# Patient Record
Sex: FEMALE | Race: WHITE | NOT HISPANIC OR LATINO | Employment: OTHER | URBAN - METROPOLITAN AREA
[De-identification: names, ages, dates, MRNs, and addresses within clinical notes are randomized per-mention and may not be internally consistent; named-entity substitution may affect disease eponyms.]

---

## 2017-01-19 ENCOUNTER — OFFICE VISIT (OUTPATIENT)
Dept: BARIATRICS | Facility: CLINIC | Age: 72
End: 2017-01-19
Payer: MEDICARE

## 2017-01-19 VITALS
DIASTOLIC BLOOD PRESSURE: 64 MMHG | BODY MASS INDEX: 39.92 KG/M2 | HEIGHT: 63 IN | HEART RATE: 76 BPM | WEIGHT: 225.31 LBS | SYSTOLIC BLOOD PRESSURE: 110 MMHG

## 2017-01-19 DIAGNOSIS — I10 ESSENTIAL HYPERTENSION: Primary | ICD-10-CM

## 2017-01-19 DIAGNOSIS — M17.11 PRIMARY LOCALIZED OSTEOARTHROSIS, LOWER LEG, RIGHT: ICD-10-CM

## 2017-01-19 DIAGNOSIS — M17.0 OSTEOARTHRITIS OF BOTH KNEES, UNSPECIFIED OSTEOARTHRITIS TYPE: ICD-10-CM

## 2017-01-19 PROCEDURE — 99213 OFFICE O/P EST LOW 20 MIN: CPT | Mod: S$PBB,,, | Performed by: INTERNAL MEDICINE

## 2017-01-19 PROCEDURE — 99999 PR PBB SHADOW E&M-EST. PATIENT-LVL III: CPT | Mod: PBBFAC,,, | Performed by: INTERNAL MEDICINE

## 2017-01-19 PROCEDURE — 99213 OFFICE O/P EST LOW 20 MIN: CPT | Mod: PBBFAC | Performed by: INTERNAL MEDICINE

## 2017-01-19 RX ORDER — TOPIRAMATE 50 MG/1
50 TABLET, FILM COATED ORAL 2 TIMES DAILY
Qty: 180 TABLET | Refills: 1 | Status: ON HOLD | OUTPATIENT
Start: 2017-01-19 | End: 2018-03-07 | Stop reason: HOSPADM

## 2017-01-19 NOTE — MR AVS SNAPSHOT
[facility] - Bariatric Surgery  [address]  [facility] [address]  Phone: [phone]  Fax: [phone]                  Coby Atkinson   2017 1:30 PM   Office Visit    Description:  Female : 1945   Provider:  Cheryl Dunn MD   Department:  [facility] - Bariatric Surgery           Reason for Visit     Follow-up           Diagnoses this Visit        Comments    Essential hypertension    -  Primary     Primary localized osteoarthrosis, lower leg, right         Osteoarthritis of both knees, unspecified osteoarthritis type         Obesity, Class II, BMI 35-39.9, with comorbidity                To Do List           Future Appointments        Provider Department Dept Phone    3/9/2017 2:00 PM Cheryl Dunn MD [facility] Bariatric Surgery [phone]      Goals (5 Years of Data)     None      Follow-Up and Disposition     Return in about 3 months (around 2017).       These Medications        Disp Refills Start End    topiramate (TOPAMAX) 50 MG tablet 180 tablet 1 2017     Take 1 tablet (50 mg total) by mouth 2 (two) times daily. - Oral    [facility]: [facility] [address] [place] [address] AT [facility] Ph #: [phone]       Notes to Pharmacy: **Patient requests 90 days supply**      [facility]     [facility] On Call Nurse Care Line -  Assistance  Registered nurses in the [facility] provide clinical advisement, health education, appointment booking, and other advisory services.  Call for this free service at [phone].             Medications           Message regarding Medications     Verify the changes and/or additions to your medication regime listed below are the same as discussed with your clinician today.  If any of these changes or additions are incorrect, please notify your healthcare provider.             Verify that the below list of medications is an accurate  "representation of the medications you are currently taking.  If none reported, the list may be blank. If incorrect, please contact your healthcare provider. Carry this list with you in case of emergency.           Current Medications     amlodipine (NORVASC) 10 MG tablet Take 1 tablet (10 mg total) by mouth once daily.    escitalopram oxalate (LEXAPRO) 20 MG tablet TAKE 1 TABLET BY MOUTH ONCE DAILY    estrogens, conjugated, (PREMARIN) 0.45 MG tablet Take 1 tablet (0.45 mg total) by mouth once daily.    furosemide (LASIX) 20 MG tablet TAKE 1 TABLET BY MOUTH TWICE DAILY; MAY TAKE ADDITIONAL DOSE DAILY AS NEEDED FOR SWELLING    losartan-hydrochlorothiazide 100-25 mg (HYZAAR) 100-25 mg per tablet Take 1 tablet by mouth once daily.    meloxicam (MOBIC) 7.5 MG tablet TAKE 1 TABLET BY MOUTH EVERY DAY    meloxicam (MOBIC) 7.5 MG tablet TAKE 1 TABLET BY MOUTH EVERY DAY    metoprolol succinate (TOPROL-XL) 25 MG 24 hr tablet TAKE 1 TABLET BY MOUTH ONCE DAILY    metoprolol succinate (TOPROL-XL) 25 MG 24 hr tablet TAKE 1 TABLET BY MOUTH ONCE DAILY    naproxen (NAPROSYN) 500 MG tablet Take 1 tablet (500 mg total) by mouth 2 (two) times daily with meals.    pantoprazole (PROTONIX) 40 MG tablet Take 1 tablet (40 mg total) by mouth once daily.    potassium chloride (MICRO-K) 10 MEQ CpSR Take 2 capsules (20 mEq total) by mouth once daily.    topiramate (TOPAMAX) 50 MG tablet Take 1 tablet (50 mg total) by mouth 2 (two) times daily.           Clinical Reference Information           Vital Signs - Last Recorded  Most recent update: 1/19/2017  1:28 PM by Ismael Montoya MA    BP Pulse Ht Wt BMI    110/64 76 5' 3" (1.6 m) 102.2 kg (225 lb 5 oz) 39.91 kg/m2      Blood Pressure          Most Recent Value    BP  110/64      Allergies as of 1/19/2017     Codeine    Ciprofloxacin      Immunizations Administered on Date of Encounter - 1/19/2017     None      Instructions    No soda, sweet tea, juices or lemonade    Walking 5-10 minutes 3-4 " times day.     3 meals a day made up from the following:    Proteins: (1/2 cup scoop)  Any meat, fish, shell fish, eggs, cottage cheese    Vegetables: (1 cup scoop- can have 2 serving a meal)  Any green vegetables, tomatoes, cauliflower, mushrooms, squash or zuchinni    Fruit: (1/2 cup scoop)  Citrus fruits, apples, peaches, melon, pineapple, berries      Starches: (1/2 cup)  Bread (1 slice), rice, noodles, potatoes, grains    1 time a day  Dairy:  Cheese- 1 slice, milk 8 oz, yogurt - plain 4 oz  Fat: 2 tsp olive oil, 1 pat butter/margerine, or 1/4 avocado    1 Snack a day: 1/2 cup protein, 1/2 cup starch and 1/2 cup fruit or vegetable.     Patient was informed that topiramate is used for migraine prevention and seizures. Weight loss is a common side effect that is well documented. She understands this. She was informed of the potential side effects such as serious and possibly fatal rash in which case the medication should be discontinued immediately. Paresthesias, forgetfulness, fatigue, kidney stones, GI symptoms, and changes in lab values such as electrolytes, blood counts and kidney function.    Return in about 6 weeks

## 2017-01-19 NOTE — PROGRESS NOTES
"Subjective:       Patient ID: Coby Atkinson is a 71 y.o. female.    Chief Complaint: Follow-up    HPI Comments: Pt here today for follow up. Has lost 20 lbs, 9 since last OV.. BP remains normal.  She has been on the topiramate.   She has been following her eating plan measuring portions,  Her brother , and she was down. She has not been walking.  Plans [place] in the mall. .      Review of Systems   Constitutional: Negative for chills and fever.   Respiratory: Positive for shortness of breath.         + snores. PSG pending   Cardiovascular: Positive for leg swelling. Negative for chest pain.   Gastrointestinal: Negative for constipation and diarrhea.        On PPI. ocasional indigestion. None recently     Endocrine: Positive for cold intolerance.   Musculoskeletal: Positive for arthralgias. Negative for back pain.        Knees   Neurological: Positive for light-headedness. Negative for dizziness.   Psychiatric/Behavioral: Positive for dysphoric mood. The patient is nervous/anxious.         Under control with medication       Objective:       Visit Vitals    /64    Pulse 76    Ht 5' 3" (1.6 m)    Wt 102.2 kg (225 lb 5 oz)    BMI 39.91 kg/m2       Physical Exam   Constitutional: She is oriented to person, place, and time. She appears well-developed and well-nourished. No distress.   Morbidly obese       HENT:   Head: Normocephalic and atraumatic.   Eyes: EOM are normal. Pupils are equal, round, and reactive to light.   Neck: Normal range of motion. Neck supple.   Cardiovascular: Normal rate and normal heart sounds.  Exam reveals no gallop and no friction rub.    No murmur heard.  Pulmonary/Chest: Effort normal and breath sounds normal. No respiratory distress. She has no wheezes.   Musculoskeletal: Normal range of motion. She exhibits no edema.   Neurological: She is alert and oriented to person, place, and time. No cranial nerve deficit.   Skin: Skin is warm and dry. No erythema.   Psychiatric: " She has a normal mood and affect. Her behavior is normal. Judgment normal.   Vitals reviewed.      Assessment:           1. Essential hypertension    2. Primary localized osteoarthrosis, lower leg, right    3. Osteoarthritis of both knees, unspecified osteoarthritis type    4. Obesity, Class II, BMI 35-39.9, with comorbidity        Plan:               Coby was seen today for follow-up.    Diagnoses and all orders for this visit:    Essential hypertension  The current medical regimen is effective;  continue present plan and medications.    Primary localized osteoarthrosis, lower leg, right  Increase low impact activity as tolerated.  Avoid high impact activity, very heavy lifting or other exercise regimens that may cause discomfort.     Osteoarthritis of both knees, unspecified osteoarthritis type      Obesity, Class II, BMI 35-39.9, with comorbidity    Other orders  -     topiramate (TOPAMAX) 50 MG tablet; Take 1 tablet (50 mg total) by mouth 2 (two) times daily.      Doing well. tolerating meds without SE. Continue portion control diet      No soda, sweet tea, juices or lemonade    Walking 5 minutes 3-4 times day.     3 meals a day made up from the following:    Proteins: (1/2 cup scoop)  Any meat, fish, shell fish, eggs, cottage cheese    Vegetables: (1 cup scoop- can have 2 serving a meal)  Any green vegetables, tomatoes, cauliflower, mushrooms, squash or zuchinni    Fruit: (1/2 cup scoop)  Citrus fruits, apples, peaches, melon, pineapple, berries      Starches: (1/2 cup)  Bread (1 slice), rice, noodles, potatoes, grains    1 time a day  Dairy:  Cheese- 1 slice, milk 8 oz, yogurt - plain 4 oz  Fat: 2 tsp olive oil, 1 pat butter/margerine, or 1/4 avocado    1 Snack a day: 1/2 cup protein, 1/2 cup starch and 1/2 cup fruit or vegetable.     Patient was informed that topiramate is used for migraine prevention and seizures. Weight loss is a common side effect that is well documented. She understands this. She was  informed of the potential side effects such as serious and possibly fatal rash in which case the medication should be discontinued immediately. Paresthesias, forgetfulness, fatigue, kidney stones, GI symptoms, and changes in lab values such as electrolytes, blood counts and kidney function.    Return in about 6 weeks

## 2017-01-19 NOTE — PATIENT INSTRUCTIONS
No soda, sweet tea, juices or lemonade    Walking 5-10 minutes 3-4 times day.     3 meals a day made up from the following:    Proteins: (1/2 cup scoop)  Any meat, fish, shell fish, eggs, cottage cheese    Vegetables: (1 cup scoop- can have 2 serving a meal)  Any green vegetables, tomatoes, cauliflower, mushrooms, squash or zuchinni    Fruit: (1/2 cup scoop)  Citrus fruits, apples, peaches, melon, pineapple, berries      Starches: (1/2 cup)  Bread (1 slice), rice, noodles, potatoes, grains    1 time a day  Dairy:  Cheese- 1 slice, milk 8 oz, yogurt - plain 4 oz  Fat: 2 tsp olive oil, 1 pat butter/margerine, or 1/4 avocado    1 Snack a day: 1/2 cup protein, 1/2 cup starch and 1/2 cup fruit or vegetable.     Patient was informed that topiramate is used for migraine prevention and seizures. Weight loss is a common side effect that is well documented. She understands this. She was informed of the potential side effects such as serious and possibly fatal rash in which case the medication should be discontinued immediately. Paresthesias, forgetfulness, fatigue, kidney stones, GI symptoms, and changes in lab values such as electrolytes, blood counts and kidney function.    Return in about 6 weeks

## 2017-02-16 ENCOUNTER — TELEPHONE (OUTPATIENT)
Dept: INTERNAL MEDICINE | Facility: CLINIC | Age: 72
End: 2017-02-16

## 2017-02-16 DIAGNOSIS — N95.9 POSTMENOPAUSAL SYMPTOMS: Primary | ICD-10-CM

## 2017-02-16 NOTE — TELEPHONE ENCOUNTER
----- Message from Nayana Deleon MA sent at 2/15/2017 10:30 AM CST -----  Premarin .45 requires prior auth from insurance  Per jovi [phone].    Medicare phone   Id#[id number]    (patient may have to pay for rx- they consider it high risk at her age)

## 2017-03-09 ENCOUNTER — OFFICE VISIT (OUTPATIENT)
Dept: BARIATRICS | Facility: CLINIC | Age: 72
End: 2017-03-09
Payer: MEDICARE

## 2017-03-09 VITALS
WEIGHT: 223.75 LBS | HEIGHT: 63 IN | SYSTOLIC BLOOD PRESSURE: 124 MMHG | HEART RATE: 78 BPM | DIASTOLIC BLOOD PRESSURE: 72 MMHG | BODY MASS INDEX: 39.64 KG/M2

## 2017-03-09 DIAGNOSIS — M17.0 OSTEOARTHRITIS OF BOTH KNEES, UNSPECIFIED OSTEOARTHRITIS TYPE: ICD-10-CM

## 2017-03-09 DIAGNOSIS — E66.01 SEVERE OBESITY (BMI 35.0-39.9) WITH COMORBIDITY: Primary | ICD-10-CM

## 2017-03-09 PROCEDURE — 99999 PR PBB SHADOW E&M-EST. PATIENT-LVL IV: CPT | Mod: PBBFAC,,, | Performed by: INTERNAL MEDICINE

## 2017-03-09 PROCEDURE — 99214 OFFICE O/P EST MOD 30 MIN: CPT | Mod: PBBFAC | Performed by: INTERNAL MEDICINE

## 2017-03-09 PROCEDURE — 99213 OFFICE O/P EST LOW 20 MIN: CPT | Mod: S$PBB,,, | Performed by: INTERNAL MEDICINE

## 2017-03-09 NOTE — MR AVS SNAPSHOT
[facility] - Bariatric Surgery  [address]  [facility] [address]  Phone: [phone]  Fax: [phone]                  Coby Atkinson   3/9/2017 2:00 PM   Office Visit    Description:  Female : 1945   Provider:  Cheryl Dunn MD   Department:  [facility] - Bariatric Surgery           Reason for Visit     Follow-up           Diagnoses this Visit        Comments    Severe obesity (BMI 35.0-39.9) with comorbidity    -  Primary     Osteoarthritis of both knees, unspecified osteoarthritis type                To Do List           Future Appointments        Provider Department Dept Phone    3/28/2017 11:15 AM Ginny Pham MD [place] - OB/ -[phone]    2017 2:45 PM Cheryl Dunn MD [facility] - Bariatric Surgery [phone]      Goals (5 Years of Data)     None      [facility]     [facility] On Call Nurse Care Line -  Assistance  Registered nurses in the [facility] On Call Center provide clinical advisement, health education, appointment booking, and other advisory services.  Call for this free service at [phone].             Medications           Message regarding Medications     Verify the changes and/or additions to your medication regime listed below are the same as discussed with your clinician today.  If any of these changes or additions are incorrect, please notify your healthcare provider.             Verify that the below list of medications is an accurate representation of the medications you are currently taking.  If none reported, the list may be blank. If incorrect, please contact your healthcare provider. Carry this list with you in case of emergency.           Current Medications     amlodipine (NORVASC) 10 MG tablet Take 1 tablet (10 mg total) by mouth once daily.    escitalopram oxalate (LEXAPRO) 20 MG tablet TAKE 1 TABLET BY MOUTH ONCE DAILY    estrogens, conjugated, (PREMARIN) 0.45 MG tablet Take 1 tablet (0.45 mg total) by mouth once  "daily.    furosemide (LASIX) 20 MG tablet TAKE 1 TABLET BY MOUTH TWICE DAILY; MAY TAKE ADDITIONAL DOSE DAILY AS NEEDED FOR SWELLING    losartan-hydrochlorothiazide 100-25 mg (HYZAAR) 100-25 mg per tablet Take 1 tablet by mouth once daily.    meloxicam (MOBIC) 7.5 MG tablet TAKE 1 TABLET BY MOUTH EVERY DAY    meloxicam (MOBIC) 7.5 MG tablet TAKE 1 TABLET BY MOUTH EVERY DAY    metoprolol succinate (TOPROL-XL) 25 MG 24 hr tablet TAKE 1 TABLET BY MOUTH ONCE DAILY    metoprolol succinate (TOPROL-XL) 25 MG 24 hr tablet TAKE 1 TABLET BY MOUTH ONCE DAILY    naproxen (NAPROSYN) 500 MG tablet Take 1 tablet (500 mg total) by mouth 2 (two) times daily with meals.    pantoprazole (PROTONIX) 40 MG tablet Take 1 tablet (40 mg total) by mouth once daily.    potassium chloride (MICRO-K) 10 MEQ CpSR Take 2 capsules (20 mEq total) by mouth once daily.    topiramate (TOPAMAX) 50 MG tablet Take 1 tablet (50 mg total) by mouth 2 (two) times daily.           Clinical Reference Information           Your Vitals Were     BP Pulse Height Weight BMI    124/72 78 5' 3" (1.6 m) 101.5 kg (223 lb 12.3 oz) 39.64 kg/m2      Blood Pressure          Most Recent Value    BP  124/72      Allergies as of 3/9/2017     Codeine    Ciprofloxacin      Immunizations Administered on Date of Encounter - 3/9/2017     None      Instructions    No soda, sweet tea, juices or lemonade     Walking 5 minutes 3-4 times day.      3 meals a day made up from the following:     Proteins: (1/2 cup scoop)  Any meat, fish, shell fish, eggs, cottage cheese     Vegetables: (1 cup scoop- can have 2 serving a meal)  Any green vegetables, tomatoes, cauliflower, mushrooms, squash or zuchinni     Fruit: (1/2 cup scoop)  Citrus fruits, apples, peaches, melon, pineapple, berries        Starches: (1/2 cup)  Bread (1 slice), rice, noodles, potatoes, grains     1 time a day  Dairy:  Cheese- 1 slice, milk 8 oz, yogurt - plain 4 oz  Fat: 2 tsp olive oil, 1 pat butter/margerine, or 1/4 " avocado     1 Snack a day: 1/2 cup protein, 1/2 cup starch and 1/2 cup fruit or vegetable.      Patient was informed that topiramate is used for migraine prevention and seizures. Weight loss is a common side effect that is well documented. She understands this. She was informed of the potential side effects such as serious and possibly fatal rash in which case the medication should be discontinued immediately. Paresthesias, forgetfulness, fatigue, kidney stones, GI symptoms, and changes in lab values such as electrolytes, blood counts and kidney function.     Return in about 6 weeks        Language Assistance Services     ATTENTION: Language assistance services are available, free of charge. Please call [phone].      ATENCIÓN: Si kuldipla rena, tiene a carrasco disposición servicios gratuitos de asistencia lingüística. Llame al [phone].     HARDIK Ý: N?u b?n nói Ti?ng Vi?t, có các d?ch v? h? tr? ngôn ng? mi?n phí dành cho b?n. G?i s? [phone].         [facility] complies with applicable Federal civil rights laws and does not discriminate on the basis of race, color, national origin, age, disability, or sex.

## 2017-03-09 NOTE — PROGRESS NOTES
"Subjective:       Patient ID: Coby Atkinson is a 71 y.o. female.    Chief Complaint: Follow-up    HPI Comments: Pt here today for follow up. Has lost 22 lbs,2 since last OV.. BP remains normal.  She has been on the topiramate.   She has been following her eating plan measuring portions. 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 Has not done as much walking.       Review of Systems   Constitutional: Negative for chills and fever.   Respiratory: Positive for shortness of breath.         + snores. PSG pending   Cardiovascular: Positive for leg swelling. Negative for chest pain.   Gastrointestinal: Negative for constipation and diarrhea.        On PPI. ocasional indigestion. None recently     Endocrine: Positive for cold intolerance.   Musculoskeletal: Positive for arthralgias. Negative for back pain.        Knees   Neurological: Positive for light-headedness. Negative for dizziness.   Psychiatric/Behavioral: Positive for dysphoric mood. The patient is nervous/anxious.         Under control with medication       Objective:       /72  Pulse 78  Ht 5' 3" (1.6 m)  Wt 101.5 kg (223 lb 12.3 oz)  BMI 39.64 kg/m2    Physical Exam   Constitutional: She is oriented to person, place, and time. She appears well-developed and well-nourished. No distress.   Morbidly obese       HENT:   Head: Normocephalic and atraumatic.   Eyes: EOM are normal. Pupils are equal, round, and reactive to light.   Neck: Normal range of motion. Neck supple.   Cardiovascular: Normal rate and normal heart sounds.  Exam reveals no gallop and no friction rub.    No murmur heard.  Pulmonary/Chest: Effort normal and breath sounds normal. No respiratory distress. She has no wheezes.   Musculoskeletal: Normal range of motion. She exhibits no edema.   Neurological: She is alert and oriented to person, place, and time. No cranial nerve deficit.   Skin: Skin is warm and dry. No erythema.   Psychiatric: She has a normal mood and " affect. Her behavior is normal. Judgment normal.   Vitals reviewed.      Assessment:           1. Severe obesity (BMI 35.0-39.9) with comorbidity    2. Osteoarthritis of both knees, unspecified osteoarthritis type        Plan:           1. Severe obesity (BMI 35.0-39.9) with comorbidity  DOing well. Decrease starches and increase walking    2. Osteoarthritis of both knees, unspecified osteoarthritis type  Increase low impact activity as tolerated.  Avoid high impact activity, very heavy lifting or other exercise regimens that may cause discomfort.             Doing well. tolerating meds without SE. Continue portion control diet      No soda, sweet tea, juices or lemonade    Walking 5 minutes 3-4 times day.     3 meals a day made up from the following:    Proteins: (1/2 cup scoop)  Any meat, fish, shell fish, eggs, cottage cheese    Vegetables: (1 cup scoop- can have 2 serving a meal)  Any green vegetables, tomatoes, cauliflower, mushrooms, squash or zuchinni    Fruit: (1/2 cup scoop)  Citrus fruits, apples, peaches, melon, pineapple, berries      Starches: (1/2 cup)  Bread (1 slice), rice, noodles, potatoes, grains    1 time a day  Dairy:  Cheese- 1 slice, milk 8 oz, yogurt - plain 4 oz  Fat: 2 tsp olive oil, 1 pat butter/margerine, or 1/4 avocado    1 Snack a day: 1/2 cup protein, 1/2 cup starch and 1/2 cup fruit or vegetable.     Patient was informed that topiramate is used for migraine prevention and seizures. Weight loss is a common side effect that is well documented. She understands this. She was informed of the potential side effects such as serious and possibly fatal rash in which case the medication should be discontinued immediately. Paresthesias, forgetfulness, fatigue, kidney stones, GI symptoms, and changes in lab values such as electrolytes, blood counts and kidney function.    Return in about 6 weeks

## 2017-03-09 NOTE — PATIENT INSTRUCTIONS
No soda, sweet tea, juices or lemonade     Walking 5 minutes 3-4 times day.      3 meals a day made up from the following:     Proteins: (1/2 cup scoop)  Any meat, fish, shell fish, eggs, cottage cheese     Vegetables: (1 cup scoop- can have 2 serving a meal)  Any green vegetables, tomatoes, cauliflower, mushrooms, squash or zuchinni     Fruit: (1/2 cup scoop)  Citrus fruits, apples, peaches, melon, pineapple, berries        Starches: (1/2 cup)  Bread (1 slice), rice, noodles, potatoes, grains     1 time a day  Dairy:  Cheese- 1 slice, milk 8 oz, yogurt - plain 4 oz  Fat: 2 tsp olive oil, 1 pat butter/margerine, or 1/4 avocado     1 Snack a day: 1/2 cup protein, 1/2 cup starch and 1/2 cup fruit or vegetable.      Patient was informed that topiramate is used for migraine prevention and seizures. Weight loss is a common side effect that is well documented. She understands this. She was informed of the potential side effects such as serious and possibly fatal rash in which case the medication should be discontinued immediately. Paresthesias, forgetfulness, fatigue, kidney stones, GI symptoms, and changes in lab values such as electrolytes, blood counts and kidney function.     Return in about 6 weeks

## 2017-03-27 RX ORDER — MELOXICAM 7.5 MG/1
TABLET ORAL
Qty: 90 TABLET | Refills: 0 | Status: SHIPPED | OUTPATIENT
Start: 2017-03-27 | End: 2017-04-27

## 2017-04-20 ENCOUNTER — OFFICE VISIT (OUTPATIENT)
Dept: BARIATRICS | Facility: CLINIC | Age: 72
End: 2017-04-20
Payer: MEDICARE

## 2017-04-20 VITALS
BODY MASS INDEX: 38.71 KG/M2 | DIASTOLIC BLOOD PRESSURE: 78 MMHG | HEIGHT: 63 IN | SYSTOLIC BLOOD PRESSURE: 122 MMHG | HEART RATE: 78 BPM | WEIGHT: 218.5 LBS

## 2017-04-20 DIAGNOSIS — E66.01 SEVERE OBESITY (BMI 35.0-39.9) WITH COMORBIDITY: ICD-10-CM

## 2017-04-20 DIAGNOSIS — M17.0 OSTEOARTHRITIS OF BOTH KNEES, UNSPECIFIED OSTEOARTHRITIS TYPE: Primary | ICD-10-CM

## 2017-04-20 PROCEDURE — 99999 PR PBB SHADOW E&M-EST. PATIENT-LVL III: CPT | Mod: PBBFAC,,, | Performed by: INTERNAL MEDICINE

## 2017-04-20 PROCEDURE — 99213 OFFICE O/P EST LOW 20 MIN: CPT | Mod: S$PBB,,, | Performed by: INTERNAL MEDICINE

## 2017-04-20 PROCEDURE — 99213 OFFICE O/P EST LOW 20 MIN: CPT | Mod: PBBFAC | Performed by: INTERNAL MEDICINE

## 2017-04-20 NOTE — PROGRESS NOTES
"Subjective:       Patient ID: Coby Atkinson is a 71 y.o. female.    Chief Complaint: Follow-up    HPI Comments: Pt here today for follow up. Has lost 29 lbs,7 since last OV.. BP remains normal.  She has been on the topiramate.   She has been following her eating plan measuring portions. She has started with some walking. Getting ready for trip to [place] that will require a good deal of walking.     Review of Systems   Constitutional: Negative for chills and fever.   Respiratory: Positive for shortness of breath.         + snores. PSG pending   Cardiovascular: Positive for leg swelling. Negative for chest pain.   Gastrointestinal: Negative for constipation and diarrhea.        On PPI. ocasional indigestion. None recently     Endocrine: Positive for cold intolerance.   Musculoskeletal: Positive for arthralgias. Negative for back pain.        Knees   Neurological: Positive for light-headedness. Negative for dizziness.   Psychiatric/Behavioral: Positive for dysphoric mood. The patient is nervous/anxious.         Under control with medication       Objective:       /78  Pulse 78  Ht 5' 3" (1.6 m)  Wt 99.1 kg (218 lb 7.6 oz)  BMI 38.7 kg/m2    Physical Exam   Constitutional: She is oriented to person, place, and time. She appears well-developed and well-nourished. No distress.   Morbidly obese       HENT:   Head: Normocephalic and atraumatic.   Eyes: EOM are normal. Pupils are equal, round, and reactive to light.   Neck: Normal range of motion. Neck supple.   Cardiovascular: Normal rate and normal heart sounds.  Exam reveals no gallop and no friction rub.    No murmur heard.  Pulmonary/Chest: Effort normal and breath sounds normal. No respiratory distress. She has no wheezes.   Musculoskeletal: Normal range of motion. She exhibits no edema.   Neurological: She is alert and oriented to person, place, and time. No cranial nerve deficit.   Skin: Skin is warm and dry. No erythema.   Psychiatric: She has a " normal mood and affect. Her behavior is normal. Judgment normal.   Vitals reviewed.      Assessment:           1. Osteoarthritis of both knees, unspecified osteoarthritis type    2. Severe obesity (BMI 35.0-39.9) with comorbidity        Plan:               1. Osteoarthritis of both knees, unspecified osteoarthritis type  Increase low impact activity as tolerated.  Avoid high impact activity, very heavy lifting or other exercise regimens that may cause discomfort.     2. Severe obesity (BMI 35.0-39.9) with comorbidity    Doing well. Consistency emphasized.         Doing well. tolerating meds without SE. Continue portion control diet      No soda, sweet tea, juices or lemonade    Walking 5 minutes 3-4 times day.     3 meals a day made up from the following:    Proteins: (1/2 cup scoop)  Any meat, fish, shell fish, eggs, cottage cheese    Vegetables: (1 cup scoop- can have 2 serving a meal)  Any green vegetables, tomatoes, cauliflower, mushrooms, squash or zuchinni    Fruit: (1/2 cup scoop)  Citrus fruits, apples, peaches, melon, pineapple, berries      Starches: (1/2 cup) no more than 2 times a day  Bread (1 slice), rice, noodles, potatoes, grains    1 time a day  Dairy:  Cheese- 1 slice, milk 8 oz, yogurt - plain 4 oz  Fat: 2 tsp olive oil, 1 pat butter/margerine, or 1/4 avocado    1 Snack a day: 1/2 cup protein, 1/2 cup starch and 1/2 cup fruit or vegetable.     Patient was informed that topiramate is used for migraine prevention and seizures. Weight loss is a common side effect that is well documented. She understands this. She was informed of the potential side effects such as serious and possibly fatal rash in which case the medication should be discontinued immediately. Paresthesias, forgetfulness, fatigue, kidney stones, GI symptoms, and changes in lab values such as electrolytes, blood counts and kidney function.    Return in about 6-8 weeks

## 2017-04-20 NOTE — PATIENT INSTRUCTIONS
No soda, sweet tea, juices or lemonade    Walking 10 minutes 3-4 times day.     3 meals a day made up from the following:    Proteins: (1/2 cup scoop)  Any meat, fish, shell fish, eggs, cottage cheese    Vegetables: (1 cup scoop- can have 2 serving a meal)  Any green vegetables, tomatoes, cauliflower, mushrooms, squash or zuchinni    Fruit: (1/2 cup scoop)  Citrus fruits, apples, peaches, melon, pineapple, berries      Starches: (1/2 cup)- no more than 2 times a day.   Bread (1 slice), rice, noodles, potatoes, grains    1 time a day  Dairy:  Cheese- 1 slice, milk 8 oz, yogurt - plain 4 oz  Fat: 2 tsp olive oil, 1 pat butter/margerine, or 1/4 avocado    1 Snack a day: 1/2 cup protein, 1/2 cup starch and 1/2 cup fruit or vegetable.     Patient was informed that topiramate is used for migraine prevention and seizures. Weight loss is a common side effect that is well documented. She understands this. She was informed of the potential side effects such as serious and possibly fatal rash in which case the medication should be discontinued immediately. Paresthesias, forgetfulness, fatigue, kidney stones, GI symptoms, and changes in lab values such as electrolytes, blood counts and kidney function.    Return in about 6-8 weeks

## 2017-04-20 NOTE — MR AVS SNAPSHOT
[facility] - Bariatric Surgery  [address]  [facility] [address]  Phone: [phone]  Fax: [phone]                  Coby Atkinson   2017 2:45 PM   Office Visit    Description:  Female : 1945   Provider:  Cheryl Dunn MD   Department:  [facility] - Bariatric Surgery           Reason for Visit     Follow-up           Diagnoses this Visit        Comments    Osteoarthritis of both knees, unspecified osteoarthritis type    -  Primary     Severe obesity (BMI 35.0-39.9) with comorbidity                To Do List           Future Appointments        Provider Department Dept Phone    2017 1:30 PM Ginny Pham MD [place] - OB/ -[phone]    2017 1:45 PM Cheryl Dunn MD [facility] - Bariatric Surgery [phone]      Goals (5 Years of Data)     None      Follow-Up and Disposition     Return in about 2 months (around 2017).      [facility] On Call     [facility] On Call Nurse Care Line -  Assistance  Unless otherwise directed by your provider, please contact [facility], our nurse care line that is available for  assistance.     Registered nurses in the [facility] On Call Center provide: appointment scheduling, clinical advisement, health education, and other advisory services.  Call: [phone] (toll free)               Medications           Message regarding Medications     Verify the changes and/or additions to your medication regime listed below are the same as discussed with your clinician today.  If any of these changes or additions are incorrect, please notify your healthcare provider.             Verify that the below list of medications is an accurate representation of the medications you are currently taking.  If none reported, the list may be blank. If incorrect, please contact your healthcare provider. Carry this list with you in case of emergency.           Current Medications     amlodipine (NORVASC) 10 MG tablet Take 1 tablet  "(10 mg total) by mouth once daily.    escitalopram oxalate (LEXAPRO) 20 MG tablet TAKE 1 TABLET BY MOUTH ONCE DAILY    estrogens, conjugated, (PREMARIN) 0.45 MG tablet Take 1 tablet (0.45 mg total) by mouth once daily.    furosemide (LASIX) 20 MG tablet TAKE 1 TABLET BY MOUTH TWICE DAILY; MAY TAKE ADDITIONAL DOSE DAILY AS NEEDED FOR SWELLING    losartan-hydrochlorothiazide 100-25 mg (HYZAAR) 100-25 mg per tablet Take 1 tablet by mouth once daily.    meloxicam (MOBIC) 7.5 MG tablet TAKE 1 TABLET BY MOUTH EVERY DAY    meloxicam (MOBIC) 7.5 MG tablet TAKE 1 TABLET BY MOUTH EVERY DAY    metoprolol succinate (TOPROL-XL) 25 MG 24 hr tablet TAKE 1 TABLET BY MOUTH ONCE DAILY    metoprolol succinate (TOPROL-XL) 25 MG 24 hr tablet TAKE 1 TABLET BY MOUTH ONCE DAILY    naproxen (NAPROSYN) 500 MG tablet Take 1 tablet (500 mg total) by mouth 2 (two) times daily with meals.    pantoprazole (PROTONIX) 40 MG tablet Take 1 tablet (40 mg total) by mouth once daily.    potassium chloride (MICRO-K) 10 MEQ CpSR Take 2 capsules (20 mEq total) by mouth once daily.    topiramate (TOPAMAX) 50 MG tablet Take 1 tablet (50 mg total) by mouth 2 (two) times daily.           Clinical Reference Information           Your Vitals Were     BP Pulse Height Weight BMI    122/78 78 5' 3" (1.6 m) 99.1 kg (218 lb 7.6 oz) 38.7 kg/m2      Blood Pressure          Most Recent Value    BP  122/78      Allergies as of 4/20/2017     Codeine    Ciprofloxacin      Immunizations Administered on Date of Encounter - 4/20/2017     None      Instructions    No soda, sweet tea, juices or lemonade    Walking 10 minutes 3-4 times day.     3 meals a day made up from the following:    Proteins: (1/2 cup scoop)  Any meat, fish, shell fish, eggs, cottage cheese    Vegetables: (1 cup scoop- can have 2 serving a meal)  Any green vegetables, tomatoes, cauliflower, mushrooms, squash or zuchinni    Fruit: (1/2 cup scoop)  Citrus fruits, apples, peaches, melon, pineapple, " berries      Starches: (1/2 cup)- no more than 2 times a day.   Bread (1 slice), rice, noodles, potatoes, grains    1 time a day  Dairy:  Cheese- 1 slice, milk 8 oz, yogurt - plain 4 oz  Fat: 2 tsp olive oil, 1 pat butter/margerine, or 1/4 avocado    1 Snack a day: 1/2 cup protein, 1/2 cup starch and 1/2 cup fruit or vegetable.     Patient was informed that topiramate is used for migraine prevention and seizures. Weight loss is a common side effect that is well documented. She understands this. She was informed of the potential side effects such as serious and possibly fatal rash in which case the medication should be discontinued immediately. Paresthesias, forgetfulness, fatigue, kidney stones, GI symptoms, and changes in lab values such as electrolytes, blood counts and kidney function.    Return in about 6-8 weeks        Language Assistance Services     ATTENTION: Language assistance services are available, free of charge. Please call [phone].      ATENCIÓN: Si luis daniel chase, tiene a carrasco disposición servicios gratuitos de asistencia lingüística. Llame al [phone].     HARDIK Ý: N?u b?n nói Ti?ng Vi?t, có các d?ch v? h? tr? ngôn ng? mi?n phí dành cho b?n. G?i s? [phone].         [facility] complies with applicable Federal civil rights laws and does not discriminate on the basis of race, color, national origin, age, disability, or sex.

## 2017-04-27 ENCOUNTER — OFFICE VISIT (OUTPATIENT)
Dept: OBSTETRICS AND GYNECOLOGY | Facility: CLINIC | Age: 72
End: 2017-04-27
Payer: MEDICARE

## 2017-04-27 VITALS
HEIGHT: 63 IN | SYSTOLIC BLOOD PRESSURE: 118 MMHG | DIASTOLIC BLOOD PRESSURE: 72 MMHG | BODY MASS INDEX: 38.52 KG/M2 | WEIGHT: 217.38 LBS

## 2017-04-27 DIAGNOSIS — B37.9 CANDIDA INFECTION: ICD-10-CM

## 2017-04-27 DIAGNOSIS — N95.1 MENOPAUSAL SYMPTOMS: ICD-10-CM

## 2017-04-27 DIAGNOSIS — Z01.419 ENCOUNTER FOR GYNECOLOGICAL EXAMINATION WITHOUT ABNORMAL FINDING: Primary | ICD-10-CM

## 2017-04-27 DIAGNOSIS — N95.2 VAGINAL ATROPHY: ICD-10-CM

## 2017-04-27 PROCEDURE — G0101 CA SCREEN;PELVIC/BREAST EXAM: HCPCS | Mod: S$PBB,,, | Performed by: OBSTETRICS & GYNECOLOGY

## 2017-04-27 PROCEDURE — 99213 OFFICE O/P EST LOW 20 MIN: CPT | Mod: PBBFAC,PO | Performed by: OBSTETRICS & GYNECOLOGY

## 2017-04-27 PROCEDURE — 99999 PR PBB SHADOW E&M-EST. PATIENT-LVL III: CPT | Mod: PBBFAC,,, | Performed by: OBSTETRICS & GYNECOLOGY

## 2017-04-27 RX ORDER — NYSTATIN 100000 [USP'U]/G
POWDER TOPICAL
Qty: 60 G | Refills: 3 | Status: SHIPPED | OUTPATIENT
Start: 2017-04-27 | End: 2017-10-04

## 2017-04-27 NOTE — PROGRESS NOTES
"CC: Well woman exam    Coby Atkinson is a 71 y.o. female No obstetric history on file. presents for a well woman exam.  No issues, problems, or complaints.    She does well on HRT and would like vaginal estrogen cream for vaginal sx.  She also has a rash in the skin folds.      Past Medical History:   Diagnosis Date    Cataract     Depression     Edema     GERD (gastroesophageal reflux disease)     Hypertension 10/2/2012    Osteopenia     Thalassemia minor      Past Surgical History:   Procedure Laterality Date    APPENDECTOMY      CATARACT EXTRACTION      COLONOSCOPY N/A 11/5/2016    Procedure: COLONOSCOPY;  Surgeon: Tanner Simental MD;  Location: [address];  Service: Endoscopy;  Laterality: N/A;    HYSTERECTOMY  age 39    fibroids    OOPHORECTOMY      TONSILLECTOMY       Family History   Problem Relation Age of Onset    Hypertension Mother     Cancer Father      skin    Cancer Brother      pancreatic    No Known Problems Sister     No Known Problems Maternal Aunt     No Known Problems Maternal Uncle     No Known Problems Paternal Aunt     No Known Problems Paternal Uncle     No Known Problems Maternal Grandmother     No Known Problems Maternal Grandfather     No Known Problems Paternal Grandmother     No Known Problems Paternal Grandfather     Heart disease Neg Hx     Amblyopia Neg Hx     Blindness Neg Hx     Cataracts Neg Hx     Diabetes Neg Hx     Glaucoma Neg Hx     Macular degeneration Neg Hx     Retinal detachment Neg Hx     Strabismus Neg Hx     Stroke Neg Hx     Thyroid disease Neg Hx      Social History   Substance Use Topics    Smoking status: Never Smoker    Smokeless tobacco: Never Used    Alcohol use No      Comment: rare     OB History     No data available          /71  Ht 5' 3" (1.6 m)  Wt 98.6 kg (217 lb 6 oz)  BMI 38.51 kg/m2    ROS:  GENERAL: Denies weight gain or weight loss. Feeling well overall.   SKIN: Denies rash or lesions. "   HEAD: Denies head injury or headache.   NODES: Denies enlarged lymph nodes.   CHEST: Denies chest pain or shortness of breath.   CARDIOVASCULAR: Denies palpitations or left sided chest pain.   ABDOMEN: No abdominal pain, constipation, diarrhea, nausea, vomiting or rectal bleeding.   URINARY: No frequency, dysuria, hematuria, or burning on urination.  REPRODUCTIVE: See HPI.   BREASTS: The patient performs breast self-examination and denies pain, lumps, or nipple discharge.   HEMATOLOGIC: No easy bruisability or excessive bleeding.   MUSCULOSKELETAL: Denies joint pain or swelling.   NEUROLOGIC: Denies syncope or weakness.   PSYCHIATRIC: Denies depression, anxiety or mood swings.    PE:   APPEARANCE: Well nourished, well developed, in no acute distress.  AFFECT: WNL, alert and oriented x 3.  SKIN: No acne or hirsutism.  NECK: Neck symmetric without masses or thyromegaly.  NODES: No inguinal, cervical, axillary or femoral lymph node enlargement.  CHEST: Good respiratory effort.   ABDOMEN: Soft. No tenderness or masses. No hepatosplenomegaly. No hernias.  BREASTS: Symmetrical, no skin changes or visible lesions. No palpable masses, nipple discharge bilaterally.  PELVIC: Normal external female genitalia without lesions. Normal hair distribution. Adequate perineal body, normal urethral meatus. Vagina atrophic without lesions or discharge. No significant cystocele or rectocele. Bimanual exam shows uterus and cervix to be surgically absent. Adnexa without masses or tenderness.  RECTAL: Rectovaginal exam confirms above with normal sphincter tone, no masses.  EXTREMITIES: No edema.      Diagnosis    ICD-10-CM ICD-9-CM    1. Encounter for gynecological examination without abnormal finding Z01.419 V72.31    2. Menopausal symptoms N95.1 627.2 estrogens, conjugated, (PREMARIN) 0.45 MG tablet   3. Vaginal atrophy N95.2 627.3 conjugated estrogens (PREMARIN) vaginal cream   4. Candida infection B37.9 112.9 nystatin (MYCOSTATIN)  powder         A full discussion of the benefit-risk ratio of hormonal replacement therapy was carried out. Improvement in vasomotor and other climacteric symptoms is discussed, including possible improvements in sleep and mood. Reduction of risk for osteoporosis was explained. We discussed the study data showing increased risk of thrombo-embolic events such as myocardial infarction, stroke and also possibly breast cancer with estrogen replacement, and how this might affect her. The range of side effects such as breast tenderness, weight gain and including possible increases in lifetime risk of breast cancer and possible thrombotic complications was discussed. We also discussed ACOG's recommendation to use hormone replacement therapy for the relief of hot flashes alone and to be on the lowest dose possible for the shortest amount of time.  Alternative such as herbal and soy-based products were reviewed. All of her questions about this therapy were answered.      Patient was counseled today on A.C.S. Pap guidelines and recommendations for yearly pelvic exams, mammograms and monthly self breast exams; to see her PCP for other health maintenance.     F/U PRN

## 2017-04-27 NOTE — MR AVS SNAPSHOT
[place] - OB/ GYN   [facility]  [place] [address]  Phone: [phone]                  Coby Atkinson   2017 1:30 PM   Office Visit    Description:  Female : 1945   Provider:  Ginny Pham MD   Department:  [place] - OB/ GYN           Reason for Visit     Well Woman                To Do List           Future Appointments        Provider Department Dept Phone    2017 1:45 PM Cheryl Dunn MD [facility] - Bariatric Surgery [phone]      Goals (5 Years of Data)     None      [facility] On Call     [facility] On Call Nurse Care Line -  Assistance  Unless otherwise directed by your provider, please contact [facility], our nurse care line that is available for  assistance.     Registered nurses in the [facility] On Call Center provide: appointment scheduling, clinical advisement, health education, and other advisory services.  Call: [phone] (toll free)               Medications           Message regarding Medications     Verify the changes and/or additions to your medication regime listed below are the same as discussed with your clinician today.  If any of these changes or additions are incorrect, please notify your healthcare provider.             Verify that the below list of medications is an accurate representation of the medications you are currently taking.  If none reported, the list may be blank. If incorrect, please contact your healthcare provider. Carry this list with you in case of emergency.           Current Medications     amlodipine (NORVASC) 10 MG tablet Take 1 tablet (10 mg total) by mouth once daily.    escitalopram oxalate (LEXAPRO) 20 MG tablet TAKE 1 TABLET BY MOUTH ONCE DAILY    estrogens, conjugated, (PREMARIN) 0.45 MG tablet Take 1 tablet (0.45 mg total) by mouth once daily.    furosemide (LASIX) 20 MG tablet TAKE 1 TABLET BY MOUTH TWICE DAILY; MAY TAKE ADDITIONAL DOSE DAILY AS NEEDED FOR SWELLING     "losartan-hydrochlorothiazide 100-25 mg (HYZAAR) 100-25 mg per tablet Take 1 tablet by mouth once daily.    meloxicam (MOBIC) 7.5 MG tablet TAKE 1 TABLET BY MOUTH EVERY DAY    metoprolol succinate (TOPROL-XL) 25 MG 24 hr tablet TAKE 1 TABLET BY MOUTH ONCE DAILY    naproxen (NAPROSYN) 500 MG tablet Take 1 tablet (500 mg total) by mouth 2 (two) times daily with meals.    pantoprazole (PROTONIX) 40 MG tablet Take 1 tablet (40 mg total) by mouth once daily.    potassium chloride (MICRO-K) 10 MEQ CpSR Take 2 capsules (20 mEq total) by mouth once daily.    topiramate (TOPAMAX) 50 MG tablet Take 1 tablet (50 mg total) by mouth 2 (two) times daily.           Clinical Reference Information           Your Vitals Were     BP Height Weight BMI       118/72 5' 3" (1.6 m) 98.6 kg (217 lb 6 oz) 38.51 kg/m2       Blood Pressure          Most Recent Value    BP  118/72      Allergies as of 4/27/2017     Codeine    Ciprofloxacin      Immunizations Administered on Date of Encounter - 4/27/2017     None      Language Assistance Services     ATTENTION: Language assistance services are available, free of charge. Please call [phone].      ATENCIÓN: Si habla rena, tiene a carrasco disposición servicios gratuitos de asistencia lingüística. Llame al [phone].     [facility] Ý: N?u b?n nói Ti?ng Vi?t, có các d?ch v? h? tr? ngôn ng? mi?n phí dành cho b?n. G?i s? [phone].         [place] - [facility]/ GYN complies with applicable Federal civil rights laws and does not discriminate on the basis of race, color, national origin, age, disability, or sex.        "

## 2017-04-27 NOTE — LETTER
April 27, 2017      Mundo Hudson,   [address] [facility] [address]           [place] - OB/ GYN  [address] [facility] [address]  Phone: [phone]          Patient: Coby Atkinson   MR Number: [id number]   YOB: 1945   Date of Visit: 4/27/2017       Dear Dr. Mundo Hudson:    Thank you for referring Coby Atkinson to me for evaluation. Attached you will find relevant portions of my assessment and plan of care.    If you have questions, please do not hesitate to call me. I look forward to following Coby Atkinson along with you.    Sincerely,    Ginny Pham MD    Enclosure  CC:  No Recipients    If you would like to receive this communication electronically, please contact [email or web address] or [phone] to request more information on [facility] access.    For providers and/or their staff who would like to refer a patient to [facility], please contact us through our one-stop-shop provider referral line, [facility], at [phone].    If you feel you have received this communication in error or would no longer like to receive these types of communications, please e-mail [email or web address]

## 2017-05-01 ENCOUNTER — TELEPHONE (OUTPATIENT)
Dept: OBSTETRICS AND GYNECOLOGY | Facility: CLINIC | Age: 72
End: 2017-05-01

## 2017-05-01 NOTE — TELEPHONE ENCOUNTER
Incoming fax received from [employer]. PA required for Premarin 0.45mg tab.    Called [facility] - meliza Mitchell states the medication is not covered through Medicare - contact plan at # [phone]    Called tel # provided  -automated message states to contact plan on back of insurance card    Called Medicare - # [phone]. Spoke with rep. Rep states in order to proceed we would need which drug prescription plan the member is under. He advised that I all the pt to find out which plan she is under, it should be on the back of her ins card under (pharmacy - provider contact)    Called pt. Pt states she has [employer] Medicare. Pt provided # on the back of [employer] card # [phone]    Called [facility] spoke with Lani. Lani states the pt has MEDICAL coverage with [employer] but PHARMACY coverage with Medicare. Lani states she can still assist with PA for medication. Pt has MEDICARE PART D for pharmacy benefits. The premarin has an AGE RESTRICTION attached to the medication. Provided to Lani start date of medication (pt has used Premarin 0.45mg in past - 2012) Provided ICD-10 code N95.1. Provided pt's recent weight and height    Lani states the PA response will be faxed to our office in about 24-72 hours. And if more information is needed on the form they will send via fax. Ref # [id number]

## 2017-06-01 ENCOUNTER — OFFICE VISIT (OUTPATIENT)
Dept: BARIATRICS | Facility: CLINIC | Age: 72
End: 2017-06-01
Payer: MEDICARE

## 2017-06-01 VITALS
BODY MASS INDEX: 37.97 KG/M2 | SYSTOLIC BLOOD PRESSURE: 120 MMHG | HEIGHT: 63 IN | WEIGHT: 214.31 LBS | HEART RATE: 76 BPM | DIASTOLIC BLOOD PRESSURE: 72 MMHG

## 2017-06-01 DIAGNOSIS — E66.01 SEVERE OBESITY (BMI 35.0-39.9) WITH COMORBIDITY: ICD-10-CM

## 2017-06-01 DIAGNOSIS — M17.0 OSTEOARTHRITIS OF BOTH KNEES, UNSPECIFIED OSTEOARTHRITIS TYPE: Primary | ICD-10-CM

## 2017-06-01 PROCEDURE — 99213 OFFICE O/P EST LOW 20 MIN: CPT | Mod: S$PBB,,, | Performed by: INTERNAL MEDICINE

## 2017-06-01 PROCEDURE — 99999 PR PBB SHADOW E&M-EST. PATIENT-LVL III: CPT | Mod: PBBFAC,,, | Performed by: INTERNAL MEDICINE

## 2017-06-01 PROCEDURE — 99213 OFFICE O/P EST LOW 20 MIN: CPT | Mod: PBBFAC | Performed by: INTERNAL MEDICINE

## 2017-06-01 NOTE — PATIENT INSTRUCTIONS
Sample meal plan  80-120g protein; 4631-0538 calories  Protein drinks and bars: 0-4 grams sugar  Drink lots of water throughout the day and exercise!  MENU # 1  Breakfast: 2 eggs, 1 turkey sausage ana  Lunch: 2-3 roll-ups (sliced turkey, low-fat slice cheese), baby carrots dipped in 1 Tbsp natural peanut butter  Mid-Day Snack: ¼ cup unsalted almonds, ½ cup fruit  Supper: 1 chicken thigh simmered in tomato sauce + 2 Tbsp mozzarella cheese, ½ cup black beans, 1/2 cup steamed veggies  Evening Snack: 1/2 cup grapes with 4 cubes low-fat cheddar cheese   ___________________________________________________  MENU # 2  Breakfast: protein drink  Mid-morning snack : ¼ cup unsalted nuts  Lunch: 1 cup tuna or chicken salad made with light modi, over salad.   Supper: hamburger ana, slice of cheese, 1 cup steamed veggies.   Snack: light yogurt      Menu #3  Breakfast: 6oz plain Greek yogurt + fruit (½ banana, ½ cup fruit - pineapple, blueberries, strawberries, peach), may add Splenda to cesar.  Lunch: ½  chicken breast w/ slice pepper steven cheese, 1/2 cup steamed veggies and small salad with Salad Spritzer.    Mid-Day snack: protein drink   Supper: 4oz Grilled fish, grilled veggie kabob ( mushrooms, onion, bell pepper, yellow squash, zucchini, cherry tomatoes)  Evening Snack: fudgsicle no-sugar-added    Menu # 4  Breakfast: vanilla iced coffee: 1 scoop vanilla protein powder + 4oz skim milk + 4oz coffee   Snack: protein bar  Lunch: 2 Lettuce tacos: ¼ cup seasoned ground turkey wrapped in a Nate lettuce leaf with 1 Tbsp shredded cheese and dollop low-fat sour cream  Dinner: Shrimp omelet: 2 eggs, ½ cup shrimp, green onions, and shredded cheese        Menu #5  Breakfast: 1 cup low-fat cottage cheese, ½ cup peaches (no sugar added)  Lunch: 2 oz baked pork chop, 1 cup okra and tomato stew  Snack: 1 cup black beans + salsa + dollop sour cream  Dinner: Caprese chicken salad: 2 oz chicken, 1oz fresh mozzarella, sliced tomato, 1  Tbsp olive oil, basil  Snack: sugar-free pudding cup      Menu #6  Breakfast: ½ cup part-skim ricotta cheese, 2 Tbsp sugar-free strawberry preserves, ¼ cup slivered almonds  Lunch: 2 oz canned chicken, 1oz shredded cheddar cheese, ½ cup black beans, 2 Tbsp salsa  Snack: Protein drink  Dinner: 4 oz grilled salmon steak, over mixed green salad with light dressing      Patient was informed that topiramate is used for migraine prevention and seizures. Weight loss is a common side effect that is well documented. She understands this. She was informed of the potential side effects such as serious and possibly fatal rash in which case the medication should be discontinued immediately. Paresthesias, forgetfulness, fatigue, kidney stones, GI symptoms, and changes in lab values such as electrolytes, blood counts and kidney function.

## 2017-06-02 DIAGNOSIS — I10 ESSENTIAL HYPERTENSION: ICD-10-CM

## 2017-06-02 RX ORDER — AMLODIPINE BESYLATE 10 MG/1
TABLET ORAL
Qty: 90 TABLET | Refills: 1 | Status: SHIPPED | OUTPATIENT
Start: 2017-06-02 | End: 2017-12-14 | Stop reason: SDUPTHER

## 2017-06-12 RX ORDER — FUROSEMIDE 20 MG/1
TABLET ORAL
Qty: 270 TABLET | Refills: 3 | Status: ON HOLD | OUTPATIENT
Start: 2017-06-12 | End: 2017-12-10 | Stop reason: HOSPADM

## 2017-06-26 RX ORDER — MELOXICAM 7.5 MG/1
TABLET ORAL
Qty: 90 TABLET | Refills: 0 | Status: SHIPPED | OUTPATIENT
Start: 2017-06-26 | End: 2017-09-21 | Stop reason: SDUPTHER

## 2017-08-16 RX ORDER — METOPROLOL SUCCINATE 25 MG/1
TABLET, EXTENDED RELEASE ORAL
Qty: 90 TABLET | Refills: 0 | Status: SHIPPED | OUTPATIENT
Start: 2017-08-16 | End: 2018-04-16 | Stop reason: SDUPTHER

## 2017-09-21 ENCOUNTER — TELEPHONE (OUTPATIENT)
Dept: INTERNAL MEDICINE | Facility: CLINIC | Age: 72
End: 2017-09-21

## 2017-09-21 RX ORDER — MELOXICAM 7.5 MG/1
TABLET ORAL
Qty: 90 TABLET | Refills: 0 | Status: ON HOLD | OUTPATIENT
Start: 2017-09-21 | End: 2017-12-10 | Stop reason: HOSPADM

## 2017-10-04 ENCOUNTER — OFFICE VISIT (OUTPATIENT)
Dept: INTERNAL MEDICINE | Facility: CLINIC | Age: 72
End: 2017-10-04
Payer: MEDICARE

## 2017-10-04 VITALS
RESPIRATION RATE: 16 BRPM | TEMPERATURE: 99 F | HEIGHT: 63 IN | BODY MASS INDEX: 38.52 KG/M2 | WEIGHT: 217.38 LBS | SYSTOLIC BLOOD PRESSURE: 151 MMHG | HEART RATE: 81 BPM | DIASTOLIC BLOOD PRESSURE: 80 MMHG

## 2017-10-04 DIAGNOSIS — I10 ESSENTIAL HYPERTENSION: Primary | ICD-10-CM

## 2017-10-04 PROCEDURE — 99999 PR PBB SHADOW E&M-EST. PATIENT-LVL III: CPT | Mod: PBBFAC,,, | Performed by: INTERNAL MEDICINE

## 2017-10-04 PROCEDURE — 99213 OFFICE O/P EST LOW 20 MIN: CPT | Mod: PBBFAC,PO | Performed by: INTERNAL MEDICINE

## 2017-10-04 PROCEDURE — 99213 OFFICE O/P EST LOW 20 MIN: CPT | Mod: S$PBB,,, | Performed by: INTERNAL MEDICINE

## 2017-10-04 RX ORDER — DOXAZOSIN 2 MG/1
2 TABLET ORAL NIGHTLY
Qty: 30 TABLET | Refills: 0 | Status: SHIPPED | OUTPATIENT
Start: 2017-10-04 | End: 2017-11-01 | Stop reason: SDUPTHER

## 2017-10-04 RX ORDER — DOXAZOSIN 2 MG/1
TABLET ORAL
Qty: 90 TABLET | Refills: 0 | OUTPATIENT
Start: 2017-10-04

## 2017-10-04 NOTE — PROGRESS NOTES
Subjective:       Patient ID: Coby Atkinson is a 71 y.o. female.    Chief Complaint: Hypertension    HPI   Pt here for f/u regarding HTN. Her BP readings at home have been running slightly high. Pt is not on a low sodium diet.   Review of Systems   Constitutional: Negative for activity change, appetite change, chills, diaphoresis, fatigue, fever and unexpected weight change.   HENT: Negative for postnasal drip, rhinorrhea, sinus pressure, sneezing, sore throat, trouble swallowing and voice change.    Respiratory: Negative for cough, shortness of breath and wheezing.    Cardiovascular: Negative for chest pain, palpitations and leg swelling.   Gastrointestinal: Negative for abdominal pain, blood in stool, constipation, diarrhea, nausea and vomiting.   Genitourinary: Negative for dysuria.   Musculoskeletal: Negative for arthralgias and myalgias.   Skin: Negative for rash and wound.   Allergic/Immunologic: Negative for environmental allergies and food allergies.   Hematological: Negative for adenopathy. Does not bruise/bleed easily.       Objective:      Physical Exam   Constitutional: She is oriented to person, place, and time. She appears well-developed and well-nourished. No distress.   HENT:   Head: Normocephalic and atraumatic.   Eyes: Conjunctivae and EOM are normal. Pupils are equal, round, and reactive to light. Right eye exhibits no discharge. Left eye exhibits no discharge. No scleral icterus.   Neck: Neck supple. No JVD present.   Cardiovascular: Normal rate, regular rhythm, normal heart sounds and intact distal pulses.    Pulmonary/Chest: Effort normal and breath sounds normal. No respiratory distress. She has no wheezes. She has no rales.   Musculoskeletal: She exhibits no edema.   Lymphadenopathy:     She has no cervical adenopathy.   Neurological: She is alert and oriented to person, place, and time.   Skin: Skin is warm and dry. No rash noted. She is not diaphoretic. No pallor.       Assessment:        1. Essential hypertension        Plan:    1. Rx Cardura 2 mg qHS and CPT       Document BP BID   2. F/u in 2 weeks for HTN

## 2017-10-23 ENCOUNTER — OFFICE VISIT (OUTPATIENT)
Dept: INTERNAL MEDICINE | Facility: CLINIC | Age: 72
End: 2017-10-23
Payer: MEDICARE

## 2017-10-23 VITALS
HEART RATE: 86 BPM | TEMPERATURE: 98 F | BODY MASS INDEX: 39.3 KG/M2 | SYSTOLIC BLOOD PRESSURE: 138 MMHG | DIASTOLIC BLOOD PRESSURE: 68 MMHG | HEIGHT: 63 IN | WEIGHT: 221.81 LBS | RESPIRATION RATE: 16 BRPM

## 2017-10-23 DIAGNOSIS — I10 ESSENTIAL HYPERTENSION: ICD-10-CM

## 2017-10-23 DIAGNOSIS — K21.9 GASTROESOPHAGEAL REFLUX DISEASE, ESOPHAGITIS PRESENCE NOT SPECIFIED: ICD-10-CM

## 2017-10-23 DIAGNOSIS — N64.4 PAIN OF LEFT BREAST: Primary | ICD-10-CM

## 2017-10-23 DIAGNOSIS — Z12.31 ENCOUNTER FOR SCREENING MAMMOGRAM FOR BREAST CANCER: ICD-10-CM

## 2017-10-23 DIAGNOSIS — F32.A DEPRESSION, UNSPECIFIED DEPRESSION TYPE: ICD-10-CM

## 2017-10-23 DIAGNOSIS — M17.0 OSTEOARTHRITIS OF BOTH KNEES, UNSPECIFIED OSTEOARTHRITIS TYPE: ICD-10-CM

## 2017-10-23 DIAGNOSIS — E66.9 OBESITY (BMI 30-39.9): ICD-10-CM

## 2017-10-23 DIAGNOSIS — G47.33 OSA (OBSTRUCTIVE SLEEP APNEA): ICD-10-CM

## 2017-10-23 PROCEDURE — 99214 OFFICE O/P EST MOD 30 MIN: CPT | Mod: S$PBB,,, | Performed by: INTERNAL MEDICINE

## 2017-10-23 PROCEDURE — 99213 OFFICE O/P EST LOW 20 MIN: CPT | Mod: PBBFAC,PO | Performed by: INTERNAL MEDICINE

## 2017-10-23 PROCEDURE — 99999 PR PBB SHADOW E&M-EST. PATIENT-LVL III: CPT | Mod: PBBFAC,,, | Performed by: INTERNAL MEDICINE

## 2017-10-23 NOTE — PROGRESS NOTES
Subjective:       Patient ID: Coby Atkinson is a 72 y.o. female.    Chief Complaint: lt breast pain    HPI   Pt here for evaluation of 3 days of slowly improving left sided breast pain around the nipple described as sharp in nature and sensitive to the touch. Pt denies any erythema, nipple discharge, trauma to the area, etc. Last Mammo was normal on 10/20/17.   Review of Systems   Constitutional: Negative for activity change, appetite change, chills, diaphoresis, fatigue, fever and unexpected weight change.   HENT: Negative for postnasal drip, rhinorrhea, sinus pressure, sneezing, sore throat, trouble swallowing and voice change.    Respiratory: Negative for cough, shortness of breath and wheezing.    Cardiovascular: Negative for chest pain, palpitations and leg swelling.   Gastrointestinal: Negative for abdominal pain, blood in stool, constipation, diarrhea, nausea and vomiting.   Genitourinary: Negative for dysuria.   Musculoskeletal: Negative for arthralgias and myalgias.   Skin: Negative for rash and wound.   Allergic/Immunologic: Negative for environmental allergies and food allergies.   Hematological: Negative for adenopathy. Does not bruise/bleed easily.       Objective:      Physical Exam   Constitutional: She is oriented to person, place, and time. She appears well-developed and well-nourished. No distress.   HENT:   Head: Normocephalic and atraumatic.   Eyes: Conjunctivae and EOM are normal. Pupils are equal, round, and reactive to light. Right eye exhibits no discharge. Left eye exhibits no discharge. No scleral icterus.   Neck: Neck supple. No JVD present.   Cardiovascular: Normal rate, regular rhythm, normal heart sounds and intact distal pulses.    Pulmonary/Chest: Effort normal and breath sounds normal. No respiratory distress. She has no wheezes. She has no rales.       Abdominal: Soft. Bowel sounds are normal. There is no tenderness.   Musculoskeletal: She exhibits no edema.    Lymphadenopathy:     She has no cervical adenopathy.   Neurological: She is alert and oriented to person, place, and time.   Skin: Skin is warm and dry. No rash noted. She is not diaphoretic. No pallor.       Assessment:       1. Pain of left breast    2. Encounter for screening mammogram for breast cancer    3. Essential hypertension    4. Obesity (BMI 30-39.9)    5. Depression, unspecified depression type    6. Gastroesophageal reflux disease, esophagitis presence not specified    7. Osteoarthritis of both knees, unspecified osteoarthritis type    8. ADRIÁN (obstructive sleep apnea)        Plan:    1/2. Check Mammo(due) and left sided breast US   3. HTN- controlled on Hyzaar/Norvasc/Toprol   4. Obesity- pt followed by Bariatric Medicine on Topamax for weight loss   5. Depression- stable on Lexapro 20 mg daily   6. GERD- controlled on Protonix daily   7. OA of knees- stable on Mobic PRN   8. Mild ADRIÁN- pt not interested in using CPAP

## 2017-10-24 ENCOUNTER — HOSPITAL ENCOUNTER (OUTPATIENT)
Dept: RADIOLOGY | Facility: HOSPITAL | Age: 72
Discharge: HOME | End: 2017-10-24
Attending: INTERNAL MEDICINE
Payer: MEDICARE

## 2017-10-24 VITALS — BODY MASS INDEX: 39.16 KG/M2 | WEIGHT: 221 LBS | HEIGHT: 63 IN

## 2017-10-24 DIAGNOSIS — Z12.31 ENCOUNTER FOR SCREENING MAMMOGRAM FOR BREAST CANCER: ICD-10-CM

## 2017-10-24 DIAGNOSIS — N64.4 PAIN OF LEFT BREAST: ICD-10-CM

## 2017-10-24 DIAGNOSIS — N64.4 BREAST PAIN, LEFT: ICD-10-CM

## 2017-10-24 PROCEDURE — 76642 ULTRASOUND BREAST LIMITED: CPT | Mod: TC,LT

## 2017-10-24 PROCEDURE — 77066 DX MAMMO INCL CAD BI: CPT | Mod: 26,,, | Performed by: RADIOLOGY

## 2017-10-24 PROCEDURE — 77062 BREAST TOMOSYNTHESIS BI: CPT | Mod: TC

## 2017-10-24 PROCEDURE — 76642 ULTRASOUND BREAST LIMITED: CPT | Mod: 26,LT,, | Performed by: RADIOLOGY

## 2017-10-24 PROCEDURE — 77062 BREAST TOMOSYNTHESIS BI: CPT | Mod: 26,,, | Performed by: RADIOLOGY

## 2017-11-01 ENCOUNTER — NURSE TRIAGE (OUTPATIENT)
Dept: ADMINISTRATIVE | Facility: CLINIC | Age: 72
End: 2017-11-01

## 2017-11-01 ENCOUNTER — HOSPITAL ENCOUNTER (EMERGENCY)
Facility: HOSPITAL | Age: 72
Discharge: HOME | End: 2017-11-01
Attending: EMERGENCY MEDICINE
Payer: MEDICARE

## 2017-11-01 ENCOUNTER — TELEPHONE (OUTPATIENT)
Dept: INTERNAL MEDICINE | Facility: CLINIC | Age: 72
End: 2017-11-01

## 2017-11-01 VITALS
RESPIRATION RATE: 18 BRPM | WEIGHT: 225 LBS | HEART RATE: 70 BPM | DIASTOLIC BLOOD PRESSURE: 67 MMHG | TEMPERATURE: 97 F | BODY MASS INDEX: 39.87 KG/M2 | OXYGEN SATURATION: 95 % | HEIGHT: 63 IN | SYSTOLIC BLOOD PRESSURE: 152 MMHG

## 2017-11-01 DIAGNOSIS — R42 DIZZINESS OF UNKNOWN CAUSE: Primary | ICD-10-CM

## 2017-11-01 LAB
ALBUMIN SERPL BCP-MCNC: 3.8 G/DL
ALP SERPL-CCNC: 85 U/L
ALT SERPL W/O P-5'-P-CCNC: 16 U/L
ANION GAP SERPL CALC-SCNC: 12 MMOL/L
AST SERPL-CCNC: 17 U/L
BASOPHILS # BLD AUTO: 0.06 K/UL
BASOPHILS NFR BLD: 0.4 %
BILIRUB SERPL-MCNC: 0.7 MG/DL
BILIRUB UR QL STRIP: NEGATIVE
BNP SERPL-MCNC: 29 PG/ML
BUN SERPL-MCNC: 26 MG/DL
CALCIUM SERPL-MCNC: 9.7 MG/DL
CHLORIDE SERPL-SCNC: 105 MMOL/L
CLARITY UR REFRACT.AUTO: ABNORMAL
CO2 SERPL-SCNC: 21 MMOL/L
COLOR UR AUTO: YELLOW
CREAT SERPL-MCNC: 1.3 MG/DL
DIFFERENTIAL METHOD: ABNORMAL
EOSINOPHIL # BLD AUTO: 0.2 K/UL
EOSINOPHIL NFR BLD: 1.2 %
ERYTHROCYTE [DISTWIDTH] IN BLOOD BY AUTOMATED COUNT: 15.7 %
EST. GFR  (AFRICAN AMERICAN): 47 ML/MIN/1.73 M^2
EST. GFR  (NON AFRICAN AMERICAN): 40.8 ML/MIN/1.73 M^2
GLUCOSE SERPL-MCNC: 111 MG/DL
GLUCOSE UR QL STRIP: NEGATIVE
HCT VFR BLD AUTO: 35.1 %
HGB BLD-MCNC: 10.6 G/DL
HGB UR QL STRIP: NEGATIVE
IMM GRANULOCYTES # BLD AUTO: 0.1 K/UL
IMM GRANULOCYTES NFR BLD AUTO: 0.7 %
KETONES UR QL STRIP: NEGATIVE
LEUKOCYTE ESTERASE UR QL STRIP: NEGATIVE
LYMPHOCYTES # BLD AUTO: 2.1 K/UL
LYMPHOCYTES NFR BLD: 14 %
MCH RBC QN AUTO: 19.9 PG
MCHC RBC AUTO-ENTMCNC: 30.2 G/DL
MCV RBC AUTO: 66 FL
MONOCYTES # BLD AUTO: 0.6 K/UL
MONOCYTES NFR BLD: 4.2 %
NEUTROPHILS # BLD AUTO: 12 K/UL
NEUTROPHILS NFR BLD: 79.5 %
NITRITE UR QL STRIP: NEGATIVE
NRBC BLD-RTO: 0 /100 WBC
PH UR STRIP: 5 [PH] (ref 5–8)
PLATELET # BLD AUTO: 262 K/UL
PMV BLD AUTO: 9.3 FL
POTASSIUM SERPL-SCNC: 4 MMOL/L
PROT SERPL-MCNC: 7.6 G/DL
PROT UR QL STRIP: NEGATIVE
RBC # BLD AUTO: 5.32 M/UL
SODIUM SERPL-SCNC: 138 MMOL/L
SP GR UR STRIP: 1.01 (ref 1–1.03)
TROPONIN I SERPL DL<=0.01 NG/ML-MCNC: <0.006 NG/ML
URN SPEC COLLECT METH UR: ABNORMAL
UROBILINOGEN UR STRIP-ACNC: NEGATIVE EU/DL
WBC # BLD AUTO: 15.04 K/UL

## 2017-11-01 PROCEDURE — 84484 ASSAY OF TROPONIN QUANT: CPT

## 2017-11-01 PROCEDURE — 81003 URINALYSIS AUTO W/O SCOPE: CPT

## 2017-11-01 PROCEDURE — 93010 ELECTROCARDIOGRAM REPORT: CPT | Mod: ,,, | Performed by: INTERNAL MEDICINE

## 2017-11-01 PROCEDURE — 83880 ASSAY OF NATRIURETIC PEPTIDE: CPT

## 2017-11-01 PROCEDURE — 99284 EMERGENCY DEPT VISIT MOD MDM: CPT | Mod: ,,, | Performed by: EMERGENCY MEDICINE

## 2017-11-01 PROCEDURE — 25000003 PHARM REV CODE 250: Performed by: EMERGENCY MEDICINE

## 2017-11-01 PROCEDURE — 99284 EMERGENCY DEPT VISIT MOD MDM: CPT | Mod: 25

## 2017-11-01 PROCEDURE — 85025 COMPLETE CBC W/AUTO DIFF WBC: CPT

## 2017-11-01 PROCEDURE — 63600175 PHARM REV CODE 636 W HCPCS: Performed by: EMERGENCY MEDICINE

## 2017-11-01 PROCEDURE — 80053 COMPREHEN METABOLIC PANEL: CPT

## 2017-11-01 PROCEDURE — 93005 ELECTROCARDIOGRAM TRACING: CPT

## 2017-11-01 RX ORDER — ONDANSETRON 4 MG/1
4 TABLET, FILM COATED ORAL EVERY 6 HOURS
Qty: 12 TABLET | Refills: 0 | Status: SHIPPED | OUTPATIENT
Start: 2017-11-01 | End: 2017-12-21

## 2017-11-01 RX ORDER — ONDANSETRON 2 MG/ML
4 INJECTION INTRAMUSCULAR; INTRAVENOUS
Status: COMPLETED | OUTPATIENT
Start: 2017-11-01 | End: 2017-11-01

## 2017-11-01 RX ORDER — DOXAZOSIN 2 MG/1
TABLET ORAL
Qty: 90 TABLET | Refills: 3 | Status: ON HOLD | OUTPATIENT
Start: 2017-11-01 | End: 2017-12-10 | Stop reason: HOSPADM

## 2017-11-01 RX ORDER — SODIUM CHLORIDE 9 MG/ML
1000 INJECTION, SOLUTION INTRAVENOUS
Status: COMPLETED | OUTPATIENT
Start: 2017-11-01 | End: 2017-11-01

## 2017-11-01 RX ADMIN — SODIUM CHLORIDE 1000 ML: 0.9 INJECTION, SOLUTION INTRAVENOUS at 06:11

## 2017-11-01 RX ADMIN — ONDANSETRON 4 MG: 2 INJECTION INTRAMUSCULAR; INTRAVENOUS at 06:11

## 2017-11-01 NOTE — TELEPHONE ENCOUNTER
Reason for Disposition   Sounds like a life-threatening emergency to the triager    Protocols used: ST LOW BLOOD PRESSURE-A-OH    Pt's  called- her systolic bp is in the 60s and 50s. Had him take it twice. Advised to hang up and call 911

## 2017-11-01 NOTE — ED NOTES
Patient states while at home she was making tuna and began to feel as though she was going to pass out. Patient states that she felt dizzy and weak lasting about 30 minutes. Patient  states when he took the patients BP it was in the 60's. Patient  states that he called the hospital and was informed to call 911.

## 2017-11-01 NOTE — ED NOTES
Patient changed into hospital gown, placed on cardiac monitor, pulse ox and automatic blood pressure cuff.

## 2017-11-01 NOTE — ED PROVIDER NOTES
Encounter Date: 11/1/2017    SCRIBE #1 NOTE: I, Karely Panchal, am scribing for, and in the presence of,  Dr. Stanley. I have scribed the entire note.       History     Chief Complaint   Patient presents with    Emesis     EMS reports patient vagal while vomiting during transport.      Time patient was seen by the provider: 6:25 PM      The patient is a 72 y.o. female with hx of: HTN, anxiety, thalassemia minor, and GERD that presents to the ED via EMS with a complaint of dizziness around 1pm today. Pt states she felt like she was going to pass out (no room-spinning noted). At the time of onset she noted palpitations (resolved), and 2 episodes of emesis since. Symptoms are not exacerbated by sitting and have currently subsided. She denies similar symptoms in the past. She denies a headache, fever, chills, CP, trauma, or current palpitations but notes fatugue. She also noted mild leg edema at baseline. Last meal was this morning.    Per nurse note: Patient states while at home she was making tuna and began to feel as though she was going to pass out. Patient states that she felt dizzy and weak lasting about 30 minutes. Patient  states when he took the patients BP it was in the 60's. Patient  states that he called the hospital and was informed to call 911.       The history is provided by the patient and medical records.     Review of patient's allergies indicates:   Allergen Reactions    Codeine     Ciprofloxacin Rash     Past Medical History:   Diagnosis Date    Cataract     Depression     Edema     GERD (gastroesophageal reflux disease)     Hypertension 10/2/2012    Osteopenia     Thalassemia minor      Past Surgical History:   Procedure Laterality Date    APPENDECTOMY      CATARACT EXTRACTION      COLONOSCOPY N/A 11/5/2016    Procedure: COLONOSCOPY;  Surgeon: Tanner Simental MD;  Location: [address];  Service: Endoscopy;  Laterality: N/A;    HYSTERECTOMY  age 39    fibroids     OOPHORECTOMY      TONSILLECTOMY       Family History   Problem Relation Age of Onset    Hypertension Mother     Cancer Father      skin    Cancer Brother      pancreatic    No Known Problems Sister     No Known Problems Maternal Aunt     No Known Problems Maternal Uncle     No Known Problems Paternal Aunt     No Known Problems Paternal Uncle     No Known Problems Maternal Grandmother     No Known Problems Maternal Grandfather     No Known Problems Paternal Grandmother     No Known Problems Paternal Grandfather     Heart disease Neg Hx     Amblyopia Neg Hx     Blindness Neg Hx     Cataracts Neg Hx     Diabetes Neg Hx     Glaucoma Neg Hx     Macular degeneration Neg Hx     Retinal detachment Neg Hx     Strabismus Neg Hx     Stroke Neg Hx     Thyroid disease Neg Hx      Social History   Substance Use Topics    Smoking status: Never Smoker    Smokeless tobacco: Never Used    Alcohol use No      Comment: rare     Review of Systems   Constitutional: Positive for fatigue. Negative for chills and fever.   HENT: Negative for nosebleeds.    Eyes: Negative for discharge.   Respiratory: Negative for wheezing.    Cardiovascular: Positive for palpitations (resolved). Negative for chest pain.   Gastrointestinal: Positive for vomiting.   Genitourinary: Negative for hematuria.   Musculoskeletal: Negative for neck pain and neck stiffness.   Skin: Negative for rash.   Neurological: Positive for dizziness.   All other systems reviewed and are negative.      Physical Exam     Initial Vitals [11/01/17 1516]   BP Pulse Resp Temp SpO2   139/70 71 16 97.8 °F (36.6 °C) 98 %      MAP       93         Physical Exam    Nursing note and vitals reviewed.      GEN:   NAD, A & Ox3, atraumatic, well appearing, nontoxic appearing  NEURO:   Alert, GCS 15, CN II-XII grossly intact, normal gaze, normal vision, no facial palsy, no motor deficits,  No sensory deficits, No limb ataxia,  no aphasia, normal articulation, no  neglect, no tremor, no nystagmus, negative Romberg,  nl gait/coordination  PSYCH:   no SI/HI, no anxiety, nl mood/affect, nl judgement/thought process  HEENT:  PERRLA, EOMI, dry mucous membranes, nl conjunctiva, no scleral icterus, no nystagmus, no nodes/nodules, soft, supple, FROM, no trachial deviation, nexus negative  CV:   RRR no m/r/g, 2+ radial pulses, <2sec cap refill, no obvious JVP  RESP:  CTA B, no w/r/r, equal and bilateral chest rise, no respiratory distress  ABD:   soft, Nontender, Nondistended, +BS, no guarding/rebound  BACK:  FROM, no midline tenderness, no paraspinal tenderness  :   Deferred  EXT:   FROM, CANTU x 4, no edema, no calf tenderness, no swelling  LYMPH:  no gross adenopathy  SKIN:  Warm, dry, intact, no rashes/lesions or masses, nl color, no pallor          ED Course   Procedures  Labs Reviewed   CBC W/ AUTO DIFFERENTIAL - Abnormal; Notable for the following:        Result Value    WBC 15.04 (*)     Hemoglobin 10.6 (*)     Hematocrit 35.1 (*)     MCV 66 (*)     MCH 19.9 (*)     MCHC 30.2 (*)     RDW 15.7 (*)     Immature Granulocytes 0.7 (*)     Gran # 12.0 (*)     Immature Grans (Abs) 0.10 (*)     Gran% 79.5 (*)     Lymph% 14.0 (*)     All other components within normal limits   COMPREHENSIVE METABOLIC PANEL - Abnormal; Notable for the following:     CO2 21 (*)     Glucose 111 (*)     BUN, Bld 26 (*)     eGFR if  47.0 (*)     eGFR if non  40.8 (*)     All other components within normal limits   URINALYSIS, REFLEX TO URINE CULTURE - Abnormal; Notable for the following:     Appearance, UA Hazy (*)     All other components within normal limits    Narrative:     Preferred Collection Type->Urine, Clean Catch   TROPONIN I   B-TYPE NATRIURETIC PEPTIDE   TROPONIN I     EKG Readings: (Independently Interpreted)   18:15   No STEMI  NSR with HR 64 bpm  Nl conduction, nl intervals  Nl ST  No ectopy  Nl axis  T-wave inversion in leads v1 to v3  Q-wave in lead v1             Medical Decision Making:   History:   Old Medical Records: I decided to obtain old medical records.  Differential Diagnosis:   Dizziness/near syncope differential includes but not exclusive to dehydration, myocardial infarction, metabolic disturbances, vertigo, migraine, presyncope, drug induced, hyperventilation,  anemia, hypovolemia, neurally-mediated reflex syncope, Orthostatic syncope, Cardiac arrhythmias, Structural cardiopulmonary disease    Unlikely CVA, ICH        Independently Interpreted Test(s):   I have ordered and independently interpreted EKG Reading(s) - see prior notes  Clinical Tests:   Lab Tests: Ordered and Reviewed  The following lab test(s) were unremarkable: CMP, Troponin and Urinalysis       <> Summary of Lab: Leukocytosis  Medical Tests: Ordered and Reviewed  ED Management:  Treatment plan includes physical exam, cardiac monitoring, labs, IVF and supportive care.  1830 IVF, zofran ordered  2020 Patient is feeling better after treatment, is tolerating PO, and ambulating without difficulty.  No hypotension noted at triage or during observation in the emergency department.    Family and patient updated on care.  Pt agrees with assessment, disposition and treatment plan and has no further questions or complaints at this time. Given return precautions and demonstrates understanding.    Given a prescription for Zofran                 Scribe Attestation:   Scribe #1: I performed the above scribed service and the documentation accurately describes the services I performed. I attest to the accuracy of the note.    Attending Attestation:           Physician Attestation for Scribe:      Comments: I, Dr. Ciera Stanley, personally performed the services described in this documentation. All medical record entries made by the scribe were at my direction and in my presence.  I have reviewed the chart and agree that the record reflects my personal performance and is accurate and complete.  Ciera Stanley MD.  11:30 AM 11/16/2017              ED Course      Clinical Impression:   The encounter diagnosis was Dizziness of unknown cause.    Disposition:   Disposition: Discharged  Condition: Stable                        Ciera Stanley MD  11/16/17 1140       Ciera Stanley MD  11/16/17 1142

## 2017-11-01 NOTE — ED NOTES
Patient identifiers verified and correct for Coby Atkinson.    LOC: The patient is awake, alert and aware of environment with an appropriate affect, the patient is oriented x 3 and speaking appropriately.  APPEARANCE: Patient resting comfortably and in no acute distress, patient is clean and well groomed, patient's clothing is properly fastened.  SKIN: The skin is warm and dry, color consistent with ethnicity, patient has normal skin turgor and moist mucus membranes, skin intact, no breakdown or bruising noted.  MUSCULOSKELETAL: Patient moving all extremities spontaneously, no obvious deformities noted.  RESPIRATORY: Airway is open and patent, respirations are spontaneous, patient has a normal effort and rate, no accessory muscle use noted, clear bilateral breath sounds noted through out the chest.  CARDIAC: Patient has a normal rate and regular rhythm, periphreal edema noted, capillary refill < 3 seconds.  ABDOMEN: Soft and non tender to palpation, no distention noted, normoactive bowel sounds present in all four quadrants. Abdominal cramping per patient.

## 2017-11-03 ENCOUNTER — OFFICE VISIT (OUTPATIENT)
Dept: INTERNAL MEDICINE | Facility: CLINIC | Age: 72
End: 2017-11-03
Payer: MEDICARE

## 2017-11-03 VITALS
HEART RATE: 74 BPM | SYSTOLIC BLOOD PRESSURE: 138 MMHG | WEIGHT: 223.56 LBS | TEMPERATURE: 98 F | BODY MASS INDEX: 39.61 KG/M2 | HEIGHT: 63 IN | DIASTOLIC BLOOD PRESSURE: 54 MMHG

## 2017-11-03 DIAGNOSIS — I10 ESSENTIAL HYPERTENSION: ICD-10-CM

## 2017-11-03 DIAGNOSIS — R42 DIZZINESS: Primary | ICD-10-CM

## 2017-11-03 PROCEDURE — 99214 OFFICE O/P EST MOD 30 MIN: CPT | Mod: S$PBB,,, | Performed by: INTERNAL MEDICINE

## 2017-11-03 PROCEDURE — 99213 OFFICE O/P EST LOW 20 MIN: CPT | Mod: PBBFAC,PO | Performed by: INTERNAL MEDICINE

## 2017-11-03 PROCEDURE — 99999 PR PBB SHADOW E&M-EST. PATIENT-LVL III: CPT | Mod: PBBFAC,,, | Performed by: INTERNAL MEDICINE

## 2017-11-03 NOTE — PROGRESS NOTES
Subjective:       Patient ID: Coby Atkinson is a 72 y.o. female.    Chief Complaint: Follow-up (ER)    HPI   Pt with HTN, anxiety, thalassemia minor, and GERD is here for ED f/u on 11/1/17 for dizziness/presyncope which took place that day before going to the ER. Associated symptoms of palpitations and N/V but no additional cardiac symptoms. Her BP at home was 99/57. The spell lasted around 30 minutes and resoled while in the ER. Labs showed elevated BUN and leukocytosis. No acute finding on EKG. BP was normal. Pt was given IVF's and discharged. Since then, pt has been back to her baseline except for mild nausea this am.   Review of Systems   Constitutional: Negative for activity change, appetite change, chills, diaphoresis, fatigue, fever and unexpected weight change.   HENT: Negative for postnasal drip, rhinorrhea, sinus pressure, sneezing, sore throat, trouble swallowing and voice change.    Respiratory: Negative for cough, shortness of breath and wheezing.    Cardiovascular: Negative for chest pain, palpitations and leg swelling.   Gastrointestinal: Negative for abdominal pain, blood in stool, constipation, diarrhea, nausea and vomiting.   Genitourinary: Negative for dysuria.   Musculoskeletal: Negative for arthralgias and myalgias.   Skin: Negative for rash and wound.   Allergic/Immunologic: Negative for environmental allergies and food allergies.   Neurological: Positive for dizziness.   Hematological: Negative for adenopathy. Does not bruise/bleed easily.       Objective:      Physical Exam   Constitutional: She is oriented to person, place, and time. She appears well-developed and well-nourished. No distress.   HENT:   Head: Normocephalic and atraumatic.   Eyes: Conjunctivae and EOM are normal. Pupils are equal, round, and reactive to light. Right eye exhibits no discharge. Left eye exhibits no discharge. No scleral icterus.   Neck: Neck supple. No JVD present.   Cardiovascular: Normal rate, regular  rhythm, normal heart sounds and intact distal pulses.    Pulmonary/Chest: Effort normal and breath sounds normal. No respiratory distress. She has no wheezes. She has no rales.   Abdominal: Soft. Bowel sounds are normal. There is no tenderness.   Musculoskeletal: She exhibits no edema.   Lymphadenopathy:     She has no cervical adenopathy.   Neurological: She is alert and oriented to person, place, and time. No cranial nerve deficit. Coordination normal.   Skin: Skin is warm and dry. No rash noted. She is not diaphoretic. No pallor.       Assessment:       1. Dizziness    2. Essential hypertension        Plan:    1. No recurrence since discharge, possible 2/2 low BP while at home associated with dehydration       Pt will call if symptoms return for further w/u   2. Controlled, CPT

## 2017-11-16 ENCOUNTER — TELEPHONE (OUTPATIENT)
Dept: INTERNAL MEDICINE | Facility: CLINIC | Age: 72
End: 2017-11-16

## 2017-11-16 DIAGNOSIS — Z00.00 ANNUAL PHYSICAL EXAM: Primary | ICD-10-CM

## 2017-11-20 ENCOUNTER — LAB VISIT (OUTPATIENT)
Dept: LAB | Facility: HOSPITAL | Age: 72
End: 2017-11-20
Attending: INTERNAL MEDICINE
Payer: MEDICARE

## 2017-11-20 DIAGNOSIS — Z00.00 ANNUAL PHYSICAL EXAM: ICD-10-CM

## 2017-11-20 DIAGNOSIS — K21.9 GASTROESOPHAGEAL REFLUX DISEASE, ESOPHAGITIS PRESENCE NOT SPECIFIED: ICD-10-CM

## 2017-11-20 DIAGNOSIS — M17.0 OSTEOARTHRITIS OF BOTH KNEES, UNSPECIFIED OSTEOARTHRITIS TYPE: ICD-10-CM

## 2017-11-20 DIAGNOSIS — F32.A DEPRESSION, UNSPECIFIED DEPRESSION TYPE: ICD-10-CM

## 2017-11-20 DIAGNOSIS — I10 ESSENTIAL HYPERTENSION: ICD-10-CM

## 2017-11-20 DIAGNOSIS — D56.3 THALASSEMIA MINOR: ICD-10-CM

## 2017-11-20 DIAGNOSIS — E66.01 MORBID OBESITY, UNSPECIFIED OBESITY TYPE: Chronic | ICD-10-CM

## 2017-11-20 DIAGNOSIS — G47.33 OSA (OBSTRUCTIVE SLEEP APNEA): ICD-10-CM

## 2017-11-20 LAB
ALBUMIN SERPL BCP-MCNC: 3.4 G/DL
ALBUMIN SERPL BCP-MCNC: 3.4 G/DL
ALP SERPL-CCNC: 80 U/L
ALP SERPL-CCNC: 80 U/L
ALT SERPL W/O P-5'-P-CCNC: 12 U/L
ALT SERPL W/O P-5'-P-CCNC: 12 U/L
ANION GAP SERPL CALC-SCNC: 10 MMOL/L
ANION GAP SERPL CALC-SCNC: 10 MMOL/L
AST SERPL-CCNC: 14 U/L
AST SERPL-CCNC: 14 U/L
BASOPHILS # BLD AUTO: 0.06 K/UL
BASOPHILS NFR BLD: 0.5 %
BILIRUB SERPL-MCNC: 0.5 MG/DL
BILIRUB SERPL-MCNC: 0.5 MG/DL
BUN SERPL-MCNC: 37 MG/DL
BUN SERPL-MCNC: 37 MG/DL
CALCIUM SERPL-MCNC: 9.5 MG/DL
CALCIUM SERPL-MCNC: 9.5 MG/DL
CHLORIDE SERPL-SCNC: 106 MMOL/L
CHLORIDE SERPL-SCNC: 106 MMOL/L
CHOLEST SERPL-MCNC: 142 MG/DL
CHOLEST/HDLC SERPL: 2 {RATIO}
CO2 SERPL-SCNC: 23 MMOL/L
CO2 SERPL-SCNC: 23 MMOL/L
CREAT SERPL-MCNC: 1.4 MG/DL
CREAT SERPL-MCNC: 1.4 MG/DL
DIFFERENTIAL METHOD: ABNORMAL
EOSINOPHIL # BLD AUTO: 0.8 K/UL
EOSINOPHIL NFR BLD: 6.6 %
ERYTHROCYTE [DISTWIDTH] IN BLOOD BY AUTOMATED COUNT: 15.5 %
EST. GFR  (AFRICAN AMERICAN): 43 ML/MIN/1.73 M^2
EST. GFR  (AFRICAN AMERICAN): 43 ML/MIN/1.73 M^2
EST. GFR  (NON AFRICAN AMERICAN): 37.3 ML/MIN/1.73 M^2
EST. GFR  (NON AFRICAN AMERICAN): 37.3 ML/MIN/1.73 M^2
GLUCOSE SERPL-MCNC: 95 MG/DL
GLUCOSE SERPL-MCNC: 95 MG/DL
HCT VFR BLD AUTO: 34.9 %
HDLC SERPL-MCNC: 71 MG/DL
HDLC SERPL: 50 %
HGB BLD-MCNC: 10.5 G/DL
IMM GRANULOCYTES # BLD AUTO: 0.05 K/UL
IMM GRANULOCYTES NFR BLD AUTO: 0.4 %
LDLC SERPL CALC-MCNC: 55.4 MG/DL
LYMPHOCYTES # BLD AUTO: 3.9 K/UL
LYMPHOCYTES NFR BLD: 33.7 %
MCH RBC QN AUTO: 20 PG
MCHC RBC AUTO-ENTMCNC: 30.1 G/DL
MCV RBC AUTO: 67 FL
MONOCYTES # BLD AUTO: 0.8 K/UL
MONOCYTES NFR BLD: 6.8 %
NEUTROPHILS # BLD AUTO: 6 K/UL
NEUTROPHILS NFR BLD: 52 %
NONHDLC SERPL-MCNC: 71 MG/DL
NRBC BLD-RTO: 0 /100 WBC
PLATELET # BLD AUTO: 280 K/UL
PMV BLD AUTO: 10.2 FL
POTASSIUM SERPL-SCNC: 3.8 MMOL/L
POTASSIUM SERPL-SCNC: 3.8 MMOL/L
PROT SERPL-MCNC: 7.2 G/DL
PROT SERPL-MCNC: 7.2 G/DL
RBC # BLD AUTO: 5.25 M/UL
SODIUM SERPL-SCNC: 139 MMOL/L
SODIUM SERPL-SCNC: 139 MMOL/L
T4 FREE SERPL-MCNC: 0.91 NG/DL
TRIGL SERPL-MCNC: 78 MG/DL
TSH SERPL DL<=0.005 MIU/L-ACNC: 4.08 UIU/ML
TSH SERPL DL<=0.005 MIU/L-ACNC: 4.08 UIU/ML
WBC # BLD AUTO: 11.57 K/UL

## 2017-11-20 PROCEDURE — 80061 LIPID PANEL: CPT

## 2017-11-20 PROCEDURE — 36415 COLL VENOUS BLD VENIPUNCTURE: CPT | Mod: PO

## 2017-11-20 PROCEDURE — 80053 COMPREHEN METABOLIC PANEL: CPT

## 2017-11-20 PROCEDURE — 85025 COMPLETE CBC W/AUTO DIFF WBC: CPT

## 2017-11-20 PROCEDURE — 84443 ASSAY THYROID STIM HORMONE: CPT

## 2017-11-20 PROCEDURE — 84439 ASSAY OF FREE THYROXINE: CPT

## 2017-11-24 RX ORDER — ESCITALOPRAM OXALATE 20 MG/1
TABLET ORAL
Qty: 90 TABLET | Refills: 0 | Status: SHIPPED | OUTPATIENT
Start: 2017-11-24 | End: 2017-12-28

## 2017-11-25 ENCOUNTER — OFFICE VISIT (OUTPATIENT)
Dept: URGENT CARE | Facility: CLINIC | Age: 72
End: 2017-11-25
Payer: MEDICARE

## 2017-11-25 VITALS
BODY MASS INDEX: 39.95 KG/M2 | WEIGHT: 234 LBS | SYSTOLIC BLOOD PRESSURE: 114 MMHG | RESPIRATION RATE: 18 BRPM | HEIGHT: 64 IN | HEART RATE: 96 BPM | TEMPERATURE: 98 F | OXYGEN SATURATION: 93 % | DIASTOLIC BLOOD PRESSURE: 68 MMHG

## 2017-11-25 DIAGNOSIS — J18.9 PNEUMONIA OF LEFT LOWER LOBE DUE TO INFECTIOUS ORGANISM: ICD-10-CM

## 2017-11-25 DIAGNOSIS — J98.01 ACUTE BRONCHOSPASM: Primary | ICD-10-CM

## 2017-11-25 DIAGNOSIS — R06.02 SOB (SHORTNESS OF BREATH): ICD-10-CM

## 2017-11-25 PROCEDURE — 99214 OFFICE O/P EST MOD 30 MIN: CPT | Mod: 25,S$GLB,, | Performed by: INTERNAL MEDICINE

## 2017-11-25 PROCEDURE — 94640 AIRWAY INHALATION TREATMENT: CPT | Mod: S$GLB,,, | Performed by: INTERNAL MEDICINE

## 2017-11-25 PROCEDURE — 96372 THER/PROPH/DIAG INJ SC/IM: CPT | Mod: 59,S$GLB,, | Performed by: INTERNAL MEDICINE

## 2017-11-25 RX ORDER — CEFUROXIME AXETIL 500 MG/1
500 TABLET ORAL 2 TIMES DAILY
Qty: 20 TABLET | Refills: 0 | Status: ON HOLD | OUTPATIENT
Start: 2017-11-25 | End: 2017-12-10 | Stop reason: HOSPADM

## 2017-11-25 RX ORDER — IPRATROPIUM BROMIDE 0.5 MG/2.5ML
0.5 SOLUTION RESPIRATORY (INHALATION)
Status: COMPLETED | OUTPATIENT
Start: 2017-11-25 | End: 2017-11-25

## 2017-11-25 RX ORDER — BETAMETHASONE SODIUM PHOSPHATE AND BETAMETHASONE ACETATE 3; 3 MG/ML; MG/ML
9 INJECTION, SUSPENSION INTRA-ARTICULAR; INTRALESIONAL; INTRAMUSCULAR; SOFT TISSUE ONCE
Status: COMPLETED | OUTPATIENT
Start: 2017-11-25 | End: 2017-11-25

## 2017-11-25 RX ORDER — ALBUTEROL SULFATE 0.83 MG/ML
2.5 SOLUTION RESPIRATORY (INHALATION)
Status: COMPLETED | OUTPATIENT
Start: 2017-11-25 | End: 2017-11-25

## 2017-11-25 RX ORDER — BENZONATATE 100 MG/1
100 CAPSULE ORAL EVERY 6 HOURS PRN
Qty: 30 CAPSULE | Refills: 1 | Status: ON HOLD | OUTPATIENT
Start: 2017-11-25 | End: 2017-12-10 | Stop reason: HOSPADM

## 2017-11-25 RX ORDER — CEFTRIAXONE 1 G/1
1 INJECTION, POWDER, FOR SOLUTION INTRAMUSCULAR; INTRAVENOUS
Status: COMPLETED | OUTPATIENT
Start: 2017-11-25 | End: 2017-11-25

## 2017-11-25 RX ORDER — AZITHROMYCIN 250 MG/1
TABLET, FILM COATED ORAL
Qty: 6 TABLET | Refills: 0 | Status: SHIPPED | OUTPATIENT
Start: 2017-11-25 | End: 2017-11-30

## 2017-11-25 RX ADMIN — ALBUTEROL SULFATE 2.5 MG: 0.83 SOLUTION RESPIRATORY (INHALATION) at 03:11

## 2017-11-25 RX ADMIN — BETAMETHASONE SODIUM PHOSPHATE AND BETAMETHASONE ACETATE 9 MG: 3; 3 INJECTION, SUSPENSION INTRA-ARTICULAR; INTRALESIONAL; INTRAMUSCULAR; SOFT TISSUE at 03:11

## 2017-11-25 RX ADMIN — IPRATROPIUM BROMIDE 0.5 MG: 0.5 SOLUTION RESPIRATORY (INHALATION) at 03:11

## 2017-11-25 RX ADMIN — CEFTRIAXONE 1 G: 1 INJECTION, POWDER, FOR SOLUTION INTRAMUSCULAR; INTRAVENOUS at 03:11

## 2017-11-25 NOTE — PROGRESS NOTES
"Subjective:       Patient ID: Coby Atkinson is a 72 y.o. female.    Vitals:  height is 5' 4" (1.626 m) and weight is 106.1 kg (234 lb). Her temperature is 98 °F (36.7 °C). Her blood pressure is 114/68 and her pulse is 96. Her respiration is 18 and oxygen saturation is 93% (abnormal).     Chief Complaint: Cough    Cough   This is a new problem. Episode onset: 5 days. The problem has been unchanged. The problem occurs constantly. The cough is non-productive. Associated symptoms include wheezing. Pertinent negatives include no chest pain, chills, ear pain, eye redness, fever, headaches, myalgias, sore throat or shortness of breath. Nothing aggravates the symptoms. She has tried nothing for the symptoms. The treatment provided no relief.     Review of Systems   Constitution: Positive for malaise/fatigue. Negative for chills and fever.   HENT: Negative for congestion, ear pain, hoarse voice and sore throat.    Eyes: Negative for discharge and redness.   Cardiovascular: Negative for chest pain, dyspnea on exertion and leg swelling.   Respiratory: Positive for cough and wheezing. Negative for shortness of breath and sputum production.    Musculoskeletal: Negative for myalgias.   Gastrointestinal: Negative for abdominal pain and nausea.   Neurological: Negative for headaches.       Objective:      Physical Exam   Constitutional: She appears well-developed and well-nourished. She appears lethargic. She appears ill.   HENT:   Head: Normocephalic and atraumatic.   Eyes: Conjunctivae and EOM are normal. Pupils are equal, round, and reactive to light.   Neck: Normal range of motion. Neck supple.   Cardiovascular: Normal rate and regular rhythm.    Pulmonary/Chest: Effort normal.   Diffuse exp wheeze with bilateral decreased BS worse on L   Neurological: She appears lethargic.   Vitals reviewed.      Assessment:       1. Acute bronchospasm    2. SOB (shortness of breath)    3. Pneumonia of left lower lobe due to infectious " organism        Plan:         Acute bronchospasm  -     X-Ray Chest PA And Lateral; Future; Expected date: 11/25/2017  -     betamethasone acetate-betamethasone sodium phosphate injection 9 mg; Inject 1.5 mLs (9 mg total) into the muscle once.  -     ipratropium 0.02 % nebulizer solution 0.5 mg; Take 2.5 mLs (0.5 mg total) by nebulization one time.  -     albuterol nebulizer solution 2.5 mg; Take 3 mLs (2.5 mg total) by nebulization one time.    SOB (shortness of breath)  -     X-Ray Chest PA And Lateral; Future; Expected date: 11/25/2017    Pneumonia of left lower lobe due to infectious organism  -     cefTRIAXone injection 1 g; Inject 1 g into the muscle one time.  -     azithromycin (ZITHROMAX Z-DANIEL) 250 MG tablet; Take 2 tablets (500 mg) on  Day 1,  followed by 1 tablet (250 mg) once daily on Days 2 through 5.  Dispense: 6 tablet; Refill: 0  -     cefUROXime (CEFTIN) 500 MG tablet; Take 1 tablet (500 mg total) by mouth 2 (two) times daily.  Dispense: 20 tablet; Refill: 0  -     benzonatate (TESSALON PERLES) 100 MG capsule; Take 1 capsule (100 mg total) by mouth every 6 (six) hours as needed for Cough.  Dispense: 30 capsule; Refill: 1

## 2017-11-30 ENCOUNTER — OFFICE VISIT (OUTPATIENT)
Dept: INTERNAL MEDICINE | Facility: CLINIC | Age: 72
DRG: 175 | End: 2017-11-30
Payer: MEDICARE

## 2017-11-30 VITALS
HEIGHT: 63 IN | WEIGHT: 218.25 LBS | RESPIRATION RATE: 16 BRPM | TEMPERATURE: 98 F | HEART RATE: 70 BPM | BODY MASS INDEX: 38.67 KG/M2 | SYSTOLIC BLOOD PRESSURE: 134 MMHG | DIASTOLIC BLOOD PRESSURE: 74 MMHG

## 2017-11-30 DIAGNOSIS — I10 ESSENTIAL HYPERTENSION: Primary | ICD-10-CM

## 2017-11-30 DIAGNOSIS — G47.33 OSA (OBSTRUCTIVE SLEEP APNEA): ICD-10-CM

## 2017-11-30 DIAGNOSIS — N18.30 CKD (CHRONIC KIDNEY DISEASE) STAGE 3, GFR 30-59 ML/MIN: ICD-10-CM

## 2017-11-30 DIAGNOSIS — M17.0 OSTEOARTHRITIS OF BOTH KNEES, UNSPECIFIED OSTEOARTHRITIS TYPE: ICD-10-CM

## 2017-11-30 DIAGNOSIS — K21.9 GASTROESOPHAGEAL REFLUX DISEASE, ESOPHAGITIS PRESENCE NOT SPECIFIED: ICD-10-CM

## 2017-11-30 DIAGNOSIS — M85.80 OSTEOPENIA, UNSPECIFIED LOCATION: ICD-10-CM

## 2017-11-30 DIAGNOSIS — E66.9 OBESITY (BMI 30-39.9): ICD-10-CM

## 2017-11-30 DIAGNOSIS — F32.A DEPRESSION, UNSPECIFIED DEPRESSION TYPE: ICD-10-CM

## 2017-11-30 DIAGNOSIS — Z00.00 ANNUAL PHYSICAL EXAM: ICD-10-CM

## 2017-11-30 PROCEDURE — 99213 OFFICE O/P EST LOW 20 MIN: CPT | Mod: PBBFAC,PO | Performed by: INTERNAL MEDICINE

## 2017-11-30 PROCEDURE — 99999 PR PBB SHADOW E&M-EST. PATIENT-LVL III: CPT | Mod: PBBFAC,,, | Performed by: INTERNAL MEDICINE

## 2017-11-30 PROCEDURE — 99215 OFFICE O/P EST HI 40 MIN: CPT | Mod: S$PBB,,, | Performed by: INTERNAL MEDICINE

## 2017-11-30 NOTE — PROGRESS NOTES
Subjective:       Patient ID: Coby Atkinson is a 72 y.o. female.    Chief Complaint: Annual Exam    HPI   72 y.o. Female here for annual exam.      Cholesterol: (normal)  Vaccines: Influenza (2017); Tetanus (declined); Pneumovax (2016); Zoster (2016)  Eye exam: 10/16  Mammogram: 10/17  Gyn exam: declined  Colonoscopy: 11/16  DEXA: 10/16     Exercise: walks daily  Diet: low carb     Past Medical History:    Depression                                                    Edema                                                         GERD (gastroesophageal reflux disease)                        Hypertension                                    10/2/2012     Osteopenia                                                    Thalassemia minor                                           Past Surgical History:    APPENDECTOMY                                                   TONSILLECTOMY                                                  HYSTERECTOMY                                     age 39          Comment:fibroids    OOPHORECTOMY                                                 Social History    Marital status:              Spouse name:                       Years of education:                 Number of children: 1              Occupational History  Occupation          Employer            Comment               Retired from [employer]                          Social History Main Topics    Smoking status: Never Smoker                                                                 Smokeless status: Never Used                        Alcohol use: No                 Comment: rare    Drug use: No              Sexual activity: Yes               Partners with: Male      -- Codeine    -- Ciprofloxacin -- Rash  Ms. Atkinson does not currently have medications on file.     Review of Systems   Constitutional: Negative for activity change, appetite change, chills, diaphoresis, fatigue, fever and unexpected weight change.   HENT:  Negative for congestion, mouth sores, postnasal drip, rhinorrhea, sinus pressure, sneezing, sore throat, trouble swallowing and voice change.    Eyes: Negative for pain, discharge and visual disturbance.   Respiratory: Negative for cough, shortness of breath and wheezing.    Cardiovascular: Negative for chest pain, palpitations and leg swelling.   Gastrointestinal: Negative for abdominal pain, blood in stool, constipation, diarrhea, nausea and vomiting.   Endocrine: Negative for cold intolerance and heat intolerance.   Genitourinary: Negative for difficulty urinating, dysuria, frequency, hematuria and urgency.   Musculoskeletal: Positive for arthralgias and joint swelling. Negative for myalgias.   Skin: Negative for rash and wound.   Allergic/Immunologic: Negative for environmental allergies and food allergies.   Neurological: Negative for dizziness, tremors, seizures, syncope, weakness, light-headedness and headaches.   Hematological: Negative for adenopathy. Does not bruise/bleed easily.   Psychiatric/Behavioral: Positive for dysphoric mood. Negative for confusion and sleep disturbance. The patient is not nervous/anxious.        Objective:      Physical Exam   Constitutional: She is oriented to person, place, and time. She appears well-developed and well-nourished. No distress.   HENT:   Head: Normocephalic and atraumatic.   Right Ear: External ear normal.   Left Ear: External ear normal.   Nose: Nose normal.   Mouth/Throat: Oropharynx is clear and moist. No oropharyngeal exudate.   Eyes: Conjunctivae and EOM are normal. Pupils are equal, round, and reactive to light. Right eye exhibits no discharge. Left eye exhibits no discharge. No scleral icterus.   Neck: Neck supple. No JVD present. No thyromegaly present.   Cardiovascular: Normal rate, regular rhythm, normal heart sounds and intact distal pulses.    No murmur heard.  Pulmonary/Chest: Effort normal and breath sounds normal. No respiratory distress. She has no  wheezes. She has no rales. She exhibits no tenderness.   Abdominal: Soft. Bowel sounds are normal. She exhibits no distension. There is no tenderness. There is no guarding.   Musculoskeletal: She exhibits no edema.   Lymphadenopathy:     She has no cervical adenopathy.   Neurological: She is alert and oriented to person, place, and time. No cranial nerve deficit. Coordination normal.   Skin: Skin is warm and dry. No rash noted. She is not diaphoretic. No pallor.   Psychiatric: She has a normal mood and affect. Judgment normal.   Nursing note and vitals reviewed.      Assessment:       1. Essential hypertension    2. Obesity (BMI 30-39.9)    3. Depression, unspecified depression type    4. Gastroesophageal reflux disease, esophagitis presence not specified    5. Osteoarthritis of both knees, unspecified osteoarthritis type    6. ADRIÁN (obstructive sleep apnea)    7. Osteopenia, unspecified location    8. CKD (chronic kidney disease) stage 3, GFR 30-59 ml/min    9. Annual physical exam        Plan:    1. HTN- controlled on Hyzaar/Norvasc/Toprol   2. Obesity- pt followed by Bariatric Medicine on Topamax for weight loss   3. Depression- stable on Lexapro 20 mg daily   4. GERD- controlled on Protonix daily   5. OA of knees- stable on Mobic PRN   6. Mild ADRIÁN- pt not interested in using CPAP   7. Osteopenia- continue Calcium/Vitamin D   8. CKD III- stable    9. Annual exam- labs reviewed with pt       Vaccines: Influenza (2017); Tetanus (declined); Pneumovax (2016); Zoster (2016)       Eye exam: 10/16       Mammogram: 10/17       Gyn exam: declined       Colonoscopy: 11/16       DEXA: 10/16   9. F/u in 6 months     Over 1/2 of 40 minute visit spent reviewing pt's medical records, education/discussion of pt's medical conditions and medical management

## 2017-12-02 ENCOUNTER — HOSPITAL ENCOUNTER (INPATIENT)
Facility: HOSPITAL | Age: 72
LOS: 8 days | Discharge: HOME HEALTH | DRG: 175 | End: 2017-12-10
Attending: EMERGENCY MEDICINE | Admitting: HOSPITALIST
Payer: MEDICARE

## 2017-12-02 DIAGNOSIS — F32.A DEPRESSION, UNSPECIFIED DEPRESSION TYPE: ICD-10-CM

## 2017-12-02 DIAGNOSIS — K26.9 DUODENAL ULCER: ICD-10-CM

## 2017-12-02 DIAGNOSIS — R06.02 SHORTNESS OF BREATH: ICD-10-CM

## 2017-12-02 DIAGNOSIS — N18.30 CKD (CHRONIC KIDNEY DISEASE) STAGE 3, GFR 30-59 ML/MIN: ICD-10-CM

## 2017-12-02 DIAGNOSIS — I10 ESSENTIAL HYPERTENSION: ICD-10-CM

## 2017-12-02 DIAGNOSIS — R79.89 ELEVATED TROPONIN: ICD-10-CM

## 2017-12-02 DIAGNOSIS — I26.99 ACUTE PULMONARY EMBOLISM: ICD-10-CM

## 2017-12-02 DIAGNOSIS — I26.09 OTHER ACUTE PULMONARY EMBOLISM WITH ACUTE COR PULMONALE: ICD-10-CM

## 2017-12-02 DIAGNOSIS — R06.02 SOB (SHORTNESS OF BREATH) ON EXERTION: ICD-10-CM

## 2017-12-02 DIAGNOSIS — D56.3 THALASSEMIA MINOR: ICD-10-CM

## 2017-12-02 DIAGNOSIS — I21.4 NSTEMI (NON-ST ELEVATED MYOCARDIAL INFARCTION): Primary | ICD-10-CM

## 2017-12-02 DIAGNOSIS — D50.9 MICROCYTIC ANEMIA: ICD-10-CM

## 2017-12-02 DIAGNOSIS — D62 ACUTE BLOOD LOSS ANEMIA: ICD-10-CM

## 2017-12-02 DIAGNOSIS — K21.9 GASTROESOPHAGEAL REFLUX DISEASE, ESOPHAGITIS PRESENCE NOT SPECIFIED: ICD-10-CM

## 2017-12-02 DIAGNOSIS — F32.9 MAJOR DEPRESSIVE DISORDER, SINGLE EPISODE: ICD-10-CM

## 2017-12-02 DIAGNOSIS — I26.92 ACUTE SADDLE PULMONARY EMBOLISM WITHOUT ACUTE COR PULMONALE: ICD-10-CM

## 2017-12-02 DIAGNOSIS — I26.99 PULMONARY EMBOLISM: ICD-10-CM

## 2017-12-02 PROBLEM — D72.829 LEUKOCYTOSIS: Status: ACTIVE | Noted: 2017-12-02

## 2017-12-02 LAB
ALBUMIN SERPL BCP-MCNC: 3.3 G/DL
ALP SERPL-CCNC: 93 U/L
ALT SERPL W/O P-5'-P-CCNC: 13 U/L
ANION GAP SERPL CALC-SCNC: 10 MMOL/L
AST SERPL-CCNC: 16 U/L
BASOPHILS # BLD AUTO: 0.08 K/UL
BASOPHILS # BLD AUTO: 0.08 K/UL
BASOPHILS NFR BLD: 0.5 %
BASOPHILS NFR BLD: 0.6 %
BILIRUB SERPL-MCNC: 0.3 MG/DL
BILIRUB UR QL STRIP: NEGATIVE
BNP SERPL-MCNC: 202 PG/ML
BUN SERPL-MCNC: 39 MG/DL
CALCIUM SERPL-MCNC: 9 MG/DL
CHLORIDE SERPL-SCNC: 107 MMOL/L
CLARITY UR REFRACT.AUTO: CLEAR
CO2 SERPL-SCNC: 23 MMOL/L
COLOR UR AUTO: YELLOW
CREAT SERPL-MCNC: 1.4 MG/DL
D DIMER PPP IA.FEU-MCNC: 4.98 MG/L FEU
DIFFERENTIAL METHOD: ABNORMAL
DIFFERENTIAL METHOD: ABNORMAL
EOSINOPHIL # BLD AUTO: 0.7 K/UL
EOSINOPHIL # BLD AUTO: 0.8 K/UL
EOSINOPHIL NFR BLD: 5.2 %
EOSINOPHIL NFR BLD: 5.5 %
ERYTHROCYTE [DISTWIDTH] IN BLOOD BY AUTOMATED COUNT: 15.6 %
ERYTHROCYTE [DISTWIDTH] IN BLOOD BY AUTOMATED COUNT: 15.6 %
EST. GFR  (AFRICAN AMERICAN): 43 ML/MIN/1.73 M^2
EST. GFR  (NON AFRICAN AMERICAN): 37.3 ML/MIN/1.73 M^2
FACT X PPP CHRO-ACNC: <0.1 IU/ML
GLUCOSE SERPL-MCNC: 101 MG/DL
GLUCOSE UR QL STRIP: NEGATIVE
HCT VFR BLD AUTO: 33.6 %
HCT VFR BLD AUTO: 34.6 %
HGB BLD-MCNC: 10.1 G/DL
HGB BLD-MCNC: 10.7 G/DL
HGB UR QL STRIP: NEGATIVE
IMM GRANULOCYTES # BLD AUTO: 0.09 K/UL
IMM GRANULOCYTES # BLD AUTO: 0.14 K/UL
IMM GRANULOCYTES NFR BLD AUTO: 0.6 %
IMM GRANULOCYTES NFR BLD AUTO: 0.9 %
INR PPP: 0.9
KETONES UR QL STRIP: NEGATIVE
LEUKOCYTE ESTERASE UR QL STRIP: NEGATIVE
LYMPHOCYTES # BLD AUTO: 3.3 K/UL
LYMPHOCYTES # BLD AUTO: 4 K/UL
LYMPHOCYTES NFR BLD: 21.6 %
LYMPHOCYTES NFR BLD: 28.1 %
MAGNESIUM SERPL-MCNC: 2.3 MG/DL
MCH RBC QN AUTO: 19.3 PG
MCH RBC QN AUTO: 20.2 PG
MCHC RBC AUTO-ENTMCNC: 30.1 G/DL
MCHC RBC AUTO-ENTMCNC: 30.9 G/DL
MCV RBC AUTO: 64 FL
MCV RBC AUTO: 65 FL
MONOCYTES # BLD AUTO: 1 K/UL
MONOCYTES # BLD AUTO: 1.1 K/UL
MONOCYTES NFR BLD: 6.7 %
MONOCYTES NFR BLD: 7.3 %
NEUTROPHILS # BLD AUTO: 8.3 K/UL
NEUTROPHILS # BLD AUTO: 9.7 K/UL
NEUTROPHILS NFR BLD: 58.8 %
NEUTROPHILS NFR BLD: 64.2 %
NITRITE UR QL STRIP: NEGATIVE
NRBC BLD-RTO: 0 /100 WBC
NRBC BLD-RTO: 0 /100 WBC
PH UR STRIP: 5 [PH] (ref 5–8)
PHOSPHATE SERPL-MCNC: 3.5 MG/DL
PLATELET # BLD AUTO: 292 K/UL
PLATELET # BLD AUTO: 294 K/UL
PMV BLD AUTO: 9 FL
PMV BLD AUTO: 9.3 FL
POTASSIUM SERPL-SCNC: 3.8 MMOL/L
PROT SERPL-MCNC: 7.1 G/DL
PROT UR QL STRIP: NEGATIVE
PROTHROMBIN TIME: 10.1 SEC
RBC # BLD AUTO: 5.23 M/UL
RBC # BLD AUTO: 5.3 M/UL
SODIUM SERPL-SCNC: 140 MMOL/L
SP GR UR STRIP: 1.01 (ref 1–1.03)
TROPONIN I SERPL DL<=0.01 NG/ML-MCNC: 0.41 NG/ML
URN SPEC COLLECT METH UR: NORMAL
UROBILINOGEN UR STRIP-ACNC: NEGATIVE EU/DL
WBC # BLD AUTO: 14.18 K/UL
WBC # BLD AUTO: 15.16 K/UL

## 2017-12-02 PROCEDURE — 93010 ELECTROCARDIOGRAM REPORT: CPT | Mod: 76,,, | Performed by: INTERNAL MEDICINE

## 2017-12-02 PROCEDURE — 99285 EMERGENCY DEPT VISIT HI MDM: CPT | Mod: 25

## 2017-12-02 PROCEDURE — 81003 URINALYSIS AUTO W/O SCOPE: CPT

## 2017-12-02 PROCEDURE — 83880 ASSAY OF NATRIURETIC PEPTIDE: CPT

## 2017-12-02 PROCEDURE — 80053 COMPREHEN METABOLIC PANEL: CPT

## 2017-12-02 PROCEDURE — 85025 COMPLETE CBC W/AUTO DIFF WBC: CPT

## 2017-12-02 PROCEDURE — 36415 COLL VENOUS BLD VENIPUNCTURE: CPT

## 2017-12-02 PROCEDURE — 85610 PROTHROMBIN TIME: CPT

## 2017-12-02 PROCEDURE — P9612 CATHETERIZE FOR URINE SPEC: HCPCS

## 2017-12-02 PROCEDURE — 99285 EMERGENCY DEPT VISIT HI MDM: CPT | Mod: GC,,, | Performed by: EMERGENCY MEDICINE

## 2017-12-02 PROCEDURE — 25000003 PHARM REV CODE 250: Performed by: EMERGENCY MEDICINE

## 2017-12-02 PROCEDURE — 20600001 HC STEP DOWN PRIVATE ROOM

## 2017-12-02 PROCEDURE — 99223 1ST HOSP IP/OBS HIGH 75: CPT | Mod: ,,, | Performed by: HOSPITALIST

## 2017-12-02 PROCEDURE — 85379 FIBRIN DEGRADATION QUANT: CPT

## 2017-12-02 PROCEDURE — 84484 ASSAY OF TROPONIN QUANT: CPT | Mod: 91

## 2017-12-02 PROCEDURE — 63600175 PHARM REV CODE 636 W HCPCS: Performed by: HOSPITALIST

## 2017-12-02 PROCEDURE — 96365 THER/PROPH/DIAG IV INF INIT: CPT

## 2017-12-02 PROCEDURE — 25000003 PHARM REV CODE 250: Performed by: HOSPITALIST

## 2017-12-02 PROCEDURE — 85520 HEPARIN ASSAY: CPT

## 2017-12-02 PROCEDURE — 84484 ASSAY OF TROPONIN QUANT: CPT

## 2017-12-02 PROCEDURE — 93010 ELECTROCARDIOGRAM REPORT: CPT | Mod: ,,, | Performed by: INTERNAL MEDICINE

## 2017-12-02 PROCEDURE — 83735 ASSAY OF MAGNESIUM: CPT

## 2017-12-02 PROCEDURE — 25500020 PHARM REV CODE 255: Performed by: EMERGENCY MEDICINE

## 2017-12-02 PROCEDURE — 84100 ASSAY OF PHOSPHORUS: CPT

## 2017-12-02 RX ORDER — ESCITALOPRAM OXALATE 10 MG/1
20 TABLET ORAL DAILY
Status: DISCONTINUED | OUTPATIENT
Start: 2017-12-03 | End: 2017-12-10 | Stop reason: HOSPADM

## 2017-12-02 RX ORDER — SODIUM CHLORIDE 9 MG/ML
INJECTION, SOLUTION INTRAVENOUS CONTINUOUS
Status: DISCONTINUED | OUTPATIENT
Start: 2017-12-03 | End: 2017-12-03

## 2017-12-02 RX ORDER — ATORVASTATIN CALCIUM 20 MG/1
80 TABLET, FILM COATED ORAL NIGHTLY
Status: DISCONTINUED | OUTPATIENT
Start: 2017-12-02 | End: 2017-12-02

## 2017-12-02 RX ORDER — HEPARIN SODIUM,PORCINE/D5W 25000/250
17 INTRAVENOUS SOLUTION INTRAVENOUS CONTINUOUS
Status: DISCONTINUED | OUTPATIENT
Start: 2017-12-02 | End: 2017-12-06

## 2017-12-02 RX ORDER — METOPROLOL SUCCINATE 25 MG/1
25 TABLET, EXTENDED RELEASE ORAL DAILY
Status: DISCONTINUED | OUTPATIENT
Start: 2017-12-03 | End: 2017-12-03

## 2017-12-02 RX ORDER — LOSARTAN POTASSIUM 50 MG/1
100 TABLET ORAL DAILY
Status: DISCONTINUED | OUTPATIENT
Start: 2017-12-03 | End: 2017-12-02

## 2017-12-02 RX ORDER — HEPARIN SODIUM,PORCINE/D5W 25000/250
17 INTRAVENOUS SOLUTION INTRAVENOUS CONTINUOUS
Status: DISCONTINUED | OUTPATIENT
Start: 2017-12-02 | End: 2017-12-02

## 2017-12-02 RX ORDER — PANTOPRAZOLE SODIUM 40 MG/1
40 TABLET, DELAYED RELEASE ORAL DAILY
Status: DISCONTINUED | OUTPATIENT
Start: 2017-12-03 | End: 2017-12-04

## 2017-12-02 RX ORDER — ASPIRIN 325 MG
325 TABLET ORAL ONCE
Status: COMPLETED | OUTPATIENT
Start: 2017-12-02 | End: 2017-12-02

## 2017-12-02 RX ORDER — ASPIRIN 81 MG/1
81 TABLET ORAL DAILY
Status: DISCONTINUED | OUTPATIENT
Start: 2017-12-03 | End: 2017-12-02

## 2017-12-02 RX ADMIN — ASPIRIN 325 MG ORAL TABLET 325 MG: 325 PILL ORAL at 07:12

## 2017-12-02 RX ADMIN — TICAGRELOR 180 MG: 90 TABLET ORAL at 08:12

## 2017-12-02 RX ADMIN — ATORVASTATIN CALCIUM 80 MG: 20 TABLET, FILM COATED ORAL at 11:12

## 2017-12-02 RX ADMIN — HEPARIN SODIUM AND DEXTROSE 17 UNITS/KG/HR: 10000; 5 INJECTION INTRAVENOUS at 09:12

## 2017-12-02 RX ADMIN — IOHEXOL 100 ML: 350 INJECTION, SOLUTION INTRAVENOUS at 09:12

## 2017-12-02 NOTE — ED NOTES
Patient identifiers verified and correct for Coby Atkinson.    LOC: The patient is awake, alert and aware of environment with an appropriate affect, the patient is oriented x 3 and speaking appropriately.  APPEARANCE: Patient resting comfortably and in no acute distress, patient is clean and well groomed, patient's clothing is properly fastened.  SKIN: The skin is warm and dry, color consistent with ethnicity, patient has normal skin turgor and moist mucus membranes, skin intact, no breakdown or bruising noted.  MUSCULOSKELETAL: Patient moving all extremities spontaneously, no obvious swelling or deformities noted.  RESPIRATORY: Airway is open and patent, respirations are spontaneous, patient has a normal effort and rate, no accessory muscle use noted, bilateral breath sounds clear  CARDIAC: Patient has a normal rate and regular rhythm, no periphreal edema noted, capillary refill < 3 seconds.  ABDOMEN: Soft and non tender to palpation, no distention noted, normoactive bowel sounds present in all four quadrants.  NEUROLOGIC: PERRL, 3mm bilaterally, eyes open spontaneously, behavior appropriate to situation, follows commands, facial expression symmetrical, bilateral hand grasp equal and even, purposeful motor response noted, normal sensation in all extremities when touched with a finger.

## 2017-12-02 NOTE — ED NOTES
"Patient felt like she was going to pass out today while walking a short distance. Patient reports SOB and labored breathing. This morning patient reports spots in front of her eyes. Patient reports multiple episodes of feeling SOB "like she is going to pass out." Denies chest pain.   "

## 2017-12-02 NOTE — ED PROVIDER NOTES
Encounter Date: 12/2/2017    SCRIBE #1 NOTE: I, Izabella Escamilla, am scribing for, and in the presence of,  Dr. Stevens. I have scribed the following portions of the note - the EKG reading and the Resident attestation. Other sections scribed: Attending ED notes.       History     Chief Complaint   Patient presents with    Fatigue     feels weak,   like I'm going to pass out, denies chest pain . Deneis n/v/d/constipation/ fever.      72 year old Female with sig Pmhx of HTN, CKD, ADRIÁN, Thalessemia minor presents with several episodes of lightheadedness and feeling that she was going to pass out today with limited exertion. Associated SOB during two events on short ambulation today. She had no associated CP or Diaphoresis. Functional status per family has been decreasing for the last year with KITCHEN. She denies any Fevers. Chill, N/V/D, no blood in stool, no recent illness.           Review of patient's allergies indicates:   Allergen Reactions    Codeine     Ciprofloxacin Rash     Past Medical History:   Diagnosis Date    Cataract     Depression     Edema     GERD (gastroesophageal reflux disease)     Hypertension 10/2/2012    Osteopenia     Thalassemia minor      Past Surgical History:   Procedure Laterality Date    APPENDECTOMY      CATARACT EXTRACTION      COLONOSCOPY N/A 11/5/2016    Procedure: COLONOSCOPY;  Surgeon: Tanner Simental MD;  Location: [address]);  Service: Endoscopy;  Laterality: N/A;    HYSTERECTOMY  age 39    fibroids    OOPHORECTOMY      TONSILLECTOMY       Family History   Problem Relation Age of Onset    Hypertension Mother     Cancer Father      skin    Cancer Brother      pancreatic    No Known Problems Sister     No Known Problems Maternal Aunt     No Known Problems Maternal Uncle     No Known Problems Paternal Aunt     No Known Problems Paternal Uncle     No Known Problems Maternal Grandmother     No Known Problems Maternal Grandfather     No Known Problems  Paternal Grandmother     No Known Problems Paternal Grandfather     Heart disease Neg Hx     Amblyopia Neg Hx     Blindness Neg Hx     Cataracts Neg Hx     Diabetes Neg Hx     Glaucoma Neg Hx     Macular degeneration Neg Hx     Retinal detachment Neg Hx     Strabismus Neg Hx     Stroke Neg Hx     Thyroid disease Neg Hx      Social History   Substance Use Topics    Smoking status: Never Smoker    Smokeless tobacco: Never Used    Alcohol use No      Comment: rare     Review of Systems   Constitutional: Negative for activity change, appetite change, chills, diaphoresis and unexpected weight change.   HENT: Negative for congestion, sinus pain and trouble swallowing.    Eyes: Negative for pain and discharge.   Respiratory: Positive for shortness of breath. Negative for apnea, cough and chest tightness.    Cardiovascular: Negative for chest pain, palpitations and leg swelling.   Gastrointestinal: Negative for abdominal distention, abdominal pain, blood in stool, diarrhea, nausea and vomiting.   Genitourinary: Negative for difficulty urinating, dysuria and hematuria.   Musculoskeletal: Negative for arthralgias and back pain.   Skin: Negative for color change and pallor.   Neurological: Positive for syncope and light-headedness. Negative for dizziness, seizures, facial asymmetry, speech difficulty, numbness and headaches.   Psychiatric/Behavioral: Negative for agitation and behavioral problems.       Physical Exam     Initial Vitals [12/02/17 1528]   BP Pulse Resp Temp SpO2   (!) 99/55 96 18 98.5 °F (36.9 °C) 95 %      MAP       69.67         Physical Exam    Nursing note and vitals reviewed.  Constitutional: She appears well-developed and well-nourished. No distress.   Elderly appearing, obese body habitus   HENT:   Head: Normocephalic and atraumatic.   Mouth/Throat: No oropharyngeal exudate.   Eyes: EOM are normal. No scleral icterus.   Neck: Normal range of motion. Neck supple.   Cardiovascular: Normal  rate, regular rhythm and normal heart sounds. Exam reveals no gallop and no friction rub.    No murmur heard.  Pulmonary/Chest: Breath sounds normal. No respiratory distress. She has no wheezes. She has no rales.   Abdominal: Soft. Bowel sounds are normal. She exhibits no distension. There is no tenderness. There is no rebound.   Musculoskeletal: Normal range of motion. She exhibits no edema or tenderness.   Lymphadenopathy:     She has no cervical adenopathy.   Neurological: She is alert and oriented to person, place, and time. No cranial nerve deficit or sensory deficit.   Skin: Skin is dry. Capillary refill takes less than 2 seconds.   Psychiatric: She has a normal mood and affect. Thought content normal.         ED Course   Procedures  Labs Reviewed   CBC W/ AUTO DIFFERENTIAL - Abnormal; Notable for the following:        Result Value    WBC 15.16 (*)     Hemoglobin 10.7 (*)     Hematocrit 34.6 (*)     MCV 65 (*)     MCH 20.2 (*)     MCHC 30.9 (*)     RDW 15.6 (*)     MPV 9.0 (*)     Immature Granulocytes 0.9 (*)     Gran # 9.7 (*)     Immature Grans (Abs) 0.14 (*)     Mono # 1.1 (*)     Eos # 0.8 (*)     All other components within normal limits   COMPREHENSIVE METABOLIC PANEL - Abnormal; Notable for the following:     BUN, Bld 39 (*)     Albumin 3.3 (*)     eGFR if  43.0 (*)     eGFR if non  37.3 (*)     All other components within normal limits   TROPONIN I - Abnormal; Notable for the following:     Troponin I 0.405 (*)     All other components within normal limits   B-TYPE NATRIURETIC PEPTIDE - Abnormal; Notable for the following:      (*)     All other components within normal limits   D DIMER, QUANTITATIVE - Abnormal; Notable for the following:     D-Dimer 4.98 (*)     All other components within normal limits   ANTI-XA HEPARIN MONITORING - Abnormal; Notable for the following:     Heparin Anti-Xa <0.10 (*)     All other components within normal limits   CBC W/ AUTO  DIFFERENTIAL - Abnormal; Notable for the following:     WBC 14.18 (*)     Hemoglobin 10.1 (*)     Hematocrit 33.6 (*)     MCV 64 (*)     MCH 19.3 (*)     MCHC 30.1 (*)     RDW 15.6 (*)     Immature Granulocytes 0.6 (*)     Gran # 8.3 (*)     Immature Grans (Abs) 0.09 (*)     Eos # 0.7 (*)     All other components within normal limits   PROTIME-INR   MAGNESIUM   PHOSPHORUS   URINALYSIS, REFLEX TO URINE CULTURE    Narrative:     Preferred Collection Type->Urine, Clean Catch     EKG Readings: (Independently Interpreted)   Heart rate of 96. Normal sinus rhythm. No STEMI.          Medical Decision Making:   History:   Old Medical Records: I decided to obtain old medical records.  Clinical Tests:   Lab Tests: Ordered and Reviewed  Radiological Study: Ordered and Reviewed  Medical Tests: Ordered and Reviewed       APC / Resident Notes:   HOIV MDM:  72 year old F with sig Pmhx of HTN, CKD, obesity who presents with SOB and lightheadedness after short ambulation concerning for presyncopal event.    Concern for: ACS equivalent, arrhythmia, CHF, Anemia, Electrolyte imbalance, orthostatic hypotension    Plan: EKG reviewed with no signs of current arrhythmia or ST elevation. Will obtain CBC, CMP, UA, Troponin, BNP at this time. CXR will be evaluated as well.    Marylou Portillo MD  [employer] EM PGY-IV  4:34 PM    HOIV update:  Troponin elevated. Discussed with cardiology and with family. WIll admit for NSTEMI and initiate heparin.    Marylou Portillo MD  [facility] [facility] PGY-IV  9:18 PM           Scribe Attestation:   Scribe #1: I performed the above scribed service and the documentation accurately describes the services I performed. I attest to the accuracy of the note.    Attending Attestation:   Physician Attestation Statement for Resident:  As the supervising MD   Physician Attestation Statement: I have personally seen and examined this patient.   I agree with the above history. -: Pt presents because of episodes of weakness and SOB when she  walked from her car to an event and while in event she continued with her symptoms and presents here with hx of HTN and CKD. She had similar compliant with symptoms of feelings of wanting to pass out. She is on anti-hypertensive's and taking these approprietly. She has no fluid loss. Denies diarrhea and denies melena. Will do labs particularly cardiac. Will do orthostatics and await lab results.   As the supervising MD I agree with the above PE.    As the supervising MD I agree with the above treatment, course, plan, and disposition.  I have reviewed and agree with the residents interpretation of the following: x-rays, EKG and lab data.  I have reviewed the following: old records at this facility.            Attending ED Notes:   6:32 PM  Pt's troponin came back as 0.405. Pt not having significant renal dysfunction. Believes this is a true value. Makes us more concerned that these pt's symptoms are anginal equivalent.          ED Course      Clinical Impression:   The primary encounter diagnosis was NSTEMI (non-ST elevated myocardial infarction). A diagnosis of Shortness of breath was also pertinent to this visit.    Disposition:   Disposition: Admitted  Condition: Serious                        Marylou Portillo MD  Resident  12/02/17 2119       Angel Stevens MD  12/20/17 2025

## 2017-12-02 NOTE — PROVIDER PROGRESS NOTES - EMERGENCY DEPT.
Encounter Date: 12/2/2017    ED Physician Progress Notes         EKG - STEMI Decision  Initial Reading: No STEMI present.  Response: patient moved to monitored bed.

## 2017-12-03 ENCOUNTER — DOCUMENTATION ONLY (OUTPATIENT)
Dept: CARDIOLOGY | Facility: CLINIC | Age: 72
End: 2017-12-03

## 2017-12-03 PROBLEM — I26.99 PE (PULMONARY THROMBOEMBOLISM): Status: ACTIVE | Noted: 2017-12-03

## 2017-12-03 PROBLEM — I26.99 ACUTE PULMONARY EMBOLISM: Status: ACTIVE | Noted: 2017-12-03

## 2017-12-03 PROBLEM — D72.829 LEUKOCYTOSIS: Status: ACTIVE | Noted: 2017-12-03

## 2017-12-03 PROBLEM — R79.89 ELEVATED TROPONIN: Status: ACTIVE | Noted: 2017-12-03

## 2017-12-03 LAB
ALLENS TEST: ABNORMAL
ANION GAP SERPL CALC-SCNC: 8 MMOL/L
APTT BLDCRRT: 64.8 SEC
BASOPHILS # BLD AUTO: 0.06 K/UL
BASOPHILS # BLD AUTO: 0.06 K/UL
BASOPHILS # BLD AUTO: 0.08 K/UL
BASOPHILS # BLD AUTO: 0.08 K/UL
BASOPHILS NFR BLD: 0.5 %
BASOPHILS NFR BLD: 0.5 %
BASOPHILS NFR BLD: 0.6 %
BASOPHILS NFR BLD: 0.6 %
BUN SERPL-MCNC: 26 MG/DL
CALCIUM SERPL-MCNC: 8.5 MG/DL
CHLORIDE SERPL-SCNC: 108 MMOL/L
CO2 SERPL-SCNC: 22 MMOL/L
CREAT SERPL-MCNC: 1 MG/DL
DELSYS: ABNORMAL
DIASTOLIC DYSFUNCTION: YES
DIFFERENTIAL METHOD: ABNORMAL
EOSINOPHIL # BLD AUTO: 0.9 K/UL
EOSINOPHIL # BLD AUTO: 1 K/UL
EOSINOPHIL NFR BLD: 6.7 %
EOSINOPHIL NFR BLD: 7.5 %
EOSINOPHIL NFR BLD: 8 %
EOSINOPHIL NFR BLD: 8.2 %
ERYTHROCYTE [DISTWIDTH] IN BLOOD BY AUTOMATED COUNT: 15.5 %
ERYTHROCYTE [DISTWIDTH] IN BLOOD BY AUTOMATED COUNT: 15.5 %
ERYTHROCYTE [DISTWIDTH] IN BLOOD BY AUTOMATED COUNT: 15.6 %
ERYTHROCYTE [DISTWIDTH] IN BLOOD BY AUTOMATED COUNT: 15.6 %
EST. GFR  (AFRICAN AMERICAN): >60 ML/MIN/1.73 M^2
EST. GFR  (NON AFRICAN AMERICAN): 56 ML/MIN/1.73 M^2
ESTIMATED PA SYSTOLIC PRESSURE: 77.56
FACT X PPP CHRO-ACNC: 0.78 IU/ML
FACT X PPP CHRO-ACNC: 0.79 IU/ML
FACT X PPP CHRO-ACNC: 1.07 IU/ML
FIBRINOGEN PPP-MCNC: 283 MG/DL
FIBRINOGEN PPP-MCNC: 283 MG/DL
FIBRINOGEN PPP-MCNC: 284 MG/DL
GLOBAL PERICARDIAL EFFUSION: ABNORMAL
GLUCOSE SERPL-MCNC: 105 MG/DL
HCO3 UR-SCNC: 20.1 MMOL/L (ref 24–28)
HCT VFR BLD AUTO: 30.8 %
HCT VFR BLD AUTO: 31.4 %
HCT VFR BLD AUTO: 32.3 %
HCT VFR BLD AUTO: 33.4 %
HGB BLD-MCNC: 10.1 G/DL
HGB BLD-MCNC: 10.2 G/DL
HGB BLD-MCNC: 9.6 G/DL
HGB BLD-MCNC: 9.8 G/DL
IMM GRANULOCYTES # BLD AUTO: 0.07 K/UL
IMM GRANULOCYTES # BLD AUTO: 0.09 K/UL
IMM GRANULOCYTES # BLD AUTO: 0.1 K/UL
IMM GRANULOCYTES # BLD AUTO: 0.1 K/UL
IMM GRANULOCYTES NFR BLD AUTO: 0.6 %
IMM GRANULOCYTES NFR BLD AUTO: 0.7 %
IMM GRANULOCYTES NFR BLD AUTO: 0.8 %
IMM GRANULOCYTES NFR BLD AUTO: 0.8 %
INR PPP: 1
LYMPHOCYTES # BLD AUTO: 3.1 K/UL
LYMPHOCYTES # BLD AUTO: 3.2 K/UL
LYMPHOCYTES NFR BLD: 23.8 %
LYMPHOCYTES NFR BLD: 24.2 %
LYMPHOCYTES NFR BLD: 27.8 %
LYMPHOCYTES NFR BLD: 28.5 %
MAGNESIUM SERPL-MCNC: 2.3 MG/DL
MCH RBC QN AUTO: 19.8 PG
MCH RBC QN AUTO: 20 PG
MCH RBC QN AUTO: 20.2 PG
MCH RBC QN AUTO: 20.3 PG
MCHC RBC AUTO-ENTMCNC: 30.5 G/DL
MCHC RBC AUTO-ENTMCNC: 31.2 G/DL
MCHC RBC AUTO-ENTMCNC: 31.2 G/DL
MCHC RBC AUTO-ENTMCNC: 31.3 G/DL
MCV RBC AUTO: 64 FL
MCV RBC AUTO: 65 FL
MCV RBC AUTO: 65 FL
MCV RBC AUTO: 66 FL
MONOCYTES # BLD AUTO: 0.6 K/UL
MONOCYTES # BLD AUTO: 0.8 K/UL
MONOCYTES # BLD AUTO: 0.9 K/UL
MONOCYTES # BLD AUTO: 1 K/UL
MONOCYTES NFR BLD: 5.7 %
MONOCYTES NFR BLD: 6.7 %
MONOCYTES NFR BLD: 7.1 %
MONOCYTES NFR BLD: 7.2 %
NEUTROPHILS # BLD AUTO: 6.2 K/UL
NEUTROPHILS # BLD AUTO: 6.3 K/UL
NEUTROPHILS # BLD AUTO: 7.9 K/UL
NEUTROPHILS # BLD AUTO: 8.3 K/UL
NEUTROPHILS NFR BLD: 55.1 %
NEUTROPHILS NFR BLD: 57.2 %
NEUTROPHILS NFR BLD: 59.7 %
NEUTROPHILS NFR BLD: 61.5 %
NRBC BLD-RTO: 0 /100 WBC
PCO2 BLDA: 30.3 MMHG (ref 35–45)
PH SMN: 7.43 [PH] (ref 7.35–7.45)
PHOSPHATE SERPL-MCNC: 2.7 MG/DL
PLATELET # BLD AUTO: 249 K/UL
PLATELET # BLD AUTO: 252 K/UL
PLATELET # BLD AUTO: 280 K/UL
PLATELET # BLD AUTO: 287 K/UL
PMV BLD AUTO: 8.9 FL
PMV BLD AUTO: 9.3 FL
PMV BLD AUTO: 9.4 FL
PMV BLD AUTO: 9.4 FL
PO2 BLDA: 86 MMHG (ref 80–100)
POC BE: -4 MMOL/L
POC SATURATED O2: 97 % (ref 95–100)
POC TCO2: 21 MMOL/L (ref 23–27)
POTASSIUM SERPL-SCNC: 3.5 MMOL/L
POTASSIUM SERPL-SCNC: 4.1 MMOL/L
PROTHROMBIN TIME: 10.6 SEC
RBC # BLD AUTO: 4.76 M/UL
RBC # BLD AUTO: 4.83 M/UL
RBC # BLD AUTO: 5.09 M/UL
RBC # BLD AUTO: 5.1 M/UL
RETIRED EF AND QEF - SEE NOTES: 60 (ref 55–65)
SAMPLE: ABNORMAL
SITE: ABNORMAL
SODIUM SERPL-SCNC: 138 MMOL/L
TRICUSPID VALVE REGURGITATION: ABNORMAL
TROPONIN I SERPL DL<=0.01 NG/ML-MCNC: 0.42 NG/ML
WBC # BLD AUTO: 11.08 K/UL
WBC # BLD AUTO: 11.24 K/UL
WBC # BLD AUTO: 13.28 K/UL
WBC # BLD AUTO: 13.53 K/UL

## 2017-12-03 PROCEDURE — 63600175 PHARM REV CODE 636 W HCPCS: Performed by: INTERNAL MEDICINE

## 2017-12-03 PROCEDURE — 93306 TTE W/DOPPLER COMPLETE: CPT

## 2017-12-03 PROCEDURE — 93010 ELECTROCARDIOGRAM REPORT: CPT | Mod: ,,, | Performed by: INTERNAL MEDICINE

## 2017-12-03 PROCEDURE — 25000003 PHARM REV CODE 250: Performed by: HOSPITALIST

## 2017-12-03 PROCEDURE — 84132 ASSAY OF SERUM POTASSIUM: CPT

## 2017-12-03 PROCEDURE — 84100 ASSAY OF PHOSPHORUS: CPT

## 2017-12-03 PROCEDURE — 85730 THROMBOPLASTIN TIME PARTIAL: CPT

## 2017-12-03 PROCEDURE — 3E04317 INTRODUCTION OF OTHER THROMBOLYTIC INTO CENTRAL VEIN, PERCUTANEOUS APPROACH: ICD-10-PCS | Performed by: INTERNAL MEDICINE

## 2017-12-03 PROCEDURE — 20000000 HC ICU ROOM

## 2017-12-03 PROCEDURE — 94761 N-INVAS EAR/PLS OXIMETRY MLT: CPT

## 2017-12-03 PROCEDURE — 36415 COLL VENOUS BLD VENIPUNCTURE: CPT

## 2017-12-03 PROCEDURE — 37211 THROMBOLYTIC ART THERAPY: CPT | Mod: ,,, | Performed by: INTERNAL MEDICINE

## 2017-12-03 PROCEDURE — 85384 FIBRINOGEN ACTIVITY: CPT | Mod: 91

## 2017-12-03 PROCEDURE — 4A023N6 MEASUREMENT OF CARDIAC SAMPLING AND PRESSURE, RIGHT HEART, PERCUTANEOUS APPROACH: ICD-10-PCS | Performed by: INTERNAL MEDICINE

## 2017-12-03 PROCEDURE — 93005 ELECTROCARDIOGRAM TRACING: CPT

## 2017-12-03 PROCEDURE — 93451 RIGHT HEART CATH: CPT | Mod: 26,,, | Performed by: INTERNAL MEDICINE

## 2017-12-03 PROCEDURE — 25000003 PHARM REV CODE 250: Performed by: STUDENT IN AN ORGANIZED HEALTH CARE EDUCATION/TRAINING PROGRAM

## 2017-12-03 PROCEDURE — 63600175 PHARM REV CODE 636 W HCPCS: Performed by: HOSPITALIST

## 2017-12-03 PROCEDURE — 93306 TTE W/DOPPLER COMPLETE: CPT | Mod: 26,,, | Performed by: INTERNAL MEDICINE

## 2017-12-03 PROCEDURE — 25000003 PHARM REV CODE 250

## 2017-12-03 PROCEDURE — 99222 1ST HOSP IP/OBS MODERATE 55: CPT | Mod: AI,25,GC, | Performed by: INTERNAL MEDICINE

## 2017-12-03 PROCEDURE — C1894 INTRO/SHEATH, NON-LASER: HCPCS

## 2017-12-03 PROCEDURE — 02HP33Z INSERTION OF INFUSION DEVICE INTO PULMONARY TRUNK, PERCUTANEOUS APPROACH: ICD-10-PCS | Performed by: INTERNAL MEDICINE

## 2017-12-03 PROCEDURE — 27200188 HC TRANSDUCER, ART ADULT/PEDS

## 2017-12-03 PROCEDURE — 85610 PROTHROMBIN TIME: CPT

## 2017-12-03 PROCEDURE — 85384 FIBRINOGEN ACTIVITY: CPT

## 2017-12-03 PROCEDURE — 85520 HEPARIN ASSAY: CPT

## 2017-12-03 PROCEDURE — 80048 BASIC METABOLIC PNL TOTAL CA: CPT

## 2017-12-03 PROCEDURE — 36600 WITHDRAWAL OF ARTERIAL BLOOD: CPT

## 2017-12-03 PROCEDURE — 93308 TTE F-UP OR LMTD: CPT

## 2017-12-03 PROCEDURE — 83735 ASSAY OF MAGNESIUM: CPT

## 2017-12-03 PROCEDURE — 82803 BLOOD GASES ANY COMBINATION: CPT

## 2017-12-03 PROCEDURE — 63600175 PHARM REV CODE 636 W HCPCS

## 2017-12-03 PROCEDURE — 85025 COMPLETE CBC W/AUTO DIFF WBC: CPT | Mod: 91

## 2017-12-03 PROCEDURE — 85520 HEPARIN ASSAY: CPT | Mod: 91

## 2017-12-03 PROCEDURE — 25000003 PHARM REV CODE 250: Performed by: INTERNAL MEDICINE

## 2017-12-03 RX ORDER — SODIUM,POTASSIUM PHOSPHATES 280-250MG
2 POWDER IN PACKET (EA) ORAL
Status: DISCONTINUED | OUTPATIENT
Start: 2017-12-03 | End: 2017-12-10 | Stop reason: HOSPADM

## 2017-12-03 RX ORDER — POTASSIUM CHLORIDE 20 MEQ/15ML
40 SOLUTION ORAL
Status: DISCONTINUED | OUTPATIENT
Start: 2017-12-03 | End: 2017-12-03

## 2017-12-03 RX ORDER — POTASSIUM CHLORIDE 20 MEQ/15ML
60 SOLUTION ORAL ONCE
Status: COMPLETED | OUTPATIENT
Start: 2017-12-03 | End: 2017-12-03

## 2017-12-03 RX ORDER — SODIUM CHLORIDE 9 MG/ML
INJECTION, SOLUTION INTRAVENOUS CONTINUOUS
Status: DISCONTINUED | OUTPATIENT
Start: 2017-12-03 | End: 2017-12-04

## 2017-12-03 RX ORDER — LANOLIN ALCOHOL/MO/W.PET/CERES
800 CREAM (GRAM) TOPICAL
Status: DISCONTINUED | OUTPATIENT
Start: 2017-12-03 | End: 2017-12-10 | Stop reason: HOSPADM

## 2017-12-03 RX ORDER — POTASSIUM CHLORIDE 20 MEQ/15ML
60 SOLUTION ORAL
Status: DISCONTINUED | OUTPATIENT
Start: 2017-12-03 | End: 2017-12-03

## 2017-12-03 RX ORDER — DIPHENHYDRAMINE HCL 50 MG
50 CAPSULE ORAL ONCE
Status: DISCONTINUED | OUTPATIENT
Start: 2017-12-03 | End: 2017-12-05

## 2017-12-03 RX ORDER — POTASSIUM CHLORIDE 20 MEQ/1
40 TABLET, EXTENDED RELEASE ORAL
Status: DISCONTINUED | OUTPATIENT
Start: 2017-12-03 | End: 2017-12-10 | Stop reason: HOSPADM

## 2017-12-03 RX ORDER — POTASSIUM CHLORIDE 20 MEQ/1
60 TABLET, EXTENDED RELEASE ORAL
Status: DISCONTINUED | OUTPATIENT
Start: 2017-12-03 | End: 2017-12-10 | Stop reason: HOSPADM

## 2017-12-03 RX ADMIN — SODIUM CHLORIDE: 0.9 INJECTION, SOLUTION INTRAVENOUS at 12:12

## 2017-12-03 RX ADMIN — POTASSIUM CHLORIDE 60 MEQ: 20 SOLUTION ORAL at 08:12

## 2017-12-03 RX ADMIN — HEPARIN SODIUM AND DEXTROSE 16 UNITS/KG/HR: 10000; 5 INJECTION INTRAVENOUS at 12:12

## 2017-12-03 RX ADMIN — ESCITALOPRAM OXALATE 20 MG: 10 TABLET ORAL at 08:12

## 2017-12-03 RX ADMIN — ALTEPLASE 1 MG/HR: 2.2 INJECTION, POWDER, LYOPHILIZED, FOR SOLUTION INTRAVENOUS at 02:12

## 2017-12-03 RX ADMIN — ALTEPLASE 1 MG/HR: 2.2 INJECTION, POWDER, LYOPHILIZED, FOR SOLUTION INTRAVENOUS at 12:12

## 2017-12-03 RX ADMIN — PANTOPRAZOLE SODIUM 40 MG: 40 TABLET, DELAYED RELEASE ORAL at 08:12

## 2017-12-03 NOTE — HOSPITAL COURSE
Patient stepped down to floor. Noted to have dark stools today so made npo. SpO2 97% on RA. Reports subjective improvement in KITCHEN.

## 2017-12-03 NOTE — ASSESSMENT & PLAN NOTE
72 y.o female with a PMHx of HTN with the main complaint of progressively worsening KITCHEN and two episodes of near-syncope today. She has had similar episodes in the past, reports feeling more fatigue and dyspneic with exertion recently. No significant smoking hx, no significant family hx, previous ROBERTO showed no evidence of ischemia (most recently in 2016). +ve troponins, initially 0.4, also has elevated BNP. Her presentation is atypical but her KITCHEN may be her anginal equivalent. JAYLEN risk score of 2.     Plan  - Admit to internal medicine, initiate pathway for NSTEMI (Loaded with ASA, load with P2Y12 inhibitor, start heparin and statin, B-blocker for HR goal of ~ 60)  - Check D-dimer as patient's story also suggestive of PE, if elevated, consider CTA PE protocol

## 2017-12-03 NOTE — CONSULTS
[facility]  Cardiothoracic Surgery  Consult Note    Patient Name: Coby Atkinson  MRN: [id number]  Admission Date: 12/2/2017  Attending Physician: Antonino High MD  Referring Provider: Self, Aaareferral    Patient information was obtained from patient and past medical records.     Inpatient consult to Cardiothoracic Surgery  Consult performed by: CALLY GOMEZ  Consult ordered by: LISA CHILD  Reason for consult: PE  Assessment/Recommendations: This is a 71yo woman with a submassive PE.  She is currently hemodynamically stable on room air.  The burden of thrombus is mainly in the segmental branches.  The most proximal thrombus is in the distal RPA as it bifurcates.  The burden is too distal for thrombectomy to be of much utility and the risks would outweigh any potential benefit.  Continue with anticoagulation and consideration of catheter based therapies.        Subjective:     Principal Problem: Acute pulmonary embolism    History of Present Illness: This is a 71yo woman who was admitted today with a submassive PE.  She is currently on a heparin drip.  Interventional cardiology has been consulted for catheter directed thrombolytics.  She has had a bedside echo with some right heart dysfunction.  She has a history of HTN, ADRIÁN, morbid obesity with BMI of 40.  She states that she has been increasingly short of breath over the last month.  She was told she might have pneumonia about 1 week ago and states she was given IM antibiotics.  She had even more shortness of breath and dyspnea on exertion today while out to lunch.  She complains of chest pressure.    No current facility-administered medications on file prior to encounter.      Current Outpatient Prescriptions on File Prior to Encounter   Medication Sig    amlodipine (NORVASC) 10 MG tablet TAKE 1 TABLET(10 MG) BY MOUTH EVERY DAY    benzonatate (TESSALON PERLES) 100 MG capsule Take 1 capsule (100 mg total) by mouth every 6  (six) hours as needed for Cough.    cefUROXime (CEFTIN) 500 MG tablet Take 1 tablet (500 mg total) by mouth 2 (two) times daily.    doxazosin (CARDURA) 2 MG tablet TAKE 1 TABLET(2 MG) BY MOUTH EVERY EVENING    escitalopram oxalate (LEXAPRO) 20 MG tablet TAKE 1 TABLET BY MOUTH ONCE DAILY    furosemide (LASIX) 20 MG tablet TAKE 1 TABLET BY MOUTH TWICE DAILY; MAY TAKE ADDITIONAL DOSE DAILY AS NEEDED FOR SWELLING    meloxicam (MOBIC) 7.5 MG tablet TAKE 1 TABLET BY MOUTH EVERY DAY    metoprolol succinate (TOPROL-XL) 25 MG 24 hr tablet TAKE 1 TABLET BY MOUTH ONCE DAILY    pantoprazole (PROTONIX) 40 MG tablet Take 1 tablet (40 mg total) by mouth once daily.    potassium chloride (MICRO-K) 10 MEQ CpSR Take 2 capsules (20 mEq total) by mouth once daily.    topiramate (TOPAMAX) 50 MG tablet Take 1 tablet (50 mg total) by mouth 2 (two) times daily.    conjugated estrogens (PREMARIN) vaginal cream Place 0.5 g vaginally twice a week.    estrogens, conjugated, (PREMARIN) 0.45 MG tablet Take 1 tablet (0.45 mg total) by mouth once daily.    losartan-hydrochlorothiazide 100-25 mg (HYZAAR) 100-25 mg per tablet Take 1 tablet by mouth once daily.    ondansetron (ZOFRAN) 4 MG tablet Take 1 tablet (4 mg total) by mouth every 6 (six) hours.       Review of patient's allergies indicates:   Allergen Reactions    Codeine     Ciprofloxacin Rash       Past Medical History:   Diagnosis Date    Cataract     Depression     Edema     GERD (gastroesophageal reflux disease)     Hypertension 10/2/2012    Osteopenia     Thalassemia minor      Past Surgical History:   Procedure Laterality Date    APPENDECTOMY      CATARACT EXTRACTION      COLONOSCOPY N/A 11/5/2016    Procedure: COLONOSCOPY;  Surgeon: Tanner Simental MD;  Location: [address]);  Service: Endoscopy;  Laterality: N/A;    HYSTERECTOMY  age 39    fibroids    OOPHORECTOMY      TONSILLECTOMY       Family History     Problem Relation (Age of Onset)     Cancer Father, Brother    Hypertension Mother    No Known Problems Sister, Maternal Aunt, Maternal Uncle, Paternal Aunt, Paternal Uncle, Maternal Grandmother, Maternal Grandfather, Paternal Grandmother, Paternal Grandfather        Social History Main Topics    Smoking status: Never Smoker    Smokeless tobacco: Never Used    Alcohol use No      Comment: rare    Drug use: No    Sexual activity: Yes     Partners: Male     Review of Systems   Constitutional: Positive for activity change. Negative for diaphoresis.   HENT: Negative.    Eyes: Negative.    Respiratory: Positive for shortness of breath.    Cardiovascular: Positive for chest pain (pressure).   Gastrointestinal: Negative for abdominal distention and abdominal pain.   Endocrine: Negative.    Genitourinary: Negative.    Musculoskeletal: Negative.    Skin: Negative.    Allergic/Immunologic: Negative.    Neurological: Positive for syncope (near-syncope episodes).   Hematological: Negative.    Psychiatric/Behavioral: Negative.      Objective:     Vital Signs (Most Recent):  Temp: 97.6 °F (36.4 °C) (12/02/17 2234)  Pulse: 80 (12/03/17 0017)  Resp: 19 (12/03/17 0017)  BP: 138/81 (12/03/17 0017)  SpO2: 99 % (12/03/17 0017) Vital Signs (24h Range):  Temp:  [97.6 °F (36.4 °C)-98.5 °F (36.9 °C)] 97.6 °F (36.4 °C)  Pulse:  [] 80  Resp:  [16-20] 19  SpO2:  [95 %-99 %] 99 %  BP: ()/(55-97) 138/81     Weight: 103.9 kg (229 lb)  Body mass index is 40.57 kg/m².    SpO2: 99 %  O2 Device (Oxygen Therapy): room air     Intake/Output - Last 3 Shifts       12/01 0700 - 12/02 0659 12/02 0700 - 12/03 0659    P.O.  0    Total Intake(mL/kg)  0 (0)    Net   0          Urine Occurrence  1 x           Lines/Drains/Airways     Peripheral Intravenous Line                 Peripheral IV - Single Lumen 11/01/17 1808 Left Antecubital 31 days         Peripheral IV - Single Lumen 12/02/17 1703 Right Antecubital less than 1 day         Peripheral IV - Single Lumen 12/02/17 3021  Left Antecubital less than 1 day                 STS Risk Score: N/A    Physical Exam   Constitutional: She is oriented to person, place, and time. No distress.   HENT:   Head: Normocephalic and atraumatic.   Cardiovascular: Normal rate, regular rhythm and normal heart sounds.    No murmur heard.  Pulmonary/Chest: Breath sounds normal. No respiratory distress.   Mild increased work of breathing   Abdominal: Soft. Bowel sounds are normal.   Musculoskeletal: Normal range of motion.   Neurological: She is alert and oriented to person, place, and time.   Skin: Skin is warm and dry. She is not diaphoretic.   Psychiatric: She has a normal mood and affect. Her behavior is normal. Thought content normal.       Significant Labs:  ABGs: No results for input(s): PH, PCO2, PO2, HCO3, POCSATURATED, BE in the last 48 hours.  Amylase: No results for input(s): AMYLASE in the last 48 hours.  BMP:   Recent Labs  Lab 12/02/17  1704         K 3.8      CO2 23   BUN 39*   CREATININE 1.4   CALCIUM 9.0   MG 2.3     Cardiac markers:   Recent Labs  Lab 12/02/17  2325   TROPONINI 0.416*     CBC:   Recent Labs  Lab 12/02/17 2007   WBC 14.18*   RBC 5.23   HGB 10.1*   HCT 33.6*      MCV 64*   MCH 19.3*   MCHC 30.1*     CMP:   Recent Labs  Lab 12/02/17  1704      CALCIUM 9.0   ALBUMIN 3.3*   PROT 7.1      K 3.8   CO2 23      BUN 39*   CREATININE 1.4   ALKPHOS 93   ALT 13   AST 16   BILITOT 0.3     Coagulation:   Recent Labs  Lab 12/02/17  1704   INR 0.9     Lactic Acid: No results for input(s): LACTATE in the last 48 hours.  LFTs:   Recent Labs  Lab 12/02/17  1704   ALT 13   AST 16   ALKPHOS 93   BILITOT 0.3   PROT 7.1   ALBUMIN 3.3*       Significant Diagnostics:  I have reviewed and interpreted all pertinent imaging results/findings within the past 24 hours.  I have reviewed the CT images.  The burden of thrombus is mainly in the segmental branches (right > left)    Assessment/Plan:     NYHA Score:  NYHA II: slight limitation of physical activity, comfortable at rest    * Acute pulmonary embolism    This is a 73yo woman with a submassive PE.  She is currently hemodynamically stable on room air.  The burden of thrombus is mainly in the segmental branches.  The most proximal thrombus is in the distal RPA as it bifurcates.  The burden is too distal for thrombectomy to be of much utility and the risks would outweigh any potential benefit.  Continue with anticoagulation and consideration of catheter based therapies.          Wes Terry MD  Thoracic Surgery Resident, PGY6  Cardiothoracic Surgery  [facility]  CTS Attending Note:    I have personally taken the history and agree with the resident's note as stated above. Hemodynamically stable and comfortable on room air. Distall PE on CTA. Recommend anticoagulation and possible directed thrombolysis.

## 2017-12-03 NOTE — PROCEDURES
"    Post Cath Note  Referring Physician: Antonino High MD  Procedure: Right heart cath with catheter directed thrombolysis (N/A)       Access: Right CFV    sPAP noted to be 71 mmHG.    See full report for further details    Intervention:    pigtail left in main PA with tpa infusing @ 1mg/hr      Post Cath Exam:   /70 (BP Location: Left arm, Patient Position: Lying)   Pulse 70   Temp 98 °F (36.7 °C) (Oral)   Resp (!) 24   Ht 5' 3" (1.6 m)   Wt 103.9 kg (229 lb)   SpO2 99%   Breastfeeding? No   BMI 40.57 kg/m²   No unusual pain, hematoma, thrill or bruit at vascular access site.  Distal pulse present without signs of ischemia.    Recommendations:   - Routine post-cath care  - IVF at 80 mg/hr with tPA 1 mg/hr infusion (20 ml from piggybag).  - Check fibrinogen and cbc q 6 hrs and notify if fibrinogen level drops <  150 or platelets < 50.  - Monitor PA pressures - if improved will attempt removing catheter in evening.    Signed:  Jake chester MD  Interventional Cardiology Fellow, PGY-7  Pager: [phone]  12/3/2017 11:46 AM  "

## 2017-12-03 NOTE — HPI
72 y.o female with a PMhx of HTN, ADRIÁN and obesity that presented to the ER earlier today with the complaint of worsening KITCHEN and two episodes of near-syncope and found to have submassive PE on CTA with RV strain with elevated troponin of 0.4 and BNP of 202. Interventional cardiology consulted for catheter directed tPA assessment    She reports she was walking to a restaurant from her car and started having worsening KITCHEN and started feeling lightheaded. She was able to make to the restaurant but had to stop and sit down when she arrived with improvement of her lightheadedness and KITCHEN. She was able to complete her meal and upon leaving, had another recurrence of KITCHEN and lightheadedness that required her to stop and sit down with improvement of her sx. She reports she has had multiple similar episodes recently (presented to the ER about 2 months ago with an episode of near-syncope), but these episodes were the closest she has ever come to actually having a syncopal episode. Per her and her , her KITCHEN has worsened over the past few months. She denies any chest pain, SOB at rest, palpitations, lightheadedness, LE edema or syncope.   She denies any recent extended periods of immobility or recent surgeries.     No smoking hx. Reports no family hx of early CAD. She has had 2 ESEs in the last 5 years that showed no evidence of ischemia.

## 2017-12-03 NOTE — PROGRESS NOTES
Reviewed case with Dr. Joseph.  Patient with subacute and submassive PE involving the distal branches of both PA's wiho presented with KITCHEN.  She is currently saturating normally on 2LNP at bedrest, has minimal RV dilation, and HR 70, RR 20, and PAS less than 30 by echo.  CT scan reviewed.    IMP;  Submassive PE without contraindications for catheter directed thrombolysis but without heparin therapy.    REC:  Begin heparin infusion, consult CTS, repeat Echo, HR, RR, O2Sat on Room air in AM and determine whether catheter directed thrombolysis will be clinically benficital.

## 2017-12-03 NOTE — ASSESSMENT & PLAN NOTE
Kidney function at baseline   - monitor    - will start 50 cc/hr of NS since NPO and recently had contrast

## 2017-12-03 NOTE — NURSING
1230 - pt return to room from cardiac cath lab via bed with ccl Rn. Patient in no apparent distress, patient without complaints. VS as documented. Assessments as documented. Pressure monitoring line connected via 3-way stopcock to catheter in Rt groin, per orders/protocol. PAP as documented. tPA infusing at 1mg/hr (20ml/hr) with NS infusing via Y-site at 80ml/hr to catheter. RT groin site benign, no bleeding noted around site. Site soft, no s/s hematoma noted. Dressing CDI. Peripheral pulses palpable and equal bilaterally. Patient with no c/o tenderness to site. Heparin infusion continues as ordered, no change, to titrate per algorithm. Call bell in reach of patient. 12 lead EKG performed as ordered, labs sent as ordered. Will monitor closely. Pt to call for any assistance/needs. Pt Israel ho, at bedside.    Nona Graham RN

## 2017-12-03 NOTE — HPI
This is a 71yo woman who was admitted today with a submassive PE.  She is currently on a heparin drip.  Interventional cardiology has been consulted for catheter directed thrombolytics.  She has had a bedside echo with some right heart dysfunction.  She has a history of HTN, ADRIÁN, morbid obesity with BMI of 40.  She states that she has been increasingly short of breath over the last month.  She was told she might have pneumonia about 1 week ago and states she was given IM antibiotics.  She had even more shortness of breath and dyspnea on exertion today while out to lunch.  She complains of chest pressure.

## 2017-12-03 NOTE — SUBJECTIVE & OBJECTIVE
No current facility-administered medications on file prior to encounter.      Current Outpatient Prescriptions on File Prior to Encounter   Medication Sig    amlodipine (NORVASC) 10 MG tablet TAKE 1 TABLET(10 MG) BY MOUTH EVERY DAY    benzonatate (TESSALON PERLES) 100 MG capsule Take 1 capsule (100 mg total) by mouth every 6 (six) hours as needed for Cough.    cefUROXime (CEFTIN) 500 MG tablet Take 1 tablet (500 mg total) by mouth 2 (two) times daily.    doxazosin (CARDURA) 2 MG tablet TAKE 1 TABLET(2 MG) BY MOUTH EVERY EVENING    escitalopram oxalate (LEXAPRO) 20 MG tablet TAKE 1 TABLET BY MOUTH ONCE DAILY    furosemide (LASIX) 20 MG tablet TAKE 1 TABLET BY MOUTH TWICE DAILY; MAY TAKE ADDITIONAL DOSE DAILY AS NEEDED FOR SWELLING    meloxicam (MOBIC) 7.5 MG tablet TAKE 1 TABLET BY MOUTH EVERY DAY    metoprolol succinate (TOPROL-XL) 25 MG 24 hr tablet TAKE 1 TABLET BY MOUTH ONCE DAILY    pantoprazole (PROTONIX) 40 MG tablet Take 1 tablet (40 mg total) by mouth once daily.    potassium chloride (MICRO-K) 10 MEQ CpSR Take 2 capsules (20 mEq total) by mouth once daily.    topiramate (TOPAMAX) 50 MG tablet Take 1 tablet (50 mg total) by mouth 2 (two) times daily.    conjugated estrogens (PREMARIN) vaginal cream Place 0.5 g vaginally twice a week.    estrogens, conjugated, (PREMARIN) 0.45 MG tablet Take 1 tablet (0.45 mg total) by mouth once daily.    losartan-hydrochlorothiazide 100-25 mg (HYZAAR) 100-25 mg per tablet Take 1 tablet by mouth once daily.    ondansetron (ZOFRAN) 4 MG tablet Take 1 tablet (4 mg total) by mouth every 6 (six) hours.       Review of patient's allergies indicates:   Allergen Reactions    Codeine     Ciprofloxacin Rash       Past Medical History:   Diagnosis Date    Cataract     Depression     Edema     GERD (gastroesophageal reflux disease)     Hypertension 10/2/2012    Osteopenia     Thalassemia minor      Past Surgical History:   Procedure Laterality Date     APPENDECTOMY      CATARACT EXTRACTION      COLONOSCOPY N/A 11/5/2016    Procedure: COLONOSCOPY;  Surgeon: Tanner Simental MD;  Location: [facility] ([address] [place] [address]);  Service: Endoscopy;  Laterality: N/A;    HYSTERECTOMY  age 39    fibroids    OOPHORECTOMY      TONSILLECTOMY       Family History     Problem Relation (Age of Onset)    Cancer Father, Brother    Hypertension Mother    No Known Problems Sister, Maternal Aunt, Maternal Uncle, Paternal Aunt, Paternal Uncle, Maternal Grandmother, Maternal Grandfather, Paternal Grandmother, Paternal Grandfather        Social History Main Topics    Smoking status: Never Smoker    Smokeless tobacco: Never Used    Alcohol use No      Comment: rare    Drug use: No    Sexual activity: Yes     Partners: Male     Review of Systems   Constitutional: Positive for activity change. Negative for diaphoresis.   HENT: Negative.    Eyes: Negative.    Respiratory: Positive for shortness of breath.    Cardiovascular: Positive for chest pain (pressure).   Gastrointestinal: Negative for abdominal distention and abdominal pain.   Endocrine: Negative.    Genitourinary: Negative.    Musculoskeletal: Negative.    Skin: Negative.    Allergic/Immunologic: Negative.    Neurological: Positive for syncope (near-syncope episodes).   Hematological: Negative.    Psychiatric/Behavioral: Negative.      Objective:     Vital Signs (Most Recent):  Temp: 97.6 °F (36.4 °C) (12/02/17 2234)  Pulse: 80 (12/03/17 0017)  Resp: 19 (12/03/17 0017)  BP: 138/81 (12/03/17 0017)  SpO2: 99 % (12/03/17 0017) Vital Signs (24h Range):  Temp:  [97.6 °F (36.4 °C)-98.5 °F (36.9 °C)] 97.6 °F (36.4 °C)  Pulse:  [] 80  Resp:  [16-20] 19  SpO2:  [95 %-99 %] 99 %  BP: ()/(55-97) 138/81     Weight: 103.9 kg (229 lb)  Body mass index is 40.57 kg/m².    SpO2: 99 %  O2 Device (Oxygen Therapy): room air     Intake/Output - Last 3 Shifts       12/01 0700 - 12/02 0659 12/02 0700 - 12/03 0659    P.O.  0    Total Intake(mL/kg)   0 (0)    Net   0          Urine Occurrence  1 x           Lines/Drains/Airways     Peripheral Intravenous Line                 Peripheral IV - Single Lumen 11/01/17 1808 Left Antecubital 31 days         Peripheral IV - Single Lumen 12/02/17 1703 Right Antecubital less than 1 day         Peripheral IV - Single Lumen 12/02/17 2123 Left Antecubital less than 1 day                 STS Risk Score: N/A    Physical Exam   Constitutional: She is oriented to person, place, and time. No distress.   HENT:   Head: Normocephalic and atraumatic.   Cardiovascular: Normal rate, regular rhythm and normal heart sounds.    No murmur heard.  Pulmonary/Chest: Breath sounds normal. No respiratory distress.   Mild increased work of breathing   Abdominal: Soft. Bowel sounds are normal.   Musculoskeletal: Normal range of motion.   Neurological: She is alert and oriented to person, place, and time.   Skin: Skin is warm and dry. She is not diaphoretic.   Psychiatric: She has a normal mood and affect. Her behavior is normal. Thought content normal.       Significant Labs:  ABGs: No results for input(s): PH, PCO2, PO2, HCO3, POCSATURATED, BE in the last 48 hours.  Amylase: No results for input(s): AMYLASE in the last 48 hours.  BMP:   Recent Labs  Lab 12/02/17  1704         K 3.8      CO2 23   BUN 39*   CREATININE 1.4   CALCIUM 9.0   MG 2.3     Cardiac markers:   Recent Labs  Lab 12/02/17  2325   TROPONINI 0.416*     CBC:   Recent Labs  Lab 12/02/17 2007   WBC 14.18*   RBC 5.23   HGB 10.1*   HCT 33.6*      MCV 64*   MCH 19.3*   MCHC 30.1*     CMP:   Recent Labs  Lab 12/02/17  1704      CALCIUM 9.0   ALBUMIN 3.3*   PROT 7.1      K 3.8   CO2 23      BUN 39*   CREATININE 1.4   ALKPHOS 93   ALT 13   AST 16   BILITOT 0.3     Coagulation:   Recent Labs  Lab 12/02/17  1704   INR 0.9     Lactic Acid: No results for input(s): LACTATE in the last 48 hours.  LFTs:   Recent Labs  Lab 12/02/17  1704   ALT 13    AST 16   ALKPHOS 93   BILITOT 0.3   PROT 7.1   ALBUMIN 3.3*       Significant Diagnostics:  I have reviewed and interpreted all pertinent imaging results/findings within the past 24 hours.  I have reviewed the CT images.  The burden of thrombus is mainly in the segmental branches (right > left)

## 2017-12-03 NOTE — ED NOTES
Patient placed on bedpan for urine collection; patient unable to void. Ok to straight cath per MD.

## 2017-12-03 NOTE — SUBJECTIVE & OBJECTIVE
Past Medical History:   Diagnosis Date    Cataract     Depression     Edema     GERD (gastroesophageal reflux disease)     Hypertension 10/2/2012    Osteopenia     Thalassemia minor        Past Surgical History:   Procedure Laterality Date    APPENDECTOMY      CATARACT EXTRACTION      COLONOSCOPY N/A 11/5/2016    Procedure: COLONOSCOPY;  Surgeon: Tanner Simental MD;  Location: [facility] ([address] [place] [address]);  Service: Endoscopy;  Laterality: N/A;    HYSTERECTOMY  age 39    fibroids    OOPHORECTOMY      TONSILLECTOMY         Review of patient's allergies indicates:   Allergen Reactions    Codeine     Ciprofloxacin Rash       PTA Medications   Medication Sig    amlodipine (NORVASC) 10 MG tablet TAKE 1 TABLET(10 MG) BY MOUTH EVERY DAY    benzonatate (TESSALON PERLES) 100 MG capsule Take 1 capsule (100 mg total) by mouth every 6 (six) hours as needed for Cough.    cefUROXime (CEFTIN) 500 MG tablet Take 1 tablet (500 mg total) by mouth 2 (two) times daily.    doxazosin (CARDURA) 2 MG tablet TAKE 1 TABLET(2 MG) BY MOUTH EVERY EVENING    escitalopram oxalate (LEXAPRO) 20 MG tablet TAKE 1 TABLET BY MOUTH ONCE DAILY    furosemide (LASIX) 20 MG tablet TAKE 1 TABLET BY MOUTH TWICE DAILY; MAY TAKE ADDITIONAL DOSE DAILY AS NEEDED FOR SWELLING    meloxicam (MOBIC) 7.5 MG tablet TAKE 1 TABLET BY MOUTH EVERY DAY    metoprolol succinate (TOPROL-XL) 25 MG 24 hr tablet TAKE 1 TABLET BY MOUTH ONCE DAILY    pantoprazole (PROTONIX) 40 MG tablet Take 1 tablet (40 mg total) by mouth once daily.    potassium chloride (MICRO-K) 10 MEQ CpSR Take 2 capsules (20 mEq total) by mouth once daily.    topiramate (TOPAMAX) 50 MG tablet Take 1 tablet (50 mg total) by mouth 2 (two) times daily.    conjugated estrogens (PREMARIN) vaginal cream Place 0.5 g vaginally twice a week.    estrogens, conjugated, (PREMARIN) 0.45 MG tablet Take 1 tablet (0.45 mg total) by mouth once daily.    losartan-hydrochlorothiazide 100-25 mg (HYZAAR)  100-25 mg per tablet Take 1 tablet by mouth once daily.    ondansetron (ZOFRAN) 4 MG tablet Take 1 tablet (4 mg total) by mouth every 6 (six) hours.     Family History     Problem Relation (Age of Onset)    Cancer Father, Brother    Hypertension Mother    No Known Problems Sister, Maternal Aunt, Maternal Uncle, Paternal Aunt, Paternal Uncle, Maternal Grandmother, Maternal Grandfather, Paternal Grandmother, Paternal Grandfather        Social History Main Topics    Smoking status: Never Smoker    Smokeless tobacco: Never Used    Alcohol use No      Comment: rare    Drug use: No    Sexual activity: Yes     Partners: Male     Review of Systems   Constitution: Negative for chills, decreased appetite, fever, weakness, night sweats, weight gain and weight loss.   HENT: Negative for congestion, hoarse voice, stridor and tinnitus.    Eyes: Negative for blurred vision, pain and visual disturbance.   Cardiovascular: Positive for dyspnea on exertion and near-syncope. Negative for chest pain, claudication, irregular heartbeat, leg swelling, orthopnea, palpitations, paroxysmal nocturnal dyspnea and syncope.   Respiratory: Positive for shortness of breath. Negative for cough, hemoptysis, snoring and wheezing.    Endocrine: Negative for cold intolerance, heat intolerance and polyuria.   Hematologic/Lymphatic: Negative for bleeding problem. Does not bruise/bleed easily.   Skin: Negative for color change and rash.   Musculoskeletal: Negative for arthritis, back pain, joint pain, muscle cramps, myalgias and stiffness.   Gastrointestinal: Negative for abdominal pain, anorexia, constipation, diarrhea, dysphagia, heartburn, hemorrhoids, melena, nausea and vomiting.   Genitourinary: Negative for frequency, hematuria, hesitancy, nocturia and urgency.   Neurological: Negative for difficulty with concentration, dizziness, focal weakness, headaches, light-headedness, numbness, seizures, tremors and vertigo.   Psychiatric/Behavioral:  Negative for altered mental status and depression. The patient does not have insomnia.    Allergic/Immunologic: Negative for hives.     Objective:     Vital Signs (Most Recent):  Temp: 97.6 °F (36.4 °C) (12/02/17 2234)  Pulse: 80 (12/03/17 0017)  Resp: 19 (12/03/17 0017)  BP: 138/81 (12/03/17 0017)  SpO2: 99 % (12/03/17 0017) Vital Signs (24h Range):  Temp:  [97.6 °F (36.4 °C)-98.5 °F (36.9 °C)] 97.6 °F (36.4 °C)  Pulse:  [] 80  Resp:  [16-20] 19  SpO2:  [95 %-99 %] 99 %  BP: ()/(55-97) 138/81     Weight: 103.9 kg (229 lb)  Body mass index is 40.57 kg/m².    SpO2: 99 %  O2 Device (Oxygen Therapy): room air      Intake/Output Summary (Last 24 hours) at 12/03/17 0133  Last data filed at 12/02/17 2300   Gross per 24 hour   Intake                0 ml   Output                0 ml   Net                0 ml       Lines/Drains/Airways     Peripheral Intravenous Line                 Peripheral IV - Single Lumen 11/01/17 1808 Left Antecubital 31 days         Peripheral IV - Single Lumen 12/02/17 1703 Right Antecubital less than 1 day         Peripheral IV - Single Lumen 12/02/17 2123 Left Antecubital less than 1 day                Physical Exam   Constitutional: She appears well-developed and well-nourished.   HENT:   Head: Normocephalic and atraumatic.   Right Ear: External ear normal.   Left Ear: External ear normal.   Eyes: Conjunctivae and EOM are normal. Pupils are equal, round, and reactive to light.   Neck: Normal range of motion. Neck supple. No JVD present. No thyromegaly present.   Cardiovascular: Normal rate, regular rhythm, S1 normal, S2 normal, normal heart sounds and intact distal pulses.  Exam reveals no gallop, no S3 and no friction rub.    No murmur heard.  Pulses:       Radial pulses are 2+ on the right side, and 2+ on the left side.        Femoral pulses are 2+ on the right side, and 2+ on the left side.       Dorsalis pedis pulses are 2+ on the right side, and 2+ on the left side.         Posterior tibial pulses are 2+ on the right side, and 2+ on the left side.   Pulmonary/Chest: Effort normal. No stridor. She has no wheezes. She has no rales. She exhibits no tenderness.   Abdominal: Soft. Bowel sounds are normal. She exhibits no distension. There is no tenderness. There is no rebound and no guarding.   Musculoskeletal: Normal range of motion. She exhibits no edema or tenderness.   Psychiatric: She has a normal mood and affect.       Significant Labs: All pertinent lab results from the last 24 hours have been reviewed.

## 2017-12-03 NOTE — NURSING TRANSFER
Nursing Transfer Note      12/3/2017     Transfer From: 390 to [id number]    Transfer via wheelchair    Transfer with cardiac monitoring    Transported by RN    Medicines sent: none    Chart send with patient: yes    Notified: pt stating she will notify  in a.m

## 2017-12-03 NOTE — PLAN OF CARE
Problem: Patient Care Overview  Goal: Plan of Care Review  Outcome: Ongoing (interventions implemented as appropriate)  Pt transferred overnight to ICU for close monitoring and preparation for tPa administration via cath lab. Applied 1L/min O2, via nasal cannula for comfort, SaO2 reading >95%. Pt denies c/o pain and shortness of breath. Pt in no noted distress. Addressed concerns and addressed questions about plan of care regarding pre op interventions. Pt remained NPO overnight. Heparin infusing at 17u/kg/hr, anti-Xa obtained this AM. Awating results nomogram will be followed. See flow shee for full assessment. Will continue to monitor pt for changes in status.

## 2017-12-03 NOTE — CONSULTS
[facility]  Cardiology  Consult Note    Patient Name: Coby Atkinson  MRN: [id number]  Admission Date: 12/2/2017  Hospital Length of Stay: 0 days  Code Status: No Order   Attending Provider: Angel Stevens, *   Consulting Provider: Kaleigh Shore MD  Primary Care Physician: Mundo Hudson DO  Principal Problem:<principal problem not specified>    Patient information was obtained from patient and ER records.     Inpatient consult to Cardiology  Consult performed by: KALEIGH SHORE  Consult ordered by: LYNN PERALES  Reason for consult: NSTEMI        Subjective:     Chief Complaint:  NSTEMI     HPI:   72 y.o female with a PMhx of HTN, ADRIÁN and obesity that presented to the ER earlier today with the complaint of worsening KITCHEN and two episodes of near-syncope.    She reports she was walking to a restaurant from her car and started having worsening KITCHEN and started feeling lightheaded. She was able to make to the restaurant but had to stop and sit down when she arrived with improvement of her lightheadedness and KITCHEN. She was able to complete her meal and upon leaving, had another recurrence of KITCEHN and lightheadedness that required her to stop and sit down with improvement of her sx. She reports she has had multiple similar episodes recently (presented to the ER about 2 months ago with an episode of near-syncope), but these episodes were the closest she has ever come to actually having a syncopal episode. Per her and her , her KITCHEN has worsened over the past few months. She denies any chest pain, SOB at rest, palpitations, lightheadedness, LE edema or syncope.     She denies any recent extended periods of immobility or recent surgeries.     No smoking hx. Reports no family hx of early CAD. She has had 2 ESEs in the last 5 years that showed no evidence of ischemia.     Past Medical History:   Diagnosis Date    Cataract     Depression     Edema     GERD (gastroesophageal  reflux disease)     Hypertension 10/2/2012    Osteopenia     Thalassemia minor        Past Surgical History:   Procedure Laterality Date    APPENDECTOMY      CATARACT EXTRACTION      COLONOSCOPY N/A 11/5/2016    Procedure: COLONOSCOPY;  Surgeon: Tanner Simental MD;  Location: [facility] ([address] [place] [address]);  Service: Endoscopy;  Laterality: N/A;    HYSTERECTOMY  age 39    fibroids    OOPHORECTOMY      TONSILLECTOMY         Review of patient's allergies indicates:   Allergen Reactions    Codeine     Ciprofloxacin Rash       No current facility-administered medications on file prior to encounter.      Current Outpatient Prescriptions on File Prior to Encounter   Medication Sig    amlodipine (NORVASC) 10 MG tablet TAKE 1 TABLET(10 MG) BY MOUTH EVERY DAY    benzonatate (TESSALON PERLES) 100 MG capsule Take 1 capsule (100 mg total) by mouth every 6 (six) hours as needed for Cough.    cefUROXime (CEFTIN) 500 MG tablet Take 1 tablet (500 mg total) by mouth 2 (two) times daily.    doxazosin (CARDURA) 2 MG tablet TAKE 1 TABLET(2 MG) BY MOUTH EVERY EVENING    escitalopram oxalate (LEXAPRO) 20 MG tablet TAKE 1 TABLET BY MOUTH ONCE DAILY    furosemide (LASIX) 20 MG tablet TAKE 1 TABLET BY MOUTH TWICE DAILY; MAY TAKE ADDITIONAL DOSE DAILY AS NEEDED FOR SWELLING    meloxicam (MOBIC) 7.5 MG tablet TAKE 1 TABLET BY MOUTH EVERY DAY    metoprolol succinate (TOPROL-XL) 25 MG 24 hr tablet TAKE 1 TABLET BY MOUTH ONCE DAILY    pantoprazole (PROTONIX) 40 MG tablet Take 1 tablet (40 mg total) by mouth once daily.    potassium chloride (MICRO-K) 10 MEQ CpSR Take 2 capsules (20 mEq total) by mouth once daily.    topiramate (TOPAMAX) 50 MG tablet Take 1 tablet (50 mg total) by mouth 2 (two) times daily.    conjugated estrogens (PREMARIN) vaginal cream Place 0.5 g vaginally twice a week.    estrogens, conjugated, (PREMARIN) 0.45 MG tablet Take 1 tablet (0.45 mg total) by mouth once daily.    losartan-hydrochlorothiazide 100-25  mg (HYZAAR) 100-25 mg per tablet Take 1 tablet by mouth once daily.    ondansetron (ZOFRAN) 4 MG tablet Take 1 tablet (4 mg total) by mouth every 6 (six) hours.     Family History     Problem Relation (Age of Onset)    Cancer Father, Brother    Hypertension Mother    No Known Problems Sister, Maternal Aunt, Maternal Uncle, Paternal Aunt, Paternal Uncle, Maternal Grandmother, Maternal Grandfather, Paternal Grandmother, Paternal Grandfather        Social History Main Topics    Smoking status: Never Smoker    Smokeless tobacco: Never Used    Alcohol use No      Comment: rare    Drug use: No    Sexual activity: Yes     Partners: Male     Review of Systems   Constitution: Negative for chills and fever.   HENT: Negative for hoarse voice and sore throat.    Cardiovascular: Positive for dyspnea on exertion. Negative for chest pain, claudication, cyanosis, irregular heartbeat, leg swelling, near-syncope, orthopnea, palpitations, paroxysmal nocturnal dyspnea and syncope.   Respiratory: Negative for cough, hemoptysis and shortness of breath.    Musculoskeletal: Negative for back pain, joint pain and joint swelling.   Gastrointestinal: Negative for abdominal pain, constipation, diarrhea, hematochezia, melena, nausea and vomiting.   Genitourinary: Negative for dysuria, hematuria and incomplete emptying.   Neurological: Positive for light-headedness. Negative for dizziness and headaches.   Psychiatric/Behavioral: Negative for altered mental status, depression and suicidal ideas. The patient is not nervous/anxious.      Objective:     Vital Signs (Most Recent):  Temp: 98.5 °F (36.9 °C) (12/02/17 1528)  Pulse: 87 (12/02/17 1824)  Resp: 20 (12/02/17 1824)  BP: 112/63 (12/02/17 1823)  SpO2: 98 % (12/02/17 1824) Vital Signs (24h Range):  Temp:  [98.5 °F (36.9 °C)] 98.5 °F (36.9 °C)  Pulse:  [] 87  Resp:  [18-20] 20  SpO2:  [95 %-98 %] 98 %  BP: ()/(55-71) 112/63     Weight: 103.9 kg (229 lb)  Body mass index is 40.57  kg/m².    SpO2: 98 %  O2 Device (Oxygen Therapy): room air    No intake or output data in the 24 hours ending 12/02/17 2029    Lines/Drains/Airways     Peripheral Intravenous Line                 Peripheral IV - Single Lumen 11/01/17 1808 Left Antecubital 31 days                Physical Exam   Constitutional: She is oriented to person, place, and time. She appears well-developed and well-nourished. No distress.   Obese   HENT:   Head: Normocephalic and atraumatic.   Right Ear: External ear normal.   Left Ear: External ear normal.   Nose: Nose normal.   Mouth/Throat: Oropharynx is clear and moist. No oropharyngeal exudate.   Eyes: Conjunctivae and EOM are normal. Pupils are equal, round, and reactive to light. Right eye exhibits no discharge. Left eye exhibits no discharge. No scleral icterus.   Neck: Normal range of motion. Neck supple. No JVD present. No tracheal deviation present. No thyromegaly present.   Cardiovascular: Normal rate, regular rhythm, normal heart sounds and intact distal pulses.  Exam reveals no gallop and no friction rub.    No murmur heard.  Pulmonary/Chest: Effort normal and breath sounds normal. No stridor. No respiratory distress. She has no wheezes. She has no rales. She exhibits no tenderness.   Abdominal: Soft. Bowel sounds are normal. She exhibits no distension and no mass. There is no tenderness. There is no rebound and no guarding.   Musculoskeletal: Normal range of motion. She exhibits no edema, tenderness or deformity.   Lymphadenopathy:     She has no cervical adenopathy.   Neurological: She is alert and oriented to person, place, and time. No cranial nerve deficit.   Skin: Skin is warm and dry. No rash noted. She is not diaphoretic. No erythema. No pallor.   Psychiatric: She has a normal mood and affect. Her behavior is normal. Thought content normal.   Nursing note and vitals reviewed.    Significant Labs:   CMP   Recent Labs  Lab 12/02/17  1704      K 3.8      CO2 23       BUN 39*   CREATININE 1.4   CALCIUM 9.0   PROT 7.1   ALBUMIN 3.3*   BILITOT 0.3   ALKPHOS 93   AST 16   ALT 13   ANIONGAP 10   ESTGFRAFRICA 43.0*   EGFRNONAA 37.3*   , CBC   Recent Labs  Lab 12/02/17  1704 12/02/17 2007   WBC 15.16* 14.18*   HGB 10.7* 10.1*   HCT 34.6* 33.6*    292   , INR   Recent Labs  Lab 12/02/17 1704   INR 0.9    and Troponin   Recent Labs  Lab 12/02/17 1704   TROPONINI 0.405*       Significant Imaging: EKG: NSR, right axis deviation, T wave inversions in V1-V4 (chronic). No acute ST-T wave changes, No S1Q3T3    Assessment and Plan:     NSTEMI (non-ST elevated myocardial infarction)    72 y.o female with a PMHx of HTN with the main complaint of progressively worsening KITCHEN and two episodes of near-syncope today. She has had similar episodes in the past, reports feeling more fatigue and dyspneic with exertion recently. No significant smoking hx, no significant family hx, previous ROBERTO showed no evidence of ischemia (most recently in 2016). +ve troponins, initially 0.4, also has elevated BNP. Her presentation is atypical but her KITCHEN may be her anginal equivalent. JAYLEN risk score of 2.     Plan  - Admit to internal medicine, initiate pathway for NSTEMI (Loaded with ASA, load with P2Y12 inhibitor, start heparin and statin, B-blocker for HR goal of ~ 60)  - Check D-dimer as patient's story also suggestive of PE, if elevated, consider CTA PE protocol            VTE Risk Mitigation         Ordered     heparin 25,000 units in dextrose 5% 250 mL (100 units/mL) infusion  Continuous     Route:  Intravenous        12/02/17 1947     heparin 25,000 units in dextrose 5% 250 mL (100 units/mL) bolus from bag; ADDITIONAL PRN BOLUS  As needed (PRN)     Route:  Intravenous        12/02/17 1947     heparin 25,000 units in dextrose 5% 250 mL (100 units/mL) bolus from bag; ADDITIONAL PRN BOLUS  As needed (PRN)     Route:  Intravenous        12/02/17 1947          Thank you for your consult. I will  sign off. Please contact us if you have any additional questions.    Gianni Park MD  Cardiology   [facility]

## 2017-12-03 NOTE — PROGRESS NOTES
Patient transferred to CSU. Patient arrived to floor via stretcher with MILY Mari. Patient arrived on tele. Vital signs obtained. Patient voices no complaints at this time. Plan of care initiated with patient. Bed in lowest position, locked, SR up x2, call bell in reach. Will continue to monitor patient.       0162 Dr. Park at bedside

## 2017-12-03 NOTE — ASSESSMENT & PLAN NOTE
- hemodynamically stable PE  - INV cardiology on board for consideration of CDT  - CTS on board with no plan to perform surgery  - move to the CMICU for closer monitoring  - LE Doppler US   - ABG, early ECHO in the AM  - re-assess risks and benefits of estrogen use  - c/w IV UFH gtt

## 2017-12-03 NOTE — SUBJECTIVE & OBJECTIVE
Past Medical History:   Diagnosis Date    Cataract     Depression     Edema     GERD (gastroesophageal reflux disease)     Hypertension 10/2/2012    Osteopenia     Thalassemia minor        Past Surgical History:   Procedure Laterality Date    APPENDECTOMY      CATARACT EXTRACTION      COLONOSCOPY N/A 11/5/2016    Procedure: COLONOSCOPY;  Surgeon: Tanner Simental MD;  Location: [address]);  Service: Endoscopy;  Laterality: N/A;    HYSTERECTOMY  age 39    fibroids    OOPHORECTOMY      TONSILLECTOMY         Review of patient's allergies indicates:   Allergen Reactions    Codeine     Ciprofloxacin Rash       No current facility-administered medications on file prior to encounter.      Current Outpatient Prescriptions on File Prior to Encounter   Medication Sig    amlodipine (NORVASC) 10 MG tablet TAKE 1 TABLET(10 MG) BY MOUTH EVERY DAY    benzonatate (TESSALON PERLES) 100 MG capsule Take 1 capsule (100 mg total) by mouth every 6 (six) hours as needed for Cough.    cefUROXime (CEFTIN) 500 MG tablet Take 1 tablet (500 mg total) by mouth 2 (two) times daily.    doxazosin (CARDURA) 2 MG tablet TAKE 1 TABLET(2 MG) BY MOUTH EVERY EVENING    escitalopram oxalate (LEXAPRO) 20 MG tablet TAKE 1 TABLET BY MOUTH ONCE DAILY    furosemide (LASIX) 20 MG tablet TAKE 1 TABLET BY MOUTH TWICE DAILY; MAY TAKE ADDITIONAL DOSE DAILY AS NEEDED FOR SWELLING    meloxicam (MOBIC) 7.5 MG tablet TAKE 1 TABLET BY MOUTH EVERY DAY    metoprolol succinate (TOPROL-XL) 25 MG 24 hr tablet TAKE 1 TABLET BY MOUTH ONCE DAILY    pantoprazole (PROTONIX) 40 MG tablet Take 1 tablet (40 mg total) by mouth once daily.    potassium chloride (MICRO-K) 10 MEQ CpSR Take 2 capsules (20 mEq total) by mouth once daily.    topiramate (TOPAMAX) 50 MG tablet Take 1 tablet (50 mg total) by mouth 2 (two) times daily.    conjugated estrogens (PREMARIN) vaginal cream Place 0.5 g vaginally twice a week.    estrogens, conjugated, (PREMARIN)  0.45 MG tablet Take 1 tablet (0.45 mg total) by mouth once daily.    losartan-hydrochlorothiazide 100-25 mg (HYZAAR) 100-25 mg per tablet Take 1 tablet by mouth once daily.    ondansetron (ZOFRAN) 4 MG tablet Take 1 tablet (4 mg total) by mouth every 6 (six) hours.     Family History     Problem Relation (Age of Onset)    Cancer Father, Brother    Hypertension Mother    No Known Problems Sister, Maternal Aunt, Maternal Uncle, Paternal Aunt, Paternal Uncle, Maternal Grandmother, Maternal Grandfather, Paternal Grandmother, Paternal Grandfather        Social History Main Topics    Smoking status: Never Smoker    Smokeless tobacco: Never Used    Alcohol use No      Comment: rare    Drug use: No    Sexual activity: Yes     Partners: Male     Review of Systems   Constitution: Negative for chills and fever.   HENT: Negative for hoarse voice and sore throat.    Cardiovascular: Positive for dyspnea on exertion. Negative for chest pain, claudication, cyanosis, irregular heartbeat, leg swelling, near-syncope, orthopnea, palpitations, paroxysmal nocturnal dyspnea and syncope.   Respiratory: Negative for cough, hemoptysis and shortness of breath.    Musculoskeletal: Negative for back pain, joint pain and joint swelling.   Gastrointestinal: Negative for abdominal pain, constipation, diarrhea, hematochezia, melena, nausea and vomiting.   Genitourinary: Negative for dysuria, hematuria and incomplete emptying.   Neurological: Positive for light-headedness. Negative for dizziness and headaches.   Psychiatric/Behavioral: Negative for altered mental status, depression and suicidal ideas. The patient is not nervous/anxious.      Objective:     Vital Signs (Most Recent):  Temp: 98.5 °F (36.9 °C) (12/02/17 1528)  Pulse: 87 (12/02/17 1824)  Resp: 20 (12/02/17 1824)  BP: 112/63 (12/02/17 1823)  SpO2: 98 % (12/02/17 1824) Vital Signs (24h Range):  Temp:  [98.5 °F (36.9 °C)] 98.5 °F (36.9 °C)  Pulse:  [] 87  Resp:  [18-20]  20  SpO2:  [95 %-98 %] 98 %  BP: ()/(55-71) 112/63     Weight: 103.9 kg (229 lb)  Body mass index is 40.57 kg/m².    SpO2: 98 %  O2 Device (Oxygen Therapy): room air    No intake or output data in the 24 hours ending 12/02/17 2029    Lines/Drains/Airways     Peripheral Intravenous Line                 Peripheral IV - Single Lumen 11/01/17 1808 Left Antecubital 31 days                Physical Exam   Constitutional: She is oriented to person, place, and time. She appears well-developed and well-nourished. No distress.   Obese   HENT:   Head: Normocephalic and atraumatic.   Right Ear: External ear normal.   Left Ear: External ear normal.   Nose: Nose normal.   Mouth/Throat: Oropharynx is clear and moist. No oropharyngeal exudate.   Eyes: Conjunctivae and EOM are normal. Pupils are equal, round, and reactive to light. Right eye exhibits no discharge. Left eye exhibits no discharge. No scleral icterus.   Neck: Normal range of motion. Neck supple. No JVD present. No tracheal deviation present. No thyromegaly present.   Cardiovascular: Normal rate, regular rhythm, normal heart sounds and intact distal pulses.  Exam reveals no gallop and no friction rub.    No murmur heard.  Pulmonary/Chest: Effort normal and breath sounds normal. No stridor. No respiratory distress. She has no wheezes. She has no rales. She exhibits no tenderness.   Abdominal: Soft. Bowel sounds are normal. She exhibits no distension and no mass. There is no tenderness. There is no rebound and no guarding.   Musculoskeletal: Normal range of motion. She exhibits no edema, tenderness or deformity.   Lymphadenopathy:     She has no cervical adenopathy.   Neurological: She is alert and oriented to person, place, and time. No cranial nerve deficit.   Skin: Skin is warm and dry. No rash noted. She is not diaphoretic. No erythema. No pallor.   Psychiatric: She has a normal mood and affect. Her behavior is normal. Thought content normal.   Nursing note and  vitals reviewed.    Significant Labs:   CMP   Recent Labs  Lab 12/02/17  1704      K 3.8      CO2 23      BUN 39*   CREATININE 1.4   CALCIUM 9.0   PROT 7.1   ALBUMIN 3.3*   BILITOT 0.3   ALKPHOS 93   AST 16   ALT 13   ANIONGAP 10   ESTGFRAFRICA 43.0*   EGFRNONAA 37.3*   , CBC   Recent Labs  Lab 12/02/17  1704 12/02/17 2007   WBC 15.16* 14.18*   HGB 10.7* 10.1*   HCT 34.6* 33.6*    292   , INR   Recent Labs  Lab 12/02/17  1704   INR 0.9    and Troponin   Recent Labs  Lab 12/02/17  1704   TROPONINI 0.405*       Significant Imaging: EKG: NSR, right axis deviation, T wave inversions in V1-V4 (chronic). No acute ST-T wave changes, No [id number]

## 2017-12-03 NOTE — ASSESSMENT & PLAN NOTE
This is a 73yo woman with a submassive PE.  She is currently hemodynamically stable on room air.  The burden of thrombus is mainly in the segmental branches.  The most proximal thrombus is in the distal RPA as it bifurcates.  The burden is too distal for thrombectomy to be of much utility and the risks would outweigh any potential benefit.  Continue with anticoagulation and consideration of catheter based therapies.

## 2017-12-03 NOTE — PLAN OF CARE
Problem: Patient Care Overview  Goal: Plan of Care Review  Outcome: Ongoing (interventions implemented as appropriate)  Plan of care discussed with pt. Trending troponin's Q6H.  Elevated d-dimer. Heparin gtt infusing. NS @ 50ml/hr. NPO currently. Plan to evaluate for TPA.  Encourage to use nearby call light to call if  SOB or CP.  VSS & NADN. Pt denies CP, SOB, or pain/discomfort at this time. Pt free of injuries this shift.  All questions addressed.  Will continue to monitor.

## 2017-12-03 NOTE — SUBJECTIVE & OBJECTIVE
Past Medical History:   Diagnosis Date    Cataract     Depression     Edema     GERD (gastroesophageal reflux disease)     Hypertension 10/2/2012    Osteopenia     Thalassemia minor        Past Surgical History:   Procedure Laterality Date    APPENDECTOMY      CATARACT EXTRACTION      COLONOSCOPY N/A 11/5/2016    Procedure: COLONOSCOPY;  Surgeon: Tanner Simental MD;  Location: [address]);  Service: Endoscopy;  Laterality: N/A;    HYSTERECTOMY  age 39    fibroids    OOPHORECTOMY      TONSILLECTOMY         Review of patient's allergies indicates:   Allergen Reactions    Codeine     Ciprofloxacin Rash       No current facility-administered medications on file prior to encounter.      Current Outpatient Prescriptions on File Prior to Encounter   Medication Sig    amlodipine (NORVASC) 10 MG tablet TAKE 1 TABLET(10 MG) BY MOUTH EVERY DAY    benzonatate (TESSALON PERLES) 100 MG capsule Take 1 capsule (100 mg total) by mouth every 6 (six) hours as needed for Cough.    cefUROXime (CEFTIN) 500 MG tablet Take 1 tablet (500 mg total) by mouth 2 (two) times daily.    doxazosin (CARDURA) 2 MG tablet TAKE 1 TABLET(2 MG) BY MOUTH EVERY EVENING    escitalopram oxalate (LEXAPRO) 20 MG tablet TAKE 1 TABLET BY MOUTH ONCE DAILY    furosemide (LASIX) 20 MG tablet TAKE 1 TABLET BY MOUTH TWICE DAILY; MAY TAKE ADDITIONAL DOSE DAILY AS NEEDED FOR SWELLING    meloxicam (MOBIC) 7.5 MG tablet TAKE 1 TABLET BY MOUTH EVERY DAY    metoprolol succinate (TOPROL-XL) 25 MG 24 hr tablet TAKE 1 TABLET BY MOUTH ONCE DAILY    pantoprazole (PROTONIX) 40 MG tablet Take 1 tablet (40 mg total) by mouth once daily.    potassium chloride (MICRO-K) 10 MEQ CpSR Take 2 capsules (20 mEq total) by mouth once daily.    topiramate (TOPAMAX) 50 MG tablet Take 1 tablet (50 mg total) by mouth 2 (two) times daily.    conjugated estrogens (PREMARIN) vaginal cream Place 0.5 g vaginally twice a week.    estrogens, conjugated, (PREMARIN)  0.45 MG tablet Take 1 tablet (0.45 mg total) by mouth once daily.    losartan-hydrochlorothiazide 100-25 mg (HYZAAR) 100-25 mg per tablet Take 1 tablet by mouth once daily.    ondansetron (ZOFRAN) 4 MG tablet Take 1 tablet (4 mg total) by mouth every 6 (six) hours.     Family History     Problem Relation (Age of Onset)    Cancer Father, Brother    Hypertension Mother    No Known Problems Sister, Maternal Aunt, Maternal Uncle, Paternal Aunt, Paternal Uncle, Maternal Grandmother, Maternal Grandfather, Paternal Grandmother, Paternal Grandfather        Social History Main Topics    Smoking status: Never Smoker    Smokeless tobacco: Never Used    Alcohol use No      Comment: rare    Drug use: No    Sexual activity: Yes     Partners: Male     Review of Systems   Constitutional: Negative for appetite change, chills, fever and unexpected weight change.   HENT: Negative for congestion, rhinorrhea and sinus pain.    Eyes: Negative for visual disturbance.   Respiratory: Positive for shortness of breath. Negative for cough, chest tightness and wheezing.    Cardiovascular: Positive for palpitations. Negative for chest pain and leg swelling.   Gastrointestinal: Negative for abdominal distention, abdominal pain, blood in stool, constipation, diarrhea, nausea and vomiting.   Genitourinary: Negative for dysuria, flank pain, pelvic pain and vaginal discharge.   Skin: Negative for rash.   Neurological: Positive for weakness. Negative for dizziness, seizures and headaches.   Psychiatric/Behavioral: Negative for suicidal ideas.     Objective:     Vital Signs (Most Recent):  Temp: 97.6 °F (36.4 °C) (12/02/17 2234)  Pulse: 79 (12/02/17 2339)  Resp: 16 (12/02/17 2234)  BP: (!) 155/97 (12/02/17 2234)  SpO2: 99 % (12/02/17 2234) Vital Signs (24h Range):  Temp:  [97.6 °F (36.4 °C)-98.5 °F (36.9 °C)] 97.6 °F (36.4 °C)  Pulse:  [] 79  Resp:  [16-20] 16  SpO2:  [95 %-99 %] 99 %  BP: ()/(55-97) 155/97     Weight: 103.9 kg (229  lb)  Body mass index is 40.57 kg/m².    Physical Exam   Constitutional: She is oriented to person, place, and time. She appears well-developed and well-nourished.   HENT:   Head: Normocephalic and atraumatic.   Nose: Nose normal.   Mouth/Throat: Oropharynx is clear and moist.   Eyes: EOM are normal. Pupils are equal, round, and reactive to light. Right eye exhibits no discharge. Left eye exhibits no discharge. No scleral icterus.   Neck: Normal range of motion. Neck supple. No JVD present.   Cardiovascular: Normal rate, regular rhythm, normal heart sounds and intact distal pulses.    Pulmonary/Chest: Effort normal and breath sounds normal. No respiratory distress. She exhibits no tenderness.   Abdominal: Soft. Bowel sounds are normal. She exhibits no distension. There is no tenderness. There is no guarding.   Musculoskeletal: Normal range of motion. She exhibits edema.   Lymphadenopathy:     She has no cervical adenopathy.   Neurological: She is alert and oriented to person, place, and time. No cranial nerve deficit or sensory deficit.   Skin: Skin is warm and dry. No rash noted. No erythema.   Psychiatric: She has a normal mood and affect. Her behavior is normal. Judgment and thought content normal.         CRANIAL NERVES     CN III, IV, VI   Pupils are equal, round, and reactive to light.  Extraocular motions are normal.        Significant Labs: All pertinent labs within the past 24 hours have been reviewed.    Significant Imaging: I have reviewed and interpreted all pertinent imaging results/findings within the past 24 hours.

## 2017-12-03 NOTE — H&P
[facility]  Cardiology  History and Physical     Patient Name: Coby Atkinson  MRN: [id number]  Admission Date: 12/2/2017  Code Status: Full Code   Attending Provider: Antonnio High MD   Primary Care Physician: Mundo Hudson DO  Principal Problem:Acute pulmonary embolism    Patient information was obtained from past medical records.     Subjective:     Chief Complaint:  SOB     HPI:  71 y/o female with a PMH of HTN, ADRIÁN and obesity that presented to the ER with KITCHEN and two episodes of near-syncope. WBC 15, D-dimer 5, Tn 0.416 and . CT c/w multilobar pulmonary emboli, right side greater than left, with CT findings worrisome for right heart strain. The patient was admitted to medicine for IV UFH. Bedide ECHO with dilated RV, normal RV function and PASP <30. INV cardiology would like the patient in the CCU for closer monitoring. The patient reports SOB only when exerting herself; however, her ability to walk around the room is impaired by SOB. She is on estrogen. Of note, the patient has had sx for months including KITCHEN though sx have gotten worse recently.     Past Medical History:   Diagnosis Date    Cataract     Depression     Edema     GERD (gastroesophageal reflux disease)     Hypertension 10/2/2012    Osteopenia     Thalassemia minor        Past Surgical History:   Procedure Laterality Date    APPENDECTOMY      CATARACT EXTRACTION      COLONOSCOPY N/A 11/5/2016    Procedure: COLONOSCOPY;  Surgeon: Tanner Simental MD;  Location: [address];  Service: Endoscopy;  Laterality: N/A;    HYSTERECTOMY  age 39    fibroids    OOPHORECTOMY      TONSILLECTOMY         Review of patient's allergies indicates:   Allergen Reactions    Codeine     Ciprofloxacin Rash       No current facility-administered medications on file prior to encounter.      Current Outpatient Prescriptions on File Prior to Encounter   Medication Sig    amlodipine (NORVASC) 10 MG tablet  TAKE 1 TABLET(10 MG) BY MOUTH EVERY DAY    benzonatate (TESSALON PERLES) 100 MG capsule Take 1 capsule (100 mg total) by mouth every 6 (six) hours as needed for Cough.    cefUROXime (CEFTIN) 500 MG tablet Take 1 tablet (500 mg total) by mouth 2 (two) times daily.    doxazosin (CARDURA) 2 MG tablet TAKE 1 TABLET(2 MG) BY MOUTH EVERY EVENING    escitalopram oxalate (LEXAPRO) 20 MG tablet TAKE 1 TABLET BY MOUTH ONCE DAILY    furosemide (LASIX) 20 MG tablet TAKE 1 TABLET BY MOUTH TWICE DAILY; MAY TAKE ADDITIONAL DOSE DAILY AS NEEDED FOR SWELLING    meloxicam (MOBIC) 7.5 MG tablet TAKE 1 TABLET BY MOUTH EVERY DAY    metoprolol succinate (TOPROL-XL) 25 MG 24 hr tablet TAKE 1 TABLET BY MOUTH ONCE DAILY    pantoprazole (PROTONIX) 40 MG tablet Take 1 tablet (40 mg total) by mouth once daily.    potassium chloride (MICRO-K) 10 MEQ CpSR Take 2 capsules (20 mEq total) by mouth once daily.    topiramate (TOPAMAX) 50 MG tablet Take 1 tablet (50 mg total) by mouth 2 (two) times daily.    conjugated estrogens (PREMARIN) vaginal cream Place 0.5 g vaginally twice a week.    estrogens, conjugated, (PREMARIN) 0.45 MG tablet Take 1 tablet (0.45 mg total) by mouth once daily.    losartan-hydrochlorothiazide 100-25 mg (HYZAAR) 100-25 mg per tablet Take 1 tablet by mouth once daily.    ondansetron (ZOFRAN) 4 MG tablet Take 1 tablet (4 mg total) by mouth every 6 (six) hours.     Family History     Problem Relation (Age of Onset)    Cancer Father, Brother    Hypertension Mother    No Known Problems Sister, Maternal Aunt, Maternal Uncle, Paternal Aunt, Paternal Uncle, Maternal Grandmother, Maternal Grandfather, Paternal Grandmother, Paternal Grandfather        Social History Main Topics    Smoking status: Never Smoker    Smokeless tobacco: Never Used    Alcohol use No      Comment: rare    Drug use: No    Sexual activity: Yes     Partners: Male     Review of Systems   Constitution: Negative for chills and fever.   HENT:  Negative for ear discharge.    Eyes: Negative for pain and visual disturbance.   Cardiovascular: Positive for dyspnea on exertion. Negative for chest pain, irregular heartbeat, leg swelling, orthopnea, palpitations, paroxysmal nocturnal dyspnea and syncope.   Respiratory: Positive for shortness of breath. Negative for hemoptysis and wheezing.    Skin: Negative for rash and suspicious lesions.   Musculoskeletal: Negative for joint pain and muscle weakness.   Gastrointestinal: Negative for abdominal pain, diarrhea, hematemesis, hematochezia, melena, nausea and vomiting.   Genitourinary: Negative for dysuria and frequency.   Neurological: Negative for focal weakness, headaches and tremors.   Psychiatric/Behavioral: Negative for altered mental status, suicidal ideas and thoughts of violence.     Objective:     Vital Signs (Most Recent):  Temp: 97.6 °F (36.4 °C) (12/02/17 2234)  Pulse: 80 (12/03/17 0017)  Resp: 19 (12/03/17 0017)  BP: 138/81 (12/03/17 0017)  SpO2: 99 % (12/03/17 0017) Vital Signs (24h Range):  Temp:  [97.6 °F (36.4 °C)-98.5 °F (36.9 °C)] 97.6 °F (36.4 °C)  Pulse:  [] 80  Resp:  [16-20] 19  SpO2:  [95 %-99 %] 99 %  BP: ()/(55-97) 138/81     Weight: 103.9 kg (229 lb)  Body mass index is 40.57 kg/m².    SpO2: 99 %  O2 Device (Oxygen Therapy): room air      Intake/Output Summary (Last 24 hours) at 12/03/17 0240  Last data filed at 12/02/17 2300   Gross per 24 hour   Intake                0 ml   Output                0 ml   Net                0 ml       Lines/Drains/Airways     Peripheral Intravenous Line                 Peripheral IV - Single Lumen 11/01/17 1808 Left Antecubital 31 days         Peripheral IV - Single Lumen 12/02/17 1703 Right Antecubital less than 1 day         Peripheral IV - Single Lumen 12/02/17 2123 Left Antecubital less than 1 day                Physical Exam   Constitutional: She is oriented to person, place, and time. She appears well-developed and well-nourished.   HENT:    Head: Normocephalic and atraumatic.   Eyes: EOM are normal.   Cardiovascular: Normal rate and regular rhythm.  Exam reveals no gallop and no friction rub.    Pulmonary/Chest: Effort normal and breath sounds normal. No stridor. She has no wheezes. She has no rales.   Abdominal: Soft. Bowel sounds are normal. There is no rebound and no guarding.   Musculoskeletal: She exhibits no edema.   Neurological: She is alert and oriented to person, place, and time. No cranial nerve deficit.   Skin: Skin is warm and dry.   Psychiatric: She has a normal mood and affect. Her behavior is normal.       Significant Labs:   CMP   Recent Labs  Lab 12/02/17  1704      K 3.8      CO2 23      BUN 39*   CREATININE 1.4   CALCIUM 9.0   PROT 7.1   ALBUMIN 3.3*   BILITOT 0.3   ALKPHOS 93   AST 16   ALT 13   ANIONGAP 10   ESTGFRAFRICA 43.0*   EGFRNONAA 37.3*   , CBC   Recent Labs  Lab 12/02/17  1704 12/02/17 2007   WBC 15.16* 14.18*   HGB 10.7* 10.1*   HCT 34.6* 33.6*    292   , INR   Recent Labs  Lab 12/02/17  1704   INR 0.9   , Lipid Panel No results for input(s): CHOL, HDL, LDLCALC, TRIG, CHOLHDL in the last 48 hours. and Troponin   Recent Labs  Lab 12/02/17  1704 12/02/17  2325   TROPONINI 0.405* 0.416*       Significant Imaging: EKG: SR with 1st degree AVB, LAD    Assessment and Plan:     PE (pulmonary thromboembolism)    - hemodynamically stable PE  - INV cardiology on board for consideration of CDT  - CTS on board with no plan to perform surgery  - move to the CMICU for closer monitoring  - LE Doppler US   - ABG, early ECHO in the AM  - re-assess risks and benefits of estrogen use  - c/w IV UFH gtt          Essential hypertension    -old antihypertensives            VTE Risk Mitigation         Ordered     High Risk of VTE  Once      12/02/17 2220     heparin 25,000 units in dextrose 5% 250 mL (100 units/mL) infusion  Continuous     Route:  Intravenous        12/02/17 1947     heparin 25,000 units in dextrose  5% 250 mL (100 units/mL) bolus from bag; ADDITIONAL PRN BOLUS  As needed (PRN)     Route:  Intravenous        12/02/17 1947     heparin 25,000 units in dextrose 5% 250 mL (100 units/mL) bolus from bag; ADDITIONAL PRN BOLUS  As needed (PRN)     Route:  Intravenous        12/02/17 1947          Mayank Walsh MD  Cardiology   [facility]

## 2017-12-03 NOTE — ASSESSMENT & PLAN NOTE
Etiology unclear. Multilobar emboli with concern for right heard strain. HDS. Troponin, BNP elevated.  - heparin gtt   - interventional cardiology consulted to evaluate for catheter administered tpa   - NPO

## 2017-12-03 NOTE — HPI
73 y/o female with a PMH of HTN, ADRIÁN and obesity that presented to the ER with KITCHEN and two episodes of near-syncope. WBC 15, D-dimer 5, Tn 0.416 and . CT c/w multilobar pulmonary emboli, right side greater than left, with CT findings worrisome for right heart strain. The patient was admitted to medicine for IV UFH. Bedide ECHO with dilated RV, normal RV function and PASP <30. INV cardiology would like the patient in the CCU for closer monitoring. The patient reports SOB only when exerting herself; however, her ability to walk around the room is impaired by SOB. She is on estrogen. Of note, the patient has had sx for months including KITCHEN though sx have gotten worse recently.

## 2017-12-03 NOTE — HPI
"73 yo woman with HTN, CKD Stage 3, Gerd, thalassemia minor who presented to ED on 12/2 after multiple presyncopal episodes today. Since the morning, patient feels sob and light headed with exertion, "there are black spots in front of my face." Denies chest pain. No recent surgeries, hx of blood clots, family hx of blood clots, long periods of inactivity, hx of cancer, smoking.     In the Ed, patient had elevated troponin. Since ddimer was also elevated CT PE was ordered which revealed multilobar emboli more on the right side with concern of right heard strain. Heparin gtt was started before CT PE and patient was also loaded with brilinta.  "

## 2017-12-03 NOTE — SUBJECTIVE & OBJECTIVE
Past Medical History:   Diagnosis Date    Cataract     Depression     Edema     GERD (gastroesophageal reflux disease)     Hypertension 10/2/2012    Osteopenia     Thalassemia minor        Past Surgical History:   Procedure Laterality Date    APPENDECTOMY      CATARACT EXTRACTION      COLONOSCOPY N/A 11/5/2016    Procedure: COLONOSCOPY;  Surgeon: Tanner Simental MD;  Location: [address]);  Service: Endoscopy;  Laterality: N/A;    HYSTERECTOMY  age 39    fibroids    OOPHORECTOMY      TONSILLECTOMY         Review of patient's allergies indicates:   Allergen Reactions    Codeine     Ciprofloxacin Rash       No current facility-administered medications on file prior to encounter.      Current Outpatient Prescriptions on File Prior to Encounter   Medication Sig    amlodipine (NORVASC) 10 MG tablet TAKE 1 TABLET(10 MG) BY MOUTH EVERY DAY    benzonatate (TESSALON PERLES) 100 MG capsule Take 1 capsule (100 mg total) by mouth every 6 (six) hours as needed for Cough.    cefUROXime (CEFTIN) 500 MG tablet Take 1 tablet (500 mg total) by mouth 2 (two) times daily.    doxazosin (CARDURA) 2 MG tablet TAKE 1 TABLET(2 MG) BY MOUTH EVERY EVENING    escitalopram oxalate (LEXAPRO) 20 MG tablet TAKE 1 TABLET BY MOUTH ONCE DAILY    furosemide (LASIX) 20 MG tablet TAKE 1 TABLET BY MOUTH TWICE DAILY; MAY TAKE ADDITIONAL DOSE DAILY AS NEEDED FOR SWELLING    meloxicam (MOBIC) 7.5 MG tablet TAKE 1 TABLET BY MOUTH EVERY DAY    metoprolol succinate (TOPROL-XL) 25 MG 24 hr tablet TAKE 1 TABLET BY MOUTH ONCE DAILY    pantoprazole (PROTONIX) 40 MG tablet Take 1 tablet (40 mg total) by mouth once daily.    potassium chloride (MICRO-K) 10 MEQ CpSR Take 2 capsules (20 mEq total) by mouth once daily.    topiramate (TOPAMAX) 50 MG tablet Take 1 tablet (50 mg total) by mouth 2 (two) times daily.    conjugated estrogens (PREMARIN) vaginal cream Place 0.5 g vaginally twice a week.    estrogens, conjugated, (PREMARIN)  0.45 MG tablet Take 1 tablet (0.45 mg total) by mouth once daily.    losartan-hydrochlorothiazide 100-25 mg (HYZAAR) 100-25 mg per tablet Take 1 tablet by mouth once daily.    ondansetron (ZOFRAN) 4 MG tablet Take 1 tablet (4 mg total) by mouth every 6 (six) hours.     Family History     Problem Relation (Age of Onset)    Cancer Father, Brother    Hypertension Mother    No Known Problems Sister, Maternal Aunt, Maternal Uncle, Paternal Aunt, Paternal Uncle, Maternal Grandmother, Maternal Grandfather, Paternal Grandmother, Paternal Grandfather        Social History Main Topics    Smoking status: Never Smoker    Smokeless tobacco: Never Used    Alcohol use No      Comment: rare    Drug use: No    Sexual activity: Yes     Partners: Male     Review of Systems   Constitution: Negative for chills and fever.   HENT: Negative for ear discharge.    Eyes: Negative for pain and visual disturbance.   Cardiovascular: Positive for dyspnea on exertion. Negative for chest pain, irregular heartbeat, leg swelling, orthopnea, palpitations, paroxysmal nocturnal dyspnea and syncope.   Respiratory: Positive for shortness of breath. Negative for hemoptysis and wheezing.    Skin: Negative for rash and suspicious lesions.   Musculoskeletal: Negative for joint pain and muscle weakness.   Gastrointestinal: Negative for abdominal pain, diarrhea, hematemesis, hematochezia, melena, nausea and vomiting.   Genitourinary: Negative for dysuria and frequency.   Neurological: Negative for focal weakness, headaches and tremors.   Psychiatric/Behavioral: Negative for altered mental status, suicidal ideas and thoughts of violence.     Objective:     Vital Signs (Most Recent):  Temp: 97.6 °F (36.4 °C) (12/02/17 2234)  Pulse: 80 (12/03/17 0017)  Resp: 19 (12/03/17 0017)  BP: 138/81 (12/03/17 0017)  SpO2: 99 % (12/03/17 0017) Vital Signs (24h Range):  Temp:  [97.6 °F (36.4 °C)-98.5 °F (36.9 °C)] 97.6 °F (36.4 °C)  Pulse:  [] 80  Resp:  [16-20]  19  SpO2:  [95 %-99 %] 99 %  BP: ()/(55-97) 138/81     Weight: 103.9 kg (229 lb)  Body mass index is 40.57 kg/m².    SpO2: 99 %  O2 Device (Oxygen Therapy): room air      Intake/Output Summary (Last 24 hours) at 12/03/17 0240  Last data filed at 12/02/17 2300   Gross per 24 hour   Intake                0 ml   Output                0 ml   Net                0 ml       Lines/Drains/Airways     Peripheral Intravenous Line                 Peripheral IV - Single Lumen 11/01/17 1808 Left Antecubital 31 days         Peripheral IV - Single Lumen 12/02/17 1703 Right Antecubital less than 1 day         Peripheral IV - Single Lumen 12/02/17 2123 Left Antecubital less than 1 day                Physical Exam   Constitutional: She is oriented to person, place, and time. She appears well-developed and well-nourished.   HENT:   Head: Normocephalic and atraumatic.   Eyes: EOM are normal.   Cardiovascular: Normal rate and regular rhythm.  Exam reveals no gallop and no friction rub.    Pulmonary/Chest: Effort normal and breath sounds normal. No stridor. She has no wheezes. She has no rales.   Abdominal: Soft. Bowel sounds are normal. There is no rebound and no guarding.   Musculoskeletal: She exhibits no edema.   Neurological: She is alert and oriented to person, place, and time. No cranial nerve deficit.   Skin: Skin is warm and dry.   Psychiatric: She has a normal mood and affect. Her behavior is normal.       Significant Labs:   CMP   Recent Labs  Lab 12/02/17  1704      K 3.8      CO2 23      BUN 39*   CREATININE 1.4   CALCIUM 9.0   PROT 7.1   ALBUMIN 3.3*   BILITOT 0.3   ALKPHOS 93   AST 16   ALT 13   ANIONGAP 10   ESTGFRAFRICA 43.0*   EGFRNONAA 37.3*   , CBC   Recent Labs  Lab 12/02/17  1704 12/02/17 2007   WBC 15.16* 14.18*   HGB 10.7* 10.1*   HCT 34.6* 33.6*    292   , INR   Recent Labs  Lab 12/02/17  1704   INR 0.9   , Lipid Panel No results for input(s): CHOL, HDL, LDLCALC, TRIG, CHOLHDL in  the last 48 hours. and Troponin   Recent Labs  Lab 12/02/17  1704 12/02/17  2325   TROPONINI 0.405* 0.416*       Significant Imaging: EKG: SR with 1st degree AVB, LAD

## 2017-12-03 NOTE — ASSESSMENT & PLAN NOTE
Patient with submassive PE (peripheral clots) - hemodynamically stable but with mild RV dysfunction/troponin and BNP elevation. She belongs to intermediate - high risk submassive PE category.    Continue with heparin anticoagulation per now. Monitor hemodynamics in ICU. If her tachypnea worsens or becomes hemodynamically unstable then will consider catheter directed thrombolysis. She does not have any contraindications to tPA     Will seek CTS consult to see if she is a candidate of thrombectomy.    Patient seen and case discussed with Dr. Briscoe

## 2017-12-04 PROBLEM — I21.4 NSTEMI (NON-ST ELEVATED MYOCARDIAL INFARCTION): Status: ACTIVE | Noted: 2017-12-04

## 2017-12-04 LAB
ANION GAP SERPL CALC-SCNC: 6 MMOL/L
BASOPHILS # BLD AUTO: 0.07 K/UL
BASOPHILS NFR BLD: 0.5 %
BASOPHILS NFR BLD: 0.6 %
BASOPHILS NFR BLD: 0.6 %
BUN SERPL-MCNC: 31 MG/DL
CALCIUM SERPL-MCNC: 8.4 MG/DL
CHLORIDE SERPL-SCNC: 114 MMOL/L
CO2 SERPL-SCNC: 20 MMOL/L
CREAT SERPL-MCNC: 1 MG/DL
DIFFERENTIAL METHOD: ABNORMAL
EOSINOPHIL # BLD AUTO: 1.1 K/UL
EOSINOPHIL # BLD AUTO: 1.1 K/UL
EOSINOPHIL # BLD AUTO: 1.3 K/UL
EOSINOPHIL NFR BLD: 10 %
EOSINOPHIL NFR BLD: 9.3 %
EOSINOPHIL NFR BLD: 9.5 %
ERYTHROCYTE [DISTWIDTH] IN BLOOD BY AUTOMATED COUNT: 15.6 %
ERYTHROCYTE [DISTWIDTH] IN BLOOD BY AUTOMATED COUNT: 15.7 %
ERYTHROCYTE [DISTWIDTH] IN BLOOD BY AUTOMATED COUNT: 15.9 %
EST. GFR  (AFRICAN AMERICAN): >60 ML/MIN/1.73 M^2
EST. GFR  (NON AFRICAN AMERICAN): 56 ML/MIN/1.73 M^2
FACT X PPP CHRO-ACNC: 0.36 IU/ML
FACT X PPP CHRO-ACNC: 0.78 IU/ML
FIBRINOGEN PPP-MCNC: 210 MG/DL
FIBRINOGEN PPP-MCNC: 298 MG/DL
FIBRINOGEN PPP-MCNC: 298 MG/DL
GLUCOSE SERPL-MCNC: 93 MG/DL
HCT VFR BLD AUTO: 29.2 %
HCT VFR BLD AUTO: 29.8 %
HCT VFR BLD AUTO: 30.4 %
HGB BLD-MCNC: 9 G/DL
HGB BLD-MCNC: 9.2 G/DL
HGB BLD-MCNC: 9.3 G/DL
IMM GRANULOCYTES # BLD AUTO: 0.08 K/UL
IMM GRANULOCYTES # BLD AUTO: 0.08 K/UL
IMM GRANULOCYTES # BLD AUTO: 0.1 K/UL
IMM GRANULOCYTES NFR BLD AUTO: 0.7 %
LYMPHOCYTES # BLD AUTO: 3.2 K/UL
LYMPHOCYTES # BLD AUTO: 3.5 K/UL
LYMPHOCYTES # BLD AUTO: 3.5 K/UL
LYMPHOCYTES NFR BLD: 24.8 %
LYMPHOCYTES NFR BLD: 28.3 %
LYMPHOCYTES NFR BLD: 32 %
MAGNESIUM SERPL-MCNC: 1.9 MG/DL
MCH RBC QN AUTO: 20 PG
MCH RBC QN AUTO: 20 PG
MCH RBC QN AUTO: 20.2 PG
MCHC RBC AUTO-ENTMCNC: 30.6 G/DL
MCHC RBC AUTO-ENTMCNC: 30.8 G/DL
MCHC RBC AUTO-ENTMCNC: 30.9 G/DL
MCV RBC AUTO: 65 FL
MCV RBC AUTO: 65 FL
MCV RBC AUTO: 66 FL
MONOCYTES # BLD AUTO: 0.6 K/UL
MONOCYTES # BLD AUTO: 0.8 K/UL
MONOCYTES # BLD AUTO: 0.8 K/UL
MONOCYTES NFR BLD: 5.4 %
MONOCYTES NFR BLD: 5.6 %
MONOCYTES NFR BLD: 7.1 %
NEUTROPHILS # BLD AUTO: 5.5 K/UL
NEUTROPHILS # BLD AUTO: 6.2 K/UL
NEUTROPHILS # BLD AUTO: 8.3 K/UL
NEUTROPHILS NFR BLD: 50.1 %
NEUTROPHILS NFR BLD: 54.8 %
NEUTROPHILS NFR BLD: 59.3 %
NRBC BLD-RTO: 0 /100 WBC
PHOSPHATE SERPL-MCNC: 2.8 MG/DL
PLATELET # BLD AUTO: 239 K/UL
PLATELET # BLD AUTO: 247 K/UL
PLATELET # BLD AUTO: 248 K/UL
PMV BLD AUTO: 9.5 FL
POTASSIUM SERPL-SCNC: 4 MMOL/L
RBC # BLD AUTO: 4.51 M/UL
RBC # BLD AUTO: 4.59 M/UL
RBC # BLD AUTO: 4.61 M/UL
SODIUM SERPL-SCNC: 140 MMOL/L
WBC # BLD AUTO: 11.06 K/UL
WBC # BLD AUTO: 11.4 K/UL
WBC # BLD AUTO: 13.99 K/UL

## 2017-12-04 PROCEDURE — 80048 BASIC METABOLIC PNL TOTAL CA: CPT

## 2017-12-04 PROCEDURE — 85520 HEPARIN ASSAY: CPT | Mod: 91

## 2017-12-04 PROCEDURE — 84100 ASSAY OF PHOSPHORUS: CPT

## 2017-12-04 PROCEDURE — 25000003 PHARM REV CODE 250: Performed by: HOSPITALIST

## 2017-12-04 PROCEDURE — 83735 ASSAY OF MAGNESIUM: CPT

## 2017-12-04 PROCEDURE — 93970 EXTREMITY STUDY: CPT

## 2017-12-04 PROCEDURE — 36415 COLL VENOUS BLD VENIPUNCTURE: CPT

## 2017-12-04 PROCEDURE — 93970 EXTREMITY STUDY: CPT | Mod: 26,,, | Performed by: INTERNAL MEDICINE

## 2017-12-04 PROCEDURE — 25000003 PHARM REV CODE 250: Performed by: INTERNAL MEDICINE

## 2017-12-04 PROCEDURE — 85025 COMPLETE CBC W/AUTO DIFF WBC: CPT | Mod: 91

## 2017-12-04 PROCEDURE — 63600175 PHARM REV CODE 636 W HCPCS: Performed by: HOSPITALIST

## 2017-12-04 PROCEDURE — 99232 SBSQ HOSP IP/OBS MODERATE 35: CPT | Mod: GC,,, | Performed by: INTERNAL MEDICINE

## 2017-12-04 PROCEDURE — 25000003 PHARM REV CODE 250: Performed by: STUDENT IN AN ORGANIZED HEALTH CARE EDUCATION/TRAINING PROGRAM

## 2017-12-04 PROCEDURE — 63600175 PHARM REV CODE 636 W HCPCS: Performed by: INTERNAL MEDICINE

## 2017-12-04 PROCEDURE — C9113 INJ PANTOPRAZOLE SODIUM, VIA: HCPCS | Performed by: INTERNAL MEDICINE

## 2017-12-04 PROCEDURE — 20000000 HC ICU ROOM

## 2017-12-04 PROCEDURE — 94799 UNLISTED PULMONARY SVC/PX: CPT

## 2017-12-04 PROCEDURE — 99233 SBSQ HOSP IP/OBS HIGH 50: CPT | Mod: ,,, | Performed by: INTERNAL MEDICINE

## 2017-12-04 PROCEDURE — 85384 FIBRINOGEN ACTIVITY: CPT

## 2017-12-04 RX ORDER — ACETAMINOPHEN 650 MG/20.3ML
650 LIQUID ORAL ONCE
Status: COMPLETED | OUTPATIENT
Start: 2017-12-04 | End: 2017-12-04

## 2017-12-04 RX ORDER — AMLODIPINE BESYLATE 10 MG/1
10 TABLET ORAL DAILY
Status: DISCONTINUED | OUTPATIENT
Start: 2017-12-04 | End: 2017-12-06

## 2017-12-04 RX ORDER — FUROSEMIDE 20 MG/1
20 TABLET ORAL DAILY
Status: DISCONTINUED | OUTPATIENT
Start: 2017-12-05 | End: 2017-12-10 | Stop reason: HOSPADM

## 2017-12-04 RX ORDER — FUROSEMIDE 10 MG/ML
20 INJECTION INTRAMUSCULAR; INTRAVENOUS ONCE
Status: COMPLETED | OUTPATIENT
Start: 2017-12-04 | End: 2017-12-04

## 2017-12-04 RX ORDER — TOPIRAMATE 25 MG/1
50 TABLET ORAL 2 TIMES DAILY
Status: DISCONTINUED | OUTPATIENT
Start: 2017-12-04 | End: 2017-12-10 | Stop reason: HOSPADM

## 2017-12-04 RX ORDER — PANTOPRAZOLE SODIUM 40 MG/10ML
40 INJECTION, POWDER, LYOPHILIZED, FOR SOLUTION INTRAVENOUS 2 TIMES DAILY
Status: DISCONTINUED | OUTPATIENT
Start: 2017-12-04 | End: 2017-12-06

## 2017-12-04 RX ADMIN — PANTOPRAZOLE SODIUM 40 MG: 40 INJECTION, POWDER, FOR SOLUTION INTRAVENOUS at 08:12

## 2017-12-04 RX ADMIN — ESCITALOPRAM OXALATE 20 MG: 10 TABLET ORAL at 10:12

## 2017-12-04 RX ADMIN — ACETAMINOPHEN 650 MG: 650 SOLUTION ORAL at 02:12

## 2017-12-04 RX ADMIN — SODIUM CHLORIDE: 0.9 INJECTION, SOLUTION INTRAVENOUS at 12:12

## 2017-12-04 RX ADMIN — AMLODIPINE BESYLATE 10 MG: 10 TABLET ORAL at 07:12

## 2017-12-04 RX ADMIN — HEPARIN SODIUM AND DEXTROSE 12 UNITS/KG/HR: 10000; 5 INJECTION INTRAVENOUS at 12:12

## 2017-12-04 RX ADMIN — TOPIRAMATE 50 MG: 25 TABLET, FILM COATED ORAL at 08:12

## 2017-12-04 RX ADMIN — FUROSEMIDE 20 MG: 10 INJECTION, SOLUTION INTRAMUSCULAR; INTRAVENOUS at 07:12

## 2017-12-04 RX ADMIN — ALTEPLASE 1 MG/HR: 2.2 INJECTION, POWDER, LYOPHILIZED, FOR SOLUTION INTRAVENOUS at 12:12

## 2017-12-04 RX ADMIN — PANTOPRAZOLE SODIUM 40 MG: 40 TABLET, DELAYED RELEASE ORAL at 10:12

## 2017-12-04 NOTE — ASSESSMENT & PLAN NOTE
- CTS on board with no plan to perform surgery  - LE Doppler US   - ABG, f/u echo  - discussed risks and benefits of estrogen use, holding   - S/p catheter directed thrombolysis 12/3. Sheath pulled this AM. Remains on heparin gtt.   O2 Sats stable, PAP 32.    - interventional cardiology following  - hemodynamically stable, likely stepdown uziel

## 2017-12-04 NOTE — HOSPITAL COURSE
- 12/3 admitted, hemodynamically stable PE, INV cardiology on board for consideration of CDT, CTS on board with no plan to perform surgery, move to the CMICU for closer monitoring, LE Doppler US, pigtail catheter placed for tpa infusion  - 12/4 pigtail catheter removed this AM, hep gtt; echo ordered, possibly stepdown uziel  - 12/5: Melena overnight with Hb drop this AM. Apixaban held. GI consulted.   - 12/6: Further Hb drop. Awaiting EGD today. No chest pain/ SOB.  - 12/8: Required 1 unit overnight. No subjective complaints.

## 2017-12-04 NOTE — ASSESSMENT & PLAN NOTE
Patient with submassive PE (peripheral clots) - hemodynamically stable but with mild RV dysfunction/troponin and BNP elevation. She belongs to intermediate - high risk submassive PE category.    S/p catheter directed thrombolysis yesterday. Sheath pulled this AM. Remains on heparin gtt.   O2 Sats stable, PAP 32.

## 2017-12-04 NOTE — PROGRESS NOTES
[facility]  Cardiology  Progress Note    Patient Name: Coby Atkinson  MRN: [id number]  Admission Date: 12/2/2017  Hospital Length of Stay: 2 days  Code Status: Full Code   Attending Physician: Ignacio Delgadillo MD   Primary Care Physician: Mundo Hudson DO  Expected Discharge Date: 12/8/2017  Principal Problem:Pulmonary embolus    Subjective:     HPI: 71 y/o female with a PMH of HTN, ADRIÁN and obesity that presented to the ER with KITCHEN and two episodes of near-syncope. WBC 15, D-dimer 5, Tn 0.416 and . CT c/w multilobar pulmonary emboli, right side greater than left, with CT findings worrisome for right heart strain. The patient was admitted to medicine for IV UFH. Bedide ECHO with dilated RV, normal RV function and PASP <30. INV cardiology would like the patient in the CCU for closer monitoring. The patient reports SOB only when exerting herself; however, her ability to walk around the room is impaired by SOB. She is on estrogen. Of note, the patient has had sx for months including KITCHEN though sx have gotten worse recently.     Hospital Course:   - 12/3 admitted, hemodynamically stable PE, INV cardiology on board for consideration of CDT, CTS on board with no plan to perform surgery, move to the CMICU for closer monitoring, LE Doppler US, pigtail catheter placed for tpa infusion  - 12/4 pigtail catheter removed this AM, hep gtt; echo ordered, possibly stepdown uziel      Past Medical History:   Diagnosis Date    Cataract     Depression     Edema     GERD (gastroesophageal reflux disease)     Hypertension 10/2/2012    Osteopenia     Thalassemia minor        Past Surgical History:   Procedure Laterality Date    APPENDECTOMY      CATARACT EXTRACTION      COLONOSCOPY N/A 11/5/2016    Procedure: COLONOSCOPY;  Surgeon: Tanner Simental MD;  Location: [facility] ([address] [place] [address]);  Service: Endoscopy;  Laterality: N/A;    HYSTERECTOMY  age 39    fibroids    OOPHORECTOMY       TONSILLECTOMY         Review of patient's allergies indicates:   Allergen Reactions    Codeine     Ciprofloxacin Rash       No current facility-administered medications on file prior to encounter.      Current Outpatient Prescriptions on File Prior to Encounter   Medication Sig    amlodipine (NORVASC) 10 MG tablet TAKE 1 TABLET(10 MG) BY MOUTH EVERY DAY    benzonatate (TESSALON PERLES) 100 MG capsule Take 1 capsule (100 mg total) by mouth every 6 (six) hours as needed for Cough.    cefUROXime (CEFTIN) 500 MG tablet Take 1 tablet (500 mg total) by mouth 2 (two) times daily.    doxazosin (CARDURA) 2 MG tablet TAKE 1 TABLET(2 MG) BY MOUTH EVERY EVENING    escitalopram oxalate (LEXAPRO) 20 MG tablet TAKE 1 TABLET BY MOUTH ONCE DAILY    furosemide (LASIX) 20 MG tablet TAKE 1 TABLET BY MOUTH TWICE DAILY; MAY TAKE ADDITIONAL DOSE DAILY AS NEEDED FOR SWELLING    meloxicam (MOBIC) 7.5 MG tablet TAKE 1 TABLET BY MOUTH EVERY DAY    metoprolol succinate (TOPROL-XL) 25 MG 24 hr tablet TAKE 1 TABLET BY MOUTH ONCE DAILY    pantoprazole (PROTONIX) 40 MG tablet Take 1 tablet (40 mg total) by mouth once daily.    potassium chloride (MICRO-K) 10 MEQ CpSR Take 2 capsules (20 mEq total) by mouth once daily.    topiramate (TOPAMAX) 50 MG tablet Take 1 tablet (50 mg total) by mouth 2 (two) times daily.    conjugated estrogens (PREMARIN) vaginal cream Place 0.5 g vaginally twice a week.    estrogens, conjugated, (PREMARIN) 0.45 MG tablet Take 1 tablet (0.45 mg total) by mouth once daily.    losartan-hydrochlorothiazide 100-25 mg (HYZAAR) 100-25 mg per tablet Take 1 tablet by mouth once daily.    ondansetron (ZOFRAN) 4 MG tablet Take 1 tablet (4 mg total) by mouth every 6 (six) hours.     Family History     Problem Relation (Age of Onset)    Cancer Father, Brother    Hypertension Mother    No Known Problems Sister, Maternal Aunt, Maternal Uncle, Paternal Aunt, Paternal Uncle, Maternal Grandmother, Maternal Grandfather,  Paternal Grandmother, Paternal Grandfather        Social History Main Topics    Smoking status: Never Smoker    Smokeless tobacco: Never Used    Alcohol use No      Comment: rare    Drug use: No    Sexual activity: Yes     Partners: Male     Review of Systems   Constitution: Negative for chills and fever.   HENT: Negative for ear discharge.    Eyes: Negative for pain and visual disturbance.   Cardiovascular: Positive for dyspnea on exertion. Negative for chest pain, irregular heartbeat, leg swelling, orthopnea, palpitations, paroxysmal nocturnal dyspnea and syncope.   Respiratory: Negative for hemoptysis, shortness of breath and wheezing.    Skin: Negative for rash and suspicious lesions.   Musculoskeletal: Negative for joint pain and muscle weakness.   Gastrointestinal: Negative for abdominal pain, diarrhea, hematemesis, hematochezia, melena, nausea and vomiting.   Genitourinary: Negative for dysuria and frequency.   Neurological: Negative for focal weakness, headaches and tremors.   Psychiatric/Behavioral: Negative for altered mental status, suicidal ideas and thoughts of violence.     Objective:     Vital Signs (Most Recent):  Temp: 98.2 °F (36.8 °C) (12/04/17 1110)  Pulse: 65 (12/04/17 1335)  Resp: (!) 23 (12/04/17 1335)  BP: (!) 170/69 (sitting to standing) (12/04/17 1335)  SpO2: 99 % (12/04/17 1335) Vital Signs (24h Range):  Temp:  [98 °F (36.7 °C)-98.2 °F (36.8 °C)] 98.2 °F (36.8 °C)  Pulse:  [56-79] 65  Resp:  [13-38] 23  SpO2:  [93 %-100 %] 99 %  BP: (122-190)/(62-86) 170/69     Weight: 103.9 kg (229 lb 0.9 oz)  Body mass index is 40.58 kg/m².    SpO2: 99 %  O2 Device (Oxygen Therapy): room air      Intake/Output Summary (Last 24 hours) at 12/04/17 1522  Last data filed at 12/04/17 1400   Gross per 24 hour   Intake          2921.87 ml   Output              200 ml   Net          2721.87 ml       Lines/Drains/Airways     Peripheral Intravenous Line                 Peripheral IV - Single Lumen 12/02/17  1703 Right Antecubital 1 day         Peripheral IV - Single Lumen 12/02/17 2123 Left Antecubital 1 day                Physical Exam   Constitutional: She is oriented to person, place, and time. She appears well-developed and well-nourished.   HENT:   Head: Normocephalic and atraumatic.   Eyes: EOM are normal.   Cardiovascular: Normal rate and regular rhythm.  Exam reveals no gallop and no friction rub.    Pulmonary/Chest: Effort normal and breath sounds normal. No stridor. She has no wheezes. She has no rales.   Abdominal: Soft. Bowel sounds are normal. There is no rebound and no guarding.   Musculoskeletal: She exhibits no edema.   Neurological: She is alert and oriented to person, place, and time. No cranial nerve deficit.   Skin: Skin is warm and dry.   Psychiatric: She has a normal mood and affect. Her behavior is normal.       Significant Labs:   CMP     Recent Labs  Lab 12/02/17  1704 12/03/17  0540 12/03/17  1237 12/04/17  0609    138  --  140   K 3.8 3.5 4.1 4.0    108  --  114*   CO2 23 22*  --  20*    105  --  93   BUN 39* 26*  --  31*   CREATININE 1.4 1.0  --  1.0   CALCIUM 9.0 8.5*  --  8.4*   PROT 7.1  --   --   --    ALBUMIN 3.3*  --   --   --    BILITOT 0.3  --   --   --    ALKPHOS 93  --   --   --    AST 16  --   --   --    ALT 13  --   --   --    ANIONGAP 10 8  --  6*   ESTGFRAFRICA 43.0* >60.0  --  >60.0   EGFRNONAA 37.3* 56.0*  --  56.0*   , CBC     Recent Labs  Lab 12/03/17  2222 12/04/17  0609 12/04/17  1251   WBC 11.24 11.06 11.40   HGB 9.6* 9.2* 9.0*   HCT 30.8* 29.8* 29.2*    239 247   , INR     Recent Labs  Lab 12/02/17  1704 12/03/17  1237   INR 0.9 1.0   , Lipid Panel No results for input(s): CHOL, HDL, LDLCALC, TRIG, CHOLHDL in the last 48 hours. and Troponin     Recent Labs  Lab 12/02/17  1704 12/02/17  2325   TROPONINI 0.405* 0.416*       Significant Imaging: EKG: SR with 1st degree AVB, LAD    Assessment and Plan:       * Pulmonary embolus    - CTS on board  with no plan to perform surgery  - LE Doppler US   - ABG, f/u echo  - discussed risks and benefits of estrogen use, holding   - S/p catheter directed thrombolysis 12/3. Sheath pulled this AM. Remains on heparin gtt.   O2 Sats stable, PAP 32.    - interventional cardiology following  - hemodynamically stable, likely stepdown uziel        PE (pulmonary thromboembolism)    - See pulmonary embolus          Obesity (BMI 30-39.9)    - pt taking topiramate 50 mg BID for weight loss as this is a common side effect of the medication        Essential hypertension    -held antihypertensives on  Admit  - 12/4: resumed amlodipine 10 mg daily, monitor and add home meds as needed        Gastroesophageal reflux disease    - cont pantoprazole 40 mg daily        Depression    - cont home med: escitalopram 20 mg daily            VTE Risk Mitigation         Ordered     High Risk of VTE  Once      12/02/17 2220     heparin 25,000 units in dextrose 5% 250 mL (100 units/mL) infusion  Continuous     Route:  Intravenous        12/02/17 1947     heparin 25,000 units in dextrose 5% 250 mL (100 units/mL) bolus from bag; ADDITIONAL PRN BOLUS  As needed (PRN)     Route:  Intravenous        12/02/17 1947     heparin 25,000 units in dextrose 5% 250 mL (100 units/mL) bolus from bag; ADDITIONAL PRN BOLUS  As needed (PRN)     Route:  Intravenous        12/02/17 1947          Venus Mireles MD  Cardiology  [facility]

## 2017-12-04 NOTE — PLAN OF CARE
Problem: Patient Care Overview  Goal: Plan of Care Review  Outcome: Ongoing (interventions implemented as appropriate)  Pt had no acute events overnight. Changed dressing to right groin sheath sight, noted bleeding at site. MD is aware of site having bloody drainage, recommended surgiseal be applied to the site. tPa infusing at ordered rate, Heparin infusion has been adjusted according to nomogram. Pt remained on room air through the night, SaO2 reading 95-97%, pt denies c/o shortness of breath. Administered Tylenol PO for c/o back pain, medication was effective and relieved pain. Pt in no noted distress at this time. Will continue to monitor pt for changes in status.

## 2017-12-04 NOTE — PLAN OF CARE
12/04/17 1404   Discharge Assessment   Assessment Type Discharge Planning Assessment   Confirmed/corrected address and phone number on facesheet? Yes   Assessment information obtained from? Medical Record   Expected Length of Stay (days) 5   Prior to hospitilization cognitive status: Alert/Oriented   Prior to hospitalization functional status: Independent   Current cognitive status: Alert/Oriented   Current Functional Status: Needs Assistance;Assistive Equipment   Facility Arrived From: home via ED   Lives With spouse   Able to Return to Prior Arrangements unable to determine at this time (comments)   Is patient able to care for self after discharge? Unable to determine at this time (comments)   Who are your caregiver(s) and their phone number(s)?  Isma Atkinson [phone]   Patient's perception of discharge disposition admitted as an inpatient   Readmission Within The Last 30 Days no previous admission in last 30 days   Patient currently being followed by outpatient case management? No   Patient currently receives home health services? No   Patient currently receives any other outside agency services? No   Equipment Currently Used at Home none   Do you have any problems affording any of your prescribed medications? No   Is the patient taking medications as prescribed? yes   Does the patient have transportation home? Yes   Transportation Available car;family or friend will provide   Does the patient receive services at the [facility]? No   Discharge Plan A Home with family   Discharge Plan B Home with family;Home Health   Patient/Family In Agreement With Plan yes     Patient lives with spouse, has transportation. No discharge needs assessed at this time.

## 2017-12-04 NOTE — SUBJECTIVE & OBJECTIVE
Interval History: No events overnight. No complaints this AM. Sheath removed - TPA d/c'd, remains on IV Heparin.     Objective:     Vital Signs (Most Recent):  Temp: 98.2 °F (36.8 °C) (12/04/17 1110)  Pulse: 65 (12/04/17 1335)  Resp: (!) 23 (12/04/17 1335)  BP: (!) 170/69 (sitting to standing) (12/04/17 1335)  SpO2: 99 % (12/04/17 1335) Vital Signs (24h Range):  Temp:  [98 °F (36.7 °C)-98.2 °F (36.8 °C)] 98.2 °F (36.8 °C)  Pulse:  [56-79] 65  Resp:  [13-38] 23  SpO2:  [93 %-100 %] 99 %  BP: (122-190)/(61-86) 170/69     Weight: 103.9 kg (229 lb 0.9 oz)  Body mass index is 40.58 kg/m².    SpO2: 99 %  O2 Device (Oxygen Therapy): room air      Intake/Output Summary (Last 24 hours) at 12/04/17 1444  Last data filed at 12/04/17 1400   Gross per 24 hour   Intake          3021.87 ml   Output              200 ml   Net          2821.87 ml       Lines/Drains/Airways     Peripheral Intravenous Line                 Peripheral IV - Single Lumen 12/02/17 1703 Right Antecubital 1 day         Peripheral IV - Single Lumen 12/02/17 2123 Left Antecubital 1 day                Physical Exam   Constitutional: She is oriented to person, place, and time. She appears well-developed and well-nourished. No distress.   HENT:   Head: Normocephalic and atraumatic.   Mouth/Throat: Oropharynx is clear and moist.   Eyes: Conjunctivae and EOM are normal. Pupils are equal, round, and reactive to light. No scleral icterus.   Neck: Neck supple. No JVD present. No tracheal deviation present.   Cardiovascular: Normal rate and regular rhythm.  Exam reveals no gallop and no friction rub.    No murmur heard.  Pulmonary/Chest: Effort normal and breath sounds normal. No respiratory distress. She has no wheezes. She has no rales. She exhibits no tenderness.   Abdominal: Soft. Bowel sounds are normal. She exhibits no distension. There is no hepatosplenomegaly. There is no tenderness.   Musculoskeletal: She exhibits no edema or tenderness.   Neurological: She is  alert and oriented to person, place, and time.   Skin: Skin is warm and dry. No rash noted. No erythema.   Psychiatric: She has a normal mood and affect. Her behavior is normal.       Significant Labs:   BMP:   Recent Labs  Lab 12/02/17  1704 12/03/17  0540 12/03/17  1237 12/04/17  0609    105  --  93    138  --  140   K 3.8 3.5 4.1 4.0    108  --  114*   CO2 23 22*  --  20*   BUN 39* 26*  --  31*   CREATININE 1.4 1.0  --  1.0   CALCIUM 9.0 8.5*  --  8.4*   MG 2.3 2.3  --  1.9   , CBC   Recent Labs  Lab 12/03/17  2222 12/04/17  0609 12/04/17  1251   WBC 11.24 11.06 11.40   HGB 9.6* 9.2* 9.0*   HCT 30.8* 29.8* 29.2*    239 247    and INR   Recent Labs  Lab 12/02/17  1704 12/03/17  1237   INR 0.9 1.0

## 2017-12-04 NOTE — ASSESSMENT & PLAN NOTE
- pt taking topiramate 50 mg BID for weight loss as this is a common side effect of the medication

## 2017-12-04 NOTE — ASSESSMENT & PLAN NOTE
-held antihypertensives on  Admit  - 12/4: resumed amlodipine 10 mg daily, monitor and add home meds as needed

## 2017-12-04 NOTE — SUBJECTIVE & OBJECTIVE
Past Medical History:   Diagnosis Date    Cataract     Depression     Edema     GERD (gastroesophageal reflux disease)     Hypertension 10/2/2012    Osteopenia     Thalassemia minor        Past Surgical History:   Procedure Laterality Date    APPENDECTOMY      CATARACT EXTRACTION      COLONOSCOPY N/A 11/5/2016    Procedure: COLONOSCOPY;  Surgeon: Tanner Simental MD;  Location: [address]);  Service: Endoscopy;  Laterality: N/A;    HYSTERECTOMY  age 39    fibroids    OOPHORECTOMY      TONSILLECTOMY         Review of patient's allergies indicates:   Allergen Reactions    Codeine     Ciprofloxacin Rash       No current facility-administered medications on file prior to encounter.      Current Outpatient Prescriptions on File Prior to Encounter   Medication Sig    amlodipine (NORVASC) 10 MG tablet TAKE 1 TABLET(10 MG) BY MOUTH EVERY DAY    benzonatate (TESSALON PERLES) 100 MG capsule Take 1 capsule (100 mg total) by mouth every 6 (six) hours as needed for Cough.    cefUROXime (CEFTIN) 500 MG tablet Take 1 tablet (500 mg total) by mouth 2 (two) times daily.    doxazosin (CARDURA) 2 MG tablet TAKE 1 TABLET(2 MG) BY MOUTH EVERY EVENING    escitalopram oxalate (LEXAPRO) 20 MG tablet TAKE 1 TABLET BY MOUTH ONCE DAILY    furosemide (LASIX) 20 MG tablet TAKE 1 TABLET BY MOUTH TWICE DAILY; MAY TAKE ADDITIONAL DOSE DAILY AS NEEDED FOR SWELLING    meloxicam (MOBIC) 7.5 MG tablet TAKE 1 TABLET BY MOUTH EVERY DAY    metoprolol succinate (TOPROL-XL) 25 MG 24 hr tablet TAKE 1 TABLET BY MOUTH ONCE DAILY    pantoprazole (PROTONIX) 40 MG tablet Take 1 tablet (40 mg total) by mouth once daily.    potassium chloride (MICRO-K) 10 MEQ CpSR Take 2 capsules (20 mEq total) by mouth once daily.    topiramate (TOPAMAX) 50 MG tablet Take 1 tablet (50 mg total) by mouth 2 (two) times daily.    conjugated estrogens (PREMARIN) vaginal cream Place 0.5 g vaginally twice a week.    estrogens, conjugated, (PREMARIN)  0.45 MG tablet Take 1 tablet (0.45 mg total) by mouth once daily.    losartan-hydrochlorothiazide 100-25 mg (HYZAAR) 100-25 mg per tablet Take 1 tablet by mouth once daily.    ondansetron (ZOFRAN) 4 MG tablet Take 1 tablet (4 mg total) by mouth every 6 (six) hours.     Family History     Problem Relation (Age of Onset)    Cancer Father, Brother    Hypertension Mother    No Known Problems Sister, Maternal Aunt, Maternal Uncle, Paternal Aunt, Paternal Uncle, Maternal Grandmother, Maternal Grandfather, Paternal Grandmother, Paternal Grandfather        Social History Main Topics    Smoking status: Never Smoker    Smokeless tobacco: Never Used    Alcohol use No      Comment: rare    Drug use: No    Sexual activity: Yes     Partners: Male     Review of Systems   Constitution: Negative for chills and fever.   HENT: Negative for ear discharge.    Eyes: Negative for pain and visual disturbance.   Cardiovascular: Positive for dyspnea on exertion. Negative for chest pain, irregular heartbeat, leg swelling, orthopnea, palpitations, paroxysmal nocturnal dyspnea and syncope.   Respiratory: Negative for hemoptysis, shortness of breath and wheezing.    Skin: Negative for rash and suspicious lesions.   Musculoskeletal: Negative for joint pain and muscle weakness.   Gastrointestinal: Negative for abdominal pain, diarrhea, hematemesis, hematochezia, melena, nausea and vomiting.   Genitourinary: Negative for dysuria and frequency.   Neurological: Negative for focal weakness, headaches and tremors.   Psychiatric/Behavioral: Negative for altered mental status, suicidal ideas and thoughts of violence.     Objective:     Vital Signs (Most Recent):  Temp: 98.2 °F (36.8 °C) (12/04/17 1110)  Pulse: 65 (12/04/17 1335)  Resp: (!) 23 (12/04/17 1335)  BP: (!) 170/69 (sitting to standing) (12/04/17 1335)  SpO2: 99 % (12/04/17 1335) Vital Signs (24h Range):  Temp:  [98 °F (36.7 °C)-98.2 °F (36.8 °C)] 98.2 °F (36.8 °C)  Pulse:  [56-79]  65  Resp:  [13-38] 23  SpO2:  [93 %-100 %] 99 %  BP: (122-190)/(62-86) 170/69     Weight: 103.9 kg (229 lb 0.9 oz)  Body mass index is 40.58 kg/m².    SpO2: 99 %  O2 Device (Oxygen Therapy): room air      Intake/Output Summary (Last 24 hours) at 12/04/17 1522  Last data filed at 12/04/17 1400   Gross per 24 hour   Intake          2921.87 ml   Output              200 ml   Net          2721.87 ml       Lines/Drains/Airways     Peripheral Intravenous Line                 Peripheral IV - Single Lumen 12/02/17 1703 Right Antecubital 1 day         Peripheral IV - Single Lumen 12/02/17 2123 Left Antecubital 1 day                Physical Exam   Constitutional: She is oriented to person, place, and time. She appears well-developed and well-nourished.   HENT:   Head: Normocephalic and atraumatic.   Eyes: EOM are normal.   Cardiovascular: Normal rate and regular rhythm.  Exam reveals no gallop and no friction rub.    Pulmonary/Chest: Effort normal and breath sounds normal. No stridor. She has no wheezes. She has no rales.   Abdominal: Soft. Bowel sounds are normal. There is no rebound and no guarding.   Musculoskeletal: She exhibits no edema.   Neurological: She is alert and oriented to person, place, and time. No cranial nerve deficit.   Skin: Skin is warm and dry.   Psychiatric: She has a normal mood and affect. Her behavior is normal.       Significant Labs:   CMP     Recent Labs  Lab 12/02/17  1704 12/03/17  0540 12/03/17  1237 12/04/17  0609    138  --  140   K 3.8 3.5 4.1 4.0    108  --  114*   CO2 23 22*  --  20*    105  --  93   BUN 39* 26*  --  31*   CREATININE 1.4 1.0  --  1.0   CALCIUM 9.0 8.5*  --  8.4*   PROT 7.1  --   --   --    ALBUMIN 3.3*  --   --   --    BILITOT 0.3  --   --   --    ALKPHOS 93  --   --   --    AST 16  --   --   --    ALT 13  --   --   --    ANIONGAP 10 8  --  6*   ESTGFRAFRICA 43.0* >60.0  --  >60.0   EGFRNONAA 37.3* 56.0*  --  56.0*   , CBC     Recent Labs  Lab  12/03/17  2222 12/04/17  0609 12/04/17  1251   WBC 11.24 11.06 11.40   HGB 9.6* 9.2* 9.0*   HCT 30.8* 29.8* 29.2*    239 247   , INR     Recent Labs  Lab 12/02/17  1704 12/03/17  1237   INR 0.9 1.0   , Lipid Panel No results for input(s): CHOL, HDL, LDLCALC, TRIG, CHOLHDL in the last 48 hours. and Troponin     Recent Labs  Lab 12/02/17  1704 12/02/17  2325   TROPONINI 0.405* 0.416*       Significant Imaging: EKG: SR with 1st degree AVB, LAD

## 2017-12-04 NOTE — NURSING
0730 Reported PA reading as charted to Dr Adrian.    0800 After review of PA readings, Dr Blanco instructed to hold heparin and tpa in prep for R fem sheath to be pulled.    0830 Dr Tatum/Francis pulled line/sheath per their protocol.  Pressure held and hemostasis was achieved.  NO bleeding noted; only old ecchymosis noted.  Site assessed and pulses explained to pt Q15 min x1 hour, etc.  Also reviewed need to stay flat for 2 hours (per Dr Blanco) after sheath was pulled.

## 2017-12-04 NOTE — NURSING
Spoke with Dr Adrian to request he reach US report...Zhui Xin has completed precedure.  MD will review and see what will be planned (positive for clots as documented by US)

## 2017-12-04 NOTE — PROGRESS NOTES
[facility]  Interventional Cardiology  Progress Note    Patient Name: Coby Atkinson  MRN: [id number]  Admission Date: 12/2/2017  Hospital Length of Stay: 2 days  Code Status: Full Code   Attending Physician: Ignacio Delgadillo MD   Primary Care Physician: Mundo Hudson DO  Principal Problem:Pulmonary embolus    Subjective:     Interval History: No events overnight. No complaints this AM. Sheath removed - TPA d/c'd, remains on IV Heparin.     Objective:     Vital Signs (Most Recent):  Temp: 98.2 °F (36.8 °C) (12/04/17 1110)  Pulse: 65 (12/04/17 1335)  Resp: (!) 23 (12/04/17 1335)  BP: (!) 170/69 (sitting to standing) (12/04/17 1335)  SpO2: 99 % (12/04/17 1335) Vital Signs (24h Range):  Temp:  [98 °F (36.7 °C)-98.2 °F (36.8 °C)] 98.2 °F (36.8 °C)  Pulse:  [56-79] 65  Resp:  [13-38] 23  SpO2:  [93 %-100 %] 99 %  BP: (122-190)/(61-86) 170/69     Weight: 103.9 kg (229 lb 0.9 oz)  Body mass index is 40.58 kg/m².    SpO2: 99 %  O2 Device (Oxygen Therapy): room air      Intake/Output Summary (Last 24 hours) at 12/04/17 1444  Last data filed at 12/04/17 1400   Gross per 24 hour   Intake          3021.87 ml   Output              200 ml   Net          2821.87 ml       Lines/Drains/Airways     Peripheral Intravenous Line                 Peripheral IV - Single Lumen 12/02/17 1703 Right Antecubital 1 day         Peripheral IV - Single Lumen 12/02/17 2123 Left Antecubital 1 day                Physical Exam   Constitutional: She is oriented to person, place, and time. She appears well-developed and well-nourished. No distress.   HENT:   Head: Normocephalic and atraumatic.   Mouth/Throat: Oropharynx is clear and moist.   Eyes: Conjunctivae and EOM are normal. Pupils are equal, round, and reactive to light. No scleral icterus.   Neck: Neck supple. No JVD present. No tracheal deviation present.   Cardiovascular: Normal rate and regular rhythm.  Exam reveals no gallop and no friction rub.    No murmur  heard.  Pulmonary/Chest: Effort normal and breath sounds normal. No respiratory distress. She has no wheezes. She has no rales. She exhibits no tenderness.   Abdominal: Soft. Bowel sounds are normal. She exhibits no distension. There is no hepatosplenomegaly. There is no tenderness.   Musculoskeletal: She exhibits no edema or tenderness.   Neurological: She is alert and oriented to person, place, and time.   Skin: Skin is warm and dry. No rash noted. No erythema.   Psychiatric: She has a normal mood and affect. Her behavior is normal.       Significant Labs:   BMP:   Recent Labs  Lab 12/02/17  1704 12/03/17  0540 12/03/17  1237 12/04/17  0609    105  --  93    138  --  140   K 3.8 3.5 4.1 4.0    108  --  114*   CO2 23 22*  --  20*   BUN 39* 26*  --  31*   CREATININE 1.4 1.0  --  1.0   CALCIUM 9.0 8.5*  --  8.4*   MG 2.3 2.3  --  1.9   , CBC   Recent Labs  Lab 12/03/17  2222 12/04/17  0609 12/04/17  1251   WBC 11.24 11.06 11.40   HGB 9.6* 9.2* 9.0*   HCT 30.8* 29.8* 29.2*    239 247    and INR   Recent Labs  Lab 12/02/17  1704 12/03/17  1237   INR 0.9 1.0         Assessment and Plan:     Patient is a 72 y.o. female presenting with:    * Pulmonary embolus    Patient with submassive PE (peripheral clots) - hemodynamically stable but with mild RV dysfunction/troponin and BNP elevation. She belongs to intermediate - high risk submassive PE category.    S/p catheter directed thrombolysis yesterday. Sheath pulled this AM. Remains on heparin gtt.   O2 Sats stable, PAP 32.              CKD (chronic kidney disease) stage 3, GFR 30-59 ml/min    Stable.   GFR 56.        Thalassemia minor    H/H stable.         Essential hypertension    Controlled on amlodipine.         Gastroesophageal reflux disease    Stable.   On PPI.               VTE Risk Mitigation         Ordered     High Risk of VTE  Once      12/02/17 2220     heparin 25,000 units in dextrose 5% 250 mL (100 units/mL) infusion  Continuous      Route:  Intravenous        12/02/17 1947     heparin 25,000 units in dextrose 5% 250 mL (100 units/mL) bolus from bag; ADDITIONAL PRN BOLUS  As needed (PRN)     Route:  Intravenous        12/02/17 1947     heparin 25,000 units in dextrose 5% 250 mL (100 units/mL) bolus from bag; ADDITIONAL PRN BOLUS  As needed (PRN)     Route:  Intravenous        12/02/17 1947          Jazmine Godoy PA-C  Interventional Cardiology  [facility]

## 2017-12-05 ENCOUNTER — ANESTHESIA EVENT (OUTPATIENT)
Dept: ENDOSCOPY | Facility: HOSPITAL | Age: 72
DRG: 175 | End: 2017-12-05
Payer: MEDICARE

## 2017-12-05 PROBLEM — K92.1 MELENA: Status: ACTIVE | Noted: 2017-12-05

## 2017-12-05 LAB
ANION GAP SERPL CALC-SCNC: 7 MMOL/L
BASOPHILS # BLD AUTO: 0.04 K/UL
BASOPHILS # BLD AUTO: 0.05 K/UL
BASOPHILS # BLD AUTO: 0.06 K/UL
BASOPHILS # BLD AUTO: 0.07 K/UL
BASOPHILS NFR BLD: 0.3 %
BASOPHILS NFR BLD: 0.4 %
BASOPHILS NFR BLD: 0.4 %
BASOPHILS NFR BLD: 0.5 %
BUN SERPL-MCNC: 36 MG/DL
CALCIUM SERPL-MCNC: 8.5 MG/DL
CHLORIDE SERPL-SCNC: 111 MMOL/L
CO2 SERPL-SCNC: 19 MMOL/L
CREAT SERPL-MCNC: 1 MG/DL
DIFFERENTIAL METHOD: ABNORMAL
EOSINOPHIL # BLD AUTO: 1.1 K/UL
EOSINOPHIL NFR BLD: 7.8 %
EOSINOPHIL NFR BLD: 8.1 %
EOSINOPHIL NFR BLD: 8.3 %
EOSINOPHIL NFR BLD: 8.3 %
ERYTHROCYTE [DISTWIDTH] IN BLOOD BY AUTOMATED COUNT: 15.8 %
ERYTHROCYTE [DISTWIDTH] IN BLOOD BY AUTOMATED COUNT: 15.8 %
ERYTHROCYTE [DISTWIDTH] IN BLOOD BY AUTOMATED COUNT: 15.9 %
ERYTHROCYTE [DISTWIDTH] IN BLOOD BY AUTOMATED COUNT: 16 %
EST. GFR  (AFRICAN AMERICAN): >60 ML/MIN/1.73 M^2
EST. GFR  (NON AFRICAN AMERICAN): 56 ML/MIN/1.73 M^2
ESTIMATED PA SYSTOLIC PRESSURE: 67
FACT X PPP CHRO-ACNC: 0.47 IU/ML
FIBRINOGEN PPP-MCNC: 292 MG/DL
FIBRINOGEN PPP-MCNC: 299 MG/DL
FIBRINOGEN PPP-MCNC: 313 MG/DL
GLUCOSE SERPL-MCNC: 89 MG/DL
HCT VFR BLD AUTO: 27.4 %
HCT VFR BLD AUTO: 27.7 %
HCT VFR BLD AUTO: 28.9 %
HCT VFR BLD AUTO: 31.4 %
HGB BLD-MCNC: 8.4 G/DL
HGB BLD-MCNC: 8.6 G/DL
HGB BLD-MCNC: 8.7 G/DL
HGB BLD-MCNC: 9.4 G/DL
IMM GRANULOCYTES # BLD AUTO: 0.09 K/UL
IMM GRANULOCYTES # BLD AUTO: 0.09 K/UL
IMM GRANULOCYTES # BLD AUTO: 0.1 K/UL
IMM GRANULOCYTES # BLD AUTO: 0.11 K/UL
IMM GRANULOCYTES NFR BLD AUTO: 0.6 %
IMM GRANULOCYTES NFR BLD AUTO: 0.7 %
IMM GRANULOCYTES NFR BLD AUTO: 0.8 %
IMM GRANULOCYTES NFR BLD AUTO: 0.8 %
LYMPHOCYTES # BLD AUTO: 4 K/UL
LYMPHOCYTES # BLD AUTO: 4.1 K/UL
LYMPHOCYTES # BLD AUTO: 4.1 K/UL
LYMPHOCYTES # BLD AUTO: 4.2 K/UL
LYMPHOCYTES NFR BLD: 28.6 %
LYMPHOCYTES NFR BLD: 30.3 %
LYMPHOCYTES NFR BLD: 30.6 %
LYMPHOCYTES NFR BLD: 31.4 %
MAGNESIUM SERPL-MCNC: 1.8 MG/DL
MCH RBC QN AUTO: 19.8 PG
MCH RBC QN AUTO: 19.8 PG
MCH RBC QN AUTO: 20 PG
MCH RBC QN AUTO: 20.2 PG
MCHC RBC AUTO-ENTMCNC: 29.9 G/DL
MCHC RBC AUTO-ENTMCNC: 30.1 G/DL
MCHC RBC AUTO-ENTMCNC: 30.7 G/DL
MCHC RBC AUTO-ENTMCNC: 31 G/DL
MCV RBC AUTO: 65 FL
MCV RBC AUTO: 65 FL
MCV RBC AUTO: 66 FL
MCV RBC AUTO: 66 FL
MITRAL VALVE MOBILITY: NORMAL
MONOCYTES # BLD AUTO: 0.7 K/UL
MONOCYTES # BLD AUTO: 0.8 K/UL
MONOCYTES NFR BLD: 5.4 %
MONOCYTES NFR BLD: 5.6 %
MONOCYTES NFR BLD: 5.9 %
MONOCYTES NFR BLD: 6 %
NEUTROPHILS # BLD AUTO: 7 K/UL
NEUTROPHILS # BLD AUTO: 7.5 K/UL
NEUTROPHILS # BLD AUTO: 7.5 K/UL
NEUTROPHILS # BLD AUTO: 8 K/UL
NEUTROPHILS NFR BLD: 53.7 %
NEUTROPHILS NFR BLD: 54.3 %
NEUTROPHILS NFR BLD: 54.6 %
NEUTROPHILS NFR BLD: 56.6 %
NRBC BLD-RTO: 0 /100 WBC
PHOSPHATE SERPL-MCNC: 2.9 MG/DL
PLATELET # BLD AUTO: 208 K/UL
PLATELET # BLD AUTO: 226 K/UL
PLATELET # BLD AUTO: 246 K/UL
PLATELET # BLD AUTO: 274 K/UL
PMV BLD AUTO: 10 FL
PMV BLD AUTO: 9.4 FL
PMV BLD AUTO: 9.6 FL
PMV BLD AUTO: 9.8 FL
POTASSIUM SERPL-SCNC: 3.7 MMOL/L
RBC # BLD AUTO: 4.2 M/UL
RBC # BLD AUTO: 4.25 M/UL
RBC # BLD AUTO: 4.4 M/UL
RBC # BLD AUTO: 4.74 M/UL
RETIRED EF AND QEF - SEE NOTES: 60 (ref 55–65)
SODIUM SERPL-SCNC: 137 MMOL/L
TRICUSPID VALVE REGURGITATION: ABNORMAL
WBC # BLD AUTO: 12.94 K/UL
WBC # BLD AUTO: 13.66 K/UL
WBC # BLD AUTO: 13.77 K/UL
WBC # BLD AUTO: 14.05 K/UL

## 2017-12-05 PROCEDURE — 93306 TTE W/DOPPLER COMPLETE: CPT | Mod: 26,,, | Performed by: INTERNAL MEDICINE

## 2017-12-05 PROCEDURE — 99232 SBSQ HOSP IP/OBS MODERATE 35: CPT | Mod: GC,,, | Performed by: INTERNAL MEDICINE

## 2017-12-05 PROCEDURE — 25000003 PHARM REV CODE 250: Performed by: HOSPITALIST

## 2017-12-05 PROCEDURE — 84100 ASSAY OF PHOSPHORUS: CPT

## 2017-12-05 PROCEDURE — 36415 COLL VENOUS BLD VENIPUNCTURE: CPT

## 2017-12-05 PROCEDURE — 83735 ASSAY OF MAGNESIUM: CPT

## 2017-12-05 PROCEDURE — 99223 1ST HOSP IP/OBS HIGH 75: CPT | Mod: GC,,, | Performed by: INTERNAL MEDICINE

## 2017-12-05 PROCEDURE — 63600175 PHARM REV CODE 636 W HCPCS: Performed by: HOSPITALIST

## 2017-12-05 PROCEDURE — 85384 FIBRINOGEN ACTIVITY: CPT

## 2017-12-05 PROCEDURE — 85520 HEPARIN ASSAY: CPT

## 2017-12-05 PROCEDURE — 20600001 HC STEP DOWN PRIVATE ROOM

## 2017-12-05 PROCEDURE — 25000003 PHARM REV CODE 250: Performed by: INTERNAL MEDICINE

## 2017-12-05 PROCEDURE — 82272 OCCULT BLD FECES 1-3 TESTS: CPT

## 2017-12-05 PROCEDURE — 93306 TTE W/DOPPLER COMPLETE: CPT

## 2017-12-05 PROCEDURE — 80048 BASIC METABOLIC PNL TOTAL CA: CPT

## 2017-12-05 PROCEDURE — 25000003 PHARM REV CODE 250: Performed by: STUDENT IN AN ORGANIZED HEALTH CARE EDUCATION/TRAINING PROGRAM

## 2017-12-05 PROCEDURE — 85384 FIBRINOGEN ACTIVITY: CPT | Mod: 91

## 2017-12-05 PROCEDURE — 85025 COMPLETE CBC W/AUTO DIFF WBC: CPT | Mod: 91

## 2017-12-05 PROCEDURE — 63600175 PHARM REV CODE 636 W HCPCS: Performed by: INTERNAL MEDICINE

## 2017-12-05 PROCEDURE — 99233 SBSQ HOSP IP/OBS HIGH 50: CPT | Mod: ,,, | Performed by: INTERNAL MEDICINE

## 2017-12-05 PROCEDURE — C9113 INJ PANTOPRAZOLE SODIUM, VIA: HCPCS | Performed by: INTERNAL MEDICINE

## 2017-12-05 RX ADMIN — AMLODIPINE BESYLATE 10 MG: 10 TABLET ORAL at 09:12

## 2017-12-05 RX ADMIN — ESCITALOPRAM OXALATE 20 MG: 10 TABLET ORAL at 09:12

## 2017-12-05 RX ADMIN — PANTOPRAZOLE SODIUM 40 MG: 40 INJECTION, POWDER, FOR SOLUTION INTRAVENOUS at 08:12

## 2017-12-05 RX ADMIN — PANTOPRAZOLE SODIUM 40 MG: 40 INJECTION, POWDER, FOR SOLUTION INTRAVENOUS at 09:12

## 2017-12-05 RX ADMIN — TOPIRAMATE 50 MG: 25 TABLET, FILM COATED ORAL at 08:12

## 2017-12-05 RX ADMIN — HEPARIN SODIUM AND DEXTROSE 11 UNITS/KG/HR: 10000; 5 INJECTION INTRAVENOUS at 07:12

## 2017-12-05 RX ADMIN — FUROSEMIDE 20 MG: 20 TABLET ORAL at 09:12

## 2017-12-05 RX ADMIN — TOPIRAMATE 50 MG: 25 TABLET, FILM COATED ORAL at 09:12

## 2017-12-05 RX ADMIN — HEPARIN SODIUM AND DEXTROSE 11 UNITS/KG/HR: 10000; 5 INJECTION INTRAVENOUS at 05:12

## 2017-12-05 NOTE — RESIDENT HANDOFF
Handoff     Primary Team: Networked reference to record [facility]  Room Number: 380/380 A     Patient Name: Coby Atkinson MRN: [id number]     Date of Birth: [id number] Allergies: Codeine and Ciprofloxacin     Age: 72 y.o. Admit Date: 12/2/2017     Sex: female  BMI: Body mass index is 40.58 kg/m².     Code Status: Full Code        Illness Level (current clinical status): Watcher - No    Reason for Admission: Pulmonary embolus    Brief HPI (pertinent PMH and diagnosis or differential diagnosis): 73yo F with HTN, CKD Stage 3, GERD, thalassemia minor and on estrogen HRT found to have submassive PE and right heart strain.     Procedure Date: RHC with catheter directed thrombolysis on 12/3/2017    Hospital Course (updated, brief assessment by system or problem, significant events): RHC with catheter directed thrombolysis 12/3/2017, sheath pulled 12/4. On heparin gtt, apixaban not started 12/5 as UGIB overnight >> GI consulted, planning for EGD 12/6.    Tasks (specific, using if-then statements):   - Follow GI recs & await EGD 12/6.   - CBC q6 and transfuse as needed.   - Start apixaban & stop heparin gtt when safe    Contingency Plan (special circumstances anticipated and plan): None anticipated    Estimated Discharge Date: TBD    Discharge Disposition: Home or Self Care

## 2017-12-05 NOTE — ASSESSMENT & PLAN NOTE
- Held antihypertensives on admit  - 12/4: resumed amlodipine 10 mg daily, monitor and add home meds as needed

## 2017-12-05 NOTE — PLAN OF CARE
Problem: Patient Care Overview  Goal: Plan of Care Review  Pt tolerated day's care well.  Pt's R fem sheath was d/c'd this and site remains healthy (ecchymosis present but soft and decreasing tenderness after sheath was pulled).  Pt has interacted well and sat up in the chair as charted for several hours.  Pt was walked approx 50 feet today with Dr Ortega per his orders and tolerated fairly well.  Pt noted to have end exp wheeze (squeze) after approx 20 feet and needed to rest.  sats noted to be 96% but quickly nunu to 99% once rested.  Dr Ortega told pt she will most likely go hoe tomorrow.  Dr Joyce wrote transfer orders to the floor this evening.  Pt and charge, Bigg were made aware.

## 2017-12-05 NOTE — NURSING TRANSFER
Nursing Transfer Note      12/5/2017     Transfer To: [facility] [place] Room 380A    Transfer via bed    Transfer with cardiac monitoring    Transported by POPEYE Frost RN and MARYAM Perez      Medicines sent: Heparin infusing     Chart send with patient: Yes    Notified: spouse    Patient reassessed at: 12/05/2017 0045   (date, time)    Upon arrival to floor: cardiac monitor applied, patient oriented to room, call bell in reach and bed in lowest position

## 2017-12-05 NOTE — PLAN OF CARE
Problem: Patient Care Overview  Goal: Plan of Care Review  Pt remains free from any falls, injuries or traumas. Bed low and locked. No infection noted or pressure ulcers noted. Tomorrow, will undergo an EGD to evaluate possible GI bleed.

## 2017-12-05 NOTE — PROGRESS NOTES
[facility]  Cardiology  Progress Note    Patient Name: Coby Atkinson  MRN: [id number]  Admission Date: 12/2/2017  Hospital Length of Stay: 3 days  Code Status: Full Code   Attending Physician: Ignacio Delgadillo MD   Primary Care Physician: Mundo Hudson DO  Expected Discharge Date: 12/8/2017  Principal Problem:Pulmonary embolus    Subjective:     Hospital Course:   - 12/3 admitted, hemodynamically stable PE, INV cardiology on board for consideration of CDT, CTS on board with no plan to perform surgery, move to the CMICU for closer monitoring, LE Doppler US, pigtail catheter placed for tpa infusion  - 12/4 pigtail catheter removed this AM, hep gtt; echo ordered, possibly stepdown uziel  - 12/5: Melena overnight with Hb drop this AM. Apixaban held. GI consulted.     Review of Systems   Constitution: Negative for chills and fever.   Cardiovascular: Positive for dyspnea on exertion. Negative for chest pain, irregular heartbeat, leg swelling, orthopnea, palpitations, paroxysmal nocturnal dyspnea and syncope.   Respiratory: Negative for hemoptysis, shortness of breath and wheezing.    Gastrointestinal: Positive for melena. Negative for abdominal pain, diarrhea, hematemesis, nausea and vomiting.   Genitourinary: Negative for dysuria and frequency.   Neurological: Negative for focal weakness, headaches and tremors.   Psychiatric/Behavioral: Negative for altered mental status, suicidal ideas and thoughts of violence.     Objective:     Vital Signs (Most Recent):  Temp: 98.2 °F (36.8 °C) (12/05/17 0740)  Pulse: 72 (12/05/17 1300)  Resp: 18 (12/05/17 1300)  BP: (!) 150/74 (12/05/17 1300)  SpO2: 98 % (12/05/17 1300) Vital Signs (24h Range):  Temp:  [97.7 °F (36.5 °C)-98.4 °F (36.9 °C)] 98.2 °F (36.8 °C)  Pulse:  [] 72  Resp:  [18-52] 18  SpO2:  [98 %-99 %] 98 %  BP: (124-183)/() 150/74     Weight: 103.9 kg (229 lb 0.9 oz)  Body mass index is 40.58 kg/m².    SpO2: 98 %  O2 Device (Oxygen  Therapy): room air      Intake/Output Summary (Last 24 hours) at 12/05/17 1404  Last data filed at 12/05/17 1300   Gross per 24 hour   Intake            929.8 ml   Output             1400 ml   Net           -470.2 ml       Lines/Drains/Airways     Peripheral Intravenous Line                 Peripheral IV - Single Lumen 12/02/17 2123 Left Antecubital 2 days         Peripheral IV - Single Lumen 12/04/17 2214 Right;Anterior Forearm less than 1 day                Physical Exam   Constitutional: She is oriented to person, place, and time. She appears well-developed and well-nourished.   HENT:   Head: Normocephalic and atraumatic.   Eyes: EOM are normal.   Cardiovascular: Normal rate and regular rhythm.  Exam reveals no gallop and no friction rub.    Pulmonary/Chest: Effort normal and breath sounds normal. No stridor. She has no wheezes. She has no rales.   Abdominal: Soft. Bowel sounds are normal. There is no rebound and no guarding.   Musculoskeletal: She exhibits no edema.   Neurological: She is alert and oriented to person, place, and time. No cranial nerve deficit.   Skin: Skin is warm and dry.   Psychiatric: She has a normal mood and affect. Her behavior is normal.       Significant Labs:   CMP     Recent Labs  Lab 12/04/17  0609 12/05/17  0636    137   K 4.0 3.7   * 111*   CO2 20* 19*   GLU 93 89   BUN 31* 36*   CREATININE 1.0 1.0   CALCIUM 8.4* 8.5*   ANIONGAP 6* 7*   ESTGFRAFRICA >60.0 >60.0   EGFRNONAA 56.0* 56.0*   , CBC     Recent Labs  Lab 12/05/17  0107 12/05/17  0636 12/05/17  0941   WBC 14.05* 13.77* 12.94*   HGB 9.4* 8.6* 8.4*   HCT 31.4* 27.7* 27.4*    226 246     Significant Imaging: No interval imaging.    Assessment and Plan:     * Pulmonary embolus    - S/p catheter directed thrombolysis 12/3  - Hemodynamically stable  - Continue heparin  - Holding apixaban due to melena and drop in Hb this morning, will restart when safe        Essential hypertension    - Held antihypertensives on  admit  - 12/4: resumed amlodipine 10 mg daily, monitor and add home meds as needed        Gastroesophageal reflux disease    - Cont pantoprazole 40 mg IV BID  - Seen by GI today, appreciate recs: CLD today, NPO at midnight for EGD, hold apixaban, hold heparin after midnight, CBC q6 & transfuse as needed        Depression    - Cont home med: escitalopram 20 mg daily            VTE Risk Mitigation         Ordered     High Risk of VTE  Once      12/02/17 2220     heparin 25,000 units in dextrose 5% 250 mL (100 units/mL) infusion  Continuous     Route:  Intravenous        12/02/17 1947     heparin 25,000 units in dextrose 5% 250 mL (100 units/mL) bolus from bag; ADDITIONAL PRN BOLUS  As needed (PRN)     Route:  Intravenous        12/02/17 1947     heparin 25,000 units in dextrose 5% 250 mL (100 units/mL) bolus from bag; ADDITIONAL PRN BOLUS  As needed (PRN)     Route:  Intravenous        12/02/17 1947          Breanna Doe MD  Cardiology  [facility]

## 2017-12-05 NOTE — PLAN OF CARE
Problem: Patient Care Overview  Goal: Plan of Care Review  Outcome: Ongoing (interventions implemented as appropriate)  Plan of care discussed with pt. Hep gtt infusing per orders. Anti-Xa to be drawn with a.m labs. DVT to BLE. Pulses +2 bilaterally. Awaiting stool sample. Monitoring CBC and fibrinogen Q6H.  VSS & NADN. Pt denies CP, SOB, or pain/discomfort at this time.Pt free of injuries this shift.  All questions addressed.  Will continue to monitor.

## 2017-12-05 NOTE — HPI
72 year old female with a history of CHF, COPD, and recently diagnosed BL PE on AC on who GI is being consulted for melena.     History provided by patient  Patient presented to the ED with worsening shortness of breath she reports. She had a CTA which showed BL PE and was started on AC.  She is on a daily PPI for indigestion but denies any ASA, Ac, NSAIDS, Pepto, or Iron use prior to this admission.  She is currently in bed and reports feeling better than she did on admission as she has no Cp, shortness of breath, palpitations, no nausea/emesis, or abdominal pain      Prior GI procedures:  Unsure if she has had an EGD in the past if this is the case it was many yesterday ago and patient cannot recall findings  Colon in 2016 normal with no repeat recommended  Colon in 2006 with internal hemorrhoids

## 2017-12-05 NOTE — SUBJECTIVE & OBJECTIVE
Past Medical History:   Diagnosis Date    Cataract     Depression     Edema     GERD (gastroesophageal reflux disease)     Hypertension 10/2/2012    Osteopenia     Thalassemia minor        Past Surgical History:   Procedure Laterality Date    APPENDECTOMY      CATARACT EXTRACTION      COLONOSCOPY N/A 11/5/2016    Procedure: COLONOSCOPY;  Surgeon: Tanner Simental MD;  Location: [address]);  Service: Endoscopy;  Laterality: N/A;    HYSTERECTOMY  age 39    fibroids    OOPHORECTOMY      TONSILLECTOMY         Review of patient's allergies indicates:   Allergen Reactions    Codeine     Ciprofloxacin Rash     Family History     Problem Relation (Age of Onset)    Cancer Father, Brother    Hypertension Mother    No Known Problems Sister, Maternal Aunt, Maternal Uncle, Paternal Aunt, Paternal Uncle, Maternal Grandmother, Maternal Grandfather, Paternal Grandmother, Paternal Grandfather        Social History Main Topics    Smoking status: Never Smoker    Smokeless tobacco: Never Used    Alcohol use No      Comment: rare    Drug use: No    Sexual activity: Yes     Partners: Male     Review of Systems   Constitutional: Negative.    HENT: Negative.    Eyes: Negative.    Respiratory: Negative.    Cardiovascular: Negative.    Gastrointestinal: Positive for blood in stool. Negative for abdominal distention, abdominal pain, anal bleeding, constipation, diarrhea, nausea, rectal pain and vomiting.   Endocrine: Negative.    Genitourinary: Negative.    Neurological: Negative.      Objective:     Vital Signs (Most Recent):  Temp: 98.2 °F (36.8 °C) (12/05/17 0740)  Pulse: 64 (12/05/17 0740)  Resp: 19 (12/05/17 0740)  BP: (!) 150/74 (12/05/17 0740)  SpO2: 98 % (12/05/17 0740) Vital Signs (24h Range):  Temp:  [97.7 °F (36.5 °C)-98.4 °F (36.9 °C)] 98.2 °F (36.8 °C)  Pulse:  [] 64  Resp:  [18-52] 19  SpO2:  [97 %-99 %] 98 %  BP: (124-183)/() 150/74     Weight: 103.9 kg (229 lb 0.9 oz) (12/04/17  0701)  Body mass index is 40.58 kg/m².      Intake/Output Summary (Last 24 hours) at 12/05/17 1007  Last data filed at 12/05/17 0600   Gross per 24 hour   Intake            435.4 ml   Output             1100 ml   Net           -664.6 ml       Lines/Drains/Airways     Peripheral Intravenous Line                 Peripheral IV - Single Lumen 12/02/17 2123 Left Antecubital 2 days         Peripheral IV - Single Lumen 12/04/17 2214 Right;Anterior Forearm less than 1 day                Physical Exam   Constitutional: No distress.   HENT:   Head: Normocephalic and atraumatic.   Eyes: No scleral icterus.   Neck: Normal range of motion.   Cardiovascular: Normal rate and regular rhythm.    Pulmonary/Chest: Effort normal and breath sounds normal.   Abdominal: Soft. Bowel sounds are normal. She exhibits no distension and no mass. There is no tenderness. There is no rebound and no guarding. No hernia.   SHAYNA not performed as patient had a BM minutes prior to me seeing her which was grossly black   Skin: She is not diaphoretic.       Significant Labs:  CBC:   Recent Labs  Lab 12/04/17  1935 12/05/17  0107 12/05/17  0636   WBC 13.99* 14.05* 13.77*   HGB 9.3* 9.4* 8.6*   HCT 30.4* 31.4* 27.7*    208 226     CMP:   Recent Labs  Lab 12/05/17  0636   GLU 89   CALCIUM 8.5*      K 3.7   CO2 19*   *   BUN 36*   CREATININE 1.0     Coagulation:   Recent Labs  Lab 12/03/17  1237   INR 1.0   APTT 64.8*       Significant Imaging:  Imaging results within the past 24 hours have been reviewed.

## 2017-12-05 NOTE — SUBJECTIVE & OBJECTIVE
Review of Systems   Constitution: Negative for chills and fever.   Cardiovascular: Positive for dyspnea on exertion. Negative for chest pain, irregular heartbeat, leg swelling, orthopnea, palpitations, paroxysmal nocturnal dyspnea and syncope.   Respiratory: Negative for hemoptysis, shortness of breath and wheezing.    Gastrointestinal: Positive for melena. Negative for abdominal pain, diarrhea, hematemesis, nausea and vomiting.   Genitourinary: Negative for dysuria and frequency.   Neurological: Negative for focal weakness, headaches and tremors.   Psychiatric/Behavioral: Negative for altered mental status, suicidal ideas and thoughts of violence.     Objective:     Vital Signs (Most Recent):  Temp: 98.2 °F (36.8 °C) (12/05/17 0740)  Pulse: 72 (12/05/17 1300)  Resp: 18 (12/05/17 1300)  BP: (!) 150/74 (12/05/17 1300)  SpO2: 98 % (12/05/17 1300) Vital Signs (24h Range):  Temp:  [97.7 °F (36.5 °C)-98.4 °F (36.9 °C)] 98.2 °F (36.8 °C)  Pulse:  [] 72  Resp:  [18-52] 18  SpO2:  [98 %-99 %] 98 %  BP: (124-183)/() 150/74     Weight: 103.9 kg (229 lb 0.9 oz)  Body mass index is 40.58 kg/m².    SpO2: 98 %  O2 Device (Oxygen Therapy): room air      Intake/Output Summary (Last 24 hours) at 12/05/17 1404  Last data filed at 12/05/17 1300   Gross per 24 hour   Intake            929.8 ml   Output             1400 ml   Net           -470.2 ml       Lines/Drains/Airways     Peripheral Intravenous Line                 Peripheral IV - Single Lumen 12/02/17 2123 Left Antecubital 2 days         Peripheral IV - Single Lumen 12/04/17 2214 Right;Anterior Forearm less than 1 day                Physical Exam   Constitutional: She is oriented to person, place, and time. She appears well-developed and well-nourished.   HENT:   Head: Normocephalic and atraumatic.   Eyes: EOM are normal.   Cardiovascular: Normal rate and regular rhythm.  Exam reveals no gallop and no friction rub.    Pulmonary/Chest: Effort normal and breath sounds  normal. No stridor. She has no wheezes. She has no rales.   Abdominal: Soft. Bowel sounds are normal. There is no rebound and no guarding.   Musculoskeletal: She exhibits no edema.   Neurological: She is alert and oriented to person, place, and time. No cranial nerve deficit.   Skin: Skin is warm and dry.   Psychiatric: She has a normal mood and affect. Her behavior is normal.       Significant Labs:   CMP     Recent Labs  Lab 12/04/17  0609 12/05/17  0636    137   K 4.0 3.7   * 111*   CO2 20* 19*   GLU 93 89   BUN 31* 36*   CREATININE 1.0 1.0   CALCIUM 8.4* 8.5*   ANIONGAP 6* 7*   ESTGFRAFRICA >60.0 >60.0   EGFRNONAA 56.0* 56.0*   , CBC     Recent Labs  Lab 12/05/17  0107 12/05/17  0636 12/05/17  0941   WBC 14.05* 13.77* 12.94*   HGB 9.4* 8.6* 8.4*   HCT 31.4* 27.7* 27.4*    226 246     Significant Imaging: No interval imaging.

## 2017-12-05 NOTE — PLAN OF CARE
"CCU stepdown to [facility]  Case discussed with CCU fellow Dr Adrian earlier today    72F with HTN, CKD-3, thalassemia minor, ADRIÁN, and obesity, admitted via ED to Hospital Medicine on 12/3 by overnight MD Dr Clay Mcmahon with worsening SOb and near-syncope for 2 days, Dx-ed with presumed NSTEM, started on NSTEMI pathway, and D Dimer checked for concern for PE, d dimer elevated so CTA STAT done and revealed submassive B PE with RV strain, trop 0.4, , so went to cath lab with Interventional cardiology (Warren/Francis) for emergent catheter-directed tPA. Per Interventional, "Patient with submassive PE (peripheral clots) - hemodynamically stable but with mild RV dysfunction/troponin and BNP elevation. She belongs to intermediate - high risk submassive PE category."    Per prior notes "No significant smoking hx, no significant family hx, previous ROBERTO showed no evidence of ischemia (most recently in 2016). +ve troponins, initially 0.4, also has elevated BNP. Her presentation is atypical but her KITCHEN may be her anginal equivalent. JAYLEN risk score of 2."     Diagnostics:  12/4 LE Doppler: with age-indeterminate L popliteal DVT    12/5 2D Echo with CFD today wiith good result, no R heart strain  CONCLUSIONS     1 - Normal left ventricular systolic function (EF 60-65%).     2 - No wall motion abnormalities.     3 - Indeterminate LV diastolic function.     4 - Normal right ventricular systolic function .     5 - Pulmonary hypertension. The estimated PA systolic pressure is 67 mmHg.      A/P:  Submassive bilateral PE.  Intermediate - high risk submassive PE   Age-indeterminate DVT  - hep gtt --> change to DOAC or coumadin (for reversibility, given GIB) ASAP  - GIB workup as below  - needs hypercoagulable workup ASAP. Would refer to Dr Gustavo Lora in Hematology clinic  - needs age-appropriate malignancy screening    UGIB acute - unmasked by tPA and anticoagulation   Melena  - NPO after midnight for EGD by GI tomorrow  - hold " hep gtt at midnight, d/w-ed Dr Talbert GI fellow  - GI to do her case early tomorrow morning, after which hep gtt will likely need to be re-started STAT  - PPI IV BID

## 2017-12-05 NOTE — ANESTHESIA PREPROCEDURE EVALUATION
[facility]  Anesthesia Pre-Operative Evaluation         Patient Name: Coby Atkinson  YOB: 1945  MRN: [id number]    SUBJECTIVE:     Pre-operative evaluation for Procedure(s) (LRB):  ESOPHAGOGASTRODUODENOSCOPY (EGD) (N/A)     12/05/2017    Coby Atkinson is a 72 y.o. female w/ a significant PMHx of CHF (Ef 65%) Pul HTN (PASP 78), depressed RV function, COPD, and recently diagnosed BL PE on AC, now with melena.Patient presents for the above procedure(s).      LDA:   18 G Lt AC  20 G Rt forearm    Prev airway: None documented.    Drips:    heparin (porcine) in D5W 11 Units/kg/hr (12/05/17 0528)       Patient Active Problem List   Diagnosis    Depression    Edema    Primary localized osteoarthrosis, lower leg    Baker's cyst of knee    SOB (shortness of breath) on exertion    Gastroesophageal reflux disease    Osteoarthritis of both knees    ADRIÁN (obstructive sleep apnea)    Microcytic anemia    Essential hypertension    Thalassemia minor    Obesity (BMI 30-39.9)    Osteopenia    CKD (chronic kidney disease) stage 3, GFR 30-59 ml/min    Pulmonary embolus    Elevated troponin    Leukocytosis    PE (pulmonary thromboembolism)    NSTEMI (non-ST elevated myocardial infarction)       Review of patient's allergies indicates:   Allergen Reactions    Codeine     Ciprofloxacin Rash       Current Inpatient Medications:   amLODIPine  10 mg Oral Daily    escitalopram oxalate  20 mg Oral Daily    furosemide  20 mg Oral Daily    pantoprazole  40 mg Intravenous BID    topiramate  50 mg Oral BID       No current facility-administered medications on file prior to encounter.      Current Outpatient Prescriptions on File Prior to Encounter   Medication Sig Dispense Refill    amlodipine (NORVASC) 10 MG tablet TAKE 1 TABLET(10 MG) BY MOUTH EVERY DAY 90 tablet 1    benzonatate (TESSALON PERLES) 100 MG capsule Take 1 capsule (100 mg total) by mouth every 6 (six) hours as  needed for Cough. 30 capsule 1    cefUROXime (CEFTIN) 500 MG tablet Take 1 tablet (500 mg total) by mouth 2 (two) times daily. 20 tablet 0    doxazosin (CARDURA) 2 MG tablet TAKE 1 TABLET(2 MG) BY MOUTH EVERY EVENING 90 tablet 3    escitalopram oxalate (LEXAPRO) 20 MG tablet TAKE 1 TABLET BY MOUTH ONCE DAILY 90 tablet 0    furosemide (LASIX) 20 MG tablet TAKE 1 TABLET BY MOUTH TWICE DAILY; MAY TAKE ADDITIONAL DOSE DAILY AS NEEDED FOR SWELLING 270 tablet 3    meloxicam (MOBIC) 7.5 MG tablet TAKE 1 TABLET BY MOUTH EVERY DAY 90 tablet 0    metoprolol succinate (TOPROL-XL) 25 MG 24 hr tablet TAKE 1 TABLET BY MOUTH ONCE DAILY 90 tablet 0    pantoprazole (PROTONIX) 40 MG tablet Take 1 tablet (40 mg total) by mouth once daily. 90 tablet 3    potassium chloride (MICRO-K) 10 MEQ CpSR Take 2 capsules (20 mEq total) by mouth once daily. 60 capsule 11    topiramate (TOPAMAX) 50 MG tablet Take 1 tablet (50 mg total) by mouth 2 (two) times daily. 180 tablet 1    conjugated estrogens (PREMARIN) vaginal cream Place 0.5 g vaginally twice a week. 1 applicator 3    estrogens, conjugated, (PREMARIN) 0.45 MG tablet Take 1 tablet (0.45 mg total) by mouth once daily. 30 tablet 11    losartan-hydrochlorothiazide 100-25 mg (HYZAAR) 100-25 mg per tablet Take 1 tablet by mouth once daily.      ondansetron (ZOFRAN) 4 MG tablet Take 1 tablet (4 mg total) by mouth every 6 (six) hours. 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7*   PLT  208  226   MCV  66*  65*   MCH  19.8*  20.2*   MCHC  29.9*  31.0*       CMP:   Recent Labs      12/02/17   1704   12/04/17   0609  12/05/17   0636   NA  140   < >  140  137   K  3.8   < >  4.0  3.7   CL  107   < >  114*  111*   CO2  23   < >  20*  19*   BUN  39*   < >  31*  36*   CREATININE  1.4   < >  1.0  1.0   GLU  101   < >  93  89   MG  2.3   < >  1.9  1.8   PHOS  3.5   < >  2.8  2.9   CALCIUM  9.0   < >  8.4*  8.5*   ALBUMIN  3.3*   --    --    --    PROT  7.1   --    --    --    ALKPHOS  93   --    --    --    ALT  13   --    --    --    AST  16   --    --    --    BILITOT  0.3   --    --    --     < > = values in this interval not displayed.       INR:  Recent Labs      12/02/17   1704  12/03/17   1237   INR  0.9  1.0   APTT   --   64.8*       Diagnostic Studies: No relevant studies.    EKG: No recent studies available.    2D ECHO:  Results for orders placed or performed during the hospital encounter of 12/02/17   2D echo with color flow doppler   Result Value Ref Range    EF 60 55 - 65    Diastolic Dysfunction Yes (A)     Est. PA Systolic Pressure 77.56 (A)     Pericardial Effusion SMALL (A)     Tricuspid Valve Regurgitation TRIVIAL TO MILD      CONCLUSIONS     1 - Normal left ventricular systolic function (EF 60-65%).     2 - Concentric remodeling.     3 - No wall motion abnormalities.      4 - Biatrial enlargement.     5 - Impaired LV relaxation, normal LAP (grade 1 diastolic dysfunction).     6 - Right ventricular enlargement with mildly depressed systolic function.     7 - Trivial to mild tricuspid regurgitation.     8 - Small pericardial effusion.     9 - Intermediate central venous pressure.     10 - Pulmonary hypertension. The estimated PA systolic pressure is 78 mmHg.     ASSESSMENT/PLAN:         Anesthesia Evaluation    I have reviewed the Patient Summary Reports.    I have reviewed the Nursing Notes.      Review of Systems  Anesthesia Hx:  No problems with previous Anesthesia Denies Hx of Anesthetic complications  History of prior surgery of interest to airway management or planning: Denies Family Hx of Anesthesia complications.   Denies Personal Hx of Anesthesia complications.   Social:  Non-Smoker, Social Alcohol Use    Hematology/Oncology:         -- Anemia: Hematology Comments:   Thalassemia minor    EENT/Dental:EENT/Dental Normal   Cardiovascular:   Hypertension (amlodipine, losartan/hctz, metoprolol), well controlled  Denies Angina. hyperlipidemia ECG has been reviewed.    Stress echo 5/2016:  CONCLUSIONS     1 - Moderate left atrial enlargement.     2 - Normal left ventricular systolic function (EF 55-60%).     3 - Left ventricular diastolic dysfunction.     4 - Normal right ventricular systolic function .     5 - The estimated PA systolic pressure is 30 mmHg.     6 - Mild mitral regurgitation.     7 - Mild tricuspid regurgitation.   No evidence of stress induced myocardial ischemia.    Pulmonary:   Sleep Apnea (not on cpap)    Hepatic/GI:   GERD (takes PPI daily, prevents sx.), well controlled    Musculoskeletal:   Arthritis     Neurological:   Chronic Pain Syndrome   Endocrine:     Morbid obesity 41.9   Psych:   depression          Physical Exam  General:  Well nourished, Morbid Obesity    Airway/Jaw/Neck:  Airway Findings: Mouth Opening: Normal Tongue: Normal  General Airway  Assessment: Good  Mallampati: III  Improves to II with phonation.  TM Distance: Normal, at least 6 cm  Jaw/Neck Findings:     Neck ROM: Normal ROM      Dental:  Dental Findings: In tact   Chest/Lungs:  Chest/Lungs Findings: Clear to auscultation, Normal Respiratory Rate     Heart/Vascular:  Heart Findings: Rate: Normal  Rhythm: Regular Rhythm        Mental Status:  Mental Status Findings:  Cooperative, Alert and Oriented         Anesthesia Plan  Type of Anesthesia, risks & benefits discussed:  Anesthesia Type:  general, MAC  Patient's Preference:   Intra-op Monitoring Plan: standard ASA monitors  Intra-op Monitoring Plan Comments:   Post Op Pain Control Plan: per primary service following discharge from PACU  Post Op Pain Control Plan Comments:   Induction:   IV  Beta Blocker:  Patient is not currently on a Beta-Blocker (No further documentation required).       Informed Consent: Patient understands risks and agrees with Anesthesia plan.  Questions answered. Anesthesia consent signed with patient.  ASA Score: 4     Day of Surgery Review of History & Physical: I have interviewed and examined the patient. I have reviewed the patient's H&P dated:  There are no significant changes.  H&P update referred to the surgeon.         Ready For Surgery From Anesthesia Perspective.

## 2017-12-05 NOTE — CONSULTS
[facility]  Gastroenterology  Consult Note    Patient Name: Coby Atkinson  MRN: [id number]  Admission Date: 12/2/2017  Hospital Length of Stay: 3 days  Code Status: Full Code   Attending Provider: Ignacio Delgadillo MD   Consulting Provider: Marlene Turcios MD  Primary Care Physician: Mundo Hudson DO  Principal Problem:Pulmonary embolus    Inpatient consult to Gastroenterology  Consult performed by: MARLENE TURCIOS  Consult ordered by: RUFINA AMAYA        Subjective:     HPI:  72 year old female with a history of CHF, COPD, and recently diagnosed BL PE on AC on who GI is being consulted for melena.     History provided by patient  Patient presented to the ED with worsening shortness of breath she reports. She had a CTA which showed BL PE and was started on AC.  She is on a daily PPI for indigestion but denies any ASA, Ac, NSAIDS, Pepto, or Iron use prior to this admission.  She is currently in bed and reports feeling better than she did on admission as she has no Cp, shortness of breath, palpitations, no nausea/emesis, or abdominal pain      Prior GI procedures:  Unsure if she has had an EGD in the past if this is the case it was many yesterday ago and patient cannot recall findings  Colon in 2016 normal with no repeat recommended  Colon in 2006 with internal hemorrhoids    Past Medical History:   Diagnosis Date    Cataract     Depression     Edema     GERD (gastroesophageal reflux disease)     Hypertension 10/2/2012    Osteopenia     Thalassemia minor        Past Surgical History:   Procedure Laterality Date    APPENDECTOMY      CATARACT EXTRACTION      COLONOSCOPY N/A 11/5/2016    Procedure: COLONOSCOPY;  Surgeon: Tanner Simental MD;  Location: [address];  Service: Endoscopy;  Laterality: N/A;    HYSTERECTOMY  age 39    fibroids    OOPHORECTOMY      TONSILLECTOMY         Review of patient's allergies indicates:   Allergen Reactions    Codeine      Ciprofloxacin Rash     Family History     Problem Relation (Age of Onset)    Cancer Father, Brother    Hypertension Mother    No Known Problems Sister, Maternal Aunt, Maternal Uncle, Paternal Aunt, Paternal Uncle, Maternal Grandmother, Maternal Grandfather, Paternal Grandmother, Paternal Grandfather        Social History Main Topics    Smoking status: Never Smoker    Smokeless tobacco: Never Used    Alcohol use No      Comment: rare    Drug use: No    Sexual activity: Yes     Partners: Male     Review of Systems   Constitutional: Negative.    HENT: Negative.    Eyes: Negative.    Respiratory: Negative.    Cardiovascular: Negative.    Gastrointestinal: Positive for blood in stool. Negative for abdominal distention, abdominal pain, anal bleeding, constipation, diarrhea, nausea, rectal pain and vomiting.   Endocrine: Negative.    Genitourinary: Negative.    Neurological: Negative.      Objective:     Vital Signs (Most Recent):  Temp: 98.2 °F (36.8 °C) (12/05/17 0740)  Pulse: 64 (12/05/17 0740)  Resp: 19 (12/05/17 0740)  BP: (!) 150/74 (12/05/17 0740)  SpO2: 98 % (12/05/17 0740) Vital Signs (24h Range):  Temp:  [97.7 °F (36.5 °C)-98.4 °F (36.9 °C)] 98.2 °F (36.8 °C)  Pulse:  [] 64  Resp:  [18-52] 19  SpO2:  [97 %-99 %] 98 %  BP: (124-183)/() 150/74     Weight: 103.9 kg (229 lb 0.9 oz) (12/04/17 0701)  Body mass index is 40.58 kg/m².      Intake/Output Summary (Last 24 hours) at 12/05/17 1007  Last data filed at 12/05/17 0600   Gross per 24 hour   Intake            435.4 ml   Output             1100 ml   Net           -664.6 ml       Lines/Drains/Airways     Peripheral Intravenous Line                 Peripheral IV - Single Lumen 12/02/17 2123 Left Antecubital 2 days         Peripheral IV - Single Lumen 12/04/17 2214 Right;Anterior Forearm less than 1 day                Physical Exam   Constitutional: No distress.   HENT:   Head: Normocephalic and atraumatic.   Eyes: No scleral icterus.   Neck: Normal  range of motion.   Cardiovascular: Normal rate and regular rhythm.    Pulmonary/Chest: Effort normal and breath sounds normal.   Abdominal: Soft. Bowel sounds are normal. She exhibits no distension and no mass. There is no tenderness. There is no rebound and no guarding. No hernia.   SHAYNA not performed as patient had a BM minutes prior to me seeing her which was grossly black   Skin: She is not diaphoretic.       Significant Labs:  CBC:   Recent Labs  Lab 12/04/17  1935 12/05/17  0107 12/05/17  0636   WBC 13.99* 14.05* 13.77*   HGB 9.3* 9.4* 8.6*   HCT 30.4* 31.4* 27.7*    208 226     CMP:   Recent Labs  Lab 12/05/17  0636   GLU 89   CALCIUM 8.5*      K 3.7   CO2 19*   *   BUN 36*   CREATININE 1.0     Coagulation:   Recent Labs  Lab 12/03/17  1237   INR 1.0   APTT 64.8*       Significant Imaging:  Imaging results within the past 24 hours have been reviewed.    Assessment/Plan:     Microcytic anemia    72 year old female with a history of CHF, COPD, and recently diagnosed BL PE on AC on who GI is being consulted for melena.  Patient does have a history of thalassemia which could account for the low MCV however in the setting of decrease in H/H with witness melena we need to rule out an upper GI bleed. Differential diagnosis is wide at this point as patient really doesn't give a history of excessive NSAIDs, drinking, Ac. Only comments on daily PPI due to ongoing indigestion.    Recommendations:  -CLD today and NPO after MN for EGD  -Continue to trend H/H and transfuse as needed  -Continue PPI IV BID  -Hold Xarelto (AM dose held please holding evening dose)  -Continue Heparin gtt but hold after MN            Thank you for your consult. I will follow-up with patient. Please contact us if you have any additional questions.    Jeovany Talbert M.D.  Gastroenterology Fellow, PGY-IV  Pager: [phone]  [facility]

## 2017-12-05 NOTE — ASSESSMENT & PLAN NOTE
72 year old female with a history of CHF, COPD, and recently diagnosed BL PE on AC on who GI is being consulted for melena.  Patient does have a history of thalassemia which could account for the low MCV however in the setting of decrease in H/H with witness melena we need to rule out an upper GI bleed. Differential diagnosis is wide at this point as patient really doesn't give a history of excessive NSAIDs, drinking, Ac. Only comments on daily PPI due to ongoing indigestion.    Recommendations:  -CLD today and NPO after MN for EGD  -Continue to trend H/H and transfuse as needed  -Continue PPI IV BID  -Hold Xarelto (AM dose held please holding evening dose)  -Continue Heparin gtt but hold after MN

## 2017-12-05 NOTE — PROGRESS NOTES
[facility]  Interventional Cardiology  Progress Note    Patient Name: Coby Atkinson  MRN: [id number]  Admission Date: 12/2/2017  Hospital Length of Stay: 3 days  Code Status: Full Code   Attending Physician: Erick Briscoe MD   Primary Care Physician: Mundo Hudson DO  Principal Problem:Pulmonary embolus    Subjective:     Interval History:     Objective:     Vital Signs (Most Recent):  Temp: 98.2 °F (36.8 °C) (12/05/17 0740)  Pulse: 72 (12/05/17 1300)  Resp: 18 (12/05/17 1300)  BP: (!) 150/74 (12/05/17 1300)  SpO2: 98 % (12/05/17 1300) Vital Signs (24h Range):  Temp:  [97.7 °F (36.5 °C)-98.4 °F (36.9 °C)] 98.2 °F (36.8 °C)  Pulse:  [] 72  Resp:  [18-52] 18  SpO2:  [98 %-99 %] 98 %  BP: (124-183)/() 150/74     Weight: 103.9 kg (229 lb 0.9 oz)  Body mass index is 40.58 kg/m².    SpO2: 98 %  O2 Device (Oxygen Therapy): room air      Intake/Output Summary (Last 24 hours) at 12/05/17 1411  Last data filed at 12/05/17 1300   Gross per 24 hour   Intake            929.8 ml   Output             1400 ml   Net           -470.2 ml       Lines/Drains/Airways     Peripheral Intravenous Line                 Peripheral IV - Single Lumen 12/02/17 2123 Left Antecubital 2 days         Peripheral IV - Single Lumen 12/04/17 2214 Right;Anterior Forearm less than 1 day                Physical Exam   Constitutional: She appears well-developed and well-nourished.   HENT:   Head: Normocephalic and atraumatic.   Right Ear: External ear normal.   Left Ear: External ear normal.   Eyes: Conjunctivae and EOM are normal. Pupils are equal, round, and reactive to light.   Neck: Normal range of motion. Neck supple. No JVD present. No thyromegaly present.   Cardiovascular: Normal rate, regular rhythm, S1 normal, S2 normal, normal heart sounds and intact distal pulses.  Exam reveals no gallop, no S3 and no friction rub.    No murmur heard.  Pulses:       Radial pulses are 2+ on the right side, and 2+ on the  left side.        Femoral pulses are 2+ on the right side, and 2+ on the left side.       Dorsalis pedis pulses are 2+ on the right side, and 2+ on the left side.        Posterior tibial pulses are 2+ on the right side, and 2+ on the left side.   Pulmonary/Chest: Effort normal. No stridor. She has no wheezes. She has no rales. She exhibits no tenderness.   Abdominal: Soft. Bowel sounds are normal. She exhibits no distension. There is no tenderness. There is no rebound and no guarding.   Musculoskeletal: Normal range of motion. She exhibits no edema or tenderness.   Psychiatric: She has a normal mood and affect.       Significant Labs:   Recent Lab Results       12/05/17  1240 12/05/17  0941 12/05/17  0636 12/05/17  0107 12/04/17  1935      Immature Granulocytes  0.8(H) 0.7(H) 0.6(H) 0.7(H)     Immature Grans (Abs)  0.10(H) 0.09(H) 0.09(H) 0.10(H)     Anion Gap   7(L)       Baso #  0.05 0.04 0.07 0.07     Basophil%  0.4 0.3 0.5 0.5     BUN, Bld   36(H)       Calcium   8.5(L)       Chloride   111(H)       CO2   19(L)       Creatinine   1.0       Differential Method  Automated Automated Automated Automated     eGFR if    >60.0       eGFR if non    56.0  Comment:  Calculation used to obtain the estimated glomerular filtration  rate (eGFR) is the CKD-EPI equation.   (A)       Eos #  1.1(H) 1.1(H) 1.1(H) 1.3(H)     Eosinophil%  8.3(H) 8.1(H) 7.8 9.3(H)     Fibrinogen 292  299 313 298     Glucose   89       Gran #  7.0 7.5 8.0(H) 8.3(H)     Gran%  53.7 54.3 56.6 59.3     Hematocrit  27.4(L) 27.7(L) 31.4(L) 30.4(L)     Hemoglobin  8.4(L) 8.6(L) 9.4(L) 9.3(L)     Heparin Anti-Xa  0.47  Comment:  Expected therapeutic range for Unfractionated heparin (UFH)  is 0.3-0.7 IU/mL.  The therapeutic range for low molecular weight heparins   (LMWH) varies with the type and , but is   typically between 0.4 and 1.1 IU/mL.          Lymph #  4.1 4.2 4.0 3.5     Lymph%  31.4 30.6 28.6 24.8      Magnesium   1.8       MCH  20.0(L) 20.2(L) 19.8(L) 20.2(L)     MCHC  30.7(L) 31.0(L) 29.9(L) 30.6(L)     MCV  65(L) 65(L) 66(L) 66(L)     Mono #  0.7 0.8 0.8 0.8     Mono%  5.4 6.0 5.9 5.4     MPV  9.8 9.4 9.6 9.5     nRBC  0 0 0 0     Phosphorus   2.9       Platelets  246 226 208 248     Potassium   3.7       RBC  4.20 4.25 4.74 4.61     RDW  15.8(H) 15.8(H) 15.9(H) 15.9(H)     Sodium   137       WBC  12.94(H) 13.77(H) 14.05(H) 13.99(H)                       Assessment and Plan:     Patient is a 72 y.o. female presenting with:    Patient is a 72 y.o. female presenting with:         * Pulmonary embolus     Patient with submassive PE (peripheral clots) - hemodynamically stable but with mild RV dysfunction/troponin and BNP elevation. She belongs to intermediate - high risk submassive PE category.     S/p catheter directed thrombolysis. Remains on heparin gtt.   O2 Sats stable. Recommend repeat Echo for reassessment of PAP             CKD (chronic kidney disease) stage 3, GFR 30-59 ml/min     Stable.   GFR 56.       GIB concern    Agree with GI consult and PPIs BID. Need to sort out etiology as patient has to stay on long term anticoagulation.       Essential hypertension     Controlled on amlodipine.        Gastroesophageal reflux disease     Stable.   On PPI.          VTE Risk Mitigation         Ordered     High Risk of VTE  Once      12/02/17 2220     heparin 25,000 units in dextrose 5% 250 mL (100 units/mL) infusion  Continuous     Route:  Intravenous        12/02/17 1947     heparin 25,000 units in dextrose 5% 250 mL (100 units/mL) bolus from bag; ADDITIONAL PRN BOLUS  As needed (PRN)     Route:  Intravenous        12/02/17 1947     heparin 25,000 units in dextrose 5% 250 mL (100 units/mL) bolus from bag; ADDITIONAL PRN BOLUS  As needed (PRN)     Route:  Intravenous        12/02/17 1947          Jake Blanco MD  Interventional Cardiology  [facility]

## 2017-12-05 NOTE — SUBJECTIVE & OBJECTIVE
Interval History:     Objective:     Vital Signs (Most Recent):  Temp: 98.2 °F (36.8 °C) (12/05/17 0740)  Pulse: 72 (12/05/17 1300)  Resp: 18 (12/05/17 1300)  BP: (!) 150/74 (12/05/17 1300)  SpO2: 98 % (12/05/17 1300) Vital Signs (24h Range):  Temp:  [97.7 °F (36.5 °C)-98.4 °F (36.9 °C)] 98.2 °F (36.8 °C)  Pulse:  [] 72  Resp:  [18-52] 18  SpO2:  [98 %-99 %] 98 %  BP: (124-183)/() 150/74     Weight: 103.9 kg (229 lb 0.9 oz)  Body mass index is 40.58 kg/m².    SpO2: 98 %  O2 Device (Oxygen Therapy): room air      Intake/Output Summary (Last 24 hours) at 12/05/17 1411  Last data filed at 12/05/17 1300   Gross per 24 hour   Intake            929.8 ml   Output             1400 ml   Net           -470.2 ml       Lines/Drains/Airways     Peripheral Intravenous Line                 Peripheral IV - Single Lumen 12/02/17 2123 Left Antecubital 2 days         Peripheral IV - Single Lumen 12/04/17 2214 Right;Anterior Forearm less than 1 day                Physical Exam   Constitutional: She appears well-developed and well-nourished.   HENT:   Head: Normocephalic and atraumatic.   Right Ear: External ear normal.   Left Ear: External ear normal.   Eyes: Conjunctivae and EOM are normal. Pupils are equal, round, and reactive to light.   Neck: Normal range of motion. Neck supple. No JVD present. No thyromegaly present.   Cardiovascular: Normal rate, regular rhythm, S1 normal, S2 normal, normal heart sounds and intact distal pulses.  Exam reveals no gallop, no S3 and no friction rub.    No murmur heard.  Pulses:       Radial pulses are 2+ on the right side, and 2+ on the left side.        Femoral pulses are 2+ on the right side, and 2+ on the left side.       Dorsalis pedis pulses are 2+ on the right side, and 2+ on the left side.        Posterior tibial pulses are 2+ on the right side, and 2+ on the left side.   Pulmonary/Chest: Effort normal. No stridor. She has no wheezes. She has no rales. She exhibits no tenderness.    Abdominal: Soft. Bowel sounds are normal. She exhibits no distension. There is no tenderness. There is no rebound and no guarding.   Musculoskeletal: Normal range of motion. She exhibits no edema or tenderness.   Psychiatric: She has a normal mood and affect.       Significant Labs:   Recent Lab Results       12/05/17  1240 12/05/17  0941 12/05/17  0636 12/05/17  0107 12/04/17  1935      Immature Granulocytes  0.8(H) 0.7(H) 0.6(H) 0.7(H)     Immature Grans (Abs)  0.10(H) 0.09(H) 0.09(H) 0.10(H)     Anion Gap   7(L)       Baso #  0.05 0.04 0.07 0.07     Basophil%  0.4 0.3 0.5 0.5     BUN, Bld   36(H)       Calcium   8.5(L)       Chloride   111(H)       CO2   19(L)       Creatinine   1.0       Differential Method  Automated Automated Automated Automated     eGFR if    >60.0       eGFR if non    56.0  Comment:  Calculation used to obtain the estimated glomerular filtration  rate (eGFR) is the CKD-EPI equation.   (A)       Eos #  1.1(H) 1.1(H) 1.1(H) 1.3(H)     Eosinophil%  8.3(H) 8.1(H) 7.8 9.3(H)     Fibrinogen 292  299 313 298     Glucose   89       Gran #  7.0 7.5 8.0(H) 8.3(H)     Gran%  53.7 54.3 56.6 59.3     Hematocrit  27.4(L) 27.7(L) 31.4(L) 30.4(L)     Hemoglobin  8.4(L) 8.6(L) 9.4(L) 9.3(L)     Heparin Anti-Xa  0.47  Comment:  Expected therapeutic range for Unfractionated heparin (UFH)  is 0.3-0.7 IU/mL.  The therapeutic range for low molecular weight heparins   (LMWH) varies with the type and , but is   typically between 0.4 and 1.1 IU/mL.          Lymph #  4.1 4.2 4.0 3.5     Lymph%  31.4 30.6 28.6 24.8     Magnesium   1.8       MCH  20.0(L) 20.2(L) 19.8(L) 20.2(L)     MCHC  30.7(L) 31.0(L) 29.9(L) 30.6(L)     MCV  65(L) 65(L) 66(L) 66(L)     Mono #  0.7 0.8 0.8 0.8     Mono%  5.4 6.0 5.9 5.4     MPV  9.8 9.4 9.6 9.5     nRBC  0 0 0 0     Phosphorus   2.9       Platelets  246 226 208 248     Potassium   3.7       RBC  4.20 4.25 4.74 4.61     RDW  15.8(H)  15.8(H) 15.9(H) 15.9(H)     Sodium   137       WBC  12.94(H) 13.77(H) 14.05(H) 13.99(H)

## 2017-12-05 NOTE — NURSING
Dr Joyce was informed about pt having a black stool. Orders received were, obtain occult stool, administer Protonix 40mg IV BID, and obtain CBC. Obtained CBC, and administered Protonix. Informed Dr Joyce that occult stool could not be obtained at this time, stool was mixed with urine. Set measuring hat in toilet to attempt to obtain next stool for collection. Pt in no noted distress at this time. Will continue to monitor pt for changes in status.

## 2017-12-05 NOTE — ASSESSMENT & PLAN NOTE
- S/p catheter directed thrombolysis 12/3  - Hemodynamically stable  - Continue heparin  - Holding apixaban due to melena and drop in Hb this morning, will restart when safe

## 2017-12-05 NOTE — NURSING TRANSFER
Nursing Transfer Note      12/5/2017     Transfer From:CMICU to     Transfer via stretcher    Transfer with cardiac monitoring    Transported by Philomena MINOR    Medicines sent: none    Chart send with patient: Yes      Patient reassessed at: 12/5/2017 0100    Upon arrival to floor: cardiac monitor applied, patient oriented to room, call bell in reach and bed in lowest position

## 2017-12-05 NOTE — ASSESSMENT & PLAN NOTE
- Cont pantoprazole 40 mg IV BID  - Seen by GI today, appreciate recs: CLD today, NPO at midnight for EGD, hold apixaban, hold heparin after midnight, CBC q6 & transfuse as needed

## 2017-12-06 ENCOUNTER — TELEPHONE (OUTPATIENT)
Dept: GASTROENTEROLOGY | Facility: CLINIC | Age: 72
End: 2017-12-06

## 2017-12-06 ENCOUNTER — TELEPHONE (OUTPATIENT)
Dept: GASTROENTEROLOGY | Facility: HOSPITAL | Age: 72
End: 2017-12-06

## 2017-12-06 ENCOUNTER — ANESTHESIA (OUTPATIENT)
Dept: ENDOSCOPY | Facility: HOSPITAL | Age: 72
DRG: 175 | End: 2017-12-06
Payer: MEDICARE

## 2017-12-06 DIAGNOSIS — K26.9 DUODENAL ULCER: Primary | ICD-10-CM

## 2017-12-06 PROBLEM — I26.99 PULMONARY EMBOLUS: Status: ACTIVE | Noted: 2017-12-06

## 2017-12-06 LAB
ALBUMIN SERPL BCP-MCNC: 2.5 G/DL
ALP SERPL-CCNC: 55 U/L
ALT SERPL W/O P-5'-P-CCNC: 11 U/L
ANION GAP SERPL CALC-SCNC: 4 MMOL/L
ANION GAP SERPL CALC-SCNC: 6 MMOL/L
AST SERPL-CCNC: 15 U/L
BASOPHILS # BLD AUTO: 0.04 K/UL
BASOPHILS # BLD AUTO: 0.07 K/UL
BASOPHILS # BLD AUTO: 0.07 K/UL
BASOPHILS NFR BLD: 0.3 %
BASOPHILS NFR BLD: 0.6 %
BASOPHILS NFR BLD: 0.6 %
BILIRUB SERPL-MCNC: 0.4 MG/DL
BUN SERPL-MCNC: 36 MG/DL
BUN SERPL-MCNC: 37 MG/DL
CALCIUM SERPL-MCNC: 8.5 MG/DL
CALCIUM SERPL-MCNC: 8.8 MG/DL
CHLORIDE SERPL-SCNC: 110 MMOL/L
CHLORIDE SERPL-SCNC: 111 MMOL/L
CO2 SERPL-SCNC: 22 MMOL/L
CO2 SERPL-SCNC: 22 MMOL/L
CREAT SERPL-MCNC: 1.1 MG/DL
CREAT SERPL-MCNC: 1.2 MG/DL
DIFFERENTIAL METHOD: ABNORMAL
EOSINOPHIL # BLD AUTO: 0.9 K/UL
EOSINOPHIL # BLD AUTO: 1 K/UL
EOSINOPHIL # BLD AUTO: 1.1 K/UL
EOSINOPHIL NFR BLD: 6.8 %
EOSINOPHIL NFR BLD: 8.3 %
EOSINOPHIL NFR BLD: 8.3 %
ERYTHROCYTE [DISTWIDTH] IN BLOOD BY AUTOMATED COUNT: 15.6 %
ERYTHROCYTE [DISTWIDTH] IN BLOOD BY AUTOMATED COUNT: 15.7 %
ERYTHROCYTE [DISTWIDTH] IN BLOOD BY AUTOMATED COUNT: 15.8 %
EST. GFR  (AFRICAN AMERICAN): 51.8 ML/MIN/1.73 M^2
EST. GFR  (AFRICAN AMERICAN): 57.6 ML/MIN/1.73 M^2
EST. GFR  (NON AFRICAN AMERICAN): 44.9 ML/MIN/1.73 M^2
EST. GFR  (NON AFRICAN AMERICAN): 49.9 ML/MIN/1.73 M^2
FACT X PPP CHRO-ACNC: 0.24 IU/ML
FIBRINOGEN PPP-MCNC: 268 MG/DL
FIBRINOGEN PPP-MCNC: 294 MG/DL
GLUCOSE SERPL-MCNC: 87 MG/DL
GLUCOSE SERPL-MCNC: 89 MG/DL
HCT VFR BLD AUTO: 24.9 %
HCT VFR BLD AUTO: 25.2 %
HCT VFR BLD AUTO: 25.9 %
HGB BLD-MCNC: 7.6 G/DL
HGB BLD-MCNC: 7.7 G/DL
HGB BLD-MCNC: 7.9 G/DL
IMM GRANULOCYTES # BLD AUTO: 0.08 K/UL
IMM GRANULOCYTES # BLD AUTO: 0.11 K/UL
IMM GRANULOCYTES # BLD AUTO: 0.14 K/UL
IMM GRANULOCYTES NFR BLD AUTO: 0.7 %
IMM GRANULOCYTES NFR BLD AUTO: 0.9 %
IMM GRANULOCYTES NFR BLD AUTO: 1.1 %
INR PPP: 1
LYMPHOCYTES # BLD AUTO: 3.5 K/UL
LYMPHOCYTES # BLD AUTO: 4 K/UL
LYMPHOCYTES # BLD AUTO: 4.2 K/UL
LYMPHOCYTES NFR BLD: 27.6 %
LYMPHOCYTES NFR BLD: 33.1 %
LYMPHOCYTES NFR BLD: 34 %
MAGNESIUM SERPL-MCNC: 1.9 MG/DL
MAGNESIUM SERPL-MCNC: 1.9 MG/DL
MCH RBC QN AUTO: 19.8 PG
MCH RBC QN AUTO: 20.2 PG
MCH RBC QN AUTO: 20.4 PG
MCHC RBC AUTO-ENTMCNC: 30.5 G/DL
MCHC RBC AUTO-ENTMCNC: 30.5 G/DL
MCHC RBC AUTO-ENTMCNC: 30.6 G/DL
MCV RBC AUTO: 65 FL
MCV RBC AUTO: 66 FL
MCV RBC AUTO: 67 FL
MONOCYTES # BLD AUTO: 0.8 K/UL
MONOCYTES NFR BLD: 6.5 %
MONOCYTES NFR BLD: 6.5 %
MONOCYTES NFR BLD: 6.7 %
NEUTROPHILS # BLD AUTO: 5.9 K/UL
NEUTROPHILS # BLD AUTO: 6.4 K/UL
NEUTROPHILS # BLD AUTO: 7.2 K/UL
NEUTROPHILS NFR BLD: 50 %
NEUTROPHILS NFR BLD: 50.4 %
NEUTROPHILS NFR BLD: 57.6 %
NRBC BLD-RTO: 0 /100 WBC
OB PNL STL: POSITIVE
PHOSPHATE SERPL-MCNC: 3.4 MG/DL
PHOSPHATE SERPL-MCNC: 3.5 MG/DL
PLATELET # BLD AUTO: 225 K/UL
PLATELET # BLD AUTO: 241 K/UL
PLATELET # BLD AUTO: 246 K/UL
PMV BLD AUTO: 9.6 FL
POTASSIUM SERPL-SCNC: 4.1 MMOL/L
POTASSIUM SERPL-SCNC: 4.1 MMOL/L
PROT SERPL-MCNC: 5.8 G/DL
PROTHROMBIN TIME: 10.5 SEC
RBC # BLD AUTO: 3.77 M/UL
RBC # BLD AUTO: 3.84 M/UL
RBC # BLD AUTO: 3.91 M/UL
SODIUM SERPL-SCNC: 137 MMOL/L
SODIUM SERPL-SCNC: 138 MMOL/L
WBC # BLD AUTO: 11.71 K/UL
WBC # BLD AUTO: 12.52 K/UL
WBC # BLD AUTO: 12.69 K/UL

## 2017-12-06 PROCEDURE — 63600175 PHARM REV CODE 636 W HCPCS: Performed by: NURSE ANESTHETIST, CERTIFIED REGISTERED

## 2017-12-06 PROCEDURE — 85384 FIBRINOGEN ACTIVITY: CPT

## 2017-12-06 PROCEDURE — 63600175 PHARM REV CODE 636 W HCPCS: Performed by: HOSPITALIST

## 2017-12-06 PROCEDURE — 43255 EGD CONTROL BLEEDING ANY: CPT | Performed by: INTERNAL MEDICINE

## 2017-12-06 PROCEDURE — 63600175 PHARM REV CODE 636 W HCPCS: Performed by: INTERNAL MEDICINE

## 2017-12-06 PROCEDURE — 25000003 PHARM REV CODE 250: Performed by: HOSPITALIST

## 2017-12-06 PROCEDURE — 85520 HEPARIN ASSAY: CPT

## 2017-12-06 PROCEDURE — 25000003 PHARM REV CODE 250: Performed by: INTERNAL MEDICINE

## 2017-12-06 PROCEDURE — 36415 COLL VENOUS BLD VENIPUNCTURE: CPT

## 2017-12-06 PROCEDURE — C9113 INJ PANTOPRAZOLE SODIUM, VIA: HCPCS | Performed by: INTERNAL MEDICINE

## 2017-12-06 PROCEDURE — 83735 ASSAY OF MAGNESIUM: CPT | Mod: 91

## 2017-12-06 PROCEDURE — 85025 COMPLETE CBC W/AUTO DIFF WBC: CPT

## 2017-12-06 PROCEDURE — 0W3P8ZZ CONTROL BLEEDING IN GASTROINTESTINAL TRACT, VIA NATURAL OR ARTIFICIAL OPENING ENDOSCOPIC: ICD-10-PCS | Performed by: INTERNAL MEDICINE

## 2017-12-06 PROCEDURE — 85610 PROTHROMBIN TIME: CPT

## 2017-12-06 PROCEDURE — 25000003 PHARM REV CODE 250: Performed by: NURSE ANESTHETIST, CERTIFIED REGISTERED

## 2017-12-06 PROCEDURE — D9220A PRA ANESTHESIA: Mod: CRNA,,, | Performed by: NURSE ANESTHETIST, CERTIFIED REGISTERED

## 2017-12-06 PROCEDURE — 80053 COMPREHEN METABOLIC PANEL: CPT

## 2017-12-06 PROCEDURE — 80048 BASIC METABOLIC PNL TOTAL CA: CPT

## 2017-12-06 PROCEDURE — D9220A PRA ANESTHESIA: Mod: ANES,,, | Performed by: ANESTHESIOLOGY

## 2017-12-06 PROCEDURE — 43255 EGD CONTROL BLEEDING ANY: CPT | Mod: ,,, | Performed by: INTERNAL MEDICINE

## 2017-12-06 PROCEDURE — 85384 FIBRINOGEN ACTIVITY: CPT | Mod: 91

## 2017-12-06 PROCEDURE — 84100 ASSAY OF PHOSPHORUS: CPT | Mod: 91

## 2017-12-06 PROCEDURE — 37000008 HC ANESTHESIA 1ST 15 MINUTES: Performed by: INTERNAL MEDICINE

## 2017-12-06 PROCEDURE — 83735 ASSAY OF MAGNESIUM: CPT

## 2017-12-06 PROCEDURE — 0D598ZZ DESTRUCTION OF DUODENUM, VIA NATURAL OR ARTIFICIAL OPENING ENDOSCOPIC: ICD-10-PCS | Performed by: INTERNAL MEDICINE

## 2017-12-06 PROCEDURE — 25000003 PHARM REV CODE 250: Performed by: STUDENT IN AN ORGANIZED HEALTH CARE EDUCATION/TRAINING PROGRAM

## 2017-12-06 PROCEDURE — 99232 SBSQ HOSP IP/OBS MODERATE 35: CPT | Mod: GC,,, | Performed by: INTERNAL MEDICINE

## 2017-12-06 PROCEDURE — 37000009 HC ANESTHESIA EA ADD 15 MINS: Performed by: INTERNAL MEDICINE

## 2017-12-06 PROCEDURE — 99233 SBSQ HOSP IP/OBS HIGH 50: CPT | Mod: ,,, | Performed by: HOSPITALIST

## 2017-12-06 PROCEDURE — 84100 ASSAY OF PHOSPHORUS: CPT

## 2017-12-06 PROCEDURE — 20600001 HC STEP DOWN PRIVATE ROOM

## 2017-12-06 RX ORDER — ONDANSETRON 2 MG/ML
INJECTION INTRAMUSCULAR; INTRAVENOUS
Status: DISCONTINUED | OUTPATIENT
Start: 2017-12-06 | End: 2017-12-06

## 2017-12-06 RX ORDER — SODIUM CHLORIDE 9 MG/ML
INJECTION, SOLUTION INTRAVENOUS CONTINUOUS PRN
Status: DISCONTINUED | OUTPATIENT
Start: 2017-12-06 | End: 2017-12-06

## 2017-12-06 RX ORDER — SUCRALFATE 1 G/10ML
1 SUSPENSION ORAL EVERY 6 HOURS
Status: DISCONTINUED | OUTPATIENT
Start: 2017-12-06 | End: 2017-12-10 | Stop reason: HOSPADM

## 2017-12-06 RX ORDER — ONDANSETRON 2 MG/ML
4 INJECTION INTRAMUSCULAR; INTRAVENOUS DAILY PRN
Status: DISCONTINUED | OUTPATIENT
Start: 2017-12-06 | End: 2017-12-06 | Stop reason: HOSPADM

## 2017-12-06 RX ORDER — SODIUM CHLORIDE 0.9 % (FLUSH) 0.9 %
3 SYRINGE (ML) INJECTION
Status: DISCONTINUED | OUTPATIENT
Start: 2017-12-06 | End: 2017-12-06 | Stop reason: HOSPADM

## 2017-12-06 RX ORDER — HEPARIN SODIUM,PORCINE/D5W 25000/250
17 INTRAVENOUS SOLUTION INTRAVENOUS CONTINUOUS
Status: DISCONTINUED | OUTPATIENT
Start: 2017-12-06 | End: 2017-12-10

## 2017-12-06 RX ORDER — PANTOPRAZOLE SODIUM 40 MG/1
40 TABLET, DELAYED RELEASE ORAL
Status: DISCONTINUED | OUTPATIENT
Start: 2017-12-06 | End: 2017-12-10 | Stop reason: HOSPADM

## 2017-12-06 RX ORDER — WARFARIN SODIUM 5 MG/1
5 TABLET ORAL DAILY
Status: DISCONTINUED | OUTPATIENT
Start: 2017-12-06 | End: 2017-12-08

## 2017-12-06 RX ORDER — EPINEPHRINE 1 MG/ML
INJECTION, SOLUTION INTRACARDIAC; INTRAMUSCULAR; INTRAVENOUS; SUBCUTANEOUS
Status: COMPLETED | OUTPATIENT
Start: 2017-12-06 | End: 2017-12-06

## 2017-12-06 RX ORDER — PROPOFOL 10 MG/ML
VIAL (ML) INTRAVENOUS
Status: DISCONTINUED | OUTPATIENT
Start: 2017-12-06 | End: 2017-12-06

## 2017-12-06 RX ORDER — LIDOCAINE HCL/PF 100 MG/5ML
SYRINGE (ML) INTRAVENOUS
Status: DISCONTINUED | OUTPATIENT
Start: 2017-12-06 | End: 2017-12-06

## 2017-12-06 RX ORDER — PROPOFOL 10 MG/ML
VIAL (ML) INTRAVENOUS CONTINUOUS PRN
Status: DISCONTINUED | OUTPATIENT
Start: 2017-12-06 | End: 2017-12-06

## 2017-12-06 RX ORDER — ETOMIDATE 2 MG/ML
INJECTION INTRAVENOUS
Status: DISCONTINUED | OUTPATIENT
Start: 2017-12-06 | End: 2017-12-06

## 2017-12-06 RX ADMIN — PROPOFOL 100 MCG/KG/MIN: 10 INJECTION, EMULSION INTRAVENOUS at 10:12

## 2017-12-06 RX ADMIN — ONDANSETRON 4 MG: 2 INJECTION INTRAMUSCULAR; INTRAVENOUS at 11:12

## 2017-12-06 RX ADMIN — EPINEPHRINE 0.5 MG: 1 INJECTION, SOLUTION INTRAMUSCULAR; SUBCUTANEOUS at 10:12

## 2017-12-06 RX ADMIN — LIDOCAINE HYDROCHLORIDE 50 MG: 20 INJECTION, SOLUTION INTRAVENOUS at 10:12

## 2017-12-06 RX ADMIN — SODIUM CHLORIDE: 0.9 INJECTION, SOLUTION INTRAVENOUS at 10:12

## 2017-12-06 RX ADMIN — TOPIRAMATE 50 MG: 25 TABLET, FILM COATED ORAL at 09:12

## 2017-12-06 RX ADMIN — PROPOFOL 50 MG: 10 INJECTION, EMULSION INTRAVENOUS at 10:12

## 2017-12-06 RX ADMIN — PANTOPRAZOLE SODIUM 40 MG: 40 TABLET, DELAYED RELEASE ORAL at 05:12

## 2017-12-06 RX ADMIN — FUROSEMIDE 20 MG: 20 TABLET ORAL at 08:12

## 2017-12-06 RX ADMIN — TOPIRAMATE 50 MG: 25 TABLET, FILM COATED ORAL at 08:12

## 2017-12-06 RX ADMIN — SUCRALFATE 1 G: 1 SUSPENSION ORAL at 10:12

## 2017-12-06 RX ADMIN — ETOMIDATE 50 MG: 2 INJECTION, SOLUTION INTRAVENOUS at 10:12

## 2017-12-06 RX ADMIN — ESCITALOPRAM OXALATE 20 MG: 10 TABLET ORAL at 08:12

## 2017-12-06 RX ADMIN — PANTOPRAZOLE SODIUM 40 MG: 40 INJECTION, POWDER, FOR SOLUTION INTRAVENOUS at 08:12

## 2017-12-06 RX ADMIN — WARFARIN SODIUM 5 MG: 5 TABLET ORAL at 06:12

## 2017-12-06 RX ADMIN — HEPARIN SODIUM AND DEXTROSE 11 UNITS/KG/HR: 10000; 5 INJECTION INTRAVENOUS at 06:12

## 2017-12-06 NOTE — ANESTHESIA POSTPROCEDURE EVALUATION
"Anesthesia Post Evaluation    Patient: Coby Atkinson    Procedure(s) Performed: Procedure(s) (LRB):  ESOPHAGOGASTRODUODENOSCOPY (EGD) (N/A)    Final Anesthesia Type: general  Patient location during evaluation: GI PACU  Patient participation: Yes- Able to Participate  Level of consciousness: awake and alert  Post-procedure vital signs: reviewed and stable  Pain management: adequate  Airway patency: patent  PONV status at discharge: nausea (controlled) and vomiting (controlled)  Anesthetic complications: no      Cardiovascular status: blood pressure returned to baseline  Respiratory status: unassisted  Hydration status: euvolemic  Follow-up not needed.        Visit Vitals  BP (!) 165/72   Pulse 67   Temp 36.3 °C (97.3 °F) (Temporal)   Resp 19   Ht 5' 3" (1.6 m)   Wt 103.9 kg (229 lb 0.9 oz)   SpO2 100%   Breastfeeding? No   BMI 40.58 kg/m²       Pain/Devi Score: Pain Assessment Performed: Yes (12/6/2017 11:30 AM)  Presence of Pain: denies (12/6/2017 11:30 AM)  Devi Score: 10 (12/6/2017 11:30 AM)  Modified Devi Score: 19 (12/6/2017 11:30 AM)      "

## 2017-12-06 NOTE — PLAN OF CARE
Problem: Patient Care Overview  Goal: Plan of Care Review  Outcome: Ongoing (interventions implemented as appropriate)  Patient verbalizes no complaints overnight; denies chest pain, SOB, or other discomfort. Heparin gtt infusing as ordered and stopped at midnight for test in AM. Pt remains free of falls or injuries. Pt verbalizes complete understanding of plan of care. Will continue to monitor.

## 2017-12-06 NOTE — PLAN OF CARE
Problem: Patient Care Overview  Goal: Plan of Care Review  Outcome: Ongoing (interventions implemented as appropriate)  Pt free of falls/traumas/injuries. Skin remains clean, dry, and intact.  Pt s/p egd. Awaiting page back from C to see when to restart heparin gtt. Pt re-educated on importance of measuring accurate intake and out put; pt verbalized and demonstrates understanding. Reviewed plan of care with pt; and pt verbalized understanding.  Pt VSS in no distress will continue to monitor.

## 2017-12-06 NOTE — PROGRESS NOTES
"Progress Note  Hospital Medicine    Provider team:    [facility] - CARDIOLOGY CONSULT STEPDOWN  [facility]  [facility]  Admit Date: 12/2/2017  Encounter Date: 12/06/2017     SUBJECTIVE:     Follow-up Visit for: Acute pulmonary embolus    HPI (See H&P for complete P,F,SHx): 72F with HTN, CKD-3, thalassemia minor, ADRIÁN, and obesity, admitted via ED to Hospital Medicine on 12/3 by overnight MD Dr Clay Mcmahon with worsening SOB and near-syncope for 2 days, Dx-ed with presumed NSTEMI, started on NSTEMI pathway, and D Dimer checked for concern for PE, d dimer elevated so CTA STAT done and revealed submassive B PE with RV strain, trop 0.4, , so went to cath lab with Interventional cardiology (Warren/Francis) for emergent catheter-directed tPA on 12/3. Per Interventional, "Patient with submassive PE (peripheral clots) - hemodynamically stable but with mild RV dysfunction/troponin and BNP elevation. She belongs to intermediate - high risk submassive PE category."     Per prior notes "No significant smoking hx, no significant family hx, previous ROBERTO showed no evidence of ischemia (most recently in 2016). +ve troponins, initially 0.4, also has elevated BNP. Her presentation is atypical but her KITCHEN may be her anginal equivalent. JAYLEN risk score of 2."      Diagnostics:  12/4 LE Doppler: with age-indeterminate L popliteal DVT     12/5 2D Echo with CFD  CONCLUSIONS     1 - Normal left ventricular systolic function (EF 60-65%).     2 - No wall motion abnormalities.     3 - Indeterminate LV diastolic function.     4 - Normal right ventricular systolic function .     5 - Pulmonary hypertension. The estimated PA systolic pressure is 67 mmHg.     Interval history:  Patient's course was then c/b melena. GI was consulted on the patient. EGD was performed today, 12/6, and I saw the patient after the procedure.  EGD showed nodular gastropathy.  One oozing duodenal ulcer was found with pigmented material; " this was injected with epinephrine and cauterized.  The patient was recommended for continuation of heparin and now coumadin is used (favored due to reversibility, but will d/w cardiology if NOAC favored).  Patient also recommended for CLD, sucralfate, and PPI.  Repeat upper endoscopy suggested.    The patient reported to me no further dark or tarry stools. She generally offers no complaints, no CP or SOB. She remains in good spirits and all questions were answered to patient satisfaction.    Review of Systems:  Review of Systems   Constitutional: Negative for chills and fever.   HENT: Negative for congestion and sore throat.    Eyes: Negative for photophobia, pain and discharge.   Respiratory: Negative for cough, hemoptysis, sputum production and shortness of breath.    Cardiovascular: Negative for chest pain, palpitations and leg swelling.   Gastrointestinal: Negative for abdominal pain, diarrhea, nausea and vomiting.   Genitourinary: Negative for dysuria and urgency.   Musculoskeletal: Negative for myalgias and neck pain.   Skin: Negative for itching and rash.   Neurological: Negative for sensory change, focal weakness and headaches.   Endo/Heme/Allergies: Negative for polydipsia. Does not bruise/bleed easily.   Psychiatric/Behavioral: Negative for depression and suicidal ideas.       OBJECTIVE:       Intake/Output Summary (Last 24 hours) at 12/06/17 1740  Last data filed at 12/06/17 1500   Gross per 24 hour   Intake              956 ml   Output             1200 ml   Net             -244 ml     Vital Signs Range (Last 24H):  Temp:  [97.3 °F (36.3 °C)-98.4 °F (36.9 °C)]   Pulse:  [63-80]   Resp:  [16-19]   BP: (112-179)/(56-83)   SpO2:  [97 %-100 %]   Body mass index is 40.58 kg/m².    Objective:  General Appearance:  Comfortable, well-appearing, in no acute distress and not in pain.    Vital signs: (most recent): Blood pressure (!) 112/56, pulse 72, temperature 98.4 °F (36.9 °C), temperature source Oral, resp.  "rate 18, height 5' 3" (1.6 m), weight 103.9 kg (229 lb 0.9 oz), SpO2 99 %, not currently breastfeeding.  No fever.    Output: Producing urine and producing stool.    HEENT: Normal HEENT exam.    Lungs:  Normal effort.  Breath sounds clear to auscultation.  She is not in respiratory distress.  No rales or wheezes.    Heart: Normal rate.  Regular rhythm.  S1 normal and S2 normal.  No murmur.   Chest: Symmetric chest wall expansion.   Abdomen: Abdomen is soft.  Bowel sounds are normal.   There is no abdominal tenderness.     Extremities: Normal range of motion.  There is no deformity, effusion, dependent edema or local swelling.    Pulses: Distal pulses are intact.    Neurological: Patient is alert and oriented to person, place and time.  Normal strength.    Pupils:  Pupils are equal, round, and reactive to light.    Skin:  Warm and dry.      Medications:  Medication list was reviewed in EPIC and changes noted under Assessment/Plan and MAR.    Laboratory:  Recent Labs      12/06/17   0709   WBC  11.71   RBC  3.84*   HGB  7.6*   HCT  24.9*   PLT  225   MCV  65*   MCH  19.8*   MCHC  30.5*   GRAN  50.0  5.9   LYMPH  34.0  4.0   MONO  6.7  0.8   EOS  1.0*      Recent Labs      12/06/17   0709   GLU  89  87   NA  138  137   K  4.1  4.1   CL  110  111*   CO2  22*  22*   BUN  37*  36*   CREATININE  1.1  1.2   CALCIUM  8.5*  8.8   ANIONGAP  6*  4*   MG  1.9  1.9   PHOS  3.5  3.4       ASSESSMENT/PLAN:     Active Hospital Problems    Diagnosis  POA    *Acute pulmonary embolus [I26.99]  Yes     Submassive PE, s/p catheter-directed tPA      Pulmonary embolus [I26.99]  Yes    Melena [K92.1]  Unknown    NSTEMI (non-ST elevated myocardial infarction) [I21.4]  Yes    Elevated troponin [R74.8]  Yes    Leukocytosis [D72.829]  Yes    CKD (chronic kidney disease) stage 3, GFR 30-59 ml/min [N18.3]  Yes    Obesity (BMI 30-39.9) [E66.9]  Yes    Essential hypertension [I10]  Yes    Thalassemia minor [D56.3]  Yes    " Microcytic anemia [D50.9]  Yes    Gastroesophageal reflux disease [K21.9]  Yes    Depression [F32.9]  Yes      Resolved Hospital Problems    Diagnosis Date Resolved POA   No resolved problems to display.      Submassive bilateral PE  Age-indeterminate DVT  - s/p catheter directed tPA 12/3  - needs hypercoagulable workup ASAP; refer to Dr. Lora of Hematology  - needs age-appropriate malignancy screening  - continue with heparin-to-coumadin transition  - consider transition to DOAC, will d/w cardiology     Acute upper GIB  Duodenal ulcer  Acute blood loss anemia  - unmasked by tPA and anticoagulation   - EGD results: duodenal ulcer s/p cauterization  - Check H. 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  Ignacio Barragan MD  [facility]

## 2017-12-06 NOTE — PROGRESS NOTES
[facility]  Cardiology  Progress Note    Patient Name: Coby Atkinson  MRN: [id number]  Admission Date: 12/2/2017  Hospital Length of Stay: 4 days  Code Status: Full Code   Attending Physician: Ignacio Barragan MD   Primary Care Physician: Mundo Hudson DO  Expected Discharge Date: 12/8/2017  Principal Problem:Acute pulmonary embolus    Subjective:     Hospital Course:   - 12/3 admitted, hemodynamically stable PE, INV cardiology on board for consideration of CDT, CTS on board with no plan to perform surgery, move to the CMICU for closer monitoring, LE Doppler US, pigtail catheter placed for tpa infusion  - 12/4 pigtail catheter removed this AM, hep gtt; echo ordered, possibly stepdown uziel  - 12/5: Melena overnight with Hb drop this AM. Apixaban held. GI consulted.   - 12/6: Further Hb drop. Awaiting EGD today. No chest pain/ SOB.    Review of Systems   Constitution: Negative for chills and fever.   Cardiovascular: Negative for chest pain, dyspnea on exertion, irregular heartbeat, leg swelling, orthopnea, palpitations, paroxysmal nocturnal dyspnea and syncope.   Respiratory: Negative for hemoptysis, shortness of breath and wheezing.    Gastrointestinal: Positive for melena. Negative for abdominal pain, diarrhea, hematemesis, nausea and vomiting.   Genitourinary: Negative for dysuria and frequency.   Neurological: Negative for focal weakness, headaches and tremors.   Psychiatric/Behavioral: Negative for altered mental status, suicidal ideas and thoughts of violence.     Objective:     Vital Signs (Most Recent):  Temp: 97.3 °F (36.3 °C) (12/06/17 1115)  Pulse: 67 (12/06/17 1130)  Resp: 19 (12/06/17 1130)  BP: (!) 165/72 (12/06/17 1130)  SpO2: 100 % (12/06/17 1130) Vital Signs (24h Range):  Temp:  [97.3 °F (36.3 °C)-98.1 °F (36.7 °C)] 97.3 °F (36.3 °C)  Pulse:  [63-80] 67  Resp:  [16-19] 19  SpO2:  [97 %-100 %] 100 %  BP: (122-169)/(58-83) 165/72     Weight: 103.9 kg (229 lb 0.9 oz)  Body  mass index is 40.58 kg/m².    SpO2: 100 %  O2 Device (Oxygen Therapy): room air      Intake/Output Summary (Last 24 hours) at 12/06/17 1147  Last data filed at 12/06/17 1100   Gross per 24 hour   Intake             1191 ml   Output             1100 ml   Net               91 ml       Lines/Drains/Airways     Peripheral Intravenous Line                 Peripheral IV - Single Lumen 12/02/17 2123 Left Antecubital 3 days                Physical Exam   Constitutional: She is oriented to person, place, and time. She appears well-developed and well-nourished.   HENT:   Head: Normocephalic and atraumatic.   Eyes: EOM are normal.   Cardiovascular: Normal rate and regular rhythm.  Exam reveals no gallop and no friction rub.    Pulmonary/Chest: Effort normal and breath sounds normal. No stridor. She has no wheezes. She has no rales.   Abdominal: Soft. Bowel sounds are normal. There is no rebound and no guarding.   Musculoskeletal: She exhibits no edema.   Neurological: She is alert and oriented to person, place, and time. No cranial nerve deficit.   Skin: Skin is warm and dry.   Psychiatric: She has a normal mood and affect. Her behavior is normal.       Significant Labs:   CMP     Recent Labs  Lab 12/05/17  0636 12/06/17  0709    138  137   K 3.7 4.1  4.1   * 110  111*   CO2 19* 22*  22*   GLU 89 89  87   BUN 36* 37*  36*   CREATININE 1.0 1.1  1.2   CALCIUM 8.5* 8.5*  8.8   PROT  --  5.8*   ALBUMIN  --  2.5*   BILITOT  --  0.4   ALKPHOS  --  55   AST  --  15   ALT  --  11   ANIONGAP 7* 6*  4*   ESTGFRAFRICA >60.0 57.6*  51.8*   EGFRNONAA 56.0* 49.9*  44.9*   , CBC     Recent Labs  Lab 12/05/17  1240 12/06/17  0054 12/06/17  0709   WBC 13.66* 12.69 11.71   HGB 8.7* 7.9* 7.6*   HCT 28.9* 25.9* 24.9*    246 225     Significant Imaging: No interval imaging.    Assessment and Plan:     * Acute pulmonary embolus    - S/p catheter directed thrombolysis 12/3  - Hemodynamically stable  - Continue  heparin  - Holding apixaban due to melena and drop in Hb this morning, restart when safe        Essential hypertension    - Held antihypertensives on admit  - 12/4: resumed amlodipine 10 mg daily, monitor and add home meds as needed         VTE Risk Mitigation         Ordered     High Risk of VTE  Once      12/02/17 2220     heparin 25,000 units in dextrose 5% 250 mL (100 units/mL) infusion  Continuous     Route:  Intravenous        12/02/17 1947     heparin 25,000 units in dextrose 5% 250 mL (100 units/mL) bolus from bag; ADDITIONAL PRN BOLUS  As needed (PRN)     Route:  Intravenous        12/02/17 1947     heparin 25,000 units in dextrose 5% 250 mL (100 units/mL) bolus from bag; ADDITIONAL PRN BOLUS  As needed (PRN)     Route:  Intravenous        12/02/17 1947          Breanna Doe MD  Cardiology  [facility]

## 2017-12-06 NOTE — SUBJECTIVE & OBJECTIVE
Review of Systems   Constitution: Negative for chills and fever.   Cardiovascular: Negative for chest pain, dyspnea on exertion, irregular heartbeat, leg swelling, orthopnea, palpitations, paroxysmal nocturnal dyspnea and syncope.   Respiratory: Negative for hemoptysis, shortness of breath and wheezing.    Gastrointestinal: Positive for melena. Negative for abdominal pain, diarrhea, hematemesis, nausea and vomiting.   Genitourinary: Negative for dysuria and frequency.   Neurological: Negative for focal weakness, headaches and tremors.   Psychiatric/Behavioral: Negative for altered mental status, suicidal ideas and thoughts of violence.     Objective:     Vital Signs (Most Recent):  Temp: 97.3 °F (36.3 °C) (12/06/17 1115)  Pulse: 67 (12/06/17 1130)  Resp: 19 (12/06/17 1130)  BP: (!) 165/72 (12/06/17 1130)  SpO2: 100 % (12/06/17 1130) Vital Signs (24h Range):  Temp:  [97.3 °F (36.3 °C)-98.1 °F (36.7 °C)] 97.3 °F (36.3 °C)  Pulse:  [63-80] 67  Resp:  [16-19] 19  SpO2:  [97 %-100 %] 100 %  BP: (122-169)/(58-83) 165/72     Weight: 103.9 kg (229 lb 0.9 oz)  Body mass index is 40.58 kg/m².    SpO2: 100 %  O2 Device (Oxygen Therapy): room air      Intake/Output Summary (Last 24 hours) at 12/06/17 1147  Last data filed at 12/06/17 1100   Gross per 24 hour   Intake             1191 ml   Output             1100 ml   Net               91 ml       Lines/Drains/Airways     Peripheral Intravenous Line                 Peripheral IV - Single Lumen 12/02/17 2123 Left Antecubital 3 days                Physical Exam   Constitutional: She is oriented to person, place, and time. She appears well-developed and well-nourished.   HENT:   Head: Normocephalic and atraumatic.   Eyes: EOM are normal.   Cardiovascular: Normal rate and regular rhythm.  Exam reveals no gallop and no friction rub.    Pulmonary/Chest: Effort normal and breath sounds normal. No stridor. She has no wheezes. She has no rales.   Abdominal: Soft. Bowel sounds are normal.  There is no rebound and no guarding.   Musculoskeletal: She exhibits no edema.   Neurological: She is alert and oriented to person, place, and time. No cranial nerve deficit.   Skin: Skin is warm and dry.   Psychiatric: She has a normal mood and affect. Her behavior is normal.       Significant Labs:   CMP     Recent Labs  Lab 12/05/17  0636 12/06/17  0709    138  137   K 3.7 4.1  4.1   * 110  111*   CO2 19* 22*  22*   GLU 89 89  87   BUN 36* 37*  36*   CREATININE 1.0 1.1  1.2   CALCIUM 8.5* 8.5*  8.8   PROT  --  5.8*   ALBUMIN  --  2.5*   BILITOT  --  0.4   ALKPHOS  --  55   AST  --  15   ALT  --  11   ANIONGAP 7* 6*  4*   ESTGFRAFRICA >60.0 57.6*  51.8*   EGFRNONAA 56.0* 49.9*  44.9*   , CBC     Recent Labs  Lab 12/05/17  1240 12/06/17  0054 12/06/17  0709   WBC 13.66* 12.69 11.71   HGB 8.7* 7.9* 7.6*   HCT 28.9* 25.9* 24.9*    246 225     Significant Imaging: No interval imaging.

## 2017-12-06 NOTE — TRANSFER OF CARE
"Anesthesia Transfer of Care Note    Patient: Coby Atkinson    Procedure(s) Performed: Procedure(s) (LRB):  ESOPHAGOGASTRODUODENOSCOPY (EGD) (N/A)    Patient location: [facility]    Anesthesia Type: general    Transport from OR: Transported from OR on room air with adequate spontaneous ventilation    Post pain: adequate analgesia    Post assessment: no apparent anesthetic complications and tolerated procedure well    Post vital signs: stable    Level of consciousness: awake, alert and oriented    Nausea/Vomiting: no nausea/vomiting    Complications: none    Transfer of care protocol was followed      Last vitals:   Visit Vitals  BP (!) 152/61   Pulse 71   Temp 36.6 °C (97.9 °F) (Temporal)   Resp 19   Ht 5' 3" (1.6 m)   Wt 103.9 kg (229 lb 0.9 oz)   SpO2 97%   Breastfeeding? No   BMI 40.58 kg/m²     "

## 2017-12-06 NOTE — PATIENT INSTRUCTIONS
Discharge Summary/Instructions after an Endoscopic Procedure  Patient Name: Coby Atkinson  Patient MRN: [id number]  Patient YOB: 1945  Wednesday, December 06, 2017  Tanner Simental MD  RESTRICTIONS:  During your procedure today, you received medications for sedation.  These   medications may affect your judgment, balance and coordination.  Therefore,   for 24 hours, you have the following restrictions:   - DO NOT drive a car, operate machinery, make legal/financial decisions,   sign important papers or drink alcohol.    ACTIVITY:  The following day: return to full activity including work, except no heavy   lifting, straining or running for 3 days if polyps were removed.  DIET:  Eat and drink normally unless instructed otherwise.     TREATMENT FOR COMMON SIDE EFFECTS:  - Mild abdominal pain, belching, bloating or excessive gas: rest, eat   lightly and use a heating pad.  - Sore Throat: treat with throat lozenges and/or gargle with warm salt   water.  SYMPTOMS TO WATCH FOR AND REPORT TO YOUR PHYSICIAN:  1. Abdominal pain or bloating, other than gas cramps.  2. Chest pain.  3. Back pain.  4. Chills or fever occurring within 24 hours after the procedure.  5. Rectal bleeding, which would show as bright red, maroon, or black stools.   (A tablespoon of blood from the rectum is not serious, especially if   hemorrhoids are present.)  6. Vomiting.  7. Weakness or dizziness.  8. Because air was used during the procedure, expelling large amounts of air   from your rectum or belching is normal.  9. If a bowel prep was taken, you may not have a bowel movement for 1-3   days.  This is normal.  GO DIRECTLY TO THE NEAREST EMERGENCY ROOM IF YOU HAVE ANY OF THE FOLLOWING:      Difficulty breathing  Chills and/or fever over 101 F   Persistent vomiting and/or vomiting blood   Severe abdominal pain   Severe chest pain   Black, tarry stools   Bleeding- more than one tablespoon   Any other symptom or condition that you may  feel needs urgent attention  Your doctor recommends these additional instructions:  If any biopsies were taken, your doctor s clinic will contact you in 1 to 2   weeks with any results.  Take a full liquid diet.   Take Carafate (sucralfate) suspension 1 gram by mouth four times a day for   two weeks.   Take Prilosec (omeprazole) 40 mg by mouth twice a day.   Your physician has recommended a repeat upper endoscopy in 12 weeks to check   healing.   Your physician has recommended an H. pylori serology.  For questions, problems or results please call your physician - Tanner Simental MD at Work:  [phone].  [facility], EMERGENCY ROOM PHONE NUMBER: [phone]  IF A COMPLICATION OR EMERGENCY SITUATION ARISES AND YOU ARE UNABLE TO REACH   YOUR PHYSICIAN - GO DIRECTLY TO THE EMERGENCY ROOM.  Tanner Simental MD  12/6/2017 10:56:31 AM  This report has been verified and signed electronically.

## 2017-12-06 NOTE — TELEPHONE ENCOUNTER
12/06/2017  2:13 PM    Repeat EGD in 12 weeks from today 12/6/17 to check for ulcer healing.    Jeovany Talbert M.D.  Gastroenterology Fellow, PGY-IV  Pager: [phone]  [facility]

## 2017-12-06 NOTE — NURSING TRANSFER
Nursing Transfer Note      12/6/2017     Transfer To: [address], RN    Transfer via stretcher    Transfer with cardiac monitoring    Transported by PCT    Chart send with patient: Yes    Notified: spouse    Patient reassessed at: 12/06/2017 1130    Upon arrival to floor: cardiac monitor applied, patient oriented to room, call bell in reach and bed in lowest position

## 2017-12-06 NOTE — ASSESSMENT & PLAN NOTE
- S/p catheter directed thrombolysis 12/3  - Hemodynamically stable  - Continue heparin  - Holding apixaban due to melena and drop in Hb this morning, restart when safe

## 2017-12-06 NOTE — TELEPHONE ENCOUNTER
----- Message from Jeovany Talbert MD sent at 12/6/2017  2:48 PM CST -----  Hi Ms. Brittney    Can you schedule this patient for a follow up with me in clinic?    Thanks

## 2017-12-06 NOTE — ASSESSMENT & PLAN NOTE
- Cont pantoprazole 40 mg IV BID  - Seen by GI today, appreciate recs, awaiting EGD today, transfuse as needed

## 2017-12-06 NOTE — PROGRESS NOTES
Pt back from [facility] VSS in no distress. Paged on call for [facility] to clarify when to restart heparin gtt.

## 2017-12-06 NOTE — TREATMENT PLAN
Treatment Plan  12/06/2017  2:49 PM    Patient seen after procedure and doing well.  Findings of EGD explained.  Will continue to follow alongside the primary team.    Jeovany Talbert M.D.  Gastroenterology Fellow, PGY-IV  Pager: [phone]  [facility]      Treatment Plan  12/06/2017  12:03 PM    EGD performed today with impression and recs below.    Impression:             - Nodular gastropathy.  - One oozing duodenal ulcer with pigmented  material. Injected with Epinephrine and coaptive coagulation with bipolar cautery was successful.    Recommendation:         - Return patient to [facility] for [facility] care.  - Full liquid diet.  - Use sucralfate suspension 1 gram PO QID for 2 weeks.  - Use Prilosec (omeprazole) 40 mg PO BID for 12 weeks and daily thereafter.  - Repeat upper endoscopy in 12 weeks to check  healing (order placed)  - Perform an H. pylori serology.  - Resume heparin at prior dose.    -Will stop by to speak to family in the afternoon and will continue to follow patient alongside primary team.    Jeovany Talbert M.D.  Gastroenterology Fellow, PGY-IV  Pager: [phone]  [facility]

## 2017-12-06 NOTE — ANESTHESIA RELEASE NOTE
"Anesthesia Release from PACU Note    Patient: Coby Atkinson    Procedure(s) Performed: Procedure(s) (LRB):  ESOPHAGOGASTRODUODENOSCOPY (EGD) (N/A)    Anesthesia type: general    Post pain: Adequate analgesia    Post assessment: no apparent anesthetic complications, tolerated procedure well and no evidence of recall    Last Vitals:   Visit Vitals  BP (!) 165/72   Pulse 67   Temp 36.3 °C (97.3 °F) (Temporal)   Resp 19   Ht 5' 3" (1.6 m)   Wt 103.9 kg (229 lb 0.9 oz)   SpO2 100%   Breastfeeding? No   BMI 40.58 kg/m²       Post vital signs: stable    Level of consciousness: awake, alert  and oriented    Nausea/Vomiting: nausea and vomiting    Complications: none    Airway Patency: patent    Respiratory: unassisted    Cardiovascular: stable and blood pressure at baseline    Hydration: euvolemic  "

## 2017-12-07 PROBLEM — K92.1 MELENA: Status: RESOLVED | Noted: 2017-12-05 | Resolved: 2017-12-07

## 2017-12-07 PROBLEM — K92.1 MELENA: Status: RESOLVED | Noted: 2017-12-07 | Resolved: 2017-12-07

## 2017-12-07 LAB
ABO + RH BLD: NORMAL
ALBUMIN SERPL BCP-MCNC: 2.6 G/DL
ALP SERPL-CCNC: 60 U/L
ALT SERPL W/O P-5'-P-CCNC: 13 U/L
ANION GAP SERPL CALC-SCNC: 7 MMOL/L
AST SERPL-CCNC: 17 U/L
BASOPHILS # BLD AUTO: 0.05 K/UL
BASOPHILS # BLD AUTO: 0.08 K/UL
BASOPHILS NFR BLD: 0.4 %
BASOPHILS NFR BLD: 0.6 %
BILIRUB SERPL-MCNC: 0.3 MG/DL
BLD GP AB SCN CELLS X3 SERPL QL: NORMAL
BLD PROD TYP BPU: NORMAL
BLOOD GROUP ANTIBODIES SERPL: NORMAL
BLOOD UNIT EXPIRATION DATE: NORMAL
BLOOD UNIT TYPE CODE: 5100
BLOOD UNIT TYPE: NORMAL
BUN SERPL-MCNC: 30 MG/DL
CALCIUM SERPL-MCNC: 8.8 MG/DL
CHLORIDE SERPL-SCNC: 110 MMOL/L
CO2 SERPL-SCNC: 22 MMOL/L
CODING SYSTEM: NORMAL
CREAT SERPL-MCNC: 1.2 MG/DL
DIFFERENTIAL METHOD: ABNORMAL
DIFFERENTIAL METHOD: ABNORMAL
DISPENSE STATUS: NORMAL
EOSINOPHIL # BLD AUTO: 0.9 K/UL
EOSINOPHIL # BLD AUTO: 1 K/UL
EOSINOPHIL NFR BLD: 7 %
EOSINOPHIL NFR BLD: 7.6 %
ERYTHROCYTE [DISTWIDTH] IN BLOOD BY AUTOMATED COUNT: 15.9 %
ERYTHROCYTE [DISTWIDTH] IN BLOOD BY AUTOMATED COUNT: 15.9 %
EST. GFR  (AFRICAN AMERICAN): 51.8 ML/MIN/1.73 M^2
EST. GFR  (NON AFRICAN AMERICAN): 44.9 ML/MIN/1.73 M^2
FACT X PPP CHRO-ACNC: 0.6 IU/ML
GLUCOSE SERPL-MCNC: 90 MG/DL
H PYLORI IGG SERPL QL IA: NEGATIVE
HCT VFR BLD AUTO: 24.3 %
HCT VFR BLD AUTO: 25.5 %
HGB BLD-MCNC: 7.3 G/DL
HGB BLD-MCNC: 7.7 G/DL
IMM GRANULOCYTES # BLD AUTO: 0.13 K/UL
IMM GRANULOCYTES # BLD AUTO: 0.2 K/UL
IMM GRANULOCYTES NFR BLD AUTO: 1 %
IMM GRANULOCYTES NFR BLD AUTO: 1.5 %
INR PPP: 1
LYMPHOCYTES # BLD AUTO: 4 K/UL
LYMPHOCYTES # BLD AUTO: 4.8 K/UL
LYMPHOCYTES NFR BLD: 29.9 %
LYMPHOCYTES NFR BLD: 36.2 %
MAGNESIUM SERPL-MCNC: 1.8 MG/DL
MCH RBC QN AUTO: 19.9 PG
MCH RBC QN AUTO: 20.2 PG
MCHC RBC AUTO-ENTMCNC: 30 G/DL
MCHC RBC AUTO-ENTMCNC: 30.2 G/DL
MCV RBC AUTO: 66 FL
MCV RBC AUTO: 67 FL
MONOCYTES # BLD AUTO: 0.6 K/UL
MONOCYTES # BLD AUTO: 0.9 K/UL
MONOCYTES NFR BLD: 4.7 %
MONOCYTES NFR BLD: 6.9 %
NEUTROPHILS # BLD AUTO: 6.8 K/UL
NEUTROPHILS # BLD AUTO: 7.2 K/UL
NEUTROPHILS NFR BLD: 50.7 %
NEUTROPHILS NFR BLD: 53.5 %
NRBC BLD-RTO: 0 /100 WBC
NRBC BLD-RTO: 0 /100 WBC
PHOSPHATE SERPL-MCNC: 4 MG/DL
PLATELET # BLD AUTO: 242 K/UL
PLATELET # BLD AUTO: 250 K/UL
PMV BLD AUTO: 9.1 FL
PMV BLD AUTO: 9.6 FL
POTASSIUM SERPL-SCNC: 3.9 MMOL/L
PROT SERPL-MCNC: 5.5 G/DL
PROTHROMBIN TIME: 10.5 SEC
RBC # BLD AUTO: 3.66 M/UL
RBC # BLD AUTO: 3.82 M/UL
SODIUM SERPL-SCNC: 139 MMOL/L
TRANS ERYTHROCYTES VOL PATIENT: NORMAL ML
WBC # BLD AUTO: 13.36 K/UL
WBC # BLD AUTO: 13.39 K/UL

## 2017-12-07 PROCEDURE — 25000003 PHARM REV CODE 250: Performed by: HOSPITALIST

## 2017-12-07 PROCEDURE — 83735 ASSAY OF MAGNESIUM: CPT

## 2017-12-07 PROCEDURE — 99233 SBSQ HOSP IP/OBS HIGH 50: CPT | Mod: GC,,, | Performed by: INTERNAL MEDICINE

## 2017-12-07 PROCEDURE — 97162 PT EVAL MOD COMPLEX 30 MIN: CPT

## 2017-12-07 PROCEDURE — 86900 BLOOD TYPING SEROLOGIC ABO: CPT

## 2017-12-07 PROCEDURE — 25000003 PHARM REV CODE 250: Performed by: INTERNAL MEDICINE

## 2017-12-07 PROCEDURE — 85520 HEPARIN ASSAY: CPT

## 2017-12-07 PROCEDURE — 86922 COMPATIBILITY TEST ANTIGLOB: CPT

## 2017-12-07 PROCEDURE — 86077 PHYS BLOOD BANK SERV XMATCH: CPT | Mod: ,,, | Performed by: PATHOLOGY

## 2017-12-07 PROCEDURE — 86677 HELICOBACTER PYLORI ANTIBODY: CPT

## 2017-12-07 PROCEDURE — 20600001 HC STEP DOWN PRIVATE ROOM

## 2017-12-07 PROCEDURE — 63600175 PHARM REV CODE 636 W HCPCS: Performed by: HOSPITALIST

## 2017-12-07 PROCEDURE — 97165 OT EVAL LOW COMPLEX 30 MIN: CPT

## 2017-12-07 PROCEDURE — 86905 BLOOD TYPING RBC ANTIGENS: CPT

## 2017-12-07 PROCEDURE — 85610 PROTHROMBIN TIME: CPT

## 2017-12-07 PROCEDURE — 85025 COMPLETE CBC W/AUTO DIFF WBC: CPT

## 2017-12-07 PROCEDURE — 84100 ASSAY OF PHOSPHORUS: CPT

## 2017-12-07 PROCEDURE — P9021 RED BLOOD CELLS UNIT: HCPCS

## 2017-12-07 PROCEDURE — 36415 COLL VENOUS BLD VENIPUNCTURE: CPT

## 2017-12-07 PROCEDURE — 86850 RBC ANTIBODY SCREEN: CPT

## 2017-12-07 PROCEDURE — 36430 TRANSFUSION BLD/BLD COMPNT: CPT

## 2017-12-07 PROCEDURE — 86870 RBC ANTIBODY IDENTIFICATION: CPT

## 2017-12-07 PROCEDURE — 99232 SBSQ HOSP IP/OBS MODERATE 35: CPT | Mod: GC,,, | Performed by: INTERNAL MEDICINE

## 2017-12-07 PROCEDURE — 99232 SBSQ HOSP IP/OBS MODERATE 35: CPT | Mod: ,,, | Performed by: HOSPITALIST

## 2017-12-07 PROCEDURE — 25000003 PHARM REV CODE 250: Performed by: STUDENT IN AN ORGANIZED HEALTH CARE EDUCATION/TRAINING PROGRAM

## 2017-12-07 PROCEDURE — 80053 COMPREHEN METABOLIC PANEL: CPT

## 2017-12-07 RX ORDER — HYDROCODONE BITARTRATE AND ACETAMINOPHEN 500; 5 MG/1; MG/1
TABLET ORAL
Status: DISCONTINUED | OUTPATIENT
Start: 2017-12-07 | End: 2017-12-10 | Stop reason: HOSPADM

## 2017-12-07 RX ORDER — SODIUM CHLORIDE 0.9 % (FLUSH) 0.9 %
5 SYRINGE (ML) INJECTION
Status: DISCONTINUED | OUTPATIENT
Start: 2017-12-07 | End: 2017-12-10 | Stop reason: HOSPADM

## 2017-12-07 RX ADMIN — HEPARIN SODIUM AND DEXTROSE 13 UNITS/KG/HR: 10000; 5 INJECTION INTRAVENOUS at 09:12

## 2017-12-07 RX ADMIN — PANTOPRAZOLE SODIUM 40 MG: 40 TABLET, DELAYED RELEASE ORAL at 06:12

## 2017-12-07 RX ADMIN — FUROSEMIDE 20 MG: 20 TABLET ORAL at 08:12

## 2017-12-07 RX ADMIN — TOPIRAMATE 50 MG: 25 TABLET, FILM COATED ORAL at 08:12

## 2017-12-07 RX ADMIN — SUCRALFATE 1 G: 1 SUSPENSION ORAL at 11:12

## 2017-12-07 RX ADMIN — HEPARIN SODIUM AND DEXTROSE 13 UNITS/KG/HR: 10000; 5 INJECTION INTRAVENOUS at 02:12

## 2017-12-07 RX ADMIN — SUCRALFATE 1 G: 1 SUSPENSION ORAL at 06:12

## 2017-12-07 RX ADMIN — WARFARIN SODIUM 5 MG: 5 TABLET ORAL at 04:12

## 2017-12-07 RX ADMIN — PANTOPRAZOLE SODIUM 40 MG: 40 TABLET, DELAYED RELEASE ORAL at 04:12

## 2017-12-07 RX ADMIN — TOPIRAMATE 50 MG: 25 TABLET, FILM COATED ORAL at 09:12

## 2017-12-07 RX ADMIN — SUCRALFATE 1 G: 1 SUSPENSION ORAL at 12:12

## 2017-12-07 RX ADMIN — SUCRALFATE 1 G: 1 SUSPENSION ORAL at 10:12

## 2017-12-07 RX ADMIN — ESCITALOPRAM OXALATE 20 MG: 10 TABLET ORAL at 08:12

## 2017-12-07 NOTE — PHYSICIAN QUERY
PT Name: Coby Atkinson  MR #: [id number]     Physician Query Form - Documentation Clarification      CDS/: Brittny Waldrop RN, CCDS             Contact information: [email or web address]    This form is a permanent document in the medical record.     Query Date: December 7, 2017    By submitting this query, we are merely seeking further clarification of documentation. Please utilize your independent clinical judgment when addressing the question(s) below.    The Medical record reflects the following:    Supporting Clinical Findings Location in Medical Record     presented to the ER with KITCHEN and two episodes of near-syncope    CT PE was ordered which revealed multilobar emboli more on the right side with concern of right heard strain.     PE, mild RV dysfunction/troponin and BNP elevation.   12/3h/p      12/2 h/p        12/4 progress note     Procedure: Right heart cath with catheter directed thrombolysis        12/3 procedure note                                                                            Doctor, Please specify diagnosis or diagnoses associated with above clinical findings.    Provider Use Only      (  x  )  Pulmonary Embolism with acute cor pulmonale    (    )  Pulmonary Embolism without acute cor pulmonale                                                                                                                          [  ] Clinically undetermined

## 2017-12-07 NOTE — PT/OT/SLP EVAL
Occupational Therapy   Evaluation    Name: Coby Atkinson  MRN: [id number]  Admitting Diagnosis:  Acute pulmonary embolus 1 Day Post-Op    Recommendations:     Discharge Recommendations: home with home health  Discharge Equipment Recommendations:  none  Barriers to discharge:  None    History:     Occupational Profile:  Pt lives with spouse in [facility] with 0 STONEY. (I) with ADLs, uses cane for long distance mobility  Equipment Owned:  cane, straight    Past Medical History:   Diagnosis Date    Cataract     Depression     Edema     GERD (gastroesophageal reflux disease)     Hypertension 10/2/2012    Osteopenia     Thalassemia minor        Past Surgical History:   Procedure Laterality Date    APPENDECTOMY      CATARACT EXTRACTION      COLONOSCOPY N/A 11/5/2016    Procedure: COLONOSCOPY;  Surgeon: Tanner Simental MD;  Location: [address]);  Service: Endoscopy;  Laterality: N/A;    HYSTERECTOMY  age 39    fibroids    OOPHORECTOMY      TONSILLECTOMY         Subjective     Pt agreeable to Evaluation    Communicated with: RN prior to session.    Pain/Comfort:  · Pain Rating 1: 0/10    Objective:     Patient found with: telemetry, peripheral IV    General Precautions: Standard, fall     Physical Exam:  · B UE strength WFL    Occupational Performance:    Bed Mobility:    · Patient completed Supine to Sit with supervision    Functional Mobility/Transfers:  · Patient completed Sit <> Stand Transfer with stand by assistance  with  no assistive device    · Pt ambulated with no AD 80 feet with one standing rest break. SOB noted    Activities of Daily Living:  · UB Dressing: stand by assistance    · LB Dressing: moderate assistance      Patient left seated EOB with all lines intact, call button in reach and RN notified    AMPA 6 Click:  AMPAC Total Score: 20  Education:    Assessment:     Coby Atkinson is a 72 y.o. female with a medical diagnosis of Acute pulmonary embolus.  She presents with endurance  deficits impeding her ability to perform ADLs and functional mobiltiy and would benefit from OT services to maximize functional (I) and safety.    Performance deficits affecting function are weakness, impaired endurance, decreased lower extremity function, impaired self care skills, gait instability, impaired functional mobilty, impaired cardiopulmonary response to activity.      Rehab Prognosis:  good; patient would benefit from acute skilled OT services to address these deficits and reach maximum level of function.       Plan:     Patient to be seen 3 x/week to address the above listed problems via self-care/home management, therapeutic activities, therapeutic exercises     Plan of Care Reviewed with: patient, spouse    This Plan of care has been discussed with the patient who was involved in its development and understands and is in agreement with the identified goals and treatment plan    GOALS:    Occupational Therapy Goals        Problem: Occupational Therapy Goal    Goal Priority Disciplines Outcome Interventions   Occupational Therapy Goal     OT, PT/OT     Description:  Goals to be met by: 7 days    Patient will increase functional independence with ADLs by performing:    UE Dressing with Supervision.  LE Dressing with Supervision with AD as needed.  Grooming while standing with Supervision.  Toileting from bedside commode with Supervision for hygiene and clothing management.   Stand pivot transfers with Supervision.  Toilet transfer to bedside commode with Supervision.  Pt will demonstrate 2 energy conservation techniques with ADLs/IADLs                          Time Tracking:     OT Date of Treatment: 12/07/17  OT Start Time: 1415  OT Stop Time: 1428  OT Total Time (min): 13 min    Billable Minutes:Evaluation 13    BANDAR Mckeon  12/7/2017

## 2017-12-07 NOTE — PROGRESS NOTES
[facility]  Interventional Cardiology  Progress Note    Patient Name: Coby Atkinson  MRN: [id number]  Admission Date: 12/2/2017  Hospital Length of Stay: 5 days  Code Status: Full Code   Attending Physician: Erick Briscoe MD   Primary Care Physician: Mundo Hudson DO  Principal Problem:Acute pulmonary embolus    Subjective:     Interval History: Patient s/p Upper GI scope and found to have peptic ulcer s/p cauterization. Restarted back on hep gtt and coumadin initiated.    Objective:     Vital Signs (Most Recent):  Temp: 98.2 °F (36.8 °C) (12/06/17 2112)  Pulse: 65 (12/07/17 0720)  Resp: 19 (12/07/17 0400)  BP: 123/72 (12/07/17 0400)  SpO2: (!) 94 % (12/07/17 0400) Vital Signs (24h Range):  Temp:  [97.3 °F (36.3 °C)-98.4 °F (36.9 °C)] 98.2 °F (36.8 °C)  Pulse:  [65-77] 65  Resp:  [16-20] 19  SpO2:  [94 %-100 %] 94 %  BP: (112-179)/(56-75) 123/72     Weight: 103.9 kg (229 lb 0.9 oz)  Body mass index is 40.58 kg/m².    SpO2: (!) 94 %  O2 Device (Oxygen Therapy): room air      Intake/Output Summary (Last 24 hours) at 12/07/17 0738  Last data filed at 12/07/17 0400   Gross per 24 hour   Intake             1395 ml   Output              750 ml   Net              645 ml       Lines/Drains/Airways     Peripheral Intravenous Line                 Peripheral IV - Single Lumen 12/02/17 2123 Left Antecubital 4 days                Physical Exam   Constitutional: She appears well-developed and well-nourished.   HENT:   Head: Normocephalic and atraumatic.   Right Ear: External ear normal.   Left Ear: External ear normal.   Eyes: Conjunctivae and EOM are normal. Pupils are equal, round, and reactive to light.   Neck: Normal range of motion. Neck supple. No JVD present. No thyromegaly present.   Cardiovascular: Normal rate, regular rhythm, S1 normal, S2 normal, normal heart sounds and intact distal pulses.  Exam reveals no gallop, no S3 and no friction rub.    No murmur heard.  Pulses:       Radial  pulses are 2+ on the right side, and 2+ on the left side.        Femoral pulses are 2+ on the right side, and 2+ on the left side.       Dorsalis pedis pulses are 2+ on the right side, and 2+ on the left side.        Posterior tibial pulses are 2+ on the right side, and 2+ on the left side.   Pulmonary/Chest: Effort normal. No stridor. She has wheezes. She has no rales. She exhibits no tenderness.   Abdominal: Soft. Bowel sounds are normal. She exhibits no distension. There is no tenderness. There is no rebound and no guarding.   Musculoskeletal: Normal range of motion. She exhibits no edema or tenderness.   Psychiatric: She has a normal mood and affect.       Significant Labs:   CMP   Recent Labs  Lab 12/06/17  0709 12/07/17  0426     137 139   K 4.1  4.1 3.9     111* 110   CO2 22*  22* 22*   GLU 89  87 90   BUN 37*  36* 30*   CREATININE 1.1  1.2 1.2   CALCIUM 8.5*  8.8 8.8   PROT 5.8* 5.5*   ALBUMIN 2.5* 2.6*   BILITOT 0.4 0.3   ALKPHOS 55 60   AST 15 17   ALT 11 13   ANIONGAP 6*  4* 7*   ESTGFRAFRICA 57.6*  51.8* 51.8*   EGFRNONAA 49.9*  44.9* 44.9*   , CBC   Recent Labs  Lab 12/06/17  0054 12/06/17  0709 12/06/17  1956   WBC 12.69 11.71 12.52   HGB 7.9* 7.6* 7.7*   HCT 25.9* 24.9* 25.2*    225 241    and INR   Recent Labs  Lab 12/06/17  1756 12/07/17  0426   INR 1.0 1.0        Significant Imaging: Echocardiogram:   2D echo with color flow doppler:   Results for orders placed or performed during the hospital encounter of 12/02/17   2D echo with color flow doppler   Result Value Ref Range    EF 60 55 - 65    Est. PA Systolic Pressure 67 (A)     Mitral Valve Mobility NORMAL     Tricuspid Valve Regurgitation MILD      Assessment and Plan:     Patient is a 72 y.o. female presenting with:    * Acute pulmonary embolus    Patient with submassive PE (peripheral clot) - hemodynamically stable but with mod- RV dysfunction/troponin and BNP elevation. She belongs to intermediate - high risk  submassive PE category.    S/p catheter directed thrombolysis for 24 hrs with improved RV function - normal now and not dilated.    O2 Sats stable. Agree with team regarding coumadin as mode of anticoagulation. Would recommend INR to be therapeutic before discharge while keeping an eye on H & H.    Consider transfusing 2 units of PRBCs as her baseline HGB was > 10 and she suffered bilateral PE to avoid desaturation on exertion.            CKD (chronic kidney disease) stage 3, GFR 30-59 ml/min    Stable.   Avoid nephrotoxins        Obesity (BMI 30-39.9)    Body mass index is 40.58 kg/m².          Thalassemia minor    Chronic.         Essential hypertension    Controlled on amlodipine.         Microcytic anemia    Consider iron supplementation        Melena-resolved as of 12/7/2017    Melena overnight. H/H dropped from 9.3 to 8.4.   GI consulted -- plan for EGD tomorrow.             VTE Risk Mitigation         Ordered     heparin 25,000 units in dextrose 5% 250 mL (100 units/mL) infusion  Continuous     Route:  Intravenous        12/06/17 1744     heparin 25,000 units in dextrose 5% 250 mL (100 units/mL) bolus from bag; ADDITIONAL PRN BOLUS  As needed (PRN)     Route:  Intravenous        12/06/17 1744     heparin 25,000 units in dextrose 5% 250 mL (100 units/mL) bolus from bag; ADDITIONAL PRN BOLUS  As needed (PRN)     Route:  Intravenous        12/06/17 1744     warfarin (COUMADIN) tablet 5 mg  Daily     Route:  Oral        12/06/17 1720     High Risk of VTE  Once      12/02/17 2220          Jake Blanco MD  Interventional Cardiology  [facility]

## 2017-12-07 NOTE — PT/OT/SLP EVAL
Physical Therapy Evaluation    Patient Name:  Coby Atkinson   MRN:  [id number]    Recommendations:     Discharge Recommendations:  home with home health   Discharge Equipment Recommendations: none   Barriers to discharge: None    Assessment:     Coby Atkinson is a 72 y.o. female admitted with a medical diagnosis of Acute pulmonary embolus.  She presents with the below impairments/functional limitations.  Pt tolerated evaluation well and is pleasant and motivated.  Pt is a good candidate for skilled PT services to address the below deficits and to increase functional independence.      Rehab Prognosis: good; patient would benefit from acute skilled PT services to address these deficits and reach maximum level of function.      Rehab Identified impairments/functional limitations:   weakness, impaired endurance, impaired functional mobilty, gait instability, impaired balance, impaired cardiopulmonary response to activity    Recent Surgery: Procedure(s) (LRB):  ESOPHAGOGASTRODUODENOSCOPY (EGD) (N/A) 1 Day Post-Op    Plan:     During this hospitalization, patient to be seen 3 x/week to address the above listed problems via gait training, therapeutic activities, therapeutic exercises  · Plan of Care Expires:  01/06/18   Plan of Care Reviewed with: patient, spouse    Subjective     Communicated with RN prior to session.  Patient found seated in chair upon PT entry to room, agreeable to evaluation.      Chief Complaint: SOB  Patient comments/goals: to return home safely    Pain/Comfort:  · Pain Rating 1: 0/10  · Pain Rating Post-Intervention 1: 0/10    Patients cultural, spiritual, Jehovah's witness conflicts given the current situation: none noted    Living Environment:  Pt lives with spouse in [facility] with 0 STONEY. (I) with ADLs, uses cane for long distance mobility.      Patient has the following equipment: cane, straight.      Objective:     Patient found with: telemetry, peripheral IV     General Precautions: Standard,  fall   Orthopedic Precautions:N/A   Braces: N/A     Exams:  · Gross Motor Coordination:  WFL  · Postural Exam:  Patient presented with the following abnormalities:    · -       Rounded shoulders  · -       Forward head  · RLE ROM: WFL  · RLE Strength: WFL  · LLE ROM: WFL  · LLE Strength: WFL    Functional Mobility:  · Transfers  · Sit to Stand:  contact guard assistance with no AD  · Gait:  · Pt ambulated ~75 feet with no AD and CGA.  Pt demonstrated slight SOB during ambulation that was able to resolve at the conclusion of gait.      AM-PAC 6 CLICK MOBILITY  Total Score:20     Therapeutic Activities and Exercises:  · PT evaluation performed.  · Pt educated on role of PT, safety with functional mobility.   · Pt educated on the importance of OOB activity and sitting up in the chair throughout the day.      Pt safe to transfer with RN staff    Patient left up in chair with all lines intact, call button in reach and RN notified.    GOALS:    Physical Therapy Goals        Problem: Physical Therapy Goal    Goal Priority Disciplines Outcome Goal Variances Interventions   Physical Therapy Goal     PT/OT, PT Ongoing (interventions implemented as appropriate)     Description:  Goals to be met by: 17     Patient will increase functional independence with mobility by performin. Supine to sit with Modified [place]  2. Sit to supine with Modified [place]  3. Sit to stand transfer with Modified [place]  4. Gait  x 150 feet with Supervision using  LRAD  5. Lower extremity exercise program x 30 reps per handout, with independence                      History:     Past Medical History:   Diagnosis Date    Cataract     Depression     Edema     GERD (gastroesophageal reflux disease)     Hypertension 10/2/2012    Osteopenia     Thalassemia minor        Past Surgical History:   Procedure Laterality Date    APPENDECTOMY      CATARACT EXTRACTION      COLONOSCOPY N/A 2016    Procedure:  COLONOSCOPY;  Surgeon: Tanner Simental MD;  Location: [facility] ([address] [place] [address]);  Service: Endoscopy;  Laterality: N/A;    HYSTERECTOMY  age 39    fibroids    OOPHORECTOMY      TONSILLECTOMY         Time Tracking:     PT Received On: 12/07/17  PT Start Time: 1415     PT Stop Time: 1428  PT Total Time (min): 13 min     Billable Minutes: Evaluation 13      Vince An PT, DPT  12/7/2017   [phone]

## 2017-12-07 NOTE — PHYSICIAN QUERY
"PT Name: Coby Atkinson  MR #: [id number]    Physician Query Form - Heart  Condition Clarification     CDS/: Brittny Waldrop RN,CCDS          Contact information: [email or web address]  This form is a permanent document in the medical record.     Query Date: December 7, 2017    By submitting this query, we are merely seeking further clarification of documentation. Please utilize your independent clinical judgment when addressing the question(s) below.    The medical record contains the following   Indicators     Supporting Clinical Findings Location in Medical Record   X  12/2 lab   X EF Ef 60-65% 12/3 echo    Radiology findings     X Echo Results Normal left ventricular systolic function (EF 60-65%).   Impaired LV relaxation, normal LAP (grade 1 diastolic dysfunction).   Pulmonary hypertension. The estimated PA systolic pressure is 78 mmHg 12/3 echo    "Ascites" documented      "SOB" or "KITCHEN" documented      "Hypoxia" documented     X Heart Failure documented significant PMHx of CHF (Ef 65%)  history of CHF, 12/5 anesthesia pre-op  12/5 gi note     "Edema" documented     X Diuretics/Meds Furosemide 20 mg IV x1  Furosemide 20 mg po daily 12/4 mar  12/5,12/6 mar     Treatment:      Other:          Provider, please specify diagnosis or diagnoses associated with above clinical findings.                               [  ] Acute Diastolic Heart Failure ( EF > 40)*  [ x ] Acute on Chronic Diastolic Heart Failure( EF > 40)*  [  ] Chronic Diastolic Heart Failure (EF > 40)*  [  ] Other Type of Heart Failure (please specify type): _________________________  [  ] Heart Failure Ruled Out  [  ] Other (please specify): ___________________________________  [  ] Clinically Undetermined            *American Heart Association                                                                                                          Please document in your progress notes daily for the duration of treatment until resolved " and include in your discharge summary.

## 2017-12-07 NOTE — PROGRESS NOTES
Per Dr. Laurel guillen to restart heparin at previous rate. Antixa due 1845. Will continue to monitor.

## 2017-12-07 NOTE — PLAN OF CARE
Problem: Patient Care Overview  Goal: Plan of Care Review  Outcome: Ongoing (interventions implemented as appropriate)  Patient verbalizes no complaints overnight; denies chest pain, SOB, or other discomfort. Heparin gtt infusing as ordered. Pt remains free of falls or injuries. Pt verbalizes complete understanding of plan of care. Will continue to monitor.

## 2017-12-07 NOTE — SUBJECTIVE & OBJECTIVE
Review of Systems   Constitution: Negative for chills and fever.   Cardiovascular: Negative for chest pain, dyspnea on exertion, irregular heartbeat, leg swelling, orthopnea, palpitations, paroxysmal nocturnal dyspnea and syncope.   Respiratory: Negative for hemoptysis, shortness of breath and wheezing.    Gastrointestinal: Positive for melena. Negative for abdominal pain, diarrhea, hematemesis, nausea and vomiting.   Genitourinary: Negative for dysuria and frequency.   Neurological: Negative for focal weakness, headaches and tremors.   Psychiatric/Behavioral: Negative for altered mental status, suicidal ideas and thoughts of violence.     Objective:     Vital Signs (Most Recent):  Temp: 97.9 °F (36.6 °C) (12/07/17 0800)  Pulse: 107 (12/07/17 1126)  Resp: 15 (12/07/17 1125)  BP: 115/67 (12/07/17 1125)  SpO2: 99 % (12/07/17 1125) Vital Signs (24h Range):  Temp:  [97.9 °F (36.6 °C)-98.4 °F (36.9 °C)] 97.9 °F (36.6 °C)  Pulse:  [] 107  Resp:  [15-20] 15  SpO2:  [94 %-100 %] 99 %  BP: (112-131)/(56-76) 115/67     Weight: 103.9 kg (229 lb 0.9 oz)  Body mass index is 40.58 kg/m².     SpO2: 99 %  O2 Device (Oxygen Therapy): room air      Intake/Output Summary (Last 24 hours) at 12/07/17 1209  Last data filed at 12/07/17 1000   Gross per 24 hour   Intake              845 ml   Output             1650 ml   Net             -805 ml       Lines/Drains/Airways     Peripheral Intravenous Line                 Peripheral IV - Single Lumen 12/02/17 2123 Left Antecubital 4 days                Physical Exam   Constitutional: She is oriented to person, place, and time. She appears well-developed and well-nourished.   HENT:   Head: Normocephalic and atraumatic.   Eyes: EOM are normal.   Cardiovascular: Normal rate and regular rhythm.  Exam reveals no gallop and no friction rub.    Pulmonary/Chest: Effort normal and breath sounds normal. No stridor. She has no wheezes. She has no rales.   Abdominal: Soft. Bowel sounds are normal.  There is no rebound and no guarding.   Musculoskeletal: She exhibits no edema.   Neurological: She is alert and oriented to person, place, and time. No cranial nerve deficit.   Skin: Skin is warm and dry.   Psychiatric: She has a normal mood and affect. Her behavior is normal.

## 2017-12-07 NOTE — SUBJECTIVE & OBJECTIVE
Interval History: Patient s/p Upper GI scope and found to have peptic ulcer s/p cauterization. Restarted back on hep gtt and coumadin initiated.    Objective:     Vital Signs (Most Recent):  Temp: 98.2 °F (36.8 °C) (12/06/17 2112)  Pulse: 65 (12/07/17 0720)  Resp: 19 (12/07/17 0400)  BP: 123/72 (12/07/17 0400)  SpO2: (!) 94 % (12/07/17 0400) Vital Signs (24h Range):  Temp:  [97.3 °F (36.3 °C)-98.4 °F (36.9 °C)] 98.2 °F (36.8 °C)  Pulse:  [65-77] 65  Resp:  [16-20] 19  SpO2:  [94 %-100 %] 94 %  BP: (112-179)/(56-75) 123/72     Weight: 103.9 kg (229 lb 0.9 oz)  Body mass index is 40.58 kg/m².    SpO2: (!) 94 %  O2 Device (Oxygen Therapy): room air      Intake/Output Summary (Last 24 hours) at 12/07/17 0738  Last data filed at 12/07/17 0400   Gross per 24 hour   Intake             1395 ml   Output              750 ml   Net              645 ml       Lines/Drains/Airways     Peripheral Intravenous Line                 Peripheral IV - Single Lumen 12/02/17 2123 Left Antecubital 4 days                Physical Exam   Constitutional: She appears well-developed and well-nourished.   HENT:   Head: Normocephalic and atraumatic.   Right Ear: External ear normal.   Left Ear: External ear normal.   Eyes: Conjunctivae and EOM are normal. Pupils are equal, round, and reactive to light.   Neck: Normal range of motion. Neck supple. No JVD present. No thyromegaly present.   Cardiovascular: Normal rate, regular rhythm, S1 normal, S2 normal, normal heart sounds and intact distal pulses.  Exam reveals no gallop, no S3 and no friction rub.    No murmur heard.  Pulses:       Radial pulses are 2+ on the right side, and 2+ on the left side.        Femoral pulses are 2+ on the right side, and 2+ on the left side.       Dorsalis pedis pulses are 2+ on the right side, and 2+ on the left side.        Posterior tibial pulses are 2+ on the right side, and 2+ on the left side.   Pulmonary/Chest: Effort normal. No stridor. She has wheezes. She has no  rales. She exhibits no tenderness.   Abdominal: Soft. Bowel sounds are normal. She exhibits no distension. There is no tenderness. There is no rebound and no guarding.   Musculoskeletal: Normal range of motion. She exhibits no edema or tenderness.   Psychiatric: She has a normal mood and affect.       Significant Labs:   CMP   Recent Labs  Lab 12/06/17  0709 12/07/17  0426     137 139   K 4.1  4.1 3.9     111* 110   CO2 22*  22* 22*   GLU 89  87 90   BUN 37*  36* 30*   CREATININE 1.1  1.2 1.2   CALCIUM 8.5*  8.8 8.8   PROT 5.8* 5.5*   ALBUMIN 2.5* 2.6*   BILITOT 0.4 0.3   ALKPHOS 55 60   AST 15 17   ALT 11 13   ANIONGAP 6*  4* 7*   ESTGFRAFRICA 57.6*  51.8* 51.8*   EGFRNONAA 49.9*  44.9* 44.9*   , CBC   Recent Labs  Lab 12/06/17  0054 12/06/17  0709 12/06/17  1956   WBC 12.69 11.71 12.52   HGB 7.9* 7.6* 7.7*   HCT 25.9* 24.9* 25.2*    225 241    and INR   Recent Labs  Lab 12/06/17  1756 12/07/17  0426   INR 1.0 1.0        Significant Imaging: Echocardiogram:   2D echo with color flow doppler:   Results for orders placed or performed during the hospital encounter of 12/02/17   2D echo with color flow doppler   Result Value Ref Range    EF 60 55 - 65    Est. PA Systolic Pressure 67 (A)     Mitral Valve Mobility NORMAL     Tricuspid Valve Regurgitation MILD

## 2017-12-07 NOTE — ASSESSMENT & PLAN NOTE
Patient with submassive PE (peripheral clot) - hemodynamically stable but with mod- RV dysfunction/troponin and BNP elevation. She belongs to intermediate - high risk submassive PE category.    S/p catheter directed thrombolysis for 24 hrs with improved RV function - normal now and not dilated.    O2 Sats stable. Agree with team regarding coumadin as mode of anticoagulation. Would recommend INR to be therapeutic before discharge while keeping an eye on H & H.    Consider transfusing 2 units of PRBCs as her baseline HGB was > 10 and she suffered bilateral PE to avoid desaturation on exertion.

## 2017-12-07 NOTE — PLAN OF CARE
Problem: Patient Care Overview  Goal: Plan of Care Review  Outcome: Ongoing (interventions implemented as appropriate)  Plan of care reviewed with patient. Patient remains free from injury. No acute events noted at this time. Patient encouraged to ambulate with assistance. Heparin gtt continued. Will continue to monitor patient.

## 2017-12-07 NOTE — PROGRESS NOTES
[facility]  Cardiology  Progress Note    Patient Name: Coby Atkinson  MRN: [id number]  Admission Date: 12/2/2017  Hospital Length of Stay: 5 days  Code Status: Full Code   Attending Physician: Ignacio Barragan MD   Primary Care Physician: Mundo Hudson DO  Expected Discharge Date: 12/11/2017  Principal Problem:Acute pulmonary embolus    Subjective:     Hospital Course:   - 12/3 admitted, hemodynamically stable PE, INV cardiology on board for consideration of CDT, CTS on board with no plan to perform surgery, move to the CMICU for closer monitoring, LE Doppler US, pigtail catheter placed for tpa infusion  - 12/4 pigtail catheter removed this AM, hep gtt; echo ordered, possibly stepdown uziel  - 12/5: Melena overnight with Hb drop this AM. Apixaban held. GI consulted.   - 12/6: Further Hb drop. Awaiting EGD today. No chest pain/ SOB.  -12/7: EGD with oozing duodenal ulcer, S/P, heparin running, coumadin started.       Review of Systems   Constitution: Negative for chills and fever.   Cardiovascular: Negative for chest pain, dyspnea on exertion, irregular heartbeat, leg swelling, orthopnea, palpitations, paroxysmal nocturnal dyspnea and syncope.   Respiratory: Negative for hemoptysis, shortness of breath and wheezing.    Gastrointestinal: Positive for melena. Negative for abdominal pain, diarrhea, hematemesis, nausea and vomiting.   Genitourinary: Negative for dysuria and frequency.   Neurological: Negative for focal weakness, headaches and tremors.   Psychiatric/Behavioral: Negative for altered mental status, suicidal ideas and thoughts of violence.     Objective:     Vital Signs (Most Recent):  Temp: 97.9 °F (36.6 °C) (12/07/17 0800)  Pulse: 107 (12/07/17 1126)  Resp: 15 (12/07/17 1125)  BP: 115/67 (12/07/17 1125)  SpO2: 99 % (12/07/17 1125) Vital Signs (24h Range):  Temp:  [97.9 °F (36.6 °C)-98.4 °F (36.9 °C)] 97.9 °F (36.6 °C)  Pulse:  [] 107  Resp:  [15-20] 15  SpO2:  [94 %-100  %] 99 %  BP: (112-131)/(56-76) 115/67     Weight: 103.9 kg (229 lb 0.9 oz)  Body mass index is 40.58 kg/m².     SpO2: 99 %  O2 Device (Oxygen Therapy): room air      Intake/Output Summary (Last 24 hours) at 12/07/17 1209  Last data filed at 12/07/17 1000   Gross per 24 hour   Intake              845 ml   Output             1650 ml   Net             -805 ml       Lines/Drains/Airways     Peripheral Intravenous Line                 Peripheral IV - Single Lumen 12/02/17 2123 Left Antecubital 4 days                Physical Exam   Constitutional: She is oriented to person, place, and time. She appears well-developed and well-nourished.   HENT:   Head: Normocephalic and atraumatic.   Eyes: EOM are normal.   Cardiovascular: Normal rate and regular rhythm.  Exam reveals no gallop and no friction rub.    Pulmonary/Chest: Effort normal and breath sounds normal. No stridor. She has no wheezes. She has no rales.   Abdominal: Soft. Bowel sounds are normal. There is no rebound and no guarding.   Musculoskeletal: She exhibits no edema.   Neurological: She is alert and oriented to person, place, and time. No cranial nerve deficit.   Skin: Skin is warm and dry.   Psychiatric: She has a normal mood and affect. Her behavior is normal.         Assessment and Plan:       * Acute pulmonary embolus    - S/p catheter directed thrombolysis 12/3  - Hemodynamically stable  - Continue heparin and coumadin  - Holding apixaban due to melena and drop in Hb this morning, restart when safe        Obesity (BMI 30-39.9)    - pt taking topiramate 50 mg BID for weight loss as this is a common side effect of the medication        Essential hypertension    - Held antihypertensives on admit  - 12/4: resumed amlodipine 10 mg daily, monitor and add home meds as needed        Gastroesophageal reflux disease    - Cont pantoprazole 40 mg IV BID  - Seen by GI today, appreciate recs, awaiting EGD today, transfuse as needed        Depression    - As per primary  team            VTE Risk Mitigation         Ordered     heparin 25,000 units in dextrose 5% 250 mL (100 units/mL) infusion  Continuous     Route:  Intravenous        12/06/17 1744     heparin 25,000 units in dextrose 5% 250 mL (100 units/mL) bolus from bag; ADDITIONAL PRN BOLUS  As needed (PRN)     Route:  Intravenous        12/06/17 1744     heparin 25,000 units in dextrose 5% 250 mL (100 units/mL) bolus from bag; ADDITIONAL PRN BOLUS  As needed (PRN)     Route:  Intravenous        12/06/17 1744     warfarin (COUMADIN) tablet 5 mg  Daily     Route:  Oral        12/06/17 1720     High Risk of VTE  Once      12/02/17 2220          Sloane Adrian MD  Cardiology  [facility]

## 2017-12-07 NOTE — PHYSICIAN QUERY
PT Name: Coby Atkinson  MR #: [id number]     Physician Query Form - Documentation Clarification      CDS/: Brittny Waldrop RN, CCDS              Contact information: [email or web address]    This form is a permanent document in the medical record.     Query Date: December 7, 2017    By submitting this query, we are merely seeking further clarification of documentation. Please utilize your independent clinical judgment when addressing the question(s) below.    The Medical record reflects the following:    Supporting Clinical Findings Location in Medical Record     One oozing duodenal ulcer with pigmented  material. Injected with Epinephrine and coaptive coagulation with bipolar cautery was successful.       Gi note 12/6                                                                                      Doctor, Please specify diagnosis or diagnoses associated with above clinical findings. Please clarify acuity of duodenal ulcer,    Provider Use Only      (    )  Acute    (    )  Chronic    (  x  )  Acute on chronic                                                                                                                           [  ] Clinically undetermined

## 2017-12-07 NOTE — PROGRESS NOTES
"Progress Note  Hospital Medicine    Provider team:    [facility] [place] CARDIOLOGY TEAM A - CARDIOLOGY CONSULT STEPDOWN  [facility] GASTROENTEROLOGY  [facility] HOSP MED C  Admit Date: 12/2/2017  Encounter Date: 12/07/2017     SUBJECTIVE:     Follow-up Visit for: Acute pulmonary embolus    HPI (See H&P for complete P,F,SHx): 72F with HTN, CKD-3, thalassemia minor, ADRIÁN, and obesity, admitted via ED to Hospital Medicine on 12/3 by overnight MD Dr. Clay Mcmahon with worsening SOB and near-syncope for 2 days, Dx-ed with presumed NSTEMI, started on NSTEMI pathway, and D Dimer checked for concern for PE, d dimer elevated so CTA STAT done and revealed submassive B PE with RV strain, trop 0.4, , so went to cath lab with Interventional cardiology (Warren/Francis) for emergent catheter-directed tPA on 12/3. Per Interventional, "Patient with submassive PE (peripheral clots) - hemodynamically stable but with mild RV dysfunction/troponin and BNP elevation. She belongs to intermediate - high risk submassive PE category."     Per prior notes "No significant smoking hx, no significant family hx, previous ROBERTO showed no evidence of ischemia (most recently in 2016). +ve troponins, initially 0.4, also has elevated BNP. Her presentation is atypical but her KITCHEN may be her anginal equivalent. JAYLEN risk score of 2."      Diagnostics:  12/4 LE Doppler: with age-indeterminate L popliteal DVT     12/5 2D Echo with CFD  CONCLUSIONS     1 - Normal left ventricular systolic function (EF 60-65%).     2 - No wall motion abnormalities.     3 - Indeterminate LV diastolic function.     4 - Normal right ventricular systolic function .     5 - Pulmonary hypertension. The estimated PA systolic pressure is 67 mmHg.     Patient's course was then c/b melena. GI was consulted on the patient. EGD was performed 12/6: showed nodular gastropathy.  One oozing duodenal ulcer was found with pigmented material; this was injected with epinephrine and cauterized.  The patient was " recommended for continuation of heparin and now coumadin is used (favored due to reversibility, but will d/w cardiology if NOAC favored).  Patient also recommended for CLD, sucralfate, and PPI.  Repeat upper endoscopy suggested as outpatient.    Interval history:  The patient reported to me no further dark or tarry stools. She generally offers no complaints, no CP or SOB. She remains in good spirits and all questions were answered to patient satisfaction.  She says she has some palpitations with walking.     I explained R/B/A of blood transfusion and patient agrees to 1 U pRBC transfusion.    Review of Systems:  Review of Systems   Constitutional: Negative for chills and fever.   HENT: Negative for congestion and sore throat.    Eyes: Negative for photophobia, pain and discharge.   Respiratory: Negative for cough, hemoptysis, sputum production and shortness of breath.    Cardiovascular: Negative for chest pain, palpitations and leg swelling.   Gastrointestinal: Negative for abdominal pain, diarrhea, nausea and vomiting.   Genitourinary: Negative for dysuria and urgency.   Musculoskeletal: Negative for myalgias and neck pain.   Skin: Negative for itching and rash.   Neurological: Negative for sensory change, focal weakness and headaches.   Endo/Heme/Allergies: Negative for polydipsia. Does not bruise/bleed easily.   Psychiatric/Behavioral: Negative for depression and suicidal ideas.       OBJECTIVE:       Intake/Output Summary (Last 24 hours) at 12/07/17 1603  Last data filed at 12/07/17 1400   Gross per 24 hour   Intake             1380 ml   Output             2550 ml   Net            -1170 ml     Vital Signs Range (Last 24H):  Temp:  [97.9 °F (36.6 °C)-98.2 °F (36.8 °C)]   Pulse:  []   Resp:  [15-20]   BP: (115-131)/(67-76)   SpO2:  [94 %-100 %]   Body mass index is 40.58 kg/m².    Objective:  General Appearance:  Comfortable, well-appearing, in no acute distress and not in pain.    Vital signs: (most  "recent): Blood pressure 115/67, pulse 77, temperature 97.9 °F (36.6 °C), temperature source Oral, resp. rate 15, height 5' 3" (1.6 m), weight 103.9 kg (229 lb 0.9 oz), SpO2 99 %, not currently breastfeeding.  No fever.    Output: Producing urine and producing stool.    HEENT: Normal HEENT exam.    Lungs:  Normal effort.  Breath sounds clear to auscultation.  She is not in respiratory distress.  No rales or wheezes.    Heart: Normal rate.  Regular rhythm.  S1 normal and S2 normal.  No murmur.   Chest: Symmetric chest wall expansion.   Abdomen: Abdomen is soft.  Bowel sounds are normal.   There is no abdominal tenderness.     Extremities: Normal range of motion.  There is no deformity, effusion, dependent edema or local swelling.    Pulses: Distal pulses are intact.    Neurological: Patient is alert and oriented to person, place and time.  Normal strength.    Pupils:  Pupils are equal, round, and reactive to light.    Skin:  Warm and dry.      Medications:  Medication list was reviewed in EPIC and changes noted under Assessment/Plan and MAR.    Laboratory:  Recent Labs      12/07/17   0757   WBC  13.36*   RBC  3.66*   HGB  7.3*   HCT  24.3*   PLT  242   MCV  66*   MCH  19.9*   MCHC  30.0*   GRAN  50.7  6.8   LYMPH  36.2  4.8   MONO  4.7  0.6   EOS  0.9*      Recent Labs      12/07/17   0426   GLU  90   NA  139   K  3.9   CL  110   CO2  22*   BUN  30*   CREATININE  1.2   CALCIUM  8.8   ANIONGAP  7*   MG  1.8   PHOS  4.0       ASSESSMENT/PLAN:     Active Hospital Problems    Diagnosis  POA    *Acute pulmonary embolus [I26.99]  Yes     Submassive PE, s/p catheter-directed tPA      Pulmonary embolus [I26.99]  Yes    NSTEMI (non-ST elevated myocardial infarction) [I21.4]  Yes    Elevated troponin [R74.8]  Yes    Leukocytosis [D72.829]  Yes    CKD (chronic kidney disease) stage 3, GFR 30-59 ml/min [N18.3]  Yes    Obesity (BMI 30-39.9) [E66.9]  Yes    Essential hypertension [I10]  Yes    Thalassemia minor [D56.3] "  Yes    Microcytic anemia [D50.9]  Yes    Gastroesophageal reflux disease [K21.9]  Yes    Depression [F32.9]  Yes      Resolved Hospital Problems    Diagnosis Date Resolved POA    Melena [K92.1] 12/07/2017 Yes      Submassive bilateral PE  Age-indeterminate DVT  - s/p catheter directed tPA 12/3  - needs hypercoagulable workup ASAP; refer to Dr. Lora of Hematology  - needs age-appropriate malignancy screening  - continue with heparin-to-coumadin transition     Acute upper GIB  Duodenal ulcer  Acute blood loss anemia  - unmasked by tPA and anticoagulation   - EGD results: duodenal ulcer s/p cauterization  - H. Pylori serology negative  - Will transition to cardiac diet per my discussions with GI  - Start sucralfate and transition to PPI PO BID    - Repeat upper endoscopy suggested as outpatient by GI  - Trend CBC q12h   - Transfuse 1 U pRBC now; consent formed signed with RN witness    Essential HTN  - Presently home antiHTNsives are being held  - Can resume amlodipine as clinically indicated  - Patient on lasix, can continue  - Other home meds appear to include hyzaar and toprol-XL (will clarify with patient prior to reinitiation if needed)    CKD-3  - Stable  - Avoid further nephrotoxic agents    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 Given high risk bleed and high risk clot will desire INR to be therapeutic before discharge while keeping eye on H/H. C/w close monitoring H/H; transfuse 1 U pRBC today.  Ignacio Barragan MD  [facility]

## 2017-12-07 NOTE — PHYSICIAN QUERY
PT Name: Coby Atkinson  MR #: [id number]     Physician Query Form - Diagnosis Clarification      CDS/: Brittny Waldrop RN, CCDS             Contact information: [email or web address]    This form is a permanent document in the medical record.     Query Date: December 7, 2017    By submitting this query, we are merely seeking further clarification of documentation.  Please utilize your independent clinical judgment when addressing the question(s) below.     The medical record contains the following:      Findings Supporting Clinical Information Location in Medical Record   NSTEMI             Dx-ed with presumed NSTEMI, started on NSTEMI pathway . No significant smoking hx, no significant family hx, previous ROBERTO showed no evidence of ischemia (most recently in 2016). +ve troponins, initially 0.4, also has elevated BNP. Her presentation is atypical but her KITCHEN may be her anginal equivalent. JAYLEN risk score of 2.           TROP 0.405-->0.416 12/2 cards note            12/6 progress note        12/2 Lab     Please clarify if the __NSTEMI______ diagnosis has been:    [  ] Ruled In  [  ] Ruled In, Now Resolved  [ x ] Ruled Out  [  ] Clinically undetermined  [  ] Other/Clarification of findings (please specify)_______________________________    Please document in your progress notes daily for the duration of treatment, until resolved, and include in your discharge summary.

## 2017-12-07 NOTE — PLAN OF CARE
Problem: Physical Therapy Goal  Goal: Physical Therapy Goal  Goals to be met by: 17     Patient will increase functional independence with mobility by performin. Supine to sit with Modified Wilkinson  2. Sit to supine with Modified Wilkinson  3. Sit to stand transfer with Modified Wilkinson  4. Gait  x 150 feet with Supervision using  LRAD  5. Lower extremity exercise program x 30 reps per handout, with independence    Outcome: Ongoing (interventions implemented as appropriate)  PT eval complete and POC initiated.

## 2017-12-07 NOTE — PLAN OF CARE
Problem: Occupational Therapy Goal  Goal: Occupational Therapy Goal  Goals to be met by: 7 days    Patient will increase functional independence with ADLs by performing:    UE Dressing with Supervision.  LE Dressing with Supervision with AD as needed.  Grooming while standing with Supervision.  Toileting from bedside commode with Supervision for hygiene and clothing management.   Stand pivot transfers with Supervision.  Toilet transfer to bedside commode with Supervision.  Pt will demonstrate 2 energy conservation techniques with ADLs/IADLs.       BANDAR Mckeon  12/7/2017

## 2017-12-08 PROBLEM — K26.9 DUODENAL ULCER: Status: ACTIVE | Noted: 2017-12-08

## 2017-12-08 PROBLEM — D62 ACUTE BLOOD LOSS ANEMIA: Status: ACTIVE | Noted: 2017-12-08

## 2017-12-08 LAB
ALBUMIN SERPL BCP-MCNC: 2.8 G/DL
ALP SERPL-CCNC: 59 U/L
ALT SERPL W/O P-5'-P-CCNC: 13 U/L
ANION GAP SERPL CALC-SCNC: 7 MMOL/L
AST SERPL-CCNC: 16 U/L
BASOPHILS # BLD AUTO: 0.04 K/UL
BASOPHILS # BLD AUTO: 0.07 K/UL
BASOPHILS NFR BLD: 0.3 %
BASOPHILS NFR BLD: 0.5 %
BILIRUB SERPL-MCNC: 0.4 MG/DL
BUN SERPL-MCNC: 24 MG/DL
CALCIUM SERPL-MCNC: 8.8 MG/DL
CHLORIDE SERPL-SCNC: 109 MMOL/L
CO2 SERPL-SCNC: 24 MMOL/L
CREAT SERPL-MCNC: 1.3 MG/DL
DIFFERENTIAL METHOD: ABNORMAL
DIFFERENTIAL METHOD: ABNORMAL
EOSINOPHIL # BLD AUTO: 0.9 K/UL
EOSINOPHIL # BLD AUTO: 1 K/UL
EOSINOPHIL NFR BLD: 6.9 %
EOSINOPHIL NFR BLD: 7.7 %
ERYTHROCYTE [DISTWIDTH] IN BLOOD BY AUTOMATED COUNT: 18.3 %
ERYTHROCYTE [DISTWIDTH] IN BLOOD BY AUTOMATED COUNT: 18.3 %
EST. GFR  (AFRICAN AMERICAN): 47 ML/MIN/1.73 M^2
EST. GFR  (NON AFRICAN AMERICAN): 40.8 ML/MIN/1.73 M^2
FACT X PPP CHRO-ACNC: 0.75 IU/ML
FACT X PPP CHRO-ACNC: 0.76 IU/ML
GLUCOSE SERPL-MCNC: 85 MG/DL
HCT VFR BLD AUTO: 28.3 %
HCT VFR BLD AUTO: 28.7 %
HGB BLD-MCNC: 8.5 G/DL
HGB BLD-MCNC: 8.7 G/DL
IMM GRANULOCYTES # BLD AUTO: 0.22 K/UL
IMM GRANULOCYTES # BLD AUTO: 0.23 K/UL
IMM GRANULOCYTES NFR BLD AUTO: 1.6 %
IMM GRANULOCYTES NFR BLD AUTO: 1.8 %
INR PPP: 1.6
LYMPHOCYTES # BLD AUTO: 3.6 K/UL
LYMPHOCYTES # BLD AUTO: 4.6 K/UL
LYMPHOCYTES NFR BLD: 28 %
LYMPHOCYTES NFR BLD: 34 %
MCH RBC QN AUTO: 20.5 PG
MCH RBC QN AUTO: 20.7 PG
MCHC RBC AUTO-ENTMCNC: 30 G/DL
MCHC RBC AUTO-ENTMCNC: 30.3 G/DL
MCV RBC AUTO: 68 FL
MCV RBC AUTO: 69 FL
MONOCYTES # BLD AUTO: 0.8 K/UL
MONOCYTES # BLD AUTO: 0.8 K/UL
MONOCYTES NFR BLD: 5.9 %
MONOCYTES NFR BLD: 6.1 %
NEUTROPHILS # BLD AUTO: 6.9 K/UL
NEUTROPHILS # BLD AUTO: 7.2 K/UL
NEUTROPHILS NFR BLD: 51.1 %
NEUTROPHILS NFR BLD: 56.1 %
NRBC BLD-RTO: 0 /100 WBC
NRBC BLD-RTO: 0 /100 WBC
PLATELET # BLD AUTO: 261 K/UL
PLATELET # BLD AUTO: 277 K/UL
PMV BLD AUTO: 9.2 FL
PMV BLD AUTO: 9.5 FL
POTASSIUM SERPL-SCNC: 3.6 MMOL/L
PROT SERPL-MCNC: 5.7 G/DL
PROTHROMBIN TIME: 16 SEC
RBC # BLD AUTO: 4.11 M/UL
RBC # BLD AUTO: 4.25 M/UL
SODIUM SERPL-SCNC: 140 MMOL/L
WBC # BLD AUTO: 12.79 K/UL
WBC # BLD AUTO: 13.54 K/UL

## 2017-12-08 PROCEDURE — 25000003 PHARM REV CODE 250: Performed by: STUDENT IN AN ORGANIZED HEALTH CARE EDUCATION/TRAINING PROGRAM

## 2017-12-08 PROCEDURE — 25000003 PHARM REV CODE 250: Performed by: HOSPITALIST

## 2017-12-08 PROCEDURE — 25000003 PHARM REV CODE 250: Performed by: INTERNAL MEDICINE

## 2017-12-08 PROCEDURE — 80053 COMPREHEN METABOLIC PANEL: CPT

## 2017-12-08 PROCEDURE — 20600001 HC STEP DOWN PRIVATE ROOM

## 2017-12-08 PROCEDURE — 99232 SBSQ HOSP IP/OBS MODERATE 35: CPT | Mod: ,,, | Performed by: HOSPITALIST

## 2017-12-08 PROCEDURE — 63600175 PHARM REV CODE 636 W HCPCS: Performed by: HOSPITALIST

## 2017-12-08 PROCEDURE — 85025 COMPLETE CBC W/AUTO DIFF WBC: CPT | Mod: 91

## 2017-12-08 PROCEDURE — 85610 PROTHROMBIN TIME: CPT

## 2017-12-08 PROCEDURE — 85520 HEPARIN ASSAY: CPT

## 2017-12-08 PROCEDURE — 36415 COLL VENOUS BLD VENIPUNCTURE: CPT

## 2017-12-08 PROCEDURE — 99232 SBSQ HOSP IP/OBS MODERATE 35: CPT | Mod: GC,,, | Performed by: INTERNAL MEDICINE

## 2017-12-08 RX ORDER — POTASSIUM CHLORIDE 20 MEQ/15ML
40 SOLUTION ORAL ONCE
Status: COMPLETED | OUTPATIENT
Start: 2017-12-08 | End: 2017-12-08

## 2017-12-08 RX ORDER — WARFARIN 2.5 MG/1
2.5 TABLET ORAL DAILY
Status: DISCONTINUED | OUTPATIENT
Start: 2017-12-08 | End: 2017-12-09

## 2017-12-08 RX ORDER — AMLODIPINE BESYLATE 10 MG/1
10 TABLET ORAL DAILY
Status: DISCONTINUED | OUTPATIENT
Start: 2017-12-08 | End: 2017-12-10 | Stop reason: HOSPADM

## 2017-12-08 RX ADMIN — SUCRALFATE 1 G: 1 SUSPENSION ORAL at 06:12

## 2017-12-08 RX ADMIN — POTASSIUM CHLORIDE 40 MEQ: 20 SOLUTION ORAL at 11:12

## 2017-12-08 RX ADMIN — ESCITALOPRAM OXALATE 20 MG: 10 TABLET ORAL at 08:12

## 2017-12-08 RX ADMIN — SUCRALFATE 1 G: 1 SUSPENSION ORAL at 04:12

## 2017-12-08 RX ADMIN — PANTOPRAZOLE SODIUM 40 MG: 40 TABLET, DELAYED RELEASE ORAL at 04:12

## 2017-12-08 RX ADMIN — FUROSEMIDE 20 MG: 20 TABLET ORAL at 08:12

## 2017-12-08 RX ADMIN — TOPIRAMATE 50 MG: 25 TABLET, FILM COATED ORAL at 09:12

## 2017-12-08 RX ADMIN — WARFARIN SODIUM 2.5 MG: 2.5 TABLET ORAL at 04:12

## 2017-12-08 RX ADMIN — SUCRALFATE 1 G: 1 SUSPENSION ORAL at 11:12

## 2017-12-08 RX ADMIN — AMLODIPINE BESYLATE 10 MG: 10 TABLET ORAL at 11:12

## 2017-12-08 RX ADMIN — TOPIRAMATE 50 MG: 25 TABLET, FILM COATED ORAL at 08:12

## 2017-12-08 RX ADMIN — PANTOPRAZOLE SODIUM 40 MG: 40 TABLET, DELAYED RELEASE ORAL at 06:12

## 2017-12-08 RX ADMIN — HEPARIN SODIUM AND DEXTROSE 12 UNITS/KG/HR: 10000; 5 INJECTION INTRAVENOUS at 02:12

## 2017-12-08 NOTE — PLAN OF CARE
Problem: Patient Care Overview  Goal: Plan of Care Review  Outcome: Revised  Plan of care discussed with patient. Patient is free of fall/trauma/injury. Denies CP, SOB, or pain/discomfort. Heparin gtt infusing per MD order. No signs/symptoms of GIB observed.  All questions addressed. Will continue to monitor

## 2017-12-08 NOTE — PROGRESS NOTES
"Progress Note  Hospital Medicine    Provider team:    [facility] [place] [place]AM A - CARDIOLOGY CONSULT STEPDOWN  [facility]  [facility] HOSP MED C  Admit Date: 12/2/2017  Encounter Date: 12/08/2017     SUBJECTIVE:     Follow-up Visit for: Acute pulmonary embolus    HPI (See H&P for complete P,F,SHx): 72F with HTN, CKD-3, thalassemia minor, ADRIÁN, and obesity, admitted via ED to Hospital Medicine on 12/3 by overnight MD Dr. Clay Mcmahon with worsening SOB and near-syncope for 2 days, Dx-ed with presumed NSTEMI, started on NSTEMI pathway, and D Dimer checked for concern for PE, d dimer elevated so CTA STAT done and revealed submassive B PE with RV strain, trop 0.4, , so went to cath lab with Interventional cardiology (Warren/Francis) for emergent catheter-directed tPA on 12/3. Per Interventional, "Patient with submassive PE (peripheral clots) - hemodynamically stable but with mild RV dysfunction/troponin and BNP elevation. She belongs to intermediate - high risk submassive PE category."     Per prior notes "No significant smoking hx, no significant family hx, previous ROBERTO showed no evidence of ischemia (most recently in 2016). +ve troponins, initially 0.4, also has elevated BNP. Her presentation is atypical but her KITCHEN may be her anginal equivalent. JAYLEN risk score of 2."      Diagnostics:  12/4 LE Doppler: with age-indeterminate L popliteal DVT     12/5 2D Echo with CFD  CONCLUSIONS     1 - Normal left ventricular systolic function (EF 60-65%).     2 - No wall motion abnormalities.     3 - Indeterminate LV diastolic function.     4 - Normal right ventricular systolic function .     5 - Pulmonary hypertension. The estimated PA systolic pressure is 67 mmHg.     Patient's course was then c/b melena. GI was consulted on the patient. EGD was performed 12/6: showed nodular gastropathy.  One oozing duodenal ulcer was found with pigmented material; this was injected with epinephrine and cauterized.  The patient was " recommended for continuation of heparin and now coumadin is used (favored due to reversibility, but will d/w cardiology if NOAC favored).  Patient also recommended for CLD, sucralfate, and PPI.  Repeat upper endoscopy suggested as outpatient.    Interval history:  The patient reported to me no further dark or tarry stools. She generally offers no complaints, no CP or SOB. She remains in good spirits and all questions were answered to patient satisfaction.  She did not walk around today and therefore unsure if she had further palpitations.    Review of Systems:  Review of Systems   Constitutional: Negative for chills and fever.   HENT: Negative for congestion and sore throat.    Eyes: Negative for photophobia, pain and discharge.   Respiratory: Negative for cough, hemoptysis, sputum production and shortness of breath.    Cardiovascular: Negative for chest pain, palpitations and leg swelling.   Gastrointestinal: Negative for abdominal pain, diarrhea, nausea and vomiting.   Genitourinary: Negative for dysuria and urgency.   Musculoskeletal: Negative for myalgias and neck pain.   Skin: Negative for itching and rash.   Neurological: Negative for sensory change, focal weakness and headaches.   Endo/Heme/Allergies: Negative for polydipsia. Does not bruise/bleed easily.   Psychiatric/Behavioral: Negative for depression and suicidal ideas.       OBJECTIVE:       Intake/Output Summary (Last 24 hours) at 12/08/17 1044  Last data filed at 12/08/17 0600   Gross per 24 hour   Intake          1824.38 ml   Output             2100 ml   Net          -275.62 ml     Vital Signs Range (Last 24H):  Temp:  [97.6 °F (36.4 °C)-98.5 °F (36.9 °C)]   Pulse:  []   Resp:  [14-21]   BP: (115-149)/(66-78)   SpO2:  [93 %-100 %]   Body mass index is 38.66 kg/m².    Objective:  General Appearance:  Comfortable, well-appearing, in no acute distress and not in pain.    Vital signs: (most recent): Blood pressure (!) 145/73, pulse 71, temperature  "97.6 °F (36.4 °C), temperature source Oral, resp. rate 15, height 5' 3" (1.6 m), weight 99 kg (218 lb 4.1 oz), SpO2 98 %, not currently breastfeeding.  No fever.    Output: Producing urine and producing stool.    HEENT: Normal HEENT exam.    Lungs:  Normal effort.  Breath sounds clear to auscultation.  She is not in respiratory distress.  No rales or wheezes.    Heart: Normal rate.  Regular rhythm.  S1 normal and S2 normal.  No murmur.   Chest: Symmetric chest wall expansion.   Abdomen: Abdomen is soft.  Bowel sounds are normal.   There is no abdominal tenderness.     Extremities: Normal range of motion.  There is no deformity, effusion, dependent edema or local swelling.    Pulses: Distal pulses are intact.    Neurological: Patient is alert and oriented to person, place and time.  Normal strength.    Pupils:  Pupils are equal, round, and reactive to light.    Skin:  Warm and dry.      Medications:  Medication list was reviewed in EPIC and changes noted under Assessment/Plan and MAR.    Laboratory:  Recent Labs      12/08/17   0836   WBC  13.54*   RBC  4.11   HGB  8.5*   HCT  28.3*   PLT  261   MCV  69*   MCH  20.7*   MCHC  30.0*   GRAN  51.1  6.9   LYMPH  34.0  4.6   MONO  6.1  0.8   EOS  0.9*      Recent Labs      12/07/17   0426  12/08/17   0634   GLU  90  85   NA  139  140   K  3.9  3.6   CL  110  109   CO2  22*  24   BUN  30*  24*   CREATININE  1.2  1.3   CALCIUM  8.8  8.8   ANIONGAP  7*  7*   MG  1.8   --    PHOS  4.0   --        ASSESSMENT/PLAN:     Active Hospital Problems    Diagnosis  POA    *Acute pulmonary embolus [I26.99]  Yes     Submassive PE, s/p catheter-directed tPA      Pulmonary embolus [I26.99]  Yes    NSTEMI (non-ST elevated myocardial infarction) [I21.4]  Yes    Elevated troponin [R74.8]  Yes    Leukocytosis [D72.829]  Yes    CKD (chronic kidney disease) stage 3, GFR 30-59 ml/min [N18.3]  Yes    Obesity (BMI 30-39.9) [E66.9]  Yes    Essential hypertension [I10]  Yes    Thalassemia " minor [D56.3]  Yes    Microcytic anemia [D50.9]  Yes    Gastroesophageal reflux disease [K21.9]  Yes    Depression [F32.9]  Yes      Resolved Hospital Problems    Diagnosis Date Resolved POA    Melena [K92.1] 12/07/2017 Yes      Submassive bilateral PE  Age-indeterminate DVT  - s/p catheter directed tPA 12/3  - needs hypercoagulable workup ASAP; refer to Dr. Lora of Hematology  - needs age-appropriate malignancy screening  - continue with heparin-to-coumadin transition (coumadin 6 mg daily for now - d/w PharmD)     Acute upper GIB  Duodenal ulcer  Acute blood loss anemia  - unmasked by tPA and anticoagulation   - EGD results: duodenal ulcer s/p cauterization  - H. 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 Given high risk bleed and high risk clot will desire INR to be therapeutic before discharge while keeping eye on H/H. C/w close monitoring H/H.  Ignacio Barragan MD  [facility]

## 2017-12-08 NOTE — ASSESSMENT & PLAN NOTE
- S/p catheter directed thrombolysis 12/3  - Hemodynamically stable  - Continue heparin bridge  - Continue warfarin, target INR 2.0-3.0, will need at least 3-6 months of therapy  - Please refer to Coumadin Clinic upon discharge  - Follow up with Cardiology ~2 weeks after discharge    Thank you for the consult, we will sign off. Please contact us with any questions or concerns.

## 2017-12-08 NOTE — PROGRESS NOTES
[facility]  Cardiology  Progress Note    Patient Name: Coby Atkinson  MRN: [id number]  Admission Date: 12/2/2017  Hospital Length of Stay: 6 days  Code Status: Full Code   Attending Physician: Ignacio Barragan MD   Primary Care Physician: Mundo Hudson DO  Expected Discharge Date: 12/11/2017  Principal Problem:Acute pulmonary embolus    Subjective:     Hospital Course:   - 12/3 admitted, hemodynamically stable PE, INV cardiology on board for consideration of CDT, CTS on board with no plan to perform surgery, move to the CMICU for closer monitoring, LE Doppler US, pigtail catheter placed for tpa infusion  - 12/4 pigtail catheter removed this AM, hep gtt; echo ordered, possibly stepdown uziel  - 12/5: Melena overnight with Hb drop this AM. Apixaban held. GI consulted.   - 12/6: Further Hb drop. Awaiting EGD today. No chest pain/ SOB.  - 12/8: Required 1 unit overnight. No subjective complaints.    Review of Systems   Constitution: Negative for chills and fever.   Cardiovascular: Negative for chest pain, dyspnea on exertion, irregular heartbeat, leg swelling, orthopnea, palpitations, paroxysmal nocturnal dyspnea and syncope.   Respiratory: Negative for hemoptysis, shortness of breath and wheezing.    Gastrointestinal: Negative for abdominal pain, diarrhea, hematemesis, nausea and vomiting.   Genitourinary: Negative for dysuria and frequency.   Neurological: Negative for focal weakness, headaches and tremors.   Psychiatric/Behavioral: Negative for altered mental status, suicidal ideas and thoughts of violence.     Objective:     Vital Signs (Most Recent):  Temp: 97.7 °F (36.5 °C) (12/08/17 1118)  Pulse: 65 (12/08/17 1400)  Resp: 17 (12/08/17 1400)  BP: 130/66 (12/08/17 1400)  SpO2: 99 % (12/08/17 1400) Vital Signs (24h Range):  Temp:  [97.6 °F (36.4 °C)-98.5 °F (36.9 °C)] 97.7 °F (36.5 °C)  Pulse:  [58-78] 65  Resp:  [14-21] 17  SpO2:  [93 %-100 %] 99 %  BP: (130-149)/(66-78) 130/66      Weight: 99 kg (218 lb 4.1 oz)  Body mass index is 38.66 kg/m².    SpO2: 99 %  O2 Device (Oxygen Therapy): room air      Intake/Output Summary (Last 24 hours) at 12/08/17 1537  Last data filed at 12/08/17 1400   Gross per 24 hour   Intake          1684.38 ml   Output             1300 ml   Net           384.38 ml       Lines/Drains/Airways     Peripheral Intravenous Line                 Peripheral IV - Single Lumen 12/02/17 2123 Left Antecubital 5 days         Peripheral IV - Single Lumen 12/07/17 1558 Right Antecubital less than 1 day                Physical Exam   Constitutional: She is oriented to person, place, and time. She appears well-developed and well-nourished.   HENT:   Head: Normocephalic and atraumatic.   Eyes: EOM are normal.   Cardiovascular: Normal rate and regular rhythm.  Exam reveals no gallop and no friction rub.    Pulmonary/Chest: Effort normal and breath sounds normal. No stridor. She has no wheezes. She has no rales.   Abdominal: Soft. Bowel sounds are normal. There is no rebound and no guarding.   Musculoskeletal: She exhibits no edema.   Neurological: She is alert and oriented to person, place, and time. No cranial nerve deficit.   Skin: Skin is warm and dry.   Psychiatric: She has a normal mood and affect. Her behavior is normal.       Significant Labs:   CMP     Recent Labs  Lab 12/07/17  0426 12/08/17  0634    140   K 3.9 3.6    109   CO2 22* 24   GLU 90 85   BUN 30* 24*   CREATININE 1.2 1.3   CALCIUM 8.8 8.8   PROT 5.5* 5.7*   ALBUMIN 2.6* 2.8*   BILITOT 0.3 0.4   ALKPHOS 60 59   AST 17 16   ALT 13 13   ANIONGAP 7* 7*   ESTGFRAFRICA 51.8* 47.0*   EGFRNONAA 44.9* 40.8*   , CBC     Recent Labs  Lab 12/07/17  0757 12/07/17  1615 12/08/17  0836   WBC 13.36* 13.39* 13.54*   HGB 7.3* 7.7* 8.5*   HCT 24.3* 25.5* 28.3*    250 261     Significant Imaging: No interval imaging.    Assessment and Plan:     * Acute pulmonary embolus    - S/p catheter directed thrombolysis  12/3  - Hemodynamically stable  - Continue heparin bridge  - Continue warfarin, target INR 2.0-3.0, will need at least 3-6 months of therapy  - Please refer to [facility]in [facility] upon discharge  - Follow up with Cardiology ~2 weeks after discharge    Thank you for the consult, we will sign off. Please contact us with any questions or concerns.     Essential hypertension    - Held antihypertensives on admit  - 12/4: resumed amlodipine 10 mg daily, monitor and add home meds as needed         VTE Risk Mitigation         Ordered     warfarin (COUMADIN) tablet 2.5 mg  Daily     Route:  Oral        12/08/17 1053     heparin 25,000 units in dextrose 5% 250 mL (100 units/mL) infusion  Continuous     Route:  Intravenous        12/06/17 1744     heparin 25,000 units in dextrose 5% 250 mL (100 units/mL) bolus from bag; ADDITIONAL PRN BOLUS  As needed (PRN)     Route:  Intravenous        12/06/17 1744     heparin 25,000 units in dextrose 5% 250 mL (100 units/mL) bolus from bag; ADDITIONAL PRN BOLUS  As needed (PRN)     Route:  Intravenous        12/06/17 1744     High Risk of VTE  Once      12/02/17 2220          Breanna Doe MD  Cardiology  [facility]

## 2017-12-08 NOTE — TREATMENT PLAN
Treatment Plan  12/07/2017  7:22 PM    Spoke to primary team about advancing diet today. From out standpoint we are OK with that.    She has been started on Coumadin (still on heparin gtt) and will be bridge inpatient due to her risk of bleeding.    Patient has follow up appointment with GI on 1/25/18 and order for repeat EGD 12 weeks from her EGD on this admission has been placed.    Will continue to follow closely alongside primary team.   If patient clinical status changes or there are signs of active bleeding please notify Gi.    Jeovany Talbert M.D.  Gastroenterology Fellow, PGY-IV  Pager: [phone]  [facility]

## 2017-12-08 NOTE — SUBJECTIVE & OBJECTIVE
Review of Systems   Constitution: Negative for chills and fever.   Cardiovascular: Negative for chest pain, dyspnea on exertion, irregular heartbeat, leg swelling, orthopnea, palpitations, paroxysmal nocturnal dyspnea and syncope.   Respiratory: Negative for hemoptysis, shortness of breath and wheezing.    Gastrointestinal: Negative for abdominal pain, diarrhea, hematemesis, nausea and vomiting.   Genitourinary: Negative for dysuria and frequency.   Neurological: Negative for focal weakness, headaches and tremors.   Psychiatric/Behavioral: Negative for altered mental status, suicidal ideas and thoughts of violence.     Objective:     Vital Signs (Most Recent):  Temp: 97.7 °F (36.5 °C) (12/08/17 1118)  Pulse: 65 (12/08/17 1400)  Resp: 17 (12/08/17 1400)  BP: 130/66 (12/08/17 1400)  SpO2: 99 % (12/08/17 1400) Vital Signs (24h Range):  Temp:  [97.6 °F (36.4 °C)-98.5 °F (36.9 °C)] 97.7 °F (36.5 °C)  Pulse:  [58-78] 65  Resp:  [14-21] 17  SpO2:  [93 %-100 %] 99 %  BP: (130-149)/(66-78) 130/66     Weight: 99 kg (218 lb 4.1 oz)  Body mass index is 38.66 kg/m².    SpO2: 99 %  O2 Device (Oxygen Therapy): room air      Intake/Output Summary (Last 24 hours) at 12/08/17 1537  Last data filed at 12/08/17 1400   Gross per 24 hour   Intake          1684.38 ml   Output             1300 ml   Net           384.38 ml       Lines/Drains/Airways     Peripheral Intravenous Line                 Peripheral IV - Single Lumen 12/02/17 2123 Left Antecubital 5 days         Peripheral IV - Single Lumen 12/07/17 1558 Right Antecubital less than 1 day                Physical Exam   Constitutional: She is oriented to person, place, and time. She appears well-developed and well-nourished.   HENT:   Head: Normocephalic and atraumatic.   Eyes: EOM are normal.   Cardiovascular: Normal rate and regular rhythm.  Exam reveals no gallop and no friction rub.    Pulmonary/Chest: Effort normal and breath sounds normal. No stridor. She has no wheezes. She has  no rales.   Abdominal: Soft. Bowel sounds are normal. There is no rebound and no guarding.   Musculoskeletal: She exhibits no edema.   Neurological: She is alert and oriented to person, place, and time. No cranial nerve deficit.   Skin: Skin is warm and dry.   Psychiatric: She has a normal mood and affect. Her behavior is normal.       Significant Labs:   CMP     Recent Labs  Lab 12/07/17  0426 12/08/17  0634    140   K 3.9 3.6    109   CO2 22* 24   GLU 90 85   BUN 30* 24*   CREATININE 1.2 1.3   CALCIUM 8.8 8.8   PROT 5.5* 5.7*   ALBUMIN 2.6* 2.8*   BILITOT 0.3 0.4   ALKPHOS 60 59   AST 17 16   ALT 13 13   ANIONGAP 7* 7*   ESTGFRAFRICA 51.8* 47.0*   EGFRNONAA 44.9* 40.8*   , CBC     Recent Labs  Lab 12/07/17  0757 12/07/17  1615 12/08/17  0836   WBC 13.36* 13.39* 13.54*   HGB 7.3* 7.7* 8.5*   HCT 24.3* 25.5* 28.3*    250 261     Significant Imaging: No interval imaging.

## 2017-12-08 NOTE — PLAN OF CARE
Problem: Patient Care Overview  Goal: Plan of Care Review  Outcome: Ongoing (interventions implemented as appropriate)  Pt received 1 unit prbc today.  Advanced to cardiac diet, tolerated well.  Will cont to monitor.

## 2017-12-09 LAB
ALBUMIN SERPL BCP-MCNC: 3.1 G/DL
ALP SERPL-CCNC: 75 U/L
ALT SERPL W/O P-5'-P-CCNC: 14 U/L
ANION GAP SERPL CALC-SCNC: 10 MMOL/L
AST SERPL-CCNC: 19 U/L
BASOPHILS # BLD AUTO: 0.05 K/UL
BASOPHILS # BLD AUTO: 0.06 K/UL
BASOPHILS NFR BLD: 0.4 %
BASOPHILS NFR BLD: 0.5 %
BILIRUB SERPL-MCNC: 0.4 MG/DL
BUN SERPL-MCNC: 24 MG/DL
CALCIUM SERPL-MCNC: 9.6 MG/DL
CHLORIDE SERPL-SCNC: 107 MMOL/L
CO2 SERPL-SCNC: 22 MMOL/L
CREAT SERPL-MCNC: 1.4 MG/DL
DIFFERENTIAL METHOD: ABNORMAL
DIFFERENTIAL METHOD: ABNORMAL
EOSINOPHIL # BLD AUTO: 0.8 K/UL
EOSINOPHIL # BLD AUTO: 1 K/UL
EOSINOPHIL NFR BLD: 6.5 %
EOSINOPHIL NFR BLD: 7.9 %
ERYTHROCYTE [DISTWIDTH] IN BLOOD BY AUTOMATED COUNT: 18.2 %
ERYTHROCYTE [DISTWIDTH] IN BLOOD BY AUTOMATED COUNT: 18.2 %
EST. GFR  (AFRICAN AMERICAN): 43 ML/MIN/1.73 M^2
EST. GFR  (NON AFRICAN AMERICAN): 37.3 ML/MIN/1.73 M^2
FACT X PPP CHRO-ACNC: 0.53 IU/ML
FACT X PPP CHRO-ACNC: 0.6 IU/ML
FACT X PPP CHRO-ACNC: 0.72 IU/ML
FACT X PPP CHRO-ACNC: 0.72 IU/ML
GLUCOSE SERPL-MCNC: 93 MG/DL
HCT VFR BLD AUTO: 26.8 %
HCT VFR BLD AUTO: 27.9 %
HGB BLD-MCNC: 8.3 G/DL
HGB BLD-MCNC: 8.5 G/DL
IMM GRANULOCYTES # BLD AUTO: 0.3 K/UL
IMM GRANULOCYTES # BLD AUTO: 0.33 K/UL
IMM GRANULOCYTES NFR BLD AUTO: 2.5 %
IMM GRANULOCYTES NFR BLD AUTO: 2.6 %
INR PPP: 2.6
LYMPHOCYTES # BLD AUTO: 3.3 K/UL
LYMPHOCYTES # BLD AUTO: 3.9 K/UL
LYMPHOCYTES NFR BLD: 26.9 %
LYMPHOCYTES NFR BLD: 31.2 %
MCH RBC QN AUTO: 20.8 PG
MCH RBC QN AUTO: 20.9 PG
MCHC RBC AUTO-ENTMCNC: 30.5 G/DL
MCHC RBC AUTO-ENTMCNC: 31 G/DL
MCV RBC AUTO: 67 FL
MCV RBC AUTO: 69 FL
MONOCYTES # BLD AUTO: 0.8 K/UL
MONOCYTES # BLD AUTO: 0.8 K/UL
MONOCYTES NFR BLD: 6.4 %
MONOCYTES NFR BLD: 6.6 %
NEUTROPHILS # BLD AUTO: 6.6 K/UL
NEUTROPHILS # BLD AUTO: 6.8 K/UL
NEUTROPHILS NFR BLD: 52.9 %
NEUTROPHILS NFR BLD: 55.6 %
NRBC BLD-RTO: 0 /100 WBC
NRBC BLD-RTO: 0 /100 WBC
PLATELET # BLD AUTO: 271 K/UL
PLATELET # BLD AUTO: 275 K/UL
PMV BLD AUTO: 9 FL
PMV BLD AUTO: 9.3 FL
POTASSIUM SERPL-SCNC: 3.9 MMOL/L
PROT SERPL-MCNC: 6.6 G/DL
PROTHROMBIN TIME: 26.4 SEC
RBC # BLD AUTO: 4 M/UL
RBC # BLD AUTO: 4.06 M/UL
SODIUM SERPL-SCNC: 139 MMOL/L
WBC # BLD AUTO: 12.14 K/UL
WBC # BLD AUTO: 12.52 K/UL

## 2017-12-09 PROCEDURE — 25000003 PHARM REV CODE 250: Performed by: HOSPITALIST

## 2017-12-09 PROCEDURE — 85610 PROTHROMBIN TIME: CPT

## 2017-12-09 PROCEDURE — 36415 COLL VENOUS BLD VENIPUNCTURE: CPT

## 2017-12-09 PROCEDURE — 25000003 PHARM REV CODE 250: Performed by: INTERNAL MEDICINE

## 2017-12-09 PROCEDURE — 85520 HEPARIN ASSAY: CPT

## 2017-12-09 PROCEDURE — 85025 COMPLETE CBC W/AUTO DIFF WBC: CPT | Mod: 91

## 2017-12-09 PROCEDURE — 63600175 PHARM REV CODE 636 W HCPCS: Performed by: HOSPITALIST

## 2017-12-09 PROCEDURE — 99232 SBSQ HOSP IP/OBS MODERATE 35: CPT | Mod: ,,, | Performed by: HOSPITALIST

## 2017-12-09 PROCEDURE — 25000003 PHARM REV CODE 250: Performed by: STUDENT IN AN ORGANIZED HEALTH CARE EDUCATION/TRAINING PROGRAM

## 2017-12-09 PROCEDURE — 20600001 HC STEP DOWN PRIVATE ROOM

## 2017-12-09 PROCEDURE — 80053 COMPREHEN METABOLIC PANEL: CPT

## 2017-12-09 RX ORDER — ACETAMINOPHEN 325 MG/1
650 TABLET ORAL EVERY 6 HOURS PRN
Status: DISCONTINUED | OUTPATIENT
Start: 2017-12-09 | End: 2017-12-10 | Stop reason: HOSPADM

## 2017-12-09 RX ORDER — WARFARIN 1 MG/1
1 TABLET ORAL DAILY
Status: DISCONTINUED | OUTPATIENT
Start: 2017-12-09 | End: 2017-12-10

## 2017-12-09 RX ADMIN — SUCRALFATE 1 G: 1 SUSPENSION ORAL at 05:12

## 2017-12-09 RX ADMIN — AMLODIPINE BESYLATE 10 MG: 10 TABLET ORAL at 08:12

## 2017-12-09 RX ADMIN — TOPIRAMATE 50 MG: 25 TABLET, FILM COATED ORAL at 08:12

## 2017-12-09 RX ADMIN — WARFARIN SODIUM 1 MG: 1 TABLET ORAL at 05:12

## 2017-12-09 RX ADMIN — PANTOPRAZOLE SODIUM 40 MG: 40 TABLET, DELAYED RELEASE ORAL at 05:12

## 2017-12-09 RX ADMIN — SUCRALFATE 1 G: 1 SUSPENSION ORAL at 12:12

## 2017-12-09 RX ADMIN — ACETAMINOPHEN 650 MG: 325 TABLET ORAL at 09:12

## 2017-12-09 RX ADMIN — SUCRALFATE 1 G: 1 SUSPENSION ORAL at 11:12

## 2017-12-09 RX ADMIN — ESCITALOPRAM OXALATE 20 MG: 10 TABLET ORAL at 08:12

## 2017-12-09 RX ADMIN — HEPARIN SODIUM AND DEXTROSE 10 UNITS/KG/HR: 10000; 5 INJECTION INTRAVENOUS at 08:12

## 2017-12-09 RX ADMIN — FUROSEMIDE 20 MG: 20 TABLET ORAL at 08:12

## 2017-12-09 NOTE — PROGRESS NOTES
"Progress Note  Hospital Medicine    Provider team:    [facility] GASTROENTEROLOGY  [facility] HOSP MED C  Admit Date: 12/2/2017  Encounter Date: 12/09/2017     SUBJECTIVE:     Follow-up Visit for: Acute pulmonary embolus    HPI (See H&P for complete P,F,SHx): 72F with HTN, CKD-3, thalassemia minor, ADRIÁN, and obesity, admitted via ED to Hospital Medicine on 12/3 by overnight MD Dr. Clay Mcmahon with worsening SOB and near-syncope for 2 days, Dx-ed with presumed NSTEMI, started on NSTEMI pathway, and D Dimer checked for concern for PE, d dimer elevated so CTA STAT done and revealed submassive B PE with RV strain, trop 0.4, , so went to cath lab with Interventional cardiology (Warren/Francis) for emergent catheter-directed tPA on 12/3. Per Interventional, "Patient with submassive PE (peripheral clots) - hemodynamically stable but with mild RV dysfunction/troponin and BNP elevation. She belongs to intermediate - high risk submassive PE category."     Per prior notes "No significant smoking hx, no significant family hx, previous ROBERTO showed no evidence of ischemia (most recently in 2016). +ve troponins, initially 0.4, also has elevated BNP. Her presentation is atypical but her KITCHEN may be her anginal equivalent. JAYLEN risk score of 2."      Diagnostics:  12/4 LE Doppler: with age-indeterminate L popliteal DVT     12/5 2D Echo with CFD  CONCLUSIONS     1 - Normal left ventricular systolic function (EF 60-65%).     2 - No wall motion abnormalities.     3 - Indeterminate LV diastolic function.     4 - Normal right ventricular systolic function .     5 - Pulmonary hypertension. The estimated PA systolic pressure is 67 mmHg.     Patient's course was then c/b melena. GI was consulted on the patient. EGD was performed 12/6: showed nodular gastropathy.  One oozing duodenal ulcer was found with pigmented material; this was injected with epinephrine and cauterized.  The patient was recommended for continuation of heparin and now coumadin " "is used (favored due to reversibility, but will d/w cardiology if NOAC favored).  Patient also recommended for CLD, sucralfate, and PPI.  Repeat upper endoscopy suggested as outpatient.    Interval history:  The patient reported to me no further dark or tarry stools. She generally offers no complaints, no CP or SOB. She remains in good spirits and all questions were answered to patient satisfaction.     Review of Systems:  Review of Systems   Constitutional: Negative for chills and fever.   HENT: Negative for congestion and sore throat.    Eyes: Negative for photophobia, pain and discharge.   Respiratory: Negative for cough, hemoptysis, sputum production and shortness of breath.    Cardiovascular: Negative for chest pain, palpitations and leg swelling.   Gastrointestinal: Negative for abdominal pain, diarrhea, nausea and vomiting.   Genitourinary: Negative for dysuria and urgency.   Musculoskeletal: Negative for myalgias and neck pain.   Skin: Negative for itching and rash.   Neurological: Negative for sensory change, focal weakness and headaches.   Endo/Heme/Allergies: Negative for polydipsia. Does not bruise/bleed easily.   Psychiatric/Behavioral: Negative for depression and suicidal ideas.       OBJECTIVE:       Intake/Output Summary (Last 24 hours) at 12/09/17 0942  Last data filed at 12/09/17 0600   Gross per 24 hour   Intake            805.6 ml   Output             1600 ml   Net           -794.4 ml     Vital Signs Range (Last 24H):  Temp:  [97.6 °F (36.4 °C)-98.4 °F (36.9 °C)]   Pulse:  [58-73]   Resp:  [15-20]   BP: (106-151)/(60-75)   SpO2:  [97 %-99 %]   Body mass index is 38.55 kg/m².    Objective:  General Appearance:  Comfortable, well-appearing, in no acute distress and not in pain.    Vital signs: (most recent): Blood pressure (!) 151/71, pulse 62, temperature 98.4 °F (36.9 °C), temperature source Oral, resp. rate 16, height 5' 3" (1.6 m), weight 98.7 kg (217 lb 9.5 oz), SpO2 99 %, not currently " breastfeeding.  No fever.    Output: Producing urine and producing stool.    HEENT: Normal HEENT exam.    Lungs:  Normal effort.  Breath sounds clear to auscultation.  She is not in respiratory distress.  No rales or wheezes.    Heart: Normal rate.  Regular rhythm.  S1 normal and S2 normal.  No murmur.   Chest: Symmetric chest wall expansion.   Abdomen: Abdomen is soft.  Bowel sounds are normal.   There is no abdominal tenderness.     Extremities: Normal range of motion.  There is no deformity, effusion, dependent edema or local swelling.    Pulses: Distal pulses are intact.    Neurological: Patient is alert and oriented to person, place and time.  Normal strength.    Pupils:  Pupils are equal, round, and reactive to light.    Skin:  Warm and dry.      Medications:  Medication list was reviewed in EPIC and changes noted under Assessment/Plan and MAR.    Laboratory:  Recent Labs      12/09/17   0836   WBC  12.52   RBC  4.06   HGB  8.5*   HCT  27.9*   PLT  275   MCV  69*   MCH  20.9*   MCHC  30.5*   GRAN  52.9  6.6   LYMPH  31.2  3.9   MONO  6.4  0.8   EOS  0.8*      Recent Labs      12/07/17   0426   12/09/17   0646   GLU  90   < >  93   NA  139   < >  139   K  3.9   < >  3.9   CL  110   < >  107   CO2  22*   < >  22*   BUN  30*   < >  24*   CREATININE  1.2   < >  1.4   CALCIUM  8.8   < >  9.6   ANIONGAP  7*   < >  10   MG  1.8   --    --    PHOS  4.0   --    --     < > = values in this interval not displayed.       ASSESSMENT/PLAN:     Active Hospital Problems    Diagnosis  POA    *Acute pulmonary embolus [I26.99]  Yes     Submassive PE, s/p catheter-directed tPA      Duodenal ulcer [K26.9]  Yes    Acute blood loss anemia [D62]  Yes    Pulmonary embolus [I26.99]  Yes    NSTEMI (non-ST elevated myocardial infarction) [I21.4]  Yes    Elevated troponin [R74.8]  Yes    Leukocytosis [D72.829]  Yes    CKD (chronic kidney disease) stage 3, GFR 30-59 ml/min [N18.3]  Yes    Obesity (BMI 30-39.9) [E66.9]  Yes     Essential hypertension [I10]  Yes    Thalassemia minor [D56.3]  Yes    Microcytic anemia [D50.9]  Yes    Gastroesophageal reflux disease [K21.9]  Yes    Depression [F32.9]  Yes      Resolved Hospital Problems    Diagnosis Date Resolved POA    Melena [K92.1] 12/07/2017 Yes      Submassive bilateral PE  Age-indeterminate DVT  - s/p catheter directed tPA 12/3  - needs hypercoagulable workup ASAP; refer to Dr. Lora of Hematology  - needs age-appropriate malignancy screening  - continue with heparin-to-coumadin transition: decrease coumadin to 1 mg daily today, all the while continuing heparin (needs therapeutic INR x 2 over 24 hours)     Acute upper GIB  Duodenal ulcer  Acute blood loss anemia  - unmasked by tPA and anticoagulation   - EGD results: duodenal ulcer s/p cauterization  - H. 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 Given high risk bleed and high risk clot will desire INR to be therapeutic before discharge while keeping eye on H/H. C/w close monitoring H/H.  Ignacio Barragan MD  [facility]

## 2017-12-09 NOTE — PLAN OF CARE
Problem: Patient Care Overview  Goal: Plan of Care Review  Outcome: Ongoing (interventions implemented as appropriate)  S/P procedure- patient will remain free of bleeding. Reviewed sign & symptoms of bleeding, will remain free of falls by keep her room clustered free, Discuss Heparin drip and blood draw requirements to monitor blood levels.

## 2017-12-09 NOTE — PLAN OF CARE
Problem: Patient Care Overview  Goal: Plan of Care Review  Outcome: Ongoing (interventions implemented as appropriate)  Pt verbalizes no complaints. Denies CP, SOB, or other discomforts. Heparin drip infusing as ordered. No sign/symptoms of GI bleed noticed. Pt H/H stable. VSS.  Pt remains free of fall or injury. Pt verbalizes understanding of plan of care. Will continue to monitor.

## 2017-12-10 VITALS
SYSTOLIC BLOOD PRESSURE: 126 MMHG | DIASTOLIC BLOOD PRESSURE: 72 MMHG | HEIGHT: 63 IN | OXYGEN SATURATION: 99 % | BODY MASS INDEX: 38.56 KG/M2 | RESPIRATION RATE: 15 BRPM | TEMPERATURE: 98 F | HEART RATE: 79 BPM | WEIGHT: 217.63 LBS

## 2017-12-10 PROBLEM — I26.99 ACUTE PULMONARY EMBOLISM: Status: ACTIVE | Noted: 2017-12-10

## 2017-12-10 LAB
ALBUMIN SERPL BCP-MCNC: 3 G/DL
ALP SERPL-CCNC: 64 U/L
ALT SERPL W/O P-5'-P-CCNC: 15 U/L
ANION GAP SERPL CALC-SCNC: 7 MMOL/L
AST SERPL-CCNC: 19 U/L
BASOPHILS # BLD AUTO: 0.05 K/UL
BASOPHILS NFR BLD: 0.4 %
BILIRUB SERPL-MCNC: 0.4 MG/DL
BUN SERPL-MCNC: 26 MG/DL
CALCIUM SERPL-MCNC: 9.1 MG/DL
CHLORIDE SERPL-SCNC: 109 MMOL/L
CO2 SERPL-SCNC: 23 MMOL/L
CREAT SERPL-MCNC: 1.5 MG/DL
DIFFERENTIAL METHOD: ABNORMAL
EOSINOPHIL # BLD AUTO: 1 K/UL
EOSINOPHIL NFR BLD: 9 %
ERYTHROCYTE [DISTWIDTH] IN BLOOD BY AUTOMATED COUNT: 18.7 %
EST. GFR  (AFRICAN AMERICAN): 39.6 ML/MIN/1.73 M^2
EST. GFR  (NON AFRICAN AMERICAN): 34.3 ML/MIN/1.73 M^2
FACT X PPP CHRO-ACNC: 0.45 IU/ML
GLUCOSE SERPL-MCNC: 87 MG/DL
HCT VFR BLD AUTO: 28.1 %
HGB BLD-MCNC: 8.6 G/DL
IMM GRANULOCYTES # BLD AUTO: 0.27 K/UL
IMM GRANULOCYTES NFR BLD AUTO: 2.4 %
INR PPP: 2.8
LYMPHOCYTES # BLD AUTO: 3.7 K/UL
LYMPHOCYTES NFR BLD: 32.1 %
MCH RBC QN AUTO: 21.1 PG
MCHC RBC AUTO-ENTMCNC: 30.6 G/DL
MCV RBC AUTO: 69 FL
MONOCYTES # BLD AUTO: 0.8 K/UL
MONOCYTES NFR BLD: 7.1 %
NEUTROPHILS # BLD AUTO: 5.6 K/UL
NEUTROPHILS NFR BLD: 49 %
NRBC BLD-RTO: 0 /100 WBC
PLATELET # BLD AUTO: 291 K/UL
PMV BLD AUTO: 9.6 FL
POTASSIUM SERPL-SCNC: 3.7 MMOL/L
PROT SERPL-MCNC: 6 G/DL
PROTHROMBIN TIME: 28.2 SEC
RBC # BLD AUTO: 4.08 M/UL
SODIUM SERPL-SCNC: 139 MMOL/L
WBC # BLD AUTO: 11.45 K/UL

## 2017-12-10 PROCEDURE — 25000003 PHARM REV CODE 250: Performed by: STUDENT IN AN ORGANIZED HEALTH CARE EDUCATION/TRAINING PROGRAM

## 2017-12-10 PROCEDURE — 99239 HOSP IP/OBS DSCHRG MGMT >30: CPT | Mod: ,,, | Performed by: HOSPITALIST

## 2017-12-10 PROCEDURE — 36415 COLL VENOUS BLD VENIPUNCTURE: CPT

## 2017-12-10 PROCEDURE — 85520 HEPARIN ASSAY: CPT

## 2017-12-10 PROCEDURE — 80053 COMPREHEN METABOLIC PANEL: CPT

## 2017-12-10 PROCEDURE — 25000003 PHARM REV CODE 250: Performed by: HOSPITALIST

## 2017-12-10 PROCEDURE — 85025 COMPLETE CBC W/AUTO DIFF WBC: CPT

## 2017-12-10 PROCEDURE — 25000003 PHARM REV CODE 250: Performed by: INTERNAL MEDICINE

## 2017-12-10 PROCEDURE — 85610 PROTHROMBIN TIME: CPT

## 2017-12-10 RX ORDER — WARFARIN 2.5 MG/1
2.5 TABLET ORAL DAILY
Status: DISCONTINUED | OUTPATIENT
Start: 2017-12-10 | End: 2017-12-10 | Stop reason: HOSPADM

## 2017-12-10 RX ORDER — POTASSIUM CHLORIDE 20 MEQ/1
20 TABLET, EXTENDED RELEASE ORAL ONCE
Status: COMPLETED | OUTPATIENT
Start: 2017-12-10 | End: 2017-12-10

## 2017-12-10 RX ORDER — WARFARIN 2.5 MG/1
2.5 TABLET ORAL DAILY
Qty: 30 TABLET | Refills: 1 | Status: SHIPPED | OUTPATIENT
Start: 2017-12-10 | End: 2018-06-06 | Stop reason: DRUGHIGH

## 2017-12-10 RX ORDER — PANTOPRAZOLE SODIUM 40 MG/1
40 TABLET, DELAYED RELEASE ORAL
Qty: 60 TABLET | Refills: 2 | Status: SHIPPED | OUTPATIENT
Start: 2017-12-10 | End: 2018-01-25 | Stop reason: SDUPTHER

## 2017-12-10 RX ORDER — FUROSEMIDE 20 MG/1
20 TABLET ORAL DAILY
Qty: 30 TABLET | Refills: 1 | Status: SHIPPED | OUTPATIENT
Start: 2017-12-11 | End: 2018-10-18 | Stop reason: SDUPTHER

## 2017-12-10 RX ORDER — SUCRALFATE 1 G/10ML
1 SUSPENSION ORAL EVERY 6 HOURS
Qty: 560 ML | Refills: 0 | Status: SHIPPED | OUTPATIENT
Start: 2017-12-10 | End: 2017-12-24

## 2017-12-10 RX ADMIN — PANTOPRAZOLE SODIUM 40 MG: 40 TABLET, DELAYED RELEASE ORAL at 06:12

## 2017-12-10 RX ADMIN — POTASSIUM CHLORIDE 20 MEQ: 1500 TABLET, EXTENDED RELEASE ORAL at 09:12

## 2017-12-10 RX ADMIN — AMLODIPINE BESYLATE 10 MG: 10 TABLET ORAL at 08:12

## 2017-12-10 RX ADMIN — TOPIRAMATE 50 MG: 25 TABLET, FILM COATED ORAL at 08:12

## 2017-12-10 RX ADMIN — ESCITALOPRAM OXALATE 20 MG: 10 TABLET ORAL at 08:12

## 2017-12-10 RX ADMIN — SUCRALFATE 1 G: 1 SUSPENSION ORAL at 06:12

## 2017-12-10 RX ADMIN — FUROSEMIDE 20 MG: 20 TABLET ORAL at 08:12

## 2017-12-10 NOTE — PLAN OF CARE
Problem: Patient Care Overview  Goal: Plan of Care Review  Outcome: Ongoing (interventions implemented as appropriate)  Heparin gtt infusing @ MD ordered rate. VSS. O2 sats stable on RA. Pt denies CP, SOB, palpitations, lightheadedness and dizziness. Fall precautions maintained this shift, pt remains free from falls, trauma and injury. D/C in AM pending INR. POC reviewed with pt, verbalized understanding. NADN. Will continue to monitor.

## 2017-12-10 NOTE — PLAN OF CARE
Discharge plan is home with home health.  Pt prefer [facility]. Weekend MSW sent referral to [facility] and verbally given to Blanca at [facility], [phone].    Kamini Ramos LMSW

## 2017-12-10 NOTE — DISCHARGE SUMMARY
"Discharge Summary  Hospital Medicine    Attending Provider on Discharge: Ignacio Barragan     Discharging Team:    [facility]  [facility]     Date of Admission:  12/2/2017         Date of Discharge:  12/10/2017      Diagnoses:     Principal Problem(s):   Acute pulmonary embolus     Secondary Problems:  Active Hospital Problems    Diagnosis    *Acute pulmonary embolus     Submassive PE, s/p catheter-directed tPA      Duodenal ulcer    Acute blood loss anemia    Pulmonary embolus    NSTEMI (non-ST elevated myocardial infarction)    Elevated troponin    Leukocytosis    CKD (chronic kidney disease) stage 3, GFR 30-59 ml/min    Obesity (BMI 30-39.9)    Essential hypertension    Thalassemia minor    Microcytic anemia    Gastroesophageal reflux disease    Depression        Hospital Course:      72F with HTN, CKD-3, thalassemia minor, ADRIÁN, and obesity, admitted via ED to Hospital Medicine on 12/3 by overnight MD Dr. Clya Mcmahon with worsening SOB and near-syncope for 2 days, Dx-ed with presumed NSTEMI, started on NSTEMI pathway, and D Dimer checked for concern for PE, d dimer elevated so CTA STAT done and revealed submassive B PE with RV strain, trop 0.4, , so went to cath lab with Interventional cardiology (Warren/Francis) for emergent catheter-directed tPA on 12/3. Per Interventional, "Patient with submassive PE (peripheral clots) - hemodynamically stable but with mild RV dysfunction/troponin and BNP elevation. She belongs to intermediate - high risk submassive PE category."     Per prior notes "No significant smoking hx, no significant family hx, previous ROBERTO showed no evidence of ischemia (most recently in 2016). +ve troponins, initially 0.4, also has elevated BNP. Her presentation is atypical but her KITCHEN may be her anginal equivalent. JAYLEN risk score of 2."     Patient had further w/u which revealed:    Diagnostics:  12/4 LE Doppler: with age-indeterminate L popliteal DVT     12/5 " 2D Echo with CFD  CONCLUSIONS     1 - Normal left ventricular systolic function (EF 60-65%).     2 - No wall motion abnormalities.     3 - Indeterminate LV diastolic function.     4 - Normal right ventricular systolic function .     5 - Pulmonary hypertension. The estimated PA systolic pressure is 67 mmHg.     Patient's course was then c/b melena. GI was consulted on the patient. EGD was performed 12/6: showed nodular gastropathy.  One oozing duodenal ulcer was found with pigmented material; this was injected with epinephrine and cauterized.  The patient was recommended for continuation of heparin and now coumadin is used (favored due to reversibility, and cardiology agrees with this over NOAC).  Patient also recommended for sucralfate x 2 weeks and PPI BID x 12 weeks.  Repeat upper endoscopy suggested as outpatient, and this is scheduled for 2/2018.    A decision was made to bridge the patient to a therapeutic INR inpatient given the complexity of her case with severe thrombotic event and severe bleeding event occurring concomitantly.  The patient reached a therapeutic INR on 12/10 without any further melena. The patient was deemed stable for d/c with close PCP, IM priority, gastroenterology, and hematology f/u.  The patient is enrolled in coumadin clinic upon d/c.  Home health services established for patient as well as d/w CM.  Please see more details below in this complex patient's care:    Submassive bilateral PE  Age-indeterminate DVT  - s/p catheter directed tPA 12/3  - needs hypercoagulable workup ASAP; refer to Dr. Lora of Hematology  - needs age-appropriate malignancy screening  - can stop heparin today and transition to coumadin 2.5 mg daily     Acute upper GIB  Duodenal ulcer  Acute blood loss anemia  - unmasked by tPA and anticoagulation   - EGD results: duodenal ulcer s/p cauterization  - H. Pylori serology negative  - Will transition to cardiac diet per my discussions with GI  - Start sucralfate x 2  weeks and transition to PPI PO BID x 12 weeks  - Repeat upper endoscopy suggested as outpatient by GI (scheduled for 2/2018)  - Trend CBC in 1 week     Essential HTN  - Presently home antiHTNsives are being held  - Can resume amlodipine as clinically indicated  - Patient on lasix, can continue  - Other home meds appear to include hyzaar and toprol-XL; hold these two medications     CKD-3  - Stable, with slow rise  - Avoid further nephrotoxic agents  - Patient will need CMP in 1 week in ADDITION to CBC     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   - C/w escitalopram  - C/w topiramate      Significant Diagnostic Studies:   Labs:   Recent Labs      12/10/17   0754   WBC  11.45   RBC  4.08   HGB  8.6*   HCT  28.1*   PLT  291   MCV  69*   MCH  21.1*   MCHC  30.6*   GRAN  49.0  5.6   LYMPH  32.1  3.7   MONO  7.1  0.8   EOS  1.0*      Recent Labs      12/10/17   0644   GLU  87   NA  139   K  3.7   CL  109   CO2  23   BUN  26*   CREATININE  1.5*   CALCIUM  9.1   ANIONGAP  7*     Cardiac Studies: 2D ECHO with CFD    Significant Treatments/Procedures:   Catheter-directed tPA on 12/3/2017  EGD with cauterization of duodenal ulcer on 12/6/2017    Consults while in Hospital:   Cardiology and Gastroenterology    Discharge Medications:      Current Discharge Medication List      START taking these medications    Details   sucralfate (CARAFATE) 100 mg/mL suspension Take 10 mLs (1 g total) by mouth every 6 (six) hours.  Qty: 560 mL, Refills: 0      warfarin (COUMADIN) 2.5 MG tablet Take 1 tablet (2.5 mg total) by mouth Daily.  Qty: 30 tablet, Refills: 1         CONTINUE these medications which have CHANGED    Details   furosemide (LASIX) 20 MG tablet Take 1 tablet (20 mg total) by mouth once daily.  Qty: 30 tablet, Refills: 1      pantoprazole (PROTONIX) 40 MG tablet Take 1 tablet (40 mg total) by mouth 2 (two) times daily before meals.  Qty: 60 tablet, Refills: 2         CONTINUE these medications which have NOT CHANGED    Details   amlodipine  (NORVASC) 10 MG tablet TAKE 1 TABLET(10 MG) BY MOUTH EVERY DAY  Qty: 90 tablet, Refills: 1    Associated Diagnoses: Essential hypertension      escitalopram oxalate (LEXAPRO) 20 MG tablet TAKE 1 TABLET BY MOUTH ONCE DAILY  Qty: 90 tablet, Refills: 0      metoprolol succinate (TOPROL-XL) 25 MG 24 hr tablet TAKE 1 TABLET BY MOUTH ONCE DAILY  Qty: 90 tablet, Refills: 0      potassium chloride (MICRO-K) 10 MEQ CpSR Take 2 capsules (20 mEq total) by mouth once daily.  Qty: 60 capsule, Refills: 11      topiramate (TOPAMAX) 50 MG tablet Take 1 tablet (50 mg total) by mouth 2 (two) times daily.  Qty: 180 tablet, Refills: 1    Comments: **Patient requests 90 days supply**      ondansetron (ZOFRAN) 4 MG tablet Take 1 tablet (4 mg total) by mouth every 6 (six) hours.  Qty: 12 tablet, Refills: 0         STOP taking these medications       benzonatate (TESSALON PERLES) 100 MG capsule Comments:   Reason for Stopping:         cefUROXime (CEFTIN) 500 MG tablet Comments:   Reason for Stopping:         doxazosin (CARDURA) 2 MG tablet Comments:   Reason for Stopping:         meloxicam (MOBIC) 7.5 MG tablet Comments:   Reason for Stopping:         conjugated estrogens (PREMARIN) vaginal cream Comments:   Reason for Stopping:         estrogens, conjugated, (PREMARIN) 0.45 MG tablet Comments:   Reason for Stopping:         losartan-hydrochlorothiazide 100-25 mg (HYZAAR) 100-25 mg per tablet Comments:   Reason for Stopping:             Discharge Diet:cardiac diet with Normal Fluid intake of 1500 - 2000 mL per day    Activity: activity as tolerated    Discharged Condition: Stable    Discharge Dis[place] [facility]    Follow[place]up:   Future Appointments  Date Time Provider Department Center   12/18/2017 11:00 AM ELANA Haskins [facility] IMPRICL [facility] PCW   1/25/2018 2:00 PM Jeovany Talbert MD [facility] GASTRO [facility]       Studies/Results pending on discharge:   Biopsy results: Duodenal ulcer; GI will call    Ignacio LEMON  MD [place]  [facility]

## 2017-12-10 NOTE — PLAN OF CARE
[facility]    HOME HEALTH ORDERS  FACE TO FACE ENCOUNTER    Patient Name: Coby Atkinson  YOB: 1945    PCP: Mundo Hudson DO   PCP Address: [address] / [place] [address]  PCP Phone Number: [phone]  PCP Fax: [phone]    Encounter Date: 12/10/2017    Admit to Home Health    Diagnoses:  Active Hospital Problems    Diagnosis  POA    *Acute pulmonary embolus [I26.99]  Yes     Submassive PE, s/p catheter-directed tPA      Duodenal ulcer [K26.9]  Yes    Acute blood loss anemia [D62]  Yes    Pulmonary embolus [I26.99]  Yes    NSTEMI (non-ST elevated myocardial infarction) [I21.4]  Yes    Elevated troponin [R74.8]  Yes    Leukocytosis [D72.829]  Yes    CKD (chronic kidney disease) stage 3, GFR 30-59 ml/min [N18.3]  Yes    Obesity (BMI 30-39.9) [E66.9]  Yes    Essential hypertension [I10]  Yes    Thalassemia minor [D56.3]  Yes    Microcytic anemia [D50.9]  Yes    Gastroesophageal reflux disease [K21.9]  Yes    Depression [F32.9]  Yes      Resolved Hospital Problems    Diagnosis Date Resolved POA    Melena [K92.1] 12/07/2017 Yes       Future Appointments  Date Time Provider Department Center   12/18/2017 11:00 AM ELANA Haskins [facility] IMPRICL [facility] PCW   1/25/2018 2:00 PM Jeovany Talbert MD [facility] GASTRO [facility]     Follow-up Information     Mundo Hudson DO.    Specialty:  Internal Medicine  Contact information:  [address]  [place] [address]  [phone]                 I have seen and examined this patient face to face today. My clinical findings that support the need for the home health skilled services and home bound status are the following:  Weakness/numbness causing balance and gait disturbance due to Weakness/Debility making it taxing to leave home.    Allergies:  Review of patient's allergies indicates:   Allergen Reactions    Codeine     Ciprofloxacin Rash       Diet: cardiac diet and  fluid restriction: 1500 cc    Activities: activity as tolerated    Nursing:   SN to complete comprehensive assessment including routine vital signs. Instruct on disease process and s/s of complications to report to MD. Review/verify medication list sent home with the patient at time of discharge  and instruct patient/caregiver as needed. Frequency may be adjusted depending on start of care date.    Notify MD if SBP > 160 or < 90; DBP > 90 or < 50; HR > 120 or < 50; Temp > 101    CONSULTS:    Physical Therapy to evaluate and treat. Evaluate for home safety and equipment needs; Establish/upgrade home exercise program. Perform / instruct on therapeutic exercises, gait training, transfer training, and Range of Motion.  Occupational Therapy to evaluate and treat. Evaluate home environment for safety and equipment needs. Perform/Instruct on transfers, ADL training, ROM, and therapeutic exercises.    MISCELLANEOUS CARE:  Heart Failure:      SN to instruct on the following:    Instruct on the definition of CHF.   Instruct on the signs/sympoms of CHF to be reported.   Instruct on and monitor daily weights.   Instruct on factors that cause exacerbation.   Instruct on action, dose, schedule, and side effects of medications.   Instruct on diet as prescribed.   Instruct on activity allowed.   Instruct on life-style modifications for life long management of CHF   SN to assess compliance with daily weights, diet, medications, fluid retention,    safety precautions, activities permitted and life-style modifications.   Additional 1-2 SN visits per week as needed for signs and symptoms     of CHF exacerbation.      For Weight Gain > 2-3 lbs in 1 day or 4-6 lbs over 1 week notify PCP.    BLOOD WORK:  Please draw INR on 12/11 and 12/13 and fax results to PCP, Dr. Hudson.   Please draw CBC and CMP on 12/13 and fax results to PCP, Dr. Hudson.    Medications: Review discharge medications with patient and family and provide  education.    Current Discharge Medication List      START taking these medications    Details   sucralfate (CARAFATE) 100 mg/mL suspension Take 10 mLs (1 g total) by mouth every 6 (six) hours.  Qty: 560 mL, Refills: 0      warfarin (COUMADIN) 2.5 MG tablet Take 1 tablet (2.5 mg total) by mouth Daily.  Qty: 30 tablet, Refills: 1         CONTINUE these medications which have CHANGED    Details   furosemide (LASIX) 20 MG tablet Take 1 tablet (20 mg total) by mouth once daily.  Qty: 30 tablet, Refills: 1      pantoprazole (PROTONIX) 40 MG tablet Take 1 tablet (40 mg total) by mouth 2 (two) times daily before meals.  Qty: 60 tablet, Refills: 2         CONTINUE these medications which have NOT CHANGED    Details   amlodipine (NORVASC) 10 MG tablet TAKE 1 TABLET(10 MG) BY MOUTH EVERY DAY  Qty: 90 tablet, Refills: 1    Associated Diagnoses: Essential hypertension      escitalopram oxalate (LEXAPRO) 20 MG tablet TAKE 1 TABLET BY MOUTH ONCE DAILY  Qty: 90 tablet, Refills: 0      metoprolol succinate (TOPROL-XL) 25 MG 24 hr tablet TAKE 1 TABLET BY MOUTH ONCE DAILY  Qty: 90 tablet, Refills: 0      potassium chloride (MICRO-K) 10 MEQ CpSR Take 2 capsules (20 mEq total) by mouth once daily.  Qty: 60 capsule, Refills: 11      topiramate (TOPAMAX) 50 MG tablet Take 1 tablet (50 mg total) by mouth 2 (two) times daily.  Qty: 180 tablet, Refills: 1    Comments: **Patient requests 90 days supply**      ondansetron (ZOFRAN) 4 MG tablet Take 1 tablet (4 mg total) by mouth every 6 (six) hours.  Qty: 12 tablet, Refills: 0         STOP taking these medications       benzonatate (TESSALON PERLES) 100 MG capsule Comments:   Reason for Stopping:         cefUROXime (CEFTIN) 500 MG tablet Comments:   Reason for Stopping:         doxazosin (CARDURA) 2 MG tablet Comments:   Reason for Stopping:         meloxicam (MOBIC) 7.5 MG tablet Comments:   Reason for Stopping:         conjugated estrogens (PREMARIN) vaginal cream Comments:   Reason for  Stopping:         estrogens, conjugated, (PREMARIN) 0.45 MG tablet Comments:   Reason for Stopping:         losartan-hydrochlorothiazide 100-25 mg (HYZAAR) 100-25 mg per tablet Comments:   Reason for Stopping:             I certify that this patient is confined to her home and needs intermittent skilled nursing care, physical therapy and occupational therapy.

## 2017-12-10 NOTE — PROGRESS NOTES
"Progress Note  Hospital Medicine    Provider team:    [facility]OLOG[facility]  [facility] HOSP MED C  Admit Date: 12/2/2017  Encounter Date: 12/10/2017     SUBJECTIVE:     Follow-up Visit for: Acute pulmonary embolus    HPI (See H&P for complete P,F,SHx): 72F with HTN, CKD-3, thalassemia minor, ADRIÁN, and obesity, admitted via ED to Hospital Medicine on 12/3 by overnight MD Dr. Clay Mcmahon with worsening SOB and near-syncope for 2 days, Dx-ed with presumed NSTEMI, started on NSTEMI pathway, and D Dimer checked for concern for PE, d dimer elevated so CTA STAT done and revealed submassive B PE with RV strain, trop 0.4, , so went to cath lab with Interventional cardiology (Warren/Francis) for emergent catheter-directed tPA on 12/3. Per Interventional, "Patient with submassive PE (peripheral clots) - hemodynamically stable but with mild RV dysfunction/troponin and BNP elevation. She belongs to intermediate - high risk submassive PE category."     Per prior notes "No significant smoking hx, no significant family hx, previous ROBERTO showed no evidence of ischemia (most recently in 2016). +ve troponins, initially 0.4, also has elevated BNP. Her presentation is atypical but her KITCHEN may be her anginal equivalent. JAYLEN risk score of 2."      Diagnostics:  12/4 LE Doppler: with age-indeterminate L popliteal DVT     12/5 2D Echo with CFD  CONCLUSIONS     1 - Normal left ventricular systolic function (EF 60-65%).     2 - No wall motion abnormalities.     3 - Indeterminate LV diastolic function.     4 - Normal right ventricular systolic function .     5 - Pulmonary hypertension. The estimated PA systolic pressure is 67 mmHg.     Patient's course was then c/b melena. GI was consulted on the patient. EGD was performed 12/6: showed nodular gastropathy.  One oozing duodenal ulcer was found with pigmented material; this was injected with epinephrine and cauterized.  The patient was recommended for continuation of heparin and now coumadin " "is used (favored due to reversibility, but will d/w cardiology if NOAC favored).  Patient also recommended for CLD, sucralfate, and PPI.  Repeat upper endoscopy suggested as outpatient.    Interval history:  The patient reported to me no further dark or tarry stools. She generally offers no complaints, no CP or SOB. She remains in good spirits and all questions were answered to patient satisfaction.     Review of Systems:  Review of Systems   Constitutional: Negative for chills and fever.   HENT: Negative for congestion and sore throat.    Eyes: Negative for photophobia, pain and discharge.   Respiratory: Negative for cough, hemoptysis, sputum production and shortness of breath.    Cardiovascular: Negative for chest pain, palpitations and leg swelling.   Gastrointestinal: Negative for abdominal pain, diarrhea, nausea and vomiting.   Genitourinary: Negative for dysuria and urgency.   Musculoskeletal: Negative for myalgias and neck pain.   Skin: Negative for itching and rash.   Neurological: Negative for sensory change, focal weakness and headaches.   Endo/Heme/Allergies: Negative for polydipsia. Does not bruise/bleed easily.   Psychiatric/Behavioral: Negative for depression and suicidal ideas.       OBJECTIVE:       Intake/Output Summary (Last 24 hours) at 12/10/17 0934  Last data filed at 12/10/17 0600   Gross per 24 hour   Intake             1145 ml   Output             1150 ml   Net               -5 ml     Vital Signs Range (Last 24H):  Temp:  [97.6 °F (36.4 °C)-98.1 °F (36.7 °C)]   Pulse:  [58-68]   Resp:  [16-20]   BP: (131-154)/(62-76)   SpO2:  [96 %-100 %]   Body mass index is 38.55 kg/m².    Objective:  General Appearance:  Comfortable, well-appearing, in no acute distress and not in pain.    Vital signs: (most recent): Blood pressure 133/76, pulse 63, temperature 97.6 °F (36.4 °C), temperature source Oral, resp. rate 16, height 5' 3" (1.6 m), weight 98.7 kg (217 lb 9.5 oz), SpO2 100 %, not currently " breastfeeding.  No fever.    Output: Producing urine and producing stool.    HEENT: Normal HEENT exam.    Lungs:  Normal effort.  Breath sounds clear to auscultation.  She is not in respiratory distress.  No rales or wheezes.    Heart: Normal rate.  Regular rhythm.  S1 normal and S2 normal.  No murmur.   Chest: Symmetric chest wall expansion.   Abdomen: Abdomen is soft.  Bowel sounds are normal.   There is no abdominal tenderness.     Extremities: Normal range of motion.  There is no deformity, effusion, dependent edema or local swelling.    Pulses: Distal pulses are intact.    Neurological: Patient is alert and oriented to person, place and time.  Normal strength.    Pupils:  Pupils are equal, round, and reactive to light.    Skin:  Warm and dry.      Medications:  Medication list was reviewed in EPIC and changes noted under Assessment/Plan and MAR.    Laboratory:  Recent Labs      12/10/17   0754   WBC  11.45   RBC  4.08   HGB  8.6*   HCT  28.1*   PLT  291   MCV  69*   MCH  21.1*   MCHC  30.6*   GRAN  49.0  5.6   LYMPH  32.1  3.7   MONO  7.1  0.8   EOS  1.0*      Recent Labs      12/10/17   0644   GLU  87   NA  139   K  3.7   CL  109   CO2  23   BUN  26*   CREATININE  1.5*   CALCIUM  9.1   ANIONGAP  7*       ASSESSMENT/PLAN:     Active Hospital Problems    Diagnosis  POA    *Acute pulmonary embolus [I26.99]  Yes     Submassive PE, s/p catheter-directed tPA      Duodenal ulcer [K26.9]  Yes    Acute blood loss anemia [D62]  Yes    Pulmonary embolus [I26.99]  Yes    NSTEMI (non-ST elevated myocardial infarction) [I21.4]  Yes    Elevated troponin [R74.8]  Yes    Leukocytosis [D72.829]  Yes    CKD (chronic kidney disease) stage 3, GFR 30-59 ml/min [N18.3]  Yes    Obesity (BMI 30-39.9) [E66.9]  Yes    Essential hypertension [I10]  Yes    Thalassemia minor [D56.3]  Yes    Microcytic anemia [D50.9]  Yes    Gastroesophageal reflux disease [K21.9]  Yes    Depression [F32.9]  Yes      Resolved Hospital  Problems    Diagnosis Date Resolved POA    Melena [K92.1] 12/07/2017 Yes      Submassive bilateral PE  Age-indeterminate DVT  - s/p catheter directed tPA 12/3  - needs hypercoagulable workup ASAP; refer to Dr. Lora of Hematology  - needs age-appropriate malignancy screening  - can stop heparin today and transition to coumadin 2.5 mg daily     Acute upper GIB  Duodenal ulcer  Acute blood loss anemia  - unmasked by tPA and anticoagulation   - EGD results: duodenal ulcer s/p cauterization  - H. 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 40 minutes spent on discharge planning and counseling alone.  Ignacio Barragan MD  [facility]

## 2017-12-11 ENCOUNTER — ANTI-COAG VISIT (OUTPATIENT)
Dept: CARDIOLOGY | Facility: CLINIC | Age: 72
End: 2017-12-11

## 2017-12-11 ENCOUNTER — TELEPHONE (OUTPATIENT)
Dept: INTERNAL MEDICINE | Facility: CLINIC | Age: 72
End: 2017-12-11

## 2017-12-11 DIAGNOSIS — Z79.01 LONG-TERM (CURRENT) USE OF ANTICOAGULANTS: ICD-10-CM

## 2017-12-11 LAB — INR PPP: 3.3

## 2017-12-11 NOTE — TELEPHONE ENCOUNTER
----- Message from Ignacio Baker sent at 12/11/2017  2:18 PM CST -----  Contact: [facility]/chitra [phone]  Patient coumadin level is 3.3.

## 2017-12-11 NOTE — PLAN OF CARE
12/11/17 0657   Final Note   Assessment Type Final Discharge Note   Discharge Disposition Home-Health   Hospital Follow Up  Appt(s) scheduled? Yes

## 2017-12-11 NOTE — TELEPHONE ENCOUNTER
lmvm for Leanna @[facility] to contact coumadin clinic to give them the coumadin level 3.3    Spoke to [email or web address] coumadin level was sent to the coumadin clinic

## 2017-12-11 NOTE — PROGRESS NOTES
73yo F recently admitted from 12/2 through 12/10 for acute, submassive PE and L-popliteal DVT.  While admitted, the pt experienced an acute upper GI bleed from a duodenal ulcer.  Pts PMHx: duodenal ulcer, NSTEMI, CKD, HTN, thalassemia minor, GERD, depression, microcytic anemia, obstructive sleep apnea, cataract, edema and obesity.  Per MD note: A decision was made to bridge the patient to a therapeutic INR inpatient given the complexity of her case with severe thrombotic event and severe bleeding event occurring concomitantly.  See calendar for recent INRs and warfarin doses while admitted.  I was able to reach the pt on 12/11.  Per the pt and her med card, she was given Warfarin 2.5mg tablets.  She will change to evening administration on 12/12, as she has already taken a dose today.  She has our contact info and her  agency will check an INR for us today.

## 2017-12-12 ENCOUNTER — TELEPHONE (OUTPATIENT)
Dept: ADMINISTRATIVE | Facility: CLINIC | Age: 72
End: 2017-12-12

## 2017-12-12 NOTE — PROGRESS NOTES
Above INR is from yesterday so I am not sure where her current INR may be since she missed her coumadin on Sunday.   Patient reports taking pantoprazole (PROTONIX) 40 MG .  She reports taking 2.5 mg couamdin 12/11.  Reports no other changes .     Initially I make an adjustment to her coumadin dose today but  Upon our medical assistant calling her with advice, patient reports she already took 2.5mg of her warfarin today. She was advised to not take coumadin dose tomorrow until she hears from us with new instructions.  She denies current bleeding issues

## 2017-12-13 ENCOUNTER — LAB VISIT (OUTPATIENT)
Dept: LAB | Facility: HOSPITAL | Age: 72
End: 2017-12-13
Attending: INTERNAL MEDICINE
Payer: MEDICARE

## 2017-12-13 ENCOUNTER — TELEPHONE (OUTPATIENT)
Dept: HEPATOLOGY | Facility: CLINIC | Age: 72
End: 2017-12-13

## 2017-12-13 ENCOUNTER — TELEPHONE (OUTPATIENT)
Dept: ADMINISTRATIVE | Facility: CLINIC | Age: 72
End: 2017-12-13

## 2017-12-13 ENCOUNTER — ANTI-COAG VISIT (OUTPATIENT)
Dept: CARDIOLOGY | Facility: CLINIC | Age: 72
End: 2017-12-13

## 2017-12-13 DIAGNOSIS — T81.718A IATROGENIC PULMONARY EMBOLISM AND INFARCTION: Primary | ICD-10-CM

## 2017-12-13 DIAGNOSIS — Z79.01 LONG-TERM (CURRENT) USE OF ANTICOAGULANTS: ICD-10-CM

## 2017-12-13 DIAGNOSIS — I10 ESSENTIAL HYPERTENSION: ICD-10-CM

## 2017-12-13 DIAGNOSIS — I26.99 IATROGENIC PULMONARY EMBOLISM AND INFARCTION: Primary | ICD-10-CM

## 2017-12-13 LAB
ALBUMIN SERPL BCP-MCNC: 3.1 G/DL
ALP SERPL-CCNC: 69 U/L
ALT SERPL W/O P-5'-P-CCNC: 17 U/L
ANION GAP SERPL CALC-SCNC: 7 MMOL/L
AST SERPL-CCNC: 24 U/L
BASOPHILS # BLD AUTO: 0.05 K/UL
BASOPHILS NFR BLD: 0.5 %
BILIRUB SERPL-MCNC: 0.3 MG/DL
BUN SERPL-MCNC: 35 MG/DL
CALCIUM SERPL-MCNC: 8.7 MG/DL
CHLORIDE SERPL-SCNC: 111 MMOL/L
CO2 SERPL-SCNC: 21 MMOL/L
CREAT SERPL-MCNC: 1.3 MG/DL
DIFFERENTIAL METHOD: ABNORMAL
EOSINOPHIL # BLD AUTO: 1.2 K/UL
EOSINOPHIL NFR BLD: 11.7 %
ERYTHROCYTE [DISTWIDTH] IN BLOOD BY AUTOMATED COUNT: 19.6 %
EST. GFR  (AFRICAN AMERICAN): 47 ML/MIN/1.73 M^2
EST. GFR  (NON AFRICAN AMERICAN): 40.8 ML/MIN/1.73 M^2
GLUCOSE SERPL-MCNC: 84 MG/DL
HCT VFR BLD AUTO: 31.1 %
HGB BLD-MCNC: 9.1 G/DL
IMM GRANULOCYTES # BLD AUTO: 0.17 K/UL
IMM GRANULOCYTES NFR BLD AUTO: 1.6 %
INR PPP: 4.8
LYMPHOCYTES # BLD AUTO: 3 K/UL
LYMPHOCYTES NFR BLD: 28.5 %
MCH RBC QN AUTO: 21 PG
MCHC RBC AUTO-ENTMCNC: 29.3 G/DL
MCV RBC AUTO: 72 FL
MONOCYTES # BLD AUTO: 0.7 K/UL
MONOCYTES NFR BLD: 7.1 %
NEUTROPHILS # BLD AUTO: 5.3 K/UL
NEUTROPHILS NFR BLD: 50.6 %
NRBC BLD-RTO: 0 /100 WBC
PLATELET # BLD AUTO: 290 K/UL
PMV BLD AUTO: 9.7 FL
POTASSIUM SERPL-SCNC: 3.8 MMOL/L
PROT SERPL-MCNC: 6.4 G/DL
RBC # BLD AUTO: 4.34 M/UL
SODIUM SERPL-SCNC: 139 MMOL/L
WBC # BLD AUTO: 10.43 K/UL

## 2017-12-13 PROCEDURE — 85025 COMPLETE CBC W/AUTO DIFF WBC: CPT

## 2017-12-13 PROCEDURE — 80053 COMPREHEN METABOLIC PANEL: CPT

## 2017-12-13 NOTE — TELEPHONE ENCOUNTER
Received note from Home Health asking if CHF teaching that was ordered in plan of care was necessary for patient. In Dr. Barragan's absence I asked Dr. Kasia Parsons to review the patient's chart and recent hospitalization.  CHF teaching will not be necessary as the patient does not have a diagnosis of CHF or a diagnosis requiring CHF teaching.

## 2017-12-13 NOTE — PROGRESS NOTES
Verbal result taken from ____Melissa_____. PT/INR __57.9 / 4.8_____ Date drawn____12/13/2017____ Hardcopy to be faxed.

## 2017-12-13 NOTE — PROGRESS NOTES
Of note, patient had already taken her coumadin yesterday before we could advise her to hold. She has not taken her coumadin yet today. She will hold her coumadin and I will ask her to eat a medium to large portion fo dark greens with repeat INR tomorrow. I am not giving vitamin K at this time since she is not reporting current bleeding and she recently had a PE. We will need very close monitoring for bleeding since patient also recenty had a bleed.  If her INR is not improving by tomorrow she will need to be prepared to come in for possible vitamin K administration. Patient to be advised to watch very closely for any bleeding or dark stools and if this were to occur to seek urgent care.  We will repeat her INR tomorrow *AM* STAT

## 2017-12-14 ENCOUNTER — ANTI-COAG VISIT (OUTPATIENT)
Dept: CARDIOLOGY | Facility: CLINIC | Age: 72
End: 2017-12-14

## 2017-12-14 DIAGNOSIS — Z79.01 LONG-TERM (CURRENT) USE OF ANTICOAGULANTS: ICD-10-CM

## 2017-12-14 LAB — INR PPP: 4.4

## 2017-12-14 RX ORDER — AMLODIPINE BESYLATE 10 MG/1
TABLET ORAL
Qty: 90 TABLET | Refills: 1 | Status: SHIPPED | OUTPATIENT
Start: 2017-12-14 | End: 2018-07-31 | Stop reason: SDUPTHER

## 2017-12-14 NOTE — TELEPHONE ENCOUNTER
Home health notifying/ [facility] doctor:     Medication discrepancies noted.     Amlodipine 10 mg 1tab daily on DC summary. Patient does not have this in home and she cannot remember if she takes it or not. Please clarify and if patient is supposed to take please request refill     Escitalopram oxalate and Ondansetron on DC summary. Patient states she no longer takes these medications. Please confirm.     Please inform Zee Hawk RN, or  Kamini Sauer RN of results. [phone]/ Beverly Molina [phone].     Thanks    December 13, 2017   Ana Cristina Lora LPN   to Becca Magallanes

## 2017-12-14 NOTE — PROGRESS NOTES
Questioned and reports holding dose .  No bleeding , darks stools, bruising , SOB or etc.  Reports having only one small fidel salad 12/13. In speaking to her more she reports it actually was mostly iceberg lettuce with a little fidel.   INR did improve but only minimal. My recommendation yesterday to eat dark greens (ex: cabbage or spinach)  was to make an good impact on reversing the INR  as a  more subtle approach (in mcg)  vs administering vitamin K in mg and risk overcorrection since she just recently had a PE as well. However, she also had a recent bleed so I want the INR to move into range more quickly. Since she is not having any bleeding issues at this time and denies dark stools and she did not eat the greens we advised, I will again ask her to eat a  serving of DARK greens today in addition to continuing to hold her coumadin and watch closely by checking INR again tomorrow. She will again be advised to seek urgent care if any bleeding issues occur.

## 2017-12-15 ENCOUNTER — ANTI-COAG VISIT (OUTPATIENT)
Dept: CARDIOLOGY | Facility: CLINIC | Age: 72
End: 2017-12-15

## 2017-12-15 DIAGNOSIS — Z79.01 LONG-TERM (CURRENT) USE OF ANTICOAGULANTS: ICD-10-CM

## 2017-12-15 LAB
INR PPP: 4.2
PATHOLOGIST INTERPRETATION AB/XM: NORMAL

## 2017-12-15 NOTE — PROGRESS NOTES
Verbal result taken from Glenna/catrachito Butler_________. PT/INR _4.2______ Date drawn_12/15/17_______ Hardcopy to be faxed.

## 2017-12-15 NOTE — PROGRESS NOTES
Despite our conversation yesterday, patient reports she could not get to the store to get dark greens so she just had a small lettuce salad. This explains why again we are only seeing a slight decrease in her INR although usually consecutive days of holding the coumadin would decrease the INR at a faster pace.   She specifically reports that she will eat a serving of cooked spinach both today and tomorrow.  Extreme caution and close monitoring required since she is both a bleed and a clot risk.  She reports very confidently that she is not having any type of bleeding issues and she is not feeling unusually weak or tired. She understands to immediately seek urgent care over the weekend if any of these issues or is any bleeding issues were to arise. In addition, she understands to seek urgent care if she were to have any SOB or chest pains develop. I will have her hold her coumadin x 2 more days since her INR is extremely slow to drop despite holding coumadin several days this week. However, due to her recent PE, I will have her restart her coumadin Sunday evening at a lower dose of 1.25mg since 2.5mg doses resulted in elevated INRs.

## 2017-12-19 ENCOUNTER — ANTI-COAG VISIT (OUTPATIENT)
Dept: CARDIOLOGY | Facility: CLINIC | Age: 72
End: 2017-12-19

## 2017-12-19 DIAGNOSIS — Z79.01 LONG-TERM (CURRENT) USE OF ANTICOAGULANTS: ICD-10-CM

## 2017-12-19 NOTE — PROGRESS NOTES
Above INR is from yesterday. Patient reports holding her coumadin 12/15 and 12/16 as prescribed. She was supposed to take 1.25mg on 12/17 but says she missed the coumadin on that day as well. She was also supposed to take 1.25mg last night (12/18) in the event that the INR did not result before close. Per patient, she forgot to do this as well. Thankfully, INR still a little elevated from yesterday. Emphasized compliance with instructions and we will restart coumadin today at 1.25mg since 2.5mg caused such a rise in her INR recently.  (*recent bleed and clot*)

## 2017-12-20 LAB — INR PPP: 2.6

## 2017-12-21 ENCOUNTER — INITIAL CONSULT (OUTPATIENT)
Dept: HEMATOLOGY/ONCOLOGY | Facility: CLINIC | Age: 72
End: 2017-12-21
Payer: MEDICARE

## 2017-12-21 ENCOUNTER — OFFICE VISIT (OUTPATIENT)
Dept: PRIMARY CARE CLINIC | Facility: CLINIC | Age: 72
End: 2017-12-21
Payer: MEDICARE

## 2017-12-21 VITALS
OXYGEN SATURATION: 98 % | HEART RATE: 72 BPM | DIASTOLIC BLOOD PRESSURE: 78 MMHG | HEIGHT: 64 IN | BODY MASS INDEX: 38.12 KG/M2 | WEIGHT: 223.31 LBS | SYSTOLIC BLOOD PRESSURE: 142 MMHG

## 2017-12-21 VITALS
BODY MASS INDEX: 38.09 KG/M2 | WEIGHT: 223.13 LBS | HEIGHT: 64 IN | HEART RATE: 76 BPM | DIASTOLIC BLOOD PRESSURE: 76 MMHG | SYSTOLIC BLOOD PRESSURE: 132 MMHG

## 2017-12-21 DIAGNOSIS — K26.9 DUODENAL ULCER: ICD-10-CM

## 2017-12-21 DIAGNOSIS — D56.3 THALASSEMIA MINOR: ICD-10-CM

## 2017-12-21 DIAGNOSIS — F33.41 RECURRENT MAJOR DEPRESSIVE DISORDER, IN PARTIAL REMISSION: ICD-10-CM

## 2017-12-21 DIAGNOSIS — N18.30 CKD (CHRONIC KIDNEY DISEASE) STAGE 3, GFR 30-59 ML/MIN: ICD-10-CM

## 2017-12-21 DIAGNOSIS — I82.432 DEEP VEIN THROMBOSIS (DVT) OF POPLITEAL VEIN OF LEFT LOWER EXTREMITY, UNSPECIFIED CHRONICITY: ICD-10-CM

## 2017-12-21 DIAGNOSIS — E66.9 OBESITY (BMI 30-39.9): ICD-10-CM

## 2017-12-21 DIAGNOSIS — I26.99 OTHER ACUTE PULMONARY EMBOLISM WITHOUT ACUTE COR PULMONALE: Primary | ICD-10-CM

## 2017-12-21 DIAGNOSIS — Z79.01 LONG-TERM (CURRENT) USE OF ANTICOAGULANTS: ICD-10-CM

## 2017-12-21 DIAGNOSIS — I82.532 CHRONIC DEEP VEIN THROMBOSIS (DVT) OF LEFT POPLITEAL VEIN: ICD-10-CM

## 2017-12-21 DIAGNOSIS — I26.09 OTHER ACUTE PULMONARY EMBOLISM WITH ACUTE COR PULMONALE: Primary | ICD-10-CM

## 2017-12-21 DIAGNOSIS — I10 ESSENTIAL HYPERTENSION: ICD-10-CM

## 2017-12-21 DIAGNOSIS — D62 ACUTE BLOOD LOSS ANEMIA: ICD-10-CM

## 2017-12-21 PROBLEM — R79.89 ELEVATED TROPONIN: Status: RESOLVED | Noted: 2017-12-03 | Resolved: 2017-12-21

## 2017-12-21 PROBLEM — D72.829 LEUKOCYTOSIS: Status: RESOLVED | Noted: 2017-12-03 | Resolved: 2017-12-21

## 2017-12-21 PROBLEM — M85.80 OSTEOPENIA: Status: RESOLVED | Noted: 2017-11-30 | Resolved: 2017-12-21

## 2017-12-21 PROCEDURE — 99495 TRANSJ CARE MGMT MOD F2F 14D: CPT | Mod: PBBFAC | Performed by: NURSE PRACTITIONER

## 2017-12-21 PROCEDURE — G0009 ADMIN PNEUMOCOCCAL VACCINE: HCPCS | Mod: PBBFAC

## 2017-12-21 PROCEDURE — 99214 OFFICE O/P EST MOD 30 MIN: CPT | Mod: PBBFAC | Performed by: NURSE PRACTITIONER

## 2017-12-21 PROCEDURE — 90732 PPSV23 VACC 2 YRS+ SUBQ/IM: CPT | Mod: PBBFAC

## 2017-12-21 PROCEDURE — 99204 OFFICE O/P NEW MOD 45 MIN: CPT | Mod: S$PBB,,, | Performed by: INTERNAL MEDICINE

## 2017-12-21 PROCEDURE — 99999 PR PBB SHADOW E&M-EST. PATIENT-LVL IV: CPT | Mod: PBBFAC,,, | Performed by: NURSE PRACTITIONER

## 2017-12-21 PROCEDURE — 99495 TRANSJ CARE MGMT MOD F2F 14D: CPT | Mod: S$PBB,,, | Performed by: NURSE PRACTITIONER

## 2017-12-21 PROCEDURE — 99999 PR PBB SHADOW E&M-EST. PATIENT-LVL III: CPT | Mod: PBBFAC,,, | Performed by: INTERNAL MEDICINE

## 2017-12-21 PROCEDURE — 99213 OFFICE O/P EST LOW 20 MIN: CPT | Mod: PBBFAC,27 | Performed by: INTERNAL MEDICINE

## 2017-12-21 NOTE — LETTER
December 21, 2017      Yesi Allan, FN  [address] [facility] [address]           [facility]  [address]  [facility] [address]  Phone: [phone]          Patient: Coby Atkinson   MR Number: [id number]   YOB: 1945   Date of Visit: 12/21/2017       Dear Yesi Allan:    Thank you for referring Coby Atkinson to me for evaluation. Attached you will find relevant portions of my assessment and plan of care.    If you have questions, please do not hesitate to call me. I look forward to following Coby Atkinson along with you.    Sincerely,    Gustavo Lora Jr., MD    Enclosure  CC:  No Recipients    If you would like to receive this communication electronically, please contact [email or web address] or [phone] to request more information on [employer] access.    For providers and/or their staff who would like to refer a patient to [facility], please contact us through our one-stop-shop provider referral line, [facility], at [phone].    If you feel you have received this communication in error or would no longer like to receive these types of communications, please e-mail [email or web address]

## 2017-12-21 NOTE — PATIENT INSTRUCTIONS
1) Will contact you with results of labs drawn in clinic today.    [facility] Visit (Post Discharge Follow-up) Today:   - Our clinic physicians and nurses plan to follow the patient up for any medical issues in the [facility] for 30 days post discharge.    Future Appointments  Date Time Provider Department Center   12/21/2017 2:00 PM LAB, APPOINTMENT [facility] INTMED [facility] LAB IM Edvin Merida PCW   12/21/2017 3:00 PM Gustavo Lora Jr., MD [facility] HEM ONC [facility]   1/18/2018 9:20 AM Mundo Hudson DO [facility] IM [place]   1/25/2018 2:00 PM Jeovany Talbert MD [facility] GASTRO [facility]

## 2017-12-21 NOTE — PROGRESS NOTES
Verified pt name and  and allergies  tolerated injection to no issues noted explained to stay 15 min for any adverse reactions noted

## 2017-12-21 NOTE — PROGRESS NOTES
DATE OF CONSULTATION:  12/21/2017.    Mrs. Atkinson is a 72-year-old woman who is seen in consultation from Dr. Yesi Allan.  She is here in regard to recent venous thromboembolic   disease.  In the latter part of November 2017, she experienced some dyspnea on   exertion.  On December 4, 2017, she had major exacerbation of dyspnea and was   short of breath at rest.  She was taken to the Emergency Department and   hospitalized.  Investigation of her dyspnea revealed extensive pulmonary emboli.    The CT angiogram report indicated a partially occlusive thrombus in the right   main pulmonary artery and an almost completely occlusive thrombosis in the right   lower lobe artery and partially occlusive thrombus in the upper lobe.  On the   left, there was a partially occlusive thrombus in the left lower lobe and the   left upper lobe.  There were signs of ventricular strain.  A subsequent venous   Doppler study showed thrombosis in the left popliteal vein of indeterminate age.   She had catheter-directed thrombolysis and was treated with heparin and then   placed on Coumadin.  During the hospitalization, she developed gastrointestinal   bleeding and an endoscopic examination revealed a duodenal ulcer.  No masses or   tumors were seen.  The area of hemorrhage in the ulcer was treated with   epinephrine and coagulation.  Bleeding ceased.    She also had an elevated troponin and BNP indicating some myocardial damage.    Fortunately, she has substantially improved and she is now walking slowly   without dyspnea.  She continues to take Coumadin and has had no further   bleeding.    She has had various degrees of lower extremity edema.  Several   years ago, her edema was much more pronounced, but an alteration in her   antihypertensive medication lead to some improvement.  It did not entirely   resolve.  She is overweight and has had no significant change in her weight for   many months.    There was no change in her  activity level prior to the symptoms of pulmonary   emboli.  She had not been immobilized, nor had she taken any lengthy trips by a   car or plane.  She did not recall any symptoms of infection or other diseases.    She has had a hysterectomy and oophorectomy.  She has hypertension,   gastroesophageal reflux, mild kidney impairment, and apparently has thalassemia   minor.  She has been told that for many years she had microcytosis and the   [facility] records contain an alpha globin gene analysis, which was normal.  I   assume that she has beta thalassemia minor although a hemoglobin electrophoresis   has not been performed here.  This should not be done today since she received 1 unit   of red cells when she had the duodenal bleeding.    She has degenerative arthritis, particularly in her knees and she is using a   cane for walking more than about a block.  She can easily move around   her home.  She is very careful to avoid situations that place her at increased   risk for trauma.    She has not smoked.  She drinks very little alcohol.  She previously worked for   the [employer].    No family members have had venous thromboembolic disease.  No family members are   known to have hematologic disorders.  A brother had cancer of the pancreas.    She has one son who developed myocarditis and now has severe heart failure.  He   is on a list for a heart transplant.  She had one spontaneous  many   years ago.    ADDITIONAL PAST HISTORY, SYSTEM REVIEW, SOCIAL HISTORY AND FAMILY HISTORY:  Have   been reviewed and updated in the electronic record.    PHYSICAL EXAMINATION:  GENERAL:  An overweight woman, who is in no acute distress.  She can climb very   slowly on to an examination table, apparently due to discomfort in her knees.  EYES:  No jaundice or pallor.  EARS:  Clear canals and membranes.  NOSE:  Clear nares.  SINUSES:  No tenderness.  MOUTH AND THROAT:  Partial upper dentures.  Lower teeth are  "in adequate repair.    No mucosal lesions.  NECK:  No masses or bruits.  No thyroid abnormalities.  LYMPH NODES:  No enlarged cervical, axillary or inguinal nodes.  CHEST AND LUNGS:  Normal respiratory effort.  Clear to auscultation and   percussion.  HEART:  Regular rate and rhythm without murmur or gallop.  ABDOMEN:  Soft without masses or tenderness.  No hepatosplenomegaly.    EXTREMITIES:  She has trace pretibial and ankle edema on the right and a   slightly greater degree of edema on the left.  The left leg is not tender.  BONES AND JOINTS:  Crepitus of the knees.  Some pain with movement of the knees   and hips.  SKIN:  No suspicious lesions in the areas examined.  PERIPHERAL VASCULATURE:  Good pulses in the feet and ankles.  NEUROLOGIC:  Memory is good.  Motor function is good.  She is fully oriented.    IMPRESSION:  1.  Extensive bilateral pulmonary emboli and left popliteal thrombosis of   unknown duration.  2.  Recent hemorrhage from a duodenal ulcer while receiving anticoagulant   therapy.  3.  Obesity.  4.  Degenerative arthritis with impaired functional level.  5.  Probable beta thalassemia minor.    RECOMMENDATIONS:  1.  Because Mrs. Atkinson had "unprovoked" pulmonary emboli, I think that she   should receive an indefinite anticoagulant therapy.  I do not think assessment   for inherited or acquired thrombophilic disorders would be of value since it   would not change the therapy recommendation.  With the absence of any family   history of venous thrombotic disease, it is unlikely that she has inherited any   of the known associated conditions.  Likewise, she does not report any symptoms   suggestive of a new acquired disease that increases risk of thrombosis.  The CT   scan of the chest did not reveal any abnormalities other than the emboli and she   recently had mammography, which was normal.  2.  She will require a followup EGD examination in the future.  If no biopsies  are planned, it may be " reasonable to do this while continuing her on Coumadin.    If the gastroenterologist thinks it is likely that the biopsies will be   necessary, she will need Lovenox bridge.  3.  The factors that increase her risk of thrombosis are unlikely to improve   with time.  These include obesity and decreased mobility related both to obesity  and to degenerative joint disease.  4.  I talked to her at some length about the relative risk and benefits related   to long-term anticoagulant therapy.  She is very aware of the risk of   bleeding and she is already taking precautions to minimize injuries.  I do not   think that she requires a routine followup in Hematology.  Her   anticoagulation can be managed by the [facility] and I think Coumadin at   this time as a very good drug choice.      SHILA/OG  dd: 12/21/2017 16:17:04 (CST)  td: 12/22/2017 08:47:50 (CST)  Doc ID   #[id number]  Job ID #[id number]    CC: Candice Mora and Becca

## 2017-12-21 NOTE — PROGRESS NOTES
"[facility]  New Visit Progress Note   Recent Hospital Discharge     PRESENTING HISTORY     Chief Complaint/Reason for Visit:  Follow up Hospital Discharge   No chief complaint on file.    PCP: Mundo Hudson DO    History of Present Illness: Ms. Coby Atkinson is a 72 y.o. female who was recently admitted to the hospital.    Discharge Summary  Hospital Medicine     Attending Provider on Discharge: Ignacio Barragan      [place]ing Team:    [facility]  [facility]      Date of Admission:  12/2/2017         Date of Discharge:  12/10/2017      Diagnoses:      Principal Problem(s):   Acute pulmonary embolus      Secondary Problems:       Active Hospital Problems     Diagnosis    *Acute pulmonary embolus       Submassive PE, s/p catheter-directed tPA    Duodenal ulcer    Acute blood loss anemia    Pulmonary embolus    NSTEMI (non-ST elevated myocardial infarction)    Elevated troponin    Leukocytosis    CKD (chronic kidney disease) stage 3, GFR 30-59 ml/min    Obesity (BMI 30-39.9)    Essential hypertension    Thalassemia minor    Microcytic anemia    Gastroesophageal reflux disease    Depression         Hospital Course:       72F with HTN, CKD-3, thalassemia minor, ADRIÁN, and obesity, admitted via ED to Hospital Medicine on 12/3 by overnight MD Dr. Clay Mcmahon with worsening SOB and near-syncope for 2 days, Dx-ed with presumed NSTEMI, started on NSTEMI pathway, and D Dimer checked for concern for PE, d dimer elevated so CTA STAT done and revealed submassive B PE with RV strain, trop 0.4, , so went to cath lab with Interventional cardiology (Warren/Francis) for emergent catheter-directed tPA on 12/3. Per Interventional, "Patient with submassive PE (peripheral clots) - hemodynamically stable but with mild RV dysfunction/troponin and BNP elevation. She belongs to intermediate - high risk submassive PE category."     Per prior notes "No significant smoking hx, no " "significant family hx, previous ROBERTO showed no evidence of ischemia (most recently in 2016). +ve troponins, initially 0.4, also has elevated BNP. Her presentation is atypical but her KITCHEN may be her anginal equivalent. JAYLEN risk score of 2."      Patient had further w/u which revealed:     Diagnostics:  12/4 LE Doppler: with age-indeterminate L popliteal DVT     12/5 2D Echo with CFD  CONCLUSIONS     1 - Normal left ventricular systolic function (EF 60-65%).     2 - No wall motion abnormalities.     3 - Indeterminate LV diastolic function.     4 - Normal right ventricular systolic function .     5 - Pulmonary hypertension. The estimated PA systolic pressure is 67 mmHg.      Patient's course was then c/b melena. GI was consulted on the patient. EGD was performed 12/6: showed nodular gastropathy.  One oozing duodenal ulcer was found with pigmented material; this was injected with epinephrine and cauterized.  The patient was recommended for continuation of heparin and now coumadin is used (favored due to reversibility, and cardiology agrees with this over NOAC).  Patient also recommended for sucralfate x 2 weeks and PPI BID x 12 weeks.  Repeat upper endoscopy suggested as outpatient, and this is scheduled for 2/2018.     A decision was made to bridge the patient to a therapeutic INR inpatient given the complexity of her case with severe thrombotic event and severe bleeding event occurring concomitantly.  The patient reached a therapeutic INR on 12/10 without any further melena. The patient was deemed stable for d/c with close PCP, IM priority, gastroenterology, and hematology f/u.  The patient is enrolled in coumadin clinic upon d/c.  Home health services established for patient as well as d/w CM.  Please see more details below in this complex patient's care:     Submassive bilateral PE  Age-indeterminate DVT  - s/p catheter directed tPA 12/3  - needs hypercoagulable workup ASAP; refer to Dr. Lora of Hematology  - needs " age-appropriate malignancy screening  - can stop heparin today and transition to coumadin 2.5 mg daily     Acute upper GIB  Duodenal ulcer  Acute blood loss anemia  - unmasked by tPA and anticoagulation   - EGD results: duodenal ulcer s/p cauterization  - H. Pylori serology negative  - Will transition to cardiac diet per my discussions with GI  - Start sucralfate x 2 weeks and transition to PPI PO BID x 12 weeks  - Repeat upper endoscopy suggested as outpatient by GI (scheduled for 2/2018)  - Trend CBC in 1 week     Essential HTN  - Presently home antiHTNsives are being held  - Can resume amlodipine as clinically indicated  - Patient on lasix, can continue  - Other home meds appear to include hyzaar and toprol-XL; hold these two medications     CKD-3  - Stable, with slow rise  - Avoid further nephrotoxic agents  - Patient will need CMP in 1 week in ADDITION to CBC     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   - C/w escitalopram  - C/w topiramate        Significant Diagnostic Studies:   Labs:       Recent Labs      12/10/17   0754   WBC  11.45   RBC  4.08   HGB  8.6*   HCT  28.1*   PLT  291   MCV  69*   MCH  21.1*   MCHC  30.6*   GRAN  49.0  5.6   LYMPH  32.1  3.7   MONO  7.1  0.8   EOS  1.0*          Recent Labs      12/10/17   0644   GLU  87   NA  139   K  3.7   CL  109   CO2  23   BUN  26*   CREATININE  1.5*   CALCIUM  9.1   ANIONGAP  7*      Cardiac Studies: 2D ECHO with CFD     Significant Treatments/Procedures:   Catheter-directed tPA on 12/3/2017  EGD with cauterization of duodenal ulcer on 12/6/2017     Consults while in Hospital:   Cardiology and Gastroenterology     Discharge Medications:             Current Discharge Medication List             START taking these medications     Details   sucralfate (CARAFATE) 100 mg/mL suspension Take 10 mLs (1 g total) by mouth every 6 (six) hours.  Qty: 560 mL, Refills: 0       warfarin (COUMADIN) 2.5 MG tablet Take 1 tablet (2.5 mg total) by mouth Daily.  Qty: 30 tablet, Refills:  1                 CONTINUE these medications which have CHANGED     Details   furosemide (LASIX) 20 MG tablet Take 1 tablet (20 mg total) by mouth once daily.  Qty: 30 tablet, Refills: 1       pantoprazole (PROTONIX) 40 MG tablet Take 1 tablet (40 mg total) by mouth 2 (two) times daily before meals.  Qty: 60 tablet, Refills: 2                 CONTINUE these medications which have NOT CHANGED     Details   amlodipine (NORVASC) 10 MG tablet TAKE 1 TABLET(10 MG) BY MOUTH EVERY DAY  Qty: 90 tablet, Refills: 1     Associated Diagnoses: Essential hypertension       escitalopram oxalate (LEXAPRO) 20 MG tablet TAKE 1 TABLET BY MOUTH ONCE DAILY  Qty: 90 tablet, Refills: 0       metoprolol succinate (TOPROL-XL) 25 MG 24 hr tablet TAKE 1 TABLET BY MOUTH ONCE DAILY  Qty: 90 tablet, Refills: 0       potassium chloride (MICRO-K) 10 MEQ CpSR Take 2 capsules (20 mEq total) by mouth once daily.  Qty: 60 capsule, Refills: 11       topiramate (TOPAMAX) 50 MG tablet Take 1 tablet (50 mg total) by mouth 2 (two) times daily.  Qty: 180 tablet, Refills: 1     Comments: **Patient requests 90 days supply**       ondansetron (ZOFRAN) 4 MG tablet Take 1 tablet (4 mg total) by mouth every 6 (six) hours.  Qty: 12 tablet, Refills: 0                STOP taking these medications         benzonatate (TESSALON PERLES) 100 MG capsule Comments:   Reason for Stopping:            cefUROXime (CEFTIN) 500 MG tablet Comments:   Reason for Stopping:            doxazosin (CARDURA) 2 MG tablet Comments:   Reason for Stopping:            meloxicam (MOBIC) 7.5 MG tablet Comments:   Reason for Stopping:            conjugated estrogens (PREMARIN) vaginal cream Comments:   Reason for Stopping:            estrogens, conjugated, (PREMARIN) 0.45 MG tablet Comments:   Reason for Stopping:            losartan-hydrochlorothiazide 100-25 mg (HYZAAR) 100-25 mg per tablet Comments:   Reason for Stopping:                 Discharge Diet:cardiac diet with Normal Fluid intake  of 1500 - 2000 mL per day     Activity: activity as tolerated     Discharged Condition: Stable     [place] Disposition[place] [facility] [place]     Follow-up:   Future Appointments  Date Time Provider Department Center   12/18/2017 11:00 AM ELANA Haskins [facility]   1/25/2018 2:00 PM Jeovany Talbert MD [facility] GASTRO [facility]         Studies/Results pending on discharge:   Biopsy results: Duodenal ulcer; GI will call     Ignacio Barragan MD  Hospital Medicine      Electronically signed by Ignacio Barragan MD at 12/10/2017 10:16 AM        ED to Hosp-Admission (Discharged) on 12/2/2017            Routing History            Detailed Report           Note shared with patient     ____________________________________________    Today:  Ms. Tenorio presents to  today for hospital follow up. Has not no complaints.   Since most recent discharge, does not endorse fever, chills, coughing, headaches, dizziness, change in bowel habit or pattern, abd pain, N/V, dark or bloody stools. She has been compliant with taking all prescribed.       Review of Systems:  Eyes: denies visual changes at this time denies floaters   ENT: no nasal congestion or sore throat  Respiratory: no cough or shorness of breath  Cardiovascular: no chest pain or palpitations  Gastrointestinal: no nausea or vomiting, no abdominal pain or change in bowel habits  Genitourinary: no hematuria or dysuria; denies frequency  Hematologic/Lymphatic: no easy bruising or lymphadenopathy  Musculoskeletal: no arthralgias or myalgias  Neurological: no seizures or tremors  Endocrine: no heat or cold intolerance      PAST HISTORY:     Past Medical History:   Diagnosis Date    Cataract     Depression     Edema     GERD (gastroesophageal reflux disease)     Hypertension 10/2/2012    Osteoarthritis of both knees 8/9/2016    Osteopenia     Osteopenia 11/30/2017    Primary localized osteoarthrosis, lower leg 12/3/2014    Thalassemia minor         Past Surgical History:   Procedure Laterality Date    APPENDECTOMY      CATARACT EXTRACTION      COLONOSCOPY N/A 11/5/2016    Procedure: COLONOSCOPY;  Surgeon: Tanner Simental MD;  Location: [facility] ([address] [place] [address]);  Service: Endoscopy;  Laterality: N/A;    HYSTERECTOMY  age 39    fibroids    OOPHORECTOMY      TONSILLECTOMY         Family History   Problem Relation Age of Onset    Hypertension Mother     Cancer Father      skin    Cancer Brother      pancreatic    No Known Problems Sister     No Known Problems Maternal Aunt     No Known Problems Maternal Uncle     No Known Problems Paternal Aunt     No Known Problems Paternal Uncle     Coronary artery disease Maternal Grandmother     Heart failure Maternal Grandmother     No Known Problems Maternal Grandfather     Stroke Paternal Grandmother     Coronary artery disease Paternal Grandmother     No Known Problems Paternal Grandfather     Coronary artery disease Brother      with CABG in his 30s    Heart disease Neg Hx     Amblyopia Neg Hx     Blindness Neg Hx     Cataracts Neg Hx     Diabetes Neg Hx     Glaucoma Neg Hx     Macular degeneration Neg Hx     Retinal detachment Neg Hx     Strabismus Neg Hx     Thyroid disease Neg Hx        Social History     Social History    Marital status:      Spouse name: N/A    Number of children: 1    Years of education: N/A     Occupational History    Retired from [employer]       Social History Main Topics    Smoking status: Never Smoker    Smokeless tobacco: Never Used    Alcohol use No      Comment: rare    Drug use: No    Sexual activity: Yes     Partners: Male     Other Topics Concern    Not on file     Social History Narrative    No narrative on file       MEDICATIONS & ALLERGIES:     Current Outpatient Prescriptions on File Prior to Visit   Medication Sig Dispense Refill    amLODIPine (NORVASC) 10 MG tablet TAKE 1 TABLET(10 MG) BY MOUTH EVERY DAY 90  tablet 1    escitalopram oxalate (LEXAPRO) 20 MG tablet TAKE 1 TABLET BY MOUTH ONCE DAILY 90 tablet 0    furosemide (LASIX) 20 MG tablet Take 1 tablet (20 mg total) by mouth once daily. 30 tablet 1    metoprolol succinate (TOPROL-XL) 25 MG 24 hr tablet TAKE 1 TABLET BY MOUTH ONCE DAILY 90 tablet 0    ondansetron (ZOFRAN) 4 MG tablet Take 1 tablet (4 mg total) by mouth every 6 (six) hours. 12 tablet 0    pantoprazole (PROTONIX) 40 MG tablet Take 1 tablet (40 mg total) by mouth 2 (two) times daily before meals. 60 tablet 2    potassium chloride (MICRO-K) 10 MEQ CpSR Take 2 capsules (20 mEq total) by mouth once daily. 60 capsule 11    sucralfate (CARAFATE) 100 mg/mL suspension Take 10 mLs (1 g total) by mouth every 6 (six) hours. 560 mL 0    topiramate (TOPAMAX) 50 MG tablet Take 1 tablet (50 mg total) by mouth 2 (two) times daily. 180 tablet 1    warfarin (COUMADIN) 2.5 MG tablet Take 1 tablet (2.5 mg total) by mouth Daily. 30 tablet 1     No current facility-administered medications on file prior to visit.         Review of patient's allergies indicates:   Allergen Reactions    Codeine     Ciprofloxacin Rash       OBJECTIVE:     Vital Signs:  There were no vitals filed for this visit.  Wt Readings from Last 1 Encounters:   12/10/17 0700 98.7 kg (217 lb 9.5 oz)   12/09/17 0700 98.7 kg (217 lb 9.5 oz)   12/08/17 0738 99 kg (218 lb 4.1 oz)   12/04/17 0701 103.9 kg (229 lb 0.9 oz)   12/02/17 1528 103.9 kg (229 lb)     There is no height or weight on file to calculate BMI.   Wt Readings from Last 3 Encounters:   12/21/17 101.3 kg (223 lb 5.2 oz)   12/10/17 98.7 kg (217 lb 9.5 oz)   11/30/17 99 kg (218 lb 4.1 oz)     Temp Readings from Last 3 Encounters:   12/10/17 97.6 °F (36.4 °C) (Oral)   11/30/17 97.7 °F (36.5 °C) (Oral)   11/25/17 98 °F (36.7 °C)     BP Readings from Last 3 Encounters:   12/21/17 (!) 142/78   12/10/17 126/72   11/30/17 134/74     Pulse Readings from Last 3 Encounters:   12/21/17 72    12/10/17 79   11/30/17 70       Physical Exam:  General: Well developed, well nourished. No distress.  HEENT: Head is normocephalic, atraumatic; ears are normal.   Eyes: Clear conjunctiva.  Neck: Supple, symmetrical neck; trachea midline. No JVD  Lungs: Clear to auscultation bilaterally and normal respiratory effort.  Cardiovascular: Heart with regular rate and rhythm. No murmurs, gallops or rubs  Extremities: + LLE edema (minimal); Pulses 2+ and symmetric.   Abdomen: Abdomen is soft, non-tender non-distended with normal bowel sounds.  Skin: Skin color, texture, turgor normal. No rashes.  Musculoskeletal: Normal gait with single base cane   Lymph Nodes: No cervical or supraclavicular adenopathy.  Neurologic: No focal numbness or weakness.   Psychiatric: Not depressed.        Laboratory  Lab Results   Component Value Date    WBC 10.43 12/13/2017    HGB 9.1 (L) 12/13/2017    HCT 31.1 (L) 12/13/2017    MCV 72 (L) 12/13/2017     12/13/2017     BMP  Lab Results   Component Value Date     12/13/2017    K 3.8 12/13/2017     (H) 12/13/2017    CO2 21 (L) 12/13/2017    BUN 35 (H) 12/13/2017    CREATININE 1.3 12/13/2017    CALCIUM 8.7 12/13/2017    ANIONGAP 7 (L) 12/13/2017    ESTGFRAFRICA 47.0 (A) 12/13/2017    EGFRNONAA 40.8 (A) 12/13/2017     Lab Results   Component Value Date    ALT 17 12/13/2017    AST 24 12/13/2017    ALKPHOS 69 12/13/2017    BILITOT 0.3 12/13/2017     Lab Results   Component Value Date    INR 3.2 12/18/2017    INR 4.2 12/15/2017    INR 4.4 12/14/2017     No results found for: HGBA1C  No results for input(s): POCTGLUCOSE in the last 72 hours.      2 D Echo  Date of Procedure: 12/05/2017        TEST DESCRIPTION   Technical Quality: This is a technically good study.     Aorta: The aortic root is normal in size, measuring 3.4 cm at sinotubular junction and 2.7 cm at Sinuses of Valsalva. The proximal ascending aorta is normal in size, measuring 3.3 cm across.     Left Atrium: The left  atrial volume index is normal, measuring 24.39 cc/m2.     Left Ventricle: The left ventricle is normal in size, with an end-diastolic diameter of 4.2 cm, and an end-systolic diameter of 2.2 cm. LV wall thickness is normal, with the septum measuring 1.0 cm and the posterior wall measuring 0.9 cm across. Relative   wall thickness was normal at 0.43, and the LV mass index was 70.9 g/m2 consistent with normal left ventricular mass. There are no regional wall motion abnormalities. Left ventricular systolic function appears normal. Visually estimated ejection fraction   is 60-65%. The LV Doppler derived stroke volume equals 82.0 ccs.     Diastolic indices: E wave velocity 0.7 m/s, E/A ratio 0.6,  msec., E/e' ratio(avg) 10. Diastolic function is indeterminate.     Right Atrium: The right atrium is normal in size, measuring 4.9 cm in length and 3.0 cm in width in the apical view.     Right Ventricle: The right ventricle is normal in size measuring 3.8 cm at the base in the apical right ventricle-focused view. Global right ventricular systolic function appears normal. Tricuspid annular plane systolic excursion (TAPSE) is 2.1 cm. The   estimated PA systolic pressure is 67 mmHg.     Aortic Valve:  The aortic valve is normal in structure with normal leaflet mobility. The aortic valve is tri-leaflet in structure.     Mitral Valve:  The mitral valve is normal in structure with normal leaflet mobility.     Tricuspid Valve:  The tricuspid valve is normal in structure. There is mild tricuspid regurgitation.     Pulmonary Valve:  The pulmonic valve is normal in structure.     IVC: IVC is normal in size and collapses > 50% with a sniff, suggesting normal right atrial pressure of 3 mmHg.     Intracavitary: There is no evidence of pericardial effusion, intracavity mass, thrombi, or vegetation.         CONCLUSIONS     1 - Normal left ventricular systolic function (EF 60-65%).     2 - No wall motion abnormalities.     3 -  Indeterminate LV diastolic function.     4 - Normal right ventricular systolic function .     5 - Pulmonary hypertension. The estimated PA systolic pressure is 67 mmHg.             This document has been electronically    SIGNED BY: Sheldon Sims MD On: 12/05/2017 16:13        Ref Range & Units 2wk ago  (12/5/17) 2wk ago  (12/3/17) 1yr ago  (5/10/16) 4yr ago  (2/20/13)    EF 55 - 65 60  60  55   65R     Est. PA Systolic Pressure  67   77.56   30.04      Mitral Valve Mobility  NORMAL   NORMAL      Tricuspid Valve Regurgitation  MILD  TRIVIAL TO MILD  MILD     Resulting Agency  [facility]      Specimen Collected: 12/05/17 14:40 Last Resulted: 12/05/17 16:18 Lab Flowsheet Order Details          US of LE  Date of Procedure: 12/04/2017    PRE-TEST DATA   Findings:    RIGHT:  Normal compressibility, augmentation, and flow were visualized in the Right Common Femoral, Femoral, Profunda Femoral, Popliteal, Anterior Tibial, Posterior Tibial, and Saphenofemoral Junction Veins.  The Right External Iliac vein was not visualized.        LEFT:  Normal compressibility, augmentation, and flow were visualized in the Left Common Femoral, Femoral, Profunda Femoral, Anterior Tibial, Posterior Tibial, External Iliac, and Saphenofemoral Junction Veins.    There is thrombus visualized in the Popliteal Vein.        TEST DESCRIPTION   Impression:    RIGHT:  No evidence of Right lower extremity DVT.      LEFT:  Age indeterminate DVT of the left popliteal vein.        This document has been electronically    SIGNED BY: Kit Ramos MD On: 12/04/2017 16:33         Specimen Collected: 12/04/17 00:00 Last Resulted: 12/04/17 16:38          COMPARISON: Chest radiograph 11/25/17    FINDINGS: PA and lateral views of the chest. Monitoring leads overlie the chest. No detrimental change.   Pulmonary vasculature and hilar regions are within normal limits.  The bilateral lungs are symmetrically deeply inflated without large consolidation or  new focal opacity. No pleural effusion or pneumothorax.  Cardiomediastinal silhouette is unchanged.  Included osseous structures appear stable without acute process seen.   Impression         No detrimental change or radiographic acute intrathoracic process seen.      Electronically signed by: JEOVANNY COHEN MD, MD  Date: 12/02/17  Time: 17:38        CTA    Technique: The chest was surveyed from the costophrenic angles through the lung apices at 5-mm increments after the administration of 75 cc of Omnipaque 350 intravenous contrast material. Axial, sagittal and coronal images were reviewed.    Comparison:No available dedicated priors.    Findings:    The structures at the base of the neck demonstrate no convincing evidence of disease.    The thoracic aorta maintains normal caliber, contour, and course without significant atherosclerotic calcification within its course.  There is no evidence of aneurysmal dilation or dissection. The heart is mildly enlarged and there is a very small pericardial effusion.     Partially occlusive right main stem pulmonary artery thromboembolism with near-complete lower lobe pulmonary artery thrombus and partially occlusive upper lobe lobar pulmonary embolism. On the left side there is partially occlusive left lower lobe pulmonary thromboembolism and partially occlusive left upper lobe pulmonary thromboembolus. There are secondary signs of right ventricular strain including possible septal bowing and enlargement of the main pulmonary artery to 3.6 cm in diameter.    The esophagus maintains a normal course and caliber. There is no axillary, mediastinal, or hilar lymph node enlargement.      The trachea is midline and proximal airways are patent. The lungs are symmetrically expanded. There is no evidence of focal consolidation or pleural fluid.    Limited images of the upper abdomen obtained during the course of this dedicated thoracic CT demonstrate nothing unusual.    The osseous  structures are unremarkable.   Impression         Multilobar pulmonary emboli, right side greater than left, with CT findings worrisome for right heart strain.      Preliminary report discussed with Dr. Jaime Bajwa by Dr. Laurent on behalf of Dr. Navarro at 21:50:13 on Saturday, December 02, 2017.  ______________________________________     Electronically signed by resident: MODESTO LAURENT  Date: 12/02/17  Time: 22:04            As the supervising and teaching physician, I personally reviewed the images and resident's interpretation and I agree with the findings.          Electronically signed by: Daryl Navarro  Date: 12/02/17  Time: 22:18        EGD  Findings:       The examined esophagus was normal.       Diffuse nodular mucosa was found in the entire examined stomach.       One oozing cratered duodenal ulcer with pigmented material was found        in the duodenal bulb. The lesion was 15 mm in largest dimension.        Area was successfully injected with 5 mL of a 1:10,000 solution of        epinephrine for hemostasis. Estimated blood loss was minimal.        Coagulation for hemostasis using bipolar probe was successful.        Estimated blood loss was minimal.       The exam of the duodenum was otherwise normal.  Impression:           - Nodular gastropathy.                        - One oozing duodenal ulcer with pigmented                         material. Injected with Epinephrine and coaptive                         coagulation with bipolar cautery was successful.  Recommendation:       - [facility].                        - Full liquid diet.                        - Use sucralfate suspension 1 gram PO QID for 2                         weeks.                        - Use Prilosec (omeprazole) 40 mg PO BID for 12                         weeks and daily thereafter.                        - Repeat upper endoscopy in 12 weeks to check                         healing.                         - Perform an H. pylori serology.                        - Resume heparin at prior dose.  Attending Participation:       -  Tanner Simental MD  12/6/2017 10:56:31 AM  This report has been verified and signed electronically.  Number of Addenda: 0  Note Initiated On: 12/6/2017 10:20 AM  Estimated Blood Loss: Estimated blood loss was minimal.       This report has been verified and signed electronically.      Specimen Collected: 12/06/17 10:20 Last Resulted: 12/06/17 11:11          TRANSITION OF CARE:     [facility] Contact Note:  12/12/2017    Family and/or Caretaker present at visit?  Yes.  Diagnostic tests reviewed/disposition: I have reviewed all completed as well as pending diagnostic tests at the time of discharge.  Disease/illness education:  Bilateral PE, GIB, DVT, HtN, ADRIÁN, Meds  Home health/community services discussion/referrals: Patient has home health established at [facility] (SN, PT) .   Establishment or re-establishment of referral orders for community resources: No other necessary community resources.   Discussion with other health care providers: No discussion with other health care providers necessary.     Transition of Care Visit:     I have reviewed and updated the history and problem list.  I have reconciled the medication list.  I have discussed the hospitalization and current medical issues, prognosis and plans with the patient/family.  I  spent more than 50% of time discussing the care with the patient/family.  Total Encounter in the [facility]: 60 minutes.    Medications Reconciliation:   I have reconciled the patient's home medications and discharge medications with the patient/family. I have updated all changes.  Refer to After-Visit Medication List.    ASSESSMENT & PLAN:     HIGH RISK CONDITION(S):        Recent admission for Acute Bilateral Multi-lobar PEs (R>L), with LLE Popliteal DVT (age undetermined) and Rt Heart Strain (PAP: 67, + DD):    *BNP: 202  (12/2/2017)  *History of Thalassemia Minor  *Needs to be seen by Hematology for Coag w/u (apt: today with Dr. MARJAN Lora)  - continue Coumadin (Most recent INR: 3.2 on 12/18; mgt per Coumadin Clinic; taking 1/2 tab of Warfarin 2.5 nightly)  *This is her 1st thromboembolic event; however, seems to be an unprovoked event and will likely need lifelong AOC. Will be seen by Hematologist today for their expert input  - Lasix with Potassium replacement  - Coumadin (being managed per Coumadin Clinic)  - suggest a repeat 2 D echo in 4 weeks   - check BNP, BMP today       Recent admission with an Acute GIB:   *12/6: s/p EGD: + Duodenal Ulcer (s/p Argon)  *Hgb at discharge: 9.1 (10/13)        *H. Pylori Serology (12/7): negative   - continue Carafate x 2 weeks (taking)  - continue PPI therapy 40 bid x 12 weeks (taking)  - follow up with GI for repeat Upper Endoscopy in 12 weeks: (apt: 1/25)  - check CBC today       Hypertension:  Per JNC 8, goal < 150/90  Today: 142/78  - continue Norvasc  - continue Toprol XL      History of CKD III:   (stable)  *Baseline: 1.2-1.4  12/13: Creat: 1.3 at discharge  - check Renal markers today (on daily dosing of Lasix)      History of Depression:   (mood and affect are stable)  - continue Lexapro       Immunizations:   Flu Vaccine: today     Per ACIP guidelines, she has had the Prevnar 13 in 10/2016 and needs the PPSV 23 to be up to date for the next 5 years:    PPSV 23: today       [facility] Visit (Post Discharge Follow-up) Today:   - Our clinic physicians and nurses plan to follow the patient up for any medical issues in the [facility] for 30 days post discharge.    Future Appointments  Date Time Provider Department Center   12/21/2017 2:00 PM LAB, APPOINTMENT [facility] OLGA [facility] LAB IM [place] [facility]   12/21/2017 3:00 PM Gustavo Lora Jr., MD [facility] HEM ONC [place]   1/18/2018 9:20 AM Mundo Hudson DO [facility] IM [place]   1/25/2018 2:00 PM Jeovany Talbert MD [facility] GASTRO [address]          Medication List          Accurate as of 12/21/17  1:55 PM. If you have any questions, ask your nurse or doctor.               CONTINUE taking these medications    amLODIPine 10 MG tablet  Commonly known as:  NORVASC  TAKE 1 TABLET(10 MG) BY MOUTH EVERY DAY     escitalopram oxalate 20 MG tablet  Commonly known as:  LEXAPRO  TAKE 1 TABLET BY MOUTH ONCE DAILY     furosemide 20 MG tablet  Commonly known as:  LASIX  Take 1 tablet (20 mg total) by mouth once daily.     metoprolol succinate 25 MG 24 hr tablet  Commonly known as:  TOPROL-XL  TAKE 1 TABLET BY MOUTH ONCE DAILY     pantoprazole 40 MG tablet  Commonly known as:  PROTONIX  Take 1 tablet (40 mg total) by mouth 2 (two) times daily before meals.     potassium chloride 10 MEQ Cpsr  Commonly known as:  MICRO-K  Take 2 capsules (20 mEq total) by mouth once daily.     sucralfate 100 mg/mL suspension  Commonly known as:  CARAFATE  Take 10 mLs (1 g total) by mouth every 6 (six) hours.     topiramate 50 MG tablet  Commonly known as:  TOPAMAX  Take 1 tablet (50 mg total) by mouth 2 (two) times daily.     warfarin 2.5 MG tablet  Commonly known as:  COUMADIN  Take 1 tablet (2.5 mg total) by mouth Daily.        STOP taking these medications    ondansetron 4 MG tablet  Commonly known as:  ZOFRAN  Stopped by:  ELANA Wilkins          Signing Physician:  ELANA Wilkins

## 2017-12-21 NOTE — Clinical Note
[facility] Visit (Post Discharge Follow-up) Today:  - Our clinic physicians and nurses plan to follow the patient up for any medical issues in the [facility] for 30 days post discharge.  Future Appointments: 12/21/2017 3:00 PM    Gustavo Lora Jr., MD    [facility] HEM ONC   Ankit Sargent  12/21/2017 5:00 PM    FLU, INTERNAL MEDICINE     [facility]        [place] PCW  1/18/2018  9:20 AM    Mundo Hudson DO   [facility] IM        Center 1/25/2018  2:00 PM    Jeovany Talbert MD          [facility] GASTRO    [place]

## 2017-12-22 ENCOUNTER — ANTI-COAG VISIT (OUTPATIENT)
Dept: CARDIOLOGY | Facility: CLINIC | Age: 72
End: 2017-12-22

## 2017-12-22 DIAGNOSIS — Z79.01 LONG-TERM (CURRENT) USE OF ANTICOAGULANTS: ICD-10-CM

## 2017-12-22 LAB
INR PPP: 2.6
INR PPP: 3

## 2017-12-22 NOTE — PROGRESS NOTES
INR received 12/22. Contacted Virgie with [facility]. She is checking to see if someone can see her for POC INR today. HH able to check INR today

## 2017-12-26 ENCOUNTER — ANTI-COAG VISIT (OUTPATIENT)
Dept: CARDIOLOGY | Facility: CLINIC | Age: 72
End: 2017-12-26

## 2017-12-26 DIAGNOSIS — Z79.01 LONG-TERM (CURRENT) USE OF ANTICOAGULANTS: ICD-10-CM

## 2017-12-26 LAB — INR PPP: 2.8

## 2017-12-28 ENCOUNTER — ANTI-COAG VISIT (OUTPATIENT)
Dept: CARDIOLOGY | Facility: CLINIC | Age: 72
End: 2017-12-28

## 2017-12-28 DIAGNOSIS — Z79.01 LONG-TERM (CURRENT) USE OF ANTICOAGULANTS: ICD-10-CM

## 2017-12-28 LAB — INR PPP: 2

## 2017-12-28 RX ORDER — ESCITALOPRAM OXALATE 20 MG/1
TABLET ORAL
Qty: 90 TABLET | Refills: 3 | Status: SHIPPED | OUTPATIENT
Start: 2017-12-28 | End: 2018-05-16

## 2017-12-28 NOTE — PROGRESS NOTES
Patient called and was given lab result, reports no changes, states she has been off the coumadin for about a week, states no one has given her any instructions to take any coumadin

## 2017-12-29 NOTE — PROGRESS NOTES
"Patient seems to get easily confused. I have had several long conversations in the past with the patient in which she expressed understanding about our instructions, but now patient is saying she does not recall being told to take any couamdin Tuesday and Wednesday of this week, despite our medical assistant advising her on 12/26 at 2:02pm.  Several weeks ago the same thing occurred in which I advised her of a dose face to face and wrote it on a yellow card for her and she expressed understanding at ths time but the following conversation she did not recall any of our discussion.  I spoke to patient's  along with patient and emphasized the importance of clear communication and following of dosing instructions. I advised her to keep a record of each time she takes her medication. Unfortunately, I am not really sure if she took coumadin as advised this week vs missing both days, therefore, I will not be able to dose accurately at this time. Untimately, I have devised a dose based on the fact that patient again reports she cannot recall taking her coumadin earlier this week. Patient was re-educated on situations that would require placing a call to the coumadin clinic, including bleeding or unusual bruising issues, changes in health, diet or medications, upcoming procedures that require warfarin interruption, and missed coumadin dose(s). Patient expressed understanding that avoidance of consistency with these parameters could cause fluctuations in INR, leading to more frequent visits and increase risk of adverse events. "This note will not be shared with the patient."    "

## 2018-01-02 ENCOUNTER — TELEPHONE (OUTPATIENT)
Dept: ENDOSCOPY | Facility: HOSPITAL | Age: 73
End: 2018-01-02

## 2018-01-02 ENCOUNTER — ANTI-COAG VISIT (OUTPATIENT)
Dept: CARDIOLOGY | Facility: CLINIC | Age: 73
End: 2018-01-02

## 2018-01-02 DIAGNOSIS — Z79.01 LONG-TERM (CURRENT) USE OF ANTICOAGULANTS: ICD-10-CM

## 2018-01-02 LAB — INR PPP: 2.2

## 2018-01-02 RX ORDER — MELOXICAM 7.5 MG/1
7.5 TABLET ORAL DAILY
Qty: 90 TABLET | Refills: 1 | Status: SHIPPED | OUTPATIENT
Start: 2018-01-02 | End: 2018-01-16

## 2018-01-02 NOTE — TELEPHONE ENCOUNTER
Pt. Has order for EGD that needs to be scheduled. Pt. Is currently on Coumadin.  Per endoscopy protocol should be stopped 5 days prior to procedure.  Please contact pt and let know if ok to stop and when and if need Lovenox bridge and respond back so that we can get pt scheduled.  Thanks

## 2018-01-03 DIAGNOSIS — K26.9 DUODENAL ULCER: Primary | ICD-10-CM

## 2018-01-03 NOTE — PROGRESS NOTES
"The pt is scheduled for her EGD "on 3/7/18 which is 12 weeks post 1st procedure when the order was put in for that it works great.  Please advise pt on holding and Lovenox bridging insturctions."  We will provide detailed dosing instructions with an INR that is roughly 1 week prior to this procedure date.  "

## 2018-01-03 NOTE — TELEPHONE ENCOUNTER
Pt. Is scheduled for 3/7/18 which is 12 weeks post 1st procedure when the order was put in for that it works great.  Please advise pt on holding and Lovenox bridging insturctions.  Thanks

## 2018-01-04 ENCOUNTER — ANTI-COAG VISIT (OUTPATIENT)
Dept: CARDIOLOGY | Facility: CLINIC | Age: 73
End: 2018-01-04

## 2018-01-04 DIAGNOSIS — Z79.01 LONG-TERM (CURRENT) USE OF ANTICOAGULANTS: ICD-10-CM

## 2018-01-04 LAB — INR PPP: 2

## 2018-01-08 ENCOUNTER — ANTI-COAG VISIT (OUTPATIENT)
Dept: CARDIOLOGY | Facility: CLINIC | Age: 73
End: 2018-01-08

## 2018-01-08 LAB — INR PPP: 3.1

## 2018-01-09 ENCOUNTER — LAB VISIT (OUTPATIENT)
Dept: LAB | Facility: HOSPITAL | Age: 73
End: 2018-01-09
Attending: INTERNAL MEDICINE
Payer: MEDICARE

## 2018-01-09 ENCOUNTER — OFFICE VISIT (OUTPATIENT)
Dept: INTERNAL MEDICINE | Facility: CLINIC | Age: 73
End: 2018-01-09
Payer: MEDICARE

## 2018-01-09 VITALS
SYSTOLIC BLOOD PRESSURE: 138 MMHG | HEIGHT: 64 IN | BODY MASS INDEX: 38.35 KG/M2 | HEART RATE: 76 BPM | WEIGHT: 224.63 LBS | DIASTOLIC BLOOD PRESSURE: 70 MMHG | RESPIRATION RATE: 16 BRPM | TEMPERATURE: 98 F

## 2018-01-09 DIAGNOSIS — I10 ESSENTIAL HYPERTENSION: ICD-10-CM

## 2018-01-09 DIAGNOSIS — D62 ACUTE BLOOD LOSS ANEMIA: ICD-10-CM

## 2018-01-09 DIAGNOSIS — E66.9 OBESITY (BMI 30-39.9): ICD-10-CM

## 2018-01-09 DIAGNOSIS — K21.9 GASTROESOPHAGEAL REFLUX DISEASE, ESOPHAGITIS PRESENCE NOT SPECIFIED: ICD-10-CM

## 2018-01-09 DIAGNOSIS — F33.41 RECURRENT MAJOR DEPRESSIVE DISORDER, IN PARTIAL REMISSION: ICD-10-CM

## 2018-01-09 DIAGNOSIS — I26.09 OTHER ACUTE PULMONARY EMBOLISM WITH ACUTE COR PULMONALE: Primary | ICD-10-CM

## 2018-01-09 DIAGNOSIS — K26.9 DUODENAL ULCER: ICD-10-CM

## 2018-01-09 LAB
ANION GAP SERPL CALC-SCNC: 7 MMOL/L
BASOPHILS # BLD AUTO: 0.05 K/UL
BASOPHILS NFR BLD: 0.5 %
BUN SERPL-MCNC: 28 MG/DL
CALCIUM SERPL-MCNC: 9.4 MG/DL
CHLORIDE SERPL-SCNC: 109 MMOL/L
CO2 SERPL-SCNC: 25 MMOL/L
CREAT SERPL-MCNC: 0.9 MG/DL
DIFFERENTIAL METHOD: ABNORMAL
EOSINOPHIL # BLD AUTO: 0.2 K/UL
EOSINOPHIL NFR BLD: 1.8 %
ERYTHROCYTE [DISTWIDTH] IN BLOOD BY AUTOMATED COUNT: 16.5 %
EST. GFR  (AFRICAN AMERICAN): >60 ML/MIN/1.73 M^2
EST. GFR  (NON AFRICAN AMERICAN): >60 ML/MIN/1.73 M^2
GLUCOSE SERPL-MCNC: 81 MG/DL
HCT VFR BLD AUTO: 33.7 %
HGB BLD-MCNC: 9.8 G/DL
IMM GRANULOCYTES # BLD AUTO: 0.05 K/UL
IMM GRANULOCYTES NFR BLD AUTO: 0.5 %
LYMPHOCYTES # BLD AUTO: 3.1 K/UL
LYMPHOCYTES NFR BLD: 32.5 %
MCH RBC QN AUTO: 20.4 PG
MCHC RBC AUTO-ENTMCNC: 29.1 G/DL
MCV RBC AUTO: 70 FL
MONOCYTES # BLD AUTO: 0.9 K/UL
MONOCYTES NFR BLD: 9.8 %
NEUTROPHILS # BLD AUTO: 5.3 K/UL
NEUTROPHILS NFR BLD: 54.9 %
NRBC BLD-RTO: 0 /100 WBC
PLATELET # BLD AUTO: 281 K/UL
PMV BLD AUTO: 10.2 FL
POTASSIUM SERPL-SCNC: 3.9 MMOL/L
RBC # BLD AUTO: 4.81 M/UL
SODIUM SERPL-SCNC: 141 MMOL/L
WBC # BLD AUTO: 9.57 K/UL

## 2018-01-09 PROCEDURE — 36415 COLL VENOUS BLD VENIPUNCTURE: CPT | Mod: PO

## 2018-01-09 PROCEDURE — 99214 OFFICE O/P EST MOD 30 MIN: CPT | Mod: S$PBB,,, | Performed by: INTERNAL MEDICINE

## 2018-01-09 PROCEDURE — 99999 PR PBB SHADOW E&M-EST. PATIENT-LVL III: CPT | Mod: PBBFAC,,, | Performed by: INTERNAL MEDICINE

## 2018-01-09 PROCEDURE — 99213 OFFICE O/P EST LOW 20 MIN: CPT | Mod: PBBFAC,PO | Performed by: INTERNAL MEDICINE

## 2018-01-09 PROCEDURE — 85025 COMPLETE CBC W/AUTO DIFF WBC: CPT

## 2018-01-09 PROCEDURE — 80048 BASIC METABOLIC PNL TOTAL CA: CPT

## 2018-01-09 RX ORDER — LOSARTAN POTASSIUM AND HYDROCHLOROTHIAZIDE 12.5; 5 MG/1; MG/1
1 TABLET ORAL DAILY
Qty: 90 TABLET | Refills: 3 | Status: SHIPPED | OUTPATIENT
Start: 2018-01-09 | End: 2018-11-01

## 2018-01-09 NOTE — PROGRESS NOTES
Subjective:       Patient ID: Coby Atkinson is a 72 y.o. female.    Chief Complaint: Follow-up (hospital)    HPI   Pt with recent B/L PE's and acute UGIB along with HTN, Morbid Obesity, Depression, GERD, OA of knee is here for f/u. Pt has already seen Hematology who recommends chronic anticoagulation. She will also f/u with GI later this month regarding UGIB. Pt denies any dark/black colored stools, fatigue, weakness, etc.   Review of Systems   Constitutional: Negative for activity change, appetite change, chills, diaphoresis, fatigue, fever and unexpected weight change.   HENT: Negative for postnasal drip, rhinorrhea, sinus pressure, sneezing, sore throat, trouble swallowing and voice change.    Respiratory: Negative for cough, shortness of breath and wheezing.    Cardiovascular: Negative for chest pain, palpitations and leg swelling.   Gastrointestinal: Negative for abdominal pain, blood in stool, constipation, diarrhea, nausea and vomiting.   Genitourinary: Negative for dysuria.   Musculoskeletal: Negative for arthralgias and myalgias.   Skin: Negative for rash and wound.   Allergic/Immunologic: Negative for environmental allergies and food allergies.   Hematological: Negative for adenopathy. Does not bruise/bleed easily.       Objective:      Physical Exam   Constitutional: She is oriented to person, place, and time. She appears well-developed and well-nourished. No distress.   HENT:   Head: Normocephalic and atraumatic.   Eyes: Conjunctivae and EOM are normal. Pupils are equal, round, and reactive to light. Right eye exhibits no discharge. Left eye exhibits no discharge. No scleral icterus.   Neck: Neck supple. No JVD present.   Cardiovascular: Normal rate, regular rhythm, normal heart sounds and intact distal pulses.    Pulmonary/Chest: Effort normal and breath sounds normal. No respiratory distress. She has no wheezes. She has no rales.   Abdominal: Soft. Bowel sounds are normal. There is no  tenderness.   Musculoskeletal: She exhibits no edema.   Lymphadenopathy:     She has no cervical adenopathy.   Neurological: She is alert and oriented to person, place, and time.   Skin: Skin is warm and dry. No rash noted. She is not diaphoretic. No pallor.       Assessment:       1. Other acute pulmonary embolism with acute cor pulmonale    2. Duodenal ulcer    3. Acute blood loss anemia    4. Essential hypertension    5. Gastroesophageal reflux disease, esophagitis presence not specified    6. Obesity (BMI 30-39.9)    7. Recurrent major depressive disorder, in partial remission        Plan:    1. Acute B/L PE- stable, s/p catheter directed tPA 12/3       Pt has seen Hematology- will stay on Coumadin for life   2. Acute UGIB 2/2 duodenal ulcer with anemia- EGD done in the hospital and area was cauterized       Continue PPI as prescribed       CBC today       F/u with GI on 1/25/18   3. HTN- controlled on Hyzaar/Norvasc/Toprol   4. Obesity- pt followed by Bariatric Medicine   5. Depression- fair control on Lexapro 20 mg daily   6. GERD- controlled on Protonix daily   7. OA of knees- stable on Mobic PRN

## 2018-01-11 ENCOUNTER — ANTI-COAG VISIT (OUTPATIENT)
Dept: CARDIOLOGY | Facility: CLINIC | Age: 73
End: 2018-01-11

## 2018-01-11 LAB — INR PPP: 2.6

## 2018-01-16 ENCOUNTER — HOSPITAL ENCOUNTER (EMERGENCY)
Facility: OTHER | Age: 73
Discharge: HOME | End: 2018-01-16
Attending: EMERGENCY MEDICINE
Payer: MEDICARE

## 2018-01-16 VITALS
HEIGHT: 63 IN | BODY MASS INDEX: 38.45 KG/M2 | OXYGEN SATURATION: 99 % | RESPIRATION RATE: 18 BRPM | SYSTOLIC BLOOD PRESSURE: 173 MMHG | DIASTOLIC BLOOD PRESSURE: 72 MMHG | HEART RATE: 65 BPM | TEMPERATURE: 99 F | WEIGHT: 217 LBS

## 2018-01-16 DIAGNOSIS — R55 NEAR SYNCOPE: Primary | ICD-10-CM

## 2018-01-16 LAB
ALBUMIN SERPL BCP-MCNC: 3.6 G/DL
ALP SERPL-CCNC: 82 U/L
ALT SERPL W/O P-5'-P-CCNC: 15 U/L
ANION GAP SERPL CALC-SCNC: 11 MMOL/L
ANISOCYTOSIS BLD QL SMEAR: ABNORMAL
APTT BLDCRRT: 29.3 SEC
AST SERPL-CCNC: 20 U/L
BASOPHILS # BLD AUTO: 0.02 K/UL
BASOPHILS NFR BLD: 0.2 %
BILIRUB SERPL-MCNC: 0.3 MG/DL
BUN SERPL-MCNC: 24 MG/DL
CALCIUM SERPL-MCNC: 9.1 MG/DL
CHLORIDE SERPL-SCNC: 105 MMOL/L
CO2 SERPL-SCNC: 23 MMOL/L
CREAT SERPL-MCNC: 1.1 MG/DL
DIFFERENTIAL METHOD: ABNORMAL
EOSINOPHIL # BLD AUTO: 0.1 K/UL
EOSINOPHIL NFR BLD: 1.4 %
ERYTHROCYTE [DISTWIDTH] IN BLOOD BY AUTOMATED COUNT: 15.7 %
EST. GFR  (AFRICAN AMERICAN): 58 ML/MIN/1.73 M^2
EST. GFR  (NON AFRICAN AMERICAN): 50 ML/MIN/1.73 M^2
GLUCOSE SERPL-MCNC: 123 MG/DL
HCT VFR BLD AUTO: 34.2 %
HGB BLD-MCNC: 10.4 G/DL
INR PPP: 2.3
LYMPHOCYTES # BLD AUTO: 1.9 K/UL
LYMPHOCYTES NFR BLD: 18.2 %
MCH RBC QN AUTO: 21 PG
MCHC RBC AUTO-ENTMCNC: 30.4 G/DL
MCV RBC AUTO: 69 FL
MONOCYTES # BLD AUTO: 0.7 K/UL
MONOCYTES NFR BLD: 6.3 %
NEUTROPHILS # BLD AUTO: 7.6 K/UL
NEUTROPHILS NFR BLD: 73.9 %
PLATELET # BLD AUTO: 255 K/UL
PLATELET BLD QL SMEAR: ABNORMAL
PMV BLD AUTO: 9.1 FL
POLYCHROMASIA BLD QL SMEAR: ABNORMAL
POTASSIUM SERPL-SCNC: 3.7 MMOL/L
PROT SERPL-MCNC: 7 G/DL
PROTHROMBIN TIME: 24.8 SEC
RBC # BLD AUTO: 4.95 M/UL
SODIUM SERPL-SCNC: 139 MMOL/L
WBC # BLD AUTO: 10.29 K/UL

## 2018-01-16 PROCEDURE — 93005 ELECTROCARDIOGRAM TRACING: CPT

## 2018-01-16 PROCEDURE — 25000003 PHARM REV CODE 250: Performed by: EMERGENCY MEDICINE

## 2018-01-16 PROCEDURE — 93010 ELECTROCARDIOGRAM REPORT: CPT | Mod: ,,, | Performed by: INTERNAL MEDICINE

## 2018-01-16 PROCEDURE — 85730 THROMBOPLASTIN TIME PARTIAL: CPT

## 2018-01-16 PROCEDURE — 85610 PROTHROMBIN TIME: CPT

## 2018-01-16 PROCEDURE — 99285 EMERGENCY DEPT VISIT HI MDM: CPT | Mod: 25

## 2018-01-16 PROCEDURE — 80053 COMPREHEN METABOLIC PANEL: CPT

## 2018-01-16 PROCEDURE — 85025 COMPLETE CBC W/AUTO DIFF WBC: CPT

## 2018-01-16 PROCEDURE — 96360 HYDRATION IV INFUSION INIT: CPT

## 2018-01-16 RX ADMIN — SODIUM CHLORIDE 500 ML: 900 INJECTION, SOLUTION INTRAVENOUS at 05:01

## 2018-01-16 NOTE — ED PROVIDER NOTES
"Encounter Date: 1/16/2018    SCRIBE #1 NOTE: I, Marcelina Mireles, am scribing for, and in the presence of, Dr. Osborne.       History     Chief Complaint   Patient presents with    Dizziness     while cooking w/ emesis and near syncope, has resolved upon EMS arrival     Time seen by provider: 5:30 PM    This is a 72 y.o. female, with history of HTN and PE, who presents via EMS with complaint of light-headedness and near syncopal episode which started approximately one hour ago. Sudden onset occurred while the patient was cooking dinner. The patient reports feeling nauseated, lightheaded and "like I was going to pass out."  She went to go sit down.  She does admit to feeling more fatigued today but otherwise had an normal day.  Per spouse, "she was mumbling and answering questions really slowly." He also says that he overheard EMS saying "her numbers were bad" upon arrival. The patient reports a similar episode of light-headedness/nausea approximately six weeks ago which was attributed to PE. She notes that previous episode was also accompanied by shortness of breath which she did not have this episode. Patient says that she currently feels improved since onset, but "I just don't feel right." She is unsure about LOC, but denies chest pain, shortness of breath, diaphoresis, fever, chills, abdominal pain, diarrhea, constipation, hematochezia, visual disturbance, cough, congestion, or sore throat. The patient denies having a history of syncopal episodes and reports last eating about five hours prior to onset of episode. She is compliant to coumadin and denies recent changes in medications. The patient has no additional complaints at this time.      The history is provided by the patient and the spouse.     Review of patient's allergies indicates:   Allergen Reactions    Codeine     Ciprofloxacin Rash     Past Medical History:   Diagnosis Date    Anticoagulant long-term use     Cataract     Depression     Edema     GERD " (gastroesophageal reflux disease)     Hypertension 10/2/2012    Osteoarthritis of both knees 8/9/2016    Osteopenia     Osteopenia 11/30/2017    PE (pulmonary thromboembolism) 12/03/2017    Primary localized osteoarthrosis, lower leg 12/3/2014    Thalassemia minor      Past Surgical History:   Procedure Laterality Date    APPENDECTOMY      CATARACT EXTRACTION      COLONOSCOPY N/A 11/5/2016    Procedure: COLONOSCOPY;  Surgeon: Tanner Simental MD;  Location: [address]);  Service: Endoscopy;  Laterality: N/A;    HYSTERECTOMY  age 39    fibroids    OOPHORECTOMY      TONSILLECTOMY       Family History   Problem Relation Age of Onset    Hypertension Mother     Cancer Father      skin    Cancer Brother      pancreatic    No Known Problems Sister     No Known Problems Maternal Aunt     No Known Problems Maternal Uncle     No Known Problems Paternal Aunt     No Known Problems Paternal Uncle     Coronary artery disease Maternal Grandmother     Heart failure Maternal Grandmother     No Known Problems Maternal Grandfather     Stroke Paternal Grandmother     Coronary artery disease Paternal Grandmother     No Known Problems Paternal Grandfather     Coronary artery disease Brother      with CABG in his 30s    Heart disease Neg Hx     Amblyopia Neg Hx     Blindness Neg Hx     Cataracts Neg Hx     Diabetes Neg Hx     Glaucoma Neg Hx     Macular degeneration Neg Hx     Retinal detachment Neg Hx     Strabismus Neg Hx     Thyroid disease Neg Hx      Social History   Substance Use Topics    Smoking status: Never Smoker    Smokeless tobacco: Never Used    Alcohol use No      Comment: rare     Review of Systems   Constitutional: Positive for activity change (during episode) and fatigue (increased sleeping). Negative for chills and fever.   HENT: Negative for congestion and sore throat.    Eyes: Negative for visual disturbance.   Respiratory: Negative for cough and shortness of breath.     Cardiovascular: Negative for chest pain.   Gastrointestinal: Positive for nausea. Negative for abdominal pain, blood in stool, constipation and diarrhea.   Neurological: Positive for light-headedness. Negative for syncope.       Physical Exam     Initial Vitals [01/16/18 1708]   BP Pulse Resp Temp SpO2   (!) 116/56 60 18 98.9 °F (37.2 °C) 96 %      MAP       76         Physical Exam    Nursing note and vitals reviewed.  Constitutional: She appears well-developed and well-nourished. She is not diaphoretic. No distress.   HENT:   Head: Normocephalic and atraumatic.   Mouth/Throat: Oropharynx is clear and moist. No oropharyngeal exudate.   Eyes: EOM are normal. Pupils are equal, round, and reactive to light. Right eye exhibits no discharge. Left eye exhibits no discharge.   Neck: Normal range of motion. Neck supple.   Cardiovascular: Normal rate, regular rhythm, normal heart sounds and intact distal pulses.   2+ DP and PT pulses.   Pulmonary/Chest: Breath sounds normal. No respiratory distress. She has no wheezes. She has no rhonchi. She has no rales.   Abdominal: Soft. Bowel sounds are normal. She exhibits no distension. There is no tenderness. There is no rebound and no guarding.   Musculoskeletal: Normal range of motion. She exhibits no edema or tenderness.   Neurological: She is alert and oriented to person, place, and time.   Skin: Skin is warm and dry. Capillary refill takes less than 2 seconds. No rash and no abscess noted. No erythema. No pallor.   Psychiatric: She has a normal mood and affect. Her behavior is normal. Judgment and thought content normal.         ED Course   Procedures  Labs Reviewed   COMPREHENSIVE METABOLIC PANEL - Abnormal; Notable for the following:        Result Value    Glucose 123 (*)     BUN, Bld 24 (*)     eGFR if  58 (*)     eGFR if non  50 (*)     All other components within normal limits   CBC W/ AUTO DIFFERENTIAL - Abnormal; Notable for the  following:     Hemoglobin 10.4 (*)     Hematocrit 34.2 (*)     MCV 69 (*)     MCH 21.0 (*)     MCHC 30.4 (*)     RDW 15.7 (*)     MPV 9.1 (*)     Gran% 73.9 (*)     All other components within normal limits   PROTIME-INR - Abnormal; Notable for the following:     Prothrombin Time 24.8 (*)     INR 2.3 (*)     All other components within normal limits   APTT     EKG Readings: (Independently Interpreted)   Initial Reading: No STEMI.   Performed 17:46PM- Normal sinus rhythm at a rate of 59 bpm. Normal axis. Normal intervals. No ST or ischemic changes.     Imaging Results          CT Head Without Contrast (Final result)  Result time 01/16/18 18:53:49    Final result by Samir Watson MD (01/16/18 18:53:49)                 Impression:       No acute intracranial abnormalities identified.      Electronically signed by: SAMIR WATSON MD  Date:     01/16/18  Time:    18:53              Narrative:    Exam: CT of the head without contrast -- 72 year-old female with dizziness and loss of consciousness.    Comparison: None.    Technique: 5 mm noncontrast axial images through the brain were obtained.    Findings: There is generalized cerebral volume loss and mild chronic microvascular ischemic disease.  No evidence of acute/recent major vascular distribution cerebral infarction, intraparenchymal hemorrhage, or intra-axial space occupying lesion. The ventricular system is normal in size and configuration with no evidence of hydrocephalus. No effacement of the skull-base cisterns. No abnormal extra-axial fluid collections or blood products. The visualized paranasal sinuses and mastoid air cells are clear. The calvarium shows no significant abnormality.                                 Medical Decision Making:   History:   I obtained history from: EMS provider.  Old Medical Records: I decided to obtain old medical records.  Old Records Summarized: records from clinic visits, records from previous admission(s) and other  records.  Initial Assessment:   5:30PM:  Pt is a 73 y/o F who presents to ED with nausea and dizziness, with a near syncopal episode. Pt appears well, nontoxic. She is feeling better and neurologically intact at this time.  She does have a hx of PE recently but has been compliant with her coumadin, and her INR has been monitored closely.  Will plan for labs, imaging, will continue to follow and reassess.    Independently Interpreted Test(s):   I have ordered and independently interpreted X-rays - see prior notes.  I have ordered and independently interpreted EKG Reading(s) - see prior notes  Clinical Tests:   Lab Tests: Ordered and Reviewed  Radiological Study: Ordered and Reviewed  Medical Tests: Ordered and Reviewed    8:18PM:  Pt doing well. Her CT and labs are unremarkable.  Her INR level is therapeutic and has been closely monitored since her discharge.  She is feeling better and able to tolerate PO and ambulate with no issues.  I suspect that pt may have had a vasovagal episode as the etiology of her symptoms.  I updated pt regarding results and I counseled pt regarding supportive care measures.  I have discussed with the pt ED return warnings and need for close PCP f/u.  Pt agreeable to plan and all questions answered.  I feel that pt is stable for discharge and management as an outpatient and no further intervention is needed at this time.  Pt is comfortable returning to the ED if needed.  Will DC home in stable condition.              Scribe Attestation:   Scribe #1: I performed the above scribed service and the documentation accurately describes the services I performed. I attest to the accuracy of the note.    Attending Attestation:           Physician Attestation for Scribe:  Physician Attestation Statement for Scribe #1: I, Dr. Osborne, reviewed documentation, as scribed by Marcelina Mireles in my presence, and it is both accurate and complete.                 ED Course      Clinical Impression:     1. Near  syncope                                 Krys Osborne MD  01/16/18 2038

## 2018-01-17 NOTE — ED NOTES
The patient was able to tolerate drinking a cup of tap water and eating lili as well as saltine crackers.

## 2018-01-17 NOTE — ED NOTES
The patient was able to ambulate for 40 feet on the ER floor without complication, denies lightheadedness, dizziness, nausea, SOB. No acute distress, will continue to monitor.

## 2018-01-18 ENCOUNTER — ANTI-COAG VISIT (OUTPATIENT)
Dept: CARDIOLOGY | Facility: CLINIC | Age: 73
End: 2018-01-18

## 2018-01-18 LAB — INR PPP: 2.9

## 2018-01-22 ENCOUNTER — ANTI-COAG VISIT (OUTPATIENT)
Dept: CARDIOLOGY | Facility: CLINIC | Age: 73
End: 2018-01-22

## 2018-01-22 LAB — INR PPP: 2.2

## 2018-01-22 RX ORDER — POTASSIUM CHLORIDE 750 MG/1
CAPSULE, EXTENDED RELEASE ORAL
Qty: 60 CAPSULE | Refills: 0 | Status: SHIPPED | OUTPATIENT
Start: 2018-01-22 | End: 2018-01-22 | Stop reason: SDUPTHER

## 2018-01-22 RX ORDER — POTASSIUM CHLORIDE 750 MG/1
CAPSULE, EXTENDED RELEASE ORAL
Qty: 180 CAPSULE | Refills: 0 | Status: SHIPPED | OUTPATIENT
Start: 2018-01-22 | End: 2018-10-18 | Stop reason: SDUPTHER

## 2018-01-23 NOTE — PROGRESS NOTES
Patient reports taking 1.25 mg daily .  Then I asked patient if  she follows the dose I given her ? Patient reports she couldn't fine dose paper .  I asked the patient what dose was taken 1/22 last night ,patient stated she doesn't know.  Patient is very confused .Pt also said she held her dose on 01/22/18. Compliance emphasized

## 2018-01-25 ENCOUNTER — OFFICE VISIT (OUTPATIENT)
Dept: GASTROENTEROLOGY | Facility: CLINIC | Age: 73
End: 2018-01-25
Payer: MEDICARE

## 2018-01-25 VITALS
HEART RATE: 70 BPM | BODY MASS INDEX: 39.34 KG/M2 | SYSTOLIC BLOOD PRESSURE: 128 MMHG | HEIGHT: 63 IN | DIASTOLIC BLOOD PRESSURE: 68 MMHG | WEIGHT: 222 LBS

## 2018-01-25 DIAGNOSIS — J44.9 CHRONIC OBSTRUCTIVE PULMONARY DISEASE, UNSPECIFIED COPD TYPE: ICD-10-CM

## 2018-01-25 DIAGNOSIS — D56.3 THALASSEMIA MINOR: ICD-10-CM

## 2018-01-25 DIAGNOSIS — I10 ESSENTIAL HYPERTENSION: ICD-10-CM

## 2018-01-25 DIAGNOSIS — K26.9 DUODENAL ULCER: Primary | ICD-10-CM

## 2018-01-25 DIAGNOSIS — K21.9 GASTROESOPHAGEAL REFLUX DISEASE WITHOUT ESOPHAGITIS: ICD-10-CM

## 2018-01-25 DIAGNOSIS — D50.9 MICROCYTIC ANEMIA: ICD-10-CM

## 2018-01-25 DIAGNOSIS — I50.9 CONGESTIVE HEART FAILURE, UNSPECIFIED CONGESTIVE HEART FAILURE CHRONICITY, UNSPECIFIED CONGESTIVE HEART FAILURE TYPE: ICD-10-CM

## 2018-01-25 DIAGNOSIS — I26.99 OTHER ACUTE PULMONARY EMBOLISM WITHOUT ACUTE COR PULMONALE: ICD-10-CM

## 2018-01-25 DIAGNOSIS — N18.30 CKD (CHRONIC KIDNEY DISEASE) STAGE 3, GFR 30-59 ML/MIN: ICD-10-CM

## 2018-01-25 PROCEDURE — 99213 OFFICE O/P EST LOW 20 MIN: CPT | Mod: PBBFAC | Performed by: STUDENT IN AN ORGANIZED HEALTH CARE EDUCATION/TRAINING PROGRAM

## 2018-01-25 PROCEDURE — 99213 OFFICE O/P EST LOW 20 MIN: CPT | Mod: S$PBB,GC,, | Performed by: STUDENT IN AN ORGANIZED HEALTH CARE EDUCATION/TRAINING PROGRAM

## 2018-01-25 PROCEDURE — 99999 PR PBB SHADOW E&M-EST. PATIENT-LVL III: CPT | Mod: PBBFAC,GC,, | Performed by: STUDENT IN AN ORGANIZED HEALTH CARE EDUCATION/TRAINING PROGRAM

## 2018-01-25 RX ORDER — PANTOPRAZOLE SODIUM 40 MG/1
40 TABLET, DELAYED RELEASE ORAL EVERY 12 HOURS
Qty: 60 TABLET | Refills: 3 | Status: SHIPPED | OUTPATIENT
Start: 2018-01-25 | End: 2019-05-16 | Stop reason: SDUPTHER

## 2018-01-25 NOTE — PATIENT INSTRUCTIONS
-Protonix 40 mg orally every 12 hours    -US of your liver    -Repeat EGD ~ 12 weeks    -Follow up in 3 months    Jeovany Talbert M.D.  Gastroenterology Fellow, PGY-IV  [facility]

## 2018-01-25 NOTE — PROGRESS NOTES
Gastroenterology Clinic    Reason for visit: The primary encounter diagnosis was Duodenal ulcer. Diagnoses of Gastroesophageal reflux disease without esophagitis, Microcytic anemia, Thalassemia minor, Other acute pulmonary embolism without acute cor pulmonale, Essential hypertension, Chronic obstructive pulmonary disease, unspecified COPD type, CKD (chronic kidney disease) stage 3, GFR 30-59 ml/min, and Congestive heart failure, unspecified congestive heart failure chronicity, unspecified congestive heart failure type were also pertinent to this visit.  Referring provider/PCP: Mundo Hudson DO    HPI:  72 year old female with a history of HTN, CHF, CKD Stage 3, COPD, BL Pulmonary embolism (on Coumadin), GERD, and Thalassemia minor here for a follow up visit after being in the hospital with a bleeding ulcer.    She was seen by GI inpatient service in December due to complain of melena and hemoglobin 8-9. EGD revealed one oozing duodenal ulcer with pigmented material. Injected with Epinephrine and coaptive coagulation with bipolar cautery was successful. H pylori serology was negative and EGD in 12 weeks was ordered. Overall patient is improving (H/H slowly improving) with no nausea, emesis, abdominal pain, melena, or BRBPR. However although PPI is on her medications patient is unsure if she is truly taking it.    PEndoHx:  EGD 12/2017:  - Nodular gastropathy.  - One oozing duodenal ulcer with pigmented material. Injected with Epinephrine and coaptive coagulation with bipolar cautery was successful.    Colon 11/2016: normal colon with recommendations to not repeat    Review of Systems:  Constitutional: no fever, chills or change in weight   Eyes: no visual changes   ENT: no sore throat or dysphagia  Respiratory: no cough or shortness of breath   Cardiovascular: no chest pain or palpitations   Gastrointestinal: as per HPI  Hematologic/Lymphatic: no easy bruising or lymphadenopathy   Musculoskeletal: no  arthralgias or myalgias   Neurological: no change in mental status  Behavioral/Psych: no change in mood      Past Medical History:   Diagnosis Date    Anticoagulant long-term use     Cataract     Depression     Edema     GERD (gastroesophageal reflux disease)     Hypertension 10/2/2012    Osteoarthritis of both knees 8/9/2016    Osteopenia     Osteopenia 11/30/2017    PE (pulmonary thromboembolism) 12/03/2017    Primary localized osteoarthrosis, lower leg 12/3/2014    Thalassemia minor        Past Surgical History:   Procedure Laterality Date    APPENDECTOMY      CATARACT EXTRACTION      COLONOSCOPY N/A 11/5/2016    Procedure: COLONOSCOPY;  Surgeon: Tanner Simental MD;  Location: [facility] ([address] [place] [address]);  Service: Endoscopy;  Laterality: N/A;    HYSTERECTOMY  age 39    fibroids    OOPHORECTOMY      TONSILLECTOMY         Family History   Problem Relation Age of Onset    Hypertension Mother     Cancer Father      skin    Cancer Brother      pancreatic    No Known Problems Sister     No Known Problems Maternal Aunt     No Known Problems Maternal Uncle     No Known Problems Paternal Aunt     No Known Problems Paternal Uncle     Coronary artery disease Maternal Grandmother     Heart failure Maternal Grandmother     No Known Problems Maternal Grandfather     Stroke Paternal Grandmother     Coronary artery disease Paternal Grandmother     No Known Problems Paternal Grandfather     Coronary artery disease Brother      with CABG in his 30s    Heart disease Neg Hx     Amblyopia Neg Hx     Blindness Neg Hx     Cataracts Neg Hx     Diabetes Neg Hx     Glaucoma Neg Hx     Macular degeneration Neg Hx     Retinal detachment Neg Hx     Strabismus Neg Hx     Thyroid disease Neg Hx        Social History     Social History    Marital status:      Spouse name: N/A    Number of children: 1    Years of education: N/A     Occupational History    Retired from [employer]   "     Social History Main Topics    Smoking status: Never Smoker    Smokeless tobacco: Never Used    Alcohol use No      Comment: rare    Drug use: No    Sexual activity: Yes     Partners: Male     Other Topics Concern    Not on file     Social History Narrative    No narrative on file       Current Outpatient Prescriptions on File Prior to Visit   Medication Sig Dispense Refill    amLODIPine (NORVASC) 10 MG tablet TAKE 1 TABLET(10 MG) BY MOUTH EVERY DAY 90 tablet 1    escitalopram oxalate (LEXAPRO) 20 MG tablet TAKE 1 TABLET BY MOUTH ONCE DAILY 90 tablet 3    furosemide (LASIX) 20 MG tablet Take 1 tablet (20 mg total) by mouth once daily. 30 tablet 1    losartan-hydrochlorothiazide 50-12.5 mg (HYZAAR) 50-12.5 mg per tablet Take 1 tablet by mouth once daily. 90 tablet 3    metoprolol succinate (TOPROL-XL) 25 MG 24 hr tablet TAKE 1 TABLET BY MOUTH ONCE DAILY 90 tablet 0    potassium chloride (MICRO-K) 10 MEQ CpSR TAKE 2 CAPSULES(20 MEQ) BY MOUTH EVERY  capsule 0    topiramate (TOPAMAX) 50 MG tablet Take 1 tablet (50 mg total) by mouth 2 (two) times daily. 180 tablet 1    warfarin (COUMADIN) 2.5 MG tablet Take 1 tablet (2.5 mg total) by mouth Daily. 30 tablet 1    [DISCONTINUED] pantoprazole (PROTONIX) 40 MG tablet Take 1 tablet (40 mg total) by mouth 2 (two) times daily before meals. 60 tablet 2     No current facility-administered medications on file prior to visit.        Review of patient's allergies indicates:   Allergen Reactions    Codeine     Ciprofloxacin Rash       Physical Exam:  /68   Pulse 70   Ht 5' 3" (1.6 m)   Wt 100.7 kg (222 lb)   BMI 39.33 kg/m²   General appearance: alert, appears stated age and cooperative  Head: Normocephalic, without obvious abnormality, atraumatic  Eyes: conjunctivae/corneas clear. PERRL, EOM's intact. Fundi benign.  Chest wall: no tenderness  Heart: regular rate and rhythm, S1, S2 normal, no murmur, click, rub or gallop  Abdomen: soft, " non-tender; bowel sounds normal; no masses,  no organomegaly  Rectal: normal tone, normal prostate, no masses or tenderness  Extremities: extremities normal, atraumatic, no cyanosis or edema    Laboratory:  Lab Results   Component Value Date    WBC 10.29 01/16/2018    HGB 10.4 (L) 01/16/2018    HCT 34.2 (L) 01/16/2018    MCV 69 (L) 01/16/2018     01/16/2018     CMP  Sodium   Date Value Ref Range Status   01/16/2018 139 136 - 145 mmol/L Final     Potassium   Date Value Ref Range Status   01/16/2018 3.7 3.5 - 5.1 mmol/L Final     Chloride   Date Value Ref Range Status   01/16/2018 105 95 - 110 mmol/L Final     CO2   Date Value Ref Range Status   01/16/2018 23 23 - 29 mmol/L Final     Glucose   Date Value Ref Range Status   01/16/2018 123 (H) 70 - 110 mg/dL Final     BUN, Bld   Date Value Ref Range Status   01/16/2018 24 (H) 8 - 23 mg/dL Final     Creatinine   Date Value Ref Range Status   01/16/2018 1.1 0.5 - 1.4 mg/dL Final     Calcium   Date Value Ref Range Status   01/16/2018 9.1 8.7 - 10.5 mg/dL Final     Total Protein   Date Value Ref Range Status   01/16/2018 7.0 6.0 - 8.4 g/dL Final     Albumin   Date Value Ref Range Status   01/16/2018 3.6 3.5 - 5.2 g/dL Final     Total Bilirubin   Date Value Ref Range Status   01/16/2018 0.3 0.1 - 1.0 mg/dL Final     Comment:     For infants and newborns, interpretation of results should be based  on gestational age, weight and in agreement with clinical  observations.  Premature Infant recommended reference ranges:  Up to 24 hours.............<8.0 mg/dL  Up to 48 hours............<12.0 mg/dL  3-5 days..................<15.0 mg/dL  6-29 days.................<15.0 mg/dL       Alkaline Phosphatase   Date Value Ref Range Status   01/16/2018 82 55 - 135 U/L Final     AST   Date Value Ref Range Status   01/16/2018 20 10 - 40 U/L Final     ALT   Date Value Ref Range Status   01/16/2018 15 10 - 44 U/L Final     Anion Gap   Date Value Ref Range Status   01/16/2018 11 8 - 16  mmol/L Final     eGFR if    Date Value Ref Range Status   01/16/2018 58 (A) >60 mL/min/1.73 m^2 Final     eGFR if non    Date Value Ref Range Status   01/16/2018 50 (A) >60 mL/min/1.73 m^2 Final     Comment:     Calculation used to obtain the estimated glomerular filtration  rate (eGFR) is the CKD-EPI equation.              Assessment:  72 year old female with a history of HTN, CHF, CKD Stage 3, COPD, BL Pulmonary embolism (on Coumadin), GERD, and Thalassemia minor here for a follow up visit after being in the hospital with a bleeding ulcer.    Plan:  Microcytic anemia in the setting of thalassemia minor and duodenal ulcer. Of note patient began to have melena when she began treatment for her Pe. On EGD an ulcer was found however we do not know the cause of the ulcer. In any case due to the extent of the ulcer and patient need for indefinite AC she will need a repeat EGD ~ 12 weeks from the original one in December.  -PPI PO BID  -US of the liver with doppler to evaluate for any malignancy in the setting of PE, CT not ordered due to CKD Stage 3 an upon review of CMP patient has had marginal Cr  -EGD in ~ 12 weeks from initially EGD (will need to be on the 2nd floor due to PA pressure and will need a Lovenox pressure to be able to take biopsies)  -Follow up  In GI clinic in 3 months    Jeovany Talbert M.D.  Gastroenterology Fellow, PGY-IV  Pager: [phone]  [facility]      Orders Placed This Encounter   Procedures    US Liver with Doppler (xpd)

## 2018-01-26 NOTE — PROGRESS NOTES
I was present with Jeovany Talbert MD the fellow during the above evaluation, including history and exam.  I discussed the case with the fellow and agree with the findings and plan as documented in the fellow's note.

## 2018-01-29 ENCOUNTER — ANTI-COAG VISIT (OUTPATIENT)
Dept: CARDIOLOGY | Facility: CLINIC | Age: 73
End: 2018-01-29

## 2018-01-29 LAB — INR PPP: 2.5

## 2018-02-05 ENCOUNTER — ANTI-COAG VISIT (OUTPATIENT)
Dept: CARDIOLOGY | Facility: CLINIC | Age: 73
End: 2018-02-05

## 2018-02-05 LAB — INR PPP: 3.9

## 2018-02-05 NOTE — PROGRESS NOTES
Verbal result taken from Aditya/Gera Butler________. PT/INR _3.9______ Date drawn_02/05/18_______ Hardcopy to be faxed.

## 2018-02-06 ENCOUNTER — TELEPHONE (OUTPATIENT)
Dept: ADMINISTRATIVE | Facility: CLINIC | Age: 73
End: 2018-02-06

## 2018-02-06 NOTE — PATIENT INSTRUCTIONS
Good Morning,    Home health requestin) Patient has been taking Cefuroxime 500 mg 2 x daily and Losartan-HCTZ 50-12.5 mg daily for an unknown amount of time. Patient was instructed on admit to home health to stop these meds as directed on DC summary and patient cannot remember how long she has been taking them. Please clarify if patient should be taking these meds.      2) Requesting order to recertify patient 1wk5 for further medication teaching and to continue to monitor patient due to recent ER visit for low BP        Please reply with written order.        Thanks

## 2018-02-07 ENCOUNTER — HOSPITAL ENCOUNTER (OUTPATIENT)
Dept: RADIOLOGY | Facility: HOSPITAL | Age: 73
Discharge: HOME | End: 2018-02-07
Attending: STUDENT IN AN ORGANIZED HEALTH CARE EDUCATION/TRAINING PROGRAM
Payer: MEDICARE

## 2018-02-07 DIAGNOSIS — K26.9 DUODENAL ULCER: ICD-10-CM

## 2018-02-07 PROCEDURE — 76705 ECHO EXAM OF ABDOMEN: CPT | Mod: 26,59,GC, | Performed by: RADIOLOGY

## 2018-02-07 PROCEDURE — 93975 VASCULAR STUDY: CPT | Mod: TC

## 2018-02-07 PROCEDURE — 93975 VASCULAR STUDY: CPT | Mod: 26,GC,, | Performed by: RADIOLOGY

## 2018-02-08 ENCOUNTER — ANTI-COAG VISIT (OUTPATIENT)
Dept: CARDIOLOGY | Facility: CLINIC | Age: 73
End: 2018-02-08

## 2018-02-08 ENCOUNTER — TELEPHONE (OUTPATIENT)
Dept: INTERNAL MEDICINE | Facility: CLINIC | Age: 73
End: 2018-02-08

## 2018-02-08 ENCOUNTER — TELEPHONE (OUTPATIENT)
Dept: GASTROENTEROLOGY | Facility: CLINIC | Age: 73
End: 2018-02-08

## 2018-02-08 LAB — INR PPP: 3.4

## 2018-02-08 NOTE — TELEPHONE ENCOUNTER
----- Message from Bev Ang sent at 2/8/2018  1:06 PM CST -----  Contact: [facility] [phone]  Pt has swelling to left lower extremity x 2 days and it is warm, not hot. Pt has had a blood clot in that leg in the past so the nurse was concerned. INR today was 3.4.

## 2018-02-08 NOTE — TELEPHONE ENCOUNTER
[facility]

## 2018-02-08 NOTE — TELEPHONE ENCOUNTER
----- Message from Jeovany Talbert MD sent at 2/8/2018  8:28 AM CST -----  Normal US- no focal hepatic lesions, appropriate flow in the hepatic vascular, and contracted gallbladder likely due to PO intake (no acute sonia)

## 2018-02-08 NOTE — TELEPHONE ENCOUNTER
[facility].   Per Dr. Hudson as long as there is no pain or heat to leg, keep leg elevated.   If the leg gets painful and hot, then patient will need to be seen.  [facility].

## 2018-02-14 ENCOUNTER — ANTI-COAG VISIT (OUTPATIENT)
Dept: CARDIOLOGY | Facility: CLINIC | Age: 73
End: 2018-02-14

## 2018-02-14 LAB — INR PPP: 5.4

## 2018-02-15 ENCOUNTER — ANTI-COAG VISIT (OUTPATIENT)
Dept: CARDIOLOGY | Facility: CLINIC | Age: 73
End: 2018-02-15

## 2018-02-15 LAB — INR PPP: 4.7

## 2018-02-19 ENCOUNTER — ANTI-COAG VISIT (OUTPATIENT)
Dept: CARDIOLOGY | Facility: CLINIC | Age: 73
End: 2018-02-19

## 2018-02-19 LAB — INR PPP: 3.3

## 2018-02-22 ENCOUNTER — ANTI-COAG VISIT (OUTPATIENT)
Dept: CARDIOLOGY | Facility: CLINIC | Age: 73
End: 2018-02-22

## 2018-02-22 ENCOUNTER — TELEPHONE (OUTPATIENT)
Dept: INTERNAL MEDICINE | Facility: CLINIC | Age: 73
End: 2018-02-22

## 2018-02-22 LAB — INR PPP: 2.6

## 2018-02-22 NOTE — TELEPHONE ENCOUNTER
Her med list and our med list to match.  She says bp is doing well.    We made appt to see dr soliz tomorrow to check leg that is still warm.    appt made. Nurse will have them call if not going to make it in.    She seems more out of it lately and nurse not sure what is going on.

## 2018-02-22 NOTE — TELEPHONE ENCOUNTER
----- Message from Alana Quezada sent at 2/22/2018 10:33 AM CST -----  Contact: [facility] [phone]  Needs to clarify all of the BP medications.    Please call and advise.    Thank You

## 2018-02-23 ENCOUNTER — OFFICE VISIT (OUTPATIENT)
Dept: INTERNAL MEDICINE | Facility: CLINIC | Age: 73
End: 2018-02-23
Payer: MEDICARE

## 2018-02-23 VITALS
DIASTOLIC BLOOD PRESSURE: 99 MMHG | HEIGHT: 64 IN | HEART RATE: 67 BPM | WEIGHT: 228.38 LBS | RESPIRATION RATE: 18 BRPM | BODY MASS INDEX: 38.99 KG/M2 | SYSTOLIC BLOOD PRESSURE: 155 MMHG | TEMPERATURE: 98 F

## 2018-02-23 DIAGNOSIS — I82.532 CHRONIC DEEP VEIN THROMBOSIS (DVT) OF LEFT POPLITEAL VEIN: ICD-10-CM

## 2018-02-23 DIAGNOSIS — I25.2 HISTORY OF NON-ST ELEVATION MYOCARDIAL INFARCTION (NSTEMI): ICD-10-CM

## 2018-02-23 DIAGNOSIS — R23.8 LOCALIZED WARMTH OF SKIN: Primary | ICD-10-CM

## 2018-02-23 PROBLEM — I21.4 NSTEMI (NON-ST ELEVATED MYOCARDIAL INFARCTION): Status: RESOLVED | Noted: 2017-12-04 | Resolved: 2018-02-23

## 2018-02-23 PROCEDURE — 99999 PR PBB SHADOW E&M-EST. PATIENT-LVL III: CPT | Mod: PBBFAC,,, | Performed by: INTERNAL MEDICINE

## 2018-02-23 PROCEDURE — 99213 OFFICE O/P EST LOW 20 MIN: CPT | Mod: S$PBB,,, | Performed by: INTERNAL MEDICINE

## 2018-02-23 PROCEDURE — 99213 OFFICE O/P EST LOW 20 MIN: CPT | Mod: PBBFAC,PO | Performed by: INTERNAL MEDICINE

## 2018-02-23 PROCEDURE — 1159F MED LIST DOCD IN RCRD: CPT | Mod: ,,, | Performed by: INTERNAL MEDICINE

## 2018-02-23 PROCEDURE — 1125F AMNT PAIN NOTED PAIN PRSNT: CPT | Mod: ,,, | Performed by: INTERNAL MEDICINE

## 2018-02-23 NOTE — PROGRESS NOTES
Subjective:       Patient ID: Coby Atkinson is a 72 y.o. female.    Chief Complaint: Leg Pain (left)    HPI   Pt with chronic DVT is here for evaluation at the advise of her [facility] nurse for resolving left lower leg warmth which has by now resolved. She denies any erythema, worsening swelling, skin tears, fevers/chills.    Review of Systems   Constitutional: Negative for activity change, appetite change, chills, diaphoresis, fatigue, fever and unexpected weight change.   HENT: Negative for postnasal drip, rhinorrhea, sinus pressure, sneezing, sore throat, trouble swallowing and voice change.    Respiratory: Negative for cough, shortness of breath and wheezing.    Cardiovascular: Negative for chest pain, palpitations and leg swelling.   Gastrointestinal: Negative for abdominal pain, blood in stool, constipation, diarrhea, nausea and vomiting.   Genitourinary: Negative for dysuria.   Musculoskeletal: Negative for arthralgias and myalgias.   Skin: Negative for rash and wound.   Allergic/Immunologic: Negative for environmental allergies and food allergies.   Hematological: Negative for adenopathy. Does not bruise/bleed easily.       Objective:      Physical Exam   Constitutional: She is oriented to person, place, and time. She appears well-developed and well-nourished. No distress.   HENT:   Head: Normocephalic and atraumatic.   Eyes: Conjunctivae and EOM are normal. Pupils are equal, round, and reactive to light. Right eye exhibits no discharge. Left eye exhibits no discharge. No scleral icterus.   Neck: Neck supple. No JVD present.   Cardiovascular: Normal rate, regular rhythm, normal heart sounds and intact distal pulses.    Pulmonary/Chest: Effort normal and breath sounds normal. No respiratory distress. She has no wheezes. She has no rales.   Abdominal: Soft. Bowel sounds are normal. There is no tenderness.   Musculoskeletal: She exhibits no edema (Trace in B/L LE).   Lymphadenopathy:     She has no cervical  adenopathy.   Neurological: She is alert and oriented to person, place, and time.   Skin: Skin is warm and dry. No rash noted. She is not diaphoretic. No pallor.       Assessment:       1. Localized warmth of skin    2. 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 History of non-ST elevation myocardial infarction (NSTEMI)        Plan:    1. Resolving, Pt advised to watch the area and notify me if there is worsening erythema, increased warmth, pain, etc.    2. Stable on Coumadin

## 2018-02-26 ENCOUNTER — ANTI-COAG VISIT (OUTPATIENT)
Dept: CARDIOLOGY | Facility: CLINIC | Age: 73
End: 2018-02-26

## 2018-02-26 DIAGNOSIS — Z79.01 LONG TERM (CURRENT) USE OF ANTICOAGULANTS: Primary | ICD-10-CM

## 2018-02-26 LAB — INR PPP: 2.7

## 2018-03-01 ENCOUNTER — ANTI-COAG VISIT (OUTPATIENT)
Dept: CARDIOLOGY | Facility: CLINIC | Age: 73
End: 2018-03-01

## 2018-03-01 ENCOUNTER — ANTI-COAG VISIT (OUTPATIENT)
Dept: CARDIOLOGY | Facility: CLINIC | Age: 73
End: 2018-03-01
Payer: MEDICARE

## 2018-03-01 ENCOUNTER — LAB VISIT (OUTPATIENT)
Dept: LAB | Facility: HOSPITAL | Age: 73
End: 2018-03-01
Payer: MEDICARE

## 2018-03-01 DIAGNOSIS — Z79.01 LONG TERM (CURRENT) USE OF ANTICOAGULANTS: ICD-10-CM

## 2018-03-01 DIAGNOSIS — Z79.01 LONG TERM (CURRENT) USE OF ANTICOAGULANTS: Primary | ICD-10-CM

## 2018-03-01 LAB
INR PPP: 2.2
INR PPP: 2.5 (ref 2–3)
PROTHROMBIN TIME: 21.2 SEC

## 2018-03-01 PROCEDURE — 99211 OFF/OP EST MAY X REQ PHY/QHP: CPT | Mod: PBBFAC | Performed by: PHARMACIST

## 2018-03-01 PROCEDURE — 85610 PROTHROMBIN TIME: CPT | Mod: PBBFAC | Performed by: PHARMACIST

## 2018-03-01 PROCEDURE — 36415 COLL VENOUS BLD VENIPUNCTURE: CPT

## 2018-03-01 PROCEDURE — 99999 PR PBB SHADOW E&M-EST. PATIENT-LVL I: CPT | Mod: PBBFAC,,, | Performed by: PHARMACIST

## 2018-03-01 PROCEDURE — 85610 PROTHROMBIN TIME: CPT

## 2018-03-01 NOTE — PROGRESS NOTES
Patient called to report she was seen earlier today and is to start Lovenox injections Monday -3/05 for 3/07 procedure, states she called [facility] but no Rx was called in, needs lovenox called in to [facility] ph. # [phone]

## 2018-03-01 NOTE — PROGRESS NOTES
Pt will come in to clinic for face to face lovenox teaching and written calendar of instructions. Historically pt gets confused easily with phone dosing instructions and has taken coumadin incorrectly due to this.(see previous notes)  She has not done lovenox ever before so will need face to face teaching. She will be in later today for this instruction.

## 2018-03-02 RX ORDER — ENOXAPARIN SODIUM 100 MG/ML
INJECTION SUBCUTANEOUS
Qty: 20 SYRINGE | Refills: 1 | Status: SHIPPED | OUTPATIENT
Start: 2018-03-02 | End: 2018-03-16 | Stop reason: ALTCHOICE

## 2018-03-02 NOTE — PROGRESS NOTES
"Patient is here for lovenox bridging instructions for EGD scheduled on 3/7/2018.    Height    5'3" (varying entries in EPIC)            Weight    228lbs./103.6kg          SCr     1.1           CrCl    Greater than 30mL/min             Pre-Procedure Instructions:    Take last dose of warfarin on :            3/3                     Start enoxaparin injections the morning of:          3/5                       Enoxaparin dose is:        100mg                Every 12 hours (Twice Daily)  Last dose of enoxaparin will be the morning of:            3/6 - at least 24hrs prior to procedure                     Post-Procedure Instructions:    Restart warfarin on        3/7                    At a dose of       1.25mg daily until INR re-check on 2/12                     Unless directed otherwise by your physician     Restart lovenox injections on the morning of          3/8 - 100mg Q12hrs                 Unless directed otherwise by your physician    The pt has been instructed to call the Coumadin Clinic if given different recommendations post procedure.  I went through these instructions with the pt and provided her with a detailed calendar.  Rx for lovenox sent into her pharmacy.  "

## 2018-03-06 NOTE — PROGRESS NOTES
The pt called today to let us know that she missed her shot of lovenox this morning.  Her last shot needs to be at least 24hrs prior to her procedure tomorrow, so I advised that she does not take any more lovenox today.  (She should resume the lovenox AFTER her procedure, as previously instructed.)

## 2018-03-07 ENCOUNTER — HOSPITAL ENCOUNTER (OUTPATIENT)
Facility: HOSPITAL | Age: 73
Discharge: HOME | End: 2018-03-07
Attending: INTERNAL MEDICINE | Admitting: INTERNAL MEDICINE
Payer: MEDICARE

## 2018-03-07 ENCOUNTER — ANESTHESIA EVENT (OUTPATIENT)
Dept: ENDOSCOPY | Facility: HOSPITAL | Age: 73
End: 2018-03-07
Payer: MEDICARE

## 2018-03-07 ENCOUNTER — SURGERY (OUTPATIENT)
Age: 73
End: 2018-03-07

## 2018-03-07 ENCOUNTER — ANESTHESIA (OUTPATIENT)
Dept: ENDOSCOPY | Facility: HOSPITAL | Age: 73
End: 2018-03-07
Payer: MEDICARE

## 2018-03-07 VITALS
OXYGEN SATURATION: 100 % | SYSTOLIC BLOOD PRESSURE: 143 MMHG | WEIGHT: 229 LBS | HEIGHT: 63 IN | BODY MASS INDEX: 40.57 KG/M2 | DIASTOLIC BLOOD PRESSURE: 58 MMHG | TEMPERATURE: 98 F | HEART RATE: 59 BPM | RESPIRATION RATE: 16 BRPM

## 2018-03-07 DIAGNOSIS — K26.9 DUODENAL ULCER: Primary | ICD-10-CM

## 2018-03-07 PROCEDURE — 37000008 HC ANESTHESIA 1ST 15 MINUTES: Performed by: INTERNAL MEDICINE

## 2018-03-07 PROCEDURE — 37000009 HC ANESTHESIA EA ADD 15 MINS: Performed by: INTERNAL MEDICINE

## 2018-03-07 PROCEDURE — 25000003 PHARM REV CODE 250: Performed by: INTERNAL MEDICINE

## 2018-03-07 PROCEDURE — 43235 EGD DIAGNOSTIC BRUSH WASH: CPT | Mod: ,,, | Performed by: INTERNAL MEDICINE

## 2018-03-07 PROCEDURE — D9220A PRA ANESTHESIA: Mod: CRNA,,, | Performed by: NURSE ANESTHETIST, CERTIFIED REGISTERED

## 2018-03-07 PROCEDURE — 63600175 PHARM REV CODE 636 W HCPCS: Performed by: NURSE ANESTHETIST, CERTIFIED REGISTERED

## 2018-03-07 PROCEDURE — D9220A PRA ANESTHESIA: Mod: ANES,,, | Performed by: ANESTHESIOLOGY

## 2018-03-07 PROCEDURE — 43235 EGD DIAGNOSTIC BRUSH WASH: CPT | Performed by: INTERNAL MEDICINE

## 2018-03-07 RX ORDER — SODIUM CHLORIDE 9 MG/ML
INJECTION, SOLUTION INTRAVENOUS CONTINUOUS
Status: DISCONTINUED | OUTPATIENT
Start: 2018-03-07 | End: 2018-03-07 | Stop reason: HOSPADM

## 2018-03-07 RX ORDER — PROPOFOL 10 MG/ML
VIAL (ML) INTRAVENOUS CONTINUOUS PRN
Status: DISCONTINUED | OUTPATIENT
Start: 2018-03-07 | End: 2018-03-07

## 2018-03-07 RX ORDER — SODIUM CHLORIDE 0.9 % (FLUSH) 0.9 %
3 SYRINGE (ML) INJECTION
Status: DISCONTINUED | OUTPATIENT
Start: 2018-03-07 | End: 2018-03-07 | Stop reason: HOSPADM

## 2018-03-07 RX ORDER — LIDOCAINE HCL/PF 100 MG/5ML
SYRINGE (ML) INTRAVENOUS
Status: DISCONTINUED | OUTPATIENT
Start: 2018-03-07 | End: 2018-03-07

## 2018-03-07 RX ORDER — PROPOFOL 10 MG/ML
VIAL (ML) INTRAVENOUS
Status: DISCONTINUED | OUTPATIENT
Start: 2018-03-07 | End: 2018-03-07

## 2018-03-07 RX ADMIN — PROPOFOL 100 MCG/KG/MIN: 10 INJECTION, EMULSION INTRAVENOUS at 08:03

## 2018-03-07 RX ADMIN — LIDOCAINE HYDROCHLORIDE 100 MG: 20 INJECTION, SOLUTION INTRAVENOUS at 08:03

## 2018-03-07 RX ADMIN — SODIUM CHLORIDE: 0.9 INJECTION, SOLUTION INTRAVENOUS at 08:03

## 2018-03-07 RX ADMIN — PROPOFOL 80 MG: 10 INJECTION, EMULSION INTRAVENOUS at 08:03

## 2018-03-07 NOTE — PLAN OF CARE
Problem: Patient Care Overview  Goal: Plan of Care Review  Outcome: Outcome(s) achieved Date Met: 03/07/18  Awake and alert. VSS. Denies pain or nausea. Tolerating liquids well. DC instructions given to patient and family and they verbalize understanding.

## 2018-03-07 NOTE — H&P
Short Stay Endoscopy History and Physical    PCP - Mundo Hudson,     Procedure - EGD  ASA - per anesthesia  Mallampati - per anesthesia  History of Anesthesia problems - no  Family history Anesthesia problems -  no   Plan of anesthesia - MAC    HPI:  This is a 72 y.o. female here for evaluation of duodenal ulcer seen in December.  This is a recommended follow-up.  Denies any further bleeding.  Still on coumadin.        ROS:  Constitutional: No fevers, chills, No weight loss  CV: No chest pain  Pulm: No cough, No shortness of breath  Ophtho: No vision changes  GI: see HPI    Medical History:  has a past medical history of Anticoagulant long-term use; Cataract; Depression; Edema; GERD (gastroesophageal reflux disease); Hypertension (10/2/2012); Osteoarthritis of both knees (8/9/2016); Osteopenia; Osteopenia (11/30/2017); PE (pulmonary thromboembolism) (12/03/2017); Primary localized osteoarthrosis, lower leg (12/3/2014); and Thalassemia minor.    Surgical History:  has a past surgical history that includes Appendectomy; Tonsillectomy; Hysterectomy (age 39); Oophorectomy; Cataract extraction; and Colonoscopy (N/A, 11/5/2016).    Family History: family history includes Cancer in her brother and father; Coronary artery disease in her brother, maternal grandmother, and paternal grandmother; Heart failure in her maternal grandmother; Hypertension in her mother; No Known Problems in her maternal aunt, maternal grandfather, maternal uncle, paternal aunt, paternal grandfather, paternal uncle, and sister; Stroke in her paternal grandmother.. Otherwise no colon cancer, inflammatory bowel disease, or GI malignancies.    Social History:  reports that she has never smoked. She has never used smokeless tobacco. She reports that she does not drink alcohol or use drugs.    Review of patient's allergies indicates:   Allergen Reactions    Codeine     Ciprofloxacin Rash       Medications:   Prescriptions Prior to  Admission   Medication Sig Dispense Refill Last Dose    amLODIPine (NORVASC) 10 MG tablet TAKE 1 TABLET(10 MG) BY MOUTH EVERY DAY 90 tablet 1 3/6/2018 at Unknown time    escitalopram oxalate (LEXAPRO) 20 MG tablet TAKE 1 TABLET BY MOUTH ONCE DAILY 90 tablet 3 3/6/2018 at Unknown time    furosemide (LASIX) 20 MG tablet Take 1 tablet (20 mg total) by mouth once daily. 30 tablet 1 3/6/2018 at Unknown time    losartan-hydrochlorothiazide 50-12.5 mg (HYZAAR) 50-12.5 mg per tablet Take 1 tablet by mouth once daily. 90 tablet 3 3/6/2018 at Unknown time    metoprolol succinate (TOPROL-XL) 25 MG 24 hr tablet TAKE 1 TABLET BY MOUTH ONCE DAILY 90 tablet 0 3/6/2018 at Unknown time    pantoprazole (PROTONIX) 40 MG tablet Take 1 tablet (40 mg total) by mouth every 12 (twelve) hours. 60 tablet 3 3/6/2018 at Unknown time    potassium chloride (MICRO-K) 10 MEQ CpSR TAKE 2 CAPSULES(20 MEQ) BY MOUTH EVERY  capsule 0 3/6/2018 at Unknown time    enoxaparin (LOVENOX) 100 mg/mL Syrg Inject 100mg SQ Q12hrs as directed per the Coumadin Clinic 20 Syringe 1 3/5/2018    topiramate (TOPAMAX) 50 MG tablet Take 1 tablet (50 mg total) by mouth 2 (two) times daily. 180 tablet 1 Taking    warfarin (COUMADIN) 2.5 MG tablet Take 1 tablet (2.5 mg total) by mouth Daily. 30 tablet 1 3/4/2018       Physical Exam:    Vital Signs:   Vitals:    03/07/18 0807   BP: (!) 143/65   Pulse: 64   Resp: 18   Temp: 98.1 °F (36.7 °C)       General Appearance: Well appearing in no acute distress  Eyes:    No scleral icterus  ENT: Neck supple, Lips, mucosa, and tongue normal; teeth and gums normal  Lungs: CTA anteriorly  Heart:  Regular rate, S1, S2 normal, no murmurs heard.  Abdomen: Soft, non tender, non distended with normal bowel sounds. No hepatosplenomegaly, ascites, or mass.      Labs:  Lab Results   Component Value Date    WBC 10.29 01/16/2018    HGB 10.4 (L) 01/16/2018    HCT 34.2 (L) 01/16/2018     01/16/2018    CHOL 142 11/20/2017     TRIG 78 11/20/2017    HDL 71 11/20/2017    ALT 15 01/16/2018    AST 20 01/16/2018     01/16/2018    K 3.7 01/16/2018     01/16/2018    CREATININE 1.1 01/16/2018    BUN 24 (H) 01/16/2018    CO2 23 01/16/2018    TSH 4.082 (H) 11/20/2017    TSH 4.082 (H) 11/20/2017    INR 1.7 (H) 03/07/2018         Assessment:  72 y.o. female here to follow-up duodenal ulcer.    Plan:  Proceed with EGD today.  I have explained the risks and benefits of endoscopy procedures to the patient including but not limited to bleeding, perforation, infection, and death.  All questions answered.      Michael Ibrahim MD

## 2018-03-07 NOTE — ANESTHESIA POSTPROCEDURE EVALUATION
"Anesthesia Post Evaluation    Patient: Coby Atkinson    Procedure(s) Performed: Procedure(s) (LRB):  ESOPHAGOGASTRODUODENOSCOPY (EGD) (N/A)    Final Anesthesia Type: general  Patient location during evaluation: [facility]  Patient participation: Yes- Able to Participate  Level of consciousness: awake and alert  Post-procedure vital signs: reviewed and stable  Pain management: adequate  Airway patency: patent  PONV status at discharge: No PONV  Anesthetic complications: no      Cardiovascular status: blood pressure returned to baseline and stable  Respiratory status: unassisted and spontaneous ventilation  Hydration status: euvolemic  Follow-up not needed.        Visit Vitals  BP (!) 143/58   Pulse (!) 59   Temp 36.5 °C (97.7 °F) (Temporal)   Resp 16   Ht 5' 3" (1.6 m)   Wt 103.9 kg (229 lb)   SpO2 100%   Breastfeeding? No   BMI 40.57 kg/m²       Pain/Devi Score: Pain Assessment Performed: Yes (3/7/2018 10:08 AM)  Presence of Pain: denies (3/7/2018 10:08 AM)  Devi Score: 10 (3/7/2018  9:12 AM)      "

## 2018-03-07 NOTE — DISCHARGE INSTRUCTIONS

## 2018-03-07 NOTE — PROVATION PATIENT INSTRUCTIONS
Discharge Summary/Instructions after an Endoscopic Procedure  Patient Name: Coby Atkinson  Patient MRN: [id number]  Patient YOB: 1945  Wednesday, March 07, 2018  Michael Ibrahim MD  RESTRICTIONS:  During your procedure today, you received medications for sedation.  These   medications may affect your judgment, balance and coordination.  Therefore,   for 24 hours, you have the following restrictions:   - DO NOT drive a car, operate machinery, make legal/financial decisions,   sign important papers or drink alcohol.    ACTIVITY:  The following day: return to full activity including work, except no heavy   lifting, straining or running for 3 days if polyps were removed.  DIET:  Eat and drink normally unless instructed otherwise.     TREATMENT FOR COMMON SIDE EFFECTS:  - Mild abdominal pain, nausea, belching, bloating or excessive gas:  rest,   eat lightly and use a heating pad.  - Sore Throat: treat with throat lozenges and/or gargle with warm salt   water.  - Because air was used during the procedure, expelling large amounts of air   from your rectum or belching is normal.  - If a bowel prep was taken, you may not have a bowel movement for 1-3 days.    This is normal.  SYMPTOMS TO WATCH FOR AND REPORT TO YOUR PHYSICIAN:  1. Abdominal pain or bloating, other than gas cramps.  2. Chest pain.  3. Back pain.  4. Signs of infection such as: chills or fever occurring within 24 hours   after the procedure.  5. Rectal bleeding, which would show as bright red, maroon, or black stools.   (A tablespoon of blood from the rectum is not serious, especially if   hemorrhoids are present.)  6. Vomiting.  7. Weakness or dizziness.  GO DIRECTLY TO THE NEAREST EMERGENCY ROOM IF YOU HAVE ANY OF THE FOLLOWING:      Difficulty breathing  Chills and/or fever over 101 F   Persistent vomiting and/or vomiting blood   Severe abdominal pain   Severe chest pain   Black, tarry stools   Bleeding- more than one tablespoon   Any other  symptom or condition that you feel may need urgent attention  Your doctor recommends these additional instructions:  If any biopsies were taken, your doctors clinic will contact you in 1 to 2   weeks with any results.  You are being discharged to home.   You have a contact number available for emergencies.  The signs and symptoms   of potential delayed complications were discussed with you.  You may return   to normal activities tomorrow.  Written discharge instructions were   provided to you.   Resume your previous diet.   Continue your present medications.   Resume taking Coumadin (warfarin) at your prior dose today.   Return to your primary care physician.  For questions, problems or results please call your physician - Michael Ibrahim MD at Work:  [phone].  [facility], EMERGENCY ROOM PHONE NUMBER: [phone]  IF A COMPLICATION OR EMERGENCY SITUATION ARISES AND YOU ARE UNABLE TO REACH   YOUR PHYSICIAN - GO DIRECTLY TO THE EMERGENCY ROOM.  Michael Ibrahim MD  3/7/2018 9:00:18 AM  This report has been verified and signed electronically.

## 2018-03-07 NOTE — ANESTHESIA PREPROCEDURE EVALUATION
[facility]  Anesthesia Pre-Operative Evaluation         Patient Name: Coby Atkinson  YOB: 1945  MRN: [id number]    SUBJECTIVE:     Pre-operative evaluation for Procedure(s) (LRB):  ESOPHAGOGASTRODUODENOSCOPY (EGD) (N/A)     03/07/2018    Coby Atkinson is a 72 y.o. female w/ a significant PMHx of CHF (Ef 65%) Pul HTN (PASP 67), normal RV function, COPD    Patient Active Problem List   Diagnosis    Depression    Baker's cyst of knee    Gastroesophageal reflux disease    Microcytic anemia    Essential hypertension    Thalassemia minor    Obesity (BMI 30-39.9)    CKD (chronic kidney disease) stage 3, GFR 30-59 ml/min    Acute pulmonary embolus    Duodenal ulcer    Acute blood loss anemia    Long-term (current) use of anticoagulants    Chronic deep vein thrombosis (DVT) of left popliteal vein    History of non-ST elevation myocardial infarction (NSTEMI)       Review of patient's allergies indicates:   Allergen Reactions    Codeine     Ciprofloxacin Rash       Current Inpatient Medications:      Current Outpatient Prescriptions on File Prior to Visit   Medication Sig Dispense Refill    amLODIPine (NORVASC) 10 MG tablet TAKE 1 TABLET(10 MG) BY MOUTH EVERY DAY 90 tablet 1    enoxaparin (LOVENOX) 100 mg/mL Syrg Inject 100mg SQ Q12hrs as directed per the Coumadin Clinic 20 Syringe 1    escitalopram oxalate (LEXAPRO) 20 MG tablet TAKE 1 TABLET BY MOUTH ONCE DAILY 90 tablet 3    furosemide (LASIX) 20 MG tablet Take 1 tablet (20 mg total) by mouth once daily. 30 tablet 1    losartan-hydrochlorothiazide 50-12.5 mg (HYZAAR) 50-12.5 mg per tablet Take 1 tablet by mouth once daily. 90 tablet 3    metoprolol succinate (TOPROL-XL) 25 MG 24 hr tablet TAKE 1 TABLET BY MOUTH ONCE DAILY 90 tablet 0    pantoprazole (PROTONIX) 40 MG tablet Take 1 tablet (40 mg total) by mouth every 12 (twelve) hours. 60 tablet 3    potassium chloride (MICRO-K) 10 MEQ CpSR TAKE 2  CAPSULES(20 MEQ) BY MOUTH EVERY  capsule 0    topiramate (TOPAMAX) 50 MG tablet Take 1 tablet (50 mg total) by mouth 2 (two) times daily. 180 tablet 1    warfarin (COUMADIN) 2.5 MG tablet Take 1 tablet (2.5 mg total) by mouth Daily. 30 tablet 1     No current facility-administered medications on file prior to visit.        Past Surgical History:   Procedure Laterality Date    APPENDECTOMY      CATARACT EXTRACTION      COLONOSCOPY N/A 11/5/2016    Procedure: COLONOSCOPY;  Surgeon: Tanner Simental MD;  Location: [address];  Service: Endoscopy;  Laterality: N/A;    HYSTERECTOMY  age 39    fibroids    OOPHORECTOMY      TONSILLECTOMY         Social History     Social History    Marital status:      Spouse name: N/A    Number of children: 1    Years of education: N/A     Occupational History    Retired from [employer]       Social History Main Topics    Smoking status: Never Smoker    Smokeless tobacco: Never Used    Alcohol use No      Comment: rare    Drug use: No    Sexual activity: Yes     Partners: Male     Other Topics Concern    Not on file     Social History Narrative    No narrative on file       OBJECTIVE:     Vital Signs Range (Last 24H):  BP: ()/()   Arterial Line BP: ()/()       CBC:   No results for input(s): WBC, RBC, HGB, HCT, PLT, MCV, MCH, MCHC in the last 72 hours.    CMP:   No results for input(s): NA, K, CL, CO2, BUN, CREATININE, GLU, MG, PHOS, CALCIUM, ALBUMIN, PROT, ALKPHOS, ALT, AST, BILITOT in the last 72 hours.    INR:  No results for input(s): PT, INR, PROTIME, APTT in the last 72 hours.    Diagnostic Studies: No relevant studies.    EKG: No recent studies available.    2D ECHO:  Results for orders placed or performed during the hospital encounter of 12/02/17   2D echo with color flow doppler   Result Value Ref Range    EF 60 55 - 65    Est. PA Systolic Pressure 67 (A)     Mitral Valve Mobility NORMAL     Tricuspid Valve  Regurgitation MILD      CONCLUSIONS     1 - Normal left ventricular systolic function (EF 60-65%).     2 - No wall motion abnormalities.     3 - Indeterminate LV diastolic function.     4 - Normal right ventricular systolic function .     5 - Pulmonary hypertension. The estimated PA systolic pressure is 67 mmHg.     ASSESSMENT/PLAN:         Pre-op Assessment    I have reviewed the Patient Summary Reports.     I have reviewed the Nursing Notes.      Review of Systems  Anesthesia Hx:  No problems with previous Anesthesia Denies Hx of Anesthetic complications  History of prior surgery of interest to airway management or planning: Denies Family Hx of Anesthesia complications.   Denies Personal Hx of Anesthesia complications.   Social:  Non-Smoker, Social Alcohol Use    Hematology/Oncology:         -- Anemia: Hematology Comments:   Thalassemia minor    EENT/Dental:EENT/Dental Normal   Cardiovascular:   Hypertension (amlodipine, losartan/hctz, metoprolol), well controlled  Denies Angina. hyperlipidemia ECG has been reviewed.    Stress echo 5/2016:  CONCLUSIONS     1 - Moderate left atrial enlargement.     2 - Normal left ventricular systolic function (EF 55-60%).     3 - Left ventricular diastolic dysfunction.     4 - Normal right ventricular systolic function .     5 - The estimated PA systolic pressure is 30 mmHg.     6 - Mild mitral regurgitation.     7 - Mild tricuspid regurgitation.   No evidence of stress induced myocardial ischemia.    Pulmonary:   Sleep Apnea (not on cpap)    Hepatic/GI:   GERD (takes PPI daily, prevents sx.), well controlled    Musculoskeletal:   Arthritis     Neurological:   Chronic Pain Syndrome   Endocrine:     Morbid obesity 41.9   Psych:   depression          Physical Exam  General:  Well nourished, Morbid Obesity    Airway/Jaw/Neck:  Airway Findings: Mouth Opening: Normal Tongue: Normal  General Airway Assessment: Good  Mallampati: III  Improves to II with phonation.  TM Distance: Normal,  at least 6 cm  Jaw/Neck Findings:     Neck ROM: Normal ROM      Dental:  Dental Findings: In tact   Chest/Lungs:  Chest/Lungs Findings: Clear to auscultation, Normal Respiratory Rate     Heart/Vascular:  Heart Findings: Rate: Normal  Rhythm: Regular Rhythm        Mental Status:  Mental Status Findings:  Cooperative, Alert and Oriented         Anesthesia Plan  Type of Anesthesia, risks & benefits discussed:  Anesthesia Type:  general  Patient's Preference:   Intra-op Monitoring Plan: standard ASA monitors  Intra-op Monitoring Plan Comments:   Post Op Pain Control Plan: per primary service following discharge from PACU  Post Op Pain Control Plan Comments:   Induction:   IV  Beta Blocker:  Patient is not currently on a Beta-Blocker (No further documentation required).       Informed Consent: Patient understands risks and agrees with Anesthesia plan.  Questions answered. Anesthesia consent signed with patient.  ASA Score: 4     Day of Surgery Review of History & Physical:    H&P update referred to the provider.         Ready For Surgery From Anesthesia Perspective.

## 2018-03-07 NOTE — TRANSFER OF CARE
"Anesthesia Transfer of Care Note    Patient: Coby Atkinson    Procedure(s) Performed: Procedure(s) (LRB):  ESOPHAGOGASTRODUODENOSCOPY (EGD) (N/A)    Patient location: [facility]    Anesthesia Type: general    Transport from OR: Transported from OR on room air with adequate spontaneous ventilation    Post pain: adequate analgesia    Post assessment: no apparent anesthetic complications and tolerated procedure well    Post vital signs: stable    Level of consciousness: awake    Nausea/Vomiting: no nausea/vomiting    Complications: none    Transfer of care protocol was followed      Last vitals:   Visit Vitals  BP (!) 143/65   Pulse 64   Temp 36.7 °C (98.1 °F)   Resp 18   Ht 5' 3" (1.6 m)   Wt 103.9 kg (229 lb)   SpO2 99%   Breastfeeding? No   BMI 40.57 kg/m²     "

## 2018-03-13 ENCOUNTER — ANTI-COAG VISIT (OUTPATIENT)
Dept: CARDIOLOGY | Facility: CLINIC | Age: 73
End: 2018-03-13

## 2018-03-13 LAB — INR PPP: 2.4

## 2018-03-13 NOTE — PROGRESS NOTES
Pt questioned and reports taking 1.25 mg daily since 3/7 and stopped Lovenox on 3/11 .  Pt stated she no more Lovenox available .  Stomach bruising .

## 2018-03-14 NOTE — PROGRESS NOTES
Received fax from  reporting pt was discharged 3/12 . Pt has been notified and scheduled for appointment at  [facility] on 3/15

## 2018-03-15 ENCOUNTER — ANTI-COAG VISIT (OUTPATIENT)
Dept: CARDIOLOGY | Facility: CLINIC | Age: 73
End: 2018-03-15
Payer: MEDICARE

## 2018-03-15 DIAGNOSIS — Z79.01 LONG TERM (CURRENT) USE OF ANTICOAGULANTS: Primary | ICD-10-CM

## 2018-03-15 LAB — INR PPP: 2.3 (ref 2–3)

## 2018-03-15 PROCEDURE — 85610 PROTHROMBIN TIME: CPT | Mod: PBBFAC

## 2018-03-15 PROCEDURE — 99211 OFF/OP EST MAY X REQ PHY/QHP: CPT | Mod: S$PBB,25,, | Performed by: PHARMACIST

## 2018-03-16 NOTE — PROGRESS NOTES
Pt seen by Aydee RODRIGUEZ. I have reviewed her initial findings and agree with her assessment and plan

## 2018-03-22 ENCOUNTER — ANTI-COAG VISIT (OUTPATIENT)
Dept: CARDIOLOGY | Facility: CLINIC | Age: 73
End: 2018-03-22
Payer: MEDICARE

## 2018-03-22 DIAGNOSIS — Z79.01 LONG TERM (CURRENT) USE OF ANTICOAGULANTS: Primary | ICD-10-CM

## 2018-03-22 LAB — INR PPP: 2.5 (ref 2–3)

## 2018-03-22 PROCEDURE — 99211 OFF/OP EST MAY X REQ PHY/QHP: CPT | Mod: S$PBB,25,, | Performed by: PHARMACIST

## 2018-03-22 PROCEDURE — 85610 PROTHROMBIN TIME: CPT | Mod: PBBFAC

## 2018-03-22 NOTE — PROGRESS NOTES
INR within therapeutic range today. Patient denies any bleeding, bruising or other changes. Will maintain current dose. Advised patient to notify coumadin clinic with any bleeding, bruising or other changes.

## 2018-03-23 NOTE — PROGRESS NOTES
Pt seen by Aydee RODRIGUEZ. I have reviewed her initial findings and agree with her assessment and plan  Patient cannot return until 2 weeks. Patient was re-educated on situations that would require placing a call to the coumadin clinic, including bleeding or unusual bruising issues, changes in health, diet or medications, upcoming procedures that require warfarin interruption, and missed coumadin dose(s). Patient expressed understanding that avoidance of consistency with these parameters could cause fluctuations in INR, leading to more frequent visits and increase risk of adverse events.

## 2018-04-03 ENCOUNTER — ANTI-COAG VISIT (OUTPATIENT)
Dept: CARDIOLOGY | Facility: CLINIC | Age: 73
End: 2018-04-03
Payer: MEDICARE

## 2018-04-03 DIAGNOSIS — Z79.01 LONG TERM (CURRENT) USE OF ANTICOAGULANTS: Primary | ICD-10-CM

## 2018-04-03 LAB — INR PPP: 2.4 (ref 2–3)

## 2018-04-03 PROCEDURE — 99211 OFF/OP EST MAY X REQ PHY/QHP: CPT | Mod: S$PBB,25,, | Performed by: PHARMACIST

## 2018-04-03 PROCEDURE — 85610 PROTHROMBIN TIME: CPT | Mod: PBBFAC

## 2018-04-03 NOTE — PROGRESS NOTES
INR within therapeutic range today. Patient states that she missed several doses because she misunderstood her instructions. Since her INR is within range even with missed dose we will slightly decrease her weekly dose. Patient denies any bleeding, bruising or other changes that may affect warfarin therapy. Advised patient to call coumadin clinic with any changes or concerns.

## 2018-04-10 ENCOUNTER — ANTI-COAG VISIT (OUTPATIENT)
Dept: CARDIOLOGY | Facility: CLINIC | Age: 73
End: 2018-04-10
Payer: MEDICARE

## 2018-04-10 DIAGNOSIS — Z79.01 LONG TERM (CURRENT) USE OF ANTICOAGULANTS: Primary | ICD-10-CM

## 2018-04-10 LAB — INR PPP: 2.5 (ref 2–3)

## 2018-04-10 PROCEDURE — 85610 PROTHROMBIN TIME: CPT | Mod: PBBFAC

## 2018-04-10 NOTE — PROGRESS NOTES
Patient denies any changes in diet, medications, or health that would effect warfarin therapy.    Patient was re-educated on situations that would require placing a call to the coumadin clinic, including bleeding or unusual bruising issues, changes in health, diet or medications, upcoming procedures that require warfarin interruption, and missed coumadin dose(s). Patient expressed understanding that avoidance of consistency with these parameters could cause fluctuations in INR, leading to more frequent visits and increase risk of adverse events.

## 2018-04-16 RX ORDER — METOPROLOL SUCCINATE 25 MG/1
TABLET, EXTENDED RELEASE ORAL
Qty: 90 TABLET | Refills: 3 | Status: SHIPPED | OUTPATIENT
Start: 2018-04-16 | End: 2019-05-14 | Stop reason: SDUPTHER

## 2018-04-17 ENCOUNTER — ANTI-COAG VISIT (OUTPATIENT)
Dept: CARDIOLOGY | Facility: CLINIC | Age: 73
End: 2018-04-17
Payer: MEDICARE

## 2018-04-17 DIAGNOSIS — Z79.01 LONG TERM (CURRENT) USE OF ANTICOAGULANTS: Primary | ICD-10-CM

## 2018-04-17 LAB — INR PPP: 2.6 (ref 2–3)

## 2018-04-17 PROCEDURE — 85610 PROTHROMBIN TIME: CPT | Mod: PBBFAC

## 2018-04-17 NOTE — PROGRESS NOTES
Patient seen by Ilana RODRIGUEZ. I have reviewed her initial findings and agree with her assessment.  Care plan made together.

## 2018-04-17 NOTE — PROGRESS NOTES
Close monitoring for new dose. INR in therapeutic range today. Patient reports no bleeding, bruising or other changes. Will maintain current weekly dose until follow up in 2 weeks. Advised patient to call with any changes or concerns.

## 2018-04-23 ENCOUNTER — HOSPITAL ENCOUNTER (OUTPATIENT)
Dept: RADIOLOGY | Facility: HOSPITAL | Age: 73
Discharge: HOME | End: 2018-04-23
Attending: INTERNAL MEDICINE
Payer: MEDICARE

## 2018-04-23 ENCOUNTER — OFFICE VISIT (OUTPATIENT)
Dept: INTERNAL MEDICINE | Facility: CLINIC | Age: 73
End: 2018-04-23
Payer: MEDICARE

## 2018-04-23 VITALS
HEART RATE: 73 BPM | TEMPERATURE: 99 F | RESPIRATION RATE: 16 BRPM | HEIGHT: 63 IN | DIASTOLIC BLOOD PRESSURE: 77 MMHG | SYSTOLIC BLOOD PRESSURE: 167 MMHG | BODY MASS INDEX: 41.71 KG/M2 | WEIGHT: 235.44 LBS

## 2018-04-23 DIAGNOSIS — M25.562 ACUTE BILATERAL KNEE PAIN: ICD-10-CM

## 2018-04-23 DIAGNOSIS — M25.561 ACUTE BILATERAL KNEE PAIN: ICD-10-CM

## 2018-04-23 DIAGNOSIS — I82.532 CHRONIC DEEP VEIN THROMBOSIS (DVT) OF LEFT POPLITEAL VEIN: ICD-10-CM
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
## 2018-04-23 DIAGNOSIS — S09.90XA TRAUMATIC INJURY OF HEAD, INITIAL ENCOUNTER: ICD-10-CM

## 2018-04-23 PROCEDURE — 99999 PR PBB SHADOW E&M-EST. PATIENT-LVL III: CPT | Mod: PBBFAC,,, | Performed by: INTERNAL MEDICINE

## 2018-04-23 PROCEDURE — 70450 CT HEAD/BRAIN W/O DYE: CPT | Mod: 26,,, | Performed by: RADIOLOGY

## 2018-04-23 PROCEDURE — 70450 CT HEAD/BRAIN W/O DYE: CPT | Mod: TC

## 2018-04-23 PROCEDURE — 99214 OFFICE O/P EST MOD 30 MIN: CPT | Mod: S$PBB,,, | Performed by: INTERNAL MEDICINE

## 2018-04-23 PROCEDURE — 99213 OFFICE O/P EST LOW 20 MIN: CPT | Mod: PBBFAC,PO,25 | Performed by: INTERNAL MEDICINE

## 2018-04-23 RX ORDER — DICLOFENAC SODIUM 10 MG/G
2 GEL TOPICAL 2 TIMES DAILY
Qty: 200 G | Refills: 1 | Status: SHIPPED | OUTPATIENT
Start: 2018-04-23 | End: 2019-09-19

## 2018-04-23 NOTE — PROGRESS NOTES
Subjective:       Patient ID: Coby Atkinson is a 72 y.o. female.    Chief Complaint: Knee Pain    HPI     72-year-old female here for evaluation of a fall on 4/21.  Both knees are bruised.  She was leaving her bedroom Saturday night and her shoe got caught on the door frame and hit the wall.  She could hear a pop.  She came down on both her knees on a glass tile floor.  She had trouble getting up.  She hit her forehead on the 21st.  She had EMS come out to get her off the floor and put her in a wheelchair.  This is an unusual occurrence for her to fall.    Review of Systems    Objective:      Physical Exam   Constitutional: She is oriented to person, place, and time. She appears well-developed and well-nourished.   HENT:   Head: Normocephalic and atraumatic.   Mouth/Throat: No oropharyngeal exudate.   Eyes: EOM are normal. Pupils are equal, round, and reactive to light. Right eye exhibits no discharge. Left eye exhibits no discharge. No scleral icterus.   Neck: Normal range of motion. Neck supple. No tracheal deviation present. No thyromegaly present.   Cardiovascular: Normal rate, regular rhythm and normal heart sounds.  Exam reveals no gallop and no friction rub.    No murmur heard.  Pulmonary/Chest: Effort normal and breath sounds normal. No respiratory distress. She has no wheezes. She has no rales. She exhibits no tenderness.   Abdominal: Soft. Bowel sounds are normal. She exhibits no distension and no mass. There is no tenderness. There is no rebound and no guarding.   Musculoskeletal: Normal range of motion. She exhibits no edema or tenderness.        Legs:  Neurological: She is alert and oriented to person, place, and time.   Skin: Skin is warm and dry. No rash noted. No erythema. No pallor.   Psychiatric: She has a normal mood and affect. Her behavior is normal.   Vitals reviewed.      Assessment:       1. Fall, initial encounter    2. Traumatic injury of head, initial encounter    3. Acute bilateral  knee pain    4. Chronic deep vein thrombosis (DVT) of left popliteal vein        Plan:       1/2/3/4.  CT scan head without contrast ordered for today.  Needs to be evaluated ASAP for bleed.  Patient is on chronic anticoagulation for DVTs.  Diclofenac gel prescribed for bilateral knee pain.

## 2018-05-02 ENCOUNTER — ANTI-COAG VISIT (OUTPATIENT)
Dept: CARDIOLOGY | Facility: CLINIC | Age: 73
End: 2018-05-02
Payer: MEDICARE

## 2018-05-02 DIAGNOSIS — Z79.01 LONG TERM (CURRENT) USE OF ANTICOAGULANTS: Primary | ICD-10-CM

## 2018-05-02 LAB — INR PPP: 1.5 (ref 2–3)

## 2018-05-02 PROCEDURE — 85610 PROTHROMBIN TIME: CPT | Mod: PBBFAC

## 2018-05-02 NOTE — PROGRESS NOTES
Pt seen by Miley RODRIGUEZ. I have reviewed her documentation and agree with her assessment and plan.

## 2018-05-15 ENCOUNTER — ANTI-COAG VISIT (OUTPATIENT)
Dept: CARDIOLOGY | Facility: CLINIC | Age: 73
End: 2018-05-15
Payer: MEDICARE

## 2018-05-15 DIAGNOSIS — Z79.01 LONG TERM (CURRENT) USE OF ANTICOAGULANTS: Primary | ICD-10-CM

## 2018-05-15 LAB — INR PPP: 2.2 (ref 2–3)

## 2018-05-15 PROCEDURE — 85610 PROTHROMBIN TIME: CPT | Mod: PBBFAC

## 2018-05-15 NOTE — PROGRESS NOTES
Quick follow up from low INR on 5/2. INR in therapeutic range. Patient reports no bleeding, bruising or other changes. Will maintain current weekly dose until follow up in 2 weeks. Advised patient to call with any changes or concerns.

## 2018-05-16 RX ORDER — ESCITALOPRAM OXALATE 20 MG/1
TABLET ORAL
Qty: 90 TABLET | Refills: 3 | Status: SHIPPED | OUTPATIENT
Start: 2018-05-16 | End: 2018-05-21

## 2018-05-21 RX ORDER — ESCITALOPRAM OXALATE 20 MG/1
TABLET ORAL
Qty: 90 TABLET | Refills: 3 | Status: SHIPPED | OUTPATIENT
Start: 2018-05-21 | End: 2019-07-02 | Stop reason: SDUPTHER

## 2018-05-21 RX ORDER — DOXAZOSIN 2 MG/1
TABLET ORAL
Qty: 90 TABLET | Refills: 3 | Status: SHIPPED | OUTPATIENT
Start: 2018-05-21 | End: 2020-05-08

## 2018-05-30 NOTE — PROGRESS NOTES
Patient called to reschedule 5/29 missed appointment, states she missed it due to bringing her  to the ER, appointment was rescheduled to 6/06

## 2018-06-06 ENCOUNTER — ANTI-COAG VISIT (OUTPATIENT)
Dept: CARDIOLOGY | Facility: CLINIC | Age: 73
End: 2018-06-06
Payer: MEDICARE

## 2018-06-06 DIAGNOSIS — Z79.01 LONG TERM (CURRENT) USE OF ANTICOAGULANTS: Primary | ICD-10-CM

## 2018-06-06 LAB — INR PPP: 1.3 (ref 2–3)

## 2018-06-06 PROCEDURE — 85610 PROTHROMBIN TIME: CPT | Mod: PBBFAC

## 2018-06-06 RX ORDER — WARFARIN 1 MG/1
.5-1 TABLET ORAL DAILY
Qty: 30 TABLET | Refills: 3 | Status: SHIPPED | OUTPATIENT
Start: 2018-06-06 | End: 2018-11-16 | Stop reason: SDUPTHER

## 2018-06-06 NOTE — PROGRESS NOTES
INR low today. Patient states that she had 2 servings of greens this past week and she will resume her normal intake of once weekly. She has bruises from use, denies any bleeding or other changes. INR's have been eratic. Will change her to a 1mg strength tablet for more gentle changes. Will increase dose slightly and follow-up in 1 week. Advised her to call with any changes or concerns.

## 2018-06-07 NOTE — PROGRESS NOTES
Patient seen by Miley RODRIGUEZ. I have reviewed her initial findings and agree with her assessment.  Care plan made together.

## 2018-06-13 ENCOUNTER — ANTI-COAG VISIT (OUTPATIENT)
Dept: CARDIOLOGY | Facility: CLINIC | Age: 73
End: 2018-06-13
Payer: MEDICARE

## 2018-06-13 DIAGNOSIS — Z79.01 LONG TERM (CURRENT) USE OF ANTICOAGULANTS: Primary | ICD-10-CM

## 2018-06-13 LAB — INR PPP: 1.4 (ref 2–3)

## 2018-06-13 PROCEDURE — 99211 OFF/OP EST MAY X REQ PHY/QHP: CPT | Mod: S$PBB,25,,

## 2018-06-13 PROCEDURE — 85610 PROTHROMBIN TIME: CPT | Mod: PBBFAC

## 2018-06-13 NOTE — PROGRESS NOTES
Quick follow-up for low INR and increased dose 6/6. INR slightly improving but still low. Patient with bruises from use, denies any bleeding or changes. Will boost dose today and increase weekly dose until follow-up next week. Advised her to call with any changes or concerns.

## 2018-06-13 NOTE — PROGRESS NOTES
I have reviewed the nurse's initial findings and agree with their assessment.  I have   assessed the patient in clinic to devise care plan.

## 2018-06-14 ENCOUNTER — OFFICE VISIT (OUTPATIENT)
Dept: INTERNAL MEDICINE | Facility: CLINIC | Age: 73
End: 2018-06-14
Payer: MEDICARE

## 2018-06-14 ENCOUNTER — LAB VISIT (OUTPATIENT)
Dept: LAB | Facility: HOSPITAL | Age: 73
End: 2018-06-14
Attending: INTERNAL MEDICINE
Payer: MEDICARE

## 2018-06-14 VITALS
TEMPERATURE: 99 F | WEIGHT: 239 LBS | SYSTOLIC BLOOD PRESSURE: 130 MMHG | RESPIRATION RATE: 18 BRPM | HEART RATE: 76 BPM | HEIGHT: 63 IN | BODY MASS INDEX: 42.35 KG/M2 | DIASTOLIC BLOOD PRESSURE: 70 MMHG

## 2018-06-14 DIAGNOSIS — R19.7 FREQUENT DIARRHEA: ICD-10-CM

## 2018-06-14 DIAGNOSIS — I10 ESSENTIAL HYPERTENSION: ICD-10-CM

## 2018-06-14 DIAGNOSIS — R29.898 WEAKNESS OF BOTH LOWER EXTREMITIES: Primary | ICD-10-CM

## 2018-06-14 DIAGNOSIS — F33.0 MILD EPISODE OF RECURRENT MAJOR DEPRESSIVE DISORDER: ICD-10-CM

## 2018-06-14 DIAGNOSIS — E66.01 MORBID OBESITY WITH BMI OF 40.0-44.9, ADULT: ICD-10-CM

## 2018-06-14 PROBLEM — E66.9 OBESITY (BMI 30-39.9): Status: RESOLVED | Noted: 2017-10-23 | Resolved: 2018-06-14

## 2018-06-14 LAB
ANION GAP SERPL CALC-SCNC: 7 MMOL/L
BASOPHILS # BLD AUTO: 0.05 K/UL
BASOPHILS NFR BLD: 0.5 %
BUN SERPL-MCNC: 19 MG/DL
CALCIUM SERPL-MCNC: 9.7 MG/DL
CHLORIDE SERPL-SCNC: 107 MMOL/L
CO2 SERPL-SCNC: 27 MMOL/L
CREAT SERPL-MCNC: 1.1 MG/DL
DIFFERENTIAL METHOD: ABNORMAL
EOSINOPHIL # BLD AUTO: 0.2 K/UL
EOSINOPHIL NFR BLD: 1.9 %
ERYTHROCYTE [DISTWIDTH] IN BLOOD BY AUTOMATED COUNT: 16 %
EST. GFR  (AFRICAN AMERICAN): 57.6 ML/MIN/1.73 M^2
EST. GFR  (NON AFRICAN AMERICAN): 49.9 ML/MIN/1.73 M^2
GLUCOSE SERPL-MCNC: 93 MG/DL
HCT VFR BLD AUTO: 35.5 %
HGB BLD-MCNC: 10.5 G/DL
IMM GRANULOCYTES # BLD AUTO: 0.05 K/UL
IMM GRANULOCYTES NFR BLD AUTO: 0.5 %
LYMPHOCYTES # BLD AUTO: 2.9 K/UL
LYMPHOCYTES NFR BLD: 31.4 %
MCH RBC QN AUTO: 20 PG
MCHC RBC AUTO-ENTMCNC: 29.6 G/DL
MCV RBC AUTO: 68 FL
MONOCYTES # BLD AUTO: 0.8 K/UL
MONOCYTES NFR BLD: 8.1 %
NEUTROPHILS # BLD AUTO: 5.3 K/UL
NEUTROPHILS NFR BLD: 57.6 %
NRBC BLD-RTO: 0 /100 WBC
PLATELET # BLD AUTO: 301 K/UL
PMV BLD AUTO: 9.7 FL
POTASSIUM SERPL-SCNC: 4.1 MMOL/L
RBC # BLD AUTO: 5.25 M/UL
SODIUM SERPL-SCNC: 141 MMOL/L
WBC # BLD AUTO: 9.24 K/UL

## 2018-06-14 PROCEDURE — 36415 COLL VENOUS BLD VENIPUNCTURE: CPT | Mod: PO

## 2018-06-14 PROCEDURE — 80048 BASIC METABOLIC PNL TOTAL CA: CPT

## 2018-06-14 PROCEDURE — 85025 COMPLETE CBC W/AUTO DIFF WBC: CPT

## 2018-06-14 PROCEDURE — 99214 OFFICE O/P EST MOD 30 MIN: CPT | Mod: S$PBB,,, | Performed by: INTERNAL MEDICINE

## 2018-06-14 PROCEDURE — 99999 PR PBB SHADOW E&M-EST. PATIENT-LVL IV: CPT | Mod: PBBFAC,,, | Performed by: INTERNAL MEDICINE

## 2018-06-14 PROCEDURE — 99214 OFFICE O/P EST MOD 30 MIN: CPT | Mod: PBBFAC,PO | Performed by: INTERNAL MEDICINE

## 2018-06-14 NOTE — PROGRESS NOTES
Subjective:       Patient ID: Coby Atkinson is a 72 y.o. female.    Chief Complaint: Knee Pain (both); frequent BM (after eating 15-30 mins later she have a BM); and Medication Refill    HPI   Pt with morbid obesity, HTN, MDD is here c/o persistent B/L knee and LE weakness which has been getting worse over the last few weeks. No real pain.    She is also c/o intermittent diarrhea after eating large meals which has been happening for the last month. Eating small meals resolves the diarrhea. No abdominal pain, fevers/chills, N/V.   Review of Systems   Constitutional: Negative for activity change, appetite change, chills, diaphoresis, fatigue, fever and unexpected weight change.   HENT: Negative for postnasal drip, rhinorrhea, sinus pressure, sneezing, sore throat, trouble swallowing and voice change.    Respiratory: Negative for cough, shortness of breath and wheezing.    Cardiovascular: Negative for chest pain, palpitations and leg swelling.   Gastrointestinal: Positive for diarrhea. Negative for abdominal distention, abdominal pain, anal bleeding, blood in stool, constipation, nausea, rectal pain and vomiting.   Genitourinary: Negative for dysuria.   Musculoskeletal: Negative for arthralgias and myalgias.   Skin: Negative for rash and wound.   Allergic/Immunologic: Negative for environmental allergies and food allergies.   Neurological: Positive for weakness.   Hematological: Negative for adenopathy. Does not bruise/bleed easily.       Objective:      Physical Exam   Constitutional: She is oriented to person, place, and time. She appears well-developed and well-nourished. No distress.   HENT:   Head: Normocephalic and atraumatic.   Right Ear: External ear normal.   Left Ear: External ear normal.   Nose: Nose normal.   Mouth/Throat: Oropharynx is clear and moist. No oropharyngeal exudate.   Eyes: Conjunctivae and EOM are normal. Pupils are equal, round, and reactive to light. Right eye exhibits no discharge.  Left eye exhibits no discharge. No scleral icterus.   Neck: Neck supple. No JVD present.   Cardiovascular: Normal rate, regular rhythm, normal heart sounds and intact distal pulses.    Pulmonary/Chest: Effort normal and breath sounds normal. No respiratory distress. She has no wheezes. She has no rales.   Abdominal: Soft. Bowel sounds are normal. There is no tenderness.   Musculoskeletal: She exhibits no edema.   Lymphadenopathy:     She has no cervical adenopathy.   Neurological: She is alert and oriented to person, place, and time. She displays normal reflexes. No cranial nerve deficit or sensory deficit. She exhibits normal muscle tone. Coordination normal.   Skin: Skin is warm and dry. No rash noted. She is not diaphoretic. No pallor.       Assessment:       1. Weakness of both lower extremities    2. Frequent diarrhea    3. Essential hypertension    4. Mild episode of recurrent major depressive disorder    5. Morbid obesity with BMI of 40.0-44.9, adult        Plan:    1. Referral to PT for strength training   2. Referral to GI   3. Controlled, CPT       CBC/BMP today   4. Stable on lexapro   5. Pt advised on proper diet/exercise for weight loss

## 2018-06-22 ENCOUNTER — ANTI-COAG VISIT (OUTPATIENT)
Dept: CARDIOLOGY | Facility: CLINIC | Age: 73
End: 2018-06-22
Payer: MEDICARE

## 2018-06-22 DIAGNOSIS — Z79.01 LONG TERM (CURRENT) USE OF ANTICOAGULANTS: Primary | ICD-10-CM

## 2018-06-22 LAB — INR PPP: 2.3 (ref 2–3)

## 2018-06-22 PROCEDURE — 85610 PROTHROMBIN TIME: CPT | Mod: PBBFAC

## 2018-06-22 NOTE — PROGRESS NOTES
Quick follow up from low INR on 6/13 and close monitoring for new dose. Patient INR in therapeutic range today. Reports no bleeding, bruising or other changes. Will maintain current weekly dose until follow up in 1.5 weeks. Advised patient to call with any concerns or changes.

## 2018-06-30 ENCOUNTER — EXTERNAL CHRONIC CARE MANAGEMENT (OUTPATIENT)
Dept: PRIMARY CARE CLINIC | Facility: CLINIC | Age: 73
End: 2018-06-30
Payer: MEDICARE

## 2018-06-30 PROCEDURE — 99490 CHRNC CARE MGMT STAFF 1ST 20: CPT | Mod: PBBFAC,PO | Performed by: INTERNAL MEDICINE

## 2018-06-30 PROCEDURE — 99490 CHRNC CARE MGMT STAFF 1ST 20: CPT | Mod: S$PBB,,, | Performed by: INTERNAL MEDICINE

## 2018-07-02 ENCOUNTER — ANTI-COAG VISIT (OUTPATIENT)
Dept: CARDIOLOGY | Facility: CLINIC | Age: 73
End: 2018-07-02
Payer: MEDICARE

## 2018-07-02 DIAGNOSIS — Z79.01 LONG TERM (CURRENT) USE OF ANTICOAGULANTS: Primary | ICD-10-CM

## 2018-07-02 LAB — INR PPP: 3 (ref 2–3)

## 2018-07-02 PROCEDURE — 85610 PROTHROMBIN TIME: CPT | Mod: PBBFAC

## 2018-07-02 NOTE — PROGRESS NOTES
INR within therapeutic range today. Patient reports some swelling in bilateral lower extremities. She is taking lasix for swelling. She states that she did have a glass or 2 of wine this week. She denies any other changes. Will maintain current dose and follow up in 2 weeks. Patient reminded to call coumadin clinic with any changes or concerns.

## 2018-07-03 ENCOUNTER — CLINICAL SUPPORT (OUTPATIENT)
Dept: REHABILITATION | Facility: HOSPITAL | Age: 73
End: 2018-07-03
Attending: INTERNAL MEDICINE
Payer: MEDICARE

## 2018-07-03 DIAGNOSIS — M25.561 CHRONIC PAIN OF BOTH KNEES: ICD-10-CM

## 2018-07-03 DIAGNOSIS — R29.898 WEAKNESS OF BOTH LOWER EXTREMITIES: ICD-10-CM

## 2018-07-03 DIAGNOSIS — G89.29 CHRONIC PAIN OF BOTH KNEES: ICD-10-CM

## 2018-07-03 DIAGNOSIS — M25.562 CHRONIC PAIN OF BOTH KNEES: ICD-10-CM

## 2018-07-03 PROCEDURE — G8978 MOBILITY CURRENT STATUS: HCPCS | Mod: CK

## 2018-07-03 PROCEDURE — 97110 THERAPEUTIC EXERCISES: CPT

## 2018-07-03 PROCEDURE — 97162 PT EVAL MOD COMPLEX 30 MIN: CPT

## 2018-07-03 PROCEDURE — G8979 MOBILITY GOAL STATUS: HCPCS | Mod: CJ

## 2018-07-03 NOTE — PLAN OF CARE
[facility]  Physical Therapy Initial Evaluation    Name: Coby Atkinson  Clinic Number: [id number]    Therapy Diagnosis:   Encounter Diagnoses   Name Primary?    Weakness of both lower extremities     Chronic pain of both knees      Physician: Mundo Hudson, *    Physician Orders: PT Eval and Treat   Medical Diagnosis: B LE weakness  Evaluation Date: 7/3/2018  Authorization period Expiration: 6/14/19  Plan of Care Certification Period: 8/14/18    Visit #/Visits authorized: 1/1  Time In:1:15 pm  Time Out: 1:55 pm  Total Billable Time: 40 minutes    Precautions: Standard and PE-on blood thinners    Subjective     Date of onset: chronic  History of current condition - Coby reports: she tripped over something in her home and she fell into the wall Dec 2017. She fell onto both knees. She has had chronic knee pain but it worsened after fall. No injections. No recent falls.       Past Medical History:   Diagnosis Date    Anticoagulant long-term use     Cataract     Depression     Edema     GERD (gastroesophageal reflux disease)     Hypertension 10/2/2012    Osteoarthritis of both knees 8/9/2016    Osteopenia     Osteopenia 11/30/2017    PE (pulmonary thromboembolism) 12/03/2017    Primary localized osteoarthrosis, lower leg 12/3/2014    Thalassemia minor      Coby Atkinson  has a past surgical history that includes Appendectomy; Tonsillectomy; Hysterectomy (age 39); Oophorectomy; Cataract extraction; and Colonoscopy (N/A, 11/5/2016).    Coby has a current medication list which includes the following prescription(s): amlodipine, diclofenac sodium, doxazosin, escitalopram oxalate, furosemide, losartan-hydrochlorothiazide 50-12.5 mg, metoprolol succinate, pantoprazole, potassium chloride, and warfarin.    Review of patient's allergies indicates:   Allergen Reactions    Codeine     Ciprofloxacin Rash        Imaging: None    Prior Therapy: none recently  Social  "History: lives with their spouse, no stairs  Occupation: NA  Prior Level of Function: Walking Indep without AD unless for long distances  Current Level of Function: walks outside with SC and indoors without AD, difficulty with stairs    Pain:  Location: B knees  Current 6/10  Worst: 8/10  Best 3/10   Description: Aching  Aggravating Factors: Walking, Getting out of bed/chair and Stairs, prolonged sitting  Easing Factors: Hot packs, theragesic, tylenol    Pts goals: improve transfers      Objective     Observation: obesity    Posture:  Genu valgus    Gait: severe antalgic gt without use of AD, Mod antalgic gt with use of SC.       Functional Testing:  Squat: 1/4 suat dysfunction, painful  Single Leg stance: R: unable, L: 2"  Sit to stand: requires use of UE  Supine to sit: min A  Step 4": min A      Sensation: Light touch intact BLE    Range of Motion: AROM/PROM  Knee Right Left   Flexion   100 98   Extension   +5 +5     Knee Special Tests:  Crepitis: Mod Sylvester      Lower Extremity Strength: MMT 5/5   Right LE Left LE   Knee Extension   4 4-   Knee Flexion   4 4-   Hip Flexion   4- 3+   Hip Abduction:   3- 3-   Hip Adduction:   NT NT   Hip Extension: (bridge) 3- 3-       LE Flexibility:   Hamstrings: R: mild restriction, L: mild restriction  Piriformis: R: mod restriction, L: mod restriction  Quad: R: severe restriction, L: severe restriction    Joint Mobility:   Hypomobility of patella    Palpation:   Tenderness to palpation of medial and lateral joint line B knees.    CMS Impairment/Limitation/Restriction for FOTO lower leg Survey    Therapist reviewed FOTO scores for Coby Atkinson on 7/3/2018.   FOTO documents entered into [employer] - see Media section.    Limitation Score: 49%  Category: Mobility    Current : CK = at least 40% but < 60% impaired, limited or restricted  Goal: CJ = at least 20% but < 40% impaired, limited or restricted  Discharge: NA       PT Evaluation Completed? Yes  Discussed Plan of Care with " "patient: Yes    TREATMENT     Treatment Time In: 1:40 pm  Treatment Time Out: 1:54 pm  Total Treatment time separate from Evaluation time:14 min    Coby received therapeutic exercises to develop strength, endurance, ROM and flexibility for 14 minutes including:    Heel slides 10x5"  Quad Sets 10x5"  SLR 1 x 5    Add as mauricio:  Nustep  Gastroc st  LAQ  HS curls  Sit to stand  Hip abd supine  Standing hip exc      Coby received cold pack to B knees for 10 minutes to to decrease circulation, pain, and swelling.      Home Exercises and Patient Education Provided    Education provided re:  -Diagnosis and Treatment plan  -Posture education, back safety techniques  -HEP  -Activities to avoid  -Goals for therapy  No spiritual or educational barriers to learning provided    Written Home Exercises Provided: Heel slides, Quad sets, SLR.  Exercises were reviewed and Coby was able to demonstrate them prior to the end of the session.   Pt received a written copy of exercises to perform at home. Coby demonstrated good  understanding of the education provided.       Assessment     Coby is a 72 y.o. female referred to outpatient Physical Therapy with a medical diagnosis of B LE weakness and demonstrates limitations as described in the problem list.    Pt prognosis is Good.   Pt will benefit from skilled outpatient Physical Therapy to address the deficits stated above and in the chart below, provide pt/family education, and to maximize pt's level of independence.     Plan of care discussed with patient: Yes  Pt's spiritual, cultural and educational needs considered and pt agreeable to plan of care and goals as stated below:     Anticipated Barriers for therapy: obesity, OA     Medical Necessity is demonstrated by the following  History  Co-morbidities and personal factors that may impact the plan of care Examination  Body Structures and Functions, activity limitations and participation restrictions that may impact the plan of care "    Clinical Presentation   Co-morbidities:   See Above list        Personal Factors:   lifestyle Body Regions:   lower extremities    Body Systems:    ROM  strength  balance  gait  transfers  transitions            Participation Restrictions:   none     Activity limitations:   Learning and applying knowledge  no deficits    General Tasks and Commands  no deficits    Communication  no deficits    Mobility  walking  driving (bike, car, motorcycle)    Self care  washing oneself (bathing, drying, washing hands)  caring for body parts (brushing teeth, shaving, grooming)  dressing    Domestic Life  shopping  cooking  doing house work (cleaning house, washing dishes, laundry)    Interactions/Relationships  no deficits    Life Areas  no deficits    Community and Social Life  community life  recreation and leisure         evolving clinical presentation with changing clinical characteristics                      low   high  moderate Decision Making/ Complexity Score:  moderate       Medical necessity is demonstrated by the following IMPAIRMENTS/PROBLEM LIST:  1. Impaired balance-Fall risk  2. Limited participation in daily activities   3. Limited participation in recreational pursuits   4. Requires skilled supervision to complete and progress HEP   5. Decreased strength  6. Decreased ROM  7. Decreased Flexibility  8. Poor posture  9. Decreased Cardiovascular endurance   10. Gait abnormality  11. Decreased community ambulation  12. Impaired muscle tone  13. Joint hypomobility    GOALS:   Short Term Goals:  3 weeks  1. Patient will report decreased knee pain to 4/10 to improve sit to stand.  2. Patient will increase knee ROM to 110 degrees in order to show improved functional mobility.  3. Patient will be independent and consistent with issued HEP in order to show carryover between therapy sessions.    Long Term Goals: 6 weeks  1. Patient will report decreased knee pain to 2/10 in order to increase tolerance for walking with SC  for 15 min.  2. Patient will improve knee ROM to 115 degrees to enable improved transfers to Indep.  3. Patient will improve LE strength to 4+/5 or greater to increase tolerance to functional activities and ADLs.  4. Patient will report 75% improvement in function to demonstrate an increase in LE function and mobility in home and community environment.   5. Patient will be independent with updated HEP to maintain gains following discharge with therapy.      Plan     Certification Period: 7/3/2018 to 8/14/18.    Outpatient Physical Therapy 2 times weekly for 6 weeks to include the following interventions: Lumbar Traction, Electrical Stimulation (IFC, NMES), Gait Training, Manual Therapy, Moist Heat/ Ice, Neuromuscular Re-ed, Patient Education, Therapeutic Activites, Therapeutic Exercise, Ultrasound/Phonophoresis and Other: Dry needling as indicated.     Rubi Parks, PT

## 2018-07-12 ENCOUNTER — CLINICAL SUPPORT (OUTPATIENT)
Dept: REHABILITATION | Facility: HOSPITAL | Age: 73
End: 2018-07-12
Attending: INTERNAL MEDICINE
Payer: MEDICARE

## 2018-07-12 DIAGNOSIS — M25.561 CHRONIC PAIN OF BOTH KNEES: Primary | ICD-10-CM

## 2018-07-12 DIAGNOSIS — R29.898 WEAKNESS OF BOTH LOWER EXTREMITIES: ICD-10-CM

## 2018-07-12 DIAGNOSIS — M25.562 CHRONIC PAIN OF BOTH KNEES: Primary | ICD-10-CM

## 2018-07-12 DIAGNOSIS — G89.29 CHRONIC PAIN OF BOTH KNEES: Primary | ICD-10-CM

## 2018-07-12 PROCEDURE — 97110 THERAPEUTIC EXERCISES: CPT

## 2018-07-12 NOTE — PROGRESS NOTES
"                                                  Physical Therapy Daily Note     Date: 07/12/2018  Name: Coby Atkinson  Clinic Number: [id number]  Diagnosis:   Encounter Diagnoses   Name Primary?    Weakness of both lower extremities     Chronic pain of both knees Yes     Physician: Mundo Hudson, *    Physician: Mundo Hudson, *     Physician Orders: PT Eval and Treat   Medical Diagnosis: B LE weakness  Evaluation Date: 7/3/2018  Authorization period Expiration: 6/14/19  Plan of Care Certification Period: 8/14/18     Visit #/Visits authorized: 2/201  Time In:1500  Time Out: 1556  Total Billable Time: 53 minutes     Precautions: Standard and PE-on blood thinners      Subjective     Pt reports she is moving slow today, 3/10 B knee pain pre-tx.    Objective     Patient received individual therapy to increase strength, endurance, ROM and flexibility with activities as follows:     Coby received therapeutic exercises to develop strength, endurance, ROM, and flexibility for 53 minutes including:     Nu-Step x3 min  Quad Sets 15x5"  Adduction ball squeeze 15x5"  Heel slides 15x5"  SLR 2x8 B  Bridges (butt not lifting up off the table) 2x10  LAQ 2x10  Sit to stand with chair and airex 2x10 standing hip abduction and extension 2x10  Calf raises 2x10  gastroc stretch x1' board  HS curls standing EOT 2x10    Pt received cold packs to B knees for 10 minutes post tx.    Written Home Exercises Provided: no new exercises provided this session; per eval  Pt demo good understanding of the education provided. Coby demonstrated good return demonstration of activities.     Education provided: Pt educated in all new clinic exercises, DOMS, and the importance of mobility  Coby verbalized good understanding of education provided.   No spiritual or educational barriers to learning identified.    Assessment     Pt tolerated all tx well. Pt demos improved endurance from evaluation, however, still required frequent " rest breaks between exercises. Added various LE strengthening exercises to improve strength and mobility. Pt will continue to benefit from skilled PT in order to decrease pain, increase strength and ROM, and improve functional mobility.    Medical necessity is demonstrated by the following IMPAIRMENTS/PROBLEM LIST:  1. Impaired balance-Fall risk  2. Limited participation in daily activities   3. Limited participation in recreational pursuits   4. Requires skilled supervision to complete and progress HEP   5. Decreased strength  6. Decreased ROM  7. Decreased Flexibility  8. Poor posture  9. Decreased Cardiovascular endurance   10. Gait abnormality  11. Decreased community ambulation  12. Impaired muscle tone  13. Joint hypomobility     GOALS:   Short Term Goals:  3 weeks  1. Patient will report decreased knee pain to 4/10 to improve sit to stand.  2. Patient will increase knee ROM to 110 degrees in order to show improved functional mobility.  3. Patient will be independent and consistent with issued HEP in order to show carryover between therapy sessions.     Long Term Goals: 6 weeks  1. Patient will report decreased knee pain to 2/10 in order to increase tolerance for walking with SC for 15 min.  2. Patient will improve knee ROM to 115 degrees to enable improved transfers to Indep.  3. Patient will improve LE strength to 4+/5 or greater to increase tolerance to functional activities and ADLs.  4. Patient will report 75% improvement in function to demonstrate an increase in LE function and mobility in home and community environment.   5. Patient will be independent with updated HEP to maintain gains following discharge with therapy.       Plan   Continue with established Plan of Care towards PT goals.      Therapist: Carolyn López, PT, DPT

## 2018-07-16 ENCOUNTER — ANTI-COAG VISIT (OUTPATIENT)
Dept: CARDIOLOGY | Facility: CLINIC | Age: 73
End: 2018-07-16
Payer: MEDICARE

## 2018-07-16 DIAGNOSIS — Z79.01 LONG TERM (CURRENT) USE OF ANTICOAGULANTS: Primary | ICD-10-CM

## 2018-07-16 LAB — INR PPP: 3.2 (ref 2–3)

## 2018-07-16 PROCEDURE — 85610 PROTHROMBIN TIME: CPT | Mod: PBBFAC

## 2018-07-16 PROCEDURE — 99211 OFF/OP EST MAY X REQ PHY/QHP: CPT | Mod: S$PBB,25,, | Performed by: PHARMACIST

## 2018-07-16 NOTE — PROGRESS NOTES
INR elevated today. Bruising noted to arms from use. Patient denies any bleeding or changes that may affect warfarin therapy. We advised a half a dose today then resume. Follow up in 2 weeks. Patient reminded to call coumadin clinic with any changes or concerns.

## 2018-07-18 NOTE — PROGRESS NOTES
"                                                  Physical Therapy Daily Note     Date: 07/18/2018  Name: Coby Atkinson  Clinic Number: [id number]  Diagnosis:   Encounter Diagnoses   Name Primary?    Weakness of both lower extremities     Chronic pain of both knees      Physician: Mundo Hudson, *     Physician Orders: PT Eval and Treat   Medical Diagnosis: B LE weakness  Evaluation Date: 7/3/2018  Authorization period Expiration: 6/14/19  Plan of Care Certification Period: 8/14/18     Visit #/Visits authorized: 3/201  Time In: 1:15 pm  Time Out: 2:00 pm  Total Billable Time: 28 minutes     Precautions: Standard and PE-on blood thinners      Subjective     Pt reports: he feels achy today.     Pain: 4/10 B knee pain pre-tx.      CMS Impairment/Limitation/Restriction for FOTO Knee Survey    Therapist reviewed FOTO scores for Coby Atkinson on 7/19/2018.   FOTO documents entered into [employer] - see Media section.    Limitation Score: 58%  Category: Mobility    Current : CK = at least 40% but < 60% impaired, limited or restricted  Goal: CJ = at least 20% but < 40% impaired, limited or restricted  Discharge: NA           Objective       Coby received therapeutic exercises to develop strength, endurance, ROM, and flexibility for 40 minutes including:     Nu-Step x5' L1  Gastroc stretch x1' board L3    Table:  Quad Sets 15x5"  SAQ 15x5"  Adduction ball squeeze 15x5"  Heel slides 15x5"  SLR 3x6 B  Bridges 2x10 (partial ROM)-discontinued after 10 due to cramping in LE  Piriformis st 2 x 30"    Seated:  LAQ 2x10  Sit to stand with chair and airex 2x10-NP (done from mat this session)  Sit to stand from 20" 1x10  Sit to stand from 22" 1x6 (fatigued and unable to perform full set of 10)    Standing:   Hip abduction 2x10  Hip extension 2x10  HS curls 2x10  Calf raises 2x10    Coby received cold pack to B knees for 5 minutes to to decrease circulation, pain, and swelling.    Written Home Exercises Provided: no " new exercises provided this session; per eval  Pt demo good understanding of the education provided. Coby demonstrated good return demonstration of activities.     Education provided: Pt educated in all new clinic exercises, DOMS, and the importance of mobility  Coby verbalized good understanding of education provided.   No spiritual or educational barriers to learning identified.    Assessment     Pt tolerated all tx well. Pt performed progression of exercises with min complaints of pain. She was unable to perform bridges due to cramping in LE, exc discontinued. Pt would [facility] to [facility] due to being closer to home. Recommend transfer per pt request.     Pt will continue to benefit from skilled PT in order to decrease pain, increase strength and ROM, and improve functional mobility.    Medical necessity is demonstrated by the following IMPAIRMENTS/PROBLEM LIST:  1. Impaired balance-Fall risk  2. Limited participation in daily activities   3. Limited participation in recreational pursuits   4. Requires skilled supervision to complete and progress HEP   5. Decreased strength  6. Decreased ROM  7. Decreased Flexibility  8. Poor posture  9. Decreased Cardiovascular endurance   10. Gait abnormality  11. Decreased community ambulation  12. Impaired muscle tone  13. Joint hypomobility     GOALS:   Short Term Goals:  3 weeks  1. Patient will report decreased knee pain to 4/10 to improve sit to stand. Progressing  2. Patient will increase knee ROM to 110 degrees in order to show improved functional mobility. Progressing  3. Patient will be independent and consistent with issued HEP in order to show carryover between therapy sessions. Progressing     Long Term Goals: 6 weeks  1. Patient will report decreased knee pain to 2/10 in order to increase tolerance for walking with SC for 15 min. Progressing  2. Patient will improve knee ROM to 115 degrees to enable improved transfers to  Indep.Progressing  3. Patient will improve LE strength to 4+/5 or greater to increase tolerance to functional activities and ADLs.Progressing  4. Patient will report 75% improvement in function to demonstrate an increase in LE function and mobility in home and community environment. Progressing  5. Patient will be independent with updated HEP to maintain gains following discharge with therapy. Progressing       Plan     Continue with established Plan of Care towards PT goals.      Therapist: Rubi Parks, PT

## 2018-07-19 ENCOUNTER — CLINICAL SUPPORT (OUTPATIENT)
Dept: REHABILITATION | Facility: HOSPITAL | Age: 73
End: 2018-07-19
Attending: INTERNAL MEDICINE
Payer: MEDICARE

## 2018-07-19 DIAGNOSIS — R29.898 WEAKNESS OF BOTH LOWER EXTREMITIES: ICD-10-CM

## 2018-07-19 DIAGNOSIS — M25.562 CHRONIC PAIN OF BOTH KNEES: ICD-10-CM

## 2018-07-19 DIAGNOSIS — G89.29 CHRONIC PAIN OF BOTH KNEES: ICD-10-CM

## 2018-07-19 DIAGNOSIS — M25.561 CHRONIC PAIN OF BOTH KNEES: ICD-10-CM

## 2018-07-19 PROCEDURE — G8979 MOBILITY GOAL STATUS: HCPCS | Mod: CJ

## 2018-07-19 PROCEDURE — 97110 THERAPEUTIC EXERCISES: CPT

## 2018-07-19 PROCEDURE — G8978 MOBILITY CURRENT STATUS: HCPCS | Mod: CK

## 2018-07-24 ENCOUNTER — CLINICAL SUPPORT (OUTPATIENT)
Dept: REHABILITATION | Facility: HOSPITAL | Age: 73
End: 2018-07-24
Attending: INTERNAL MEDICINE
Payer: MEDICARE

## 2018-07-24 DIAGNOSIS — M25.661 KNEE JOINT STIFFNESS, BILATERAL: ICD-10-CM

## 2018-07-24 DIAGNOSIS — M25.662 KNEE JOINT STIFFNESS, BILATERAL: ICD-10-CM

## 2018-07-24 PROCEDURE — 97110 THERAPEUTIC EXERCISES: CPT | Mod: PO

## 2018-07-24 NOTE — PROGRESS NOTES
"                                                  Physical Therapy Daily Note     Date: 07/24/2018  Name: oCby Atkinson  Clinic Number: [id number]  Diagnosis:   Encounter Diagnosis   Name Primary?    Knee joint stiffness, bilateral      Physician: Mundo Hudson, *     Physician Orders: PT Eval and Treat   Medical Diagnosis: B LE weakness  Evaluation Date: 7/3/2018  Authorization period Expiration: 6/14/19  Plan of Care Certification Period: 8/14/18     Visit  7/20  Time In:805  Time Out: 9:00Total Billable Time55 minutes     Precautions: Standard and PE-on blood thinners      Subjective     Pt reports:she is feeling achey states her knees only hurt with sit to stand, walking a long distance    Pain: 3/10 B knee pain pre-tx.        Objective       Coby received therapeutic exercises to develop strength, endurance, ROM, and flexibility for 55 minutes including:     Nu-Step x5' L1  Pt stopped multiple times due to fatigue  Gastroc stretch x1' board L3    Supine  Quad Sets 15x5"  SAQ 15x5"  Adduction ball squeeze 15x5"  Heel slides 15x5"  SLR 1x10 B  Bridges x1 (partial ROM)-discontinued after 10 due to cramping in LE  Piriformis st 2 x 30"  NP    Seated:  LAQ 2x10  Sit to stand with chair and  1x5  Sit to stand from 20" 1x5    At stairs:    Ascend descend 4 steps x 2 with proper technique and BUE support  Step upx x 10 on 3" platform    Standing: NP  Hip abduction 2x10  Hip extension 2x10  HS curls 2x10  Calf raises 2x10    Coby received cold pack to B knees for 5 minutes to to decrease circulation, pain, and swelling.    Written Home Exercises Provided: Pt reports she has not been doing HEP because she lost sheet.  New updated HEP provided to pt and reviewed with pt return demonstration.  She understands HEP compliance 3-5x week is required for progresslPt demo good understanding of the education provided. Coby demonstrated good return demonstration of activities.       Assessment     Pt tolerated all " tx well. She was  Not able to perform all exercises previously reported due to slow movement and KITCHEN.  She demonstrates very poor functional endurance as well as mm weakness.  She has a marked crepitus in knees bilaterally and may benefit from knee injections in the future;  Pt will continue to benefit from skilled PT in order to decrease pain, increase strength and ROM, and improve functional mobility.    Medical necessity is demonstrated by the following IMPAIRMENTS/PROBLEM LIST:  1. Impaired balance-Fall risk  2. Limited participation in daily activities   3. Limited participation in recreational pursuits   4. Requires skilled supervision to complete and progress HEP   5. Decreased strength  6. Decreased ROM  7. Decreased Flexibility  8. Poor posture  9. Decreased Cardiovascular endurance   10. Gait abnormality  11. Decreased community ambulation  12. Impaired muscle tone  13. Joint hypomobility     GOALS:   Short Term Goals:  3 weeks  1. Patient will report decreased knee pain to 4/10 to improve sit to stand. Progressing  2. Patient will increase knee ROM to 110 degrees in order to show improved functional mobility. Progressing  3. Patient will be independent and consistent with issued HEP in order to show carryover between therapy sessions. Progressing     Long Term Goals: 6 weeks  1. Patient will report decreased knee pain to 2/10 in order to increase tolerance for walking with SC for 15 min. Progressing  2. Patient will improve knee ROM to 115 degrees to enable improved transfers to Indep.Progressing  3. Patient will improve LE strength to 4+/5 or greater to increase tolerance to functional activities and ADLs.Progressing  4. Patient will report 75% improvement in function to demonstrate an increase in LE function and mobility in home and community environment. Progressing  5. Patient will be independent with updated HEP to maintain gains following discharge with therapy. Progressing       Plan     Continue  with established Plan of Care towards PT goals.      Therapist: Nayana Weston, PT

## 2018-07-25 NOTE — PROGRESS NOTES
"                                                    Physical Therapy Daily Note     Date: 07/26/2018  Name: Coby Atkinson  Clinic Number: [id number]  Diagnosis:   Encounter Diagnosis   Name Primary?    Knee joint stiffness, bilateral      Physician: Mundo Hudson, *     Physician Orders: PT Eval and Treat   Medical Diagnosis: B LE weakness  Evaluation Date: 7/3/2018  Authorization period Expiration: 6/14/19  Plan of Care Certification Period: 8/14/18     Visit  7/20  Time In:900  Time Out: 10:00  Total Billable Time30 minutes     Precautions: Standard and PE-on blood thinners      Subjective     Pt c/o 'all my muscles aching and I am very tired today.  My knees are a bit better    Pain: 3/10 B knee pain  Which is worst with sit to/from  stand    Objective   Observation: obesity     Posture:  Genu valgus bilaterally     Gait: severe ant  Sit to stand: requires use of UE  Supine to sit: min A  Step 4": min A      Range of Motion: AROM/PROM  Knee Right Left   Flexion    100 98   Extension    +5 +5      Knee Special Tests:  Crepitis: Mod Sylvester        Lower Extremity Strength: MMT 5/5    Right LE Left LE   Knee Extension    4 4-   Knee Flexion    4 4-   Hip Flexion    4- 3+   Hip Abduction:    3- 3-   Hip Adduction:    NT NT   Hip Extension: (bridge) 3- 3-         LE Flexibility:   Hamstrings: R: mild restriction, L: mild restriction  Piriformis: R: mod restriction, L: mod restriction  Quad: R: severe restriction, L: severe restriction     Joint Mobility:   Hypomobility of patella     Palpation:   Tenderness to palpation of medial and lateral joint line B knees.       TREATMENT  Coby received therapeutic exercises to develop strength, endurance, ROM, and flexibility for 55 minutes of which 30 was one to one and billable    sCI FIT  x 8 min  L1  Pt stopped multiple times due to fatigue and she also put forth minimal UE effort.    Gastroc stretch on incline 3 x 20 sec  Hamstring stretch on steps 3 x 20 " "sec  Supine  Quad Sets 15x5"  SAQ 15x5"2#  Adduction ball squeeze 15x5"  Heel slides 15x5"  SLR 1x10 B  2#      Seated:  LAQ 2x10  Sit to stand with chair and  1x5  Sit to stand from 20" 1x5    At stairs:    Ascend descend 4 steps x 2 with proper technique and BUE support  Step upx x 10 on 3" platform    Standing:   Hip abduction 2x10 with 1#  Hip extension 2x10 with 1#  HS curls 2x10 with 1#  Calf raises 2x10    Coby received moist pack to B knees for 5 minutes for pain relief    Written Home Exercises Provided: HEP was reviewed with pt return demonstration.  She understands HEP compliance 3-5x week is required for progresslPt demo good understanding of the education provided. Coby demonstrated good return demonstration of activities.       Assessment     Pt tolerated all tx well. She performed better tody and was actually able to advance activity/  She easily becomes SOB and demonstrates very poor functional endurance as well as mm weakness.  She has a marked crepitus in knees bilaterally and may benefit from knee injections in the future;  Pt will continue to benefit from skilled PT in order to decrease pain, increase strength and ROM, and improve functional mobility.    Medical necessity is demonstrated by the following IMPAIRMENTS/PROBLEM LIST:  1. Impaired balance-Fall risk  2. Limited participation in daily activities   3. Limited participation in recreational pursuits   4. Requires skilled supervision to complete and progress HEP   5. Decreased strength  6. Decreased ROM  7. Decreased Flexibility  8. Poor posture  9. Decreased Cardiovascular endurance   10. Gait abnormality  11. Decreased community ambulation  12. Impaired muscle tone  13. Joint hypomobility     GOALS:   Short Term Goals:  3 weeks  1. Patient will report decreased knee pain to 4/10 to improve sit to stand. Progressing  2. Patient will increase knee ROM to 110 degrees in order to show improved functional mobility. Progressing  3. Patient " will be independent and consistent with issued HEP in order to show carryover between therapy sessions. Progressing     Long Term Goals: 6 weeks  1. Patient will report decreased knee pain to 2/10 in order to increase tolerance for walking with SC for 15 min. Progressing  2. Patient will improve knee ROM to 115 degrees to enable improved transfers to Indep.Progressing  3. Patient will improve LE strength to 4+/5 or greater to increase tolerance to functional activities and ADLs.Progressing  4. Patient will report 75% improvement in function to demonstrate an increase in LE function and mobility in home and community environment. Progressing  5. Patient will be independent with updated HEP to maintain gains following discharge with therapy. Progressing       Plan     Continue with established Plan of Care towards PT goals.  8/14/18 expiration of POC  Therapist: Nayana Weston, PT

## 2018-07-26 ENCOUNTER — CLINICAL SUPPORT (OUTPATIENT)
Dept: REHABILITATION | Facility: HOSPITAL | Age: 73
End: 2018-07-26
Attending: INTERNAL MEDICINE
Payer: MEDICARE

## 2018-07-26 DIAGNOSIS — M25.662 KNEE JOINT STIFFNESS, BILATERAL: ICD-10-CM

## 2018-07-26 DIAGNOSIS — M25.661 KNEE JOINT STIFFNESS, BILATERAL: ICD-10-CM

## 2018-07-26 PROCEDURE — 97110 THERAPEUTIC EXERCISES: CPT | Mod: PO

## 2018-07-30 ENCOUNTER — ANTI-COAG VISIT (OUTPATIENT)
Dept: CARDIOLOGY | Facility: CLINIC | Age: 73
End: 2018-07-30
Payer: MEDICARE

## 2018-07-30 ENCOUNTER — TELEPHONE (OUTPATIENT)
Dept: INTERNAL MEDICINE | Facility: CLINIC | Age: 73
End: 2018-07-30

## 2018-07-30 DIAGNOSIS — Z79.01 LONG TERM (CURRENT) USE OF ANTICOAGULANTS: Primary | ICD-10-CM

## 2018-07-30 LAB — INR PPP: 3.3 (ref 2–3)

## 2018-07-30 PROCEDURE — 85610 PROTHROMBIN TIME: CPT | Mod: PBBFAC

## 2018-07-30 NOTE — PROGRESS NOTES
Quick follow-up for elevated INR7/16 . INR elevated today with no explanation to cause.  Will hold today's dose then decrease with follow up in 10 days.  Patient with bruises from use, denies any bleeding or changes. Advised patient to call with any changes or concerns. MARJAN JONES assisted with dosing

## 2018-07-30 NOTE — TELEPHONE ENCOUNTER
"----- Message from Philip Suárez sent at 7/28/2018 10:21 AM CDT -----  Contact: Self [phone]  RX request - refill or new RX.  Is this a refill or new RX:  Refill  RX name and strength: metoprolol succinate (TOPROL-XL) 25 MG 24 hr tablet  Directions:   Is this a 30 day or 90 day RX:  90 day  Local pharmacy or mail order pharmacy:    Pharmacy name and phone # (DON'T enter "on file" or "in chart"): [facility] [address] [address] AT [facility] [phone] (Phone)  [phone] (Fax)  Comments:          "

## 2018-07-31 ENCOUNTER — EXTERNAL CHRONIC CARE MANAGEMENT (OUTPATIENT)
Dept: PRIMARY CARE CLINIC | Facility: CLINIC | Age: 73
End: 2018-07-31
Payer: MEDICARE

## 2018-07-31 ENCOUNTER — CLINICAL SUPPORT (OUTPATIENT)
Dept: REHABILITATION | Facility: HOSPITAL | Age: 73
End: 2018-07-31
Attending: INTERNAL MEDICINE
Payer: MEDICARE

## 2018-07-31 DIAGNOSIS — M25.662 KNEE JOINT STIFFNESS, BILATERAL: ICD-10-CM

## 2018-07-31 DIAGNOSIS — I10 ESSENTIAL HYPERTENSION: ICD-10-CM

## 2018-07-31 DIAGNOSIS — M25.661 KNEE JOINT STIFFNESS, BILATERAL: ICD-10-CM

## 2018-07-31 PROCEDURE — 99490 CHRNC CARE MGMT STAFF 1ST 20: CPT | Mod: PBBFAC,PO | Performed by: INTERNAL MEDICINE

## 2018-07-31 PROCEDURE — 97110 THERAPEUTIC EXERCISES: CPT | Mod: PO

## 2018-07-31 PROCEDURE — 99490 CHRNC CARE MGMT STAFF 1ST 20: CPT | Mod: S$PBB,,, | Performed by: INTERNAL MEDICINE

## 2018-07-31 RX ORDER — AMLODIPINE BESYLATE 10 MG/1
TABLET ORAL
Qty: 90 TABLET | Refills: 1 | Status: SHIPPED | OUTPATIENT
Start: 2018-07-31 | End: 2018-11-01 | Stop reason: SINTOL

## 2018-07-31 NOTE — PROGRESS NOTES
"                                                    Physical Therapy Daily Note     Date: 07/31/2018  Name: Coby Atkinson  Clinic Number: [id number]  Diagnosis:   Encounter Diagnosis   Name Primary?    Knee joint stiffness, bilateral      Physician: Mundo Hudson, *     Physician Orders: PT Eval and Treat   Medical Diagnosis: B LE weakness  Evaluation Date: 7/3/2018  Authorization period Expiration: 6/14/19  Plan of Care Certification Period: 8/14/18     Visit  7/20  Time In:915  Time Out: 10:10  Total Billable Time55 minutes     Precautions: Standard and PE-on blood thinners      Subjective     Pt c/o 'all my muscles aching  today.  I couldn;t do my exercises over the weekend because of pain    Pain: 3/10 B knee pain  Which is worst with sit to/from  stand    Objective   Sit to stand: requires use of UE  Supine to sit: min A  Step 4": min A      TREATMENT  Coby received therapeutic exercises to develop strength, endurance, ROM, and flexibility for 55 minutes of which 55 was one to one and billable    Recumbent bike x 4 min  L1  Pt stopped multiple times due to fatigue and she also put forth minimal UE effort and needed max coax from PT  Gastroc stretch on incline 3 x 20 sec  Hamstring stretch on steps 3 x 20 sec    Supine  Quad Sets 15x5"    SAQ 15x5"2#  Adduction ball squeeze 15x5"  Abduction with BTB 2 x 10  Heel slides 2x10 B  1#  SLR 2x10 B  1#  Seated:  LAQ 2x10  Sit to stand with low mat 18"    1x7    Pt took extended time with all of these exercises due to pain and fatigue      At stairs:    NP  Ascend descend 4 steps x 2 with proper technique and BUE support  Step upx x 10 on 3" platform  Standing:  NP  Hip abduction 2x10 with 1#  Hip extension 2x10 with 1#  HS curls 2x10 with 1#  Calf raises 2x10        Written Home Exercises Provided: HEP was reviewed with pt return demonstration.  She understands HEP compliance 3-5x week is required for progresslPt demo good understanding of the education " provided. Coby demonstrated good return demonstration of activities.       Assessment     Pt tolerated all tx fair.  She puts forth minimal effort and fatigues easily with SOB.  She needs multiple rest pauses within sets of exercises.  She continues to demonstrate very poor functional endurance as well as mm weakness.  She has a marked crepitus in knees bilaterally and may benefit from knee injections in the future;  I suggested she make an appointment with ortho.      Pt will continue to benefit from skilled PT in order to decrease pain, increase strength and ROM, and improve functional mobility.    Medical necessity is demonstrated by the following IMPAIRMENTS/PROBLEM LIST:  1. Impaired balance-Fall risk  2. Limited participation in daily activities   3. Limited participation in recreational pursuits   4. Requires skilled supervision to complete and progress HEP   5. Decreased strength  6. Decreased ROM  7. Decreased Flexibility  8. Poor posture  9. Decreased Cardiovascular endurance   10. Gait abnormality  11. Decreased community ambulation  12. Impaired muscle tone  13. Joint hypomobility     GOALS:   Short Term Goals:  3 weeks  1. Patient will report decreased knee pain to 4/10 to improve sit to stand. Progressing  2. Patient will increase knee ROM to 110 degrees in order to show improved functional mobility. Progressing  3. Patient will be independent and consistent with issued HEP in order to show carryover between therapy sessions. Progressing     Long Term Goals: 6 weeks  1. Patient will report decreased knee pain to 2/10 in order to increase tolerance for walking with SC for 15 min. Progressing  2. Patient will improve knee ROM to 115 degrees to enable improved transfers to Indep.Progressing  3. Patient will improve LE strength to 4+/5 or greater to increase tolerance to functional activities and ADLs.Progressing  4. Patient will report 75% improvement in function to demonstrate an increase in LE function  and mobility in home and community environment. Progressing  5. Patient will be independent with updated HEP to maintain gains following discharge with therapy. Progressing       Plan     Continue with established Plan of Care towards PT goals.  STG are not met and are extended  8/14/18 expiration of POC  Therapist: Nayana Weston, PT

## 2018-08-02 ENCOUNTER — OFFICE VISIT (OUTPATIENT)
Dept: GASTROENTEROLOGY | Facility: CLINIC | Age: 73
End: 2018-08-02
Payer: MEDICARE

## 2018-08-02 VITALS
HEART RATE: 79 BPM | WEIGHT: 240.94 LBS | SYSTOLIC BLOOD PRESSURE: 155 MMHG | HEIGHT: 63 IN | BODY MASS INDEX: 42.69 KG/M2 | DIASTOLIC BLOOD PRESSURE: 75 MMHG

## 2018-08-02 DIAGNOSIS — K52.9 CHRONIC DIARRHEA: Primary | ICD-10-CM

## 2018-08-02 DIAGNOSIS — K21.9 GASTROESOPHAGEAL REFLUX DISEASE WITHOUT ESOPHAGITIS: ICD-10-CM

## 2018-08-02 PROCEDURE — 99214 OFFICE O/P EST MOD 30 MIN: CPT | Mod: PBBFAC | Performed by: PHYSICIAN ASSISTANT

## 2018-08-02 PROCEDURE — 99999 PR PBB SHADOW E&M-EST. PATIENT-LVL IV: CPT | Mod: PBBFAC,,, | Performed by: PHYSICIAN ASSISTANT

## 2018-08-02 PROCEDURE — 99214 OFFICE O/P EST MOD 30 MIN: CPT | Mod: S$PBB,,, | Performed by: PHYSICIAN ASSISTANT

## 2018-08-02 RX ORDER — DICYCLOMINE HYDROCHLORIDE 10 MG/1
10 CAPSULE ORAL
Qty: 90 CAPSULE | Refills: 2 | Status: SHIPPED | OUTPATIENT
Start: 2018-08-02 | End: 2019-01-15 | Stop reason: SDUPTHER

## 2018-08-02 NOTE — PROGRESS NOTES
[facility] Consultation Note    Reason for Consult:  The primary encounter diagnosis was Chronic diarrhea. A diagnosis of Gastroesophageal reflux disease without esophagitis was also pertinent to this visit.    PCP: Mundo Hudson   [address] [facility] / [place] [id number]  REF MD: Mundo Hudson DO  [address] [facility]  [place] [id number]    HPI:  This is a 72 y.o. female here for evaluation of diarrhea.    Having BMs 15-30mins after meals  Loose and watery  Duration - 3-5 yrs   Associated urgency, some incontinence    Antibiotics/Travel/Well Water - no/no/no  Blood/Mucus/Pus/Oily - no  Nocturnal stools - no  Fasting - no diarrhea if she does not eat     Taking imodium before meals and after meals when traveling    No abdominal pain, N/V, gas bloating    GERD is controlled on protonix  Breakthrough if she misses a does    ROS:  Constitutional: No fevers, chills, No weight loss  ENT: No allergies  CV: No chest pain  Pulm: No cough, No shortness of breath  Ophtho: No vision changes  GI: see HPI  Derm: No rash  Heme: No lymphadenopathy, No bruising  MSK: No arthritis  : No dysuria, No hematuria  Endo: No hot or cold intolerance  Neuro: No syncope, No seizure  Psych: No anxiety, No depression    Medical History:  has a past medical history of Anticoagulant long-term use; Cataract; Depression; Edema; GERD (gastroesophageal reflux disease); Hypertension (10/2/2012); Osteoarthritis of both knees (8/9/2016); Osteopenia; Osteopenia (11/30/2017); PE (pulmonary thromboembolism) (12/03/2017); Primary localized osteoarthrosis, lower leg (12/3/2014); and Thalassemia minor.    Surgical History:  has a past surgical history that includes Appendectomy; Tonsillectomy; Hysterectomy (age 39); Oophorectomy; Cataract extraction; and Colonoscopy (N/A, 11/5/2016).    Family History: family history includes Cancer in her brother and father; Coronary artery disease in her brother,  "maternal grandmother, and paternal grandmother; Heart failure in her maternal grandmother; Hypertension in her mother; No Known Problems in her maternal aunt, maternal grandfather, maternal uncle, paternal aunt, paternal grandfather, paternal uncle, and sister; Stroke in her paternal grandmother..     Social History:  reports that she has never smoked. She has never used smokeless tobacco. She reports that she does not drink alcohol or use drugs.    Review of patient's allergies indicates:   Allergen Reactions    Codeine     Ciprofloxacin Rash       Current Outpatient Prescriptions   Medication Sig    amLODIPine (NORVASC) 10 MG tablet TAKE 1 TABLET(10 MG) BY MOUTH EVERY DAY    escitalopram oxalate (LEXAPRO) 20 MG tablet TAKE 1 TABLET BY MOUTH EVERY DAY    furosemide (LASIX) 20 MG tablet Take 1 tablet (20 mg total) by mouth once daily.    losartan-hydrochlorothiazide 50-12.5 mg (HYZAAR) 50-12.5 mg per tablet Take 1 tablet by mouth once daily.    metoprolol succinate (TOPROL-XL) 25 MG 24 hr tablet TAKE 1 TABLET BY MOUTH EVERY DAY    pantoprazole (PROTONIX) 40 MG tablet Take 1 tablet (40 mg total) by mouth every 12 (twelve) hours. (Patient taking differently: Take 40 mg by mouth once daily. )    potassium chloride (MICRO-K) 10 MEQ CpSR TAKE 2 CAPSULES(20 MEQ) BY MOUTH EVERY DAY    warfarin (COUMADIN) 1 MG tablet Take 0.5-1 tablets (0.5-1 mg total) by mouth Daily. As directed by coumadin clinic    diclofenac sodium 1 % Gel Apply 2 g topically 2 (two) times daily.    dicyclomine (BENTYL) 10 MG capsule Take 1 capsule (10 mg total) by mouth before meals as needed (diarrhea).    doxazosin (CARDURA) 2 MG tablet TAKE 1 TABLET(2 MG) BY MOUTH EVERY EVENING    ranitidine (ZANTAC) 150 MG tablet Take 1 tablet (150 mg total) by mouth 2 (two) times daily.     No current facility-administered medications for this visit.          Objective Findings:    Vital Signs:  BP (!) 155/75   Pulse 79   Ht 5' 3" (1.6 m)   Wt " 109.3 kg (240 lb 15.4 oz)   BMI 42.68 kg/m²   Body mass index is 42.68 kg/m².    Physical Exam:  General Appearance: Well appearing in no acute distress  Head:   Normocephalic, without obvious abnormality  Eyes:    No scleral icterus  ENT: Neck supple, Lips, mucosa, and tongue normal  Lungs: CTA bilaterally in anterior and posterior fields, no wheezes, no crackles.  Heart:  Regular rate and rhythm, S1, S2 normal, no murmurs heard  Abdomen: Soft, non tender, non distended with positive bowel sounds in all four quadrants.   Extremities: no edema  Skin: No rash  Neurologic: AAO x 3      Labs:  Lab Results   Component Value Date    WBC 9.24 06/14/2018    HGB 10.5 (L) 06/14/2018    HCT 35.5 (L) 06/14/2018     06/14/2018    CHOL 142 11/20/2017    TRIG 78 11/20/2017    HDL 71 11/20/2017    ALT 15 01/16/2018    AST 20 01/16/2018     06/14/2018    K 4.1 06/14/2018     06/14/2018    CREATININE 1.1 06/14/2018    BUN 19 06/14/2018    CO2 27 06/14/2018    TSH 4.082 (H) 11/20/2017    TSH 4.082 (H) 11/20/2017    INR 3.3 07/30/2018         Endoscopy:     11/2016 colonoscopy - normal. TI not visualized. Bx for micro colitis not taken    12/2017 EGD  Assessment:  1. Chronic diarrhea    2. Gastroesophageal reflux disease without esophagitis    osteopenia     73yo F with post-prandial diarrhea, urgency and incontinence episodes x 3-5yrs    GERD is controlled on protonix. Will attempt to ween to zantac    Recommendations:  1. Stool studies to rule out infection and malabsorption  2. Trial of bentyl before meals. ADRs were discussed.  3. Start a probiotic  4. Keep food diary on diarrhea episodes  5. Trial of weening off protonix and onto zantac 150mg BID  6. Low fodmap diet    Follow-up in about 2 months (around 10/2/2018).      Order summary:  Orders Placed This Encounter    Stool culture    Clostridium difficile EIA    Stool Exam-Ova,Cysts,Parasites    Stool Exam-Ova,Cysts,Parasites    Stool  Exam-Ova,Cysts,Parasites    Giardia / Cryptosporidum, EIA    Giardia / Cryptosporidum, EIA    Giardia / Cryptosporidum, EIA    Fecal fat, qualitative    dicyclomine (BENTYL) 10 MG capsule    ranitidine (ZANTAC) 150 MG tablet         Thank you so much for allowing me to participate in the care of Coby Conn China Albrecht MD

## 2018-08-02 NOTE — LETTER
August 3, 2018      Mundo Hudson, DO  [address] [facility] [place] [address]           [facility] - Gastroenterology  [address]  [place] [address]  Phone: [phone]  Fax: [phone]          Patient: Coby Atkinson   MR Number: [id number]   YOB: 1945   Date of Visit: 8/2/2018       Dear Dr. Mundo Hudson:    Thank you for referring Coby Atkinson to me for evaluation. Attached you will find relevant portions of my assessment and plan of care.    If you have questions, please do not hesitate to call me. I look forward to following Coby Atkinson along with you.    Sincerely,    RAHUL Hernandez  CC:  No Recipients    If you would like to receive this communication electronically, please contact [email or web address] or [phone] to request more information on [employer] Link access.    For providers and/or their staff who would like to refer a patient to [facility], please contact us through our one-stop-shop provider referral line, [facility], at [phone].    If you feel you have received this communication in error or would no longer like to receive these types of communications, please e-mail [email or web address]

## 2018-08-02 NOTE — PATIENT INSTRUCTIONS
Start taking a lactose free probiotics- once daily - try Culturelle    Keep a food diary of what you ate prior to the diarrhea episode    Ok to try to ween off the protonix. Start taking every other day for 2 weeks, then every 3 days for 2 weeks, then stop. Days that you are not taking the protonix, take zantac 150mg twice daily.    Low Fodmap Diet    Fodmaps (fructo,oligo,mono saccharides and polyols) are contained in certain foods and can cause significant bloating in some people. Reducing these foods can reduce bloating. Start off by cutting out anything with high fructose corn syrup in the ingredients.        Foods suitable on a low-fodmap diet  fruit  banana, blueberry, boysenberry, canteloupe, cranberry, durian, grape,  grapefruit, honeydew melon, kiwifruit, lemon,lime, mandarin, orange,  passionfruit, pawpaw, raspberry, rhubarb, rockmelon, star anise,  strawberry, tangelo  vegetables  alfalfa, artichoke, bamboo shoots, bean shoots, bok delmi,  carrot, celery, choko, delmi sum, endive, michelle, green beans,  lettuce, olives, parsnip, potato, pumpkin, red capsicum (bell pepper),  silver beet, spinach, summer squash (yellow), swede, sweet potato, taro, tomato,  turnip, yam, zucchini   herbs  basil, chili, coriander, michelle, lemongrass,   marjoram, mint, oregano, parsley, rosemary, thyme  milk  lactose-free milk, oat milk*, rice milk, soy milk*  cheeses  hard cheeses, and brie and camembert  yoghurt  lactose-free varieties  ice-cream substitutes  gelati, sorbet  butter substitutes  olive oilgrain foods  cereals  gluten-free bread or cereal products  bread  100% spelt bread  Rice, oats, polenta, other  arrowroot, millet, psyllium, quinoa, sorgum, tapioca  sweeteners  sugar* (sucrose), glucose, artificial sweeteners not ending in -ol  honey substitutes  last syrup*, maple syrup*, molasses, treacle  *small quantities  *check for additives  Note: if fruit is dried, eat in small quantities    excess fructose lactose  fructans galactans polyols      Eliminate foods containing fodmaps  fruit  apple, apricot, avocado, blackberry, cherry, lychee, nashi, nectarine, peach, pear, plum,  prune, watermelon, misty, tinned fruit in natural juice,   sweeteners  sorbitol (420)  mannitol (421)  isomalt (953)  maltitol (965)  xylitol (967)  legumes  baked beans, chickpeas, kidney beans, lentils  vegetables  asparagus, beetroot, broccoli, brussels sprouts, cabbage, eggplant, fennel, garlic,  hang, okra, onion (all), shallots, spring onion, cauliflower, green capsicum (bell pepper), mushroom, sweet corn  cereals  wheat and rye, in large amounts eg. Bread, crackers, cookies, couscous, pasta  fruit  custard apple, persimmon, watermelon  miscellaneous  chicory, dandelion, inulin  sweeteners  fructose, high fructose corn syrup  large total fructose dose  concentrated fruit sources, large servings of fruit, dried fruit, fruit juice  honey  corn syrup, fruisana  milk  milk from cows, goats or sheep, custard, ice cream, yoghurt  cheeses  soft unripened cheeses eg. cottage, cream, mascarpone    Additional FODMAPs Diet Reading List    IBS-Free At Last!: Second Edition: Change Your Carbs, Change Your Life with the FODMAP Elimination Diet, 2 nd edition by Anita Landa MS, RD     2012 copyright; [employer].   ISBN: 361-5-0560615-2-1    The Complete Idiots Guide To Living Well With IBS by Lita Amaya RD, LDN  [facility], 2010    The FODMAPs Approach:--Minimize Consumption of Fermentable Carbs to Manage Functional Gut Disorder Symptoms  by Lita Amaya RD, LDN article found in  Todays Dietitian, vol. 12, no. 8, p. 30    The Inside Tract: Your Good Gut Guide To Great Digestive Health by Francisco Huang MD and Shanna Angelo MS, RD, LDN  2011 copyright, [employer]   ISBN: 052-2-99302-264-9    List assembled by: Jeannine Raphael, MS, RD, LDN, CDE  [facility]  Last Updated: 7/13/12

## 2018-08-03 ENCOUNTER — CLINICAL SUPPORT (OUTPATIENT)
Dept: REHABILITATION | Facility: HOSPITAL | Age: 73
End: 2018-08-03
Attending: INTERNAL MEDICINE
Payer: MEDICARE

## 2018-08-03 DIAGNOSIS — M25.662 KNEE JOINT STIFFNESS, BILATERAL: ICD-10-CM

## 2018-08-03 DIAGNOSIS — M25.661 KNEE JOINT STIFFNESS, BILATERAL: ICD-10-CM

## 2018-08-03 PROBLEM — K52.9 CHRONIC DIARRHEA: Status: ACTIVE | Noted: 2018-08-03

## 2018-08-03 PROBLEM — K26.9 DUODENAL ULCER: Status: RESOLVED | Noted: 2017-12-08 | Resolved: 2018-08-03

## 2018-08-03 PROCEDURE — 97110 THERAPEUTIC EXERCISES: CPT | Mod: PO

## 2018-08-03 NOTE — PROGRESS NOTES
"                                                    Physical Therapy Daily Note     Date: 08/03/2018  Name: Coby Atkinson  Clinic Number: [id number]  Diagnosis:   Encounter Diagnosis   Name Primary?    Knee joint stiffness, bilateral      Physician: Mundo Hudson, *     Physician Orders: PT Eval and Treat   Medical Diagnosis: B LE weakness  Evaluation Date: 7/3/2018  Authorization period Expiration: 6/14/19  Plan of Care Certification Period: 8/14/18     Visit  8/20  Time In:904  Time Out: 10:00  Total Billable Time56 minutes     Precautions: Standard and PE-on blood thinners      Subjective     Pt c/o generalized fatigue and aching  Pain: 3/10 B knee pain  Which is worst with sit to/from  stand    Objective   Sit to stand: requires use of UE and is more difficult from lower surfaces   Supine to sit: min A        TREATMENT  Coby received therapeutic exercises to develop strength, endurance, ROM, and flexibility for 55 minutes of which 55 was one to one and billable    Sci fit level 1 x 8 min  Pt stopped multiple times due to fatigue and she also put forth minimal UE effort and needed max coax from PT  Gastroc stretch on incline 3 x 20 sec  Hamstring stretch on steps 3 x 20 sec    Supine  Quad Sets 15x5"    SAQ 15x5"2#  Adduction ball squeeze 15x5"  Abduction with BTB 2 x 10  Heel slides 2x10 B  1#  SLR 2x10 B  1#  Seated:  LAQ 2x10  Sit to stand with low mat 18"    1x5  --    19" 1x 5   ---    20"  1x5  Pt took extended time with all of these exercises due to pain and fatigue    Education: HEP was reviewed with pt return demonstration.  She understands HEP compliance 3-5x week is required for progresslPt demo good understanding of the education provided. Coby demonstrated good return demonstration of activities.       Assessment     Pt tolerated treatment well but  puts forth minimal effort and fatigues easily with SOB.  She needs multiple rest pauses within sets of exercises.  She continues to " demonstrate very poor functional endurance as well as mm weakness.  She is making slight progress with exercises.  It appears tht she has a very sedentary lifestyle and does not put much effort into movement or exercise at home.  Pt will continue to benefit from skilled PT in order to decrease pain, increase strength and ROM, and improve functional mobility.    Medical necessity is demonstrated by the following IMPAIRMENTS/PROBLEM LIST:  1. Impaired balance-Fall risk  2. Limited participation in daily activities   3. Limited participation in recreational pursuits   4. Requires skilled supervision to complete and progress HEP   5. Decreased strength  6. Decreased ROM  7. Decreased Flexibility  8. Poor posture  9. Decreased Cardiovascular endurance   10. Gait abnormality  11. Decreased community ambulation  12. Impaired muscle tone  13. Joint hypomobility     GOALS:   Short Term Goals:  3 weeks  1. Patient will report decreased knee pain to 4/10 to improve sit to stand. Progressing  2. Patient will increase knee ROM to 110 degrees in order to show improved functional mobility. Progressing  3. Patient will be independent and consistent with issued HEP in order to show carryover between therapy sessions. Progressing     Long Term Goals: 6 weeks  1. Patient will report decreased knee pain to 2/10 in order to increase tolerance for walking with SC for 15 min. Progressing  2. Patient will improve knee ROM to 115 degrees to enable improved transfers to Indep.Progressing  3. Patient will improve LE strength to 4+/5 or greater to increase tolerance to functional activities and ADLs.Progressing  4. Patient will report 75% improvement in function to demonstrate an increase in LE function and mobility in home and community environment. Progressing  5. Patient will be independent with updated HEP to maintain gains following discharge with therapy. Progressing       Plan     Continue with established Plan of Care towards PT goals.   STG are not met and are extended  8/14/18 expiration of POC  Therapist: Nayana Weston, PT

## 2018-08-07 ENCOUNTER — CLINICAL SUPPORT (OUTPATIENT)
Dept: REHABILITATION | Facility: HOSPITAL | Age: 73
End: 2018-08-07
Attending: INTERNAL MEDICINE
Payer: MEDICARE

## 2018-08-07 DIAGNOSIS — M25.662 KNEE JOINT STIFFNESS, BILATERAL: ICD-10-CM

## 2018-08-07 DIAGNOSIS — M25.661 KNEE JOINT STIFFNESS, BILATERAL: ICD-10-CM

## 2018-08-07 PROCEDURE — 97110 THERAPEUTIC EXERCISES: CPT | Mod: PO

## 2018-08-07 NOTE — PROGRESS NOTES
"                                                    Physical Therapy Daily Note     Date: 08/07/2018  Name: Coby Atkinson  Clinic Number: [id number]  Diagnosis:   No diagnosis found.  Physician: Mundo Hudson, *     Physician Orders: PT Eval and Treat   Medical Diagnosis: B LE weakness  Evaluation Date: 7/3/2018  Authorization period Expiration: 6/14/19  Plan of Care Certification Period: 8/14/18     Visit  9/20  Time In:913 * pt with late arrival  Time Out: 10:00   Total Billable Time 56 minutes     Precautions: Standard and PE-on blood thinners      Subjective     Pt reports soreness following last treatment.    Pain: 3/10 B knee pain     Objective   Sit to stand: requires use of UE and is more difficult from lower surfaces   Supine to sit: min A        TREATMENT  Coby received therapeutic exercises to develop strength, endurance, ROM, and flexibility for 47 minutes of which 47 was one to one and billable    Sci fit level 1 x 8 min  - patient able to maintain timer on sci fit without taking breaks  Gastroc stretch on incline 3 x 20 sec  Hamstring stretch on steps 3 x 20 sec    Supine  Quad Sets 15x5"    SAQ 15x5"2#  Abduction with BTB 2 x 10  Heel slides 2x10 B   SLR 2x10 B  1#- NP    Seated:  Adduction ball squeeze 20x5"  LAQ 2x10  Sit to stand with low mat 19" 1x 10, 19" 1 x 5     Pt took extended time with all of these exercises due to pain and fatigue    Education: HEP was reviewed with pt return demonstration.  She understands HEP compliance 3-5x week is required for progresslPt demo good understanding of the education provided. Coby demonstrated good return demonstration of activities.       Assessment     Pt tolerated treatment well.  Pt was able to perform the sci fit without taking a break.  Pt was very challenged going from supine to sitting on the edge of the mat.  Pt required increased timing performing sit to stand reported some joint pain.  Will try and progress as tolerated.  Pt will " continue to benefit from skilled PT in order to decrease pain, increase strength and ROM, and improve functional mobility.    Medical necessity is demonstrated by the following IMPAIRMENTS/PROBLEM LIST:  1. Impaired balance-Fall risk  2. Limited participation in daily activities   3. Limited participation in recreational pursuits   4. Requires skilled supervision to complete and progress HEP   5. Decreased strength  6. Decreased ROM  7. Decreased Flexibility  8. Poor posture  9. Decreased Cardiovascular endurance   10. Gait abnormality  11. Decreased community ambulation  12. Impaired muscle tone  13. Joint hypomobility     GOALS:   Short Term Goals:  3 weeks  1. Patient will report decreased knee pain to 4/10 to improve sit to stand. Progressing  2. Patient will increase knee ROM to 110 degrees in order to show improved functional mobility. Progressing  3. Patient will be independent and consistent with issued HEP in order to show carryover between therapy sessions. Progressing     Long Term Goals: 6 weeks  1. Patient will report decreased knee pain to 2/10 in order to increase tolerance for walking with SC for 15 min. Progressing  2. Patient will improve knee ROM to 115 degrees to enable improved transfers to Indep.Progressing  3. Patient will improve LE strength to 4+/5 or greater to increase tolerance to functional activities and ADLs.Progressing  4. Patient will report 75% improvement in function to demonstrate an increase in LE function and mobility in home and community environment. Progressing  5. Patient will be independent with updated HEP to maintain gains following discharge with therapy. Progressing       Plan     Continue with established Plan of Care towards PT goals.  STG are not met and are extended

## 2018-08-09 ENCOUNTER — CLINICAL SUPPORT (OUTPATIENT)
Dept: REHABILITATION | Facility: HOSPITAL | Age: 73
End: 2018-08-09
Attending: INTERNAL MEDICINE
Payer: MEDICARE

## 2018-08-09 DIAGNOSIS — M25.661 KNEE JOINT STIFFNESS, BILATERAL: ICD-10-CM

## 2018-08-09 DIAGNOSIS — M25.662 KNEE JOINT STIFFNESS, BILATERAL: ICD-10-CM

## 2018-08-09 PROCEDURE — 97110 THERAPEUTIC EXERCISES: CPT | Mod: PO

## 2018-08-09 NOTE — PROGRESS NOTES
Physical Therapy Daily Note     Date: 08/09/2018  Name: Coby Atkinson  Clinic Number: [id number]  Diagnosis:   Encounter Diagnosis   Name Primary?    Knee joint stiffness, bilateral      Physician: Mundo Hudson, *     Physician Orders: PT Eval and Treat   Medical Diagnosis: B LE weakness  Evaluation Date: 7/3/2018  Authorization period Expiration: 6/14/19  Plan of Care Certification Period: 8/14/18     Visit  10/20  Time In:900  Time Out: 10:00   Total Billable Time 60 minutes     Precautions: Standard and PE-on blood thinners      Subjective     Pt reports soreness following last treatment. She admits to avoiding movement at home because of knee pain.  She states she feels somewhat better since onset of therapy and can move somewhat better  She states she does her HEP  Pain: 3/10 B knee pain     Objective   Sit to stand: requires use of UE and is more difficult from lower surfaces   Supine to sit: min A  Range of Motion: AROM/PROM unchanged since eval  Knee Right Left   Flexion    100 98   Extension    +5 +5      Knee Special Tests:  Crepitis: Mod Sylvester        Lower Extremity Strength: MMT 5/5  No change since evaluation    Right LE Left LE   Knee Extension    4 4-   Knee Flexion    4 4-   Hip Flexion    4- 3+   Hip Abduction:    3- 3-   Hip Adduction:    2 2   Hip Extension: (bridge) 3- 3-         LE Flexibility:  Unchanged since eval  Hamstrings: R: mild restriction, L: mild restriction  Piriformis: R: mod restriction, L: mod restriction  Quad: R: severe restriction, L: severe restriction      Palpation: unchanged since eval  Tenderness to palpation of medial and lateral joint line B knees        TREATMENT  Coby received therapeutic exercises to develop strength, endurance, ROM, and flexibility for 55 minutes  one to one and billable    Sci fit level 1 x 8 min  Level 3  Pt required cues for proper alignment.  But did not require rest pauses.  She  "was able to maintain a conversation and had no KITCHEN  Gastroc stretch on incline 3 x 20 sec  Hamstring stretch on steps 3 x 20 sec    Supine  Quad Sets 15x5"    SAQ 15x5"2#  Abduction with BTB 2 x 10  Heel slides 2x10 B   SLR 2x10 B  1#-   Adduction ball squeeze 20x5"  Sit to stand with low mat 19" 1x 10, 19" 1 x 5     Pt took extended time with all of these exercises due to pain and fatigue      Education: HEP was reviewed with pt return demonstration.  She understands HEP compliance 3-5x week is required for progressl Pt demo good understanding of the education provided. Coby demonstrated good return demonstration of activities.       Assessment     Pt tolerated treatment well.  Pt was able to perform the sci fit at increased resistance level without taking a break and without KITCHEN.  Pt has difficulty with supine to sit due to body habitus and weak hip flexors  Pt is very low energy with exercises and seems to put forth the very minimal effort.  This is evident even with doing exercises that are not painful.  She has arthritic knees causing pain and this is unlikely to improve with therapy.  However she can gain strength and improve functional mobility.    Medical necessity is demonstrated by the following IMPAIRMENTS/PROBLEM LIST:  1. Impaired balance-Fall risk  2. Limited participation in daily activities   3. Limited participation in recreational pursuits   4. Requires skilled supervision to complete and progress HEP   5. Decreased strength  6. Decreased ROM  7. Decreased Flexibility  8. Poor posture  9. Decreased Cardiovascular endurance   10. Gait abnormality  11. Decreased community ambulation  12. Impaired muscle tone  13. Joint hypomobility     GOALS:   Short Term Goals:  3 weeks  1. Patient will report decreased knee pain to 4/10 to improve sit to stand. Progressing  2. Patient will increase knee ROM to 110 degrees in order to show improved functional mobility. Progressing  3. Patient will be independent " and consistent with issued HEP in order to show carryover between therapy sessions. Progressing     Long Term Goals: 6 weeks  1. Patient will report decreased knee pain to 2/10 in order to increase tolerance for walking with SC for 15 min. Progressing  2. Patient will improve knee ROM to 115 degrees to enable improved transfers to Indep.Progressing  3. Patient will improve LE strength to 4+/5 or greater to increase tolerance to functional activities and ADLs.Progressing  4. Patient will report 75% improvement in function to demonstrate an increase in LE function and mobility in home and community environment. Progressing  5. Patient will be independent with updated HEP to maintain gains following discharge with therapy. Progressing       Plan     Continue with established Plan of Care towards PT goals.  STG are not met and are extended

## 2018-08-10 ENCOUNTER — ANTI-COAG VISIT (OUTPATIENT)
Dept: CARDIOLOGY | Facility: CLINIC | Age: 73
End: 2018-08-10
Payer: MEDICARE

## 2018-08-10 ENCOUNTER — LAB VISIT (OUTPATIENT)
Dept: LAB | Facility: HOSPITAL | Age: 73
End: 2018-08-10
Payer: MEDICARE

## 2018-08-10 DIAGNOSIS — Z79.01 LONG TERM (CURRENT) USE OF ANTICOAGULANTS: ICD-10-CM

## 2018-08-10 LAB
INR PPP: 1.8
PROTHROMBIN TIME: 18 SEC

## 2018-08-10 PROCEDURE — 99211 OFF/OP EST MAY X REQ PHY/QHP: CPT | Mod: PBBFAC

## 2018-08-10 PROCEDURE — 99999 PR PBB SHADOW E&M-EST. PATIENT-LVL I: CPT | Mod: PBBFAC,,,

## 2018-08-10 PROCEDURE — 85610 PROTHROMBIN TIME: CPT

## 2018-08-10 PROCEDURE — 36415 COLL VENOUS BLD VENIPUNCTURE: CPT

## 2018-08-10 NOTE — PROGRESS NOTES
Quick follow-up for elevated INR 7/30. INR low today.  Patient reports she missed dose on 8/8 and she started taking Bentyl and Zantac for IBS but denies diarrhea.  Will boost today and resume with follow up on 8/20.  Patient with bruises from use, denies any bleeding or changes. Advised patient to call with any changes or concerns. MARJAN Chaves D assisted with dosing

## 2018-08-10 NOTE — PROGRESS NOTES
"Per message from Juany, "She missed dose on 8/8 and started taking 2 new GI meds Bentyl and Zantac for IBS, denies diarrhea, no other changes or issues"  "

## 2018-08-14 ENCOUNTER — CLINICAL SUPPORT (OUTPATIENT)
Dept: REHABILITATION | Facility: HOSPITAL | Age: 73
End: 2018-08-14
Attending: INTERNAL MEDICINE
Payer: MEDICARE

## 2018-08-14 DIAGNOSIS — M25.661 KNEE JOINT STIFFNESS, BILATERAL: ICD-10-CM

## 2018-08-14 DIAGNOSIS — M25.662 KNEE JOINT STIFFNESS, BILATERAL: ICD-10-CM

## 2018-08-14 PROCEDURE — G8980 MOBILITY D/C STATUS: HCPCS | Mod: CK,PO

## 2018-08-14 PROCEDURE — 97110 THERAPEUTIC EXERCISES: CPT | Mod: PO

## 2018-08-14 PROCEDURE — G8979 MOBILITY GOAL STATUS: HCPCS | Mod: CJ,PO

## 2018-08-14 NOTE — PROGRESS NOTES
Physical Therapy treatment and Discharge summary     Date: 08/14/2018  Name: Coby Atkinson  Clinic Number: [id number]  Diagnosis:   Encounter Diagnosis   Name Primary?    Knee joint stiffness, bilateral      Physician: Mundo Hudson, *     Physician Orders: PT Eval and Treat   Medical Diagnosis: B LE weakness  Evaluation Date: 7/3/2018  Authorization period Expiration: 6/14/19  Plan of Care Certification Period: 8/14/18     Visit  10/20  Time In:905  Time Out: 10:00   Total Billable Time 55 minutes     Precautions: Standard and PE-on blood thinners   History of current condition - Coby reports: she tripped over something in her home and she fell into the wall Dec 2017. She fell onto both knees. She has had chronic knee pain but it worsened after fall. No injections. No recent falls.      Subjective   Some days I don't have much pain and others are bad.  Sometimes her knees lock up but are overall better.  Today my legs weight 10 tons.   She states she feels somewhat better since onset of therapy and can move somewhat better  She states she does her HEP  Pain: 4/10 B knee pain     Objective   Sit to stand: requires use of UE and is more difficult from lower surfaces   Supine to sit: min A  KITCHEN  Range of Motion: AROM/PROM unchanged since eval  Knee Right Left   Flexion    100 98   Extension    +5 +5      Knee Special Tests:  Crepitis: Mod Sylvester        Lower Extremity Strength: MMT 5/5  No change since evaluation    Right LE Left LE   Knee Extension    4 4-   Knee Flexion    4 4-   Hip Flexion    4- 3+   Hip Abduction:    3- 3-   Hip Adduction:    2 2   Hip Extension: (bridge) 3- 3-         LE Flexibility:  Unchanged since eval  Hamstrings: R: mild restriction, L: mild restriction  Piriformis: R: mod restriction, L: mod restriction  Quad: R: severe restriction, L: severe restriction      Palpation: unchanged since eval  Tenderness to palpation of medial  "and lateral joint line B knees        TREATMENT  Coby received therapeutic exercises to develop strength, endurance, ROM, and flexibility for 55 minutes  one to one and billable  Pt was cued to perform specific exercises and encouraged to take fewer pauses  Sci fit level 1 x 8 min  Level 3  Pt required cues for proper alignment.  But did not require rest pauses.  She was able to maintain a conversation and had no KITCHEN  Gastroc stretch on incline 3 x 20 sec  Hamstring stretch on steps 3 x 20 sec    Supine  Quad Sets 15x5"    SAQ 15x5"2#  Abduction with BTB 2 x 10  Heel slides 2x10 B   SLR 2x10 B  No wt today   Adduction ball squeeze 20x5"  Sit to stand with low mat 19" 1x 10, 19" 1 x 5     Pt took extended time with all of these exercises due to pain and fatigue      Education:   EDUCATION: Pt is now on a comprehensive  Pt states compliance but this is questionable given lack of progress and poor endurance  Patient is ready for [facility] Program and was educated about it and provided with a brochure.  Patient expressed eagerness to pursue this.  Patient instructed to call the number provided on the [facility] [employer] Brochure to set up the appointment.  Patient understands that continuation of  HEP is necessary to  maintain benefits gained in therapy. All questions were answered and pt agrees to discharge.    Assessment     Pt tolerated treatment well but was slow moving and KITCHEN.  She requires encouragement to persist with exercise.  She has had 10 PT visits in the last 6 weeks but has not shown any significant improvement in physical ability.  However she has had decreased pain in the knees.  She has severely arthritic knees and morbid obesity which continue to make her a fall risk because her knees continue to occasionally lock up.  She resists use of walker and continues to use cane.  I question her compliance with HEP.  Pt is very low energy with exercises and seems to put forth the very minimal effort within " therapy session.     CMS Impairment/Limitation/Restriction for FOTO Lower Leg (w/o Knee) Survey  Status Limitation G-Code CMS Severity Modifier  Intake 51% 49%  Predicted 62% 38% Goal Status+ CJ - At least 20 percent but less than 40 percent  7/19/2018 41% 59%  7/26/2018 47% 53%  8/14/2018 43% 58% Current Status CK - At least 40 percent but less than 60 percent  D/C Status CK **only report if this is discharge survey  +Based on FOTO predicted change score  [facility]  FUNCTIONAL STATUS SUMMARY (7/3/2018)  Patient: Coby Atkinson ([id number]) Primary Body Part: Lower Leg (w/o Knee) Initial DOS: 7/3/2018  Produced and © [id number] by  [employer]    GOALS:   Short Term Goals:  3 weeks  1. Patient will report decreased knee pain to 4/10 to improve sit to stand.MET  2. Patient will increase knee ROM to 110 degrees in order to show improved functional mobility. NOT MET  Patient will be independent and consistent with issued HEP in order to show carryover between therapy sessions. NOT MET   Long Term Goals: 6 weeks  1. Patient will report decreased knee pain to 2/10 in order to increase tolerance for walking with SC for 15 min NOT MET  2. Patient will improve knee ROM to 115 degrees to enable improved transfers to Indep.NOT MET  3. Patient will improve LE strength to 4+/5 or greater to increase tolerance to functional activities and ADLs.VARIABLE  4. Patient will report 75% improvement in function to demonstrate an increase in LE function and mobility in home and community environment.    25% IMPROVED PER PT  Patient will be independent with updated HEP to maintain gains following discharge with therapy. NOT MET     Plan   PT IS DISCHARGED THIS DATE WITH PT ADVISED TO CONTINUE HER HEP AND TO GO TO MEDICAL FITNESS/

## 2018-08-16 ENCOUNTER — TELEPHONE (OUTPATIENT)
Dept: GASTROENTEROLOGY | Facility: CLINIC | Age: 73
End: 2018-08-16

## 2018-08-17 ENCOUNTER — TELEPHONE (OUTPATIENT)
Dept: GASTROENTEROLOGY | Facility: CLINIC | Age: 73
End: 2018-08-17

## 2018-08-20 ENCOUNTER — ANTI-COAG VISIT (OUTPATIENT)
Dept: CARDIOLOGY | Facility: CLINIC | Age: 73
End: 2018-08-20
Payer: MEDICARE

## 2018-08-20 DIAGNOSIS — Z79.01 LONG TERM (CURRENT) USE OF ANTICOAGULANTS: Primary | ICD-10-CM

## 2018-08-20 LAB — INR PPP: 2.3 (ref 2–3)

## 2018-08-20 PROCEDURE — 85610 PROTHROMBIN TIME: CPT | Mod: PBBFAC

## 2018-08-20 NOTE — PROGRESS NOTES
Quick follow-up for low INR 8/10.  INR within range today. Will maintain and follow up in 2 weeks.  Patient with bruises from use, denies any bleeding or other changes.

## 2018-08-21 ENCOUNTER — TELEPHONE (OUTPATIENT)
Dept: GASTROENTEROLOGY | Facility: CLINIC | Age: 73
End: 2018-08-21

## 2018-08-31 ENCOUNTER — EXTERNAL CHRONIC CARE MANAGEMENT (OUTPATIENT)
Dept: PRIMARY CARE CLINIC | Facility: CLINIC | Age: 73
End: 2018-08-31
Payer: MEDICARE

## 2018-08-31 PROCEDURE — 99490 CHRNC CARE MGMT STAFF 1ST 20: CPT | Mod: PBBFAC,PO | Performed by: INTERNAL MEDICINE

## 2018-08-31 PROCEDURE — 99490 CHRNC CARE MGMT STAFF 1ST 20: CPT | Mod: S$PBB,,, | Performed by: INTERNAL MEDICINE

## 2018-09-06 ENCOUNTER — ANTI-COAG VISIT (OUTPATIENT)
Dept: CARDIOLOGY | Facility: CLINIC | Age: 73
End: 2018-09-06
Payer: MEDICARE

## 2018-09-06 DIAGNOSIS — Z79.01 LONG TERM (CURRENT) USE OF ANTICOAGULANTS: Primary | ICD-10-CM

## 2018-09-06 LAB — INR PPP: 3.6 (ref 2–3)

## 2018-09-06 PROCEDURE — 85610 PROTHROMBIN TIME: CPT | Mod: PBBFAC

## 2018-09-18 RX ORDER — POTASSIUM CHLORIDE 750 MG/1
CAPSULE, EXTENDED RELEASE ORAL
Qty: 180 CAPSULE | Refills: 0 | OUTPATIENT
Start: 2018-09-18

## 2018-09-24 ENCOUNTER — ANTI-COAG VISIT (OUTPATIENT)
Dept: CARDIOLOGY | Facility: CLINIC | Age: 73
End: 2018-09-24
Payer: MEDICARE

## 2018-09-24 DIAGNOSIS — Z79.01 LONG TERM (CURRENT) USE OF ANTICOAGULANTS: Primary | ICD-10-CM

## 2018-09-24 LAB — INR PPP: 2.5 (ref 2–3)

## 2018-09-24 PROCEDURE — 99211 OFF/OP EST MAY X REQ PHY/QHP: CPT | Mod: S$PBB,25,,

## 2018-09-24 PROCEDURE — 85610 PROTHROMBIN TIME: CPT | Mod: PBBFAC

## 2018-09-24 NOTE — PROGRESS NOTES
Quick follow up for elevated INR on 9/6. INR within therapeutic range today. Patient reports one missed dose. She  denies any bleeding, bruising or other changes that would affect warfarin therapy. Will maintain current dose. Patient advised to call coumadin clinic with any changes or concerns.

## 2018-09-25 NOTE — PROGRESS NOTES
I have reviewed the chart and recent INR.  I agree with the plan outlined by MARJAN Muller LPN.  May need to consider dose reduction if INR elevated at next visit.

## 2018-09-30 ENCOUNTER — EXTERNAL CHRONIC CARE MANAGEMENT (OUTPATIENT)
Dept: PRIMARY CARE CLINIC | Facility: CLINIC | Age: 73
End: 2018-09-30
Payer: MEDICARE

## 2018-09-30 PROCEDURE — 99490 CHRNC CARE MGMT STAFF 1ST 20: CPT | Mod: S$PBB,,, | Performed by: INTERNAL MEDICINE

## 2018-09-30 PROCEDURE — 99490 CHRNC CARE MGMT STAFF 1ST 20: CPT | Mod: PBBFAC,PO | Performed by: INTERNAL MEDICINE

## 2018-09-30 RX ORDER — POTASSIUM CHLORIDE 750 MG/1
CAPSULE, EXTENDED RELEASE ORAL
Qty: 180 CAPSULE | Refills: 0 | OUTPATIENT
Start: 2018-09-30

## 2018-10-12 ENCOUNTER — OFFICE VISIT (OUTPATIENT)
Dept: GASTROENTEROLOGY | Facility: CLINIC | Age: 73
End: 2018-10-12
Payer: MEDICARE

## 2018-10-12 VITALS
DIASTOLIC BLOOD PRESSURE: 77 MMHG | SYSTOLIC BLOOD PRESSURE: 128 MMHG | HEIGHT: 64 IN | BODY MASS INDEX: 41.37 KG/M2 | WEIGHT: 242.31 LBS | HEART RATE: 75 BPM

## 2018-10-12 DIAGNOSIS — K58.0 IRRITABLE BOWEL SYNDROME WITH DIARRHEA: Primary | ICD-10-CM

## 2018-10-12 PROCEDURE — 99213 OFFICE O/P EST LOW 20 MIN: CPT | Mod: S$PBB,,, | Performed by: PHYSICIAN ASSISTANT

## 2018-10-12 PROCEDURE — 99999 PR PBB SHADOW E&M-EST. PATIENT-LVL IV: CPT | Mod: PBBFAC,,, | Performed by: PHYSICIAN ASSISTANT

## 2018-10-12 PROCEDURE — 99214 OFFICE O/P EST MOD 30 MIN: CPT | Mod: PBBFAC | Performed by: PHYSICIAN ASSISTANT

## 2018-10-12 NOTE — PATIENT INSTRUCTIONS
Start a lactose-free probiotic pill    Try to ween down on the dicyclomine to twice daily    Low Fodmap Diet    Fodmaps (fructo,oligo,mono saccharides and polyols) are contained in certain foods and can cause significant bloating in some people. Reducing these foods can reduce bloating. Start off by cutting out anything with high fructose corn syrup in the ingredients.        Foods suitable on a low-fodmap diet  fruit  banana, blueberry, boysenberry, canteloupe, cranberry, durian, grape,  grapefruit, honeydew melon, kiwifruit, lemon,lime, mandarin, orange,  passionfruit, pawpaw, raspberry, rhubarb, rockmelon, star anise,  strawberry, tangelo  vegetables  alfalfa, artichoke, bamboo shoots, bean shoots, bok delmi,  carrot, celery, choko, delmi sum, endive, michelle, green beans,  lettuce, olives, parsnip, potato, pumpkin, red capsicum (bell pepper),  silver beet, spinach, summer squash (yellow), swede, sweet potato, taro, tomato,  turnip, yam, zucchini   herbs  basil, chili, coriander, michelle, lemongrass,   marjoram, mint, oregano, parsley, rosemary, thyme  milk  lactose-free milk, oat milk*, rice milk, soy milk*  cheeses  hard cheeses, and brie and camembert  yoghurt  lactose-free varieties  ice-cream substitutes  gelati, sorbet  butter substitutes  olive oilgrain foods  cereals  gluten-free bread or cereal products  bread  100% spelt bread  Rice, oats, polenta, other  arrowroot, millet, psyllium, quinoa, sorgum, tapioca  sweeteners  sugar* (sucrose), glucose, artificial sweeteners not ending in -ol  honey substitutes  last syrup*, maple syrup*, molasses, treacle  *small quantities  *check for additives  Note: if fruit is dried, eat in small quantities    excess fructose lactose fructans galactans polyols      Eliminate foods containing fodmaps  fruit  apple, apricot, avocado, blackberry, cherry, lychee, nashi, nectarine, peach, pear, plum,  prune, watermelon, misty, tinned fruit in natural juice,    sweeteners  sorbitol (420)  mannitol (421)  isomalt (953)  maltitol (965)  xylitol (967)  legumes  baked beans, chickpeas, kidney beans, lentils  vegetables  asparagus, beetroot, broccoli, brussels sprouts, cabbage, eggplant, fennel, garlic,  hang, okra, onion (all), shallots, spring onion, cauliflower, green capsicum (bell pepper), mushroom, sweet corn  cereals  wheat and rye, in large amounts eg. Bread, crackers, cookies, couscous, pasta  fruit  custard apple, persimmon, watermelon  miscellaneous  chicory, dandelion, inulin  sweeteners  fructose, high fructose corn syrup  large total fructose dose  concentrated fruit sources, large servings of fruit, dried fruit, fruit juice  honey  corn syrup, fruisana  milk  milk from cows, goats or sheep, custard, ice cream, yoghurt  cheeses  soft unripened cheeses eg. cottage, cream, mascarpone    Additional FODMAPs Diet Reading List    IBS-Free At Last!: Second Edition: Change Your Carbs, Change Your Life with the FODMAP Elimination Diet, 2 nd edition by Anita Landa MS, RD     2012 copyright; [employer].   ISBN: 861-9-6378900-2-1    The Complete Idiots Guide To Living Well With IBS by Lita Amaya RD, LDN  [facility], 2010    The FODMAPs Approach:--Minimize Consumption of Fermentable Carbs to Manage Functional Gut Disorder Symptoms  by Lita Amaya RD, LDN article found in  Todays Dietitian, vol. 12, no. 8, p. 30    The Inside Tract: Your Good Gut Guide To Great Digestive Health by Francisco Huang MD and Shanna Angelo MS, RD, LDN  2011 copyright, [employer]   ISBN: [phone]    List assembled by: Jeannine Raphael MS, RD, LDN, E  [facility]  Last Updated: 7/13/12

## 2018-10-12 NOTE — PROGRESS NOTES
[facility] Consultation Note    Reason for Consult:  The encounter diagnosis was Irritable bowel syndrome with diarrhea.    PCP: Mundo BULLOCK MD: No referring provider defined for this encounter.    HPI:  This is a 72 y.o. female here to follow up on her diarrhea.  Last seen by myself 2 months ago.     Interval Hx  Today she is doing much better  Having 2 BM daily  Taking  Bentyl three times daily before meals  No more incontinence episodes  Did not start the probiotic  diarrhea returns if she misses a dose    8/2018 visit notes  Having BMs 15-30mins after meals  Loose and watery  Duration - 3-5 yrs   Associated urgency, some incontinence    Antibiotics/Travel/Well Water - no/no/no  Blood/Mucus/Pus/Oily - no  Nocturnal stools - no  Fasting - no diarrhea if she does not eat     Taking imodium before meals and after meals when traveling    No abdominal pain, N/V, gas bloating    GERD is controlled on protonix  Breakthrough if she misses a does    ROS:  Constitutional: No fevers, chills, No weight loss  ENT: No allergies  CV: No chest pain  Pulm: No cough, No shortness of breath  Ophtho: No vision changes  GI: see HPI  Derm: No rash  Heme: No lymphadenopathy, No bruising  MSK: No arthritis  : No dysuria, No hematuria  Endo: No hot or cold intolerance  Neuro: No syncope, No seizure  Psych: No anxiety, No depression    Medical History:  has a past medical history of Anticoagulant long-term use, Cataract, Chronic diarrhea, Depression, Edema, GERD (gastroesophageal reflux disease), Hypertension (10/2/2012), Osteoarthritis of both knees (8/9/2016), Osteopenia, Osteopenia (11/30/2017), PE (pulmonary thromboembolism) (12/03/2017), Primary localized osteoarthrosis, lower leg (12/3/2014), and Thalassemia minor.    Surgical History:  has a past surgical history that includes Appendectomy; Tonsillectomy; Hysterectomy (age 39); Oophorectomy; Cataract extraction; ESOPHAGOGASTRODUODENOSCOPY  (EGD) (N/A, 3/7/2018); ESOPHAGOGASTRODUODENOSCOPY (EGD) (N/A, 12/6/2017); and COLONOSCOPY (N/A, 11/5/2016).    Family History: family history includes Cancer in her brother and father; Coronary artery disease in her brother, maternal grandmother, and paternal grandmother; Heart failure in her maternal grandmother; Hypertension in her mother; No Known Problems in her maternal aunt, maternal grandfather, maternal uncle, paternal aunt, paternal grandfather, paternal uncle, and sister; Stroke in her paternal grandmother..     Social History:  reports that  has never smoked. she has never used smokeless tobacco. She reports that she does not drink alcohol or use drugs.    Review of patient's allergies indicates:   Allergen Reactions    Codeine     Ciprofloxacin Rash       Current Outpatient Medications   Medication Sig    amLODIPine (NORVASC) 10 MG tablet TAKE 1 TABLET(10 MG) BY MOUTH EVERY DAY    diclofenac sodium 1 % Gel Apply 2 g topically 2 (two) times daily.    dicyclomine (BENTYL) 10 MG capsule Take 1 capsule (10 mg total) by mouth before meals as needed (diarrhea).    doxazosin (CARDURA) 2 MG tablet TAKE 1 TABLET(2 MG) BY MOUTH EVERY EVENING    escitalopram oxalate (LEXAPRO) 20 MG tablet TAKE 1 TABLET BY MOUTH EVERY DAY    losartan-hydrochlorothiazide 50-12.5 mg (HYZAAR) 50-12.5 mg per tablet Take 1 tablet by mouth once daily.    metoprolol succinate (TOPROL-XL) 25 MG 24 hr tablet TAKE 1 TABLET BY MOUTH EVERY DAY    potassium chloride (MICRO-K) 10 MEQ CpSR TAKE 2 CAPSULES(20 MEQ) BY MOUTH EVERY DAY    ranitidine (ZANTAC) 150 MG tablet Take 1 tablet (150 mg total) by mouth 2 (two) times daily.    warfarin (COUMADIN) 1 MG tablet Take 0.5-1 tablets (0.5-1 mg total) by mouth Daily. As directed by coumadin clinic    furosemide (LASIX) 20 MG tablet Take 1 tablet (20 mg total) by mouth once daily.    pantoprazole (PROTONIX) 40 MG tablet Take 1 tablet (40 mg total) by mouth every 12 (twelve) hours. (Patient  "taking differently: Take 40 mg by mouth once daily. )     No current facility-administered medications for this visit.          Objective Findings:    Vital Signs:  /77   Pulse 75   Ht 5' 4" (1.626 m)   Wt 109.9 kg (242 lb 4.6 oz)   BMI 41.59 kg/m²   Body mass index is 41.59 kg/m².    Physical Exam:  General Appearance: Well appearing in no acute distress  Head:   Normocephalic, without obvious abnormality  Eyes:    No scleral icterus  ENT: Neck supple, Lips, mucosa, and tongue normal  Lungs: CTA bilaterally in anterior and posterior fields, no wheezes, no crackles.  Heart:  Regular rate and rhythm, S1, S2 normal, no murmurs heard  Abdomen: Soft, non tender, non distended with positive bowel sounds in all four quadrants.   Extremities: no edema  Skin: No rash  Neurologic: AAO x 3      Labs:  Lab Results   Component Value Date    WBC 9.24 06/14/2018    HGB 10.5 (L) 06/14/2018    HCT 35.5 (L) 06/14/2018     06/14/2018    CHOL 142 11/20/2017    TRIG 78 11/20/2017    HDL 71 11/20/2017    ALT 15 01/16/2018    AST 20 01/16/2018     06/14/2018    K 4.1 06/14/2018     06/14/2018    CREATININE 1.1 06/14/2018    BUN 19 06/14/2018    CO2 27 06/14/2018    TSH 4.082 (H) 11/20/2017    TSH 4.082 (H) 11/20/2017    INR 2.5 09/24/2018     Stool studies - neg    Endoscopy:     11/2016 colonoscopy - normal. TI not visualized. Bx for micro colitis not taken    12/2017 EGD  Assessment:  1. Irritable bowel syndrome with diarrhea    osteopenia     71yo F presented 2 months ago with post-prandial diarrhea, urgency and incontinence episodes x 3-5yrs  Now doing well taking bently before meals     GERD is controlled on zantac    Recommendations:  1. Continue bentyl advise trying to ween to BID  2. Start probiotic  3. Low fodmap diet  Follow-up in about 6 months (around 4/12/2019).      Order summary:         Thank you so much for allowing me to participate in the care of Coby Albrecht, " RAHUL

## 2018-10-15 ENCOUNTER — ANTI-COAG VISIT (OUTPATIENT)
Dept: CARDIOLOGY | Facility: CLINIC | Age: 73
End: 2018-10-15
Payer: MEDICARE

## 2018-10-15 DIAGNOSIS — Z79.01 LONG TERM (CURRENT) USE OF ANTICOAGULANTS: Primary | ICD-10-CM

## 2018-10-15 LAB — INR PPP: 1.4 (ref 2–3)

## 2018-10-15 PROCEDURE — 85610 PROTHROMBIN TIME: CPT | Mod: PBBFAC

## 2018-10-15 PROCEDURE — 99211 OFF/OP EST MAY X REQ PHY/QHP: CPT | Mod: S$PBB,25,, | Performed by: INTERNAL MEDICINE

## 2018-10-15 NOTE — PROGRESS NOTES
INR within therapeutic range today. Bruising noted to arms from use. Patient reports one missed dose. She denies any bleeding or other changes that would affect warfarin therapy. We will boost her dose then resume and follow up in 2 weeks. Patient advised to call coumadin clinic with any changes or concerns. Care plan made with Baylee Chaves D

## 2018-10-18 RX ORDER — POTASSIUM CHLORIDE 750 MG/1
CAPSULE, EXTENDED RELEASE ORAL
Qty: 180 CAPSULE | Refills: 0 | Status: SHIPPED | OUTPATIENT
Start: 2018-10-18 | End: 2020-11-03 | Stop reason: SDUPTHER

## 2018-10-18 RX ORDER — FUROSEMIDE 20 MG/1
20 TABLET ORAL DAILY
Qty: 30 TABLET | Refills: 0 | Status: SHIPPED | OUTPATIENT
Start: 2018-10-18 | End: 2019-02-06 | Stop reason: SDUPTHER

## 2018-10-18 RX ORDER — FUROSEMIDE 20 MG/1
TABLET ORAL
Qty: 90 TABLET | Refills: 0 | OUTPATIENT
Start: 2018-10-18

## 2018-10-18 NOTE — TELEPHONE ENCOUNTER
"----- Message from Brittney Awan sent at 10/18/2018 10:37 AM CDT -----  Contact: Self. call  [phone]  RX request - refill or new RX.  Is this a refill or new RX:  Refill  RX name and strength: furosemide (LASIX) 20 MG tablet  Directions: Take 1 tablet (20 mg total) by mouth once daily.   Is this a 30 day or 90 day RX: 30  Local pharmacy or mail order pharmacy:    Pharmacy name and phone # (DON'T enter "on file" or "in chart"): [facility]  [phone] (Phone)  [phone] (Fax)  Comments:            "

## 2018-10-30 ENCOUNTER — ANTI-COAG VISIT (OUTPATIENT)
Dept: CARDIOLOGY | Facility: CLINIC | Age: 73
End: 2018-10-30
Payer: MEDICARE

## 2018-10-30 DIAGNOSIS — Z79.01 LONG TERM (CURRENT) USE OF ANTICOAGULANTS: Primary | ICD-10-CM

## 2018-10-30 LAB — INR PPP: 2.9 (ref 2–3)

## 2018-10-30 PROCEDURE — 85610 PROTHROMBIN TIME: CPT | Mod: PBBFAC

## 2018-10-30 NOTE — PROGRESS NOTES
Quick follow up for subtherapeutic INR on 10/15. INR within therapeutic range today. Bruising noted to arms from use. Patient reports that she did not eat enough greens in the past week because she was on a cruise. S denies any bleeding or other changes that would affect warfarin therapy. Will maintain current dose and follow up in 2 weeks. Patient advised to call coumadin clinic with any changes or concerns. Care plan made with VIDHYA JONES

## 2018-10-31 ENCOUNTER — EXTERNAL CHRONIC CARE MANAGEMENT (OUTPATIENT)
Dept: PRIMARY CARE CLINIC | Facility: CLINIC | Age: 73
End: 2018-10-31
Payer: MEDICARE

## 2018-10-31 PROCEDURE — 99490 CHRNC CARE MGMT STAFF 1ST 20: CPT | Mod: S$PBB,,, | Performed by: INTERNAL MEDICINE

## 2018-10-31 PROCEDURE — 99490 CHRNC CARE MGMT STAFF 1ST 20: CPT | Mod: PBBFAC,PO | Performed by: INTERNAL MEDICINE

## 2018-11-01 ENCOUNTER — LAB VISIT (OUTPATIENT)
Dept: LAB | Facility: HOSPITAL | Age: 73
End: 2018-11-01
Attending: INTERNAL MEDICINE
Payer: MEDICARE

## 2018-11-01 ENCOUNTER — OFFICE VISIT (OUTPATIENT)
Dept: CARDIOLOGY | Facility: CLINIC | Age: 73
End: 2018-11-01
Payer: MEDICARE

## 2018-11-01 VITALS
DIASTOLIC BLOOD PRESSURE: 80 MMHG | HEART RATE: 66 BPM | HEIGHT: 63 IN | BODY MASS INDEX: 43.94 KG/M2 | SYSTOLIC BLOOD PRESSURE: 156 MMHG | WEIGHT: 248 LBS

## 2018-11-01 DIAGNOSIS — I27.20 PULMONARY HYPERTENSION: ICD-10-CM

## 2018-11-01 DIAGNOSIS — M79.89 LEG SWELLING: Primary | ICD-10-CM

## 2018-11-01 DIAGNOSIS — M79.89 LEFT LEG SWELLING: ICD-10-CM

## 2018-11-01 DIAGNOSIS — I10 ESSENTIAL HYPERTENSION: ICD-10-CM

## 2018-11-01 DIAGNOSIS — D56.3 THALASSEMIA MINOR: ICD-10-CM

## 2018-11-01 DIAGNOSIS — E66.01 MORBID OBESITY WITH BMI OF 40.0-44.9, ADULT: ICD-10-CM

## 2018-11-01 DIAGNOSIS — G47.33 OSA (OBSTRUCTIVE SLEEP APNEA): ICD-10-CM

## 2018-11-01 DIAGNOSIS — I26.99 OTHER PULMONARY EMBOLISM WITHOUT ACUTE COR PULMONALE, UNSPECIFIED CHRONICITY: ICD-10-CM

## 2018-11-01 LAB
ANION GAP SERPL CALC-SCNC: 8 MMOL/L
BNP SERPL-MCNC: 68 PG/ML
BUN SERPL-MCNC: 21 MG/DL
CALCIUM SERPL-MCNC: 9.3 MG/DL
CHLORIDE SERPL-SCNC: 108 MMOL/L
CO2 SERPL-SCNC: 25 MMOL/L
CREAT SERPL-MCNC: 1.1 MG/DL
EST. GFR  (AFRICAN AMERICAN): 57.2 ML/MIN/1.73 M^2
EST. GFR  (NON AFRICAN AMERICAN): 49.6 ML/MIN/1.73 M^2
GLUCOSE SERPL-MCNC: 81 MG/DL
HCT VFR BLD AUTO: 37.4 %
HGB BLD-MCNC: 11.2 G/DL
POTASSIUM SERPL-SCNC: 3.9 MMOL/L
SODIUM SERPL-SCNC: 141 MMOL/L

## 2018-11-01 PROCEDURE — 85014 HEMATOCRIT: CPT

## 2018-11-01 PROCEDURE — 99999 PR PBB SHADOW E&M-EST. PATIENT-LVL III: CPT | Mod: PBBFAC,,, | Performed by: INTERNAL MEDICINE

## 2018-11-01 PROCEDURE — 83880 ASSAY OF NATRIURETIC PEPTIDE: CPT

## 2018-11-01 PROCEDURE — 99213 OFFICE O/P EST LOW 20 MIN: CPT | Mod: PBBFAC,PO | Performed by: INTERNAL MEDICINE

## 2018-11-01 PROCEDURE — 85018 HEMOGLOBIN: CPT

## 2018-11-01 PROCEDURE — 80048 BASIC METABOLIC PNL TOTAL CA: CPT

## 2018-11-01 PROCEDURE — 99214 OFFICE O/P EST MOD 30 MIN: CPT | Mod: S$PBB,,, | Performed by: INTERNAL MEDICINE

## 2018-11-01 PROCEDURE — 36415 COLL VENOUS BLD VENIPUNCTURE: CPT | Mod: PO

## 2018-11-01 RX ORDER — MELOXICAM 7.5 MG/1
7.5 TABLET ORAL DAILY
COMMUNITY
End: 2019-01-14

## 2018-11-01 RX ORDER — LOSARTAN POTASSIUM 100 MG/1
100 TABLET ORAL DAILY
Qty: 90 TABLET | Refills: 3 | Status: SHIPPED | OUTPATIENT
Start: 2018-11-01 | End: 2020-10-12

## 2018-11-01 NOTE — PROGRESS NOTES
Subjective:     Patient ID:  Coby Atkinson is a 73 y.o. 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  She is self-referred and is here today for evaluation of leg swelling. She has mild shortness of breath on exertion. She suffered a submassive pulmonary embolism in 12/17. Echo revealed normal LV systolic function, an enlarged and hypokinetic RV and a PA systolic pressure of 78 mm Hg.The PA pressure was normal in 5/16.  She had been evaluated for leg swelling by Dr. Chin a few years ago.  He diagnosed her with venous insufficiency and prescribed elastic compression stockings.  She stopped using them.  She was diagnosed w sleep apnea but does not use CPAP.  She brought her medications with her.  Losartan with HCTZ was on her med list but the patient says states that she is not taking it.  She is currently taking amlodipine and low-dose furosemide.      ROS  Negative except as in HPI.      Current Outpatient Medications   Medication Sig    amLODIPine (NORVASC) 10 MG tablet TAKE 1 TABLET(10 MG) BY MOUTH EVERY DAY    dicyclomine (BENTYL) 10 MG capsule Take 1 capsule (10 mg total) by mouth before meals as needed (diarrhea).    doxazosin (CARDURA) 2 MG tablet TAKE 1 TABLET(2 MG) BY MOUTH EVERY EVENING    escitalopram oxalate (LEXAPRO) 20 MG tablet TAKE 1 TABLET BY MOUTH EVERY DAY    furosemide (LASIX) 20 MG tablet Take 1 tablet (20 mg total) by mouth once daily.    meloxicam (MOBIC) 7.5 MG tablet Take 7.5 mg by mouth once daily.    metoprolol succinate (TOPROL-XL) 25 MG 24 hr tablet TAKE 1 TABLET BY MOUTH EVERY DAY    pantoprazole (PROTONIX) 40 MG tablet Take 1 tablet (40 mg total) by mouth every 12 (twelve) hours. (Patient taking differently: Take 40 mg by mouth once daily. )    potassium chloride (MICRO-K) 10  MEQ CpSR TAKE 2 CAPSULES(20 MEQ) BY MOUTH EVERY DAY    ranitidine (ZANTAC) 150 MG tablet Take 1 tablet (150 mg total) by mouth 2 (two) times daily.    warfarin (COUMADIN) 1 MG tablet Take 0.5-1 tablets (0.5-1 mg total) by mouth Daily. As directed by coumadin clinic    diclofenac sodium 1 % Gel Apply 2 g topically 2 (two) times daily.     No current facility-administered medications for this visit.          Past Medical History:   Diagnosis Date    Anticoagulant long-term use     Cataract     Chronic diarrhea     Depression     Edema     GERD (gastroesophageal reflux disease)     Hypertension 10/2/2012    Osteoarthritis of both knees 8/9/2016    Osteopenia     Osteopenia 11/30/2017    PE (pulmonary thromboembolism) 12/03/2017    Primary localized osteoarthrosis, lower leg 12/3/2014    Thalassemia minor       Past Surgical History:   Procedure Laterality Date    APPENDECTOMY      CATARACT EXTRACTION      COLONOSCOPY N/A 11/5/2016    Procedure: COLONOSCOPY;  Surgeon: Tanner Simental MD;  Location: [facility] (4TH FLR);  Service: Endoscopy;  Laterality: N/A;    COLONOSCOPY N/A 11/5/2016    Performed by Tanner Simental MD at [facility] (4TH FLR)    ESOPHAGOGASTRODUODENOSCOPY (EGD) N/A 3/7/2018    Performed by Michael Ibrahim MD at [facility] (2ND FLR)    ESOPHAGOGASTRODUODENOSCOPY (EGD) N/A 12/6/2017    Performed by Tanner Simental MD at [facility] (2ND FLR)    HYSTERECTOMY  age 39    fibroids    OOPHORECTOMY      TONSILLECTOMY           Social History     Tobacco Use    Smoking status: Never Smoker    Smokeless tobacco: Never Used   Substance Use Topics    Alcohol use: No     Comment: rare    Drug use: No         Family History   Problem Relation Age of Onset    Hypertension Mother     Cancer Father         skin    Cancer Brother         pancreatic    No Known Problems Sister     No Known Problems Maternal Aunt     No Known Problems Maternal Uncle     No Known Problems Paternal  "Aunt     No Known Problems Paternal Uncle     Coronary artery disease Maternal Grandmother     Heart failure Maternal Grandmother     No Known Problems Maternal Grandfather     Stroke Paternal Grandmother     Coronary artery disease Paternal Grandmother     No Known Problems Paternal Grandfather     Coronary artery disease Brother         with CABG in his 30s    Amblyopia Neg Hx     Blindness Neg Hx          Objective:     Physical Exam   Constitutional: She is oriented to person, place, and time. She appears well-developed and well-nourished.   BP (!) 156/80 (BP Location: Left arm, Patient Position: Sitting, BP Method: Large (Manual))   Pulse 66   Ht 5' 3" (1.6 m)   Wt 112.5 kg (248 lb 0.3 oz)   BMI 43.93 kg/m²      HENT:   Head: Normocephalic.   Neck: No JVD present. Carotid bruit is not present.   Cardiovascular: Normal rate, regular rhythm, S1 normal and S2 normal. Exam reveals no gallop.   No murmur heard.  Pulses:       Radial pulses are 2+ on the right side, and 2+ on the left side.        Posterior tibial pulses are 2+ on the right side, and 2+ on the left side.   Pulmonary/Chest: Effort normal. She has no wheezes. She has no rales. Chest wall is not dull to percussion.   Abdominal: Soft. She exhibits no abdominal bruit. There is no splenomegaly or hepatomegaly. There is no tenderness.   Musculoskeletal:        Right ankle: She exhibits swelling.        Left ankle: She exhibits swelling (worse on the left side).        Right lower leg: She exhibits edema.        Left lower leg: She exhibits edema.   Neurological: She is alert and oriented to person, place, and time. Gait normal.   Skin: Skin is warm and dry. No bruising and no rash noted. No cyanosis. Nails show no clubbing.   Psychiatric: She has a normal mood and affect. Her speech is normal and behavior is normal. Judgment normal. Cognition and memory are normal.             Lab Results   Component Value Date    CHOL 142 11/20/2017    CHOL " 151 01/31/2012    CHOL 149 04/11/2011     Lab Results   Component Value Date    HDL 71 11/20/2017    HDL 72 01/31/2012    HDL 69 04/11/2011     Lab Results   Component Value Date    LDLCALC 55.4 (L) 11/20/2017    LDLCALC 67.0 01/31/2012    LDLCALC 65.4 04/11/2011     Lab Results   Component Value Date    TRIG 78 11/20/2017    TRIG 59 01/31/2012    TRIG 73 04/11/2011     Lab Results   Component Value Date    CHOLHDL 50.0 11/20/2017    CHOLHDL 47.7 01/31/2012    CHOLHDL 46.3 04/11/2011     Lab Results   Component Value Date    ALT 15 01/16/2018     Lab Results   Component Value Date    ALT 15 01/16/2018    AST 20 01/16/2018    ALKPHOS 82 01/16/2018    BILITOT 0.3 01/16/2018      Lab Results   Component Value Date    CREATININE 1.1 06/14/2018    BUN 19 06/14/2018     06/14/2018    K 4.1 06/14/2018     06/14/2018    CO2 27 06/14/2018         Assessment and Plan:     1. Bilateral leg swelling, worse on the left  2. Pulmonary embolism 12/17  3. Pulmonary hypertension  4. Sleep apnea - untreated  5. Obesity  PA systolic pressure was in the 70s following the pulmonary embolism but was ~30 mm Hg prior to the pulmonary embolism. Echo to reassess PA pressure and to r/o RV failure as the cause of the leg swelling. I am not sure if there is an element of congestive left sided heart failure. She was previously diagnosed w venous insufficiency but does not use pressure stockings. Also consider May-Thurner (iliac vein compression) syndrome.  - 2D echo with color flow doppler; Future  - Brain natriuretic peptide; Future    6. Essential hypertension  It appears that she is not taking losartan w HCTZ. The amlodipine may be contributing to the leg swelling.  D/C amlodipine.  Begin:  - losartan (COZAAR) 100 MG tablet; Take 1 tablet (100 mg total) by mouth once daily.  Dispense: 90 tablet; Refill: 3  - Basic metabolic panel; Future    7. Thalassemia minor  - Hematocrit; Future  - Hemoglobin; Future    No Follow-up on  file.

## 2018-11-01 NOTE — LETTER
November 4, 2018      Mundo Hudson DO  [address] [place] [address]           [facility]  [address] [facility] [address]  Phone: [phone]          Patient: Coby Atkinson   MR Number: [id number]   YOB: 1945   Date of Visit: 11/1/2018       Dear Dr. Mundo Hudson:    Thank you for referring Coby Atkinson to me for evaluation. Attached you will find relevant portions of my assessment and plan of care.    If you have questions, please do not hesitate to call me. I look forward to following Coby Atkinson along with you.    Sincerely,    Juan Martinez MD    Enclosure  CC:  No Recipients    If you would like to receive this communication electronically, please contact [email or web address] or [phone] to request more information on [employer] access.    For providers and/or their staff who would like to refer a patient to [facility], please contact us through our one-stop-shop provider referral line, [facility], at [phone].    If you feel you have received this communication in error or would no longer like to receive these types of communications, please e-mail [email or web address]          POSTPARTUM DAY 0    SUBJECTIVE:  No complaints.  Denies SOB, CP, HA, visual changes.  Some cramping, relieved with ibuprofen.  Baby doing well. Had shower. Denies dizziness.     OBJECTIVE:  Awake, alert, NAD.  Ambulating in room without difficulty  Visit Vitals  /74 (BP Location: [facility], Patient Position: Sitting)   Pulse 80   Temp 98.2 °F (36.8 °C) (Oral)   Resp 16   Ht 5' 4\" (1.626 m)   Wt 82.1 kg   LMP 07/20/2017 (Exact Date)   SpO2 97%   Breastfeeding? Unknown   BMI 31.09 kg/m²     Fundus - Firm, nontender @ U   Extremities - trace edema, nontender    HGB (g/dL)   Date Value   04/09/2018 11.3 (L)     HCT (%)   Date Value   04/09/2018 33.7 (L)       ASSESSMENT/PLAN:    1) Postpartum Day 0  Doing well.   Continue present care.     2) Opioid dependence  subutex treatment    3) Hepatitis C  Postpartum treatment with GI planned    Note, parents would like baby circumcised. Discussed with parents that circumcision is elective procedure and risks include, but are not limited to bleeding, infection, and injury to penis/urethra. They had no questions and stated understanding. They desire baby to be circumcised. Plan to do circumcision prior to discharge.

## 2018-11-07 ENCOUNTER — CLINICAL SUPPORT (OUTPATIENT)
Dept: CARDIOLOGY | Facility: CLINIC | Age: 73
End: 2018-11-07
Attending: INTERNAL MEDICINE
Payer: MEDICARE

## 2018-11-07 VITALS
WEIGHT: 248 LBS | SYSTOLIC BLOOD PRESSURE: 164 MMHG | BODY MASS INDEX: 46.82 KG/M2 | HEART RATE: 61 BPM | DIASTOLIC BLOOD PRESSURE: 80 MMHG | HEIGHT: 61 IN

## 2018-11-07 DIAGNOSIS — M79.89 LEG SWELLING: ICD-10-CM

## 2018-11-07 LAB
ASCENDING AORTA: 3.38 CM
AV MEAN GRADIENT: 5.29 MMHG
AV PEAK GRADIENT: 8.88 MMHG
AV VALVE AREA: 2.01 CM2
BSA FOR ECHO PROCEDURE: 2.2 M2
CV ECHO LV RWT: 0.43 CM
DOP CALC AO PEAK VEL: 1.49 M/S
DOP CALC AO VTI: 36.92 CM
DOP CALC LVOT AREA: 3.27 CM2
DOP CALC LVOT DIAMETER: 2.04 CM
DOP CALC LVOT STROKE VOLUME: 74.09 CM3
DOP CALCLVOT PEAK VEL VTI: 22.68 CM
E WAVE DECELERATION TIME: 205.39 MSEC
E/A RATIO: 0.97
ECHO LV POSTERIOR WALL: 1.02 CM (ref 0.6–1.1)
FRACTIONAL SHORTENING: 35 % (ref 28–44)
INTERVENTRICULAR SEPTUM: 1.05 CM (ref 0.6–1.1)
IVRT: 0.09 MSEC
LA MAJOR: 6.28 CM
LA MINOR: 6.03 CM
LA WIDTH: 4.44 CM
LEFT ATRIUM SIZE: 4.16 CM
LEFT ATRIUM VOLUME INDEX: 43.9 ML/M2
LEFT ATRIUM VOLUME: 96.59 CM3
LEFT INTERNAL DIMENSION IN SYSTOLE: 3.1 CM (ref 2.1–4)
LEFT VENTRICLE DIASTOLIC VOLUME INDEX: 48.32 ML/M2
LEFT VENTRICLE DIASTOLIC VOLUME: 106.3 ML
LEFT VENTRICLE MASS INDEX: 80.5 G/M2
LEFT VENTRICLE SYSTOLIC VOLUME INDEX: 17.2 ML/M2
LEFT VENTRICLE SYSTOLIC VOLUME: 37.8 ML
LEFT VENTRICULAR INTERNAL DIMENSION IN DIASTOLE: 4.78 CM (ref 3.5–6)
LEFT VENTRICULAR MASS: 177.15 G
MV PEAK A VEL: 0.87 M/S
MV PEAK E VEL: 0.84 M/S
MV STENOSIS PRESSURE HALF TIME: 59.56 MS
MV VALVE AREA P 1/2 METHOD: 3.69 CM2
PISA TR MAX VEL: 3.13 M/S
PULM VEIN S/D RATIO: 1.29
PV PEAK D VEL: 0.34 M/S
PV PEAK S VEL: 0.44 M/S
RA MAJOR: 5 CM
RA PRESSURE: 3 MMHG
RA WIDTH: 3.5 CM
RIGHT VENTRICULAR END-DIASTOLIC DIMENSION: 3.4 CM
SINUS: 3.16 CM
STJ: 3.01 CM
TR MAX PG: 39.19 MMHG
TRICUSPID ANNULAR PLANE SYSTOLIC EXCURSION: 1.67 CM
TV REST PULMONARY ARTERY PRESSURE: 42.19 MMHG

## 2018-11-07 PROCEDURE — 99212 OFFICE O/P EST SF 10 MIN: CPT | Mod: PBBFAC,PO,25

## 2018-11-07 PROCEDURE — 93306 TTE W/DOPPLER COMPLETE: CPT | Mod: PBBFAC,PO | Performed by: INTERNAL MEDICINE

## 2018-11-07 PROCEDURE — 99999 PR PBB SHADOW E&M-EST. PATIENT-LVL II: CPT | Mod: PBBFAC,,,

## 2018-11-14 ENCOUNTER — ANTI-COAG VISIT (OUTPATIENT)
Dept: CARDIOLOGY | Facility: CLINIC | Age: 73
End: 2018-11-14
Payer: MEDICARE

## 2018-11-14 DIAGNOSIS — Z79.01 LONG TERM (CURRENT) USE OF ANTICOAGULANTS: Primary | ICD-10-CM

## 2018-11-14 LAB — INR PPP: 2.2 (ref 2–3)

## 2018-11-14 PROCEDURE — 85610 PROTHROMBIN TIME: CPT | Mod: PBBFAC

## 2018-11-14 NOTE — PROGRESS NOTES
INR within therapeutic range today. Bruising noted to arms from use. Patient denies any bleeding or other changes that would affect warfarin therapy. Will maintain current dose and follow up in 2 weeks. Patient advised to call coumadin clinic with any changes or concerns. Care plan made with J Cordaro Pharm D.

## 2018-11-16 ENCOUNTER — TELEPHONE (OUTPATIENT)
Dept: INTERNAL MEDICINE | Facility: CLINIC | Age: 73
End: 2018-11-16

## 2018-11-16 DIAGNOSIS — Z79.01 LONG TERM (CURRENT) USE OF ANTICOAGULANTS: ICD-10-CM

## 2018-11-16 RX ORDER — WARFARIN 1 MG/1
.5-1 TABLET ORAL DAILY
Qty: 30 TABLET | Refills: 11 | Status: SHIPPED | OUTPATIENT
Start: 2018-11-16 | End: 2019-11-14 | Stop reason: SDUPTHER

## 2018-11-25 ENCOUNTER — TELEPHONE (OUTPATIENT)
Dept: CARDIOLOGY | Facility: CLINIC | Age: 73
End: 2018-11-25

## 2018-11-25 DIAGNOSIS — I27.20 PULMONARY HYPERTENSION: Primary | ICD-10-CM

## 2018-11-25 DIAGNOSIS — G47.33 OSA (OBSTRUCTIVE SLEEP APNEA): ICD-10-CM

## 2018-11-25 NOTE — TELEPHONE ENCOUNTER
Re-consult sleep clinic. Pt had seen them in 2016. Tell her that now the sleep apnea is affecting her heart and she should use CPAP.  Also confirm that pt is taking losartan and stopped amlodipine. If correct has there been any improvement in leg swelling?

## 2018-11-26 ENCOUNTER — TELEPHONE (OUTPATIENT)
Dept: SLEEP MEDICINE | Facility: CLINIC | Age: 73
End: 2018-11-26

## 2018-11-26 NOTE — TELEPHONE ENCOUNTER
----- Message from Juan Martinez MD sent at 11/25/2018  4:39 PM CST -----  Echo showed that the upper part of the heart was enlarged and that the pressure on the right side of heart was increased. That is known as pulmonary hypertension. Keep taking furosemide aka Lasix and stay away from salt. FUP PRN and in 8-9 mo w repeat echo

## 2018-11-26 NOTE — TELEPHONE ENCOUNTER
Pt notified, verbalized understanding.Pt states she did make the medication change recommended. Pt states she does not notice any improvement in her leg swelling.

## 2018-11-27 ENCOUNTER — TELEPHONE (OUTPATIENT)
Dept: SLEEP MEDICINE | Facility: OTHER | Age: 73
End: 2018-11-27

## 2018-11-27 ENCOUNTER — OFFICE VISIT (OUTPATIENT)
Dept: SLEEP MEDICINE | Facility: CLINIC | Age: 73
End: 2018-11-27
Payer: MEDICARE

## 2018-11-27 VITALS
BODY MASS INDEX: 46.87 KG/M2 | DIASTOLIC BLOOD PRESSURE: 72 MMHG | SYSTOLIC BLOOD PRESSURE: 145 MMHG | HEART RATE: 75 BPM | WEIGHT: 248.25 LBS | HEIGHT: 61 IN

## 2018-11-27 DIAGNOSIS — G47.33 OBSTRUCTIVE SLEEP APNEA: Primary | ICD-10-CM

## 2018-11-27 PROCEDURE — 99999 PR PBB SHADOW E&M-EST. PATIENT-LVL IV: CPT | Mod: PBBFAC,,, | Performed by: NURSE PRACTITIONER

## 2018-11-27 PROCEDURE — 99214 OFFICE O/P EST MOD 30 MIN: CPT | Mod: PBBFAC | Performed by: NURSE PRACTITIONER

## 2018-11-27 PROCEDURE — 99214 OFFICE O/P EST MOD 30 MIN: CPT | Mod: S$PBB,,, | Performed by: NURSE PRACTITIONER

## 2018-11-27 NOTE — PROGRESS NOTES
"Coby Atkinson seen in follow-up for ADRIÁN management.     INTERVAL HISTORY:    11/27/2018 ANGEL Adams NP: Since last clinic visit, pt has completed PSG but pt does not recall test. Reviewed results in detail today. Per pt cards recommended treatment of ADRIÁN due to recent dx pulmonary HTN. Of note, pt had significant PE 12/2017. Continues to have snoring, daytime sleepiness, and fatigue which she largely contributes to medication side effects. Finds sleep is refreshing generally, although reports sleepiness very early in AM.     08/18/2016  lb. The overall AHI was 5.7 and overall RDI 8.5 with an oxygen laura of 88.0%. The supine AHI was 8.5 and the REM AHI was 26.9.     08/10/2016 Dr. Thibodeaux: This 73 y.o. female patient presents for the evaluation of possible obstructive sleep apnea.    Patient reports "I'm tired all the time".  She snores nightly.  Her  and ADRIÁN and uses CPAP.  No witnessed apnea\  Just feels fatigued during the day    She dozes off twice during the day while watching TV    ESS = 9    Nasal breathing is fine.    Some medications which may be contributing to fatigue    Takes Premarin since 1984 hysterctomy    SLEEP ROUTINE:   Bed partner:  (he uses CPAP)   Time to bed: 1-2 am   Sleep onset latency: not long   Disruptions or awakenings: 1-2 times (bathroom)   Wakeup time: 8-10 am   Perceived sleep quality: fair   Daytime naps: 2 naps (doze off during TV)    Past Medical History:   Diagnosis Date    Anticoagulant long-term use     Cataract     Chronic diarrhea     Depression     Edema     GERD (gastroesophageal reflux disease)     Hypertension 10/2/2012    Osteoarthritis of both knees 8/9/2016    Osteopenia     Osteopenia 11/30/2017    PE (pulmonary thromboembolism) 12/03/2017    Primary localized osteoarthrosis, lower leg 12/3/2014    Sleep apnea 2016    Thalassemia minor        Past Surgical History:   Procedure Laterality Date    APPENDECTOMY      CATARACT " EXTRACTION      COLONOSCOPY N/A 11/5/2016    Procedure: COLONOSCOPY;  Surgeon: Tanner Simental MD;  Location: [facility] (4TH FLR);  Service: Endoscopy;  Laterality: N/A;    COLONOSCOPY N/A 11/5/2016    Performed by [facility]anner Simental MD at [facility] (4TH FLR)    ESOPHAGOGASTRODUODENOSCOPY (EGD) N/A 3/7/2018    Performed by Michael Ibrahim MD at [facility] (2ND FLR)    ESOPHAGOGASTRODUODENOSCOPY (EGD) N/A 12/6/2017    Performed by Tanner Simental MD at [facility] (2ND FLR)    HYSTERECTOMY  age 39    fibroids    OOPHORECTOMY      TONSILLECTOMY         Family History   Problem Relation Age of Onset    Hypertension Mother     Cancer Father         skin    Cancer Brother         pancreatic    No Known Problems Sister     No Known Problems Maternal Aunt     No Known Problems Maternal Uncle     No Known Problems Paternal Aunt     No Known Problems Paternal Uncle     Coronary artery disease Maternal Grandmother     Heart failure Maternal Grandmother     No Known Problems Maternal Grandfather     Stroke Paternal Grandmother     Coronary artery disease Paternal Grandmother     No Known Problems Paternal Grandfather     Coronary artery disease Brother         with CABG in his 30s    Amblyopia Neg Hx     Blindness Neg Hx     Cataracts Neg Hx     Diabetes Neg Hx     Glaucoma Neg Hx     Macular degeneration Neg Hx     Retinal detachment Neg Hx     Strabismus Neg Hx     Thyroid disease Neg Hx     Colon cancer Neg Hx     Stomach cancer Neg Hx     Esophageal cancer Neg Hx        Social History     Tobacco Use    Smoking status: Never Smoker    Smokeless tobacco: Never Used   Substance Use Topics    Alcohol use: No     Comment: rare    Drug use: No       ALLERGIES: Reviewed in EPIC    CURRENT MEDICATIONS: Reviewed in EPIC    REVIEW OF SYSTEMS:  Sleep related symptoms as per HPI;    Denies weight gain;    Sometimes sinus problems;    Sometimes dyspnea;    Denies palpitations;    Positive acid  "reflux;    Positive body aches   Denies polyuria;    Denies headaches;    Denies mood disturbance;    Denies anemia;    Otherwise, a balance of systems reviewed is negative.    PHYSICAL EXAM:  BP (!) 145/72 (BP Location: Right arm, Patient Position: Sitting)   Pulse 75   Ht 5' 1" (1.549 m)   Wt 112.6 kg (248 lb 3.8 oz)   BMI 46.90 kg/m²   GENERAL: Well groomed; Normally developed;    11/07/2018 TTE  Normal ejection fraction  The tricuspid valve is normal. No stenosis. Mild regurgitation. Pulmonary hypertension present. The estimated PA systolic pressure is 42.19 mmHg    ASSESSMENT:    REM-predominant obstructive sleep apnea with an overall AHI of 5.7 with an AHI of 26.9 during REM sleep. The patient symptomatically has snoring, excessive daytime sleepiness, and excessive daytime fatigue. Medical co-morbidities: depression, essential HTN, PE (12/2017) on long term anticoagulation, and pulmonary HTN. . This warrants treatment for ADRIÁN.     Hx tonsillectomy          PLAN:    Treatment:  -CPAP titration study due to cardio-pulmonary co-morbidities. Call pt with results and order PAP machine at Medicare DME -RTC 31 - 90 days after PAP      Education: During our discussion today, we talked about the etiology of obstructive sleep apnea as well as the potential ramifications of untreated sleep apnea, which could include daytime sleepiness, hypertension, heart disease and/or stroke.  We discussed potential treatment options, which could include weight loss, body positioning, use of an oral appliance, continuous positive airway pressure (CPAP).    Precautions: The patient was advised to abstain from driving should they feel sleepy or drowsy.      "

## 2018-11-27 NOTE — TELEPHONE ENCOUNTER
Suggest that for now, she continue Lasix prescribed by Dr. Arriaga, stay off the amlodipine and take losartan as prescribed by me. Come back and see me after you have been wearing CPAP for a couple of months, if the swelling does not improve

## 2018-11-27 NOTE — LETTER
November 27, 2018      Juan Martinez MD  [address] [facility]  8th Floor - Cardiology  [facility] [address]           [facility]  [address] [facility] [address] [place] [id number]  Phone: [phone]          Patient: Coby Atkinson   MR Number: [id number]   YOB: 1945   Date of Visit: 11/27/2018       Dear Dr. Juan Martinez:    Thank you for referring Coby Atkinson to me for evaluation. Attached you will find relevant portions of my assessment and plan of care.    If you have questions, please do not hesitate to call me. I look forward to following Coby Atkinson along with you.    Sincerely,    Noris Adams, KATARZYNA    Enclosure  CC:  No Recipients    If you would like to receive this communication electronically, please contact [email or web address] or [phone] to request more information on [employer] access.    For providers and/or their staff who would like to refer a patient to [facility], please contact us through our one-stop-shop provider referral line, [facility], at [phone].    If you feel you have received this communication in error or would no longer like to receive these types of communications, please e-mail [email or web address]

## 2018-11-27 NOTE — PATIENT INSTRUCTIONS
Anurag will contact you to schedule your sleep study. Their number is [phone] (ext 2). [facility] is located on 7th floor of the [facility].    We will call you when the sleep study results are ready - if you have not heard from us by 2 weeks from the date of the study, please call [phone] (ext 1).    You are advised to abstain from driving should you feel sleepy or drowsy.

## 2018-11-29 ENCOUNTER — ANTI-COAG VISIT (OUTPATIENT)
Dept: CARDIOLOGY | Facility: CLINIC | Age: 73
End: 2018-11-29
Payer: MEDICARE

## 2018-11-29 ENCOUNTER — HOSPITAL ENCOUNTER (OUTPATIENT)
Dept: SLEEP MEDICINE | Facility: OTHER | Age: 73
Discharge: HOME | End: 2018-11-29
Attending: NURSE PRACTITIONER
Payer: MEDICARE

## 2018-11-29 DIAGNOSIS — G47.33 OBSTRUCTIVE SLEEP APNEA: ICD-10-CM

## 2018-11-29 DIAGNOSIS — Z79.01 LONG TERM (CURRENT) USE OF ANTICOAGULANTS: Primary | ICD-10-CM

## 2018-11-29 LAB — INR PPP: 2.1 (ref 2–3)

## 2018-11-29 PROCEDURE — 85610 PROTHROMBIN TIME: CPT | Mod: PBBFAC

## 2018-11-29 PROCEDURE — 95811 POLYSOM 6/>YRS CPAP 4/> PARM: CPT

## 2018-11-29 PROCEDURE — 95811 POLYSOM 6/>YRS CPAP 4/> PARM: CPT | Mod: 26,,, | Performed by: INTERNAL MEDICINE

## 2018-11-29 NOTE — PROGRESS NOTES
INR within therapeutic range today. Bruising noted to arms from use. Patient reports one missed dose. She states that she is no longer taking lasix. She denies any bleeding or other changes that would affect warfarin therapy. Will maintain current dose and follow up in 3 weeks . Patient advised to call coumadin clinic with any changes or concerns. Care plan made with J Cordaro Pharm D.

## 2018-11-30 ENCOUNTER — EXTERNAL CHRONIC CARE MANAGEMENT (OUTPATIENT)
Dept: PRIMARY CARE CLINIC | Facility: CLINIC | Age: 73
End: 2018-11-30
Payer: MEDICARE

## 2018-11-30 PROCEDURE — 99490 CHRNC CARE MGMT STAFF 1ST 20: CPT | Mod: S$PBB,,, | Performed by: INTERNAL MEDICINE

## 2018-11-30 PROCEDURE — 99490 CHRNC CARE MGMT STAFF 1ST 20: CPT | Mod: PBBFAC,PO | Performed by: INTERNAL MEDICINE

## 2018-11-30 NOTE — PROGRESS NOTES
Patient was educated about the purpose of the wires and what data was being collected. Patient was informed that the technician may need to enter the room during the shift to fix leads or make adjustments to the equipment. Patient started the study on cpap with a nasal mask and chin strap. Head of bed at 10 degrees for patient comfort.     EKG appears to be occasional PVC's/Bradycardia    Medium Eson nasal mask and chin strap in use. Humdification3/Cflex3    Optimal pressure 13 cmH20  Supine Rem observed at optimal pressure    Patient stated that cpap was okay    Follow up instructions were provided to the patient

## 2018-12-11 ENCOUNTER — TELEPHONE (OUTPATIENT)
Dept: SLEEP MEDICINE | Facility: CLINIC | Age: 73
End: 2018-12-11

## 2018-12-11 DIAGNOSIS — G47.33 OBSTRUCTIVE SLEEP APNEA: Primary | ICD-10-CM

## 2018-12-11 NOTE — TELEPHONE ENCOUNTER
11/29/2018 CPAP titration study 248 lb. Effective control of sleep disordered breathing was achieved with CPAP at 13 cm of water.       CPAP machine ordered at [employer] [facility].   RTC 31-90 days after PAP      Please notify pt that CPAP machine has been ordered per settings determined during 11/29/2018 titration sleep study. If she has not heard from Medicare [facility] after 14 business days, advise to notify Sleep Clinic.     After obtaining CPAP machine, recommend for pt to make f/u appt 31 - 90 days after set up date. Please bring machine to all Sleep Clinic appointments.

## 2018-12-11 NOTE — TELEPHONE ENCOUNTER
Called pt to let her know that she should hear from Medicare's DME with in 14 business days and if not to call our office. I also let her know that she is to call back after 31-90 days to schedule f/u with cpap

## 2018-12-20 ENCOUNTER — ANTI-COAG VISIT (OUTPATIENT)
Dept: CARDIOLOGY | Facility: CLINIC | Age: 73
End: 2018-12-20
Payer: MEDICARE

## 2018-12-20 DIAGNOSIS — Z79.01 LONG TERM (CURRENT) USE OF ANTICOAGULANTS: Primary | ICD-10-CM

## 2018-12-20 LAB — INR PPP: 2.7 (ref 2–3)

## 2018-12-20 PROCEDURE — 85610 PROTHROMBIN TIME: CPT | Mod: PBBFAC

## 2018-12-20 NOTE — PROGRESS NOTES
INR within therapeutic range today. Bruising noted to arms from use. Patient denies any bleeding or other changes that would affect warfarin therapy. Will maintain current dose and follow up in 3 weeks. Patient advised to call coumadin clinic with any changes or concerns. Care plan made with VIDHYA JONES.

## 2018-12-31 ENCOUNTER — EXTERNAL CHRONIC CARE MANAGEMENT (OUTPATIENT)
Dept: PRIMARY CARE CLINIC | Facility: CLINIC | Age: 73
End: 2018-12-31
Payer: MEDICARE

## 2018-12-31 PROCEDURE — 99490 CHRNC CARE MGMT STAFF 1ST 20: CPT | Mod: S$PBB,,, | Performed by: INTERNAL MEDICINE

## 2018-12-31 PROCEDURE — 99490 PR CHRONIC CARE MGMT, 1ST 20 MIN: ICD-10-PCS | Mod: S$PBB,,, | Performed by: INTERNAL MEDICINE

## 2018-12-31 PROCEDURE — 99490 CHRNC CARE MGMT STAFF 1ST 20: CPT | Mod: PBBFAC,PO | Performed by: INTERNAL MEDICINE

## 2019-01-10 ENCOUNTER — ANTI-COAG VISIT (OUTPATIENT)
Dept: CARDIOLOGY | Facility: CLINIC | Age: 74
End: 2019-01-10
Payer: MEDICARE

## 2019-01-10 DIAGNOSIS — Z79.01 LONG TERM (CURRENT) USE OF ANTICOAGULANTS: Primary | ICD-10-CM

## 2019-01-10 LAB — INR PPP: 1.9 (ref 2–3)

## 2019-01-10 PROCEDURE — 85610 PROTHROMBIN TIME: CPT | Mod: PBBFAC

## 2019-01-10 NOTE — PROGRESS NOTES
INR subtherapeutic today. Patient reports one missed dose last week. Bruising noted to hands from use. Patient denies any bleeding or other changes that may affect warfarin therapy. We will boost her dose today then resume. Follow up in 3 weeks. Patient reminded to call clinic with any changes or concerns. Care plan made with VIDHYA JONES.

## 2019-01-14 DIAGNOSIS — I10 ESSENTIAL HYPERTENSION: ICD-10-CM

## 2019-01-14 RX ORDER — FUROSEMIDE 20 MG/1
TABLET ORAL
Qty: 30 TABLET | Refills: 0 | OUTPATIENT
Start: 2019-01-14

## 2019-01-14 RX ORDER — POTASSIUM CHLORIDE 750 MG/1
CAPSULE, EXTENDED RELEASE ORAL
Qty: 180 CAPSULE | Refills: 0 | OUTPATIENT
Start: 2019-01-14

## 2019-01-14 RX ORDER — MELOXICAM 7.5 MG/1
TABLET ORAL
Qty: 90 TABLET | Refills: 1 | Status: SHIPPED | OUTPATIENT
Start: 2019-01-14 | End: 2019-07-05

## 2019-01-14 RX ORDER — AMLODIPINE BESYLATE 10 MG/1
TABLET ORAL
Qty: 90 TABLET | Refills: 1 | Status: SHIPPED | OUTPATIENT
Start: 2019-01-14 | End: 2019-09-15 | Stop reason: SDUPTHER

## 2019-01-15 DIAGNOSIS — K52.9 CHRONIC DIARRHEA: ICD-10-CM

## 2019-01-15 RX ORDER — DICYCLOMINE HYDROCHLORIDE 10 MG/1
10 CAPSULE ORAL
Qty: 90 CAPSULE | Refills: 2 | Status: SHIPPED | OUTPATIENT
Start: 2019-01-15 | End: 2019-09-19

## 2019-01-24 ENCOUNTER — TELEPHONE (OUTPATIENT)
Dept: SLEEP MEDICINE | Facility: CLINIC | Age: 74
End: 2019-01-24

## 2019-01-24 NOTE — TELEPHONE ENCOUNTER
----- Message from Naya Whalen sent at 1/24/2019  2:33 PM CST -----  Contact: Kameron [facility]  Name of Who is Calling: Urmila      What is the request in detail: requesting the status on the request for the pt's face to face notes as well as the sleep study results and notes. [facility] states that they fax the request in on yesterday. Please fax information to [phone]      Can the clinic reply by [facility]: no      What Number to Call Back if not in [facility]: [phone]

## 2019-01-31 ENCOUNTER — EXTERNAL CHRONIC CARE MANAGEMENT (OUTPATIENT)
Dept: PRIMARY CARE CLINIC | Facility: CLINIC | Age: 74
End: 2019-01-31
Payer: MEDICARE

## 2019-01-31 PROCEDURE — 99490 CHRNC CARE MGMT STAFF 1ST 20: CPT | Mod: PBBFAC,PO | Performed by: INTERNAL MEDICINE

## 2019-01-31 PROCEDURE — 99490 PR CHRONIC CARE MGMT, 1ST 20 MIN: ICD-10-PCS | Mod: S$PBB,,, | Performed by: INTERNAL MEDICINE

## 2019-01-31 PROCEDURE — 99490 CHRNC CARE MGMT STAFF 1ST 20: CPT | Mod: S$PBB,,, | Performed by: INTERNAL MEDICINE

## 2019-02-04 RX ORDER — FUROSEMIDE 20 MG/1
TABLET ORAL
Qty: 30 TABLET | Refills: 0 | OUTPATIENT
Start: 2019-02-04

## 2019-02-05 ENCOUNTER — ANTI-COAG VISIT (OUTPATIENT)
Dept: CARDIOLOGY | Facility: CLINIC | Age: 74
End: 2019-02-05
Payer: MEDICARE

## 2019-02-05 DIAGNOSIS — Z79.01 LONG TERM (CURRENT) USE OF ANTICOAGULANTS: Primary | ICD-10-CM

## 2019-02-05 LAB — INR PPP: 2 (ref 2–3)

## 2019-02-05 PROCEDURE — 85610 PROTHROMBIN TIME: CPT | Mod: PBBFAC

## 2019-02-05 NOTE — PROGRESS NOTES
Quick follow up for subtherapeutic INR on 1/10. INR within therapeutic range today. Bruising noted to arms from use. Patient denies any bleeding or other changes that would affect warfarin therapy. Will maintain current dose and follow up in 4 weeks. Patient was re-educated on situations that would require placing a call to the Coumadin  Clinic, including bleeding or unusual bruising issues, changes in health, diet or medications,upcoming procedures that require warfarin interruption, and missed Coumadin dose(s). Patient expressed understanding that avoidance of consistency with these parameters could cause fluctuations in INR, leading to more frequent visits and increase risk of adverse events. Care plan made with J Cordaro Pharm D.

## 2019-02-06 ENCOUNTER — OFFICE VISIT (OUTPATIENT)
Dept: INTERNAL MEDICINE | Facility: CLINIC | Age: 74
End: 2019-02-06
Payer: MEDICARE

## 2019-02-06 ENCOUNTER — TELEPHONE (OUTPATIENT)
Dept: INTERNAL MEDICINE | Facility: CLINIC | Age: 74
End: 2019-02-06

## 2019-02-06 VITALS
HEART RATE: 69 BPM | HEIGHT: 61 IN | RESPIRATION RATE: 18 BRPM | TEMPERATURE: 99 F | SYSTOLIC BLOOD PRESSURE: 122 MMHG | WEIGHT: 240.31 LBS | BODY MASS INDEX: 45.37 KG/M2 | DIASTOLIC BLOOD PRESSURE: 60 MMHG

## 2019-02-06 DIAGNOSIS — I10 ESSENTIAL HYPERTENSION: Primary | ICD-10-CM

## 2019-02-06 DIAGNOSIS — N18.30 CKD (CHRONIC KIDNEY DISEASE) STAGE 3, GFR 30-59 ML/MIN: ICD-10-CM

## 2019-02-06 DIAGNOSIS — I82.532 CHRONIC DEEP VEIN THROMBOSIS (DVT) OF LEFT POPLITEAL VEIN: Primary | ICD-10-CM

## 2019-02-06 DIAGNOSIS — I10 ESSENTIAL HYPERTENSION: ICD-10-CM

## 2019-02-06 DIAGNOSIS — K21.9 GASTROESOPHAGEAL REFLUX DISEASE WITHOUT ESOPHAGITIS: ICD-10-CM

## 2019-02-06 DIAGNOSIS — E78.5 DYSLIPIDEMIA: ICD-10-CM

## 2019-02-06 DIAGNOSIS — F33.0 MILD EPISODE OF RECURRENT MAJOR DEPRESSIVE DISORDER: ICD-10-CM

## 2019-02-06 DIAGNOSIS — E66.01 MORBID OBESITY WITH BMI OF 40.0-44.9, ADULT: ICD-10-CM

## 2019-02-06 DIAGNOSIS — I27.20 PULMONARY HYPERTENSION: ICD-10-CM

## 2019-02-06 DIAGNOSIS — I26.09 OTHER ACUTE PULMONARY EMBOLISM WITH ACUTE COR PULMONALE: ICD-10-CM

## 2019-02-06 DIAGNOSIS — M17.0 PRIMARY OSTEOARTHRITIS OF BOTH KNEES: ICD-10-CM

## 2019-02-06 PROCEDURE — 99999 PR PBB SHADOW E&M-EST. PATIENT-LVL III: CPT | Mod: PBBFAC,,, | Performed by: INTERNAL MEDICINE

## 2019-02-06 PROCEDURE — 99214 PR OFFICE/OUTPT VISIT, EST, LEVL IV, 30-39 MIN: ICD-10-PCS | Mod: S$PBB,,, | Performed by: INTERNAL MEDICINE

## 2019-02-06 PROCEDURE — 99999 PR PBB SHADOW E&M-EST. PATIENT-LVL III: ICD-10-PCS | Mod: PBBFAC,,, | Performed by: INTERNAL MEDICINE

## 2019-02-06 PROCEDURE — 99213 OFFICE O/P EST LOW 20 MIN: CPT | Mod: PBBFAC,PO | Performed by: INTERNAL MEDICINE

## 2019-02-06 PROCEDURE — 99214 OFFICE O/P EST MOD 30 MIN: CPT | Mod: S$PBB,,, | Performed by: INTERNAL MEDICINE

## 2019-02-06 RX ORDER — FUROSEMIDE 20 MG/1
20 TABLET ORAL DAILY
Qty: 90 TABLET | Refills: 3 | Status: SHIPPED | OUTPATIENT
Start: 2019-02-06 | End: 2019-12-22

## 2019-02-06 NOTE — TELEPHONE ENCOUNTER
----- Message from Dianna Chirinos sent at 2/6/2019  3:18 PM CST -----  Doctor appointment and lab have been scheduled.  Please link lab orders to the lab appointment.  Date of doctor appointment:  2/11/2019  Physical or EP:  Physical   Date of lab appointment:  2/8/2019  Comments:

## 2019-02-06 NOTE — PROGRESS NOTES
Subjective:       Patient ID: Coby Atkinson is a 73 y.o. female.    Chief Complaint: Medication Refill    HPI   Pt with recent B/L PE's, hx of UGIB, HTN, Morbid Obesity, Depression, GERD, OA, pulmonary HTN of knee is here for f/u. She is requesting med refills.   Review of Systems   Constitutional: Negative for activity change, appetite change, chills, diaphoresis, fatigue, fever and unexpected weight change.   HENT: Negative for postnasal drip, rhinorrhea, sinus pressure, sneezing, sore throat, trouble swallowing and voice change.    Respiratory: Negative for cough, shortness of breath and wheezing.    Cardiovascular: Negative for chest pain, palpitations and leg swelling.   Gastrointestinal: Negative for abdominal pain, blood in stool, constipation, diarrhea, nausea and vomiting.   Genitourinary: Negative for dysuria.   Musculoskeletal: Negative for arthralgias and myalgias.   Skin: Negative for rash and wound.   Allergic/Immunologic: Negative for environmental allergies and food allergies.   Hematological: Negative for adenopathy. Does not bruise/bleed easily.   Psychiatric/Behavioral: Positive for dysphoric mood. Negative for self-injury and suicidal ideas.       Objective:      Physical Exam   Constitutional: She is oriented to person, place, and time. She appears well-developed and well-nourished. No distress.   HENT:   Head: Normocephalic and atraumatic.   Eyes: Conjunctivae and EOM are normal. Pupils are equal, round, and reactive to light. Right eye exhibits no discharge. Left eye exhibits no discharge. No scleral icterus.   Neck: Neck supple. No JVD present.   Cardiovascular: Normal rate, regular rhythm, normal heart sounds and intact distal pulses.   Pulmonary/Chest: Effort normal and breath sounds normal. No respiratory distress. She has no wheezes. She has no rales.   Musculoskeletal: She exhibits no edema.   Lymphadenopathy:     She has no cervical adenopathy.   Neurological: She is alert and  oriented to person, place, and time.   Skin: Skin is warm and dry. No rash noted. She is not diaphoretic. No pallor.       Assessment:       1. Chronic deep vein thrombosis (DVT) of left popliteal vein    2. Other acute pulmonary embolism with acute cor pulmonale    3. Essential hypertension    4. Morbid obesity with BMI of 40.0-44.9, adult    5. Mild episode of recurrent major depressive disorder    6. Gastroesophageal reflux disease without esophagitis    7. Primary osteoarthritis of both knees    8. CKD (chronic kidney disease) stage 3, GFR 30-59 ml/min    9. Pulmonary hypertension        Plan:    1. Recurrent PE/DVT- continue coumadin    2. HTN- controlled on Hyzaar/Norvasc/Toprol   3. Morbid Obesity- pt followed by Bariatric Medicine   4. Depression- fair control on Lexapro 20 mg daily   5. GERD- controlled on Protonix daily   6. OA of knees- Tylenol PRN    7. CKD III- stable   8. Pulmonary HTN- stable, will follow

## 2019-02-08 ENCOUNTER — LAB VISIT (OUTPATIENT)
Dept: LAB | Facility: HOSPITAL | Age: 74
End: 2019-02-08
Attending: INTERNAL MEDICINE
Payer: MEDICARE

## 2019-02-08 DIAGNOSIS — I10 ESSENTIAL HYPERTENSION: ICD-10-CM

## 2019-02-08 DIAGNOSIS — E78.5 DYSLIPIDEMIA: ICD-10-CM

## 2019-02-08 LAB
ALBUMIN SERPL BCP-MCNC: 3.9 G/DL
ALP SERPL-CCNC: 104 U/L
ALT SERPL W/O P-5'-P-CCNC: 12 U/L
ANION GAP SERPL CALC-SCNC: 9 MMOL/L
AST SERPL-CCNC: 16 U/L
BASOPHILS # BLD AUTO: 0.05 K/UL
BASOPHILS NFR BLD: 0.5 %
BILIRUB SERPL-MCNC: 0.7 MG/DL
BUN SERPL-MCNC: 17 MG/DL
CALCIUM SERPL-MCNC: 10 MG/DL
CHLORIDE SERPL-SCNC: 104 MMOL/L
CHOLEST SERPL-MCNC: 144 MG/DL
CHOLEST/HDLC SERPL: 2.1 {RATIO}
CO2 SERPL-SCNC: 25 MMOL/L
CREAT SERPL-MCNC: 1.1 MG/DL
DIFFERENTIAL METHOD: ABNORMAL
EOSINOPHIL # BLD AUTO: 0.2 K/UL
EOSINOPHIL NFR BLD: 2.2 %
ERYTHROCYTE [DISTWIDTH] IN BLOOD BY AUTOMATED COUNT: 15.5 %
EST. GFR  (AFRICAN AMERICAN): 57.2 ML/MIN/1.73 M^2
EST. GFR  (NON AFRICAN AMERICAN): 49.6 ML/MIN/1.73 M^2
GLUCOSE SERPL-MCNC: 102 MG/DL
HCT VFR BLD AUTO: 39.1 %
HDLC SERPL-MCNC: 69 MG/DL
HDLC SERPL: 47.9 %
HGB BLD-MCNC: 11.6 G/DL
IMM GRANULOCYTES # BLD AUTO: 0.02 K/UL
IMM GRANULOCYTES NFR BLD AUTO: 0.2 %
LDLC SERPL CALC-MCNC: 61.8 MG/DL
LYMPHOCYTES # BLD AUTO: 3 K/UL
LYMPHOCYTES NFR BLD: 31.7 %
MCH RBC QN AUTO: 20.1 PG
MCHC RBC AUTO-ENTMCNC: 29.7 G/DL
MCV RBC AUTO: 68 FL
MONOCYTES # BLD AUTO: 0.8 K/UL
MONOCYTES NFR BLD: 8.5 %
NEUTROPHILS # BLD AUTO: 5.4 K/UL
NEUTROPHILS NFR BLD: 56.9 %
NONHDLC SERPL-MCNC: 75 MG/DL
NRBC BLD-RTO: 0 /100 WBC
PLATELET # BLD AUTO: 287 K/UL
PMV BLD AUTO: 9.8 FL
POTASSIUM SERPL-SCNC: 3.9 MMOL/L
PROT SERPL-MCNC: 7.4 G/DL
RBC # BLD AUTO: 5.76 M/UL
SODIUM SERPL-SCNC: 138 MMOL/L
TRIGL SERPL-MCNC: 66 MG/DL
TSH SERPL DL<=0.005 MIU/L-ACNC: 2.58 UIU/ML
WBC # BLD AUTO: 9.41 K/UL

## 2019-02-08 PROCEDURE — 85025 COMPLETE CBC W/AUTO DIFF WBC: CPT

## 2019-02-08 PROCEDURE — 80053 COMPREHEN METABOLIC PANEL: CPT

## 2019-02-08 PROCEDURE — 36415 COLL VENOUS BLD VENIPUNCTURE: CPT | Mod: PO

## 2019-02-08 PROCEDURE — 80061 LIPID PANEL: CPT

## 2019-02-08 PROCEDURE — 84443 ASSAY THYROID STIM HORMONE: CPT

## 2019-02-20 ENCOUNTER — HOSPITAL ENCOUNTER (OUTPATIENT)
Dept: RADIOLOGY | Facility: HOSPITAL | Age: 74
Discharge: HOME | End: 2019-02-20
Attending: INTERNAL MEDICINE
Payer: MEDICARE

## 2019-02-20 ENCOUNTER — OFFICE VISIT (OUTPATIENT)
Dept: INTERNAL MEDICINE | Facility: CLINIC | Age: 74
End: 2019-02-20
Payer: MEDICARE

## 2019-02-20 VITALS
HEIGHT: 63 IN | WEIGHT: 244.25 LBS | RESPIRATION RATE: 16 BRPM | HEART RATE: 79 BPM | DIASTOLIC BLOOD PRESSURE: 63 MMHG | BODY MASS INDEX: 43.28 KG/M2 | SYSTOLIC BLOOD PRESSURE: 138 MMHG | TEMPERATURE: 98 F

## 2019-02-20 DIAGNOSIS — F33.0 MILD EPISODE OF RECURRENT MAJOR DEPRESSIVE DISORDER: ICD-10-CM

## 2019-02-20 DIAGNOSIS — E66.2 OBESITY HYPOVENTILATION SYNDROME: ICD-10-CM

## 2019-02-20 DIAGNOSIS — I10 ESSENTIAL HYPERTENSION: ICD-10-CM

## 2019-02-20 DIAGNOSIS — N18.30 CKD (CHRONIC KIDNEY DISEASE) STAGE 3, GFR 30-59 ML/MIN: ICD-10-CM

## 2019-02-20 DIAGNOSIS — I82.532 CHRONIC DEEP VEIN THROMBOSIS (DVT) OF LEFT POPLITEAL VEIN: Primary | ICD-10-CM

## 2019-02-20 DIAGNOSIS — M17.0 PRIMARY OSTEOARTHRITIS OF BOTH KNEES: ICD-10-CM

## 2019-02-20 DIAGNOSIS — Z12.31 ENCOUNTER FOR SCREENING MAMMOGRAM FOR BREAST CANCER: ICD-10-CM

## 2019-02-20 DIAGNOSIS — I27.20 PULMONARY HYPERTENSION: ICD-10-CM

## 2019-02-20 DIAGNOSIS — D56.3 THALASSEMIA MINOR: ICD-10-CM

## 2019-02-20 DIAGNOSIS — K21.9 GASTROESOPHAGEAL REFLUX DISEASE WITHOUT ESOPHAGITIS: ICD-10-CM

## 2019-02-20 DIAGNOSIS — Z00.00 ANNUAL PHYSICAL EXAM: ICD-10-CM

## 2019-02-20 DIAGNOSIS — E66.01 MORBID OBESITY WITH BMI OF 40.0-44.9, ADULT: ICD-10-CM

## 2019-02-20 PROBLEM — M25.562 CHRONIC PAIN OF BOTH KNEES: Status: RESOLVED | Noted: 2018-07-03 | Resolved: 2019-02-20

## 2019-02-20 PROBLEM — M25.561 CHRONIC PAIN OF BOTH KNEES: Status: RESOLVED | Noted: 2018-07-03 | Resolved: 2019-02-20

## 2019-02-20 PROBLEM — G89.29 CHRONIC PAIN OF BOTH KNEES: Status: RESOLVED | Noted: 2018-07-03 | Resolved: 2019-02-20

## 2019-02-20 PROCEDURE — 77063 MAMMO DIGITAL SCREENING BILAT WITH TOMOSYNTHESIS_CAD: ICD-10-PCS | Mod: 26,,, | Performed by: RADIOLOGY

## 2019-02-20 PROCEDURE — 99215 PR OFFICE/OUTPT VISIT, EST, LEVL V, 40-54 MIN: ICD-10-PCS | Mod: S$PBB,,, | Performed by: INTERNAL MEDICINE

## 2019-02-20 PROCEDURE — 77067 MAMMO DIGITAL SCREENING BILAT WITH TOMOSYNTHESIS_CAD: ICD-10-PCS | Mod: 26,,, | Performed by: RADIOLOGY

## 2019-02-20 PROCEDURE — 99215 OFFICE O/P EST HI 40 MIN: CPT | Mod: S$PBB,,, | Performed by: INTERNAL MEDICINE

## 2019-02-20 PROCEDURE — 99999 PR PBB SHADOW E&M-EST. PATIENT-LVL III: ICD-10-PCS | Mod: PBBFAC,,, | Performed by: INTERNAL MEDICINE

## 2019-02-20 PROCEDURE — 99213 OFFICE O/P EST LOW 20 MIN: CPT | Mod: PBBFAC,PO | Performed by: INTERNAL MEDICINE

## 2019-02-20 PROCEDURE — 77067 SCR MAMMO BI INCL CAD: CPT | Mod: 26,,, | Performed by: RADIOLOGY

## 2019-02-20 PROCEDURE — 77067 SCR MAMMO BI INCL CAD: CPT | Mod: TC,PO

## 2019-02-20 PROCEDURE — 99999 PR PBB SHADOW E&M-EST. PATIENT-LVL III: CPT | Mod: PBBFAC,,, | Performed by: INTERNAL MEDICINE

## 2019-02-20 PROCEDURE — 77063 BREAST TOMOSYNTHESIS BI: CPT | Mod: 26,,, | Performed by: RADIOLOGY

## 2019-02-20 NOTE — PROGRESS NOTES
Subjective:       Patient ID: Coby Atkinson is a 73 y.o. female.    Chief Complaint: Annual Exam    HPI   73 y.o. Female here for annual exam.       Vaccines: Influenza (2018); Tetanus (declined); PNA (done); Zoster (2016)  Eye exam: 10/16  Mammogram: 10/17  Gyn exam: declined  Colonoscopy: 11/16  DEXA: 10/16     Exercise: walks daily  Diet: low carb     Past Medical History:    Depression                                                    Edema                                                         GERD (gastroesophageal reflux disease)                        Hypertension                                    10/2/2012     Osteopenia                                                    Thalassemia minor                                           Past Surgical History:    APPENDECTOMY                                                   TONSILLECTOMY                                                  HYSTERECTOMY                                     age 39          Comment:fibroids    OOPHORECTOMY                                                 Social History    Marital status:              Spouse name:                       Years of education:                 Number of children: 1              Occupational History  Occupation          Employer            Comment               Retired from [employer]                          Social History Main Topics    Smoking status: Never Smoker                                                                 Smokeless status: Never Used                        Alcohol use: No                 Comment: rare    Drug use: No              Sexual activity: Yes               Partners with: Male      -- Codeine    -- Ciprofloxacin -- Rash  Review of Systems   Constitutional: Negative for activity change, appetite change, chills, diaphoresis, fatigue, fever and unexpected weight change.   HENT: Negative for congestion, mouth sores, postnasal drip, rhinorrhea, sinus pressure, sneezing, sore  throat, trouble swallowing and voice change.    Eyes: Negative for pain, discharge and visual disturbance.   Respiratory: Negative for cough, shortness of breath and wheezing.    Cardiovascular: Negative for chest pain, palpitations and leg swelling.   Gastrointestinal: Negative for abdominal pain, blood in stool, constipation, diarrhea, nausea and vomiting.   Endocrine: Negative for cold intolerance and heat intolerance.   Genitourinary: Negative for difficulty urinating, dysuria, frequency, hematuria and urgency.   Musculoskeletal: Positive for arthralgias. Negative for myalgias.   Skin: Negative for rash and wound.   Allergic/Immunologic: Negative for environmental allergies and food allergies.   Neurological: Negative for dizziness, tremors, seizures, syncope, weakness, light-headedness and headaches.   Hematological: Negative for adenopathy. Does not bruise/bleed easily.   Psychiatric/Behavioral: Negative for confusion, self-injury, sleep disturbance and suicidal ideas. The patient is nervous/anxious.        Objective:      Physical Exam   Constitutional: She is oriented to person, place, and time. She appears well-developed and well-nourished. No distress.   HENT:   Head: Normocephalic and atraumatic.   Right Ear: External ear normal.   Left Ear: External ear normal.   Nose: Nose normal.   Mouth/Throat: Oropharynx is clear and moist. No oropharyngeal exudate.   Eyes: Conjunctivae and EOM are normal. Pupils are equal, round, and reactive to light. Right eye exhibits no discharge. Left eye exhibits no discharge. No scleral icterus.   Neck: Neck supple. No JVD present. No thyromegaly present.   Cardiovascular: Normal rate, regular rhythm, normal heart sounds and intact distal pulses.   No murmur heard.  Pulmonary/Chest: Effort normal and breath sounds normal. No respiratory distress. She has no wheezes. She has no rales. She exhibits no tenderness.   Abdominal: Soft. Bowel sounds are normal. She exhibits no  distension. There is no tenderness. There is no guarding.   Musculoskeletal: She exhibits no edema.   Lymphadenopathy:     She has no cervical adenopathy.   Neurological: She is alert and oriented to person, place, and time. No cranial nerve deficit. Coordination normal.   Skin: Skin is warm and dry. No rash noted. She is not diaphoretic. No pallor.   Psychiatric: She has a normal mood and affect. Judgment normal.   Nursing note and vitals reviewed.      Assessment:       1. Chronic deep vein thrombosis (DVT) of left popliteal vein    2. Essential hypertension    3. Morbid obesity with BMI of 40.0-44.9, adult    4. Mild episode of recurrent major depressive disorder    5. Gastroesophageal reflux disease without esophagitis    6. Primary osteoarthritis of both knees    7. CKD (chronic kidney disease) stage 3, GFR 30-59 ml/min    8. Pulmonary hypertension    9. Obesity hypoventilation syndrome    10. Thalassemia minor    11. Encounter for screening mammogram for breast cancer    12. Annual physical exam        Plan:    1. Recurrent PE/DVT- continue coumadin    2. HTN- controlled on Hyzaar/Norvasc/Toprol   3. Morbid Obesity- pt followed by Bariatric Medicine   4. Depression- fair control on Lexapro 20 mg daily   5. GERD- controlled on Protonix daily   6. OA of knees- Tylenol PRN    7. CKD III- stable   8. Pulmonary HTN- stable, will follow    9. ADRIÁN- stable on CPAP   10. Thalassemia minor  11. Mammo ordered  12. Annual exam- labs reviewed with pt       Vaccines: Influenza (2018); Tetanus (declined); PNA (done); Shingrix (will get)       Eye exam: 10/16       Mammogram: 10/17       Gyn exam: declined       Colonoscopy: 11/16       DEXA: 10/16  13. F/u in 6 months    Over 1/2 of 40 minute visit spent reviewing pt's medical records, education/discussion of pt's medical conditions and medical management

## 2019-02-28 ENCOUNTER — EXTERNAL CHRONIC CARE MANAGEMENT (OUTPATIENT)
Dept: PRIMARY CARE CLINIC | Facility: CLINIC | Age: 74
End: 2019-02-28
Payer: MEDICARE

## 2019-02-28 PROCEDURE — 99490 CHRNC CARE MGMT STAFF 1ST 20: CPT | Mod: S$PBB,,, | Performed by: INTERNAL MEDICINE

## 2019-02-28 PROCEDURE — 99490 CHRNC CARE MGMT STAFF 1ST 20: CPT | Mod: PBBFAC,PO | Performed by: INTERNAL MEDICINE

## 2019-02-28 PROCEDURE — 99490 PR CHRONIC CARE MGMT, 1ST 20 MIN: ICD-10-PCS | Mod: S$PBB,,, | Performed by: INTERNAL MEDICINE

## 2019-03-21 ENCOUNTER — ANTI-COAG VISIT (OUTPATIENT)
Dept: CARDIOLOGY | Facility: CLINIC | Age: 74
End: 2019-03-21
Payer: MEDICARE

## 2019-03-21 DIAGNOSIS — Z79.01 LONG TERM (CURRENT) USE OF ANTICOAGULANTS: Primary | ICD-10-CM

## 2019-03-21 LAB — INR PPP: 1.9 (ref 2–3)

## 2019-03-21 PROCEDURE — 85610 PROTHROMBIN TIME: CPT | Mod: PBBFAC

## 2019-03-21 NOTE — PROGRESS NOTES
INR low, reports she missed one dose, will increase due to the fact she has been trending to the low end of her rante, follow up in 2 weeks, Bruising from use but denies bleeding or other issues. ADRYAN Ayers, Pharm D assisted with dosing  ADRYAN Ayers, Pharm D assisted with dosing    Patient was re-educated on situations that would require placing a call to the Coumadin Clinic, including bleeding or unusual bruising issues, changes in health, diet or medications, upcoming procedures that require Coumadin interruption, and missed Coumadin dose(s). Patient expressed understanding that avoidance of consistency with these parameters could cause fluctuations in INR, leading to more frequent visits and increase risk of adverse events.

## 2019-03-31 ENCOUNTER — EXTERNAL CHRONIC CARE MANAGEMENT (OUTPATIENT)
Dept: PRIMARY CARE CLINIC | Facility: CLINIC | Age: 74
End: 2019-03-31
Payer: MEDICARE

## 2019-03-31 PROCEDURE — 99487 CPLX CHRNC CARE 1ST 60 MIN: CPT | Mod: S$PBB,,, | Performed by: INTERNAL MEDICINE

## 2019-03-31 PROCEDURE — 99487 CPLX CHRNC CARE 1ST 60 MIN: CPT | Mod: PBBFAC,PO | Performed by: INTERNAL MEDICINE

## 2019-03-31 PROCEDURE — 99487 PR COMPLX CHRON CARE MGMT, 1ST HR, PER MONTH: ICD-10-PCS | Mod: S$PBB,,, | Performed by: INTERNAL MEDICINE

## 2019-04-04 ENCOUNTER — ANTI-COAG VISIT (OUTPATIENT)
Dept: CARDIOLOGY | Facility: CLINIC | Age: 74
End: 2019-04-04
Payer: MEDICARE

## 2019-04-04 DIAGNOSIS — Z79.01 LONG TERM (CURRENT) USE OF ANTICOAGULANTS: Primary | ICD-10-CM

## 2019-04-04 LAB — INR PPP: 2.5 (ref 2–3)

## 2019-04-04 PROCEDURE — 93793 PR ANTICOAGULANT MGMT FOR PT TAKING WARFARIN: ICD-10-PCS | Mod: ,,, | Performed by: PHARMACIST

## 2019-04-04 PROCEDURE — 85610 PROTHROMBIN TIME: CPT | Mod: PBBFAC

## 2019-04-04 PROCEDURE — 93793 ANTICOAG MGMT PT WARFARIN: CPT | Mod: ,,, | Performed by: PHARMACIST

## 2019-04-04 NOTE — PROGRESS NOTES
Patient is here for a quick follow up status post low INR on 3/21. INR within range now. Patient denies any changes in diet, medications, or health that would effect warfarin therapy.  Patient was re-educated on situations that would require placing a call to the coumadin clinic, including bleeding or unusual bruising issues, changes in health, diet or medications, upcoming procedures that require warfarin interruption, and missed coumadin dose(s). Patient expressed understanding that avoidance of consistency with these parameters could cause fluctuations in INR, leading to more frequent visits and increase risk of adverse events.     Per patient soonest follow up she can make is 5/2

## 2019-04-30 ENCOUNTER — EXTERNAL CHRONIC CARE MANAGEMENT (OUTPATIENT)
Dept: PRIMARY CARE CLINIC | Facility: CLINIC | Age: 74
End: 2019-04-30
Payer: MEDICARE

## 2019-04-30 PROCEDURE — 99490 PR CHRONIC CARE MGMT, 1ST 20 MIN: ICD-10-PCS | Mod: S$PBB,,, | Performed by: INTERNAL MEDICINE

## 2019-04-30 PROCEDURE — 99490 CHRNC CARE MGMT STAFF 1ST 20: CPT | Mod: S$PBB,,, | Performed by: INTERNAL MEDICINE

## 2019-04-30 PROCEDURE — 99490 CHRNC CARE MGMT STAFF 1ST 20: CPT | Mod: PBBFAC,PO | Performed by: INTERNAL MEDICINE

## 2019-05-02 ENCOUNTER — ANTI-COAG VISIT (OUTPATIENT)
Dept: CARDIOLOGY | Facility: CLINIC | Age: 74
End: 2019-05-02
Payer: MEDICARE

## 2019-05-02 DIAGNOSIS — I26.99 OTHER ACUTE PULMONARY EMBOLISM WITHOUT ACUTE COR PULMONALE: ICD-10-CM

## 2019-05-02 DIAGNOSIS — Z79.01 LONG TERM (CURRENT) USE OF ANTICOAGULANTS: Primary | ICD-10-CM

## 2019-05-02 LAB — INR PPP: 1.9 (ref 2–3)

## 2019-05-02 PROCEDURE — 85610 PROTHROMBIN TIME: CPT | Mod: PBBFAC

## 2019-05-02 PROCEDURE — 93793 PR ANTICOAGULANT MGMT FOR PT TAKING WARFARIN: ICD-10-PCS | Mod: ,,,

## 2019-05-02 PROCEDURE — 93793 ANTICOAG MGMT PT WARFARIN: CPT | Mod: ,,,

## 2019-05-02 NOTE — PROGRESS NOTES
INR not at goal. Medications, chart, and patient findings reviewed. See calendar for adjustments to dose and follow up plan.  Pt findings as follows: Pt missed last Wednesday dose. Denies any other changes. Will instruct pt to take 1.5mg 5/2 and resume maintenance regimen.

## 2019-05-06 RX ORDER — POTASSIUM CHLORIDE 750 MG/1
CAPSULE, EXTENDED RELEASE ORAL
Qty: 180 CAPSULE | Refills: 0 | OUTPATIENT
Start: 2019-05-06

## 2019-05-08 RX ORDER — POTASSIUM CHLORIDE 750 MG/1
CAPSULE, EXTENDED RELEASE ORAL
Qty: 60 CAPSULE | Refills: 0 | Status: SHIPPED | OUTPATIENT
Start: 2019-05-08 | End: 2019-05-14 | Stop reason: SDUPTHER

## 2019-05-14 RX ORDER — POTASSIUM CHLORIDE 750 MG/1
CAPSULE, EXTENDED RELEASE ORAL
Qty: 60 CAPSULE | Refills: 0 | Status: SHIPPED | OUTPATIENT
Start: 2019-05-14 | End: 2019-09-19 | Stop reason: SDUPTHER

## 2019-05-14 RX ORDER — METOPROLOL SUCCINATE 25 MG/1
TABLET, EXTENDED RELEASE ORAL
Qty: 90 TABLET | Refills: 0 | Status: SHIPPED | OUTPATIENT
Start: 2019-05-14 | End: 2019-07-02 | Stop reason: SDUPTHER

## 2019-05-16 DIAGNOSIS — K26.9 DUODENAL ULCER: ICD-10-CM

## 2019-05-16 RX ORDER — PANTOPRAZOLE SODIUM 40 MG/1
40 TABLET, DELAYED RELEASE ORAL EVERY 12 HOURS
Qty: 180 TABLET | Refills: 3 | Status: SHIPPED | OUTPATIENT
Start: 2019-05-16 | End: 2020-12-29 | Stop reason: SDUPTHER

## 2019-05-31 ENCOUNTER — EXTERNAL CHRONIC CARE MANAGEMENT (OUTPATIENT)
Dept: PRIMARY CARE CLINIC | Facility: CLINIC | Age: 74
End: 2019-05-31
Payer: MEDICARE

## 2019-05-31 PROCEDURE — 99490 CHRNC CARE MGMT STAFF 1ST 20: CPT | Mod: S$PBB,,, | Performed by: INTERNAL MEDICINE

## 2019-05-31 PROCEDURE — 99490 PR CHRONIC CARE MGMT, 1ST 20 MIN: ICD-10-PCS | Mod: S$PBB,,, | Performed by: INTERNAL MEDICINE

## 2019-05-31 PROCEDURE — 99490 CHRNC CARE MGMT STAFF 1ST 20: CPT | Mod: PBBFAC,PO | Performed by: INTERNAL MEDICINE

## 2019-06-05 NOTE — PROGRESS NOTES
Pt stated she did not know she had an appt. The patient is rescheduled for 6/6/19 at her request.

## 2019-06-06 ENCOUNTER — ANTI-COAG VISIT (OUTPATIENT)
Dept: CARDIOLOGY | Facility: CLINIC | Age: 74
End: 2019-06-06
Payer: MEDICARE

## 2019-06-06 DIAGNOSIS — Z79.01 LONG TERM (CURRENT) USE OF ANTICOAGULANTS: Primary | ICD-10-CM

## 2019-06-06 LAB — INR PPP: 2.7 (ref 2–3)

## 2019-06-06 PROCEDURE — 93793 PR ANTICOAGULANT MGMT FOR PT TAKING WARFARIN: ICD-10-PCS | Mod: ,,, | Performed by: PHARMACIST

## 2019-06-06 PROCEDURE — 85610 PROTHROMBIN TIME: CPT | Mod: PBBFAC

## 2019-06-06 PROCEDURE — 93793 ANTICOAG MGMT PT WARFARIN: CPT | Mod: ,,, | Performed by: PHARMACIST

## 2019-06-30 ENCOUNTER — EXTERNAL CHRONIC CARE MANAGEMENT (OUTPATIENT)
Dept: PRIMARY CARE CLINIC | Facility: CLINIC | Age: 74
End: 2019-06-30
Payer: MEDICARE

## 2019-06-30 PROCEDURE — 99490 PR CHRONIC CARE MGMT, 1ST 20 MIN: ICD-10-PCS | Mod: S$PBB,,, | Performed by: INTERNAL MEDICINE

## 2019-06-30 PROCEDURE — 99490 CHRNC CARE MGMT STAFF 1ST 20: CPT | Mod: PBBFAC,PO | Performed by: INTERNAL MEDICINE

## 2019-06-30 PROCEDURE — 99490 CHRNC CARE MGMT STAFF 1ST 20: CPT | Mod: S$PBB,,, | Performed by: INTERNAL MEDICINE

## 2019-07-02 RX ORDER — ESCITALOPRAM OXALATE 20 MG/1
TABLET ORAL
Qty: 90 TABLET | Refills: 3 | Status: SHIPPED | OUTPATIENT
Start: 2019-07-02 | End: 2020-12-03

## 2019-07-03 RX ORDER — METOPROLOL SUCCINATE 25 MG/1
TABLET, EXTENDED RELEASE ORAL
Qty: 90 TABLET | Refills: 3 | Status: SHIPPED | OUTPATIENT
Start: 2019-07-03 | End: 2021-05-20 | Stop reason: SDUPTHER

## 2019-07-05 ENCOUNTER — ANTI-COAG VISIT (OUTPATIENT)
Dept: CARDIOLOGY | Facility: CLINIC | Age: 74
End: 2019-07-05
Payer: MEDICARE

## 2019-07-05 DIAGNOSIS — I26.99 OTHER ACUTE PULMONARY EMBOLISM WITHOUT ACUTE COR PULMONALE: ICD-10-CM

## 2019-07-05 DIAGNOSIS — Z79.01 LONG TERM (CURRENT) USE OF ANTICOAGULANTS: Primary | ICD-10-CM

## 2019-07-05 LAB — INR PPP: 2.1 (ref 2–3)

## 2019-07-05 PROCEDURE — 85610 PROTHROMBIN TIME: CPT | Mod: PBBFAC

## 2019-07-05 PROCEDURE — 93793 ANTICOAG MGMT PT WARFARIN: CPT | Mod: ,,,

## 2019-07-05 PROCEDURE — 93793 PR ANTICOAGULANT MGMT FOR PT TAKING WARFARIN: ICD-10-PCS | Mod: ,,,

## 2019-07-24 DIAGNOSIS — I10 ESSENTIAL HYPERTENSION: Primary | ICD-10-CM

## 2019-07-24 DIAGNOSIS — I27.20 PULMONARY HYPERTENSION: ICD-10-CM

## 2019-07-24 DIAGNOSIS — Z79.01 LONG TERM (CURRENT) USE OF ANTICOAGULANTS: ICD-10-CM

## 2019-07-31 ENCOUNTER — EXTERNAL CHRONIC CARE MANAGEMENT (OUTPATIENT)
Dept: PRIMARY CARE CLINIC | Facility: CLINIC | Age: 74
End: 2019-07-31
Payer: MEDICARE

## 2019-07-31 PROCEDURE — 99490 PR CHRONIC CARE MGMT, 1ST 20 MIN: ICD-10-PCS | Mod: S$PBB,,, | Performed by: INTERNAL MEDICINE

## 2019-07-31 PROCEDURE — 99490 CHRNC CARE MGMT STAFF 1ST 20: CPT | Mod: S$PBB,,, | Performed by: INTERNAL MEDICINE

## 2019-07-31 PROCEDURE — 99490 CHRNC CARE MGMT STAFF 1ST 20: CPT | Mod: PBBFAC,PO | Performed by: INTERNAL MEDICINE

## 2019-08-06 ENCOUNTER — ANTI-COAG VISIT (OUTPATIENT)
Dept: CARDIOLOGY | Facility: CLINIC | Age: 74
End: 2019-08-06
Payer: MEDICARE

## 2019-08-06 DIAGNOSIS — Z79.01 LONG TERM (CURRENT) USE OF ANTICOAGULANTS: Primary | ICD-10-CM

## 2019-08-06 LAB — INR PPP: 1.6 (ref 2–3)

## 2019-08-06 PROCEDURE — 93793 PR ANTICOAGULANT MGMT FOR PT TAKING WARFARIN: ICD-10-PCS | Mod: ,,, | Performed by: PHARMACIST

## 2019-08-06 PROCEDURE — 85610 PROTHROMBIN TIME: CPT | Mod: PBBFAC

## 2019-08-06 PROCEDURE — 93793 ANTICOAG MGMT PT WARFARIN: CPT | Mod: ,,, | Performed by: PHARMACIST

## 2019-08-06 NOTE — PROGRESS NOTES
Patient repots missing 1-2 doses of coumadin. Otherwise, Patient denies any changes in diet, medications, or health that would effect warfarin therapy.

## 2019-08-20 ENCOUNTER — ANTI-COAG VISIT (OUTPATIENT)
Dept: CARDIOLOGY | Facility: CLINIC | Age: 74
End: 2019-08-20
Payer: MEDICARE

## 2019-08-20 DIAGNOSIS — Z79.01 LONG TERM (CURRENT) USE OF ANTICOAGULANTS: Primary | ICD-10-CM

## 2019-08-20 LAB — INR PPP: 1.5 (ref 2–3)

## 2019-08-20 PROCEDURE — 85610 PROTHROMBIN TIME: CPT | Mod: PBBFAC

## 2019-08-20 PROCEDURE — 93793 PR ANTICOAGULANT MGMT FOR PT TAKING WARFARIN: ICD-10-PCS | Mod: ,,, | Performed by: PHARMACIST

## 2019-08-20 PROCEDURE — 93793 ANTICOAG MGMT PT WARFARIN: CPT | Mod: ,,, | Performed by: PHARMACIST

## 2019-08-20 NOTE — PROGRESS NOTES
Patient missed doses of her coumadin last week. The importance of compliance with taking her medications properly was emphasized. Otherwise, Patient denies any changes in diet, medications, or health that would effect warfarin therapy. Patient was re-educated on situations that would require placing a call to the coumadin clinic, including bleeding or unusual bruising issues, changes in health, diet or medications, upcoming procedures that require warfarin interruption, and missed coumadin dose(s). Patient expressed understanding that avoidance of consistency with these parameters could cause fluctuations in INR, leading to more frequent visits and increase risk of adverse events.

## 2019-08-31 ENCOUNTER — EXTERNAL CHRONIC CARE MANAGEMENT (OUTPATIENT)
Dept: PRIMARY CARE CLINIC | Facility: CLINIC | Age: 74
End: 2019-08-31
Payer: MEDICARE

## 2019-08-31 PROCEDURE — 99490 CHRNC CARE MGMT STAFF 1ST 20: CPT | Mod: PBBFAC,PO | Performed by: INTERNAL MEDICINE

## 2019-08-31 PROCEDURE — 99490 PR CHRONIC CARE MGMT, 1ST 20 MIN: ICD-10-PCS | Mod: S$PBB,,, | Performed by: INTERNAL MEDICINE

## 2019-08-31 PROCEDURE — 99490 CHRNC CARE MGMT STAFF 1ST 20: CPT | Mod: S$PBB,,, | Performed by: INTERNAL MEDICINE

## 2019-09-03 ENCOUNTER — ANTI-COAG VISIT (OUTPATIENT)
Dept: CARDIOLOGY | Facility: CLINIC | Age: 74
End: 2019-09-03
Payer: MEDICARE

## 2019-09-03 DIAGNOSIS — K21.9 GASTROESOPHAGEAL REFLUX DISEASE WITHOUT ESOPHAGITIS: ICD-10-CM

## 2019-09-03 DIAGNOSIS — Z79.01 LONG TERM (CURRENT) USE OF ANTICOAGULANTS: Primary | ICD-10-CM

## 2019-09-03 LAB — INR PPP: 1.4 (ref 2–3)

## 2019-09-03 PROCEDURE — 85610 PROTHROMBIN TIME: CPT | Mod: PBBFAC

## 2019-09-03 PROCEDURE — 93793 ANTICOAG MGMT PT WARFARIN: CPT | Mod: ,,, | Performed by: PHARMACIST

## 2019-09-03 PROCEDURE — 93793 PR ANTICOAGULANT MGMT FOR PT TAKING WARFARIN: ICD-10-PCS | Mod: ,,, | Performed by: PHARMACIST

## 2019-09-03 NOTE — PROGRESS NOTES
Patient denies any changes in diet, medications, or health that would effect warfarin therapy.  I will increase her weekly coumadin dose at this time. Patient was re-educated on situations that would require placing a call to the coumadin clinic, including bleeding or unusual bruising issues, changes in health, diet or medications, upcoming procedures that require warfarin interruption, and missed coumadin dose(s). Patient expressed understanding that avoidance of consistency with these parameters could cause fluctuations in INR, leading to more frequent visits and increase risk of adverse events.

## 2019-09-12 ENCOUNTER — LAB VISIT (OUTPATIENT)
Dept: LAB | Facility: HOSPITAL | Age: 74
End: 2019-09-12
Attending: INTERNAL MEDICINE
Payer: MEDICARE

## 2019-09-12 DIAGNOSIS — Z79.01 LONG TERM (CURRENT) USE OF ANTICOAGULANTS: ICD-10-CM

## 2019-09-12 DIAGNOSIS — I27.20 PULMONARY HYPERTENSION: ICD-10-CM

## 2019-09-12 DIAGNOSIS — I10 ESSENTIAL HYPERTENSION: ICD-10-CM

## 2019-09-12 LAB
ALT SERPL W/O P-5'-P-CCNC: 15 U/L (ref 10–44)
ANION GAP SERPL CALC-SCNC: 8 MMOL/L (ref 8–16)
AST SERPL-CCNC: 18 U/L (ref 10–40)
BUN SERPL-MCNC: 20 MG/DL (ref 8–23)
CALCIUM SERPL-MCNC: 9.4 MG/DL (ref 8.7–10.5)
CHLORIDE SERPL-SCNC: 106 MMOL/L (ref 95–110)
CHOLEST SERPL-MCNC: 137 MG/DL (ref 120–199)
CHOLEST/HDLC SERPL: 2.2 {RATIO} (ref 2–5)
CO2 SERPL-SCNC: 25 MMOL/L (ref 23–29)
CREAT SERPL-MCNC: 1 MG/DL (ref 0.5–1.4)
EST. GFR  (AFRICAN AMERICAN): >60 ML/MIN/1.73 M^2
EST. GFR  (NON AFRICAN AMERICAN): 55.6 ML/MIN/1.73 M^2
GLUCOSE SERPL-MCNC: 77 MG/DL (ref 70–110)
HCT VFR BLD AUTO: 37.4 % (ref 37–48.5)
HDLC SERPL-MCNC: 62 MG/DL (ref 40–75)
HDLC SERPL: 45.3 % (ref 20–50)
HGB BLD-MCNC: 11 G/DL (ref 12–16)
LDLC SERPL CALC-MCNC: 60.8 MG/DL (ref 63–159)
NONHDLC SERPL-MCNC: 75 MG/DL
POTASSIUM SERPL-SCNC: 3.5 MMOL/L (ref 3.5–5.1)
SODIUM SERPL-SCNC: 139 MMOL/L (ref 136–145)
TRIGL SERPL-MCNC: 71 MG/DL (ref 30–150)

## 2019-09-12 PROCEDURE — 84450 TRANSFERASE (AST) (SGOT): CPT

## 2019-09-12 PROCEDURE — 85018 HEMOGLOBIN: CPT

## 2019-09-12 PROCEDURE — 85014 HEMATOCRIT: CPT

## 2019-09-12 PROCEDURE — 84460 ALANINE AMINO (ALT) (SGPT): CPT

## 2019-09-12 PROCEDURE — 80061 LIPID PANEL: CPT

## 2019-09-12 PROCEDURE — 36415 COLL VENOUS BLD VENIPUNCTURE: CPT | Mod: PO

## 2019-09-12 PROCEDURE — 80048 BASIC METABOLIC PNL TOTAL CA: CPT

## 2019-09-15 DIAGNOSIS — I10 ESSENTIAL HYPERTENSION: ICD-10-CM

## 2019-09-16 RX ORDER — AMLODIPINE BESYLATE 10 MG/1
TABLET ORAL
Qty: 90 TABLET | Refills: 3 | Status: SHIPPED | OUTPATIENT
Start: 2019-09-16 | End: 2020-11-03 | Stop reason: SDUPTHER

## 2019-09-17 ENCOUNTER — ANTI-COAG VISIT (OUTPATIENT)
Dept: CARDIOLOGY | Facility: CLINIC | Age: 74
End: 2019-09-17
Payer: MEDICARE

## 2019-09-17 DIAGNOSIS — Z79.01 LONG TERM (CURRENT) USE OF ANTICOAGULANTS: Primary | ICD-10-CM

## 2019-09-17 LAB — INR PPP: 1.6 (ref 2–3)

## 2019-09-17 PROCEDURE — 85610 PROTHROMBIN TIME: CPT | Mod: PBBFAC

## 2019-09-17 PROCEDURE — 93793 PR ANTICOAGULANT MGMT FOR PT TAKING WARFARIN: ICD-10-PCS | Mod: ,,, | Performed by: PHARMACIST

## 2019-09-17 PROCEDURE — 93793 ANTICOAG MGMT PT WARFARIN: CPT | Mod: ,,, | Performed by: PHARMACIST

## 2019-09-17 NOTE — PROGRESS NOTES
INR continues to hover low with no changes reported. I will increase her coumadin  dose more at this time. We will watch closely. Patient was re-educated on situations that would require placing a call to the coumadin clinic, including bleeding or unusual bruising issues, changes in health, diet or medications, upcoming procedures that require warfarin interruption, and missed coumadin dose(s). Patient expressed understanding that avoidance of consistency with these parameters could cause fluctuations in INR, leading to more frequent visits and increase risk of adverse events.

## 2019-09-19 ENCOUNTER — OFFICE VISIT (OUTPATIENT)
Dept: CARDIOLOGY | Facility: CLINIC | Age: 74
End: 2019-09-19
Payer: MEDICARE

## 2019-09-19 VITALS
BODY MASS INDEX: 43.58 KG/M2 | HEART RATE: 84 BPM | SYSTOLIC BLOOD PRESSURE: 166 MMHG | HEIGHT: 63 IN | DIASTOLIC BLOOD PRESSURE: 84 MMHG | OXYGEN SATURATION: 96 % | WEIGHT: 245.94 LBS

## 2019-09-19 DIAGNOSIS — Z86.711 HISTORY OF PULMONARY EMBOLISM: ICD-10-CM

## 2019-09-19 DIAGNOSIS — Z79.01 CHRONIC ANTICOAGULATION: ICD-10-CM

## 2019-09-19 DIAGNOSIS — I10 ESSENTIAL HYPERTENSION: Primary | ICD-10-CM

## 2019-09-19 DIAGNOSIS — I25.2 HISTORY OF NON-ST ELEVATION MYOCARDIAL INFARCTION (NSTEMI): ICD-10-CM

## 2019-09-19 DIAGNOSIS — N18.30 CKD (CHRONIC KIDNEY DISEASE) STAGE 3, GFR 30-59 ML/MIN: ICD-10-CM

## 2019-09-19 DIAGNOSIS — E66.01 MORBID OBESITY WITH BMI OF 40.0-44.9, ADULT: ICD-10-CM

## 2019-09-19 DIAGNOSIS — G47.33 OBSTRUCTIVE SLEEP APNEA: ICD-10-CM

## 2019-09-19 DIAGNOSIS — D56.3 THALASSEMIA MINOR: ICD-10-CM

## 2019-09-19 DIAGNOSIS — R53.83 FATIGUE, UNSPECIFIED TYPE: ICD-10-CM

## 2019-09-19 PROBLEM — K52.9 CHRONIC DIARRHEA: Status: RESOLVED | Noted: 2018-08-03 | Resolved: 2019-09-19

## 2019-09-19 PROBLEM — D62 ACUTE BLOOD LOSS ANEMIA: Status: RESOLVED | Noted: 2017-12-08 | Resolved: 2019-09-19

## 2019-09-19 PROCEDURE — 99999 PR PBB SHADOW E&M-EST. PATIENT-LVL III: ICD-10-PCS | Mod: PBBFAC,,, | Performed by: NURSE PRACTITIONER

## 2019-09-19 PROCEDURE — 93005 ELECTROCARDIOGRAM TRACING: CPT | Mod: PBBFAC,PO | Performed by: INTERNAL MEDICINE

## 2019-09-19 PROCEDURE — 93010 EKG 12-LEAD: ICD-10-PCS | Mod: S$PBB,,, | Performed by: INTERNAL MEDICINE

## 2019-09-19 PROCEDURE — 99214 OFFICE O/P EST MOD 30 MIN: CPT | Mod: S$PBB,,, | Performed by: NURSE PRACTITIONER

## 2019-09-19 PROCEDURE — 99214 PR OFFICE/OUTPT VISIT, EST, LEVL IV, 30-39 MIN: ICD-10-PCS | Mod: S$PBB,,, | Performed by: NURSE PRACTITIONER

## 2019-09-19 PROCEDURE — 99213 OFFICE O/P EST LOW 20 MIN: CPT | Mod: PBBFAC,PO,25 | Performed by: NURSE PRACTITIONER

## 2019-09-19 PROCEDURE — 99999 PR PBB SHADOW E&M-EST. PATIENT-LVL III: CPT | Mod: PBBFAC,,, | Performed by: NURSE PRACTITIONER

## 2019-09-19 PROCEDURE — 93010 ELECTROCARDIOGRAM REPORT: CPT | Mod: S$PBB,,, | Performed by: INTERNAL MEDICINE

## 2019-09-19 RX ORDER — MELOXICAM 7.5 MG/1
7.5 TABLET ORAL DAILY
COMMUNITY
End: 2020-10-28

## 2019-09-19 RX ORDER — ALPRAZOLAM 0.5 MG/1
0.5 TABLET ORAL NIGHTLY
COMMUNITY
End: 2019-11-25 | Stop reason: SDUPTHER

## 2019-09-19 NOTE — PATIENT INSTRUCTIONS
Increase cardiovascular exercise to 30 minutes of brisk walking a day for 4-5 days a week.  You can use a stationary bike or swim for 30 minutes a day instead of walking.  Whatever exercise you choose, make sure you are working hard enough to increase your heart rate.     Please ask Dr. Hudson about whether you need to have your B12 checked as you've been on the protonix for a long time.  You've not had a vitamin D level and you're not getting much sun exposure.  Please ask whether he thinks a vitamin D level would be appropriate.     You should avoid NSAIDs (Nonsteroidal anti-inflammatory drugs) because you take a blood thinner. NSAIDs are: Ibuprofen, Advil, Motrin, Aleeve, Naproxen, Meloxicam, Mobic, Diclofenac and Voltaren.  Do not take Celebrex either.  You can take Tylenol for pain. It is not a NSAID. Limit Tylenol to 4 tabs (500 mg each) in a 24 hr period.

## 2019-09-24 NOTE — PROGRESS NOTES
Patient called this am to inform us that she is ill today and cannot make appt. Will call back later to nel appt.

## 2019-09-26 ENCOUNTER — OFFICE VISIT (OUTPATIENT)
Dept: INTERNAL MEDICINE | Facility: CLINIC | Age: 74
End: 2019-09-26
Payer: MEDICARE

## 2019-09-26 VITALS
HEIGHT: 63 IN | BODY MASS INDEX: 42.38 KG/M2 | DIASTOLIC BLOOD PRESSURE: 80 MMHG | HEART RATE: 88 BPM | TEMPERATURE: 99 F | WEIGHT: 239.19 LBS | SYSTOLIC BLOOD PRESSURE: 130 MMHG

## 2019-09-26 DIAGNOSIS — J40 BRONCHITIS: ICD-10-CM

## 2019-09-26 DIAGNOSIS — R52 BODY ACHES: Primary | ICD-10-CM

## 2019-09-26 DIAGNOSIS — J06.9 URTI (ACUTE UPPER RESPIRATORY INFECTION): ICD-10-CM

## 2019-09-26 LAB
INFLUENZA A, MOLECULAR: NEGATIVE
INFLUENZA B, MOLECULAR: NEGATIVE
SPECIMEN SOURCE: NORMAL

## 2019-09-26 PROCEDURE — 99213 OFFICE O/P EST LOW 20 MIN: CPT | Mod: S$PBB,,, | Performed by: NURSE PRACTITIONER

## 2019-09-26 PROCEDURE — 99999 PR PBB SHADOW E&M-EST. PATIENT-LVL III: CPT | Mod: PBBFAC,,, | Performed by: NURSE PRACTITIONER

## 2019-09-26 PROCEDURE — 99213 OFFICE O/P EST LOW 20 MIN: CPT | Mod: PBBFAC,PO,25 | Performed by: NURSE PRACTITIONER

## 2019-09-26 PROCEDURE — 87502 INFLUENZA DNA AMP PROBE: CPT | Mod: PO

## 2019-09-26 PROCEDURE — 99999 PR PBB SHADOW E&M-EST. PATIENT-LVL III: ICD-10-PCS | Mod: PBBFAC,,, | Performed by: NURSE PRACTITIONER

## 2019-09-26 PROCEDURE — 96372 THER/PROPH/DIAG INJ SC/IM: CPT | Mod: PBBFAC,PO

## 2019-09-26 PROCEDURE — 99213 PR OFFICE/OUTPT VISIT, EST, LEVL III, 20-29 MIN: ICD-10-PCS | Mod: S$PBB,,, | Performed by: NURSE PRACTITIONER

## 2019-09-26 RX ORDER — DOXYCYCLINE HYCLATE 100 MG
100 TABLET ORAL 2 TIMES DAILY
Qty: 10 TABLET | Refills: 0 | Status: SHIPPED | OUTPATIENT
Start: 2019-09-26 | End: 2019-10-01

## 2019-09-26 RX ORDER — CEFTRIAXONE 500 MG/1
500 INJECTION, POWDER, FOR SOLUTION INTRAMUSCULAR; INTRAVENOUS
Status: COMPLETED | OUTPATIENT
Start: 2019-09-26 | End: 2019-09-26

## 2019-09-26 RX ADMIN — CEFTRIAXONE SODIUM 500 MG: 500 INJECTION, POWDER, FOR SOLUTION INTRAMUSCULAR; INTRAVENOUS at 03:09

## 2019-09-26 NOTE — PROGRESS NOTES
[facility] Note    Chief Complaint      Chief Complaint   Patient presents with    Nasal Congestion    Cough    Fever     History of Present Illness      Coby Atkinson is a 73 y.o. female patient of Dr. Bryant who is new to me presents today for c/o head congestion, PND, sore throat/hoarseness, dry coughing, fatigue all over the past week. Has been taking tessalon perles for cough. Denies fever, chills, sob, cp, n/v/d.    Problem List Items Addressed This Visit     None      Visit Diagnoses     Body aches    -  Primary    Relevant Orders    Influenza A & B by Molecular (Completed)    URTI (acute upper respiratory infection)        Relevant Medications    cefTRIAXone injection 500 mg (Completed)    Other Relevant Orders    Influenza A & B by Molecular (Completed)    Bronchitis        Relevant Medications    doxycycline (VIBRA-TABS) 100 MG tablet          Health Maintenance   Topic Date Due    TETANUS VACCINE  10/22/1962    High Dose Statin  10/22/1965    DEXA SCAN  10/20/2019    Mammogram  02/20/2021    Lipid Panel  09/12/2024    Colonoscopy  11/05/2026    Pneumococcal Vaccine (65+ High/Highest Risk)  Completed       Past Medical History:   Diagnosis Date    Anticoagulant long-term use     Cataract     Chronic diarrhea     Depression     Edema     GERD (gastroesophageal reflux disease)     Hypertension 10/2/2012    Osteoarthritis of both knees 8/9/2016    Osteopenia     Osteopenia 11/30/2017    PE (pulmonary thromboembolism) 12/03/2017    Primary localized osteoarthrosis, lower leg 12/3/2014    Sleep apnea 2016    Thalassemia minor        Past Surgical History:   Procedure Laterality Date    APPENDECTOMY      CATARACT EXTRACTION      COLONOSCOPY N/A 11/5/2016    Procedure: COLONOSCOPY;  Surgeon: Tanner Simental MD;  Location: [address];  Service: Endoscopy;  Laterality: N/A;    HYSTERECTOMY  age 39    fibroids    OOPHORECTOMY      TONSILLECTOMY          family history includes Cancer in her brother and father; Coronary artery disease in her brother, maternal grandmother, and paternal grandmother; Heart failure in her maternal grandmother; Hypertension in her mother; No Known Problems in her maternal aunt, maternal grandfather, maternal uncle, paternal aunt, paternal grandfather, paternal uncle, and sister; Stroke in her paternal grandmother.    Social History     Tobacco Use    Smoking status: Never Smoker    Smokeless tobacco: Never Used   Substance Use Topics    Alcohol use: No     Comment: rare    Drug use: No       Review of Systems   Constitutional: Positive for malaise/fatigue. Negative for chills and fever.   HENT: Positive for congestion, sinus pain and sore throat.    Eyes: Negative for blurred vision.   Respiratory: Positive for cough. Negative for shortness of breath and wheezing.    Cardiovascular: Negative for chest pain, palpitations and leg swelling.   Gastrointestinal: Negative for abdominal pain, constipation, diarrhea, nausea and vomiting.   Genitourinary: Negative for dysuria.   Musculoskeletal: Negative for myalgias.   Skin: Negative for rash.   Neurological: Negative for dizziness, weakness and headaches.   Psychiatric/Behavioral: Negative for depression. The patient is not nervous/anxious.         Outpatient Encounter Medications as of 9/26/2019   Medication Sig Dispense Refill    ALPRAZolam (XANAX) 0.5 MG tablet Take 0.5 mg by mouth every evening.      amLODIPine (NORVASC) 10 MG tablet TAKE 1 TABLET(10 MG) BY MOUTH EVERY DAY 90 tablet 3    doxazosin (CARDURA) 2 MG tablet TAKE 1 TABLET(2 MG) BY MOUTH EVERY EVENING 90 tablet 3    escitalopram oxalate (LEXAPRO) 20 MG tablet TAKE 1 TABLET BY MOUTH EVERY DAY 90 tablet 3    losartan (COZAAR) 100 MG tablet Take 1 tablet (100 mg total) by mouth once daily. 90 tablet 3    meloxicam (MOBIC) 7.5 MG tablet Take 7.5 mg by mouth once daily.      metoprolol succinate (TOPROL-XL) 25 MG 24  "hr tablet TAKE 1 TABLET BY MOUTH EVERY DAY 90 tablet 3    potassium chloride (MICRO-K) 10 MEQ CpSR TAKE 2 CAPSULES(20 MEQ) BY MOUTH EVERY  capsule 0    warfarin (COUMADIN) 1 MG tablet Take 0.5-1 tablets (0.5-1 mg total) by mouth Daily. As directed by coumadin clinic (Patient taking differently: Take by mouth Daily. 1 tablet 4 days per week, 1.5 tablets 3 days per week, followed by Coumadin clinic) 30 tablet 11    doxycycline (VIBRA-TABS) 100 MG tablet Take 1 tablet (100 mg total) by mouth 2 (two) times daily. for 5 days 10 tablet 0    furosemide (LASIX) 20 MG tablet Take 1 tablet (20 mg total) by mouth once daily. 90 tablet 3    pantoprazole (PROTONIX) 40 MG tablet Take 1 tablet (40 mg total) by mouth every 12 (twelve) hours. (Patient taking differently: Take 40 mg by mouth once daily. ) 180 tablet 3     Facility-Administered Encounter Medications as of 9/26/2019   Medication Dose Route Frequency Provider Last Rate Last Dose    [COMPLETED] cefTRIAXone injection 500 mg  500 mg Intramuscular 1 time in Clinic/HOD Jaimie Junior NP   500 mg at 09/26/19 8624        Review of patient's allergies indicates:   Allergen Reactions    Codeine     Ciprofloxacin Rash       Physical Exam      Vital Signs  Temp: 99.1 °F (37.3 °C)  Temp src: Oral  Pulse: 88  BP: 130/80  BP Location: Left arm  Patient Position: Sitting  Pain Score: 0-No pain  Height and Weight  Height: 5' 3" (160 cm)  Weight: 108.5 kg (239 lb 3.2 oz)  BSA (Calculated - sq m): 2.2 sq meters  BMI (Calculated): 42.5  Weight in (lb) to have BMI = 25: 140.8]    Physical Exam   Constitutional: She is oriented to person, place, and time. She appears well-developed and well-nourished.   HENT:   Head: Normocephalic and atraumatic.   Bilateral effusion noted, erythema to oropharynx   Eyes: Pupils are equal, round, and reactive to light. Conjunctivae and EOM are normal.   Neck: Normal range of motion. Neck supple.   Cardiovascular: Normal rate, regular rhythm, " normal heart sounds and intact distal pulses.   No murmur heard.  Pulmonary/Chest: Effort normal and breath sounds normal. She exhibits no tenderness.   Coarse upper BS noted   Abdominal: Soft.   Musculoskeletal: Normal range of motion.   Neurological: She is alert and oriented to person, place, and time.   Skin: Skin is warm and dry.   Psychiatric: She has a normal mood and affect. Her behavior is normal. Judgment and thought content normal.   Nursing note and vitals reviewed.       Laboratory:  CBC:  Recent Labs   Lab Result Units 09/12/19  1457   Hemoglobin g/dL 11.0*   Hematocrit % 37.4     CMP:  Recent Labs   Lab Result Units 09/12/19  1457   Glucose mg/dL 77   Calcium mg/dL 9.4   Sodium mmol/L 139   Potassium mmol/L 3.5   CO2 mmol/L 25   Chloride mmol/L 106   BUN, Bld mg/dL 20   ALT U/L 15   AST U/L 18     URINALYSIS:  No results for input(s): COLORU, CLARITYU, SPECGRAV, PHUR, PROTEINUA, GLUCOSEU, BILIRUBINCON, BLOODU, WBCU, RBCU, BACTERIA, MUCUS, NITRITE, LEUKOCYTESUR, UROBILINOGEN, HYALINECASTS in the last 2160 hours.   LIPIDS:  Recent Labs   Lab Result Units 09/12/19  1457   HDL mg/dL 62   Cholesterol mg/dL 137   Triglycerides mg/dL 71   LDL Cholesterol mg/dL 60.8*   Hdl/Cholesterol Ratio % 45.3   Non-HDL Cholesterol mg/dL 75   Total Cholesterol/HDL Ratio  2.2     TSH:  No results for input(s): TSH in the last 2160 hours.  A1C:  No results for input(s): HGBA1C in the last 2160 hours.      Assessment/Plan     Coby Atkinson is a 73 y.o.female with:    1. URTI (acute upper respiratory infection)  - Influenza A & B by Molecular  - cefTRIAXone injection 500 mg    2. Bronchitis  - doxycycline (VIBRA-TABS) 100 MG tablet; Take 1 tablet (100 mg total) by mouth 2 (two) times daily. for 5 days  Dispense: 10 tablet; Refill: 0    3. Body aches  - Influenza A & B by Molecular      -Continue current medications and maintain follow up with specialists.  Return to clinic as needed for any concerns       Jaimie Junior,  NP-C [facility] [place]

## 2019-09-27 ENCOUNTER — TELEPHONE (OUTPATIENT)
Dept: FAMILY MEDICINE | Facility: CLINIC | Age: 74
End: 2019-09-27

## 2019-09-27 RX ORDER — POTASSIUM CHLORIDE 750 MG/1
CAPSULE, EXTENDED RELEASE ORAL
Qty: 60 CAPSULE | Refills: 0 | Status: SHIPPED | OUTPATIENT
Start: 2019-09-27 | End: 2020-01-07

## 2019-09-27 NOTE — TELEPHONE ENCOUNTER
----- Message from Hilary Simon sent at 9/27/2019  1:38 PM CDT -----  Contact: [phone]  Patient stated she was tested negative for the flu, she wants to know if she should stop taking the antibiotics.  Please advise, thanks

## 2019-09-30 ENCOUNTER — EXTERNAL CHRONIC CARE MANAGEMENT (OUTPATIENT)
Dept: PRIMARY CARE CLINIC | Facility: CLINIC | Age: 74
End: 2019-09-30
Payer: MEDICARE

## 2019-09-30 PROCEDURE — 99490 CHRNC CARE MGMT STAFF 1ST 20: CPT | Mod: S$PBB,,, | Performed by: INTERNAL MEDICINE

## 2019-09-30 PROCEDURE — 99490 PR CHRONIC CARE MGMT, 1ST 20 MIN: ICD-10-PCS | Mod: S$PBB,,, | Performed by: INTERNAL MEDICINE

## 2019-09-30 PROCEDURE — 99490 CHRNC CARE MGMT STAFF 1ST 20: CPT | Mod: PBBFAC,PO | Performed by: INTERNAL MEDICINE

## 2019-10-31 ENCOUNTER — ANTI-COAG VISIT (OUTPATIENT)
Dept: CARDIOLOGY | Facility: CLINIC | Age: 74
End: 2019-10-31
Payer: MEDICARE

## 2019-10-31 ENCOUNTER — EXTERNAL CHRONIC CARE MANAGEMENT (OUTPATIENT)
Dept: PRIMARY CARE CLINIC | Facility: CLINIC | Age: 74
End: 2019-10-31
Payer: MEDICARE

## 2019-10-31 DIAGNOSIS — Z79.01 CHRONIC ANTICOAGULATION: ICD-10-CM

## 2019-10-31 DIAGNOSIS — Z79.01 LONG TERM (CURRENT) USE OF ANTICOAGULANTS: Primary | ICD-10-CM

## 2019-10-31 DIAGNOSIS — I26.99 OTHER ACUTE PULMONARY EMBOLISM WITHOUT ACUTE COR PULMONALE: ICD-10-CM

## 2019-10-31 LAB — INR PPP: 4.1 (ref 2–3)

## 2019-10-31 PROCEDURE — 99490 CHRNC CARE MGMT STAFF 1ST 20: CPT | Mod: PBBFAC,PO | Performed by: INTERNAL MEDICINE

## 2019-10-31 PROCEDURE — 93793 PR ANTICOAGULANT MGMT FOR PT TAKING WARFARIN: ICD-10-PCS | Mod: ,,,

## 2019-10-31 PROCEDURE — 99490 PR CHRONIC CARE MGMT, 1ST 20 MIN: ICD-10-PCS | Mod: S$PBB,,, | Performed by: INTERNAL MEDICINE

## 2019-10-31 PROCEDURE — 99490 CHRNC CARE MGMT STAFF 1ST 20: CPT | Mod: S$PBB,,, | Performed by: INTERNAL MEDICINE

## 2019-10-31 PROCEDURE — 85610 PROTHROMBIN TIME: CPT | Mod: PBBFAC

## 2019-10-31 PROCEDURE — 93793 ANTICOAG MGMT PT WARFARIN: CPT | Mod: ,,,

## 2019-10-31 NOTE — PROGRESS NOTES
INR not at goal. Medications, chart, and patient findings reviewed. See calendar for adjustments to dose and follow up plan.  Findings: Pt states she missed her dose last Tuesday; she is not sure if she had her serving of greens this past week; she did take tylenol for a headache; & denies any other changes.  Instruct pt to hold coumadin 10/31 & decrease maintenance regimen.  Plan to re-assess in 2 weeks.  NOTE: pt missed a dose & remains supratherapeutic.   Patient was re-educated on situations that would require placing a call to the Coumadin Clinic, including bleeding or unusual bruising issues, changes in health, diet or medications,upcoming procedures that require warfarin interruption, and missed Coumadin dose(s). Patient expressed understanding that avoidance of consistency with these parameters could cause fluctuations in INR, leading to more frequent visits and increase risk of adverse events.

## 2019-11-14 ENCOUNTER — ANTI-COAG VISIT (OUTPATIENT)
Dept: CARDIOLOGY | Facility: CLINIC | Age: 74
End: 2019-11-14
Payer: MEDICARE

## 2019-11-14 DIAGNOSIS — Z79.01 CHRONIC ANTICOAGULATION: ICD-10-CM

## 2019-11-14 DIAGNOSIS — I26.99 OTHER ACUTE PULMONARY EMBOLISM WITHOUT ACUTE COR PULMONALE: ICD-10-CM

## 2019-11-14 DIAGNOSIS — Z79.01 LONG TERM (CURRENT) USE OF ANTICOAGULANTS: Primary | ICD-10-CM

## 2019-11-14 DIAGNOSIS — Z79.01 LONG TERM (CURRENT) USE OF ANTICOAGULANTS: ICD-10-CM

## 2019-11-14 LAB — INR PPP: 3.7 (ref 2–3)

## 2019-11-14 PROCEDURE — 93793 ANTICOAG MGMT PT WARFARIN: CPT | Mod: ,,,

## 2019-11-14 PROCEDURE — 93793 PR ANTICOAGULANT MGMT FOR PT TAKING WARFARIN: ICD-10-PCS | Mod: ,,,

## 2019-11-14 PROCEDURE — 85610 PROTHROMBIN TIME: CPT | Mod: PBBFAC

## 2019-11-14 RX ORDER — WARFARIN 1 MG/1
TABLET ORAL
Qty: 30 TABLET | Refills: 5 | Status: SHIPPED | OUTPATIENT
Start: 2019-11-14 | End: 2020-12-02 | Stop reason: SDUPTHER

## 2019-11-14 NOTE — PROGRESS NOTES
INR not at goal. Medications, chart, and patient findings reviewed. See calendar for adjustments to dose and follow up plan.  Findings: Pt has bruising due to use; she has been stressed due to the loss of her son last night; & she has also had a decrease in her appetite. She denies any other changes.  Instruct pt to hold coumadin 11/14 & decrease maintenance dose.  Pt inquired about having serving of greens today (instructed her it is ok).  Plan to re-assess in 2 weeks.   Patient was re-educated on situations that would require placing a call to the Coumadin Clinic, including bleeding or unusual bruising issues, changes in health, diet or medications,upcoming procedures that require warfarin interruption, and missed Coumadin dose(s). Patient expressed understanding that avoidance of consistency with these parameters could cause fluctuations in INR, leading to more frequent visits and increase risk of adverse events.

## 2019-11-20 PROBLEM — M25.662 KNEE JOINT STIFFNESS, BILATERAL: Status: RESOLVED | Noted: 2018-07-24 | Resolved: 2019-11-20

## 2019-11-20 PROBLEM — M25.661 KNEE JOINT STIFFNESS, BILATERAL: Status: RESOLVED | Noted: 2018-07-24 | Resolved: 2019-11-20

## 2019-11-20 PROBLEM — Z79.01 CURRENT USE OF LONG TERM ANTICOAGULATION: Status: ACTIVE | Noted: 2019-11-20

## 2019-11-22 ENCOUNTER — TELEPHONE (OUTPATIENT)
Dept: INTERNAL MEDICINE | Facility: CLINIC | Age: 74
End: 2019-11-22

## 2019-11-25 ENCOUNTER — ANTI-COAG VISIT (OUTPATIENT)
Dept: CARDIOLOGY | Facility: CLINIC | Age: 74
End: 2019-11-25
Payer: MEDICARE

## 2019-11-25 ENCOUNTER — OFFICE VISIT (OUTPATIENT)
Dept: INTERNAL MEDICINE | Facility: CLINIC | Age: 74
End: 2019-11-25
Payer: MEDICARE

## 2019-11-25 ENCOUNTER — TELEPHONE (OUTPATIENT)
Dept: INTERNAL MEDICINE | Facility: CLINIC | Age: 74
End: 2019-11-25

## 2019-11-25 VITALS
RESPIRATION RATE: 15 BRPM | SYSTOLIC BLOOD PRESSURE: 129 MMHG | BODY MASS INDEX: 42.89 KG/M2 | HEART RATE: 67 BPM | OXYGEN SATURATION: 97 % | DIASTOLIC BLOOD PRESSURE: 75 MMHG | HEIGHT: 63 IN | WEIGHT: 242.06 LBS

## 2019-11-25 DIAGNOSIS — Z13.820 SCREENING FOR OSTEOPOROSIS: ICD-10-CM

## 2019-11-25 DIAGNOSIS — M17.0 PRIMARY OSTEOARTHRITIS OF BOTH KNEES: ICD-10-CM

## 2019-11-25 DIAGNOSIS — E66.01 MORBID OBESITY WITH BMI OF 40.0-44.9, ADULT: ICD-10-CM

## 2019-11-25 DIAGNOSIS — K21.9 GASTROESOPHAGEAL REFLUX DISEASE, ESOPHAGITIS PRESENCE NOT SPECIFIED: ICD-10-CM

## 2019-11-25 DIAGNOSIS — Z79.01 CURRENT USE OF LONG TERM ANTICOAGULATION: ICD-10-CM

## 2019-11-25 DIAGNOSIS — I26.90 ACUTE SEPTIC PULMONARY EMBOLISM, UNSPECIFIED WHETHER ACUTE COR PULMONALE PRESENT: ICD-10-CM

## 2019-11-25 DIAGNOSIS — N18.30 CKD (CHRONIC KIDNEY DISEASE) STAGE 3, GFR 30-59 ML/MIN: ICD-10-CM

## 2019-11-25 DIAGNOSIS — Z79.01 CHRONIC ANTICOAGULATION: ICD-10-CM

## 2019-11-25 DIAGNOSIS — F33.0 MILD EPISODE OF RECURRENT MAJOR DEPRESSIVE DISORDER: ICD-10-CM

## 2019-11-25 DIAGNOSIS — Z91.81 RISK FOR FALLS: ICD-10-CM

## 2019-11-25 DIAGNOSIS — Z13.5 SCREENING FOR EYE CONDITION: ICD-10-CM

## 2019-11-25 DIAGNOSIS — Z78.0 MENOPAUSE: ICD-10-CM

## 2019-11-25 DIAGNOSIS — R41.3 MEMORY CHANGES: ICD-10-CM

## 2019-11-25 DIAGNOSIS — I27.9 PULMONARY HEART DISEASE: ICD-10-CM

## 2019-11-25 DIAGNOSIS — D56.3 THALASSEMIA MINOR: ICD-10-CM

## 2019-11-25 DIAGNOSIS — I10 ESSENTIAL HYPERTENSION: ICD-10-CM

## 2019-11-25 DIAGNOSIS — M71.20 SYNOVIAL CYST OF KNEE, UNSPECIFIED LATERALITY: ICD-10-CM

## 2019-11-25 DIAGNOSIS — I27.20 PULMONARY HYPERTENSION: ICD-10-CM

## 2019-11-25 DIAGNOSIS — I25.2 HISTORY OF NON-ST ELEVATION MYOCARDIAL INFARCTION (NSTEMI): ICD-10-CM

## 2019-11-25 DIAGNOSIS — F43.21 GRIEVING: ICD-10-CM

## 2019-11-25 DIAGNOSIS — Z79.01 CHRONIC ANTICOAGULATION: Primary | ICD-10-CM

## 2019-11-25 DIAGNOSIS — E66.2 OBESITY HYPOVENTILATION SYNDROME: ICD-10-CM

## 2019-11-25 DIAGNOSIS — I82.532 CHRONIC DEEP VEIN THROMBOSIS (DVT) OF LEFT POPLITEAL VEIN: ICD-10-CM

## 2019-11-25 DIAGNOSIS — G47.33 OBSTRUCTIVE SLEEP APNEA: ICD-10-CM

## 2019-11-25 DIAGNOSIS — M85.9 DISORDER OF BONE DENSITY AND STRUCTURE, UNSPECIFIED: ICD-10-CM

## 2019-11-25 DIAGNOSIS — Z13.31 POSITIVE DEPRESSION SCREENING: ICD-10-CM

## 2019-11-25 DIAGNOSIS — Z00.00 ENCOUNTER FOR PREVENTIVE HEALTH EXAMINATION: Primary | ICD-10-CM

## 2019-11-25 DIAGNOSIS — R29.898 WEAKNESS OF BOTH LOWER EXTREMITIES: ICD-10-CM

## 2019-11-25 LAB — INR PPP: 3 (ref 2–3)

## 2019-11-25 PROCEDURE — 93793 ANTICOAG MGMT PT WARFARIN: CPT | Mod: ,,,

## 2019-11-25 PROCEDURE — 99999 PR PBB SHADOW E&M-EST. PATIENT-LVL V: ICD-10-PCS | Mod: PBBFAC,,, | Performed by: NURSE PRACTITIONER

## 2019-11-25 PROCEDURE — G0439 PR MEDICARE ANNUAL WELLNESS SUBSEQUENT VISIT: ICD-10-PCS | Mod: S$GLB,,, | Performed by: NURSE PRACTITIONER

## 2019-11-25 PROCEDURE — 85610 PROTHROMBIN TIME: CPT | Mod: PBBFAC,PO

## 2019-11-25 PROCEDURE — 99215 OFFICE O/P EST HI 40 MIN: CPT | Mod: PBBFAC,PO | Performed by: NURSE PRACTITIONER

## 2019-11-25 PROCEDURE — 99999 PR PBB SHADOW E&M-EST. PATIENT-LVL V: CPT | Mod: PBBFAC,,, | Performed by: NURSE PRACTITIONER

## 2019-11-25 PROCEDURE — G0439 PPPS, SUBSEQ VISIT: HCPCS | Mod: S$GLB,,, | Performed by: NURSE PRACTITIONER

## 2019-11-25 PROCEDURE — 93793 PR ANTICOAGULANT MGMT FOR PT TAKING WARFARIN: ICD-10-PCS | Mod: ,,,

## 2019-11-25 RX ORDER — ALPRAZOLAM 0.5 MG/1
0.5 TABLET ORAL 2 TIMES DAILY PRN
Qty: 60 TABLET | Refills: 1 | Status: SHIPPED | OUTPATIENT
Start: 2019-11-25 | End: 2022-11-23

## 2019-11-25 NOTE — PATIENT INSTRUCTIONS
Counseling and Referral of Other Preventative  (Italic type indicates deductible and co-insurance are waived)    Patient Name: Coby Atkinson  Today's Date: 11/25/2019    Health Maintenance       Date Due Completion Date    Influenza Vaccine (1) Done per pt   10/11/2016    High Dose Statin N/a   ---    DEXA SCAN Ordered   10/20/2016    TETANUS VACCINE In the future for injury   ---    Shingles Vaccine (3 of 3) 01/20/2020 11/25/2019 (Done)        Lipid Panel 09/12/2020 9/12/2019    Colonoscopy    Annual eye exam- done      Positive depression screen- appt scheduled with PCP tomorrow for further evaluation     11/05/2026 11/5/2016        Orders Placed This Encounter   Procedures    DXA Bone Density Spine And Hip    Ambulatory Referral to Ophthalmology     The following information is provided to all patients.  This information is to help you find resources for any of the problems found today that may be affecting your health:                Living healthy guide: www.[facility].[place]      Understanding Diabetes: www.diabetes.org      Eating healthy: www.cdc.gov/healthyweight      [facility] home safety checklist: www.cdc.gov/steadi/patient.html      Agency on Aging: www.goea.[place]      Alcoholics anonymous (AA): www.aa.org      Physical Activity: www.erica.nih.gov/sz5ciem      Tobacco use: www.quitwithusla.org     Exercises to Prevent Falls  Certain types of exercises may help make you less likely to fall. Try the ones below. Or do other exercises that your health care provider suggests. Depending on your health, you may need to start slowly. Don't let that stop you. Even small amounts of exercise can help you. Be sure to talk to your health care provider before starting any exercise program.       Improve balance  Many types of exercise can help improve balance. Lance chi and yoga are good examples. Here's another one to try. You can do it anytime and almost anywhere.  · Stand next to a counter or solid  "support.  · Push yourself up onto your tiptoes.  · Hold for 5 seconds. If you start to lose your balance, hold on to the counter.  · Rest and repeat 5 times. Work up to holding for 20 to 30 seconds, if you can. Increase flexibility  Being more flexible makes it easier for you to move around safely. Try exercises like the seated hamstring stretch.  · Sit in a chair and put one foot on a stool.  · Straighten your leg and reach with both hands down either side of your leg. Reach as far down your leg as you can.  · Hold for about 20 seconds.  · Go back to the starting position. Then repeat 5 times. Switch legs. Build strength  "Resistance" exercises help build strength. You can do them without equipment. Or you can use weights, elastic bands, or special machines. One such exercise is called the biceps curl. You can hold a 1-pound weight or even a can of soup. Do this exercise at least 3 times a week. Strive for every day.  · Sit up straight in a chair.  · Keep your elbow close to your body and your wrist straight.  · Bend your arm, moving your hand up to your shoulder. Then slowly lower your arm.  · Repeat 5 times. Switch to the other arm.   Build your staying power  Aerobic exercises make your heart and lungs stronger so you can keep moving longer. Walking and swimming are two of the best types of exercises you can do. Using a stationary bike is great, too. Find an aerobic exercise that you enjoy. Start slowly and build up. Even 5 minutes is helpful. Aim for a goal of 30 minutes, at least 3 times a week. You don't have to do 30 minutes in 1 session. Break it up and walk a little throughout the day.  More helpful tips  · Start easy. Slowly work up to doing more.  · Talk with your health care provider about the best exercises for you.  · Call senior centers or health clubs about exercise programs.  · If needed, have a family member watch you walk every so often to check your stability.  · Exercise with a friend. Choose " an activity you both enjoy.  · Consider robinson chi or yoga to strengthen your balance.  · Try exercises that you can do anytime, anywhere. Here are 2 examples. Have someone with you when you first try these:  ¨ Practice walking by placing 1 foot right in front of the other.  ¨ Stand up and sit down 10 times. Repeat this throughout the day.   Date Last Reviewed: 6/13/2015  © 7335-9497 [employer]. [address] [place] [address], [place] [address]. All rights reserved. This information is not intended as a substitute for professional medical care. Always follow your healthcare professional's instructions.        Preventing Falls: Making Changes in Your Living Space  Is your living space filled with hazards that could cause you to fall? Changes can make you safer. They could even save your life. Take a careful look around your home. Change what you can on your own. Hire someone or ask friends or family to help with harder tasks.      Be sure to add a nonslip mat to the inside of your shower or bathtub. Always keep a nightlight on. Keep a clear path from your bed to the bathroom. Move items from higher shelves to lower ones.   Remove hazards  · Remove things that can trip you, like throw rugs, boxes, piles of paper, or cords.  · Nail down rugs or carpeting if you don't want to remove them.  · Don't store items on stairs.  · Keep walkways clear.  · Clean up spills right away.  · Replace glass tables with wooden ones. They're safer if you fall.  Add safety devices  · Add handrails to both sides of stairs.  · Buy a raised toilet seat.  · Add grab bars near the toilet and in the shower.  · Get grabbers to help you reach things and avoid climbing.  Improve lighting  · Add nightlights to halls, bedrooms, and bathrooms.   · Put light switches at the top and bottom of stairs.  · Be sure each room and flight of stairs has proper lighting.  · Use shades or curtains to cut glare from windows.  · Put flashlights in each room.  Replace burned-out bulbs.  · Get glowing light switches for room entrances.  Take other precautions  · Use nonskid floor wax.  · Buy a nonslip mat and a liquid soap dispenser for the shower.  · Tack down carpets or use slip-resistant backing.  · Put most-used items within easy reach.  · Add bright paint or tape on the top front edge of steps.  · Save big jobs, such as moving furniture or other heavy objects, for family or friends.  · Get professional help installing grab bars. They can be unsafe if not installed the right way.  Fix riskier rooms first  Don't tackle everything at once. Focus on one room at a time. The bathroom is a common spot for falls, so you may start there. Or start with a room you spend lots of time in, such as your bedroom. Make only a few changes at once. This will give you time to adjust to them.     Outside your home  You might arrange for these changes yourself, or you might need to talk to your  or homeowners' association about them.  · Have loose boards on porches or damaged stairs repaired.  · Have rough edges, holes, or large cracks in sidewalks or driveways repaired.  · Have hazards that could trip you, such as hoses or claudette, removed.  · Use high-wattage light bulbs (100 or greater) near outside doors and stairs.  · Get handrails added to outside stairs. Have them extend beyond the bottom step.  · Get help in winter weather with ice or snow removal.   Date Last Reviewed: 6/12/2015  © 4160-6081 [employer]. [address] [place] [address], [place] [address]. All rights reserved. This information is not intended as a substitute for professional medical care. Always follow your healthcare professional's instructions.

## 2019-11-25 NOTE — TELEPHONE ENCOUNTER
Patient was seen by Flor COLÓN today.    Patient recently lost only son via [place] MI.  Patient is crying all the time needs something sent to her pharmacy. She has an appointment with you tomorrow.

## 2019-11-25 NOTE — PROGRESS NOTES
Quick follow-up for elevated INR. INR within goal range today. Patient denies any bleeding or changes - some stress with recent loss of son. Will maintain weekly dose until follow-up in 2 weeks. Advised her to call with any changes or concerns.

## 2019-11-25 NOTE — PROGRESS NOTES
"Coby Atkinson presented for a  Medicare AWV and comprehensive Health Risk Assessment today. The following components were reviewed and updated:    · Medical history  · Family History  · Social history  · Allergies and Current Medications  · Health Risk Assessment  · Health Maintenance  · Care Team external opth    ** See Completed Assessments for Annual Wellness Visit within the encounter summary.**       The following assessments were completed:  · Living Situation  · CAGE  · Depression Screening-+ son passed about suddenly this month  · Timed Get Up and Go  · Whisper Test  · Cognitive Function Screening  · Nutrition Screening  · ADL Screening  · PAQ Screening    Vitals:    11/25/19 0949   BP: 129/75   Patient Position: Sitting   Pulse: 67   Resp: 15   SpO2: 97%   Weight: 109.8 kg (242 lb 1 oz)   Height: 5' 3" (1.6 m)     Body mass index is 42.88 kg/m².  Physical Exam   Constitutional: She is oriented to person, place, and time. She appears well-developed and well-nourished.   HENT:   Head: Normocephalic and atraumatic.   Right Ear: External ear normal.   Left Ear: External ear normal.   Nose: Nose normal.   Mouth/Throat: Oropharynx is clear and moist. No oropharyngeal exudate.   Eyes: Pupils are equal, round, and reactive to light. Conjunctivae and EOM are normal. No scleral icterus.   Neck: Normal range of motion. Neck supple.   Cardiovascular: Normal rate and regular rhythm.   Pulmonary/Chest: Effort normal and breath sounds normal. No respiratory distress.   Abdominal: Soft. Bowel sounds are normal. She exhibits no distension. There is no tenderness.   obese   Musculoskeletal:   Ambulates w walker   Neurological: She is alert and oriented to person, place, and time. She has normal reflexes. She displays normal reflexes. No cranial nerve deficit. She exhibits normal muscle tone. Coordination normal.   Skin: Skin is warm and dry. No rash noted.   Psychiatric: She has a normal mood and affect. Her behavior is " normal. Judgment and thought content normal.         Lab Results   Component Value Date    CHOL 137 2019    HDL 62 2019    LDLCALC 60.8 (L) 2019    TRIG 71 2019    CHOLHDL 45.3 2019      Results for orders placed in visit on 10/20/16   DXA Bone Density Spine And Hip_Axial Skeleton    Narrative : 1945 ORDERING PHYSICIAN: COLBY Hudson LOCATION: [facility]    HISTORY: 71y/o female with no hx of fractures. She had surgical menopause at 39 y/o. She does not exercise or smoke.    TECHNIQUE: Bone Mineral Density performed using [employer] Discovery W (S/N [id number]) reveals good positioning of lumbar spine and hip.    BONE MINERAL DENSITY RESULTS:  Lumbar Spine: Lumbar bone mineral density L1-L4 is 1.061g/cm2, which is a t-score of 0.1. The z-score is 2.4.    Total Hip: The total hip bone mineral density is 0.891g/cm2.  The t-score is -0.4, and the z-score is 1.2.  Femoral neck BMD is 0.693g/cm2 and the t-score is -1.4.    COMPARISONS:  Date   Location  BMD  T-score  11  L-spine  0.999  -0.4     Total Hip  0.902  -0.3    Impression  Low bone mass/osteopenia of the femoral neck.  Increase in lumbar spine BMD (6.3%) since prior study. FRAX calculations do not suggest treatment.          Recommendations:  1) Adequate calcium and Vitamin D therapy  2) Appropriate exercise  3) Weigh risks and benefits of estrogen therapy.  4) Consider repeat BMD in 2 years.    EXPLANATION OF RESULTS:  The t-score compares this results to the bone density of a 25 year old of the same gender. The z-score compares this result to the average bone density to people of the same age and gender. The amounts indicate the number of standard deviations above or below the mean.  * Osteoporosis is generally defined as having a t-score between less than -2.5.  * Osteopenia is generally defined as having a t-score between -1 and -2.5.  * The normal range is generally defined as having a t-score between -1 to  1.      Electronically signed by: MANUELA PALOMINO MD  Date:     10/26/16  Time:    11:23      Results for orders placed during the hospital encounter of 05/07/14   Mammo Digital Screening Bilat with CAD    Narrative The present examination has been compared to prior imaging studies dated  02/17/2012 and 05/21/2010.    BILATERAL MAMMOGRAM FINDINGS:  The breasts are almost entirely fat.    No significant abnormalities are seen.    These images  were processed to produce digital images analyzed for potential  abnormalities.      Impression There is no mammographic evidence of malignancy.    Mammogram in 1 year is recommended.    ACR BI-RADS Category 1: Negative      LINDA KNIGHT M.D.  05/07/2014       Diagnoses and health risks identified today and associated recommendations/orders:    1. Synovial cyst of knee, unspecified laterality  Stable followed by PCP    2. Gastroesophageal reflux disease, esophagitis presence not specified  Stable followed by PCP    3. Obstructive sleep apnea  Stable followed by PCP, sleep medicine clinic    4. Essential hypertension  Stable followed by cardiology    5. Thalassemia minor  Stable followed by PCP    6. CKD (chronic kidney disease) stage 3, GFR 30-59 ml/min  Stable followed by PCP    7. Chronic anticoagulation  Stable followed by coumadin clinic    8. Chronic deep vein thrombosis (DVT) of left popliteal vein  Stable followed by PCP    9. History of non-ST elevation myocardial infarction (NSTEMI)  Stable followed by PCP, cardiology    10. Mild episode of recurrent major depressive disorder  Stable followed by PCP    11. Primary osteoarthritis of both knees  Stable followed by PCP    12. Pulmonary hypertension  Stable followed by PCP    13. Obesity hypoventilation syndrome  Stable followed by sleep clinic    14. Acute septic pulmonary embolism, unspecified whether acute cor pulmonale present  Stable followed by PCP, coumading clinic    15. Weakness of both lower  extremities  Stable followed by PCP    16. Current use of long term anticoagulation  Stable followed by coumadin clinic    17. Screening for osteoporosis  Stable followed by PCP  - DXA Bone Density Spine And Hip; Future    18. Disorder of bone density and structure, unspecified   Stable followed by PCP  - DXA Bone Density Spine And Hip; Future    19. Menopause  Stable followed by PCP  - DXA Bone Density Spine And Hip; Future    20. Screening for eye condition  Stable followed by PCP  - Ambulatory Referral to Ophthalmology    21. Morbid obesity with BMI of 40.0-44.9, adult  Chronic. Followed by PCP.   Centers for Disease Control and Prevention (CDC)  weight recommendations for current BMI & ideal BMI range discussed with patient.  Recommended  Low fat diet. Start regular exercise regimen qd.    22. Pulmonary heart disease  Stable followed by PCP    23. Positive depression screening  Stable followed by PCP-appt scheduled tomorrow    24. Memory changes  Stable followed by PCP    25. Grieving  Stable followed by PCP    26. Encounter for preventive health examination  Assessments completed. Preventative health recommendations reviewed.     27. Risk for falls  Stable followed by PCP      Provided Coby with a 5-10 year written screening schedule and personal prevention plan. Recommendations were developed using the USPSTF age appropriate recommendations. Education, counseling, and referrals were provided as needed. After Visit Summary printed and given to patient which includes a list of additional screenings\tests needed. Fall prevention. Grieving over son's death- appt scheduled with PCP tomorrow.    Follow up in about 1 year (around 11/25/2020) for HRA.    Flor Gautam NP I offered to discuss end of life issues, including information on how to make advance directives that the patient could use to name someone who would make medical decisions on their behalf if they became too ill to make themselves.    _X_Patient  declined  ___Patient is interested, I provided paper work and offered to discuss.

## 2019-11-30 ENCOUNTER — EXTERNAL CHRONIC CARE MANAGEMENT (OUTPATIENT)
Dept: PRIMARY CARE CLINIC | Facility: CLINIC | Age: 74
End: 2019-11-30
Payer: MEDICARE

## 2019-11-30 PROCEDURE — 99490 CHRNC CARE MGMT STAFF 1ST 20: CPT | Mod: PBBFAC,PO | Performed by: INTERNAL MEDICINE

## 2019-11-30 PROCEDURE — 99490 CHRNC CARE MGMT STAFF 1ST 20: CPT | Mod: S$PBB,,, | Performed by: INTERNAL MEDICINE

## 2019-11-30 PROCEDURE — 99490 PR CHRONIC CARE MGMT, 1ST 20 MIN: ICD-10-PCS | Mod: S$PBB,,, | Performed by: INTERNAL MEDICINE

## 2019-12-03 ENCOUNTER — APPOINTMENT (OUTPATIENT)
Dept: RADIOLOGY | Facility: CLINIC | Age: 74
End: 2019-12-03
Attending: NURSE PRACTITIONER
Payer: MEDICARE

## 2019-12-03 DIAGNOSIS — Z13.820 SCREENING FOR OSTEOPOROSIS: ICD-10-CM

## 2019-12-03 DIAGNOSIS — M85.9 DISORDER OF BONE DENSITY AND STRUCTURE, UNSPECIFIED: ICD-10-CM

## 2019-12-03 DIAGNOSIS — Z78.0 MENOPAUSE: ICD-10-CM

## 2019-12-03 PROCEDURE — 77080 DEXA BONE DENSITY SPINE HIP: ICD-10-PCS | Mod: 26,,, | Performed by: INTERNAL MEDICINE

## 2019-12-03 PROCEDURE — 77080 DXA BONE DENSITY AXIAL: CPT | Mod: TC,PO

## 2019-12-03 PROCEDURE — 77080 DXA BONE DENSITY AXIAL: CPT | Mod: 26,,, | Performed by: INTERNAL MEDICINE

## 2019-12-09 ENCOUNTER — ANTI-COAG VISIT (OUTPATIENT)
Dept: CARDIOLOGY | Facility: CLINIC | Age: 74
End: 2019-12-09
Payer: MEDICARE

## 2019-12-09 ENCOUNTER — HOSPITAL ENCOUNTER (EMERGENCY)
Facility: HOSPITAL | Age: 74
Discharge: HOME | End: 2019-12-09
Attending: EMERGENCY MEDICINE
Payer: MEDICARE

## 2019-12-09 VITALS
HEIGHT: 63 IN | RESPIRATION RATE: 19 BRPM | OXYGEN SATURATION: 99 % | BODY MASS INDEX: 42.52 KG/M2 | SYSTOLIC BLOOD PRESSURE: 200 MMHG | TEMPERATURE: 98 F | DIASTOLIC BLOOD PRESSURE: 100 MMHG | HEART RATE: 90 BPM | WEIGHT: 240 LBS
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
## 2019-12-09 DIAGNOSIS — Z79.01 CHRONIC ANTICOAGULATION: Primary | ICD-10-CM

## 2019-12-09 DIAGNOSIS — Z79.01 LONG TERM (CURRENT) USE OF ANTICOAGULANTS: ICD-10-CM

## 2019-12-09 DIAGNOSIS — M79.601 RIGHT ARM PAIN: ICD-10-CM

## 2019-12-09 DIAGNOSIS — S40.021A ARM CONTUSION, RIGHT, INITIAL ENCOUNTER: ICD-10-CM

## 2019-12-09 DIAGNOSIS — E04.1 THYROID NODULE: ICD-10-CM

## 2019-12-09 LAB — INR PPP: 1.5 (ref 2–3)

## 2019-12-09 PROCEDURE — 93793 PR ANTICOAGULANT MGMT FOR PT TAKING WARFARIN: ICD-10-PCS | Mod: ,,,

## 2019-12-09 PROCEDURE — 99284 EMERGENCY DEPT VISIT MOD MDM: CPT | Mod: 25

## 2019-12-09 PROCEDURE — 99284 EMERGENCY DEPT VISIT MOD MDM: CPT | Mod: ,,, | Performed by: EMERGENCY MEDICINE

## 2019-12-09 PROCEDURE — 85610 PROTHROMBIN TIME: CPT | Mod: PBBFAC,PO

## 2019-12-09 PROCEDURE — 93793 ANTICOAG MGMT PT WARFARIN: CPT | Mod: ,,,

## 2019-12-09 PROCEDURE — 99284 PR EMERGENCY DEPT VISIT,LEVEL IV: ICD-10-PCS | Mod: ,,, | Performed by: EMERGENCY MEDICINE

## 2019-12-09 RX ORDER — ACETAMINOPHEN 325 MG/1
650 TABLET ORAL
Status: DISCONTINUED | OUTPATIENT
Start: 2019-12-09 | End: 2019-12-09 | Stop reason: HOSPADM

## 2019-12-09 NOTE — ED PROVIDER NOTES
Encounter Date: 12/9/2019       History     Chief Complaint   Patient presents with    Fall     Pt [place] mall. Pt c/o lower back pain. No LOC reported. +head trauma.      Coby Atkinson is a 74 y.o. female who  has a past medical history of Anticoagulant long-term use, Cataract, Chronic diarrhea, Depression, Edema, GERD (gastroesophageal reflux disease), Hypertension (10/2/2012), Osteoarthritis of both knees (8/9/2016), Osteopenia, Osteopenia (11/30/2017), PE (pulmonary thromboembolism) (12/03/2017), Primary localized osteoarthrosis, lower leg (12/3/2014), Sleep apnea (2016), and Thalassemia minor.    The patient presents to the ED due to fall.  Patient reports she lost her balance on the escalator at the mall. She fell backward and rolled down the escalator to the bottom.   She presents in c-collar.  She reports pain to R forearm as well as posterior headache.  She denies any LOC, back pain, lower extremity pain/injury, or any other complaints.          Review of patient's allergies indicates:   Allergen Reactions    Codeine     Ciprofloxacin Rash     Past Medical History:   Diagnosis Date    Anticoagulant long-term use     Cataract     Chronic diarrhea     Depression     Edema     GERD (gastroesophageal reflux disease)     Hypertension 10/2/2012    Osteoarthritis of both knees 8/9/2016    Osteopenia     Osteopenia 11/30/2017    PE (pulmonary thromboembolism) 12/03/2017    Primary localized osteoarthrosis, lower leg 12/3/2014    Sleep apnea 2016    Thalassemia minor      Past Surgical History:   Procedure Laterality Date    APPENDECTOMY      CATARACT EXTRACTION      COLONOSCOPY N/A 11/5/2016    Procedure: COLONOSCOPY;  Surgeon: Tanner Simental MD;  Location: [address];  Service: Endoscopy;  Laterality: N/A;    HYSTERECTOMY  age 39    fibroids    OOPHORECTOMY      TONSILLECTOMY       Family History   Problem Relation Age of Onset    Hypertension  Mother     Cancer Father         skin    Cancer Brother         pancreatic    No Known Problems Sister     No Known Problems Maternal Aunt     No Known Problems Maternal Uncle     No Known Problems Paternal Aunt     No Known Problems Paternal Uncle     Coronary artery disease Maternal Grandmother     Heart failure Maternal Grandmother     No Known Problems Maternal Grandfather     Stroke Paternal Grandmother     Coronary artery disease Paternal Grandmother     No Known Problems Paternal Grandfather     Coronary artery disease Brother         with CABG in his 30s    Amblyopia Neg Hx     Blindness Neg Hx     Cataracts Neg Hx     Diabetes Neg Hx     Glaucoma Neg Hx     Macular degeneration Neg Hx     Retinal detachment Neg Hx     Strabismus Neg Hx     Thyroid disease Neg Hx     Colon cancer Neg Hx     Stomach cancer Neg Hx     Esophageal cancer Neg Hx      Social History     Tobacco Use    Smoking status: Never Smoker    Smokeless tobacco: Never Used   Substance Use Topics    Alcohol use: No     Comment: rare    Drug use: No     Review of Systems   Constitutional: Negative for chills and fever.   HENT: Negative for sore throat.    Respiratory: Negative for cough and shortness of breath.    Cardiovascular: Negative for chest pain.   Gastrointestinal: Negative for nausea and vomiting.   Genitourinary: Negative for dysuria, frequency and urgency.   Musculoskeletal: Positive for arthralgias, myalgias and neck pain. Negative for back pain and neck stiffness.   Skin: Negative for rash and wound.   Neurological: Positive for headaches. Negative for syncope and weakness.   Hematological: Does not bruise/bleed easily.   Psychiatric/Behavioral: Negative for agitation, behavioral problems and confusion.       Physical Exam     Initial Vitals [12/09/19 1215]   BP Pulse Resp Temp SpO2   (!) 200/100 90 19 98.2 °F (36.8 °C) 99 %      MAP       --         Physical Exam    Nursing note and vitals  reviewed.  Constitutional: She appears well-developed and well-nourished. She is not diaphoretic. No distress.   HENT:   Head: Normocephalic and atraumatic.   Mouth/Throat: Oropharynx is clear and moist.   Eyes: EOM are normal. Pupils are equal, round, and reactive to light.   Neck: Muscular tenderness present. No spinous process tenderness present. No tracheal deviation and normal range of motion present.   C-collar in place.   Cardiovascular: Normal rate, regular rhythm, normal heart sounds and intact distal pulses.   Pulmonary/Chest: Breath sounds normal. No stridor. No respiratory distress. She has no wheezes.   Abdominal: Soft. Bowel sounds are normal. She exhibits no distension and no mass. There is no tenderness.   Musculoskeletal: Normal range of motion. She exhibits no edema.        Cervical back: She exhibits no tenderness and no bony tenderness.        Thoracic back: She exhibits no tenderness and no bony tenderness.        Lumbar back: She exhibits no tenderness and no bony tenderness.        Arms:  Abrasions and superficial skin tears to R forearm.  Mild tenderness to R elbow and distal forearm.   Neurological: She is alert and oriented to person, place, and time. She has normal strength. No cranial nerve deficit or sensory deficit.   Skin: Skin is warm and dry. Capillary refill takes less than 2 seconds. No pallor.   Psychiatric: She has a normal mood and affect. Her behavior is normal. Thought content normal.         ED Course   Procedures  Labs Reviewed - No data to display       Imaging Results          CT Head Without Contrast (Final result)  Result time 12/09/19 15:16:20    Final result by Keanu Avelar MD (12/09/19 15:16:20)                 Impression:      No evidence of recent hemorrhage or other acute traumatic injury.      Electronically signed by: Keanu Avelar MD  Date:    12/09/2019  Time:    15:16             Narrative:    EXAMINATION:  CT HEAD WITHOUT CONTRAST    CLINICAL  HISTORY:  Head trauma, headache;    TECHNIQUE:  Low dose axial CT images obtained throughout the head without the use of intravenous contrast.  Axial, sagittal and coronal reconstructions were performed.    COMPARISON:  04/23/2018    FINDINGS:  Intracranial compartment:    Ventricles and sulci are normal in size for age without evidence of hydrocephalus.    The brain parenchyma appears stable.  Mild chronic microvascular ischemic disease.  No parenchymal mass, hemorrhage, edema or major vascular distribution infarct.    No extra-axial blood or fluid collections.    Skull/extracranial contents (limited evaluation):    No fracture.  Mastoid air cells are clear. Mild patchy mucosal thickening in the paranasal sinuses.                                CT Cervical Spine Without Contrast (Final result)  Result time 12/09/19 15:51:17    Final result by Petr Rodriges MD (12/09/19 15:51:17)                 Impression:      No acute fracture or traumatic malalignment in cervical spine.    Cervical spondylosis, more significant at C4-C5, C5-C6 and C6-C7 where there is mild canal stenosis and bilateral neural foraminal narrowing.    2.7 cm left thyroid nodule, further evaluation with thyroid ultrasound or clinical correlation with outside ultrasound if available.    Enlarged left transverse foramen at C6, possibly representing left vertebral artery aneurysm or related to tortuous course of the left vertebral artery.  The corticated margin of this finding is highly suggestive of a chronic lesion.    This report was flagged in Epic as abnormal.    Electronically signed by resident: Namita Richards  Date:    12/09/2019  Time:    15:19    Electronically signed by: Petr Rodriges MD  Date:    12/09/2019  Time:    15:51             Narrative:    EXAMINATION:  CT CERVICAL SPINE WITHOUT CONTRAST    CLINICAL HISTORY:  C-spine trauma, NEXUS/CCR positive, +risk factor(s);    TECHNIQUE:  Low dose axial images, sagittal and coronal  reformations were performed though the cervical spine.  Contrast was not administered.    COMPARISON:  CT head 12/09/2019.    FINDINGS:  Visualized portion of the intracranial content: Pneumatization of the petrous apices.  Please see CT head report obtained at the same time for further details.    Alignment: Straightening of the normal cervical lordosis.  Grade 1 anterolisthesis of C3 on C4 and C7 on T1.    Vertebrae: Normal body height and contour.  No fracture.    Note is made of well-corticated expansion at the left transverse process at C6 measure on the order of 0.9 x 0.8 cm (series 605, image 70), adjacent to the course of the left vertebral artery, possibly representing aneurysmal dilatation or tortuous course of the left vertebral artery.    Discs: Multilevel height loss extending throughout C3 to T1.    Degenerative changes: Posterior disc osteophyte complex, uncovertebral spurring and facet arthrosis throughout the cervical spine, more pronounced at C4-C5, C5-C6 and C6-C7 where there is mild canal stenosis and bilateral neural foraminal narrowing.  No high-grade spinal canal stenosis or neural foraminal narrowing.    Paravertebral muscles and soft tissue: 2.7 cm nodule in the left thyroid lobe.                               X-Ray Elbow Complete Right (Final result)  Result time 12/09/19 13:19:45    Final result by Abraham Christian III, MD (12/09/19 13:19:45)                 Narrative:    EXAMINATION:  XR ELBOW COMPLETE 3 VIEW RIGHT    CLINICAL HISTORY:  Unspecified fall, initial encounter    FINDINGS:  Elbow complete three views right.    There is mild chronic calcific lateral epicondylitis.  No fracture dislocation bone destruction or joint effusion seen.      Electronically signed by: Abraham Christian MD  Date:    12/09/2019  Time:    13:19                             X-Ray Forearm Right (Final result)  Result time 12/09/19 13:20:37    Final result by Abraham Christian III, MD (12/09/19 13:20:37)                  Narrative:    EXAMINATION:  XR FOREARM RIGHT    CLINICAL HISTORY:  Pain in right arm    FINDINGS:  Two views forearm right.    There is calcific chronic lateral epicondylitis.  No fracture, dislocation, or bone destruction seen.      Electronically signed by: Abraham Christian MD  Date:    12/09/2019  Time:    13:20                               Medical Decision Making:   History:   Old Medical Records: I decided to obtain old medical records.  Old Records Summarized: other records.  Initial Assessment:   74-year-old female presents to ED after fall down an escalator at the mall earlier today.  Exam with mild bilateral neck tenderness, right forearm abrasions and contusions.  Will obtain x-rays of the right arm, CT head/C-spine, and reassess.  Differential Diagnosis:   Differential Diagnosis includes, but is not limited to:  Polytrauma, fall/syncope, traumatic SAH/intracranial bleed, skull/c-spine/facial fracture, concussion, neck injury, chest trauma, intraabdominal bleed, solid organ injury, pelvic fracture, long bone fracture/dislocation, nerve injury/palsy, vascular injury, hemarthrosis, septic joint, osteoarthritis, compartment syndrome, rhabdomyolysis, soft tissue contusion, muscle strain, ligament tear/sprain, foreign body, laceration, abrasion.    Clinical Tests:   Radiological Study: Ordered and Reviewed  ED Management:  CT head negative for acute process.  CT C-spine with incidental findings of thyroid nodule and enlarged left C6 transverse foramen, no other acute findings.  X-rays negative for fracture or traumatic injury.  Patient and family informed of findings and reassured, counseled on symptomatic and supportive care of cervical strain and forearm abrasions/contusions.  Instructed to follow up with PCP for further evaluation management of thyroid nodule.  Patient stable for discharge.    On re-evaluation, the patient's status has improved.  After complete ED evaluation, clinical impression is most  consistent with fall, forearm contusions/abrasions.  PCP follow-up within 2-3 days was recommended.    After taking into careful account the patient's history, physical exam findings, as well as empirical and objective data obtained throughout ED workup, I feel no emergent medical condition has been identified. No further evaluation or admission was felt to be required, and the patient is stable for discharge from the ED. The patient and any additional family present were updated with test results, overall clinical impression, and recommended further plan of care, including discharge instructions as provided and outpatient follow-up for continued evaluation and management as needed. All questions were answered. The patient expressed understanding and agreed with current plan for discharge and follow-up plan of care. Strict ED return precautions were provided, including return/worsening of current symptoms, new symptoms, or any other concerns.                                   Clinical Impression:       ICD-10-CM ICD-9-CM   1. Fall W19.XXXA E888.9   2. Right arm pain M79.601 729.5   3. Thyroid nodule E04.1 241.0   4. Arm contusion, right, initial encounter S40.021A 923.9         Disposition:   Disposition: Discharged  Condition: Stable                     Carloz Albarran MD  12/10/19 [id number]

## 2019-12-09 NOTE — ED TRIAGE NOTES
Coby Atkinson, a 74 y.o. female presents to the ED w/ complaint of fall down an escalator at the mall. No complaining of lower back pain.     Triage note:  Chief Complaint   Patient presents with    Fall     Pt fell [place]ator [place] the mall. Pt c/o lower back pain. No LOC reported. +head trauma.      Review of patient's allergies indicates:   Allergen Reactions    Codeine     Ciprofloxacin Rash     Past Medical History:   Diagnosis Date    Anticoagulant long-term use     Cataract     Chronic diarrhea     Depression     Edema     GERD (gastroesophageal reflux disease)     Hypertension 10/2/2012    Osteoarthritis of both knees 8/9/2016    Osteopenia     Osteopenia 11/30/2017    PE (pulmonary thromboembolism) 12/03/2017    Primary localized osteoarthrosis, lower leg 12/3/2014    Sleep apnea 2016    Thalassemia minor      LOC: Patient name and date of birth verified. The patient is awake, alert and aware of environment with an appropriate affect, the patient is oriented x 3 and speaking appropriately.   APPEARANCE: Patient resting comfortably, patient is clean and well groomed, patient's clothing is properly fastened.  SKIN: The skin is warm and dry, color consistent with ethnicity, patient has normal skin turgor and moist mucus membranes, skin intact, no breakdown or bruising noted.  MUSCULOSKELETAL: Patient moving all extremities well, no obvious swelling or deformities noted.   RESPIRATORY: Respirations are spontaneous, patient has a normal effort and rate, no accessory muscle use noted.  CARDIAC: Patient has a normal rate and rhythm, no periphreal edema noted, capillary refill < 3 seconds.  ABDOMEN: Soft and non tender to palpation, no distention noted. Bowel sounds present in all four quadrants.  NEUROLOGIC: Eyes open spontaneously, behavior appropriate to situation, follows commands, facial expression symmetrical, bilateral hand grasp equal and even, purposeful motor response noted,  normal sensation in all extremities when touched with a finger.

## 2019-12-09 NOTE — PROGRESS NOTES
INR below goal range today. Patient denies any bleeding or other changes. She thinks she may have missed a dose recently (son passed away unexpectedly a few weeks ago and she states she's been having trouble keeping track of things). She denies changes to diet. Will boost tonight and resume maintenance dose until f/u in 2 weeks. I advised her to contact us with any changes or problems.

## 2019-12-10 ENCOUNTER — PES CALL (OUTPATIENT)
Dept: ADMINISTRATIVE | Facility: CLINIC | Age: 74
End: 2019-12-10

## 2019-12-11 ENCOUNTER — OFFICE VISIT (OUTPATIENT)
Dept: INTERNAL MEDICINE | Facility: CLINIC | Age: 74
End: 2019-12-11
Payer: MEDICARE

## 2019-12-11 VITALS
SYSTOLIC BLOOD PRESSURE: 139 MMHG | WEIGHT: 240.06 LBS | DIASTOLIC BLOOD PRESSURE: 82 MMHG | HEART RATE: 75 BPM | RESPIRATION RATE: 16 BRPM | TEMPERATURE: 98 F | BODY MASS INDEX: 42.54 KG/M2 | HEIGHT: 63 IN

## 2019-12-11 DIAGNOSIS — E04.1 THYROID NODULE: ICD-10-CM

## 2019-12-11 DIAGNOSIS — S09.90XA TRAUMATIC INJURY OF HEAD, INITIAL ENCOUNTER: ICD-10-CM

## 2019-12-11 DIAGNOSIS — S33.5XXA LOW BACK SPRAIN, INITIAL ENCOUNTER: ICD-10-CM
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
## 2019-12-11 PROCEDURE — 1159F PR MEDICATION LIST DOCUMENTED IN MEDICAL RECORD: ICD-10-PCS | Mod: ,,, | Performed by: INTERNAL MEDICINE

## 2019-12-11 PROCEDURE — 99213 OFFICE O/P EST LOW 20 MIN: CPT | Mod: PBBFAC,PO | Performed by: INTERNAL MEDICINE

## 2019-12-11 PROCEDURE — 99213 OFFICE O/P EST LOW 20 MIN: CPT | Mod: S$PBB,,, | Performed by: INTERNAL MEDICINE

## 2019-12-11 PROCEDURE — 1125F PR PAIN SEVERITY QUANTIFIED, PAIN PRESENT: ICD-10-PCS | Mod: ,,, | Performed by: INTERNAL MEDICINE

## 2019-12-11 PROCEDURE — 1159F MED LIST DOCD IN RCRD: CPT | Mod: ,,, | Performed by: INTERNAL MEDICINE

## 2019-12-11 PROCEDURE — 99999 PR PBB SHADOW E&M-EST. PATIENT-LVL III: ICD-10-PCS | Mod: PBBFAC,,, | Performed by: INTERNAL MEDICINE

## 2019-12-11 PROCEDURE — 1125F AMNT PAIN NOTED PAIN PRSNT: CPT | Mod: ,,, | Performed by: INTERNAL MEDICINE

## 2019-12-11 PROCEDURE — 99999 PR PBB SHADOW E&M-EST. PATIENT-LVL III: CPT | Mod: PBBFAC,,, | Performed by: INTERNAL MEDICINE

## 2019-12-11 PROCEDURE — 99213 PR OFFICE/OUTPT VISIT, EST, LEVL III, 20-29 MIN: ICD-10-PCS | Mod: S$PBB,,, | Performed by: INTERNAL MEDICINE

## 2019-12-11 NOTE — PROGRESS NOTES
Subjective:       Patient ID: Coby Atkinson is a 74 y.o. female.    Chief Complaint: fall and Back Pain    HPI   Pt on chronic anticoagulation is here for ED f/u on 12/9/19 for a fall while at the mall. She fell backwards down an escalator in the mall. Pt hit her head but denied any syncope. Also with bruising to her right forearm and low back. She had a normal Head CT in the ED. CT scan of the neck shoed an enlarged thyroid nodule.  Pt still having mild low back and knee pain but none in her extremities and is able to ambulate with out difficulty.   Review of Systems   Constitutional: Negative for activity change, appetite change, chills, diaphoresis, fatigue, fever and unexpected weight change.   HENT: Negative for postnasal drip, rhinorrhea, sinus pressure, sneezing, sore throat, trouble swallowing and voice change.    Respiratory: Negative for cough, shortness of breath and wheezing.    Cardiovascular: Negative for chest pain, palpitations and leg swelling.   Gastrointestinal: Negative for abdominal pain, blood in stool, constipation, diarrhea, nausea and vomiting.   Genitourinary: Negative for dysuria.   Musculoskeletal: Negative for arthralgias and myalgias.   Skin: Negative for rash and wound.   Allergic/Immunologic: Negative for environmental allergies and food allergies.   Hematological: Negative for adenopathy. Does not bruise/bleed easily.       Objective:      Physical Exam   Constitutional: She is oriented to person, place, and time. She appears well-developed and well-nourished. No distress.   HENT:   Head: Normocephalic and atraumatic.   Right Ear: External ear normal.   Left Ear: External ear normal.   Nose: Nose normal.   Mouth/Throat: Oropharynx is clear and moist. No oropharyngeal exudate.   Eyes: Pupils are equal, round, and reactive to light. Conjunctivae and EOM are normal. Right eye exhibits no discharge. Left eye exhibits no discharge. No scleral icterus.   Neck: Neck supple. No JVD  present.   Cardiovascular: Normal rate, regular rhythm, normal heart sounds and intact distal pulses.   Pulmonary/Chest: Effort normal and breath sounds normal. No respiratory distress. She has no wheezes. She has no rales.   Abdominal: Soft. Bowel sounds are normal. There is no tenderness.   Musculoskeletal: She exhibits no edema.        Lumbar back: She exhibits pain.   Lymphadenopathy:     She has no cervical adenopathy.   Neurological: She is alert and oriented to person, place, and time.   Skin: Skin is warm and dry. No rash noted. She is not diaphoretic. No pallor.        Ecchymosis        Assessment:       1. Fall (on)(from) escalator, initial encounter    2. Traumatic injury of head, initial encounter    3. Thyroid nodule    4. Low back sprain, initial encounter        Plan:    1/2. Stable, Head CT neg for bleeding/fractures.    3. Check Thyroid US   4. Stable

## 2019-12-22 RX ORDER — FUROSEMIDE 20 MG/1
TABLET ORAL
Qty: 90 TABLET | Refills: 0 | Status: SHIPPED | OUTPATIENT
Start: 2019-12-22 | End: 2021-05-20 | Stop reason: SDUPTHER

## 2019-12-23 ENCOUNTER — ANTI-COAG VISIT (OUTPATIENT)
Dept: CARDIOLOGY | Facility: CLINIC | Age: 74
End: 2019-12-23
Payer: MEDICARE

## 2019-12-23 DIAGNOSIS — Z79.01 LONG TERM (CURRENT) USE OF ANTICOAGULANTS: Primary | ICD-10-CM

## 2019-12-23 DIAGNOSIS — Z79.01 CHRONIC ANTICOAGULATION: ICD-10-CM

## 2019-12-23 LAB — INR PPP: 2.2 (ref 2–3)

## 2019-12-23 PROCEDURE — 93793 PR ANTICOAGULANT MGMT FOR PT TAKING WARFARIN: ICD-10-PCS | Mod: ,,,

## 2019-12-23 PROCEDURE — 85610 PROTHROMBIN TIME: CPT | Mod: PBBFAC,PO

## 2019-12-23 PROCEDURE — 93793 ANTICOAG MGMT PT WARFARIN: CPT | Mod: ,,,

## 2019-12-23 NOTE — PROGRESS NOTES
Quick follow-up for low INR. INR now at goal. Patient with bruises from use, denies any bleeding or other changes. Will continue maintenance dose and f/u INR in 2 weeks. Patient advised to call with any changes/concerns.

## 2019-12-31 ENCOUNTER — EXTERNAL CHRONIC CARE MANAGEMENT (OUTPATIENT)
Dept: PRIMARY CARE CLINIC | Facility: CLINIC | Age: 74
End: 2019-12-31
Payer: MEDICARE

## 2019-12-31 PROCEDURE — 99490 CHRNC CARE MGMT STAFF 1ST 20: CPT | Mod: PBBFAC,PO | Performed by: INTERNAL MEDICINE

## 2019-12-31 PROCEDURE — 99490 CHRNC CARE MGMT STAFF 1ST 20: CPT | Mod: S$PBB,,, | Performed by: INTERNAL MEDICINE

## 2019-12-31 PROCEDURE — 99490 PR CHRONIC CARE MGMT, 1ST 20 MIN: ICD-10-PCS | Mod: S$PBB,,, | Performed by: INTERNAL MEDICINE

## 2020-01-07 RX ORDER — POTASSIUM CHLORIDE 750 MG/1
CAPSULE, EXTENDED RELEASE ORAL
Qty: 60 CAPSULE | Refills: 0 | Status: SHIPPED | OUTPATIENT
Start: 2020-01-07 | End: 2020-05-05

## 2020-01-08 ENCOUNTER — PES CALL (OUTPATIENT)
Dept: ADMINISTRATIVE | Facility: CLINIC | Age: 75
End: 2020-01-08

## 2020-01-16 PROBLEM — Z13.31 POSITIVE DEPRESSION SCREENING: Status: RESOLVED | Noted: 2019-11-25 | Resolved: 2020-01-16

## 2020-01-16 PROBLEM — Z79.01 CURRENT USE OF LONG TERM ANTICOAGULATION: Status: RESOLVED | Noted: 2019-11-20 | Resolved: 2020-01-16

## 2020-01-23 ENCOUNTER — ANTI-COAG VISIT (OUTPATIENT)
Dept: CARDIOLOGY | Facility: CLINIC | Age: 75
End: 2020-01-23
Payer: MEDICARE

## 2020-01-23 ENCOUNTER — LAB VISIT (OUTPATIENT)
Dept: LAB | Facility: HOSPITAL | Age: 75
End: 2020-01-23
Attending: INTERNAL MEDICINE
Payer: MEDICARE

## 2020-01-23 DIAGNOSIS — Z79.01 LONG TERM (CURRENT) USE OF ANTICOAGULANTS: ICD-10-CM

## 2020-01-23 DIAGNOSIS — Z79.01 CHRONIC ANTICOAGULATION: ICD-10-CM

## 2020-01-23 LAB
INR PPP: 1.2 (ref 0.8–1.2)
PROTHROMBIN TIME: 12.4 SEC (ref 9–12.5)

## 2020-01-23 PROCEDURE — 36415 COLL VENOUS BLD VENIPUNCTURE: CPT | Mod: PO

## 2020-01-23 PROCEDURE — 93793 PR ANTICOAGULANT MGMT FOR PT TAKING WARFARIN: ICD-10-PCS | Mod: ,,, | Performed by: PHARMACIST

## 2020-01-23 PROCEDURE — 93793 ANTICOAG MGMT PT WARFARIN: CPT | Mod: ,,, | Performed by: PHARMACIST

## 2020-01-23 PROCEDURE — 85610 PROTHROMBIN TIME: CPT

## 2020-01-24 NOTE — PROGRESS NOTES
Confirmed taking 1mg daily coumadin  States consistent w/ veggies per week; sometimes eat greens twice per week; reports eating  mixed nuts daily  NO other new changes

## 2020-01-28 ENCOUNTER — ANTI-COAG VISIT (OUTPATIENT)
Dept: CARDIOLOGY | Facility: CLINIC | Age: 75
End: 2020-01-28
Payer: MEDICARE

## 2020-01-28 DIAGNOSIS — Z79.01 CHRONIC ANTICOAGULATION: ICD-10-CM

## 2020-01-28 LAB — INR PPP: 1.4 (ref 2–3)

## 2020-01-28 PROCEDURE — 93793 PR ANTICOAGULANT MGMT FOR PT TAKING WARFARIN: ICD-10-PCS | Mod: ,,,

## 2020-01-28 PROCEDURE — 85610 PROTHROMBIN TIME: CPT | Mod: PBBFAC,PO

## 2020-01-28 PROCEDURE — 93793 ANTICOAG MGMT PT WARFARIN: CPT | Mod: ,,,

## 2020-01-28 NOTE — PROGRESS NOTES
INR improving but still very low. Patient denies missed doses and uses a pillbox as an aid. She denies changes in diet; confirmed greens once per week. No changes in medications or health. She does report increased stress with the passing of her son a few months ago. We will increase dose. Recheck INR in 1 week

## 2020-01-31 ENCOUNTER — EXTERNAL CHRONIC CARE MANAGEMENT (OUTPATIENT)
Dept: PRIMARY CARE CLINIC | Facility: CLINIC | Age: 75
End: 2020-01-31
Payer: MEDICARE

## 2020-01-31 PROCEDURE — 99490 PR CHRONIC CARE MGMT, 1ST 20 MIN: ICD-10-PCS | Mod: S$PBB,,, | Performed by: INTERNAL MEDICINE

## 2020-01-31 PROCEDURE — 99490 CHRNC CARE MGMT STAFF 1ST 20: CPT | Mod: PBBFAC,PO | Performed by: INTERNAL MEDICINE

## 2020-01-31 PROCEDURE — 99490 CHRNC CARE MGMT STAFF 1ST 20: CPT | Mod: S$PBB,,, | Performed by: INTERNAL MEDICINE

## 2020-02-04 ENCOUNTER — ANTI-COAG VISIT (OUTPATIENT)
Dept: CARDIOLOGY | Facility: CLINIC | Age: 75
End: 2020-02-04
Payer: MEDICARE

## 2020-02-04 DIAGNOSIS — Z79.01 CHRONIC ANTICOAGULATION: ICD-10-CM

## 2020-02-04 PROCEDURE — 93793 ANTICOAG MGMT PT WARFARIN: CPT | Mod: ,,, | Performed by: PHARMACIST

## 2020-02-04 PROCEDURE — 93793 PR ANTICOAGULANT MGMT FOR PT TAKING WARFARIN: ICD-10-PCS | Mod: ,,, | Performed by: PHARMACIST

## 2020-02-10 ENCOUNTER — ANTI-COAG VISIT (OUTPATIENT)
Dept: CARDIOLOGY | Facility: CLINIC | Age: 75
End: 2020-02-10
Payer: MEDICARE

## 2020-02-10 DIAGNOSIS — Z79.01 CHRONIC ANTICOAGULATION: ICD-10-CM

## 2020-02-10 PROCEDURE — 93793 ANTICOAG MGMT PT WARFARIN: CPT | Mod: ,,, | Performed by: PHARMACIST

## 2020-02-10 PROCEDURE — 93793 PR ANTICOAGULANT MGMT FOR PT TAKING WARFARIN: ICD-10-PCS | Mod: ,,, | Performed by: PHARMACIST

## 2020-02-10 NOTE — PROGRESS NOTES
INR better this week and seems to be improving on this new dose.  Will maintain this regimen for now.

## 2020-02-17 ENCOUNTER — ANTI-COAG VISIT (OUTPATIENT)
Dept: CARDIOLOGY | Facility: CLINIC | Age: 75
End: 2020-02-17
Payer: MEDICARE

## 2020-02-17 DIAGNOSIS — Z79.01 CHRONIC ANTICOAGULATION: ICD-10-CM

## 2020-02-17 PROCEDURE — 93793 ANTICOAG MGMT PT WARFARIN: CPT | Mod: ,,, | Performed by: PHARMACIST

## 2020-02-17 PROCEDURE — 93793 PR ANTICOAGULANT MGMT FOR PT TAKING WARFARIN: ICD-10-PCS | Mod: ,,, | Performed by: PHARMACIST

## 2020-02-20 ENCOUNTER — OFFICE VISIT (OUTPATIENT)
Dept: INTERNAL MEDICINE | Facility: CLINIC | Age: 75
End: 2020-02-20
Payer: MEDICARE

## 2020-02-20 VITALS
HEART RATE: 61 BPM | BODY MASS INDEX: 42.88 KG/M2 | SYSTOLIC BLOOD PRESSURE: 148 MMHG | RESPIRATION RATE: 15 BRPM | DIASTOLIC BLOOD PRESSURE: 68 MMHG | HEIGHT: 62 IN | OXYGEN SATURATION: 97 % | WEIGHT: 233 LBS

## 2020-02-20 DIAGNOSIS — F33.0 MILD EPISODE OF RECURRENT MAJOR DEPRESSIVE DISORDER: ICD-10-CM

## 2020-02-20 DIAGNOSIS — Z91.81 RISK FOR FALLS: ICD-10-CM

## 2020-02-20 DIAGNOSIS — E66.2 OBESITY HYPOVENTILATION SYNDROME: ICD-10-CM

## 2020-02-20 DIAGNOSIS — N18.30 CKD (CHRONIC KIDNEY DISEASE) STAGE 3, GFR 30-59 ML/MIN: ICD-10-CM

## 2020-02-20 DIAGNOSIS — M17.0 PRIMARY OSTEOARTHRITIS OF BOTH KNEES: ICD-10-CM

## 2020-02-20 DIAGNOSIS — Z99.89 DEPENDENCE ON OTHER ENABLING MACHINES AND DEVICES: ICD-10-CM

## 2020-02-20 DIAGNOSIS — I27.9 PULMONARY HEART DISEASE: ICD-10-CM

## 2020-02-20 DIAGNOSIS — G47.33 OBSTRUCTIVE SLEEP APNEA: ICD-10-CM

## 2020-02-20 DIAGNOSIS — Z00.00 ENCOUNTER FOR PREVENTIVE HEALTH EXAMINATION: ICD-10-CM

## 2020-02-20 DIAGNOSIS — I26.90 ACUTE SEPTIC PULMONARY EMBOLISM, UNSPECIFIED WHETHER ACUTE COR PULMONALE PRESENT: ICD-10-CM

## 2020-02-20 DIAGNOSIS — M71.20 SYNOVIAL CYST OF KNEE, UNSPECIFIED LATERALITY: Primary | ICD-10-CM

## 2020-02-20 DIAGNOSIS — R26.9 ABNORMALITY OF GAIT AND MOBILITY: ICD-10-CM

## 2020-02-20 DIAGNOSIS — R29.898 WEAKNESS OF BOTH LOWER EXTREMITIES: ICD-10-CM

## 2020-02-20 DIAGNOSIS — E66.01 MORBID OBESITY WITH BMI OF 40.0-44.9, ADULT: ICD-10-CM

## 2020-02-20 DIAGNOSIS — Z13.5 SCREENING FOR EYE CONDITION: ICD-10-CM

## 2020-02-20 DIAGNOSIS — D56.3 THALASSEMIA MINOR: ICD-10-CM

## 2020-02-20 DIAGNOSIS — F43.21 GRIEVING: ICD-10-CM

## 2020-02-20 DIAGNOSIS — I25.2 HISTORY OF NON-ST ELEVATION MYOCARDIAL INFARCTION (NSTEMI): ICD-10-CM

## 2020-02-20 DIAGNOSIS — I10 ESSENTIAL HYPERTENSION: ICD-10-CM

## 2020-02-20 DIAGNOSIS — Z79.01 CHRONIC ANTICOAGULATION: ICD-10-CM

## 2020-02-20 DIAGNOSIS — R41.3 MEMORY CHANGES: ICD-10-CM

## 2020-02-20 DIAGNOSIS — I82.532 CHRONIC DEEP VEIN THROMBOSIS (DVT) OF LEFT POPLITEAL VEIN: ICD-10-CM

## 2020-02-20 DIAGNOSIS — I27.20 PULMONARY HYPERTENSION: ICD-10-CM

## 2020-02-20 DIAGNOSIS — K21.9 GASTROESOPHAGEAL REFLUX DISEASE, ESOPHAGITIS PRESENCE NOT SPECIFIED: ICD-10-CM

## 2020-02-20 PROBLEM — E66.811 CLASS 1 OBESITY DUE TO EXCESS CALORIES WITH SERIOUS COMORBIDITY IN ADULT: Status: ACTIVE | Noted: 2020-02-20

## 2020-02-20 PROBLEM — E66.811 CLASS 1 OBESITY DUE TO EXCESS CALORIES WITH SERIOUS COMORBIDITY IN ADULT: Status: RESOLVED | Noted: 2020-02-20 | Resolved: 2020-02-20

## 2020-02-20 PROBLEM — E66.09 CLASS 1 OBESITY DUE TO EXCESS CALORIES WITH SERIOUS COMORBIDITY IN ADULT: Status: ACTIVE | Noted: 2020-02-20

## 2020-02-20 PROBLEM — E66.09 CLASS 1 OBESITY DUE TO EXCESS CALORIES WITH SERIOUS COMORBIDITY IN ADULT: Status: RESOLVED | Noted: 2020-02-20 | Resolved: 2020-02-20

## 2020-02-20 PROCEDURE — 99999 PR PBB SHADOW E&M-EST. PATIENT-LVL V: CPT | Mod: PBBFAC,,, | Performed by: NURSE PRACTITIONER

## 2020-02-20 PROCEDURE — 99999 PR PBB SHADOW E&M-EST. PATIENT-LVL V: ICD-10-PCS | Mod: PBBFAC,,, | Performed by: NURSE PRACTITIONER

## 2020-02-20 PROCEDURE — 99215 OFFICE O/P EST HI 40 MIN: CPT | Mod: PBBFAC,PO | Performed by: NURSE PRACTITIONER

## 2020-02-20 PROCEDURE — G0439 PPPS, SUBSEQ VISIT: HCPCS | Mod: S$GLB,,, | Performed by: NURSE PRACTITIONER

## 2020-02-20 PROCEDURE — G0439 PR MEDICARE ANNUAL WELLNESS SUBSEQUENT VISIT: ICD-10-PCS | Mod: S$GLB,,, | Performed by: NURSE PRACTITIONER

## 2020-02-20 NOTE — PROGRESS NOTES
"Coby Atkinson presented for a  Medicare AWV and comprehensive Health Risk Assessment today. The following components were reviewed and updated:    · Medical history  · Family History  · Social history  · Allergies and Current Medications  · Health Risk Assessment  · Health Maintenance  · Care Team     ** See Completed Assessments for Annual Wellness Visit within the encounter summary.**       The following assessments were completed:  · Living Situation  · CAGE  · Depression Screening  · Timed Get Up and Go  · Whisper Test  · Cognitive Function Screening  · Nutrition Screening  · ADL Screening  · PAQ Screening    Vitals:    02/20/20 1014   BP: (!) 148/68   Pulse: 61   Resp: 15   SpO2: 97%   Weight: 105.7 kg (233 lb)   Height: 5' 1.5" (1.562 m)     Body mass index is 43.31 kg/m².  Physical Exam   Constitutional: She is oriented to person, place, and time. She appears well-developed and well-nourished.   HENT:   Head: Normocephalic.   Right Ear: External ear normal.   Left Ear: External ear normal.   Mouth/Throat: No oropharyngeal exudate.   Cardiovascular: Normal rate, regular rhythm and normal heart sounds.   No murmur heard.  Pulmonary/Chest: Effort normal and breath sounds normal.   Abdominal: Soft. Bowel sounds are normal. She exhibits no distension.   obese   Musculoskeletal: Normal range of motion. She exhibits no edema.   Neurological: She is alert and oriented to person, place, and time. She exhibits normal muscle tone.   Psychiatric: She has a normal mood and affect. Her behavior is normal. Judgment and thought content normal.   Nursing note and vitals reviewed.        Lab Results   Component Value Date    CHOL 137 09/12/2019    HDL 62 09/12/2019    LDLCALC 60.8 (L) 09/12/2019    TRIG 71 09/12/2019    CHOLHDL 45.3 09/12/2019      Results for orders placed in visit on 12/03/19   DXA Bone Density Spine And Hip    Narrative EXAMINATION:  DEXA BONE DENSITY SPINE HIP    CLINICAL HISTORY:  Disorder of bone density " and structure, unspecified.  73 y/o female with no history of fractures.  She had surgical menopause at 39 y/o.  She does not exercise or smoke.    TECHNIQUE:  DXA Specification: [place] Compound Time [id number]    Bone Mineral Density scanning was performed over the hip and lumbar spine. Review of the images confirms satisfactory positioning and technique.    COMPARISON:  Comparison study done on 10/20/2016.    Lumbar spine:  BMD 1.061 g/cm2 and T-score 0.1.    Total Hip:        BMD 0.891 g/cm2 and T-score -0.4.    FINDINGS:  There is a 9.4% risk of a major osteoporotic fracture and a 1.6% risk of hip fracture in the next 10 years (FRAX)    Compared with previous DXA, BMD at the lumbar spine has decreased by -11.7%, and the BMD at the total hip has decreased by -6.4%.    Lumbar spine (L1-L4):   BMD is 0.938 g/cm2, T-score is -1.0, and Z-score is 1.4.    Total hip:                    BMD is 0.834 g/cm2, T-score is -0.9, and Z-score is 0.9.    Femoral neck:              BMD is 0.684 g/cm2, T-score is -1.5, and Z-score is 0.6.      Impression 1. Osteopenia of the femoral neck.  FRAX calculations do not suggest treatment.  RECOMMENDATIONS of [facility]:    1.  Recommended daily calcium intake 2237-3350 mg, dietary sources preferred; Vitamin D 2410-9491 IU daily.    2.  Adequate exercise and fall precautions.    3.  Repeat in 2 years.    EXPLANATION OF RESULTS:    The T-score compares these results to the average bone density of a 20 year-old of the same gender. The Z-score compares this result to the average bone density to people of the same age, gender, and race. The amounts indicate the number of standard deviations above or below the mean.    * Osteoporosis is generally defined as having a T-score between less than or equal to -2.5.    * Osteopenia is generally defined as having a T-score between -1.0 and -2.5.    * The normal range is generally defined as having a t-score  greater than or equal to -1.0.    * Calculated FRAX scores for fracture risk prediction may not be accurate in the setting of certain clinical factors such as pharmacologic therapy for osteoporosis, prior fragility fractures, high dose glucocorticoid use etc.      Electronically signed by: Shanda Huang MD  Date:    12/11/2019  Time:    16:29     Results for orders placed during the hospital encounter of 05/07/14   Mammo Digital Screening Bilat with CAD    Narrative The present examination has been compared to prior imaging studies dated  02/17/2012 and 05/21/2010.    BILATERAL MAMMOGRAM FINDINGS:  The breasts are almost entirely fat.    No significant abnormalities are seen.    These images  were processed to produce digital images analyzed for potential  abnormalities.      Impression There is no mammographic evidence of malignancy.    Mammogram in 1 year is recommended.    ACR BI-RADS Category 1: Negative      LINDA KNIGHT M.D.  05/07/2014                 Diagnoses and health risks identified today and associated recommendations/orders:    1. Synovial cyst of knee, unspecified laterality  Stable- followed by PCP    2. Gastroesophageal reflux disease, esophagitis presence not specified  Stable- followed by PCP    3. Obstructive sleep apnea- CPAP  Stable- followed by PCP, sleep medicine    4. Essential hypertension  Stable- followed by PCP, cardiology    5. Thalassemia minor  Stable- followed by PCP    6. CKD (chronic kidney disease) stage 3, GFR 30-59 ml/min  Stable- followed by PCP    7. Chronic anticoagulation  Stable- followed by coumadin clinic    8. Chronic deep vein thrombosis (DVT) of left popliteal vein  Stable- followed by PCP    9. History of non-ST elevation myocardial infarction (NSTEMI)  Stable- followed by PCP, cardiology    10. Mild episode of recurrent major depressive disorder  Stable- followed by PCP    11. Pulmonary hypertension  Stable- followed by PCP, cardiology    12. Primary  osteoarthritis of both knees  Stable- followed by PCP    13. Obesity hypoventilation syndrome  Stable- followed by sleep medicine    14. Pulmonary heart disease  Stable- followed by PCP    15. Memory changes  Stable- followed by PCP    16. Risk for falls  Stable- followed by PCP    17. Screening for eye condition  Stable- followed by PCP  - Ambulatory referral/consult to Ophthalmology; Future    18. Acute septic pulmonary embolism, unspecified whether acute cor pulmonale present  Stable- followed by PCP    19. Dependence on other enabling machines and devices  Stable- followed by PCP    20. Abnormality of gait and mobility  Stable- followed by PCP    21. Encounter for preventive health examination  Here for Health Risk Assessment/Annual Wellness Visit.  Health maintenance reviewed and updated.      22. Morbid obesity with BMI of 40.0-44.9, adult  Chronic problem - no recent weight loss. Followed by PCP.   Centers for Disease Control and Prevention (CDC)  weight recommendations for current BMI & ideal BMI range discussed with patient.  Recommended  Low fat diet,   Declined referrals to dietician, weight management program.     23. Weakness of both lower extremities  Stable-followed by PCP      24. Grieving  Stable- followed by PCP- loss of son last Nov 2019      Provided Coby with a 5-10 year written screening schedule and personal prevention plan. Recommendations were developed using the USPSTF age appropriate recommendations. Education, counseling, and referrals were provided as needed. After Visit Summary printed and given to patient which includes a list of additional screenings\tests needed.Recommended  Low fat diet,   Declined referrals to dietician, weight management program. Prevent falls .denies bleeding. Exercise limited by chronic knee pains- discussed options water aerobics, graded exercise program, PT outpt- states has done PT without marled improvement.       Follow up in about 1 year (around  2/20/2021) for HRA.    Flor Gautam NP I offered to discuss end of life issues, including information on how to make advance directives that the patient could use to name someone who would make medical decisions on their behalf if they became too ill to make themselves.    ___Patient declined  _X_Patient is interested, I provided paper work and offered to discuss.

## 2020-02-20 NOTE — PATIENT INSTRUCTIONS
Counseling and Referral of Other Preventative  (Italic type indicates deductible and co-insurance are waived)    Patient Name: Coby Atkinson  Today's Date: 2/20/2020    Health Maintenance       Date Due Completion Date    High Dose Statin N/a   ---    Influenza Vaccine (1) cdc handout given   10/11/2016    TETANUS VACCINE In the future for injury   ---    Shingles Vaccine (3 of 3) Declined     11/25/2019 (Done)        DEXA SCAN 12/03/2021   12/3/2019    Lipid Panel 09/12/2020 9/12/2019    Colonoscopy 11/05/2026 if needed   11/5/2016        Orders Placed This Encounter   Procedures    Ambulatory referral/consult to Ophthalmology     The following information is provided to all patients.  This information is to help you find resources for any of the problems found today that may be affecting your health:                Living healthy guide: www.[facility].[place].[facility]      Understanding Diabetes: www.diabetes.org      Eating healthy: www.cdc.gov/healthyweight      [facility] home safety checklist: www.cdc.gov/steadi/patient.html      Agency on Aging: www.goea.[place].[facility]      Alcoholics anonymous (AA): www.aa.org      Physical Activity: www.erica.nih.gov/gi6stsf      Tobacco use: www.quitwithusla.org     Low-Cholesterol Diet  Your body needs cholesterol to build new cells and create certain hormones. There are 2 kinds of cholesterol in your blood:     · HDL (good) cholesterol. This prevents fat deposits (plaque) from building up in your arteries. In this way it protects against heart disease and stroke.  · LDL (bad) cholesterol. This stays in your body and sticks to artery walls. Over time it may block blood flow to the heart and brain. This can cause a heart attack or stroke.  The cholesterol in your blood comes from 2 sources: cholesterol in food that you eat and cholesterol that your liver makes. You should limit the amount of cholesterol in your diet. But the cholesterol that your body makes has the greatest disease  risk. And your body makes more cholesterol when your diet is high in bad fats (saturated and trans fats). There are 2 kinds of fats you can eat:  · Good fats, or unsaturated fats (mono-unsaturated and poly-unsaturated). They raise the level of good cholesterol and lower the level of bad cholesterol. Good fats are found in vegetable oils such as olive, sunflower, corn, and soybean oils, and in nuts and seeds.  · Bad fats, or saturated fats (including foods high in cholesterol) and trans fats. These raise your risk of disease. They lower the good cholesterol and raise the level of bad cholesterol. Bad fats are found in animal products, including meat, whole-milk dairy products, and butter. Some plants are also high in bad fats (coconut and palm plants). Trans fats are found in hard (stick) margarines. They are also in many fast foods and commercially baked goods. Soft margarine sold in tubs has fewer trans fats and is safer to use.  High blood cholesterol is usually due to a diet high in saturated fat, along with not being physically active. In some cases, genetics plays a role in causing high cholesterol. The tips below will help you create healthy eating habits that will help lower your blood cholesterol level.  Create a diet high in good fats, low in bad fats (and low in cholesterol)  The following steps will help you create a diet high in good fats and low in bad fats:  · Talk with your doctor before starting a low cholesterol diet or weight loss program.  · Learn to read nutrition labels and select appropriate portion sizes.  · When cooking, use plant-based unsaturated vegetable oils (sunflower, corn, soybean, canola, peanut, and olive oils).  · Avoid saturated fats found in animal products such as meat, dairy (whole-milk, cheese and ice cream), poultry skin, and egg yolks. Plants high in saturated oils include coconut oil, palm oil, and palm kernel oil.  · If you eat meat, choose smaller portions and lean cuts,  such as round, branden, sirloin, or loin. Eat more meatless meals.  · Replace meat with fish at least 2 times a week. Fish is an important source of the unsaturated fat called omega-3 fatty acids. This fat has potential to lower the risk of heart disease.  · Replace whole-milk dairy products with low-fat or nonfat products. Try soy products. Soy helps to reduce total cholesterol.  · Supplement your diet with protective fibers. Eat nuts, seeds, and whole grains rather than white rice and bread. These foods lower both cholesterol and triglyceride levels. (Triglycerides are another fat found in the blood.) Walnuts are one of the best sources of omega-3 fatty acids.  · Eat plenty of fresh fruits and vegetables daily.  · Avoid fast foods and commercial baked goods. Assume they contain saturated fat unless labeled otherwise.  Date Last Reviewed: 8/1/2016  © 1457-5105 [employer]. [address] [place] [address]. All rights reserved. This information is not intended as a substitute for professional medical care. Always follow your healthcare professional's instructions.        Low-Fat Diet    A low-fat diet will help you lose weight. It also can lower cholesterol and prevent symptoms of gallbladder disease. The average American diet contains up to 50% fat. This means that 50% of all calories come from fat (about 80 grams to 100 grams of fat per day). Choosing normal portions of foods from the list below can help lower your fat intake. Experts recommend that only 20% to 35% of your daily calories come from fat. The remaining 65% to 80% of calories will come from protein and carbohydrates. This is much healthier for you.  Breads  Ok: Whole-wheat or rye bread, lili or soda crackers, albert toast, plain rolls, bagels, English muffins  Avoid: Rolls and breads containing whole milk or egg; waffles, pancakes, biscuits, corn bread; cheese crackers, other flavored crackers, pastries, doughnuts  Cereals  Ok:  Oatmeal, whole-wheat, bran, multigrain, rice  Avoid: Granola or other cereals that have oil, coconut, or more than 2 grams of fat per serving  Cheese and eggs  Ok: Cheeses labeled low-fat; 3 whole eggs per week; egg whites and egg substitutes as desired  Avoid: All other cheeses  Desserts  Ok: Gelatin, slushy, [place] food cake, meringues, nonfat yogurt, and puddings or sherbet made with nonfat milk  Avoid: Any other store-bought desserts, or desserts that have fat, whole milk, cream, chocolate, and coconut  Drinks  Ok: Nonfat milk, coffee, tea, fizzy (carbonated) drinks  Avoid: Whole and reduced-fat milk, evaporated and condensed milk, hot chocolate mixes, milk shakes, malts, eggnog  Fats  Ok: You may have up to 3 teaspoons of fat daily. This can be butter, margarine, mayonnaise, or healthy oils (canola or olive)  Avoid: Cream, nondairy creams, cream cheese, gravies, and cream sauces  Fruits  Ok: All fruits made without fat  Avoid: Coconut, olives  Meats, poultry, fish  Ok: Limit meat to 6 ounces daily (broiled, roasted, baked, grilled, or boiled). Buy lean cuts, and trim off the fat. Try beef, fish, lamb, pork, and canned fish packed in water; also chicken and turkey with the skin removed.  Avoid: Fried meats, fish, or poultry; fried eggs, and fish canned in oils; fatty meats such as mesa, sausage, corned beef, hot dogs, and lunch meats; meats with gravies and sauces  Potatoes, beans, pasta  Ok: Dried beans, split peas, lentils, potatoes, rice, pasta made without added fat  Avoid: French fries, potato chips, potatoes prepared with butter, refried beans  Soups  Ok: Clear broth soups without fat and with allowed vegetables  Avoid: Cream-based soups  Vegetables  Ok: Fresh, frozen, canned or dried vegetables, all made without added fat  Avoid: Fried vegetables and those prepared with butter, cream, sauces  Miscellaneous  Ok: Salt, sugar, jelly, hard candy, marshmallows, honey, syrup, spices and herbs, mustard, ketchup,  lemon, and vinegar. Try to limit sweets and added sugars.  Avoid: Chocolate, nuts, coconut, and cream candies; sunflower, sesame, and other seeds; fried foods; cream sauces and gravies; pizza  Date Last Reviewed: 8/1/2016  © 9487-6171 [employer]. [address] [place] [address]. All rights reserved. This information is not intended as a substitute for professional medical care. Always follow your healthcare professional's instructions.        Eating Heart-Healthy Food: Using the DASH Plan    Eating for your heart doesnt have to be hard or boring. You just need to know how to make healthier choices. The DASH eating plan has been developed to help you do just that. DASH stands for Dietary Approaches to Stop Hypertension. It is a plan that has been proven to be healthier for your heart and to lower your risk for high blood pressure. It can also help lower your risk for cancer, heart disease, osteoporosis, and diabetes.  Choosing from each food group  Choose foods from each of the food groups below each day. Try to get the recommended number of servings for each food group. The serving numbers are based on a diet of 2,000 calories a day. Talk to your doctor if youre unsure about your calorie needs. Along with getting the correct servings, the DASH plan also recommends a sodium intake less than 2,300 mg per day.        Grains  Servings: 6 to 8 a day  A serving is:  · 1 slice bread  · 1 ounce dry cereal  · Half a cup cooked rice, pasta or cereal  Best choices: Whole grains and any grains high in fiber. Vegetables  Servings: 4 to 5 a day  A serving is:  · 1 cup raw leafy vegetable  · Half a cup cut-up raw or cooked vegetable  · Half a cup vegetable juice  Best choices: Fresh or frozen vegetables prepared without added salt or fat.   Fruits  Servings: 4 to 5 a day  A serving is:  · 1 medium fruit  · One-quarter cup dried fruit  · Half a cup fresh, frozen, or canned fruit  · Half a cup of 100%  fruit juices  Best choices: A variety of fresh fruits of different colors. Whole fruits are a better choice than fruit juices. Low-fat or fat-free dairy  Servings: 2 to 3 a day  A serving is:  · 1 cup milk  · 1 cup yogurt  · One and a half ounces cheese  Best choices: Skim or 1% milk, low-fat or fat-free yogurt or buttermilk, and low-fat cheeses.         Lean meats, poultry, fish  Servings: 6 or fewer a day  A serving is:  · 1 ounce cooked meats, poultry, or fish  · 1 egg  Best choices: Lean poultry and fish. Trim away visible fat. Broil, grill, roast, or boil instead of frying. Remove skin from poultry before eating. Limit how much red meat you eat.  Nuts, seeds, beans  Servings: 4 to 5 a week  A serving is:  · One-third cup nuts (one and a half ounces)  · 2 tablespoons nut butter or seeds  · Half a cup cooked dry beans or legumes  Best choices: Dry roasted nuts with no salt added, lentils, kidney beans, garbanzo beans, and whole martinez beans.   Fats and oils  Servings: 2 to 3 a day  A serving is:  · 1 teaspoon vegetable oil  · 1 teaspoon soft margarine  · 1 tablespoon mayonnaise  · 2 tablespoons salad dressing  Best choices: Nut and vegetable oils (nontropical vegetable oils), such as olive and canola oil. Sweets  Servings: 5 a week or fewer  A serving is:  · 1 tablespoon sugar, maple syrup, or honey  · 1 tablespoon jam or jelly  · 1 half-ounce jelly beans (about 15)  · 1 cup lemonade  Best choices: Dried fruit can be a satisfying sweet. Choose low-fat sweets. And watch your serving sizes!      For more on the DASH eating plan, visit:  www.nhlbi.nih.gov/health/health-topics/topics/dash   Date Last Reviewed: 6/1/2016  © 0202-9194 [employer]. [address] [place] [address], [place] [address]. All rights reserved. This information is not intended as a substitute for professional medical care. Always follow your healthcare professional's instructions.        Preventing Falls: Making Changes in Your Living  Space  Is your living space filled with hazards that could cause you to fall? Changes can make you safer. They could even save your life. Take a careful look around your home. Change what you can on your own. Hire someone or ask friends or family to help with harder tasks.      Be sure to add a nonslip mat to the inside of your shower or bathtub. Always keep a nightlight on. Keep a clear path from your bed to the bathroom. Move items from higher shelves to lower ones.   Remove hazards  · Remove things that can trip you, like throw rugs, boxes, piles of paper, or cords.  · Nail down rugs or carpeting if you don't want to remove them.  · Don't store items on stairs.  · Keep walkways clear.  · Clean up spills right away.  · Replace glass tables with wooden ones. They're safer if you fall.  Add safety devices  · Add handrails to both sides of stairs.  · Buy a raised toilet seat.  · Add grab bars near the toilet and in the shower.  · Get grabbers to help you reach things and avoid climbing.  Improve lighting  · Add nightlights to halls, bedrooms, and bathrooms.   · Put light switches at the top and bottom of stairs.  · Be sure each room and flight of stairs has proper lighting.  · Use shades or curtains to cut glare from windows.  · Put flashlights in each room. Replace burned-out bulbs.  · Get glowing light switches for room entrances.  Take other precautions  · Use nonskid floor wax.  · Buy a nonslip mat and a liquid soap dispenser for the shower.  · Tack down carpets or use slip-resistant backing.  · Put most-used items within easy reach.  · Add bright paint or tape on the top front edge of steps.  · Save big jobs, such as moving furniture or other heavy objects, for family or friends.  · Get professional help installing grab bars. They can be unsafe if not installed the right way.  Fix riskier rooms first  Don't tackle everything at once. Focus on one room at a time. The bathroom is a common spot for falls, so you  may start there. Or start with a room you spend lots of time in, such as your bedroom. Make only a few changes at once. This will give you time to adjust to them.     Outside your home  You might arrange for these changes yourself, or you might need to talk to your  or homeowners' association about them.  · Have loose boards on porches or damaged stairs repaired.  · Have rough edges, holes, or large cracks in sidewalks or driveways repaired.  · Have hazards that could trip you, such as hoses or claudette, removed.  · Use high-wattage light bulbs (100 or greater) near outside doors and stairs.  · Get handrails added to outside stairs. Have them extend beyond the bottom step.  · Get help in winter weather with ice or snow removal.   Date Last Reviewed: 6/12/2015  © 2214-0301 [employer]. [address] [place] [address], [place] [address]. All rights reserved. This information is not intended as a substitute for professional medical care. Always follow your healthcare professional's instructions.

## 2020-02-24 ENCOUNTER — ANTI-COAG VISIT (OUTPATIENT)
Dept: CARDIOLOGY | Facility: CLINIC | Age: 75
End: 2020-02-24
Payer: MEDICARE

## 2020-02-24 DIAGNOSIS — Z79.01 CHRONIC ANTICOAGULATION: ICD-10-CM

## 2020-02-24 PROCEDURE — 93793 PR ANTICOAGULANT MGMT FOR PT TAKING WARFARIN: ICD-10-PCS | Mod: ,,, | Performed by: PHARMACIST

## 2020-02-24 PROCEDURE — 93793 ANTICOAG MGMT PT WARFARIN: CPT | Mod: ,,, | Performed by: PHARMACIST

## 2020-02-29 ENCOUNTER — EXTERNAL CHRONIC CARE MANAGEMENT (OUTPATIENT)
Dept: PRIMARY CARE CLINIC | Facility: CLINIC | Age: 75
End: 2020-02-29
Payer: MEDICARE

## 2020-02-29 PROCEDURE — 99490 PR CHRONIC CARE MGMT, 1ST 20 MIN: ICD-10-PCS | Mod: S$PBB,,, | Performed by: INTERNAL MEDICINE

## 2020-02-29 PROCEDURE — 99490 CHRNC CARE MGMT STAFF 1ST 20: CPT | Mod: PBBFAC,PO | Performed by: INTERNAL MEDICINE

## 2020-02-29 PROCEDURE — 99490 CHRNC CARE MGMT STAFF 1ST 20: CPT | Mod: S$PBB,,, | Performed by: INTERNAL MEDICINE

## 2020-03-02 ENCOUNTER — ANTI-COAG VISIT (OUTPATIENT)
Dept: CARDIOLOGY | Facility: CLINIC | Age: 75
End: 2020-03-02
Payer: MEDICARE

## 2020-03-02 DIAGNOSIS — Z79.01 CHRONIC ANTICOAGULATION: ICD-10-CM

## 2020-03-02 PROCEDURE — 93793 PR ANTICOAGULANT MGMT FOR PT TAKING WARFARIN: ICD-10-PCS | Mod: ,,, | Performed by: PHARMACIST

## 2020-03-02 PROCEDURE — 93793 ANTICOAG MGMT PT WARFARIN: CPT | Mod: ,,, | Performed by: PHARMACIST

## 2020-03-07 ENCOUNTER — TELEPHONE (OUTPATIENT)
Dept: INTERNAL MEDICINE | Facility: CLINIC | Age: 75
End: 2020-03-07

## 2020-03-07 DIAGNOSIS — E04.1 THYROID NODULE: Primary | ICD-10-CM

## 2020-03-09 NOTE — TELEPHONE ENCOUNTER
I reached her and she hasn't read the message on portal.    I explained need to see ent for biopsy and someone will call her to set up.    She expressed understanding.

## 2020-03-09 NOTE — TELEPHONE ENCOUNTER
----- Message from Mundo Hudson DO sent at 3/7/2020 11:16 AM CST -----  Left sided thyroid nodule per US- I will refer you to ENT to discuss biopsy to make sure there are no signs of cancer

## 2020-03-16 ENCOUNTER — ANTI-COAG VISIT (OUTPATIENT)
Dept: CARDIOLOGY | Facility: CLINIC | Age: 75
End: 2020-03-16
Payer: MEDICARE

## 2020-03-16 ENCOUNTER — TELEPHONE (OUTPATIENT)
Dept: INTERNAL MEDICINE | Facility: CLINIC | Age: 75
End: 2020-03-16

## 2020-03-16 DIAGNOSIS — Z79.01 CHRONIC ANTICOAGULATION: ICD-10-CM

## 2020-03-16 PROCEDURE — 93793 PR ANTICOAGULANT MGMT FOR PT TAKING WARFARIN: ICD-10-PCS | Mod: ,,, | Performed by: PHARMACIST

## 2020-03-16 PROCEDURE — 93793 ANTICOAG MGMT PT WARFARIN: CPT | Mod: ,,, | Performed by: PHARMACIST

## 2020-03-16 NOTE — PROGRESS NOTES
Could not confirmed exact dose of coumadin; states in the process of unpacking; recently moved  NO new changes (bleeding, diarrhea, new meds, alcohol, etc)

## 2020-03-16 NOTE — PROGRESS NOTES
Lizzie with [facility]  called with critical INR 5.07.  I routed to Dennis Herrmann MA to question patient then advise per Pharmacist orders.  I notified J. Cordaro, Pharmacist of critical result.

## 2020-03-19 ENCOUNTER — ANTI-COAG VISIT (OUTPATIENT)
Dept: CARDIOLOGY | Facility: CLINIC | Age: 75
End: 2020-03-19
Payer: MEDICARE

## 2020-03-19 ENCOUNTER — TELEPHONE (OUTPATIENT)
Dept: INTERNAL MEDICINE | Facility: CLINIC | Age: 75
End: 2020-03-19

## 2020-03-19 DIAGNOSIS — Z79.01 CHRONIC ANTICOAGULATION: ICD-10-CM

## 2020-03-19 PROCEDURE — 93793 PR ANTICOAGULANT MGMT FOR PT TAKING WARFARIN: ICD-10-PCS | Mod: ,,, | Performed by: PHARMACIST

## 2020-03-19 PROCEDURE — 93793 ANTICOAG MGMT PT WARFARIN: CPT | Mod: ,,, | Performed by: PHARMACIST

## 2020-03-19 NOTE — PROGRESS NOTES
Confirmed after holding coumadin  two days, resumed correct dose on WED  States will eat a serving of spinach tonight   NO other changes

## 2020-03-23 ENCOUNTER — ANTI-COAG VISIT (OUTPATIENT)
Dept: CARDIOLOGY | Facility: CLINIC | Age: 75
End: 2020-03-23
Payer: MEDICARE

## 2020-03-23 DIAGNOSIS — Z79.01 CHRONIC ANTICOAGULATION: ICD-10-CM

## 2020-03-23 PROCEDURE — 93793 PR ANTICOAGULANT MGMT FOR PT TAKING WARFARIN: ICD-10-PCS | Mod: ,,, | Performed by: PHARMACIST

## 2020-03-23 PROCEDURE — 93793 ANTICOAG MGMT PT WARFARIN: CPT | Mod: ,,, | Performed by: PHARMACIST

## 2020-03-27 ENCOUNTER — ANTI-COAG VISIT (OUTPATIENT)
Dept: CARDIOLOGY | Facility: CLINIC | Age: 75
End: 2020-03-27
Payer: MEDICARE

## 2020-03-27 DIAGNOSIS — Z79.01 CHRONIC ANTICOAGULATION: ICD-10-CM

## 2020-03-27 PROCEDURE — 93793 PR ANTICOAGULANT MGMT FOR PT TAKING WARFARIN: ICD-10-PCS | Mod: ,,, | Performed by: PHARMACIST

## 2020-03-27 PROCEDURE — 93793 ANTICOAG MGMT PT WARFARIN: CPT | Mod: ,,, | Performed by: PHARMACIST

## 2020-03-31 ENCOUNTER — EXTERNAL CHRONIC CARE MANAGEMENT (OUTPATIENT)
Dept: PRIMARY CARE CLINIC | Facility: CLINIC | Age: 75
End: 2020-03-31
Payer: MEDICARE

## 2020-03-31 PROCEDURE — 99490 CHRNC CARE MGMT STAFF 1ST 20: CPT | Mod: PBBFAC,PO | Performed by: INTERNAL MEDICINE

## 2020-03-31 PROCEDURE — 99490 PR CHRONIC CARE MGMT, 1ST 20 MIN: ICD-10-PCS | Mod: S$PBB,,, | Performed by: INTERNAL MEDICINE

## 2020-03-31 PROCEDURE — 99490 CHRNC CARE MGMT STAFF 1ST 20: CPT | Mod: S$PBB,,, | Performed by: INTERNAL MEDICINE

## 2020-04-01 ENCOUNTER — ANTI-COAG VISIT (OUTPATIENT)
Dept: CARDIOLOGY | Facility: CLINIC | Age: 75
End: 2020-04-01
Payer: MEDICARE

## 2020-04-01 DIAGNOSIS — Z79.01 CHRONIC ANTICOAGULATION: ICD-10-CM

## 2020-04-01 PROCEDURE — 93793 ANTICOAG MGMT PT WARFARIN: CPT | Mod: ,,, | Performed by: PHARMACIST

## 2020-04-01 PROCEDURE — 93793 PR ANTICOAGULANT MGMT FOR PT TAKING WARFARIN: ICD-10-PCS | Mod: ,,, | Performed by: PHARMACIST

## 2020-04-08 ENCOUNTER — ANTI-COAG VISIT (OUTPATIENT)
Dept: CARDIOLOGY | Facility: CLINIC | Age: 75
End: 2020-04-08
Payer: MEDICARE

## 2020-04-08 DIAGNOSIS — Z79.01 CHRONIC ANTICOAGULATION: ICD-10-CM

## 2020-04-08 PROCEDURE — 93793 PR ANTICOAGULANT MGMT FOR PT TAKING WARFARIN: ICD-10-PCS | Mod: ,,, | Performed by: PHARMACIST

## 2020-04-08 PROCEDURE — 93793 ANTICOAG MGMT PT WARFARIN: CPT | Mod: ,,, | Performed by: PHARMACIST

## 2020-04-15 ENCOUNTER — ANTI-COAG VISIT (OUTPATIENT)
Dept: CARDIOLOGY | Facility: CLINIC | Age: 75
End: 2020-04-15
Payer: MEDICARE

## 2020-04-15 DIAGNOSIS — Z79.01 CHRONIC ANTICOAGULATION: ICD-10-CM

## 2020-04-15 PROCEDURE — 93793 PR ANTICOAGULANT MGMT FOR PT TAKING WARFARIN: ICD-10-PCS | Mod: ,,, | Performed by: PHARMACIST

## 2020-04-15 PROCEDURE — 93793 ANTICOAG MGMT PT WARFARIN: CPT | Mod: ,,, | Performed by: PHARMACIST

## 2020-04-15 NOTE — PROGRESS NOTES
Called pt and CC appmt made for 4/22 at [facility] site.   INR within therapeutic range. Patient reports bruising still present on her abdomen. She denies any bleeding or other changes that would affect warfarin therapy. Will maintain current dose and follow up in 1 week. Advised patient to notify us of any changes or concerns.

## 2020-04-15 NOTE — PROGRESS NOTES
Patient reports she has been taking Coumadin per maintenance plan; She has been taking 1 mg tab on Mon/Wed/Thu/Sat and zero tablets on all other days.  She denies change to health, medications, or diet.

## 2020-04-22 ENCOUNTER — ANTI-COAG VISIT (OUTPATIENT)
Dept: CARDIOLOGY | Facility: CLINIC | Age: 75
End: 2020-04-22
Payer: MEDICARE

## 2020-04-22 DIAGNOSIS — Z79.01 CHRONIC ANTICOAGULATION: ICD-10-CM

## 2020-04-22 PROCEDURE — 93793 ANTICOAG MGMT PT WARFARIN: CPT | Mod: ,,, | Performed by: PHARMACIST

## 2020-04-22 PROCEDURE — 93793 PR ANTICOAGULANT MGMT FOR PT TAKING WARFARIN: ICD-10-PCS | Mod: ,,, | Performed by: PHARMACIST

## 2020-04-22 NOTE — PROGRESS NOTES
Patient reports missing 2 doses of coumadin. The importance of compliance was emphasized. Patient was re-educated on situations that would require placing a call to the coumadin clinic, including bleeding or unusual bruising issues, changes in health, diet or medications, upcoming procedures that require warfarin interruption, and missed coumadin dose(s). Patient expressed understanding that avoidance of consistency with these parameters could cause fluctuations in INR, leading to more frequent visits and increase risk of adverse events.

## 2020-04-30 ENCOUNTER — EXTERNAL CHRONIC CARE MANAGEMENT (OUTPATIENT)
Dept: PRIMARY CARE CLINIC | Facility: CLINIC | Age: 75
End: 2020-04-30
Payer: MEDICARE

## 2020-04-30 PROCEDURE — 99490 CHRNC CARE MGMT STAFF 1ST 20: CPT | Mod: S$PBB,,, | Performed by: INTERNAL MEDICINE

## 2020-04-30 PROCEDURE — 99490 PR CHRONIC CARE MGMT, 1ST 20 MIN: ICD-10-PCS | Mod: S$PBB,,, | Performed by: INTERNAL MEDICINE

## 2020-04-30 PROCEDURE — 99490 CHRNC CARE MGMT STAFF 1ST 20: CPT | Mod: PBBFAC,PO | Performed by: INTERNAL MEDICINE

## 2020-05-08 ENCOUNTER — TELEPHONE (OUTPATIENT)
Dept: INTERNAL MEDICINE | Facility: CLINIC | Age: 75
End: 2020-05-08

## 2020-05-08 RX ORDER — DOXAZOSIN 4 MG/1
4 TABLET ORAL NIGHTLY
Qty: 90 TABLET | Refills: 3 | Status: SHIPPED | OUTPATIENT
Start: 2020-05-08 | End: 2020-11-03

## 2020-05-08 NOTE — TELEPHONE ENCOUNTER
----- Message from Barbie Levin LPN sent at 5/8/2020  8:26 AM CDT -----  [facility] spoke with pt today about BP readings and today pt reported pt's BP was 170/96 but pt stated this was before taking BP medication and pt did not retake it. But overall lately pt's BP has been high. Pt is also wondering why pt is scheduled to see the doctor on 6/1 for head and neck. Please advise and reach out to pt when possible. Thank you.   Jazmine Parker-MICHAEL   Care Coordinator   [facility]   [phone]

## 2020-05-08 NOTE — TELEPHONE ENCOUNTER
Tried calling pt re: 6-1-20 appt is for her thyroid.   But mailbox is full & I could not leave a message. Will send msg via portal

## 2020-05-21 ENCOUNTER — ANTI-COAG VISIT (OUTPATIENT)
Dept: CARDIOLOGY | Facility: CLINIC | Age: 75
End: 2020-05-21
Payer: MEDICARE

## 2020-05-21 DIAGNOSIS — Z79.01 CHRONIC ANTICOAGULATION: ICD-10-CM

## 2020-05-21 PROCEDURE — 93793 PR ANTICOAGULANT MGMT FOR PT TAKING WARFARIN: ICD-10-PCS | Mod: ,,, | Performed by: PHARMACIST

## 2020-05-21 PROCEDURE — 93793 ANTICOAG MGMT PT WARFARIN: CPT | Mod: ,,, | Performed by: PHARMACIST

## 2020-05-22 NOTE — PROGRESS NOTES
"Confirmed taking ((1.5mg)) SUN, TUES, THURS & FRI;    & 1mg all other days  NO other new changes   Historically, patient gets confused easily. I quiextion this dose she is reporting but we will resume and keep close watch to establish a trend. "This note will not be shared with the patient."    "

## 2020-05-27 DIAGNOSIS — Z01.812 PRE-PROCEDURE LAB EXAM: Primary | ICD-10-CM

## 2020-05-28 ENCOUNTER — TELEPHONE (OUTPATIENT)
Dept: OTOLARYNGOLOGY | Facility: CLINIC | Age: 75
End: 2020-05-28

## 2020-05-29 ENCOUNTER — ANTI-COAG VISIT (OUTPATIENT)
Dept: CARDIOLOGY | Facility: CLINIC | Age: 75
End: 2020-05-29
Payer: MEDICARE

## 2020-05-29 DIAGNOSIS — Z79.01 CHRONIC ANTICOAGULATION: ICD-10-CM

## 2020-05-29 PROCEDURE — 93793 ANTICOAG MGMT PT WARFARIN: CPT | Mod: ,,,

## 2020-05-29 PROCEDURE — 93793 PR ANTICOAGULANT MGMT FOR PT TAKING WARFARIN: ICD-10-PCS | Mod: ,,,

## 2020-05-31 ENCOUNTER — EXTERNAL CHRONIC CARE MANAGEMENT (OUTPATIENT)
Dept: PRIMARY CARE CLINIC | Facility: CLINIC | Age: 75
End: 2020-05-31
Payer: MEDICARE

## 2020-05-31 PROCEDURE — G2058 CCM ADD 20MIN: HCPCS | Mod: PBBFAC,PO | Performed by: INTERNAL MEDICINE

## 2020-05-31 PROCEDURE — G2058 PR CHRON CARE MGMT, EA ADDTL 20 MINS: ICD-10-PCS | Mod: S$PBB,,, | Performed by: INTERNAL MEDICINE

## 2020-05-31 PROCEDURE — 99490 CHRNC CARE MGMT STAFF 1ST 20: CPT | Mod: S$PBB,,, | Performed by: INTERNAL MEDICINE

## 2020-05-31 PROCEDURE — G2058 CCM ADD 20MIN: HCPCS | Mod: S$PBB,,, | Performed by: INTERNAL MEDICINE

## 2020-05-31 PROCEDURE — 99490 CHRNC CARE MGMT STAFF 1ST 20: CPT | Mod: PBBFAC,PO,25 | Performed by: INTERNAL MEDICINE

## 2020-05-31 PROCEDURE — 99490 PR CHRONIC CARE MGMT, 1ST 20 MIN: ICD-10-PCS | Mod: S$PBB,,, | Performed by: INTERNAL MEDICINE

## 2020-06-01 ENCOUNTER — OFFICE VISIT (OUTPATIENT)
Dept: OTOLARYNGOLOGY | Facility: CLINIC | Age: 75
End: 2020-06-01
Payer: MEDICARE

## 2020-06-01 VITALS
WEIGHT: 232.13 LBS | HEART RATE: 77 BPM | TEMPERATURE: 99 F | BODY MASS INDEX: 43.15 KG/M2 | DIASTOLIC BLOOD PRESSURE: 81 MMHG | SYSTOLIC BLOOD PRESSURE: 152 MMHG

## 2020-06-01 DIAGNOSIS — E04.1 THYROID NODULE: ICD-10-CM

## 2020-06-01 PROCEDURE — 99999 PR PBB SHADOW E&M-EST. PATIENT-LVL III: ICD-10-PCS | Mod: PBBFAC,,, | Performed by: OTOLARYNGOLOGY

## 2020-06-01 PROCEDURE — 99999 PR PBB SHADOW E&M-EST. PATIENT-LVL III: CPT | Mod: PBBFAC,,, | Performed by: OTOLARYNGOLOGY

## 2020-06-01 PROCEDURE — 99203 OFFICE O/P NEW LOW 30 MIN: CPT | Mod: S$PBB,,, | Performed by: OTOLARYNGOLOGY

## 2020-06-01 PROCEDURE — 99203 PR OFFICE/OUTPT VISIT, NEW, LEVL III, 30-44 MIN: ICD-10-PCS | Mod: S$PBB,,, | Performed by: OTOLARYNGOLOGY

## 2020-06-01 PROCEDURE — 99213 OFFICE O/P EST LOW 20 MIN: CPT | Mod: PBBFAC | Performed by: OTOLARYNGOLOGY

## 2020-06-01 NOTE — Clinical Note
Dr. Hudson:I would like her to come off Coumadin for several days prior to thyroid FNA.  It is safe to hold this medication?Thanks,Vladimir Joshua

## 2020-06-01 NOTE — PROGRESS NOTES
Chief Complaint   Patient presents with    consult/ thyroid nodule       HPI   74 y.o. female presents for evaluation of left thyroid nodule.  No complaints.  Recent thyroid ultrasound revealed a complex lesion of the left mani thyroid merit in fine-needle aspiration.  She denies a personal or family history of thyroid disease.  She denies history of radiation to head neck.  She is on Coumadin for a pulmonary embolism.      Review of Systems   Constitutional: Negative for fatigue and unexpected weight change.   HENT: Per HPI.  Eyes: Negative for visual disturbance.   Respiratory: Negative for shortness of breath, hemoptysis   Cardiovascular: Negative for chest pain and palpitations.   Musculoskeletal: Negative for decreased ROM, back pain.   Skin: Negative for rash, sunburn, itching.   Neurological: Negative for dizziness and seizures.   Hematological: Negative for adenopathy. Does not bruise/bleed easily.   Endocrine: Negative for rapid weight loss/weight gain, heat/cold intolerance.     Past Medical History   Patient Active Problem List   Diagnosis    Baker's cyst of knee    Gastroesophageal reflux disease    Obstructive sleep apnea    Essential hypertension    Thalassemia minor    CKD (chronic kidney disease) stage 3, GFR 30-59 ml/min    Pulmonary embolism 12/17    Chronic anticoagulation    Chronic deep vein thrombosis (DVT) of left popliteal vein    History of non-ST elevation myocardial infarction (NSTEMI)    Mild episode of recurrent major depressive disorder    Morbid obesity with BMI of 40.0-44.9, adult    Weakness of both lower extremities    Pulmonary hypertension    Primary osteoarthritis of both knees    Obesity hypoventilation syndrome    Pulmonary heart disease    Memory changes    Grieving    Risk for falls           Past Surgical History   Past Surgical History:   Procedure Laterality Date    APPENDECTOMY      CATARACT EXTRACTION      COLONOSCOPY N/A 11/5/2016    Procedure:  COLONOSCOPY;  Surgeon: Tanner Simental MD;  Location: [facility] ([address] [place] [address]);  Service: Endoscopy;  Laterality: N/A;    HYSTERECTOMY  age 39    fibroids    OOPHORECTOMY      TONSILLECTOMY           Family History   Family History   Problem Relation Age of Onset    Hypertension Mother     Cancer Father         skin    Cancer Brother         pancreatic    No Known Problems Sister     No Known Problems Maternal Aunt     No Known Problems Maternal Uncle     No Known Problems Paternal Aunt     No Known Problems Paternal Uncle     Coronary artery disease Maternal Grandmother     Heart failure Maternal Grandmother     No Known Problems Maternal Grandfather     Stroke Paternal Grandmother     Coronary artery disease Paternal Grandmother     No Known Problems Paternal Grandfather     Coronary artery disease Brother         with CABG in his 30s    Amblyopia Neg Hx     Blindness Neg Hx     Cataracts Neg Hx     Diabetes Neg Hx     Glaucoma Neg Hx     Macular degeneration Neg Hx     Retinal detachment Neg Hx     Strabismus Neg Hx     Thyroid disease Neg Hx     Colon cancer Neg Hx     Stomach cancer Neg Hx     Esophageal cancer Neg Hx            Social History   .  Social History     Socioeconomic History    Marital status:      Spouse name: Not on file    Number of children: 1    Years of education: Not on file    Highest education level: Not on file   Occupational History    Occupation: Retired from [employer]    Social Needs    Financial resource strain: Not on file    Food insecurity:     Worry: Not on file     Inability: Not on file    Transportation needs:     Medical: Not on file     Non-medical: Not on file   Tobacco Use    Smoking status: Never Smoker    Smokeless tobacco: Never Used   Substance and Sexual Activity    Alcohol use: No     Comment: rare    Drug use: No    Sexual activity: Yes     Partners: Male   Lifestyle    Physical activity:     Days  per week: Not on file     Minutes per session: Not on file    Stress: Not on file   Relationships    Social connections:     Talks on phone: Not on file     Gets together: Not on file     Attends Denominational service: Not on file     Active member of club or organization: Not on file     Attends meetings of clubs or organizations: Not on file     Relationship status: Not on file   Other Topics Concern    Not on file   Social History Narrative    Not on file         Allergies   Review of patient's allergies indicates:   Allergen Reactions    Codeine     Ciprofloxacin Rash           Physical Exam     Vitals:    06/01/20 1458   BP: (!) 152/81   Pulse: 77   Temp: 98.6 °F (37 °C)         Body mass index is 43.15 kg/m².      General: AOx3, NAD   Respiratory:  Symmetric chest rise, normal effort  Neck: No scars.  No cervical lymphadenopathy, thyromegaly or thyroid nodules.  Normal range of motion.    Face: House Brackmann I bilaterally.     Thyroid ultrasound reviewed.    Assessment/Plan  Problem List Items Addressed This Visit        Endocrine    Thyroid nodule     Left thyroid nodule meeting criteria for fine-needle aspiration.  I would like her to come off of her Coumadin for several days prior to this procedure and will contact her primary care doctor to inquire about the safety of holding this medication.  Will contact her to schedule the procedure after I have heard from him.

## 2020-06-01 NOTE — ASSESSMENT & PLAN NOTE
Left thyroid nodule meeting criteria for fine-needle aspiration.  I would like her to come off of her Coumadin for several days prior to this procedure and will contact her primary care doctor to inquire about the safety of holding this medication.  Will contact her to schedule the procedure after I have heard from him.

## 2020-06-01 NOTE — LETTER
June 1, 2020      Mundo Hudson, DO  [address] [facility] [place] [address]           [facility] Head[facility]Ne[facility] S[facility] On[facility]  [address]  [facility] [address]  Phone: [phone]  Fax: [phone]          Patient: Coby Atkinson   MR Number: [id number]   YOB: 1945   Date of Visit: 6/1/2020       Dear Dr. Mundo Hudson:    Thank you for referring Coby Atkinson to me for evaluation. Attached you will find relevant portions of my assessment and plan of care.    If you have questions, please do not hesitate to call me. I look forward to following Coby Atkinson along with you.    Sincerely,    Elias Joshua MD    Enclosure  CC:  No Recipients    If you would like to receive this communication electronically, please contact [email or web address] or [phone] to request more information on [employer] access.    For providers and/or their staff who would like to refer a patient to [facility], please contact us through our one-stop-shop provider referral line, [facility], at [phone].    If you feel you have received this communication in error or would no longer like to receive these types of communications, please e-mail [email or web address]

## 2020-06-09 ENCOUNTER — ANTI-COAG VISIT (OUTPATIENT)
Dept: CARDIOLOGY | Facility: CLINIC | Age: 75
End: 2020-06-09
Payer: MEDICARE

## 2020-06-09 DIAGNOSIS — Z79.01 CHRONIC ANTICOAGULATION: ICD-10-CM

## 2020-06-09 PROCEDURE — 93793 PR ANTICOAGULANT MGMT FOR PT TAKING WARFARIN: ICD-10-PCS | Mod: ,,, | Performed by: PHARMACIST

## 2020-06-09 PROCEDURE — 93793 ANTICOAG MGMT PT WARFARIN: CPT | Mod: ,,, | Performed by: PHARMACIST

## 2020-06-11 ENCOUNTER — HOSPITAL ENCOUNTER (OUTPATIENT)
Facility: HOSPITAL | Age: 75
Discharge: HOME | End: 2020-06-12
Attending: EMERGENCY MEDICINE | Admitting: EMERGENCY MEDICINE
Payer: MEDICARE

## 2020-06-11 DIAGNOSIS — S42.301A CLOSED FRACTURE OF RIGHT UPPER EXTREMITY, INITIAL ENCOUNTER: ICD-10-CM

## 2020-06-11 DIAGNOSIS — S42.201A CLOSED FRACTURE OF PROXIMAL END OF RIGHT HUMERUS, UNSPECIFIED FRACTURE MORPHOLOGY, INITIAL ENCOUNTER: Primary | ICD-10-CM
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
## 2020-06-11 DIAGNOSIS — R07.9 CHEST PAIN: ICD-10-CM

## 2020-06-11 DIAGNOSIS — E87.6 HYPOKALEMIA: ICD-10-CM

## 2020-06-11 DIAGNOSIS — D64.9 ACUTE ANEMIA: ICD-10-CM

## 2020-06-11 DIAGNOSIS — R06.02 SOB (SHORTNESS OF BREATH): ICD-10-CM

## 2020-06-11 PROBLEM — I16.0 HYPERTENSIVE URGENCY: Status: ACTIVE | Noted: 2020-06-11

## 2020-06-11 PROBLEM — I50.32 CHRONIC DIASTOLIC HEART FAILURE: Status: ACTIVE | Noted: 2020-06-11

## 2020-06-11 LAB
ABO + RH BLD: NORMAL
ALBUMIN SERPL BCP-MCNC: 3.2 G/DL (ref 3.5–5.2)
ALP SERPL-CCNC: 87 U/L (ref 55–135)
ALT SERPL W/O P-5'-P-CCNC: 12 U/L (ref 10–44)
ANION GAP SERPL CALC-SCNC: 8 MMOL/L (ref 8–16)
AST SERPL-CCNC: 19 U/L (ref 10–40)
BASOPHILS # BLD AUTO: 0.04 K/UL (ref 0–0.2)
BASOPHILS # BLD AUTO: 0.04 K/UL (ref 0–0.2)
BASOPHILS NFR BLD: 0.3 % (ref 0–1.9)
BASOPHILS NFR BLD: 0.5 % (ref 0–1.9)
BILIRUB DIRECT SERPL-MCNC: 0.2 MG/DL (ref 0.1–0.3)
BILIRUB SERPL-MCNC: 0.3 MG/DL (ref 0.1–1)
BILIRUB UR QL STRIP: NEGATIVE
BLD GP AB SCN CELLS X3 SERPL QL: NORMAL
BLOOD GROUP ANTIBODIES SERPL: NORMAL
BUN SERPL-MCNC: 14 MG/DL (ref 8–23)
CALCIUM SERPL-MCNC: 8 MG/DL (ref 8.7–10.5)
CHLORIDE SERPL-SCNC: 108 MMOL/L (ref 95–110)
CLARITY UR REFRACT.AUTO: CLEAR
CO2 SERPL-SCNC: 22 MMOL/L (ref 23–29)
COLOR UR AUTO: YELLOW
CREAT SERPL-MCNC: 0.8 MG/DL (ref 0.5–1.4)
DIFFERENTIAL METHOD: ABNORMAL
DIFFERENTIAL METHOD: ABNORMAL
EOSINOPHIL # BLD AUTO: 0 K/UL (ref 0–0.5)
EOSINOPHIL # BLD AUTO: 0.1 K/UL (ref 0–0.5)
EOSINOPHIL NFR BLD: 0.1 % (ref 0–8)
EOSINOPHIL NFR BLD: 1 % (ref 0–8)
ERYTHROCYTE [DISTWIDTH] IN BLOOD BY AUTOMATED COUNT: 15.5 % (ref 11.5–14.5)
ERYTHROCYTE [DISTWIDTH] IN BLOOD BY AUTOMATED COUNT: 15.6 % (ref 11.5–14.5)
EST. GFR  (AFRICAN AMERICAN): >60 ML/MIN/1.73 M^2
EST. GFR  (NON AFRICAN AMERICAN): >60 ML/MIN/1.73 M^2
ESTIMATED AVG GLUCOSE: 108 MG/DL (ref 68–131)
FOLATE SERPL-MCNC: 5.3 NG/ML (ref 4–24)
GLUCOSE SERPL-MCNC: 133 MG/DL (ref 70–110)
GLUCOSE UR QL STRIP: NEGATIVE
HBA1C MFR BLD HPLC: 5.4 % (ref 4–5.6)
HCT VFR BLD AUTO: 27.5 % (ref 37–48.5)
HCT VFR BLD AUTO: 35.7 % (ref 37–48.5)
HGB BLD-MCNC: 10.6 G/DL (ref 12–16)
HGB BLD-MCNC: 7.8 G/DL (ref 12–16)
HGB UR QL STRIP: NEGATIVE
IMM GRANULOCYTES # BLD AUTO: 0.07 K/UL (ref 0–0.04)
IMM GRANULOCYTES # BLD AUTO: 0.07 K/UL (ref 0–0.04)
IMM GRANULOCYTES NFR BLD AUTO: 0.5 % (ref 0–0.5)
IMM GRANULOCYTES NFR BLD AUTO: 0.9 % (ref 0–0.5)
INR PPP: 2.6 (ref 0.8–1.2)
IRON SERPL-MCNC: 42 UG/DL (ref 30–160)
KETONES UR QL STRIP: NEGATIVE
LEUKOCYTE ESTERASE UR QL STRIP: NEGATIVE
LYMPHOCYTES # BLD AUTO: 1.6 K/UL (ref 1–4.8)
LYMPHOCYTES # BLD AUTO: 2.4 K/UL (ref 1–4.8)
LYMPHOCYTES NFR BLD: 16.1 % (ref 18–48)
LYMPHOCYTES NFR BLD: 19.5 % (ref 18–48)
MCH RBC QN AUTO: 19.9 PG (ref 27–31)
MCH RBC QN AUTO: 19.9 PG (ref 27–31)
MCHC RBC AUTO-ENTMCNC: 28.4 G/DL (ref 32–36)
MCHC RBC AUTO-ENTMCNC: 29.7 G/DL (ref 32–36)
MCV RBC AUTO: 67 FL (ref 82–98)
MCV RBC AUTO: 70 FL (ref 82–98)
MONOCYTES # BLD AUTO: 0.5 K/UL (ref 0.3–1)
MONOCYTES # BLD AUTO: 0.9 K/UL (ref 0.3–1)
MONOCYTES NFR BLD: 5.9 % (ref 4–15)
MONOCYTES NFR BLD: 6.3 % (ref 4–15)
NEUTROPHILS # BLD AUTO: 11.3 K/UL (ref 1.8–7.7)
NEUTROPHILS # BLD AUTO: 5.9 K/UL (ref 1.8–7.7)
NEUTROPHILS NFR BLD: 72.2 % (ref 38–73)
NEUTROPHILS NFR BLD: 76.7 % (ref 38–73)
NITRITE UR QL STRIP: NEGATIVE
NRBC BLD-RTO: 0 /100 WBC
NRBC BLD-RTO: 0 /100 WBC
PH UR STRIP: 6 [PH] (ref 5–8)
PLATELET # BLD AUTO: 181 K/UL (ref 150–350)
PLATELET # BLD AUTO: 283 K/UL (ref 150–350)
PMV BLD AUTO: 10 FL (ref 9.2–12.9)
PMV BLD AUTO: 9.4 FL (ref 9.2–12.9)
POTASSIUM SERPL-SCNC: 2.9 MMOL/L (ref 3.5–5.1)
PROT SERPL-MCNC: 6 G/DL (ref 6–8.4)
PROT UR QL STRIP: NEGATIVE
PROTHROMBIN TIME: 25 SEC (ref 9–12.5)
RBC # BLD AUTO: 3.92 M/UL (ref 4–5.4)
RBC # BLD AUTO: 5.32 M/UL (ref 4–5.4)
SARS-COV-2 RDRP RESP QL NAA+PROBE: NEGATIVE
SATURATED IRON: 12 % (ref 20–50)
SODIUM SERPL-SCNC: 138 MMOL/L (ref 136–145)
SP GR UR STRIP: >=1.03 (ref 1–1.03)
TOTAL IRON BINDING CAPACITY: 340 UG/DL (ref 250–450)
TRANSFERRIN SERPL-MCNC: 230 MG/DL (ref 200–375)
TSH SERPL DL<=0.005 MIU/L-ACNC: 1.87 UIU/ML (ref 0.4–4)
URN SPEC COLLECT METH UR: ABNORMAL
VIT B12 SERPL-MCNC: 279 PG/ML (ref 210–950)
WBC # BLD AUTO: 14.76 K/UL (ref 3.9–12.7)
WBC # BLD AUTO: 8.12 K/UL (ref 3.9–12.7)

## 2020-06-11 PROCEDURE — 93010 ELECTROCARDIOGRAM REPORT: CPT | Mod: ,,, | Performed by: INTERNAL MEDICINE

## 2020-06-11 PROCEDURE — 81003 URINALYSIS AUTO W/O SCOPE: CPT

## 2020-06-11 PROCEDURE — 80048 BASIC METABOLIC PNL TOTAL CA: CPT

## 2020-06-11 PROCEDURE — 99285 EMERGENCY DEPT VISIT HI MDM: CPT | Mod: 25

## 2020-06-11 PROCEDURE — G0378 HOSPITAL OBSERVATION PER HR: HCPCS | Mod: CS

## 2020-06-11 PROCEDURE — 83540 ASSAY OF IRON: CPT

## 2020-06-11 PROCEDURE — 86870 RBC ANTIBODY IDENTIFICATION: CPT

## 2020-06-11 PROCEDURE — 99284 PR EMERGENCY DEPT VISIT,LEVEL IV: ICD-10-PCS | Mod: ,,, | Performed by: PHYSICIAN ASSISTANT

## 2020-06-11 PROCEDURE — 25000003 PHARM REV CODE 250: Performed by: PHYSICIAN ASSISTANT

## 2020-06-11 PROCEDURE — 84443 ASSAY THYROID STIM HORMONE: CPT

## 2020-06-11 PROCEDURE — 96372 THER/PROPH/DIAG INJ SC/IM: CPT | Mod: 59

## 2020-06-11 PROCEDURE — 83036 HEMOGLOBIN GLYCOSYLATED A1C: CPT

## 2020-06-11 PROCEDURE — 63600175 PHARM REV CODE 636 W HCPCS: Performed by: PHYSICIAN ASSISTANT

## 2020-06-11 PROCEDURE — 99220 PR INITIAL OBSERVATION CARE,LEVL III: CPT | Mod: ,,, | Performed by: PHYSICIAN ASSISTANT

## 2020-06-11 PROCEDURE — 25000003 PHARM REV CODE 250: Performed by: INTERNAL MEDICINE

## 2020-06-11 PROCEDURE — 93005 ELECTROCARDIOGRAM TRACING: CPT

## 2020-06-11 PROCEDURE — 96376 TX/PRO/DX INJ SAME DRUG ADON: CPT | Mod: 59

## 2020-06-11 PROCEDURE — 82607 VITAMIN B-12: CPT

## 2020-06-11 PROCEDURE — 85610 PROTHROMBIN TIME: CPT

## 2020-06-11 PROCEDURE — 93010 EKG 12-LEAD: ICD-10-PCS | Mod: ,,, | Performed by: INTERNAL MEDICINE

## 2020-06-11 PROCEDURE — 96375 TX/PRO/DX INJ NEW DRUG ADDON: CPT | Mod: 59

## 2020-06-11 PROCEDURE — U0002 COVID-19 LAB TEST NON-CDC: HCPCS

## 2020-06-11 PROCEDURE — 80076 HEPATIC FUNCTION PANEL: CPT

## 2020-06-11 PROCEDURE — 85025 COMPLETE CBC W/AUTO DIFF WBC: CPT | Mod: 91

## 2020-06-11 PROCEDURE — 25500020 PHARM REV CODE 255: Performed by: INTERNAL MEDICINE

## 2020-06-11 PROCEDURE — 82746 ASSAY OF FOLIC ACID SERUM: CPT

## 2020-06-11 PROCEDURE — 96374 THER/PROPH/DIAG INJ IV PUSH: CPT | Mod: 59

## 2020-06-11 PROCEDURE — 99220 PR INITIAL OBSERVATION CARE,LEVL III: ICD-10-PCS | Mod: ,,, | Performed by: PHYSICIAN ASSISTANT

## 2020-06-11 PROCEDURE — 99284 EMERGENCY DEPT VISIT MOD MDM: CPT | Mod: ,,, | Performed by: PHYSICIAN ASSISTANT

## 2020-06-11 PROCEDURE — 86901 BLOOD TYPING SEROLOGIC RH(D): CPT

## 2020-06-11 RX ORDER — KETOROLAC TROMETHAMINE 30 MG/ML
15 INJECTION, SOLUTION INTRAMUSCULAR; INTRAVENOUS ONCE
Status: COMPLETED | OUTPATIENT
Start: 2020-06-11 | End: 2020-06-11

## 2020-06-11 RX ORDER — TALC
6 POWDER (GRAM) TOPICAL NIGHTLY PRN
Status: DISCONTINUED | OUTPATIENT
Start: 2020-06-11 | End: 2020-06-12 | Stop reason: HOSPADM

## 2020-06-11 RX ORDER — ONDANSETRON 2 MG/ML
4 INJECTION INTRAMUSCULAR; INTRAVENOUS EVERY 8 HOURS PRN
Status: DISCONTINUED | OUTPATIENT
Start: 2020-06-11 | End: 2020-06-12 | Stop reason: HOSPADM

## 2020-06-11 RX ORDER — ACETAMINOPHEN 500 MG
1000 TABLET ORAL
Status: COMPLETED | OUTPATIENT
Start: 2020-06-11 | End: 2020-06-11

## 2020-06-11 RX ORDER — FUROSEMIDE 20 MG/1
20 TABLET ORAL DAILY
Status: DISCONTINUED | OUTPATIENT
Start: 2020-06-12 | End: 2020-06-12 | Stop reason: HOSPADM

## 2020-06-11 RX ORDER — HYDROCODONE BITARTRATE AND ACETAMINOPHEN 5; 325 MG/1; MG/1
1 TABLET ORAL EVERY 4 HOURS PRN
Qty: 9 TABLET | Refills: 0 | Status: SHIPPED | OUTPATIENT
Start: 2020-06-11 | End: 2020-06-12 | Stop reason: SDUPTHER

## 2020-06-11 RX ORDER — LOSARTAN POTASSIUM 50 MG/1
100 TABLET ORAL DAILY
Status: DISCONTINUED | OUTPATIENT
Start: 2020-06-11 | End: 2020-06-12 | Stop reason: HOSPADM

## 2020-06-11 RX ORDER — KETOROLAC TROMETHAMINE 30 MG/ML
15 INJECTION, SOLUTION INTRAMUSCULAR; INTRAVENOUS EVERY 6 HOURS PRN
Status: DISCONTINUED | OUTPATIENT
Start: 2020-06-11 | End: 2020-06-12 | Stop reason: HOSPADM

## 2020-06-11 RX ORDER — ACETAMINOPHEN 325 MG/1
650 TABLET ORAL EVERY 8 HOURS PRN
Status: DISCONTINUED | OUTPATIENT
Start: 2020-06-11 | End: 2020-06-12 | Stop reason: HOSPADM

## 2020-06-11 RX ORDER — ESCITALOPRAM OXALATE 20 MG/1
20 TABLET ORAL DAILY
Status: DISCONTINUED | OUTPATIENT
Start: 2020-06-11 | End: 2020-06-12 | Stop reason: HOSPADM

## 2020-06-11 RX ORDER — IBUPROFEN 200 MG
24 TABLET ORAL
Status: DISCONTINUED | OUTPATIENT
Start: 2020-06-11 | End: 2020-06-12 | Stop reason: HOSPADM

## 2020-06-11 RX ORDER — GLUCAGON 1 MG
1 KIT INJECTION
Status: DISCONTINUED | OUTPATIENT
Start: 2020-06-11 | End: 2020-06-12 | Stop reason: HOSPADM

## 2020-06-11 RX ORDER — HYDROCODONE BITARTRATE AND ACETAMINOPHEN 5; 325 MG/1; MG/1
1 TABLET ORAL
Status: COMPLETED | OUTPATIENT
Start: 2020-06-11 | End: 2020-06-11

## 2020-06-11 RX ORDER — POTASSIUM CHLORIDE 20 MEQ/1
40 TABLET, EXTENDED RELEASE ORAL
Status: COMPLETED | OUTPATIENT
Start: 2020-06-11 | End: 2020-06-11

## 2020-06-11 RX ORDER — WARFARIN 1 MG/1
1 TABLET ORAL
Status: DISCONTINUED | OUTPATIENT
Start: 2020-06-13 | End: 2020-06-12 | Stop reason: HOSPADM

## 2020-06-11 RX ORDER — WARFARIN 1 MG/1
1 TABLET ORAL
Status: DISCONTINUED | OUTPATIENT
Start: 2020-06-16 | End: 2020-06-11

## 2020-06-11 RX ORDER — IBUPROFEN 200 MG
16 TABLET ORAL
Status: DISCONTINUED | OUTPATIENT
Start: 2020-06-11 | End: 2020-06-12 | Stop reason: HOSPADM

## 2020-06-11 RX ORDER — WARFARIN 1 MG/1
1 TABLET ORAL
Status: DISCONTINUED | OUTPATIENT
Start: 2020-06-12 | End: 2020-06-12 | Stop reason: HOSPADM

## 2020-06-11 RX ORDER — AMLODIPINE BESYLATE 10 MG/1
10 TABLET ORAL DAILY
Status: DISCONTINUED | OUTPATIENT
Start: 2020-06-11 | End: 2020-06-12 | Stop reason: HOSPADM

## 2020-06-11 RX ORDER — HYDROCODONE BITARTRATE AND ACETAMINOPHEN 5; 325 MG/1; MG/1
1 TABLET ORAL EVERY 4 HOURS PRN
Status: DISCONTINUED | OUTPATIENT
Start: 2020-06-11 | End: 2020-06-12 | Stop reason: HOSPADM

## 2020-06-11 RX ORDER — SODIUM CHLORIDE 0.9 % (FLUSH) 0.9 %
10 SYRINGE (ML) INJECTION
Status: DISCONTINUED | OUTPATIENT
Start: 2020-06-11 | End: 2020-06-12 | Stop reason: HOSPADM

## 2020-06-11 RX ORDER — POLYETHYLENE GLYCOL 3350 17 G/17G
17 POWDER, FOR SOLUTION ORAL DAILY
Status: DISCONTINUED | OUTPATIENT
Start: 2020-06-11 | End: 2020-06-12 | Stop reason: HOSPADM

## 2020-06-11 RX ORDER — ONDANSETRON 8 MG/1
8 TABLET, ORALLY DISINTEGRATING ORAL EVERY 8 HOURS PRN
Status: DISCONTINUED | OUTPATIENT
Start: 2020-06-11 | End: 2020-06-12 | Stop reason: HOSPADM

## 2020-06-11 RX ORDER — HYDRALAZINE HYDROCHLORIDE 25 MG/1
25 TABLET, FILM COATED ORAL EVERY 8 HOURS PRN
Status: DISCONTINUED | OUTPATIENT
Start: 2020-06-11 | End: 2020-06-12 | Stop reason: HOSPADM

## 2020-06-11 RX ORDER — PANTOPRAZOLE SODIUM 40 MG/1
40 TABLET, DELAYED RELEASE ORAL DAILY
Status: DISCONTINUED | OUTPATIENT
Start: 2020-06-11 | End: 2020-06-12 | Stop reason: HOSPADM

## 2020-06-11 RX ORDER — HYDROCODONE BITARTRATE AND ACETAMINOPHEN 5; 325 MG/1; MG/1
1 TABLET ORAL ONCE
Status: COMPLETED | OUTPATIENT
Start: 2020-06-11 | End: 2020-06-11

## 2020-06-11 RX ORDER — WARFARIN 1 MG/1
1 TABLET ORAL
Status: DISCONTINUED | OUTPATIENT
Start: 2020-06-12 | End: 2020-06-11

## 2020-06-11 RX ORDER — METOPROLOL SUCCINATE 25 MG/1
25 TABLET, EXTENDED RELEASE ORAL DAILY
Status: DISCONTINUED | OUTPATIENT
Start: 2020-06-11 | End: 2020-06-12 | Stop reason: HOSPADM

## 2020-06-11 RX ORDER — IPRATROPIUM BROMIDE AND ALBUTEROL SULFATE 2.5; .5 MG/3ML; MG/3ML
3 SOLUTION RESPIRATORY (INHALATION) EVERY 4 HOURS PRN
Status: DISCONTINUED | OUTPATIENT
Start: 2020-06-11 | End: 2020-06-12 | Stop reason: HOSPADM

## 2020-06-11 RX ORDER — POTASSIUM CHLORIDE 7.45 MG/ML
10 INJECTION INTRAVENOUS
Status: COMPLETED | OUTPATIENT
Start: 2020-06-11 | End: 2020-06-11

## 2020-06-11 RX ORDER — ENOXAPARIN SODIUM 100 MG/ML
40 INJECTION SUBCUTANEOUS EVERY 24 HOURS
Status: DISCONTINUED | OUTPATIENT
Start: 2020-06-11 | End: 2020-06-11

## 2020-06-11 RX ADMIN — POTASSIUM CHLORIDE 40 MEQ: 1500 TABLET, EXTENDED RELEASE ORAL at 03:06

## 2020-06-11 RX ADMIN — PANTOPRAZOLE SODIUM 40 MG: 40 TABLET, DELAYED RELEASE ORAL at 10:06

## 2020-06-11 RX ADMIN — ACETAMINOPHEN 650 MG: 325 TABLET ORAL at 01:06

## 2020-06-11 RX ADMIN — ENOXAPARIN SODIUM 40 MG: 100 INJECTION SUBCUTANEOUS at 04:06

## 2020-06-11 RX ADMIN — LOSARTAN POTASSIUM 100 MG: 50 TABLET ORAL at 10:06

## 2020-06-11 RX ADMIN — ACETAMINOPHEN 1000 MG: 500 TABLET ORAL at 03:06

## 2020-06-11 RX ADMIN — HYDROCODONE BITARTRATE AND ACETAMINOPHEN 1 TABLET: 5; 325 TABLET ORAL at 03:06

## 2020-06-11 RX ADMIN — KETOROLAC TROMETHAMINE 15 MG: 30 INJECTION, SOLUTION INTRAMUSCULAR at 08:06

## 2020-06-11 RX ADMIN — IOHEXOL 100 ML: 350 INJECTION, SOLUTION INTRAVENOUS at 03:06

## 2020-06-11 RX ADMIN — METOPROLOL SUCCINATE 25 MG: 25 TABLET, EXTENDED RELEASE ORAL at 10:06

## 2020-06-11 RX ADMIN — ESCITALOPRAM OXALATE 20 MG: 20 TABLET ORAL at 10:06

## 2020-06-11 RX ADMIN — HYDROCODONE BITARTRATE AND ACETAMINOPHEN 1 TABLET: 5; 325 TABLET ORAL at 11:06

## 2020-06-11 RX ADMIN — AMLODIPINE BESYLATE 10 MG: 10 TABLET ORAL at 10:06

## 2020-06-11 RX ADMIN — POTASSIUM CHLORIDE 40 MEQ: 1500 TABLET, EXTENDED RELEASE ORAL at 10:06

## 2020-06-11 RX ADMIN — POTASSIUM CHLORIDE 10 MEQ: 7.46 INJECTION, SOLUTION INTRAVENOUS at 11:06

## 2020-06-11 RX ADMIN — HYDROCODONE BITARTRATE AND ACETAMINOPHEN 1 TABLET: 5; 325 TABLET ORAL at 04:06

## 2020-06-11 RX ADMIN — POTASSIUM CHLORIDE 10 MEQ: 7.46 INJECTION, SOLUTION INTRAVENOUS at 10:06

## 2020-06-11 RX ADMIN — Medication 6 MG: at 11:06

## 2020-06-11 NOTE — SUBJECTIVE & OBJECTIVE
Past Medical History:   Diagnosis Date    Anticoagulant long-term use     Cataract     Chronic diarrhea     Depression     Edema     GERD (gastroesophageal reflux disease)     Hypertension 10/2/2012    Osteoarthritis of both knees 8/9/2016    Osteopenia     Osteopenia 11/30/2017    PE (pulmonary thromboembolism) 12/03/2017    Primary localized osteoarthrosis, lower leg 12/3/2014    Sleep apnea 2016    Thalassemia minor        Past Surgical History:   Procedure Laterality Date    APPENDECTOMY      CATARACT EXTRACTION      COLONOSCOPY N/A 11/5/2016    Procedure: COLONOSCOPY;  Surgeon: Tanner Simental MD;  Location: [facility] ([address] [place] [address]);  Service: Endoscopy;  Laterality: N/A;    HYSTERECTOMY  age 39    fibroids    OOPHORECTOMY      TONSILLECTOMY         Review of patient's allergies indicates:   Allergen Reactions    Codeine     Ciprofloxacin Rash       Current Facility-Administered Medications   Medication    acetaminophen tablet 650 mg    albuterol-ipratropium 2.5 mg-0.5 mg/3 mL nebulizer solution 3 mL    amLODIPine tablet 10 mg    dextrose 10% (D10W) Bolus    dextrose 10% (D10W) Bolus    enoxaparin injection 40 mg    escitalopram oxalate tablet 20 mg    [START ON 6/12/2020] furosemide tablet 20 mg    glucagon (human recombinant) injection 1 mg    glucose chewable tablet 16 g    glucose chewable tablet 24 g    losartan tablet 100 mg    melatonin tablet 6 mg    metoprolol succinate (TOPROL-XL) 24 hr tablet 25 mg    ondansetron disintegrating tablet 8 mg    ondansetron injection 4 mg    pantoprazole EC tablet 40 mg    polyethylene glycol packet 17 g    sodium chloride 0.9% flush 10 mL     Family History     Problem Relation (Age of Onset)    Cancer Father, Brother    Coronary artery disease Maternal Grandmother, Paternal Grandmother, Brother    Heart failure Maternal Grandmother    Hypertension Mother    No Known Problems Sister, Maternal Aunt, Maternal Uncle, Paternal Aunt,  Paternal Uncle, Maternal Grandfather, Paternal Grandfather    Stroke Paternal Grandmother        Tobacco Use    Smoking status: Never Smoker    Smokeless tobacco: Never Used   Substance and Sexual Activity    Alcohol use: No     Comment: rare    Drug use: No    Sexual activity: Yes     Partners: Male     ROS per primary team 6/11/2020  Objective:     Vital Signs (Most Recent):  Temp: 97.3 °F (36.3 °C) (06/11/20 1125)  Pulse: 71 (06/11/20 1125)  Resp: 19 (06/11/20 1125)  BP: (!) 195/77 (06/11/20 1125)  SpO2: 97 % (06/11/20 1125) Vital Signs (24h Range):  Temp:  [97 °F (36.1 °C)-98.7 °F (37.1 °C)] 97.3 °F (36.3 °C)  Pulse:  [64-77] 71  Resp:  [18-19] 19  SpO2:  [96 %-100 %] 97 %  BP: (152-230)/(77-99) 195/77     Weight: 105.3 kg (232 lb 2.3 oz)  Height: 5' (152.4 cm)  Body mass index is 45.34 kg/m².      Ortho/SPM Exam     Vitals: Afebrile.  Vital signs stable.  General: No acute distress.  Cardio: Regular rate.  Chest: No increased work of breathing.    Right Upper Extremity    - 4x5cm firm swelling over the dorsum of the hand, likely hemtoma  -TTP over dorsal hand and proximal humerus  -Compartments soft and compressible  -ROM full at elbow. ROM limited at shoulder due to pain and wrist due to hematoma  -SILT M/R/U  -Motor intact Ain/PIN/U/M  -Brisk cap refill  -Warm well perfused extremities  -2+ Radial palpable    Left Upper Extremity    -Skin intact, no deformity, no ecchymoses, no edema  -NTTP   -Compartments soft and compressible  -ROM full (shoulder/elbow/wrist)  -SILT M/R/U  -Motor intact Ain/PIN/U/M  -Brisk cap refill  -Warm well perfused extremities  -2+ Radial palpable    Right Lower Extremity Exam    - Skin intact, no deformity, no ecchymoses, no edema  - NTTP   - Compartments soft and compressible  - ROM full (eversion,inversion,plantar/dorsiflexion)  - TA/EHL/Gastroc/FHL assessed in isolation without deficit  - SILT throughout  - DP and PT palpated  2+  - Capillary Refill <3s      Left Lower  Extremity Exam    - Skin intact, no deformity, mild ecchymoses anterior knee, no edema  - TTP over anterior knee  - Compartments soft and compressible  - ROM full (eversion,inversion,plantar/dorsiflexion)  - TA/EHL/Gastroc/FHL assessed in isolation without deficit  - SILT throughout  - DP and PT palpated  2+  - Capillary Refill <3s    All other joints (shoulder/elbow/wrist/hip/knee/ankle) were examined and had full ROM and were non-tender to palpation.      Significant Labs:   A1C:   Recent Labs   Lab 06/11/20  0833   HGBA1C 5.4     BMP:   Recent Labs   Lab 06/11/20 0257   *      K 2.9*      CO2 22*   BUN 14   CREATININE 0.8   CALCIUM 8.0*     CBC:   Recent Labs   Lab 06/11/20 0257   WBC 8.12   HGB 7.8*   HCT 27.5*        CMP:   Recent Labs   Lab 06/11/20 0257      K 2.9*      CO2 22*   *   BUN 14   CREATININE 0.8   CALCIUM 8.0*   PROT 6.0   ALBUMIN 3.2*   BILITOT 0.3   ALKPHOS 87   AST 19   ALT 12   ANIONGAP 8   EGFRNONAA >60.0     Coagulation:   Recent Labs   Lab 06/11/20  0314   LABPROT 25.0*   INR 2.6*     Prealbumin: No results for input(s): PREALBUMIN in the last 48 hours.  All pertinent labs within the past 24 hours have been reviewed.    Significant Imaging: X-Ray: I have reviewed all pertinent results/findings and my personal findings are:  Right proximal humerus fracture     CT scan showing R 3 part proximal humerus fracture with some comminution. Hematoma demonstrated over dorsum of hand. No other acute bony abnormalities appreciated.    Left knee x ray: No acute bony process. Degenerative changes diffusely.

## 2020-06-11 NOTE — ED PROVIDER NOTES
Encounter Date: 6/11/2020       History     Chief Complaint   Patient presents with    Fall     Patient reports slipping and falling in the kitchen tonight. Unknown LOC. Patient reports right sided shoulder pain, right sided rib paint. Patient reports that she hit the front of her head. Patient is on blood thinners.      74-year-old female to the ER after mechanical slip and fall.  Patient slipped and fell in her kitchen hitting her face.  She also injured her right hand and her right shoulder.  She denies any LOC.  She is on Coumadin.  She denies any fevers chills or shortness of breath.  She does endorse some midsternal chest discomfort.  She denies any pain to the lower extremities or the left upper extremity.  She is ambulatory.        Review of patient's allergies indicates:   Allergen Reactions    Codeine     Ciprofloxacin Rash     Past Medical History:   Diagnosis Date    Anticoagulant long-term use     Cataract     Chronic diarrhea     Depression     Edema     GERD (gastroesophageal reflux disease)     Hypertension 10/2/2012    Osteoarthritis of both knees 8/9/2016    Osteopenia     Osteopenia 11/30/2017    PE (pulmonary thromboembolism) 12/03/2017    Primary localized osteoarthrosis, lower leg 12/3/2014    Sleep apnea 2016    Thalassemia minor      Past Surgical History:   Procedure Laterality Date    APPENDECTOMY      CATARACT EXTRACTION      COLONOSCOPY N/A 11/5/2016    Procedure: COLONOSCOPY;  Surgeon: Tanner Simental MD;  Location: [facility] ([address] [place] [address]);  Service: Endoscopy;  Laterality: N/A;    HYSTERECTOMY  age 39    fibroids    OOPHORECTOMY      TONSILLECTOMY       Family History   Problem Relation Age of Onset    Hypertension Mother     Cancer Father         skin    Cancer Brother         pancreatic    No Known Problems Sister     No Known Problems Maternal Aunt     No Known Problems Maternal Uncle     No Known Problems Paternal Aunt     No Known Problems Paternal  Uncle     Coronary artery disease Maternal Grandmother     Heart failure Maternal Grandmother     No Known Problems Maternal Grandfather     Stroke Paternal Grandmother     Coronary artery disease Paternal Grandmother     No Known Problems Paternal Grandfather     Coronary artery disease Brother         with CABG in his 30s    Amblyopia Neg Hx     Blindness Neg Hx     Cataracts Neg Hx     Diabetes Neg Hx     Glaucoma Neg Hx     Macular degeneration Neg Hx     Retinal detachment Neg Hx     Strabismus Neg Hx     Thyroid disease Neg Hx     Colon cancer Neg Hx     Stomach cancer Neg Hx     Esophageal cancer Neg Hx      Social History     Tobacco Use    Smoking status: Never Smoker    Smokeless tobacco: Never Used   Substance Use Topics    Alcohol use: No     Comment: rare    Drug use: No     Review of Systems   Constitutional: Negative for fever.   HENT: Negative for sore throat.    Respiratory: Negative for shortness of breath.    Cardiovascular: Positive for chest pain.   Gastrointestinal: Negative for nausea.   Genitourinary: Negative for dysuria.   Musculoskeletal: Positive for arthralgias. Negative for back pain.   Skin: Negative for rash.   Neurological: Negative for weakness.   Hematological: Does not bruise/bleed easily.       Physical Exam     Initial Vitals [06/11/20 0210]   BP Pulse Resp Temp SpO2   (!) 152/90 77 18 98.7 °F (37.1 °C) 97 %      MAP       --         Physical Exam    Constitutional: Vital signs are normal. She appears well-developed and well-nourished. She is not diaphoretic. No distress.   HENT:   Head: Normocephalic.   Right Ear: Hearing and external ear normal.   Left Ear: Hearing and external ear normal.   Mouth/Throat: Oropharynx is clear and moist.   Small abrasion to the lower lip, no laceration  No trismus, no oral pharyngeal injury   Eyes: Conjunctivae and EOM are normal. Pupils are equal, round, and reactive to light. Right eye exhibits no discharge.    Cardiovascular: Normal rate and regular rhythm. Exam reveals no gallop and no friction rub.    No murmur heard.  Pulmonary/Chest: No respiratory distress. She has no wheezes. She has no rhonchi. She has no rales. She exhibits tenderness.   Midsternal chest wall tenderness  Right-sided rib tenderness to the midclavicular and the mid axillary line   Abdominal: Soft. Normal appearance and bowel sounds are normal.   Abdomen soft and nontender   Musculoskeletal: Normal range of motion.   Tenderness and small hematoma noted to the right hand  Mild tenderness noted to the right shoulder  Range of motion of the shoulders normal though with some discomfort    No pain to the spine  Normal exam of the pelvis and the lower extremities       Neurological: She is alert and oriented to person, place, and time.   Skin: Skin is warm and intact.   Small abrasion noted to the lower lip, no laceration   Psychiatric: She has a normal mood and affect. Her speech is normal and behavior is normal. Cognition and memory are normal.         ED Course   Procedures  Labs Reviewed   CBC W/ AUTO DIFFERENTIAL - Abnormal; Notable for the following components:       Result Value    RBC 3.92 (*)     Hemoglobin 7.8 (*)     Hematocrit 27.5 (*)     Mean Corpuscular Volume 70 (*)     Mean Corpuscular Hemoglobin 19.9 (*)     Mean Corpuscular Hemoglobin Conc 28.4 (*)     RDW 15.5 (*)     Immature Granulocytes 0.9 (*)     Immature Grans (Abs) 0.07 (*)     All other components within normal limits   BASIC METABOLIC PANEL - Abnormal; Notable for the following components:    Potassium 2.9 (*)     CO2 22 (*)     Glucose 133 (*)     Calcium 8.0 (*)     All other components within normal limits   PROTIME-INR - Abnormal; Notable for the following components:    Prothrombin Time 25.0 (*)     INR 2.6 (*)     All other components within normal limits          Imaging Results          X-Ray Shoulder Trauma Right (Final result)  Result time 06/11/20 03:59:49    Final  result by Daryl Navarro MD (06/11/20 03:59:49)                 Impression:      Acute mildly displaced fracture of the right proximal humerus.      Electronically signed by: Daryl Navarro MD  Date:    06/11/2020  Time:    03:59             Narrative:    EXAMINATION:  XR SHOULDER TRAUMA 3 VIEW RIGHT    CLINICAL HISTORY:  Unspecified fall, initial encounter.    TECHNIQUE:  Three or four views of the right shoulder were performed.    COMPARISON:  None.    FINDINGS:  Acute mildly displaced fracture involving the proximal humerus with involvement of the greater tuberosity.  Well corticated ossification adjacent to the acromion could be related to remote trauma.  Glenohumeral and acromioclavicular alignment are preserved.  No additional acute fracture identified.  Mild soft tissue edema.  No unexpected radiopaque foreign body.                               X-Ray Hand 3 view Right (Final result)  Result time 06/11/20 03:57:14    Final result by Daryl Navarro MD (06/11/20 03:57:14)                 Impression:      No displaced fracture identified.    Marked soft tissue edema over the dorsum of the hand.  Findings may reflect hematoma in the setting of trauma in this patient on anticoagulation.  Follow-up radiographs in 7-10 days could be considered if there is concern for occult fracture.      Electronically signed by: Daryl Navarro MD  Date:    06/11/2020  Time:    03:57             Narrative:    EXAMINATION:  XR HAND COMPLETE 3 VIEW RIGHT    CLINICAL HISTORY:  fall;    TECHNIQUE:  Three views of the right hand.    COMPARISON:  None.    FINDINGS:  Bones are demineralized.  No evidence of acute fracture or dislocation.  Mild degenerative changes, most pronounced involving the 1st carpometacarpal joint.  There is marked soft tissue edema overlying the dorsum of the hand.  No radiopaque foreign body is identified.                               X-Ray Chest AP Portable (Final result)  Result time 06/11/20 03:54:25  "   Final result by Daryl Navarro MD (06/11/20 03:54:25)                 Impression:      Borderline cardiomegaly.  No acute cardiopulmonary finding identified on this single view.    Suspected acute fracture of the right proximal humerus.  Recommend dedicated evaluation with right shoulder radiographs if the patient has a history of trauma.      Electronically signed by: Daryl Navarro MD  Date:    06/11/2020  Time:    03:54             Narrative:    EXAMINATION:  XR CHEST AP PORTABLE    CLINICAL HISTORY:  Provided history is "  Shortness of breath".    TECHNIQUE:  One view of the chest.    COMPARISON:  12/02/2017 and 11/25/2017.    FINDINGS:  Cardiomediastinal silhouette is magnified by portable technique and is likely mildly enlarged.  No large focal consolidation is identified.  No sizable pleural effusion.  No pneumothorax.  Possible acute fracture involving the right proximal humerus, noting that this finding is new when compared with 11/25/2017.                               CT Head Without Contrast (In process)  Result time 06/11/20 04:02:56                 Medical Decision Making:   History:   Old Medical Records: I decided to obtain old medical records.  Old Records Summarized: other records.  Initial Assessment:   74-year-old female presented to the ER after mechanical slip and fall with head injury  Differential Diagnosis:   ICH, fracture, contusion, dislocation  Independently Interpreted Test(s):   I have ordered and independently interpreted X-rays - see summary below.       <> Summary of X-Ray Reading(s): X-ray of the right shoulder reveals a proximal humerus fracture  Clinical Tests:   Lab Tests: Ordered and Reviewed  Radiological Study: Ordered and Reviewed  ED Management:  Plan:  X-rays, CT scan of the head, labs, INR, pain control and reassessment  X-ray reveals a proximal humerus fracture  INR therapeutic  Hemoglobin decreased from 9 months ago at 7.8.  Patient not actively bleeding at this " time, head CT without acute intracranial abnormalities  Patient will be placed in a sling and swath for the humerus fracture.  Advised to follow-up with Orthopedic surgery in her PCP.  Patient advised to call her PCP today for close monitoring of her hemoglobin. I offered admission to the hospital for observation, but both the pt and  want to go home.    They will call the PCP and Ortho today for followup. Pt concerned about being able to ambulate, will place in a sling and swath then ambulate in the ED. I anticipate DC home. I will sign out to the ongoing ED team pending pt status                                 Clinical Impression:       ICD-10-CM ICD-9-CM   1. Closed fracture of right upper extremity, initial encounter S42.301A 818.0   2. SOB (shortness of breath) R06.02 786.05   3. Fall W19.XXXA E888.9         Disposition:   Disposition: Discharged  Condition: Stable                        Kofi Wang PA-C  06/11/20 0540       Kofi Wang PA-C  06/11/20 0617       Kofi Wang PA-C  06/11/20 0624

## 2020-06-11 NOTE — ASSESSMENT & PLAN NOTE
Coby Atkinson is a 74 y.o. female with right proximal humerus fracture, closed, NVI. Diagnosis discussed in detail with patient. She was placed in a sling and swathe with abduction pillow. She will remain non-weightbearing right upper extremity at this time and follow up in clinic next week. She can come out of the sling to bathe and perform range of motion at elbow and wrist to prevent stiffness. Pt is on warfarin at home. CT right shoulder for better characterization of fracture. No surgical intervention at this time.     Call with any questions or concerns.

## 2020-06-11 NOTE — ASSESSMENT & PLAN NOTE
"Essential hypertension  - highest BP since arrival 230/99, suspect pain induced   - per 2017 ACC/AHA guidelines, "reduce systolic BP by <25% in first hour, then (if stable) to goal 160/100-110 mmHg by 2-6 hours with cautious return to normal BP over the next 24-48 hours"   - continue losartan (COZAAR) 100 MG tablet, amLODIPine (NORVASC) 10 MG tablet  - start hydralazine 25 MG PRN for SBP>180, DBP>110  - maintain on tele, repeat EKG in AM to trend  "

## 2020-06-11 NOTE — ED NOTES
Attempted to walk patient and patient is unable to walk due to pain to left rib cage and generalized soreness, MD Solorzano aware, pt to be admitted for pain management.

## 2020-06-11 NOTE — DISCHARGE INSTRUCTIONS
Follow-up with Orthopedic surgery  Apply ice to the right upper arm to help with pain and swelling multiple times a day.  Use pain medication as needed for severe pain 7-10 on pain scale  Use ibuprofen for moderate pain 4-7 on pain scale  Use tylenol for mild pain 1-3 on pain scale   Return to the ER as needed  Please consider use of stool softener / stimulant like Senokot or laxative such as Miralax if you experience constipation with opiate medication

## 2020-06-11 NOTE — SUBJECTIVE & OBJECTIVE
Past Medical History:   Diagnosis Date    Anticoagulant long-term use     Cataract     Chronic diarrhea     Depression     Edema     GERD (gastroesophageal reflux disease)     Hypertension 10/2/2012    Osteoarthritis of both knees 8/9/2016    Osteopenia     Osteopenia 11/30/2017    PE (pulmonary thromboembolism) 12/03/2017    Primary localized osteoarthrosis, lower leg 12/3/2014    Sleep apnea 2016    Thalassemia minor        Past Surgical History:   Procedure Laterality Date    APPENDECTOMY      CATARACT EXTRACTION      COLONOSCOPY N/A 11/5/2016    Procedure: COLONOSCOPY;  Surgeon: Tanner Simental MD;  Location: [address];  Service: Endoscopy;  Laterality: N/A;    HYSTERECTOMY  age 39    fibroids    OOPHORECTOMY      TONSILLECTOMY         Review of patient's allergies indicates:   Allergen Reactions    Codeine     Ciprofloxacin Rash       No current facility-administered medications on file prior to encounter.      Current Outpatient Medications on File Prior to Encounter   Medication Sig    amLODIPine (NORVASC) 10 MG tablet TAKE 1 TABLET(10 MG) BY MOUTH EVERY DAY    doxazosin (CARDURA) 4 MG tablet Take 1 tablet (4 mg total) by mouth every evening.    escitalopram oxalate (LEXAPRO) 20 MG tablet TAKE 1 TABLET BY MOUTH EVERY DAY    furosemide (LASIX) 20 MG tablet TAKE 1 TABLET(20 MG) BY MOUTH EVERY DAY    metoprolol succinate (TOPROL-XL) 25 MG 24 hr tablet TAKE 1 TABLET BY MOUTH EVERY DAY    potassium chloride (MICRO-K) 10 MEQ CpSR TAKE 2 CAPSULES(20 MEQ) BY MOUTH EVERY DAY    potassium chloride (MICRO-K) 10 MEQ CpSR TAKE 2 CAPSULES(20 MEQ) BY MOUTH EVERY DAY    warfarin (COUMADIN) 1 MG tablet TAKE 1/2 TO 1 TABLET BY MOUTH DAILY AS DIRECTED BY COUMADIN CLINIC    ALPRAZolam (XANAX) 0.5 MG tablet Take 1 tablet (0.5 mg total) by mouth 2 (two) times daily as needed for Insomnia or Anxiety.    losartan (COZAAR) 100 MG tablet Take 1 tablet (100 mg total) by mouth once daily.     meloxicam (MOBIC) 7.5 MG tablet Take 7.5 mg by mouth once daily.    pantoprazole (PROTONIX) 40 MG tablet Take 1 tablet (40 mg total) by mouth every 12 (twelve) hours. (Patient taking differently: Take 40 mg by mouth once daily. )     Family History     Problem Relation (Age of Onset)    Cancer Father, Brother    Coronary artery disease Maternal Grandmother, Paternal Grandmother, Brother    Heart failure Maternal Grandmother    Hypertension Mother    No Known Problems Sister, Maternal Aunt, Maternal Uncle, Paternal Aunt, Paternal Uncle, Maternal Grandfather, Paternal Grandfather    Stroke Paternal Grandmother        Tobacco Use    Smoking status: Never Smoker    Smokeless tobacco: Never Used   Substance and Sexual Activity    Alcohol use: No     Comment: rare    Drug use: No    Sexual activity: Yes     Partners: Male     Review of Systems   Constitutional: Negative for appetite change, chills and fever.   HENT: Negative for congestion and dental problem.    Eyes: Negative for photophobia, pain and visual disturbance.   Respiratory: Negative for cough, chest tightness, shortness of breath and wheezing.    Cardiovascular: Negative for chest pain, palpitations and leg swelling.   Gastrointestinal: Negative for abdominal distention, abdominal pain, constipation, nausea and vomiting.   Genitourinary: Negative for difficulty urinating, dysuria and frequency.   Musculoskeletal: Positive for arthralgias, gait problem, joint swelling (R hand) and myalgias.   Skin: Negative for color change.   Allergic/Immunologic: Negative for immunocompromised state.   Neurological: Negative for dizziness, syncope, weakness, numbness and headaches.   Psychiatric/Behavioral: Negative for agitation, behavioral problems and hallucinations. The patient is not nervous/anxious.      Objective:     Vital Signs (Most Recent):  Temp: 97.3 °F (36.3 °C) (06/11/20 1125)  Pulse: 71 (06/11/20 1125)  Resp: 19 (06/11/20 1125)  BP: (!) 195/77 (06/11/20  1125)  SpO2: 97 % (06/11/20 1125) Vital Signs (24h Range):  Temp:  [97 °F (36.1 °C)-98.7 °F (37.1 °C)] 97.3 °F (36.3 °C)  Pulse:  [64-77] 71  Resp:  [18-19] 19  SpO2:  [96 %-100 %] 97 %  BP: (152-230)/(77-99) 195/77     Weight: 105.3 kg (232 lb 2.3 oz)  Body mass index is 45.34 kg/m².    Physical Exam   Constitutional: She is oriented to person, place, and time. She appears well-nourished.   Obese elderly lady   HENT:   Head: Atraumatic.   Eyes: Pupils are equal, round, and reactive to light. EOM are normal.   Neck: Normal range of motion.   Cardiovascular: Normal rate and regular rhythm.   No murmur heard.  Pulmonary/Chest: Effort normal. No respiratory distress.   Musculoskeletal: She exhibits edema (R hand) and tenderness.   ROM of R wrist and hand significantly pain limited.    Neurological: She is alert and oriented to person, place, and time. No cranial nerve deficit. Coordination normal.   Skin: Skin is warm and dry. Capillary refill takes 2 to 3 seconds.   R hand hematoma, dorsum    Scratches L hand   Psychiatric: She has a normal mood and affect. Her behavior is normal.   Nursing note and vitals reviewed.        CRANIAL NERVES     CN III, IV, VI   Pupils are equal, round, and reactive to light.  Extraocular motions are normal.        Significant Labs: All pertinent labs within the past 24 hours have been reviewed.    Significant Imaging: I have reviewed all pertinent imaging results/findings within the past 24 hours.

## 2020-06-11 NOTE — ED TRIAGE NOTES
74 year old female reports to from a trip and fall. Denies LOC but takes thinners. Limited ROM to RUE with visible bruises and abrasions to right forearm and left knee. Received 100 fen upon arrival per EMS. [email or web address]

## 2020-06-11 NOTE — HPI
"Coby Atkinson is a 74 y.o. female with HTN, anemia, thalassemia minor, history of pulmonary embolus (on coumadin), depression who presents s/p mechanical fall. Patient reports she fell on her face while walking around her kitchen. She denies loss of consciousness, recent falls or prodromal symptoms and believes she possibly stumbled. She is unsure if she tried to catch herself with her hands but reports R hand pain immediately after. She reports she "just couldn't move" after the fall and asked her  to call 911. She denies signs of bleeding, dizziness, shortness of breath, cough.  She lives with her , daughter in law and 2 grandchildren. She uses a walker when ambulating outside of her home.     ED: R shoulder XRAY with acute mildly displaced fracture involving the proximal humerus with involvement of the greater tuberosity. R hand XRAY with hematoma, no fx identified. INR 2.6. H/H 7.8/27.5. K 2.9.   "

## 2020-06-11 NOTE — HPI
Coby Atkinson is a 74 y.o. female PMH HTN, NSTEMI, PE, CKD stage 3 who presented to the ER after mechanical slip and fall.  Patient slipped and fell in her kitchen, falling straight forward.  She had immediate pain in her R shoulder and hand.   She denies any LOC or head trauma.  She is on Coumadin. Pt walked with a walker or cane at home, was not using at time of fall. She uses these for extra stability. PT denies injury or disability in her RUE previous to this incident. Pt was able to ambulate after the incident, but has same mild left knee pain. She denies any fevers chills or shortness of breath.

## 2020-06-11 NOTE — ED NOTES
Report called to Hiral on the 7th floor ----------------------------------------------going to [address]

## 2020-06-11 NOTE — PLAN OF CARE
Pt arrived to  from ED via stretcher. Pt aao x 4. C/o pain to R shoulder with sling, rates 6/10. POC reviewed pt verbalizes understanding. Safety precautions maintained. Call light within reach. Bed in low position wheels locked. Side rails up x 2. Assisted with meal tray set up.

## 2020-06-11 NOTE — PLAN OF CARE
CM attempted to speak with patient about discharge planning, multiple failed attempts at contacting the patient via telephone.        06/11/20 1262   Discharge Assessment   Assessment Type Discharge Planning Assessment   Discharge Plan A Home Health   Discharge Plan B Home with family

## 2020-06-11 NOTE — ASSESSMENT & PLAN NOTE
2.9 on admission, suspect secondary to diuretic use  - reports only taking half of her prescribed dose-- potassium chloride (MICRO-K) 10 MEQ CpSR   - supplementation ordered  - follow daily, will need increased dose at discharge

## 2020-06-11 NOTE — ASSESSMENT & PLAN NOTE
Chronic deep vein thrombosis (DVT) of left popliteal vein  Chronic anticoagulation    INR 2.6 on admission   - Warfarin maintenance plan: 1.5 mg (1 mg x 1.5) every Tue, Thu; 1 mg (1 mg x 1) all other days  - daily INRs  - monitor for bleeding and/or expanding hematoma  - continue as H/H improved on repeat labs

## 2020-06-11 NOTE — ASSESSMENT & PLAN NOTE
Risk for falls    - CT ordered for further evaluation  - orthopedic surgery consulted   - PTOT consulted   - decreased ROM, pain-limited  - monitor for expanding hematoma

## 2020-06-11 NOTE — ASSESSMENT & PLAN NOTE
- obesity complicates all aspects of disease management from diagnostic modalities to treatment.   - Weight loss encouraged and health benefits explained to patient.  - exercise and portion control  - consider dietary consult

## 2020-06-12 ENCOUNTER — TELEPHONE (OUTPATIENT)
Dept: ORTHOPEDICS | Facility: CLINIC | Age: 75
End: 2020-06-12

## 2020-06-12 VITALS
OXYGEN SATURATION: 95 % | RESPIRATION RATE: 20 BRPM | TEMPERATURE: 98 F | DIASTOLIC BLOOD PRESSURE: 63 MMHG | HEART RATE: 63 BPM | WEIGHT: 232.13 LBS | HEIGHT: 60 IN | BODY MASS INDEX: 45.57 KG/M2 | SYSTOLIC BLOOD PRESSURE: 133 MMHG

## 2020-06-12 PROBLEM — Z51.5 COMFORT MEASURES ONLY STATUS: Status: ACTIVE | Noted: 2020-06-12

## 2020-06-12 LAB
ALBUMIN SERPL BCP-MCNC: 3.3 G/DL (ref 3.5–5.2)
ALP SERPL-CCNC: 88 U/L (ref 55–135)
ALT SERPL W/O P-5'-P-CCNC: 12 U/L (ref 10–44)
ANION GAP SERPL CALC-SCNC: 5 MMOL/L (ref 8–16)
AST SERPL-CCNC: 15 U/L (ref 10–40)
BASOPHILS # BLD AUTO: 0.07 K/UL (ref 0–0.2)
BASOPHILS NFR BLD: 0.6 % (ref 0–1.9)
BILIRUB SERPL-MCNC: 0.6 MG/DL (ref 0.1–1)
BUN SERPL-MCNC: 16 MG/DL (ref 8–23)
CALCIUM SERPL-MCNC: 9.1 MG/DL (ref 8.7–10.5)
CHLORIDE SERPL-SCNC: 106 MMOL/L (ref 95–110)
CO2 SERPL-SCNC: 24 MMOL/L (ref 23–29)
CREAT SERPL-MCNC: 1 MG/DL (ref 0.5–1.4)
DIFFERENTIAL METHOD: ABNORMAL
EOSINOPHIL # BLD AUTO: 0.3 K/UL (ref 0–0.5)
EOSINOPHIL NFR BLD: 2.7 % (ref 0–8)
ERYTHROCYTE [DISTWIDTH] IN BLOOD BY AUTOMATED COUNT: 15.8 % (ref 11.5–14.5)
EST. GFR  (AFRICAN AMERICAN): >60 ML/MIN/1.73 M^2
EST. GFR  (NON AFRICAN AMERICAN): 55.2 ML/MIN/1.73 M^2
GLUCOSE SERPL-MCNC: 94 MG/DL (ref 70–110)
HCT VFR BLD AUTO: 36.4 % (ref 37–48.5)
HGB BLD-MCNC: 10.3 G/DL (ref 12–16)
IMM GRANULOCYTES # BLD AUTO: 0.07 K/UL (ref 0–0.04)
IMM GRANULOCYTES NFR BLD AUTO: 0.6 % (ref 0–0.5)
INR PPP: 2.3 (ref 0.8–1.2)
LYMPHOCYTES # BLD AUTO: 3.5 K/UL (ref 1–4.8)
LYMPHOCYTES NFR BLD: 31.2 % (ref 18–48)
MAGNESIUM SERPL-MCNC: 1.8 MG/DL (ref 1.6–2.6)
MCH RBC QN AUTO: 19.5 PG (ref 27–31)
MCHC RBC AUTO-ENTMCNC: 28.3 G/DL (ref 32–36)
MCV RBC AUTO: 69 FL (ref 82–98)
MONOCYTES # BLD AUTO: 1 K/UL (ref 0.3–1)
MONOCYTES NFR BLD: 8.6 % (ref 4–15)
NEUTROPHILS # BLD AUTO: 6.3 K/UL (ref 1.8–7.7)
NEUTROPHILS NFR BLD: 56.3 % (ref 38–73)
NRBC BLD-RTO: 0 /100 WBC
PHOSPHATE SERPL-MCNC: 4.2 MG/DL (ref 2.7–4.5)
PLATELET # BLD AUTO: 274 K/UL (ref 150–350)
PMV BLD AUTO: 9.8 FL (ref 9.2–12.9)
POTASSIUM SERPL-SCNC: 4.5 MMOL/L (ref 3.5–5.1)
PROT SERPL-MCNC: 6.5 G/DL (ref 6–8.4)
PROTHROMBIN TIME: 22.1 SEC (ref 9–12.5)
RBC # BLD AUTO: 5.28 M/UL (ref 4–5.4)
SODIUM SERPL-SCNC: 135 MMOL/L (ref 136–145)
WBC # BLD AUTO: 11.27 K/UL (ref 3.9–12.7)

## 2020-06-12 PROCEDURE — 84100 ASSAY OF PHOSPHORUS: CPT

## 2020-06-12 PROCEDURE — 36415 COLL VENOUS BLD VENIPUNCTURE: CPT

## 2020-06-12 PROCEDURE — 85025 COMPLETE CBC W/AUTO DIFF WBC: CPT

## 2020-06-12 PROCEDURE — 97530 THERAPEUTIC ACTIVITIES: CPT

## 2020-06-12 PROCEDURE — 80053 COMPREHEN METABOLIC PANEL: CPT

## 2020-06-12 PROCEDURE — 99220 PR INITIAL OBSERVATION CARE,LEVL III: CPT | Mod: GC,,, | Performed by: ORTHOPAEDIC SURGERY

## 2020-06-12 PROCEDURE — 97165 OT EVAL LOW COMPLEX 30 MIN: CPT

## 2020-06-12 PROCEDURE — 85610 PROTHROMBIN TIME: CPT

## 2020-06-12 PROCEDURE — 97535 SELF CARE MNGMENT TRAINING: CPT | Mod: 59

## 2020-06-12 PROCEDURE — 97116 GAIT TRAINING THERAPY: CPT

## 2020-06-12 PROCEDURE — 83735 ASSAY OF MAGNESIUM: CPT

## 2020-06-12 PROCEDURE — 99217 PR OBSERVATION CARE DISCHARGE: ICD-10-PCS | Mod: ,,, | Performed by: INTERNAL MEDICINE

## 2020-06-12 PROCEDURE — 99217 PR OBSERVATION CARE DISCHARGE: CPT | Mod: ,,, | Performed by: INTERNAL MEDICINE

## 2020-06-12 PROCEDURE — G0378 HOSPITAL OBSERVATION PER HR: HCPCS | Mod: CS

## 2020-06-12 PROCEDURE — 25000003 PHARM REV CODE 250: Performed by: PHYSICIAN ASSISTANT

## 2020-06-12 PROCEDURE — 99220 PR INITIAL OBSERVATION CARE,LEVL III: ICD-10-PCS | Mod: GC,,, | Performed by: ORTHOPAEDIC SURGERY

## 2020-06-12 PROCEDURE — 97116 GAIT TRAINING THERAPY: CPT | Mod: 59

## 2020-06-12 PROCEDURE — 97161 PT EVAL LOW COMPLEX 20 MIN: CPT

## 2020-06-12 RX ORDER — HYDROCODONE BITARTRATE AND ACETAMINOPHEN 5; 325 MG/1; MG/1
1 TABLET ORAL EVERY 8 HOURS PRN
Qty: 12 TABLET | Refills: 0 | Status: SHIPPED | OUTPATIENT
Start: 2020-06-12 | End: 2020-06-16

## 2020-06-12 RX ADMIN — POLYETHYLENE GLYCOL 3350 17 G: 17 POWDER, FOR SOLUTION ORAL at 09:06

## 2020-06-12 RX ADMIN — ESCITALOPRAM OXALATE 20 MG: 20 TABLET ORAL at 09:06

## 2020-06-12 RX ADMIN — FUROSEMIDE 20 MG: 20 TABLET ORAL at 09:06

## 2020-06-12 RX ADMIN — HYDROCODONE BITARTRATE AND ACETAMINOPHEN 1 TABLET: 5; 325 TABLET ORAL at 09:06

## 2020-06-12 RX ADMIN — AMLODIPINE BESYLATE 10 MG: 10 TABLET ORAL at 09:06

## 2020-06-12 RX ADMIN — PANTOPRAZOLE SODIUM 40 MG: 40 TABLET, DELAYED RELEASE ORAL at 09:06

## 2020-06-12 RX ADMIN — LOSARTAN POTASSIUM 100 MG: 50 TABLET ORAL at 09:06

## 2020-06-12 RX ADMIN — METOPROLOL SUCCINATE 25 MG: 25 TABLET, EXTENDED RELEASE ORAL at 09:06

## 2020-06-12 NOTE — PLAN OF CARE
06/12/20 1048   Final Note   Assessment Type Final Discharge Note   Anticipated Discharge Disposition Home   What phone number can be called within the next 1-3 days to see how you are doing after discharge? [phone]   Discharge plans and expectations educations in teach back method with documentation complete? Yes   Right Care Referral Info   Post Acute Recommendation No Care     Future Appointments   Date Time Provider Department Center   6/18/2020  9:00 AM Yana Coronado PA-C [facility] ORTHO [facility]   6/23/2020  7:30 AM LAB, SBP SBP LAB . [facility]

## 2020-06-12 NOTE — ASSESSMENT & PLAN NOTE
- diuresis with furosemide (LASIX) 20 MG tablet daily   - strict Is/Os   - daily weights, preferably standing   - tele  - keep Mg >2, K >4

## 2020-06-12 NOTE — CONSULTS
[facility]  Orthopedics  Consult Note      Attg Note:  Patient seen and examined.  I agree with the resident's assessment and plan.  Right proximal humerus fracture with large greater tuberosity piece and some valgus impaction.  Discussed options with the patient to include ORIF versus shoulder replacement versus non operative treatment with initial occupational therapy.  Will proceed with non operative treatment at present.  Will schedule follow-up for patient.  Placed in sling with abduction pillow.    Samm Valdez MD      Patient Name: Coby Atkinson  MRN: [id number]  Admission Date: 6/11/2020  Hospital Length of Stay: 0 days  Attending Provider: Ollie Owens MD  Primary Care Provider: Mundo Hudson DO      Inpatient consult to Orthopedic Surgery  Consult performed by: Kit Carter MD  Consult ordered by: Talya Mascorro PA-C        Subjective:     Principal Problem:Closed fracture of right proximal humerus    Chief Complaint:   Chief Complaint   Patient presents with    Fall     Patient reports slipping and falling in the kitchen tonight. Unknown LOC. Patient reports right sided shoulder pain, right sided rib paint. Patient reports that she hit the front of her head. Patient is on blood thinners.         HPI: Coby Atkinson is a 74 y.o. female PMH HTN, NSTEMI, PE, CKD stage 3 who presented to the ER after mechanical slip and fall.  Patient slipped and fell in her kitchen, falling straight forward.  She  had immediate pain in her R shoulder and hand.   She denies any LOC or head trauma.  She is on Coumadin. Pt walked with a walker or cane at home, was not using at time of fall. She uses these for extra stability. PT denies injury or disability in her RUE previous to this incident. Pt was able to ambulate after the incident, but has same mild left knee pain. She denies any fevers chills or shortness of breath.    Past Medical History:   Diagnosis Date     Anticoagulant long-term use     Cataract     Chronic diarrhea     Depression     Edema     GERD (gastroesophageal reflux disease)     Hypertension 10/2/2012    Osteoarthritis of both knees 8/9/2016    Osteopenia     Osteopenia 11/30/2017    PE (pulmonary thromboembolism) 12/03/2017    Primary localized osteoarthrosis, lower leg 12/3/2014    Sleep apnea 2016    Thalassemia minor        Past Surgical History:   Procedure Laterality Date    APPENDECTOMY      CATARACT EXTRACTION      COLONOSCOPY N/A 11/5/2016    Procedure: COLONOSCOPY;  Surgeon: Tanner Simental MD;  Location: [address]);  Service: Endoscopy;  Laterality: N/A;    HYSTERECTOMY  age 39    fibroids    OOPHORECTOMY      TONSILLECTOMY         Review of patient's allergies indicates:   Allergen Reactions    Codeine     Ciprofloxacin Rash       Current Facility-Administered Medications   Medication    acetaminophen tablet 650 mg    albuterol-ipratropium 2.5 mg-0.5 mg/3 mL nebulizer solution 3 mL    amLODIPine tablet 10 mg    dextrose 10% (D10W) Bolus    dextrose 10% (D10W) Bolus    enoxaparin injection 40 mg    escitalopram oxalate tablet 20 mg    [START ON 6/12/2020] furosemide tablet 20 mg    glucagon (human recombinant) injection 1 mg    glucose chewable tablet 16 g    glucose chewable tablet 24 g    losartan tablet 100 mg    melatonin tablet 6 mg    metoprolol succinate (TOPROL-XL) 24 hr tablet 25 mg    ondansetron disintegrating tablet 8 mg    ondansetron injection 4 mg    pantoprazole EC tablet 40 mg    polyethylene glycol packet 17 g    sodium chloride 0.9% flush 10 mL     Family History     Problem Relation (Age of Onset)    Cancer Father, Brother    Coronary artery disease Maternal Grandmother, Paternal Grandmother, Brother    Heart failure Maternal Grandmother    Hypertension Mother    No Known Problems Sister, Maternal Aunt, Maternal Uncle, Paternal Aunt, Paternal Uncle, Maternal Grandfather,  Paternal Grandfather    Stroke Paternal Grandmother        Tobacco Use    Smoking status: Never Smoker    Smokeless tobacco: Never Used   Substance and Sexual Activity    Alcohol use: No     Comment: rare    Drug use: No    Sexual activity: Yes     Partners: Male     ROS per primary team 6/11/2020  Objective:     Vital Signs (Most Recent):  Temp: 97.3 °F (36.3 °C) (06/11/20 1125)  Pulse: 71 (06/11/20 1125)  Resp: 19 (06/11/20 1125)  BP: (!) 195/77 (06/11/20 1125)  SpO2: 97 % (06/11/20 1125) Vital Signs (24h Range):  Temp:  [97 °F (36.1 °C)-98.7 °F (37.1 °C)] 97.3 °F (36.3 °C)  Pulse:  [64-77] 71  Resp:  [18-19] 19  SpO2:  [96 %-100 %] 97 %  BP: (152-230)/(77-99) 195/77     Weight: 105.3 kg (232 lb 2.3 oz)  Height: 5' (152.4 cm)  Body mass index is 45.34 kg/m².      Ortho/SPM Exam     Vitals: Afebrile.  Vital signs stable.  General: No acute distress.  Cardio: Regular rate.  Chest: No increased work of breathing.    Right Upper Extremity    - 4x5cm firm swelling over the dorsum of the hand, likely hemtoma  -TTP over dorsal hand and proximal humerus  -Compartments soft and compressible  -ROM full at elbow. ROM limited at shoulder due to pain and wrist due to hematoma  -SILT M/R/U  -Motor intact Ain/PIN/U/M  -Brisk cap refill  -Warm well perfused extremities  -2+ Radial palpable    Left Upper Extremity    -Skin intact, no deformity, no ecchymoses, no edema  -NTTP   -Compartments soft and compressible  -ROM full (shoulder/elbow/wrist)  -SILT M/R/U  -Motor intact Ain/PIN/U/M  -Brisk cap refill  -Warm well perfused extremities  -2+ Radial palpable    Right Lower Extremity Exam    - Skin intact, no deformity, no ecchymoses, no edema  - NTTP   - Compartments soft and compressible  - ROM full (eversion,inversion,plantar/dorsiflexion)  - TA/EHL/Gastroc/FHL assessed in isolation without deficit  - SILT throughout  - DP and PT palpated  2+  - Capillary Refill <3s      Left Lower Extremity Exam    - Skin intact, no  deformity, mild ecchymoses anterior knee, no edema  - TTP over anterior knee  - Compartments soft and compressible  - ROM full (eversion,inversion,plantar/dorsiflexion)  - TA/EHL/Gastroc/FHL assessed in isolation without deficit  - SILT throughout  - DP and PT palpated  2+  - Capillary Refill <3s    All other joints (shoulder/elbow/wrist/hip/knee/ankle) were examined and had full ROM and were non-tender to palpation.      Significant Labs:   A1C:   Recent Labs   Lab 06/11/20  0833   HGBA1C 5.4     BMP:   Recent Labs   Lab 06/11/20 0257   *      K 2.9*      CO2 22*   BUN 14   CREATININE 0.8   CALCIUM 8.0*     CBC:   Recent Labs   Lab 06/11/20 0257   WBC 8.12   HGB 7.8*   HCT 27.5*        CMP:   Recent Labs   Lab 06/11/20 0257      K 2.9*      CO2 22*   *   BUN 14   CREATININE 0.8   CALCIUM 8.0*   PROT 6.0   ALBUMIN 3.2*   BILITOT 0.3   ALKPHOS 87   AST 19   ALT 12   ANIONGAP 8   EGFRNONAA >60.0     Coagulation:   Recent Labs   Lab 06/11/20  0314   LABPROT 25.0*   INR 2.6*     Prealbumin: No results for input(s): PREALBUMIN in the last 48 hours.  All pertinent labs within the past 24 hours have been reviewed.    Significant Imaging: X-Ray: I have reviewed all pertinent results/findings and my personal findings are:  Right proximal humerus fracture     CT scan showing R 3 part proximal humerus fracture with some comminution. Hematoma demonstrated over dorsum of hand. No other acute bony abnormalities appreciated.    Left knee x ray: No acute bony process. Degenerative changes diffusely.     Assessment/Plan:     * Closed fracture of right proximal humerus  Coby Atkinson is a 74 y.o. female with right proximal humerus fracture, closed, NVI. Diagnosis discussed in detail with patient. She was placed in a sling and swathe with abduction pillow. She will remain non-weightbearing right upper extremity at this time and follow up in clinic next week. She can come out of  the sling to bathe and perform range of motion at elbow and wrist to prevent stiffness. Pt is on warfarin at home. CT right shoulder for better characterization of fracture. No surgical intervention at this time.     Call with any questions or concerns.                   Kit Carter MD  Orthopedics  [facility]

## 2020-06-12 NOTE — PLAN OF CARE
Problem: Physical Therapy Goal  Goal: Physical Therapy Goal  Description  Goals to be met by: 20     Patient will increase functional independence with mobility by performin. Supine to sit with Contact Guard Assistance.  2. Sit to supine with Contact Guard Assistance.  3. Sit to stand transfer with Stand-by Assistance.  4. Bed to chair transfer with Modified [place] using Quad Cane.  5. Gait  x 50 feet with Supervision using Quad Cane.   6. Lower extremity exercise program x 20 reps per handout, with assistance as needed.     Outcome: Ongoing, Progressing     PT goals established.

## 2020-06-12 NOTE — PT/OT/SLP EVAL
Occupational Therapy   Evaluation    Name: Coby Atkinson  MRN: [id number]  Admitting Diagnosis:  Closed fracture of right proximal humerus      Recommendations:     Discharge Recommendations: home with home health  Discharge Equipment Recommendations:  cane, quad, bedside commode  Barriers to discharge:  None    Assessment:     Coby Atkinson is a 74 y.o. female with a medical diagnosis of Closed fracture of right proximal humerus.  She presents with impairments listed below. Pt did well to tolerate and participate in the session. Pt w/ noted limited endurance w/ oob activities, which may be baseline functioning. Pt w/ noted deficits for ADLs and mobility compared to baseline. Pt displayed global deconditioning requiring increased assist for ADLs and mobility at this time. Pt would benefit from skilled OT services to improve independence and overall occupational functioning.     Performance deficits affecting function: weakness, impaired endurance, impaired self care skills, impaired functional mobilty, gait instability, impaired balance, decreased upper extremity function, decreased ROM, impaired cardiopulmonary response to activity, orthopedic precautions, pain.      Rehab Prognosis: Good; patient would benefit from acute skilled OT services to address these deficits and reach maximum level of function.       Plan:     Patient to be seen 4 x/week to address the above listed problems via self-care/home management, therapeutic activities, therapeutic exercises, neuromuscular re-education  · Plan of Care Expires:    · Plan of Care Reviewed with: patient    Subjective     Chief Complaint: No complaints  Patient/Family Comments/goals: return home    Occupational Profile:  Living Environment: Pt lives in a [facility] w/ family w/ ramp access w/ L HR.   Previous level of function: Assist for ADLs and MOD I for mobility (RW).   Roles and Routines: N/A  Equipment Used at Home:  walker, rolling, raised toilet, bath  bench  Assistance upon Discharge: Pt has assistance upon D/C.     Pain/Comfort:  · Pain Rating 1: 5/10  · Location - Side 1: Right  · Location - Orientation 1: generalized  · Location 1: rib(s)  · Pain Addressed 1: Pre-medicate for activity, Reposition, Cessation of Activity  · Pain Rating Post-Intervention 1: (did not rate)    Patients cultural, spiritual, [employer]al conflicts given the current situation:      Objective:     Communicated with: RN prior to session.  Patient found up in chair with peripheral IV upon OT entry to room.    General Precautions: Standard, fall   Orthopedic Precautions:RUE non weight bearing   Braces: Shoulder abduction brace     Occupational Performance:      Functional Mobility/Transfers:  · Patient completed Sit <> Stand Transfer with minimum assistance  with  quad cane   · Patient completed Toilet Transfer Step Transfer technique with contact guard assistance with  bedside commode and quad cane  · Functional Mobility: Pt ambulated ~40 ft at a to [facility] w/ QC for initial 20 and mani-walker for final ~20 ft.     Activities of Daily Living:  · Lower Body Dressing: total assistance donned socks  · Toileting: minimum assistance simulated at [facility], but required gown management     Cognitive/Visual Perceptual:  Cognitive/Psychosocial Skills:     -       Oriented to: Person, Place, Time and Situation   -       Follows Commands/attention:Follows multistep  commands  -       Communication: clear/fluent  -       Memory: No Deficits noted  -       Safety awareness/insight to disability: intact   -       Mood/Affect/Coping skills/emotional control: Appropriate to situation  Visual/Perceptual:      -Intact      Physical Exam:  Balance:    -       sba to [facility] for ambualtion  Postural examination/scapula alignment:    -       Rounded shoulders  Skin integrity: Visible skin intact  Edema:  Mild R hand   Strength:    -       Right Upper Extremity: WFL  -       Left Upper Extremity: WFL  Fine Motor  Coordination:    -       Intact  Gross motor coordination:   WFL    AMPAC 6 Click ADL:  AMPAC Total Score: 15    Treatment & Education:  Pt educated on UBD techniques, LBD techniques, hand exercises, RUE positioning, POC, DME, and D/C recs.  Education:    Patient left up in chair with all lines intact, call button in reach and MD present    GOALS:   Multidisciplinary Problems     Occupational Therapy Goals        Problem: Occupational Therapy Goal    Goal Priority Disciplines Outcome Interventions   Occupational Therapy Goal     OT, PT/OT Ongoing, Progressing    Description:  Goals to be met by: 6/22/2020     Patient will increase functional independence with ADLs by performing:    UE Dressing with Minimal Assistance.  LE Dressing with Minimal Assistance.  Grooming while standing at sink with Minimal Assistance.  Toileting from toilet with Supervision for hygiene and clothing management.   Toilet transfer to toilet with Stand-by Assistance.                      History:     Past Medical History:   Diagnosis Date    Anticoagulant long-term use     Cataract     Chronic diarrhea     Depression     Edema     GERD (gastroesophageal reflux disease)     Hypertension 10/2/2012    Osteoarthritis of both knees 8/9/2016    Osteopenia     Osteopenia 11/30/2017    PE (pulmonary thromboembolism) 12/03/2017    Primary localized osteoarthrosis, lower leg 12/3/2014    Sleep apnea 2016    Thalassemia minor        Past Surgical History:   Procedure Laterality Date    APPENDECTOMY      CATARACT EXTRACTION      COLONOSCOPY N/A 11/5/2016    Procedure: COLONOSCOPY;  Surgeon: Tanner Simental MD;  Location: [address];  Service: Endoscopy;  Laterality: N/A;    HYSTERECTOMY  age 39    fibroids    OOPHORECTOMY      TONSILLECTOMY         Time Tracking:     OT Date of Treatment: 06/12/20  OT Start Time: 0945  OT Stop Time: 1012  OT Total Time (min): 27 min    Billable Minutes:Evaluation 17 minutes  Self Care/Home  Management 10 minutes    Justin Jo, OT  6/12/2020

## 2020-06-12 NOTE — TELEPHONE ENCOUNTER
Unable to leave a voice message on pt phone [phone]. Pt mail box in full. Appointment with KIRT Coronado PA-C at [facility] 6/18/20 at 9:00 am/mailed.

## 2020-06-12 NOTE — PLAN OF CARE
[facility]    HOME HEALTH ORDERS  FACE TO FACE ENCOUNTER    Patient Name: Coby Atkinson  YOB: 1945    PCP: Mundo Hudson DO   PCP Address: [address] [facility] [address] [facility] / [place] [address]  PCP Phone Number: [phone]  PCP Fax: [phone]    Encounter Date: 06/12/2020    Admit to Home Health    Diagnoses:  Active Hospital Problems    Diagnosis  POA    *Closed fracture of right proximal humerus [S42.201A]  Yes    Comfort measures only status [Z51.5]  Not Applicable    Hypokalemia [E87.6]  Yes    Hypertensive urgency [I16.0]  No    Chronic diastolic heart failure [I50.32]  Yes    Risk for falls [Z91.81]  Not Applicable    Morbid obesity with BMI of 40.0-44.9, adult [E66.01, Z68.41]  Not Applicable    Mild episode of recurrent major depressive disorder [F33.0]  Yes    Chronic deep vein thrombosis (DVT) of left popliteal vein [I82.532]  Yes    Chronic anticoagulation [Z79.01]  Not Applicable    Pulmonary embolism 12/17 [I26.99]  Yes     Submassive PE, s/p catheter-directed tPA      Essential hypertension [I10]  Yes    Gastroesophageal reflux disease [K21.9]  Yes      Resolved Hospital Problems   No resolved problems to display.       Future Appointments   Date Time Provider Department Center   6/18/2020  9:00 AM [facility]   6/23/2020  7:30 AM LAB, [facility]     Follow-up Information     [facility] - Orthopedics. Call in 1 day.    Specialty:  Orthopedics  Contact information:  [address]  [facility] [address] [phone]  Additional information:  [facility] [address]                   I have seen and examined this patient face to face today. My clinical findings that support the need for the home health skilled services and home bound status are the following:  Weakness/numbness causing balance and gait disturbance due to Fracture making it taxing to leave  home.    Allergies:  Review of patient's allergies indicates:   Allergen Reactions    Codeine     Ciprofloxacin Rash       Diet: cardiac diet    Activities: activity as tolerated; RUE sling and immbolization per orthopedics    Nursing:     SN to complete comprehensive assessment including routine vital signs. Instruct on disease process and s/s of complications to report to MD. Review/verify medication list sent home with the patient at time of discharge  and instruct patient/caregiver as needed. Frequency may be adjusted depending on start of care date.    Notify MD if SBP > 160 or < 90; DBP > 90 or < 50; HR > 120 or < 50; Temp > 101;    Nursing to obtain weekly PT/INR and send results to PCP listed above.       CONSULTS:    Physical Therapy to evaluate and treat. Evaluate for home safety and equipment needs; Establish/upgrade home exercise program. Perform / instruct on therapeutic exercises, gait training, transfer training, and Range of Motion.  Occupational Therapy to evaluate and treat. Evaluate home environment for safety and equipment needs. Perform/Instruct on transfers, ADL training, ROM, and therapeutic exercises.        Medications: Review discharge medications with patient and family and provide education.      Current Discharge Medication List      START taking these medications    Details   HYDROcodone-acetaminophen (NORCO) 5-325 mg per tablet Take 1 tablet by mouth every 8 (eight) hours as needed for Pain.  Qty: 12 tablet, Refills: 0    Comments: Quantity prescribed more than 7 day supply? No         CONTINUE these medications which have NOT CHANGED    Details   amLODIPine (NORVASC) 10 MG tablet TAKE 1 TABLET(10 MG) BY MOUTH EVERY DAY  Qty: 90 tablet, Refills: 3    Associated Diagnoses: Essential hypertension      doxazosin (CARDURA) 4 MG tablet Take 1 tablet (4 mg total) by mouth every evening.  Qty: 90 tablet, Refills: 3    Comments: .      escitalopram oxalate (LEXAPRO) 20 MG tablet TAKE 1  TABLET BY MOUTH EVERY DAY  Qty: 90 tablet, Refills: 3      furosemide (LASIX) 20 MG tablet TAKE 1 TABLET(20 MG) BY MOUTH EVERY DAY  Qty: 90 tablet, Refills: 0      metoprolol succinate (TOPROL-XL) 25 MG 24 hr tablet TAKE 1 TABLET BY MOUTH EVERY DAY  Qty: 90 tablet, Refills: 3      !! potassium chloride (MICRO-K) 10 MEQ CpSR TAKE 2 CAPSULES(20 MEQ) BY MOUTH EVERY DAY  Qty: 180 capsule, Refills: 0      !! potassium chloride (MICRO-K) 10 MEQ CpSR TAKE 2 CAPSULES(20 MEQ) BY MOUTH EVERY DAY  Qty: 180 capsule, Refills: 3      warfarin (COUMADIN) 1 MG tablet TAKE 1/2 TO 1 TABLET BY MOUTH DAILY AS DIRECTED BY COUMADIN CLINIC  Qty: 30 tablet, Refills: 5    Associated Diagnoses: Long term (current) use of anticoagulants      ALPRAZolam (XANAX) 0.5 MG tablet Take 1 tablet (0.5 mg total) by mouth 2 (two) times daily as needed for Insomnia or Anxiety.  Qty: 60 tablet, Refills: 1      losartan (COZAAR) 100 MG tablet Take 1 tablet (100 mg total) by mouth once daily.  Qty: 90 tablet, Refills: 3    Associated Diagnoses: Essential hypertension      meloxicam (MOBIC) 7.5 MG tablet Take 7.5 mg by mouth once daily.      pantoprazole (PROTONIX) 40 MG tablet Take 1 tablet (40 mg total) by mouth every 12 (twelve) hours.  Qty: 180 tablet, Refills: 3    Associated Diagnoses: Duodenal ulcer       !! - Potential duplicate medications found. Please discuss with provider.          I certify that this patient is confined to her home and needs physical therapy and occupational therapy.

## 2020-06-12 NOTE — PLAN OF CARE
Problem: Occupational Therapy Goal  Goal: Occupational Therapy Goal  Description  Goals to be met by: 6/22/2020     Patient will increase functional independence with ADLs by performing:    UE Dressing with Minimal Assistance.  LE Dressing with Minimal Assistance.  Grooming while standing at sink with Minimal Assistance.  Toileting from toilet with Supervision for hygiene and clothing management.   Toilet transfer to toilet with Stand-by Assistance.     Outcome: Ongoing, Progressing    Justin Jo OTR/L  6/12/2020

## 2020-06-12 NOTE — ASSESSMENT & PLAN NOTE
Coby Atkinson is a 74 y.o. female with right proximal humerus fracture, closed, NVI. Diagnosis discussed in detail with patient. She was placed in a sling and swathe with abduction pillow. She will remain non-weightbearing right upper extremity at this time and follow up in clinic next week. She can come out of the sling to bathe and perform range of motion at elbow and wrist to prevent stiffness. Pt is on warfarin at home. CT right shoulder for better characterization of fracture. No surgical intervention at this time.  Patient is tolerating sling and swath.    Call with any questions or concerns.

## 2020-06-12 NOTE — SUBJECTIVE & OBJECTIVE
Principal Problem:Closed fracture of right proximal humerus    Principal Orthopedic Problem:  Same    Interval History:  Patient seen examined at bedside.  No acute events overnight.  Pain well controlled.  Sling and swath with abduction pillow to right upper extremity.  Patient has no new complaints today.    Review of patient's allergies indicates:   Allergen Reactions    Codeine     Ciprofloxacin Rash       Current Facility-Administered Medications   Medication    acetaminophen tablet 650 mg    albuterol-ipratropium 2.5 mg-0.5 mg/3 mL nebulizer solution 3 mL    amLODIPine tablet 10 mg    dextrose 10% (D10W) Bolus    dextrose 10% (D10W) Bolus    escitalopram oxalate tablet 20 mg    furosemide tablet 20 mg    glucagon (human recombinant) injection 1 mg    glucose chewable tablet 16 g    glucose chewable tablet 24 g    hydrALAZINE tablet 25 mg    HYDROcodone-acetaminophen 5-325 mg per tablet 1 tablet    ketorolac injection 15 mg    losartan tablet 100 mg    melatonin tablet 6 mg    metoprolol succinate (TOPROL-XL) 24 hr tablet 25 mg    ondansetron disintegrating tablet 8 mg    ondansetron injection 4 mg    pantoprazole EC tablet 40 mg    polyethylene glycol packet 17 g    sodium chloride 0.9% flush 10 mL    [START ON 6/16/2020] warfarin (COUMADIN) split tablet 1.5 mg    warfarin (COUMADIN) tablet 1 mg    [START ON 6/13/2020] warfarin (COUMADIN) tablet 1 mg     Objective:     Vital Signs (Most Recent):  Temp: 98.7 °F (37.1 °C) (06/11/20 2312)  Pulse: 71 (06/11/20 2312)  Resp: 16 (06/11/20 2312)  BP: (!) 142/66 (06/11/20 2312)  SpO2: (!) 93 % (06/11/20 2312) Vital Signs (24h Range):  Temp:  [97 °F (36.1 °C)-98.7 °F (37.1 °C)] 98.7 °F (37.1 °C)  Pulse:  [64-72] 71  Resp:  [16-19] 16  SpO2:  [92 %-100 %] 93 %  BP: (142-230)/(66-99) 142/66     Weight: 105.3 kg (232 lb 2.3 oz)  Height: 5' (152.4 cm)  Body mass index is 45.34 kg/m².      Ortho/SPM Exam       Right Upper Extremity    -small 1 x 1  cm abrasion medial posterior form, firm fluid collection over the dorsal hand, likely hematoma  - sling and swath with abduction pillow in place  -TTP over dorsum of her hand, proximal humerus  -Compartments soft and compressible  -ROM full (shoulder/elbow/wrist)  -SILT M/R/U  -Motor intact Ain/PIN/U/M  -Brisk cap refill  -Warm well perfused extremities  -2+ Radial palpable      Significant Labs:   BMP:   Recent Labs   Lab 06/11/20  0257   *      K 2.9*      CO2 22*   BUN 14   CREATININE 0.8   CALCIUM 8.0*     CBC:   Recent Labs   Lab 06/11/20 0257 06/11/20  0833   WBC 8.12 14.76*   HGB 7.8* 10.6*   HCT 27.5* 35.7*    283     CMP:   Recent Labs   Lab 06/11/20 0257      K 2.9*      CO2 22*   *   BUN 14   CREATININE 0.8   CALCIUM 8.0*   PROT 6.0   ALBUMIN 3.2*   BILITOT 0.3   ALKPHOS 87   AST 19   ALT 12   ANIONGAP 8   EGFRNONAA >60.0     Coagulation:   Recent Labs   Lab 06/11/20  0314   LABPROT 25.0*   INR 2.6*     CRP: No results for input(s): CRP in the last 48 hours.  POCT Glucose: No results for input(s): POCTGLUCOSE in the last 48 hours.  All pertinent labs within the past 24 hours have been reviewed.    Significant Imaging: I have reviewed all pertinent imaging results/findings.   No new imaging

## 2020-06-12 NOTE — PROGRESS NOTES
Josee with [facility] calling to report Patient's in [facility] and being discharged today, needs orders for INR lab draw, not sure if she will be with [facility] or [facility], Josee's call back # [phone]

## 2020-06-12 NOTE — H&P
"[facility] Hospital Medicine  History & Physical    Patient Name: Coby Atkinson  MRN: [id number]  Admission Date: 6/11/2020  Attending Physician: Ollie Owens MD   Primary Care Provider: Mundo Hudson [facility] Medicine Team: [facility] HOSP MED O Talya Mascorro PA-C     Patient information was obtained from patient, past medical records and ER records.     Subjective:     Principal Problem:Closed fracture of right proximal humerus    Chief Complaint:   Chief Complaint   Patient presents with    Fall     Patient reports slipping and falling in the kitchen tonight. Unknown LOC. Patient reports right sided shoulder pain, right sided rib paint. Patient reports that she hit the front of her head. Patient is on blood thinners.         HPI: Coby Atkinson is a 74 y.o. female with HTN, anemia, thalassemia minor, history of pulmonary embolus (on coumadin), depression who presents s/p mechanical fall. Patient reports she fell on her face while walking around her kitchen. She denies loss of consciousness, recent falls or prodromal symptoms and believes she possibly stumbled. She is unsure if she tried to catch herself with her hands but reports R hand pain immediately after. She reports she "just couldn't move" after the fall and asked her  to call 911. She denies signs of bleeding, dizziness, shortness of breath, cough.  She lives with her , daughter in law and 2 grandchildren. She uses a walker when ambulating outside of her home.     ED: R shoulder XRAY with acute mildly displaced fracture involving the proximal humerus with involvement of the greater tuberosity. R hand XRAY with hematoma, no fx identified. INR 2.6. H/H 7.8/27.5. K 2.9.     Past Medical History:   Diagnosis Date    Anticoagulant long-term use     Cataract     Chronic diarrhea     Depression     Edema     GERD (gastroesophageal reflux disease)     Hypertension 10/2/2012    Osteoarthritis of " both knees 8/9/2016    Osteopenia     Osteopenia 11/30/2017    PE (pulmonary thromboembolism) 12/03/2017    Primary localized osteoarthrosis, lower leg 12/3/2014    Sleep apnea 2016    Thalassemia minor        Past Surgical History:   Procedure Laterality Date    APPENDECTOMY      CATARACT EXTRACTION      COLONOSCOPY N/A 11/5/2016    Procedure: COLONOSCOPY;  Surgeon: Tanner Simental MD;  Location: [address]);  Service: Endoscopy;  Laterality: N/A;    HYSTERECTOMY  age 39    fibroids    OOPHORECTOMY      TONSILLECTOMY         Review of patient's allergies indicates:   Allergen Reactions    Codeine     Ciprofloxacin Rash       No current facility-administered medications on file prior to encounter.      Current Outpatient Medications on File Prior to Encounter   Medication Sig    amLODIPine (NORVASC) 10 MG tablet TAKE 1 TABLET(10 MG) BY MOUTH EVERY DAY    doxazosin (CARDURA) 4 MG tablet Take 1 tablet (4 mg total) by mouth every evening.    escitalopram oxalate (LEXAPRO) 20 MG tablet TAKE 1 TABLET BY MOUTH EVERY DAY    furosemide (LASIX) 20 MG tablet TAKE 1 TABLET(20 MG) BY MOUTH EVERY DAY    metoprolol succinate (TOPROL-XL) 25 MG 24 hr tablet TAKE 1 TABLET BY MOUTH EVERY DAY    potassium chloride (MICRO-K) 10 MEQ CpSR TAKE 2 CAPSULES(20 MEQ) BY MOUTH EVERY DAY    potassium chloride (MICRO-K) 10 MEQ CpSR TAKE 2 CAPSULES(20 MEQ) BY MOUTH EVERY DAY    warfarin (COUMADIN) 1 MG tablet TAKE 1/2 TO 1 TABLET BY MOUTH DAILY AS DIRECTED BY COUMADIN CLINIC    ALPRAZolam (XANAX) 0.5 MG tablet Take 1 tablet (0.5 mg total) by mouth 2 (two) times daily as needed for Insomnia or Anxiety.    losartan (COZAAR) 100 MG tablet Take 1 tablet (100 mg total) by mouth once daily.    meloxicam (MOBIC) 7.5 MG tablet Take 7.5 mg by mouth once daily.    pantoprazole (PROTONIX) 40 MG tablet Take 1 tablet (40 mg total) by mouth every 12 (twelve) hours. (Patient taking differently: Take 40 mg by mouth once daily.  )     Family History     Problem Relation (Age of Onset)    Cancer Father, Brother    Coronary artery disease Maternal Grandmother, Paternal Grandmother, Brother    Heart failure Maternal Grandmother    Hypertension Mother    No Known Problems Sister, Maternal Aunt, Maternal Uncle, Paternal Aunt, Paternal Uncle, Maternal Grandfather, Paternal Grandfather    Stroke Paternal Grandmother        Tobacco Use    Smoking status: Never Smoker    Smokeless tobacco: Never Used   Substance and Sexual Activity    Alcohol use: No     Comment: rare    Drug use: No    Sexual activity: Yes     Partners: Male     Review of Systems   Constitutional: Negative for appetite change, chills and fever.   HENT: Negative for congestion and dental problem.    Eyes: Negative for photophobia, pain and visual disturbance.   Respiratory: Negative for cough, chest tightness, shortness of breath and wheezing.    Cardiovascular: Negative for chest pain, palpitations and leg swelling.   Gastrointestinal: Negative for abdominal distention, abdominal pain, constipation, nausea and vomiting.   Genitourinary: Negative for difficulty urinating, dysuria and frequency.   Musculoskeletal: Positive for arthralgias, gait problem, joint swelling (R hand) and myalgias.   Skin: Negative for color change.   Allergic/Immunologic: Negative for immunocompromised state.   Neurological: Negative for dizziness, syncope, weakness, numbness and headaches.   Psychiatric/Behavioral: Negative for agitation, behavioral problems and hallucinations. The patient is not nervous/anxious.      Objective:     Vital Signs (Most Recent):  Temp: 97.3 °F (36.3 °C) (06/11/20 1125)  Pulse: 71 (06/11/20 1125)  Resp: 19 (06/11/20 1125)  BP: (!) 195/77 (06/11/20 1125)  SpO2: 97 % (06/11/20 1125) Vital Signs (24h Range):  Temp:  [97 °F (36.1 °C)-98.7 °F (37.1 °C)] 97.3 °F (36.3 °C)  Pulse:  [64-77] 71  Resp:  [18-19] 19  SpO2:  [96 %-100 %] 97 %  BP: (152-230)/(77-99) 195/77     Weight:  "105.3 kg (232 lb 2.3 oz)  Body mass index is 45.34 kg/m².    Physical Exam   Constitutional: She is oriented to person, place, and time. She appears well-nourished.   Obese elderly lady   HENT:   Head: Atraumatic.   Eyes: Pupils are equal, round, and reactive to light. EOM are normal.   Neck: Normal range of motion.   Cardiovascular: Normal rate and regular rhythm.   No murmur heard.  Pulmonary/Chest: Effort normal. No respiratory distress.   Musculoskeletal: She exhibits edema (R hand) and tenderness.   ROM of R wrist and hand significantly pain limited.    Neurological: She is alert and oriented to person, place, and time. No cranial nerve deficit. Coordination normal.   Skin: Skin is warm and dry. Capillary refill takes 2 to 3 seconds.   R hand hematoma, dorsum    Scratches L hand   Psychiatric: She has a normal mood and affect. Her behavior is normal.   Nursing note and vitals reviewed.        CRANIAL NERVES     CN III, IV, VI   Pupils are equal, round, and reactive to light.  Extraocular motions are normal.        Significant Labs: All pertinent labs within the past 24 hours have been reviewed.    Significant Imaging: I have reviewed all pertinent imaging results/findings within the past 24 hours.    Assessment/Plan:     * Closed fracture of right proximal humerus  Risk for falls    - CT ordered for further evaluation  - orthopedic surgery consulted   - PTOT consulted   - decreased ROM, pain-limited  - monitor for expanding hematoma     Chronic diastolic heart failure  - diuresis with furosemide (LASIX) 20 MG tablet daily   - strict Is/Os   - daily weights, preferably standing   - tele  - keep Mg >2, K >4    Hypertensive urgency  Essential hypertension  - highest BP since arrival 230/99, suspect pain induced   - per 2017 ACC/AHA guidelines, "reduce systolic BP by <25% in first hour, then (if stable) to goal 160/100-110 mmHg by 2-6 hours with cautious return to normal BP over the next 24-48 hours"   - continue " losartan (COZAAR) 100 MG tablet, amLODIPine (NORVASC) 10 MG tablet  - start hydralazine 25 MG PRN for SBP>180, DBP>110  - maintain on tele, repeat EKG in AM to trend    Hypokalemia  2.9 on admission, suspect secondary to diuretic use  - reports only taking half of her prescribed dose-- potassium chloride (MICRO-K) 10 MEQ CpSR   - supplementation ordered  - follow daily, will need increased dose at discharge     Morbid obesity with BMI of 40.0-44.9, adult  - obesity complicates all aspects of disease management from diagnostic modalities to treatment.   - Weight loss encouraged and health benefits explained to patient.  - exercise and portion control  - consider dietary consult     Mild episode of recurrent major depressive disorder  - escitalopram oxalate (LEXAPRO) 20 MG tablet    Pulmonary embolism 12/17  Chronic deep vein thrombosis (DVT) of left popliteal vein  Chronic anticoagulation    INR 2.6 on admission   - Warfarin maintenance plan: 1.5 mg (1 mg x 1.5) every Tue, Thu; 1 mg (1 mg x 1) all other days  - daily INRs  - monitor for bleeding and/or expanding hematoma  - continue as H/H improved on repeat labs    Essential hypertension        Gastroesophageal reflux disease  - pantoprazole (PROTONIX) 40 MG tablet daily    VTE Risk Mitigation (From admission, onward)         Ordered     warfarin (COUMADIN) split tablet 1.5 mg  Every Tues, Thurs      06/11/20 1855     warfarin (COUMADIN) tablet 1 mg  Every Sat, Sun      06/11/20 1855     warfarin (COUMADIN) tablet 1 mg  Every Mon, Wed, Fri      06/11/20 1855     IP VTE HIGH RISK PATIENT  Once      06/11/20 0817     Place sequential compression device  Until discontinued      06/11/20 0817                   Talya Mascorro PA-C  Department of Hospital Medicine   [facility]

## 2020-06-12 NOTE — PLAN OF CARE
06/12/20 1417   Final Note   Assessment Type Final Discharge Note   Anticipated Discharge Disposition Home-Health   What phone number can be called within the next 1-3 days to see how you are doing after discharge? [phone]   Discharge plans and expectations educations in teach back method with documentation complete? Yes   Right Care Referral Info   Post Acute Recommendation Home-care   Facility Name [facility]      Future Appointments   Date Time Provider Department Center   6/18/2020  9:00 AM Yana Coronado PA-C [facility] ORTHO [place] [facility]   6/23/2020  7:30 AM LAB, SBP SBP LAB [facility]

## 2020-06-12 NOTE — PROGRESS NOTES
Admitted 6/11-6/12 for right proximal humerus fracture.    Per hospital team: She was placed in a sling and swathe with abduction pillow. She will remain non-weightbearing right upper extremity at this time and follow up in clinic next week.    Calendar updated with inpatient INRs/warfarin doses. Will r/s INR with  6/15. Orders faxed to [facility].

## 2020-06-12 NOTE — PT/OT/SLP EVAL
Physical Therapy Evaluation    Patient Name:  Coby Atkinson   MRN:  [id number]    Recommendations:     Discharge Recommendations:  rehabilitation facility   Discharge Equipment Recommendations: cane, quad, bedside commode   Barriers to discharge: Decreased caregiver support and unsafe home environment    Assessment:     Coby Atkinson is a 74 y.o. female admitted with a medical diagnosis of Closed fracture of right proximal humerus. Pt presents with HTN, anemia, thalassemia minor, history of pulmonary embolus (on coumadin), depression who presents s/p mechanical fall. Patient reports she fell on her face while walking around her kitchen.      Upon evaluation, pt requires SBA-CGA of 1 person to amb short distances, <15', with use of AD for support.  Pt requires Amarjit of 1 person to assist with bed mobility and sit<>stand transfers.  Pt presents in a shoulder abduction brace and is R UE NWB.  PT assisted pt and spouse in ed and performance of functional transfers.  Pts  has a number co-morbidities himself and demonstrated difficulty with providing safe assistance for the pt.  Concerned regarding pts falls risk and safety with amb.  According to pts spouse the hallways are small and there is large furniture that cannot be moved.  She gets very dyspneic when amb <15', requiring prolonged sitting rest breaks after each trial.  Pt will be a good candidate for rehab services to facilitate a return to PLOF following this falls episode.  She presents with the following impairments/functional limitations:  weakness, impaired endurance, impaired self care skills, impaired functional mobilty, gait instability, impaired balance, decreased upper extremity function, decreased ROM, pain, decreased safety awareness, edema, orthopedic precautions.    Rehab Prognosis: Good; patient would benefit from acute skilled PT services to address these deficits and reach maximum level of function.    Recent Surgery: * No surgery  "found *      Plan:     During this hospitalization, patient to be seen 5xs a week to address the identified rehab impairments via gait training, therapeutic activities, therapeutic exercises, neuromuscular re-education and progress toward the following goals:    · Plan of Care Expires:  07/09/20    Subjective     Chief Complaint: R rib and R hand pain  Patient/Family Comments/goals: "No falls this year." though the pt just had a fall  Pain/Comfort:  · Pain Rating 1: 5/10  · Location - Side 1: Right  · Location 1: rib(s)  · Pain Addressed 1: Pre-medicate for activity, Reposition, Cessation of Activity  · Pain Rating 2: 5/10  · Location - Side 2: Right  · Location 2: hand  · Pain Addressed 2: Pre-medicate for activity(therex to reduce edema)    Patients cultural, spiritual, Confucianist conflicts given the current situation: no    Living Environment:  Pt lives with spouse, daughter in law, and 2 grandchildren.  According to pts spouse the daughter in law and grandchildren are "useless" and will not provide assistance.   Prior to admission, patients level of function required use of RW to amb outside, pt uses walls for support to amb indoors, I with ADLs.  Equipment used at home: walker, rolling, raised toilet, bath bench.  DME owned (not currently used): single point cane and power w/c.  Upon discharge, patient will have assistance from spouse.    Objective:     Communicated with nursing prior to session.  Patient found up in chair with peripheral IV  upon PT entry to room.    General Precautions: Standard, fall   Orthopedic Precautions:RUE non weight bearing   Braces: Shoulder abduction brace     Exams:  · Cognitive Exam:  Patient is oriented to Person, Place, Time and Situation  · Fine Motor Coordination:    · -       Intact  Left hand thumb/finger opposition skills  · -       Impaired  Right hand thumb/finger opposition skills    · Gross Motor Coordination:  WFL  · Postural Exam:  Patient presented with the " following abnormalities:    · -       Rounded shoulders  · -       Posterior pelvic tilt  · Sensation:    · -       Intact  · Skin Integrity/Edema:      · -       Edema: Moderate R hand  · RUE ROM: not tested - in brace  · RUE Strength: not tested - in brace  · LUE ROM: WFL  · LUE Strength: WFL  · RLE ROM: WFL  · RLE Strength: WFL  · LLE ROM: WFL  · LLE Strength: WFL    Functional Mobility:  · Bed Mobility:     · Scooting: Amarjit: to scoot ant sitting EOB  · Sit to Supine: Amarjit: with time taken to ed spouse on hand placement and sequencing    · Transfers:     · Sit to Stand:  CGA-Amarjit: from chair and BSC, pt performed multiple times with ed on hand placement, verbal cuing to facilitate ant weight shifting - pts spouse present part of the time with ed on hand placement to provide A, pts spouse demonstrated with difficulty and poor body mechanics  · Bed to Chair: SBA-CGA with  hemiwalker and quad cane  using  Stand Pivot  · Chair to BSC: SBA-CGA with  hemiwalker and quad cane  using  Stand Pivot  · BSC to Chair: stand by assistance and contact guard assistance with  quad cane  using  Stand Pivot    · Gait: Pt amb 10', 8', 14', 14', 12', 12', with prolonged sitting rest breaks bn each trials, with SBA-CGA, with use of hemiwalker or quad cane, with improved AD placement with QC, KITCHEN noted following each trial, with verbal cuing and demo for deep and pursed lip breathing, 1 trial without AD, 5/10 RPE following     · Balance: SBA-CGA: dynamic standing balance with AD      Therapeutic Activities and Exercises:   Whiteboard updated    Time taken to discuss furniture management and home safety    AM-PAC 6 CLICK MOBILITY  Total Score:16     Patient left sitting EOB with all lines intact, call button in reach and nursing and MD notified.    GOALS:   Multidisciplinary Problems     Physical Therapy Goals        Problem: Physical Therapy Goal    Goal Priority Disciplines Outcome Goal Variances Interventions   Physical Therapy Goal      PT, PT/OT Ongoing, Progressing     Description:  Goals to be met by: 20     Patient will increase functional independence with mobility by performin. Supine to sit with Contact Guard Assistance.  2. Sit to supine with Contact Guard Assistance.  3. Sit to stand transfer with Stand-by Assistance.  4. Bed to chair transfer with Modified [place] using Quad Cane.  5. Gait  x 50 feet with Supervision using Quad Cane.   6. Lower extremity exercise program x 20 reps per handout, with assistance as needed.                      History:     Past Medical History:   Diagnosis Date    Anticoagulant long-term use     Cataract     Chronic diarrhea     Depression     Edema     GERD (gastroesophageal reflux disease)     Hypertension 10/2/2012    Osteoarthritis of both knees 2016    Osteopenia     Osteopenia 2017    PE (pulmonary thromboembolism) 2017    Primary localized osteoarthrosis, lower leg 12/3/2014    Sleep apnea 2016    Thalassemia minor        Past Surgical History:   Procedure Laterality Date    APPENDECTOMY      CATARACT EXTRACTION      COLONOSCOPY N/A 2016    Procedure: COLONOSCOPY;  Surgeon: Tanner Simental MD;  Location: [address];  Service: Endoscopy;  Laterality: N/A;    HYSTERECTOMY  age 39    fibroids    OOPHORECTOMY      TONSILLECTOMY         Time Tracking:     PT Received On: 20  PT Start Time: 0946     PT Stop Time: 1012  PT Total Time (min): 26 min     PT Start Time: 1200     PT Stop Time: 1224    PT Total Time (min): 50 min     Billable Minutes: Evaluation 9, Gait Training 38 and Therapeutic Activity 23      Yamila Carter, PT  2020

## 2020-06-12 NOTE — PROGRESS NOTES
[facility]  Orthopedics  Progress Note    Patient Name: Coby Atkinson  MRN: [id number]  Admission Date: 6/11/2020  Hospital Length of Stay: 0 days  Attending Provider: Ollie Owens MD  Primary Care Provider: Mundo Hudson DO    Subjective:     Principal Problem:Closed fracture of right proximal humerus    Principal Orthopedic Problem:  Same    Interval History:  Patient seen examined at bedside.  No acute events overnight.  Pain well controlled.  Sling and swath with abduction pillow to right upper extremity.  Patient has no new complaints today.    Review of patient's allergies indicates:   Allergen Reactions    Codeine     Ciprofloxacin Rash       Current Facility-Administered Medications   Medication    acetaminophen tablet 650 mg    albuterol-ipratropium 2.5 mg-0.5 mg/3 mL nebulizer solution 3 mL    amLODIPine tablet 10 mg    dextrose 10% (D10W) Bolus    dextrose 10% (D10W) Bolus    escitalopram oxalate tablet 20 mg    furosemide tablet 20 mg    glucagon (human recombinant) injection 1 mg    glucose chewable tablet 16 g    glucose chewable tablet 24 g    hydrALAZINE tablet 25 mg    HYDROcodone-acetaminophen 5-325 mg per tablet 1 tablet    ketorolac injection 15 mg    losartan tablet 100 mg    melatonin tablet 6 mg    metoprolol succinate (TOPROL-XL) 24 hr tablet 25 mg    ondansetron disintegrating tablet 8 mg    ondansetron injection 4 mg    pantoprazole EC tablet 40 mg    polyethylene glycol packet 17 g    sodium chloride 0.9% flush 10 mL    [START ON 6/16/2020] warfarin (COUMADIN) split tablet 1.5 mg    warfarin (COUMADIN) tablet 1 mg    [START ON 6/13/2020] warfarin (COUMADIN) tablet 1 mg     Objective:     Vital Signs (Most Recent):  Temp: 98.7 °F (37.1 °C) (06/11/20 2312)  Pulse: 71 (06/11/20 2312)  Resp: 16 (06/11/20 2312)  BP: (!) 142/66 (06/11/20 2312)  SpO2: (!) 93 % (06/11/20 2312) Vital Signs (24h Range):  Temp:  [97 °F (36.1 °C)-98.7 °F  (37.1 °C)] 98.7 °F (37.1 °C)  Pulse:  [64-72] 71  Resp:  [16-19] 16  SpO2:  [92 %-100 %] 93 %  BP: (142-230)/(66-99) 142/66     Weight: 105.3 kg (232 lb 2.3 oz)  Height: 5' (152.4 cm)  Body mass index is 45.34 kg/m².      Ortho/SPM Exam       Right Upper Extremity    -small 1 x 1 cm abrasion medial posterior form, firm fluid collection over the dorsal hand, likely hematoma  - sling and swath with abduction pillow in place  -TTP over dorsum of her hand, proximal humerus  -Compartments soft and compressible  -ROM full (shoulder/elbow/wrist)  -SILT M/R/U  -Motor intact Ain/PIN/U/M  -Brisk cap refill  -Warm well perfused extremities  -2+ Radial palpable      Significant Labs:   BMP:   Recent Labs   Lab 06/11/20 0257   *      K 2.9*      CO2 22*   BUN 14   CREATININE 0.8   CALCIUM 8.0*     CBC:   Recent Labs   Lab 06/11/20 0257 06/11/20  0833   WBC 8.12 14.76*   HGB 7.8* 10.6*   HCT 27.5* 35.7*    283     CMP:   Recent Labs   Lab 06/11/20 0257      K 2.9*      CO2 22*   *   BUN 14   CREATININE 0.8   CALCIUM 8.0*   PROT 6.0   ALBUMIN 3.2*   BILITOT 0.3   ALKPHOS 87   AST 19   ALT 12   ANIONGAP 8   EGFRNONAA >60.0     Coagulation:   Recent Labs   Lab 06/11/20  0314   LABPROT 25.0*   INR 2.6*     CRP: No results for input(s): CRP in the last 48 hours.  POCT Glucose: No results for input(s): POCTGLUCOSE in the last 48 hours.  All pertinent labs within the past 24 hours have been reviewed.    Significant Imaging: I have reviewed all pertinent imaging results/findings.   No new imaging    Assessment/Plan:     * Closed fracture of right proximal humerus  Coby Atkinson is a 74 y.o. female with right proximal humerus fracture, closed, NVI. Diagnosis discussed in detail with patient. She was placed in a sling and swathe with abduction pillow. She will remain non-weightbearing right upper extremity at this time and follow up in clinic next week. She can come out of the sling to  bathe and perform range of motion at elbow and wrist to prevent stiffness. Pt is on warfarin at home. CT right shoulder for better characterization of fracture. No surgical intervention at this time.  Patient is tolerating sling and swath.    Call with any questions or concerns.                   Kit Carter MD  Orthopedics  [facility]

## 2020-06-14 NOTE — HOSPITAL COURSE
"Patient evaluated by ortho for fracture of R humerus. Non operative case. She was placed in a sling and swathe with abduction pillow and made NWB.  CT shoulder obtained without joint involvement. She will follow up with orthopedics in clinic.  Patient also found with hematoma of RUE relating to fall - most notable on hand.  Hematoma was stable and resolving.  INR was elevated but within therapeutic range and continue on warfarin given chronic PE.  Despite initial blood counts being 7, there was no evidence of bleeding as repeat CBC reveal Hgb 10 which is about baseline. This value was stable on repeat with assumption that initial result was in error.  Assessed by PT/OT - PT recommended IPR, OT recommended HH.  Patient opted for home discharge with HH and DME was delivered per OT recommendations.        Closed fracture of right proximal humerus  Risk for falls     - CT ordered for further evaluation  - orthopedic surgery consulted   - PTOT consulted   - decreased ROM, pain-limited  - monitor for expanding hematoma      Chronic diastolic heart failure  - diuresis with furosemide (LASIX) 20 MG tablet daily   - strict Is/Os   - daily weights, preferably standing   - tele  - keep Mg >2, K >4     Hypertensive urgency  Essential hypertension  - highest BP since arrival 230/99, suspect pain induced   - per 2017 ACC/AHA guidelines, "reduce systolic BP by <25% in first hour, then (if stable) to goal 160/100-110 mmHg by 2-6 hours with cautious return to normal BP over the next 24-48 hours"   - continue losartan (COZAAR) 100 MG tablet, amLODIPine (NORVASC) 10 MG tablet  - start hydralazine 25 MG PRN for SBP>180, DBP>110  - maintain on tele, repeat EKG in AM to trend     Hypokalemia  2.9 on admission, suspect secondary to diuretic use  - reports only taking half of her prescribed dose-- potassium chloride (MICRO-K) 10 MEQ CpSR   - supplementation ordered  - follow daily, will need increased dose at discharge      Morbid obesity " with BMI of 40.0-44.9, adult  - obesity complicates all aspects of disease management from diagnostic modalities to treatment.   - Weight loss encouraged and health benefits explained to patient.  - exercise and portion control  - consider dietary consult      Mild episode of recurrent major depressive disorder  - escitalopram oxalate (LEXAPRO) 20 MG tablet     Pulmonary embolism 12/17  Chronic deep vein thrombosis (DVT) of left popliteal vein  Chronic anticoagulation     INR 2.6 on admission   - Warfarin maintenance plan: 1.5 mg (1 mg x 1.5) every Tue, Thu; 1 mg (1 mg x 1) all other days  - daily INRs  - monitor for bleeding and/or expanding hematoma  - continue as H/H improved on repeat labs     Essential hypertension

## 2020-06-14 NOTE — DISCHARGE SUMMARY
"[facility] Hospital Medicine  Discharge Summary      Patient Name: Coby Atkinson  MRN: [id number]  Admission Date: 6/11/2020  Hospital Length of Stay: 0 days  Discharge Date and Time: 6/12/2020  2:53 PM  Attending Physician: No att. providers found   Discharging Provider: Ollie Owens MD  Primary Care Provider: Mundo Hudson DO  [facility] Medicine Team: [facility] HOSP MED O Ollie Owens MD    HPI:   Coby Atkinson is a 74 y.o. female with HTN, anemia, thalassemia minor, history of pulmonary embolus (on coumadin), depression who presents s/p mechanical fall. Patient reports she fell on her face while walking around her kitchen. She denies loss of consciousness, recent falls or prodromal symptoms and believes she possibly stumbled. She is unsure if she tried to catch herself with her hands but reports R hand pain immediately after. She reports she "just couldn't move" after the fall and asked her  to call 911. She denies signs of bleeding, dizziness, shortness of breath, cough.  She lives with her , daughter in law and 2 grandchildren. She uses a walker when ambulating outside of her home.     ED: R shoulder XRAY with acute mildly displaced fracture involving the proximal humerus with involvement of the greater tuberosity. R hand XRAY with hematoma, no fx identified. INR 2.6. H/H 7.8/27.5. K 2.9.     * No surgery found *      Hospital Course:   Patient evaluated by ortho for fracture of R humerus. Non operative case. She was placed in a sling and swathe with abduction pillow and made NWB.  CT shoulder obtained without joint involvement. She will follow up with orthopedics in clinic.  Patient also found with hematoma of RUE relating to fall - most notable on hand.  Hematoma was stable and resolving.  INR was elevated but within therapeutic range and continue on warfarin given chronic PE.  Despite initial blood counts being 7, there was no evidence of bleeding as repeat " "CBC reveal Hgb 10 which is about baseline. This value was stable on repeat with assumption that initial result was in error.  Assessed by PT/OT - PT recommended IPR, OT recommended HH.  Patient opted for home discharge with HH and DME was delivered per OT recommendations.        Closed fracture of right proximal humerus  Risk for falls     - CT ordered for further evaluation  - orthopedic surgery consulted   - PTOT consulted   - decreased ROM, pain-limited  - monitor for expanding hematoma      Chronic diastolic heart failure  - diuresis with furosemide (LASIX) 20 MG tablet daily   - strict Is/Os   - daily weights, preferably standing   - tele  - keep Mg >2, K >4     Hypertensive urgency  Essential hypertension  - highest BP since arrival 230/99, suspect pain induced   - per 2017 ACC/AHA guidelines, "reduce systolic BP by <25% in first hour, then (if stable) to goal 160/100-110 mmHg by 2-6 hours with cautious return to normal BP over the next 24-48 hours"   - continue losartan (COZAAR) 100 MG tablet, amLODIPine (NORVASC) 10 MG tablet  - start hydralazine 25 MG PRN for SBP>180, DBP>110  - maintain on tele, repeat EKG in AM to trend     Hypokalemia  2.9 on admission, suspect secondary to diuretic use  - reports only taking half of her prescribed dose-- potassium chloride (MICRO-K) 10 MEQ CpSR   - supplementation ordered  - follow daily, will need increased dose at discharge      Morbid obesity with BMI of 40.0-44.9, adult  - obesity complicates all aspects of disease management from diagnostic modalities to treatment.   - Weight loss encouraged and health benefits explained to patient.  - exercise and portion control  - consider dietary consult      Mild episode of recurrent major depressive disorder  - escitalopram oxalate (LEXAPRO) 20 MG tablet     Pulmonary embolism 12/17  Chronic deep vein thrombosis (DVT) of left popliteal vein  Chronic anticoagulation     INR 2.6 on admission   - Warfarin maintenance plan: 1.5 " mg (1 mg x 1.5) every Tue, Thu; 1 mg (1 mg x 1) all other days  - daily INRs  - monitor for bleeding and/or expanding hematoma  - continue as H/H improved on repeat labs     Essential hypertension     Consults:   Consults (From admission, onward)        Status Ordering Provider     Inpatient consult to Orthopedic Surgery  Once     Provider:  (Not yet assigned)    Completed YOHANNES ANAND          No new Assessment & Plan notes have been filed under this hospital service since the last note was generated.  Service: Hospital Medicine    Final Active Diagnoses:    Diagnosis Date Noted POA    PRINCIPAL PROBLEM:  Closed fracture of right proximal humerus [S42.201A] 06/11/2020 Yes    Comfort measures only status [Z51.5] 06/12/2020 Not Applicable    Hypokalemia [E87.6] 06/11/2020 Yes    Hypertensive urgency [I16.0] 06/11/2020 No    Chronic diastolic heart failure [I50.32] 06/11/2020 Yes    Risk for falls [Z91.81] 11/25/2019 Not Applicable    Morbid obesity with BMI of 40.0-44.9, adult [E66.01, Z68.41] 06/14/2018 Not Applicable    Mild episode of recurrent major depressive disorder [F33.0] 06/14/2018 Yes    Chronic deep vein thrombosis (DVT) of left popliteal vein [I82.532] 12/21/2017 Yes    Chronic anticoagulation [Z79.01] 12/11/2017 Not Applicable    Pulmonary embolism 12/17 [I26.99] 12/03/2017 Yes    Essential hypertension [I10] 10/11/2016 Yes    Gastroesophageal reflux disease [K21.9] 06/13/2016 Yes      Problems Resolved During this Admission:       Discharged Condition: good    Disposition: Home or Self Care    Follow Up:  Follow-up Information     [facility] - Orthopedics. Call in 1 day.    Specialty: Orthopedics  Contact information:  [address]  [facility] [address] [phone]  Additional information:  [facility] - [address]               Patient Instructions:      COMMODE FOR HOME USE     Order Specific Question Answer Comments   Type: Heavy duty    Height: 5' (1.524 m)     Weight: 105.3 kg (232 lb 2.3 oz)    Does patient have medical equipment at home? walker, rolling    Does patient have medical equipment at home? raised toilet    Does patient have medical equipment at home? bath bench    Length of need (1-99 months): 1    Vendor: [facility]    Expected Date of Delivery: 6/12/2020      CANE FOR HOME USE     Order Specific Question Answer Comments   Type of Cane: Narrow Quad    Height: 5' (1.524 m)    Weight: 105.3 kg (232 lb 2.3 oz)    Does patient have medical equipment at home? walker, rolling    Does patient have medical equipment at home? raised toilet    Does patient have medical equipment at home? bath bench    Length of need (1-99 months): 1    Please check all that apply: Patient needs help to get in and out of chair.    Please check all that apply: Patient is unable to safely ambulate without equipment.    Vendor: [facility]    Expected Date of Delivery: 6/12/2020      Diet Cardiac     Other restrictions (specify):   Order Comments: RUE sling as instructed by ortho     Notify your health care provider if you experience any of the following:  temperature >100.4     Notify your health care provider if you experience any of the following:  persistent nausea and vomiting or diarrhea     Notify your health care provider if you experience any of the following:  severe uncontrolled pain     Notify your health care provider if you experience any of the following:  redness, tenderness, or signs of infection (pain, swelling, redness, odor or green/yellow discharge around incision site)     Notify your health care provider if you experience any of the following:  difficulty breathing or increased cough     Notify your health care provider if you experience any of the following:  severe persistent headache     Notify your health care provider if you experience any of the following:  worsening rash     Notify your health care provider if you experience any of the following:   persistent dizziness, light-headedness, or visual disturbances     Notify your health care provider if you experience any of the following:  increased confusion or weakness     Activity as tolerated       Medications:  Reconciled Home Medications:      Medication List      START taking these medications    HYDROcodone-acetaminophen 5-325 mg per tablet  Commonly known as: NORCO  Take 1 tablet by mouth every 8 (eight) hours as needed for Pain.        CHANGE how you take these medications    pantoprazole 40 MG tablet  Commonly known as: PROTONIX  Take 1 tablet (40 mg total) by mouth every 12 (twelve) hours.  What changed: when to take this        CONTINUE taking these medications    ALPRAZolam 0.5 MG tablet  Commonly known as: XANAX  Take 1 tablet (0.5 mg total) by mouth 2 (two) times daily as needed for Insomnia or Anxiety.     amLODIPine 10 MG tablet  Commonly known as: NORVASC  TAKE 1 TABLET(10 MG) BY MOUTH EVERY DAY     doxazosin 4 MG tablet  Commonly known as: CARDURA  Take 1 tablet (4 mg total) by mouth every evening.     escitalopram oxalate 20 MG tablet  Commonly known as: LEXAPRO  TAKE 1 TABLET BY MOUTH EVERY DAY     furosemide 20 MG tablet  Commonly known as: LASIX  TAKE 1 TABLET(20 MG) BY MOUTH EVERY DAY     losartan 100 MG tablet  Commonly known as: COZAAR  Take 1 tablet (100 mg total) by mouth once daily.     meloxicam 7.5 MG tablet  Commonly known as: MOBIC  Take 7.5 mg by mouth once daily.     metoprolol succinate 25 MG 24 hr tablet  Commonly known as: TOPROL-XL  TAKE 1 TABLET BY MOUTH EVERY DAY     * potassium chloride 10 MEQ Cpsr  Commonly known as: MICRO-K  TAKE 2 CAPSULES(20 MEQ) BY MOUTH EVERY DAY     * potassium chloride 10 MEQ Cpsr  Commonly known as: MICRO-K  TAKE 2 CAPSULES(20 MEQ) BY MOUTH EVERY DAY     warfarin 1 MG tablet  Commonly known as: COUMADIN  TAKE 1/2 TO 1 TABLET BY MOUTH DAILY AS DIRECTED BY COUMADIN CLINIC         * This list has 2 medication(s) that are the same as other  medications prescribed for you. Read the directions carefully, and ask your doctor or other care provider to review them with you.                Indwelling Lines/Drains at time of discharge:   Lines/Drains/Airways     None                 Time spent on the discharge of patient: 35 minutes  Patient was seen and examined on the date of discharge and determined to be suitable for discharge.         Ollie Owens MD  Department of Hospital Medicine  [facility]

## 2020-06-15 ENCOUNTER — TELEPHONE (OUTPATIENT)
Dept: INTERNAL MEDICINE | Facility: CLINIC | Age: 75
End: 2020-06-15

## 2020-06-15 ENCOUNTER — TELEPHONE (OUTPATIENT)
Dept: ORTHOPEDICS | Facility: CLINIC | Age: 75
End: 2020-06-15

## 2020-06-15 NOTE — TELEPHONE ENCOUNTER
----- Message from Sabi Wilson sent at 6/15/2020 12:15 PM CDT -----  Regarding: Referral for Rehab  Contact: [facility] [phone]  Type: Home Health (orders, updates, clarifications, etc.)    Home Health Agency/Nurse: [facility]    Phone number: [phone]    Reason for call: Patient had a fall with fracture  and was discharged from hospital on Friday.  Patient's family is unable to manage her care and is requesting that patient is sent to rehab

## 2020-06-15 NOTE — TELEPHONE ENCOUNTER
----- Message from Carolyn Herr sent at 6/15/2020 12:59 PM CDT -----  Regarding: Reschedule Appointment  Contact: PT  PT called to reschedule her appointment currently set for 6/18     Callback: [phone]

## 2020-06-15 NOTE — TELEPHONE ENCOUNTER
----- Message from Bev Ang sent at 6/15/2020  1:04 PM CDT -----  Contact: patient [phone]  Patient was in the hospital after a fall and fracturing her r arm and hand, very painful and refused rehab because her  thought he could handle it.     Patient would like to speak with the nurse about getting this handled soon. Her  is scheduled to go in the hospital in the next 2 weeks so patient needs to get to the rehab facility asa.

## 2020-06-15 NOTE — TELEPHONE ENCOUNTER
Patient had a fall with fracture  and was discharged from hospital on Friday.  Patient's family is unable to manage her care and is requesting that patient is sent to rehab    [facility] rehab

## 2020-06-15 NOTE — TELEPHONE ENCOUNTER
Spoke to Yadi@ [facility], Instructed that  Dr. Hudson was unable to order rehab at the hospital. Instructed Yadi to kal the hospitalist to reorder the Rebab. Stated she would

## 2020-06-15 NOTE — TELEPHONE ENCOUNTER
Spoke with pt. Pt stated that her  is having surgery. Pt new appointment 6/19/20 @ 7:45 am. Patient states verbal understanding and has no further questions.

## 2020-06-16 ENCOUNTER — TELEPHONE (OUTPATIENT)
Dept: INTERNAL MEDICINE | Facility: CLINIC | Age: 75
End: 2020-06-16

## 2020-06-16 NOTE — TELEPHONE ENCOUNTER
Spoke to patient re: inpatient rehab. I instructed patient, I had spoken to [email or web address], Dr. Hudson was not able to order an inpatient rehab, they would have to go through the Hospitalist. Stated understanding

## 2020-06-16 NOTE — TELEPHONE ENCOUNTER
----- Message from Kamini Kaufman sent at 6/16/2020 10:46 AM CDT -----  Regarding: self/[phone]  Patient called in regards yesterday message 06/15/20. Patient would like a courtesy call. Thank you.

## 2020-06-17 ENCOUNTER — TELEPHONE (OUTPATIENT)
Dept: INTERNAL MEDICINE | Facility: CLINIC | Age: 75
End: 2020-06-17

## 2020-06-17 ENCOUNTER — ANTI-COAG VISIT (OUTPATIENT)
Dept: CARDIOLOGY | Facility: CLINIC | Age: 75
End: 2020-06-17
Payer: MEDICARE

## 2020-06-17 DIAGNOSIS — Z79.01 CHRONIC ANTICOAGULATION: ICD-10-CM

## 2020-06-17 PROCEDURE — 93793 ANTICOAG MGMT PT WARFARIN: CPT | Mod: ,,, | Performed by: PHARMACIST

## 2020-06-17 PROCEDURE — 93793 PR ANTICOAGULANT MGMT FOR PT TAKING WARFARIN: ICD-10-PCS | Mod: ,,, | Performed by: PHARMACIST

## 2020-06-17 NOTE — PLAN OF CARE
[facility]    FACILITY TRANSFER ORDERS      Patient Name: Coby Atkinson  YOB: 1945    PCP: Mundo Hudson DO   PCP Address: [address] / [place] [address]  PCP Phone Number: [phone]  PCP Fax: [phone]    Encounter Date: 06/17/2020    Admit to: IPR    Vital Signs:  Routine    Diagnoses:   Active Hospital Problems    Diagnosis  POA    *Closed fracture of right proximal humerus [S42.201A]  Yes    Comfort measures only status [Z51.5]  Not Applicable    Hypokalemia [E87.6]  Yes    Hypertensive urgency [I16.0]  No    Chronic diastolic heart failure [I50.32]  Yes    Risk for falls [Z91.81]  Not Applicable    Morbid obesity with BMI of 40.0-44.9, adult [E66.01, Z68.41]  Not Applicable    Mild episode of recurrent major depressive disorder [F33.0]  Yes    Chronic deep vein thrombosis (DVT) of left popliteal vein [I82.532]  Yes    Chronic anticoagulation [Z79.01]  Not Applicable    Pulmonary embolism 12/17 [I26.99]  Yes     Submassive PE, s/p catheter-directed tPA      Essential hypertension [I10]  Yes    Gastroesophageal reflux disease [K21.9]  Yes      Resolved Hospital Problems   No resolved problems to display.       Allergies:  Review of patient's allergies indicates:   Allergen Reactions    Codeine     Ciprofloxacin Rash       Diet: cardiac diet    Activities: Activity as tolerated - RUE limitations due to humeral fracture/sling/swaddle     Nursing: per routine    Labs: CBC and BMPand INR twice weekly     CONSULTS:    Physical Therapy to evaluate and treat. , Occupational Therapy to evaluate and treat. and  to evaluate for community resources/long-range planning.      Medications: Review discharge medications with patient and family and provide education.      Discharge Medication List as of 6/12/2020 10:57 AM      CONTINUE these medications which have CHANGED    Details   HYDROcodone-acetaminophen (NORCO) 5-325 mg per  tablet Take 1 tablet by mouth every 8 (eight) hours as needed for Pain., Starting Fri 6/12/2020, Until Tue 6/16/2020, Print         CONTINUE these medications which have NOT CHANGED    Details   amLODIPine (NORVASC) 10 MG tablet TAKE 1 TABLET(10 MG) BY MOUTH EVERY DAY, Normal      doxazosin (CARDURA) 4 MG tablet Take 1 tablet (4 mg total) by mouth every evening., Starting Fri 5/8/2020, Until Sat 5/8/2021, Normal      escitalopram oxalate (LEXAPRO) 20 MG tablet TAKE 1 TABLET BY MOUTH EVERY DAY, Normal      furosemide (LASIX) 20 MG tablet TAKE 1 TABLET(20 MG) BY MOUTH EVERY DAY, Normal      metoprolol succinate (TOPROL-XL) 25 MG 24 hr tablet TAKE 1 TABLET BY MOUTH EVERY DAY, Normal      !! potassium chloride (MICRO-K) 10 MEQ CpSR TAKE 2 CAPSULES(20 MEQ) BY MOUTH EVERY DAY, Normal      !! potassium chloride (MICRO-K) 10 MEQ CpSR TAKE 2 CAPSULES(20 MEQ) BY MOUTH EVERY DAY, Normal      warfarin (COUMADIN) 1 MG tablet TAKE 1/2 TO 1 TABLET BY MOUTH DAILY AS DIRECTED BY COUMADIN CLINIC, Normal      ALPRAZolam (XANAX) 0.5 MG tablet Take 1 tablet (0.5 mg total) by mouth 2 (two) times daily as needed for Insomnia or Anxiety., Starting Mon 11/25/2019, Normal      losartan (COZAAR) 100 MG tablet Take 1 tablet (100 mg total) by mouth once daily., Starting Thu 11/1/2018, Until Mon 11/25/2019, Normal      meloxicam (MOBIC) 7.5 MG tablet Take 7.5 mg by mouth once daily., Historical Med      pantoprazole (PROTONIX) 40 MG tablet Take 1 tablet (40 mg total) by mouth every 12 (twelve) hours., Starting Thu 5/16/2019, Until Thu 2/20/2020, Normal       !! - Potential duplicate medications found. Please discuss with provider.               _________________________________  Ollie Owens MD  06/17/2020

## 2020-06-17 NOTE — TELEPHONE ENCOUNTER
Spoke to , wants to put patient Nursing home    (2 days) is for permanent placement. Suggested he call to these nursing homes to see if they would consent to do this or find a 24 hours sitter.

## 2020-06-17 NOTE — TELEPHONE ENCOUNTER
----- Message from Prem Valle sent at 6/17/2020  9:44 AM CDT -----  Regarding: advice  Contact: Spouse [phone] or [phone]  Patient would like to get medical advice.     Comments: Patient spouse calling regarding previous requesting for patient, spouse will be going in for surgery, requesting nursing home for patient, call to discuss further.    Please call an advise  Thank you

## 2020-06-17 NOTE — PROGRESS NOTES
Josee with [facility]  called to report the Patient was supposed to be enrolled in  service but states Patient has not been admitted to  service and is needing to be admitted to Rehab ( having hard time caring for her), states  can take Patient to the [facility] today -6/17/20

## 2020-06-17 NOTE — PROGRESS NOTES
Confirmed taking correct dose of coumadin  ER Admit for fall bruised ribs & small fracture w/ R arm; hematoma on R hand  NO other changes

## 2020-06-18 NOTE — PROGRESS NOTES
called to report that the Patient was admitted to [facility] for about a week, states Rehab can draw the INR, [facility] ph. # [phone], next INR is due 6/23/20

## 2020-06-26 ENCOUNTER — TELEPHONE (OUTPATIENT)
Dept: ORTHOPEDICS | Facility: CLINIC | Age: 75
End: 2020-06-26

## 2020-06-26 NOTE — TELEPHONE ENCOUNTER
Spoke with Mingo with [facility].   She was calling to schedule an appointment for pt on 's behalf.   Pt will discharge from [facility] next week and needs a follow up with orthopedics.    Pt was seen during Orthopedic Consult while in hospital.   Pt discharged home with a follow up one week later in orthpedics clinic.    Pt was being seen by [facility].    was unable to care for pt at home. [facility] arranged for pt to be admitted to [facility] for Rehab.      cancelled pt's orthopedic clinic follow up while pt was in [facility].     Pt rescheduled with Inderjit Pantoja NP on 7/6 as requested by Mingo.   Mailed appointment slip to pt's home address

## 2020-06-26 NOTE — TELEPHONE ENCOUNTER
----- Message from Sabrina Herrmann sent at 6/26/2020 11:18 AM CDT -----  Regarding: mananthony with [facility]   Calling to get a update on pt.  Asking for a call back.    Contact info [phone]

## 2020-06-30 ENCOUNTER — EXTERNAL CHRONIC CARE MANAGEMENT (OUTPATIENT)
Dept: PRIMARY CARE CLINIC | Facility: CLINIC | Age: 75
End: 2020-06-30
Payer: MEDICARE

## 2020-06-30 PROCEDURE — 99490 CHRNC CARE MGMT STAFF 1ST 20: CPT | Mod: S$PBB,,, | Performed by: INTERNAL MEDICINE

## 2020-06-30 PROCEDURE — 99490 CHRNC CARE MGMT STAFF 1ST 20: CPT | Mod: PBBFAC,PO | Performed by: INTERNAL MEDICINE

## 2020-06-30 PROCEDURE — 99490 PR CHRONIC CARE MGMT, 1ST 20 MIN: ICD-10-PCS | Mod: S$PBB,,, | Performed by: INTERNAL MEDICINE

## 2020-07-07 ENCOUNTER — TELEPHONE (OUTPATIENT)
Dept: ORTHOPEDICS | Facility: CLINIC | Age: 75
End: 2020-07-07

## 2020-07-07 ENCOUNTER — HOSPITAL ENCOUNTER (OUTPATIENT)
Dept: RADIOLOGY | Facility: HOSPITAL | Age: 75
Discharge: HOME | End: 2020-07-07
Attending: NURSE PRACTITIONER
Payer: MEDICARE

## 2020-07-07 ENCOUNTER — OFFICE VISIT (OUTPATIENT)
Dept: ORTHOPEDICS | Facility: CLINIC | Age: 75
End: 2020-07-07
Payer: MEDICARE

## 2020-07-07 DIAGNOSIS — S42.201D CLOSED FRACTURE OF PROXIMAL END OF RIGHT HUMERUS WITH ROUTINE HEALING, UNSPECIFIED FRACTURE MORPHOLOGY, SUBSEQUENT ENCOUNTER: ICD-10-CM

## 2020-07-07 DIAGNOSIS — S42.201D CLOSED FRACTURE OF PROXIMAL END OF RIGHT HUMERUS WITH ROUTINE HEALING, UNSPECIFIED FRACTURE MORPHOLOGY, SUBSEQUENT ENCOUNTER: Primary | ICD-10-CM

## 2020-07-07 PROCEDURE — 99213 OFFICE O/P EST LOW 20 MIN: CPT | Mod: S$PBB,,, | Performed by: NURSE PRACTITIONER

## 2020-07-07 PROCEDURE — 73030 XR SHOULDER TRAUMA 3 VIEW RIGHT: ICD-10-PCS | Mod: 26,RT,, | Performed by: RADIOLOGY

## 2020-07-07 PROCEDURE — 99999 PR PBB SHADOW E&M-EST. PATIENT-LVL III: CPT | Mod: PBBFAC,,, | Performed by: NURSE PRACTITIONER

## 2020-07-07 PROCEDURE — 99213 OFFICE O/P EST LOW 20 MIN: CPT | Mod: PBBFAC,25 | Performed by: NURSE PRACTITIONER

## 2020-07-07 PROCEDURE — 99213 PR OFFICE/OUTPT VISIT, EST, LEVL III, 20-29 MIN: ICD-10-PCS | Mod: S$PBB,,, | Performed by: NURSE PRACTITIONER

## 2020-07-07 PROCEDURE — 99999 PR PBB SHADOW E&M-EST. PATIENT-LVL III: ICD-10-PCS | Mod: PBBFAC,,, | Performed by: NURSE PRACTITIONER

## 2020-07-07 PROCEDURE — 73030 X-RAY EXAM OF SHOULDER: CPT | Mod: TC,RT

## 2020-07-07 PROCEDURE — 73030 X-RAY EXAM OF SHOULDER: CPT | Mod: 26,RT,, | Performed by: RADIOLOGY

## 2020-07-07 NOTE — TELEPHONE ENCOUNTER
Spoke with pt and Ja at facility.   Explained that pt's appointment has been cancelled and rescheduled multiple times.   However, pt's appointment that was given to her for today at 12:00 was with the incorrect provider.    Pt was rescheduled with the correct provider for same day at 1:15 pm   Advised that pt can come to clinic earlier than her 115 pm appointment and we will work her in sooner if possible.   Ja verbalized understanding

## 2020-07-07 NOTE — TELEPHONE ENCOUNTER
----- Message from Sabrina Herrmann sent at 7/7/2020 10:09 AM CDT -----  Regarding: Ja from [facility] is calling to see why pt appt was change from 12 to 1:15 it interferes with her transportation    She states now she has to call transportation to see if they available to pick the pt up    Contact info [phone]

## 2020-07-07 NOTE — PROGRESS NOTES
SUBJECTIVE:     Chief Complaint & History of Present Illness:  Coby Atkinson is a New 74 y.o. year old female patient presenting with intermittent right shoulder pain that started 1 month ago, s/p fall at home.  She is Right hand dominant.  There is a history of injury.  The pain is located in the  lateral aspect of the shoulder.  The pain is described as dull.  It is aggravated by activity.  Associated symptoms include none.  Previous treatments include sling which have provided adequate relief.  There is not a history of previous injury or surgery to the shoulder.      In summary, patient states she slipped on a cloth at home and fell in her kitchen thus striking her right arm on the oven.  She was brought to the ED and Ortho was consulted.  She was diagnosed with a right greater tuberosity humerus fracture.  The decision was to treat her non-operative and she was placed into a sling with an abductor brace.  She was eventually discharged to [facility] and now is at [facility] where she is getting therapy.  She currently denies numbness or tingling sensation to her right hand or fingers.    Review of patient's allergies indicates:   Allergen Reactions    Codeine     Ciprofloxacin Rash         Current Outpatient Medications   Medication Sig Dispense Refill    ALPRAZolam (XANAX) 0.5 MG tablet Take 1 tablet (0.5 mg total) by mouth 2 (two) times daily as needed for Insomnia or Anxiety. 60 tablet 1    amLODIPine (NORVASC) 10 MG tablet TAKE 1 TABLET(10 MG) BY MOUTH EVERY DAY 90 tablet 3    doxazosin (CARDURA) 4 MG tablet Take 1 tablet (4 mg total) by mouth every evening. 90 tablet 3    escitalopram oxalate (LEXAPRO) 20 MG tablet TAKE 1 TABLET BY MOUTH EVERY DAY 90 tablet 3    furosemide (LASIX) 20 MG tablet TAKE 1 TABLET(20 MG) BY MOUTH EVERY DAY 90 tablet 0    losartan (COZAAR) 100 MG tablet Take 1 tablet (100 mg total) by mouth once daily. 90 tablet 3    meloxicam (MOBIC) 7.5 MG  tablet Take 7.5 mg by mouth once daily.      metoprolol succinate (TOPROL-XL) 25 MG 24 hr tablet TAKE 1 TABLET BY MOUTH EVERY DAY 90 tablet 3    pantoprazole (PROTONIX) 40 MG tablet Take 1 tablet (40 mg total) by mouth every 12 (twelve) hours. (Patient taking differently: Take 40 mg by mouth once daily. ) 180 tablet 3    potassium chloride (MICRO-K) 10 MEQ CpSR TAKE 2 CAPSULES(20 MEQ) BY MOUTH EVERY  capsule 0    potassium chloride (MICRO-K) 10 MEQ CpSR TAKE 2 CAPSULES(20 MEQ) BY MOUTH EVERY  capsule 3    warfarin (COUMADIN) 1 MG tablet TAKE 1/2 TO 1 TABLET BY MOUTH DAILY AS DIRECTED BY COUMADIN CLINIC 30 tablet 5     No current facility-administered medications for this visit.        Past Medical History:   Diagnosis Date    Anticoagulant long-term use     Cataract     Chronic diarrhea     Depression     Edema     GERD (gastroesophageal reflux disease)     Hypertension 10/2/2012    Osteoarthritis of both knees 8/9/2016    Osteopenia     Osteopenia 11/30/2017    PE (pulmonary thromboembolism) 12/03/2017    Primary localized osteoarthrosis, lower leg 12/3/2014    Sleep apnea 2016    Thalassemia minor        Past Surgical History:   Procedure Laterality Date    APPENDECTOMY      CATARACT EXTRACTION      COLONOSCOPY N/A 11/5/2016    Procedure: COLONOSCOPY;  Surgeon: Tanner Simental MD;  Location: [facility] ([address] [place] [address]);  Service: Endoscopy;  Laterality: N/A;    HYSTERECTOMY  age 39    fibroids    OOPHORECTOMY      TONSILLECTOMY         Family History   Problem Relation Age of Onset    Hypertension Mother     Cancer Father         skin    Cancer Brother         pancreatic    No Known Problems Sister     No Known Problems Maternal Aunt     No Known Problems Maternal Uncle     No Known Problems Paternal Aunt     No Known Problems Paternal Uncle     Coronary artery disease Maternal Grandmother     Heart failure Maternal Grandmother     No Known Problems Maternal Grandfather      Stroke Paternal Grandmother     Coronary artery disease Paternal Grandmother     No Known Problems Paternal Grandfather     Coronary artery disease Brother         with CABG in his 30s    Amblyopia Neg Hx     Blindness Neg Hx     Cataracts Neg Hx     Diabetes Neg Hx     Glaucoma Neg Hx     Macular degeneration Neg Hx     Retinal detachment Neg Hx     Strabismus Neg Hx     Thyroid disease Neg Hx     Colon cancer Neg Hx     Stomach cancer Neg Hx     Esophageal cancer Neg Hx            Review of Systems:  ROS:  Constitutional: no fever or chills  Eyes: no visual changes  ENT: no nasal congestion or sore throat  Respiratory: no cough or shortness of breath  Cardiovascular: no chest pain or palpitations  Gastrointestinal: no nausea or vomiting, tolerating diet  Genitourinary: no hematuria or dysuria  Integument/Breast: no rash or pruritis  Hematologic/Lymphatic: no easy bruising or lymphadenopathy  Musculoskeletal: right shoulder injury  Neurological: no seizures or tremors  Behavioral/Psych: no auditory or visual hallucinations  Endocrine: no heat or cold intolerance      OBJECTIVE:     PHYSICAL EXAM:  Vital Signs (Most Recent)  There were no vitals filed for this visit.     ,   General Appearance: Well nourished, well developed, in no acute distress.  HENT: Normal cephalic, oropharynx pink and moist  Eyes: PERRLA bilaterally and EOM x 4  Respiratory: Even and unlabored  Skin: Warm and Dry. Ecchymosis to her right hand with healing hematoma  Psychiatric: AAO x 4, Mood & affect are normal.    Shoulder exam: right  Tenderness: AC joint  ROM: limited ROM. Can do 0-100 degrees.    Shoulder Strength: biceps 4/5, triceps 4/5, abduction 4/5, adduction 4/5,   tenderness over the glenohumeral joint, distal neuromuscular exam negative, sensory exam normal and radial pulse intact      RADIOGRAPHS:  Right shoulder x-ray obtained and personally reviewed by me and discussed with Dr. Orta.  Compared x-ray to  previous CT of shoulder dated on 6/11/2020.  Findings show fracture still present to right greater tuberosity of the humerus with mild displacement.    ASSESSMENT/PLAN:       ICD-10-CM ICD-9-CM   1. Closed fracture of proximal end of right humerus with routine healing, unspecified fracture morphology, subsequent encounter  S42.201D V54.11       Plan: We discussed with the patient at length all the different treatment options available for her right shoulder including anti-inflammatories, acetaminophen, rest, ice, Physical therapy to include strengthening exercise, occasional cortisone injections for temporary relief, arthroscopic surgical repair, and finally shoulder arthroplasty.     -Coby Atkinson presents to clinic with c/c right shoulder pain for the past month  -X-ray as above.  -Recommend RICE therapy.  -Plan of care discussed with Dr. Orta.  Will continue to treat non-operative.    -Recommend she continue to wear her sling when not in therapy.  -Will allow her to continue:   May begin pendulums   Then will have facility begin therapy   -PROM for 2 weeks then,   -AAROM for 2 weeks then,   -AROM for 2 weeks  -She will follow up in 6 weeks at which time will repeat her right shoulder x-ray.  -Suggest she alternate ice/heat packs to help with hematoma to her hand.  -Continue ROM of right hand, fingers and elbow as tolerates.

## 2020-07-17 DIAGNOSIS — Z71.89 COMPLEX CARE COORDINATION: ICD-10-CM

## 2020-07-24 PROCEDURE — G0180 PR HOME HEALTH MD CERTIFICATION: ICD-10-PCS | Mod: ,,, | Performed by: INTERNAL MEDICINE

## 2020-07-24 PROCEDURE — G0180 MD CERTIFICATION HHA PATIENT: HCPCS | Mod: ,,, | Performed by: INTERNAL MEDICINE

## 2020-07-27 ENCOUNTER — ANTI-COAG VISIT (OUTPATIENT)
Dept: CARDIOLOGY | Facility: CLINIC | Age: 75
End: 2020-07-27
Payer: MEDICARE

## 2020-07-27 DIAGNOSIS — Z79.01 CHRONIC ANTICOAGULATION: ICD-10-CM

## 2020-07-27 PROCEDURE — 93793 ANTICOAG MGMT PT WARFARIN: CPT | Mod: ,,, | Performed by: PHARMACIST

## 2020-07-27 PROCEDURE — 93793 PR ANTICOAGULANT MGMT FOR PT TAKING WARFARIN: ICD-10-PCS | Mod: ,,, | Performed by: PHARMACIST

## 2020-07-30 ENCOUNTER — EXTERNAL HOME HEALTH (OUTPATIENT)
Dept: HOME HEALTH SERVICES | Facility: HOSPITAL | Age: 75
End: 2020-07-30
Payer: MEDICARE

## 2020-07-31 ENCOUNTER — EXTERNAL CHRONIC CARE MANAGEMENT (OUTPATIENT)
Dept: PRIMARY CARE CLINIC | Facility: CLINIC | Age: 75
End: 2020-07-31
Payer: MEDICARE

## 2020-07-31 PROCEDURE — 99490 CHRNC CARE MGMT STAFF 1ST 20: CPT | Mod: PBBFAC,PO | Performed by: INTERNAL MEDICINE

## 2020-07-31 PROCEDURE — 99490 PR CHRONIC CARE MGMT, 1ST 20 MIN: ICD-10-PCS | Mod: S$PBB,,, | Performed by: INTERNAL MEDICINE

## 2020-07-31 PROCEDURE — 99490 CHRNC CARE MGMT STAFF 1ST 20: CPT | Mod: S$PBB,,, | Performed by: INTERNAL MEDICINE

## 2020-08-03 ENCOUNTER — ANTI-COAG VISIT (OUTPATIENT)
Dept: CARDIOLOGY | Facility: CLINIC | Age: 75
End: 2020-08-03
Payer: MEDICARE

## 2020-08-03 DIAGNOSIS — Z79.01 CHRONIC ANTICOAGULATION: ICD-10-CM

## 2020-08-03 PROCEDURE — 93793 PR ANTICOAGULANT MGMT FOR PT TAKING WARFARIN: ICD-10-PCS | Mod: ,,, | Performed by: PHARMACIST

## 2020-08-03 PROCEDURE — 93793 ANTICOAG MGMT PT WARFARIN: CPT | Mod: ,,, | Performed by: PHARMACIST

## 2020-08-03 NOTE — PROGRESS NOTES
Confirmed taking correct dose of coumadin  Reports only eating green beans once & almonds SAT  NO other new changes

## 2020-08-13 ENCOUNTER — ANTI-COAG VISIT (OUTPATIENT)
Dept: CARDIOLOGY | Facility: CLINIC | Age: 75
End: 2020-08-13
Payer: MEDICARE

## 2020-08-13 DIAGNOSIS — Z79.01 CHRONIC ANTICOAGULATION: ICD-10-CM

## 2020-08-13 PROCEDURE — 93793 PR ANTICOAGULANT MGMT FOR PT TAKING WARFARIN: ICD-10-PCS | Mod: ,,, | Performed by: PHARMACIST

## 2020-08-13 PROCEDURE — 93793 ANTICOAG MGMT PT WARFARIN: CPT | Mod: ,,, | Performed by: PHARMACIST

## 2020-08-17 ENCOUNTER — ANTI-COAG VISIT (OUTPATIENT)
Dept: CARDIOLOGY | Facility: CLINIC | Age: 75
End: 2020-08-17
Payer: MEDICARE

## 2020-08-17 DIAGNOSIS — Z79.01 CHRONIC ANTICOAGULATION: ICD-10-CM

## 2020-08-17 PROCEDURE — 93793 PR ANTICOAGULANT MGMT FOR PT TAKING WARFARIN: ICD-10-PCS | Mod: ,,, | Performed by: PHARMACIST

## 2020-08-17 PROCEDURE — 93793 ANTICOAG MGMT PT WARFARIN: CPT | Mod: ,,, | Performed by: PHARMACIST

## 2020-08-17 NOTE — PROGRESS NOTES
Confirmed taking correct dose of coumadin  Ate an [place] w/ almonds recently   NO other changes

## 2020-08-18 ENCOUNTER — OFFICE VISIT (OUTPATIENT)
Dept: ORTHOPEDICS | Facility: CLINIC | Age: 75
End: 2020-08-18
Payer: MEDICARE

## 2020-08-18 ENCOUNTER — HOSPITAL ENCOUNTER (OUTPATIENT)
Dept: RADIOLOGY | Facility: HOSPITAL | Age: 75
Discharge: HOME | End: 2020-08-18
Attending: NURSE PRACTITIONER
Payer: MEDICARE

## 2020-08-18 VITALS
BODY MASS INDEX: 45.57 KG/M2 | HEIGHT: 60 IN | WEIGHT: 232.13 LBS | HEART RATE: 80 BPM | SYSTOLIC BLOOD PRESSURE: 164 MMHG | DIASTOLIC BLOOD PRESSURE: 84 MMHG

## 2020-08-18 DIAGNOSIS — M25.511 RIGHT SHOULDER PAIN, UNSPECIFIED CHRONICITY: Primary | ICD-10-CM

## 2020-08-18 DIAGNOSIS — S42.201D CLOSED FRACTURE OF PROXIMAL END OF RIGHT HUMERUS WITH ROUTINE HEALING, UNSPECIFIED FRACTURE MORPHOLOGY, SUBSEQUENT ENCOUNTER: Primary | ICD-10-CM

## 2020-08-18 DIAGNOSIS — M25.511 RIGHT SHOULDER PAIN, UNSPECIFIED CHRONICITY: ICD-10-CM

## 2020-08-18 DIAGNOSIS — M25.531 RIGHT WRIST PAIN: ICD-10-CM

## 2020-08-18 PROCEDURE — 99999 PR PBB SHADOW E&M-EST. PATIENT-LVL IV: ICD-10-PCS | Mod: PBBFAC,,, | Performed by: NURSE PRACTITIONER

## 2020-08-18 PROCEDURE — 99999 PR PBB SHADOW E&M-EST. PATIENT-LVL IV: CPT | Mod: PBBFAC,,, | Performed by: NURSE PRACTITIONER

## 2020-08-18 PROCEDURE — 73030 XR SHOULDER TRAUMA 3 VIEW RIGHT: ICD-10-PCS | Mod: 26,RT,, | Performed by: RADIOLOGY

## 2020-08-18 PROCEDURE — 99214 OFFICE O/P EST MOD 30 MIN: CPT | Mod: PBBFAC,25 | Performed by: NURSE PRACTITIONER

## 2020-08-18 PROCEDURE — 99214 OFFICE O/P EST MOD 30 MIN: CPT | Mod: S$PBB,,, | Performed by: NURSE PRACTITIONER

## 2020-08-18 PROCEDURE — 73110 XR WRIST COMPLETE 3 VIEWS RIGHT: ICD-10-PCS | Mod: 26,RT,, | Performed by: RADIOLOGY

## 2020-08-18 PROCEDURE — 73030 X-RAY EXAM OF SHOULDER: CPT | Mod: 26,RT,, | Performed by: RADIOLOGY

## 2020-08-18 PROCEDURE — 99214 PR OFFICE/OUTPT VISIT, EST, LEVL IV, 30-39 MIN: ICD-10-PCS | Mod: S$PBB,,, | Performed by: NURSE PRACTITIONER

## 2020-08-18 PROCEDURE — 73110 X-RAY EXAM OF WRIST: CPT | Mod: 26,RT,, | Performed by: RADIOLOGY

## 2020-08-18 PROCEDURE — 73030 X-RAY EXAM OF SHOULDER: CPT | Mod: TC,RT

## 2020-08-18 PROCEDURE — 73110 X-RAY EXAM OF WRIST: CPT | Mod: TC,RT

## 2020-08-18 NOTE — PROGRESS NOTES
SUBJECTIVE:     Chief Complaint & History of Present Illness:  Coby Atkinson is a Established 74 y.o. year old female patient following up for her right proximal humerus fracture.  She was last seen by me on 7/7/2020.  She had fractured her humerus due to a mechanical fall a month prior.  She was treated with a sling and abductor brace.  She was previously at [facility] then discharged to [facility].  She is now home with her spouse.  She denies injuries or falls since last seen.  She reports she was getting home health but they discharged her and gave her home exercises she could do.  She still reports intermittent pain to her right shoulder and weakness to her right hand.  There is no numbness or tingling sensation.  She reports her hand  is weaker when compared to the left.  Her and her  are concerned about swelling at the right wrist.      Review of patient's allergies indicates:   Allergen Reactions    Codeine     Ciprofloxacin Rash         Current Outpatient Medications   Medication Sig Dispense Refill    ALPRAZolam (XANAX) 0.5 MG tablet Take 1 tablet (0.5 mg total) by mouth 2 (two) times daily as needed for Insomnia or Anxiety. 60 tablet 1    amLODIPine (NORVASC) 10 MG tablet TAKE 1 TABLET(10 MG) BY MOUTH EVERY DAY 90 tablet 3    doxazosin (CARDURA) 4 MG tablet Take 1 tablet (4 mg total) by mouth every evening. 90 tablet 3    escitalopram oxalate (LEXAPRO) 20 MG tablet TAKE 1 TABLET BY MOUTH EVERY DAY 90 tablet 3    furosemide (LASIX) 20 MG tablet TAKE 1 TABLET(20 MG) BY MOUTH EVERY DAY 90 tablet 0    losartan (COZAAR) 100 MG tablet Take 1 tablet (100 mg total) by mouth once daily. 90 tablet 3    meloxicam (MOBIC) 7.5 MG tablet Take 7.5 mg by mouth once daily.      metoprolol succinate (TOPROL-XL) 25 MG 24 hr tablet TAKE 1 TABLET BY MOUTH EVERY DAY 90 tablet 3    pantoprazole (PROTONIX) 40 MG tablet Take 1 tablet (40 mg total) by mouth every 12 (twelve) hours.  (Patient taking differently: Take 40 mg by mouth once daily. ) 180 tablet 3    potassium chloride (MICRO-K) 10 MEQ CpSR TAKE 2 CAPSULES(20 MEQ) BY MOUTH EVERY  capsule 0    potassium chloride (MICRO-K) 10 MEQ CpSR TAKE 2 CAPSULES(20 MEQ) BY MOUTH EVERY  capsule 3    warfarin (COUMADIN) 1 MG tablet TAKE 1/2 TO 1 TABLET BY MOUTH DAILY AS DIRECTED BY COUMADIN CLINIC 30 tablet 5     No current facility-administered medications for this visit.        Past Medical History:   Diagnosis Date    Anticoagulant long-term use     Cataract     Chronic diarrhea     Depression     Edema     GERD (gastroesophageal reflux disease)     Hypertension 10/2/2012    Osteoarthritis of both knees 8/9/2016    Osteopenia     Osteopenia 11/30/2017    PE (pulmonary thromboembolism) 12/03/2017    Primary localized osteoarthrosis, lower leg 12/3/2014    Sleep apnea 2016    Thalassemia minor        Past Surgical History:   Procedure Laterality Date    APPENDECTOMY      CATARACT EXTRACTION      COLONOSCOPY N/A 11/5/2016    Procedure: COLONOSCOPY;  Surgeon: Tanner Simental MD;  Location: [facility] ([address] [place] [address]);  Service: Endoscopy;  Laterality: N/A;    HYSTERECTOMY  age 39    fibroids    OOPHORECTOMY      TONSILLECTOMY         Family History   Problem Relation Age of Onset    Hypertension Mother     Cancer Father         skin    Cancer Brother         pancreatic    No Known Problems Sister     No Known Problems Maternal Aunt     No Known Problems Maternal Uncle     No Known Problems Paternal Aunt     No Known Problems Paternal Uncle     Coronary artery disease Maternal Grandmother     Heart failure Maternal Grandmother     No Known Problems Maternal Grandfather     Stroke Paternal Grandmother     Coronary artery disease Paternal Grandmother     No Known Problems Paternal Grandfather     Coronary artery disease Brother         with CABG in his 30s    Amblyopia Neg Hx     Blindness Neg Hx      Cataracts Neg Hx     Diabetes Neg Hx     Glaucoma Neg Hx     Macular degeneration Neg Hx     Retinal detachment Neg Hx     Strabismus Neg Hx     Thyroid disease Neg Hx     Colon cancer Neg Hx     Stomach cancer Neg Hx     Esophageal cancer Neg Hx      Review of Systems:  ROS:  Constitutional: no fever or chills  Eyes: no visual changes  ENT: no nasal congestion or sore throat  Respiratory: no cough or shortness of breath  Cardiovascular: no chest pain or palpitations  Gastrointestinal: no nausea or vomiting, tolerating diet  Genitourinary: no hematuria or dysuria  Integument/Breast: no rash or pruritis  Hematologic/Lymphatic: no easy bruising or lymphadenopathy  Musculoskeletal: right proximal humerus fracture  Neurological: no seizures or tremors  Behavioral/Psych: no auditory or visual hallucinations  Endocrine: no heat or cold intolerance      OBJECTIVE:     PHYSICAL EXAM:  Vital Signs (Most Recent)  Vitals:    08/18/20 1337   BP: (!) 164/84   Pulse: 80        ,   General Appearance: Well nourished, well developed, in no acute distress.  HENT: Normal cephalic, oropharynx pink and moist  Eyes: PERRLA bilaterally and EOM x 4  Respiratory: Even and unlabored  Skin: Warm and Dry.  Psychiatric: AAO x 4, Mood & affect are normal.    Shoulder exam: right  Tenderness: AC joint  ROM: limited ROM. Normal ROM at the elbow.  ROM at wrist is 20 degree flexion and 10 degree extension.  ROM at shoulder is 0-90 degrees.  .    Shoulder Strength: biceps 4/5, triceps 4/5, abduction 4/5, adduction 4/5,   tenderness over the glenohumeral joint, distal neuromuscular exam negative, sensory exam normal and radial pulse intact      RADIOGRAPHS:  Right shoulder and wrist x-ray obtained and personally reviewed by me and discussed with Dr. Orta.  Right proximal humerus fracture appears to be healing and unchanged from prior study.  X-ray of the right wrist is negative for acute fractures.    ASSESSMENT/PLAN:       ICD-10-CM  ICD-9-CM   1. Closed fracture of proximal end of right humerus with routine healing, unspecified fracture morphology, subsequent encounter  S42.201D V54.11   2. Right wrist pain  M25.531 719.43       Plan: We discussed with the patient at length all the different treatment options available for her right shoulder including anti-inflammatories, acetaminophen, rest, ice, Physical therapy to include strengthening exercise, occasional cortisone injections for temporary relief, arthroscopic surgical repair, and finally shoulder arthroplasty.     -Coby Atkinson presents to clinic with c/c right shoulder pain for the past 2 months  -X-ray as above.  -Recommend RICE therapy.  -Plan of care discussed with Dr. Orta.  Can continue to treat non-operative.    -Ok to come out of sling and allow ROM as tolerates.  -Will refer to outpatient therapy for strengthening.  -She will follow up in 6 weeks at which time will repeat her right shoulder x-ray.  -Continue ROM of right hand, fingers and elbow as tolerates.  -Call for questions or concerns.

## 2020-08-19 ENCOUNTER — ANTI-COAG VISIT (OUTPATIENT)
Dept: CARDIOLOGY | Facility: CLINIC | Age: 75
End: 2020-08-19
Payer: MEDICARE

## 2020-08-19 DIAGNOSIS — Z79.01 CHRONIC ANTICOAGULATION: ICD-10-CM

## 2020-08-19 PROCEDURE — 93793 ANTICOAG MGMT PT WARFARIN: CPT | Mod: ,,, | Performed by: PHARMACIST

## 2020-08-19 PROCEDURE — 93793 PR ANTICOAGULANT MGMT FOR PT TAKING WARFARIN: ICD-10-PCS | Mod: ,,, | Performed by: PHARMACIST

## 2020-08-31 ENCOUNTER — EXTERNAL CHRONIC CARE MANAGEMENT (OUTPATIENT)
Dept: PRIMARY CARE CLINIC | Facility: CLINIC | Age: 75
End: 2020-08-31
Payer: MEDICARE

## 2020-08-31 PROCEDURE — 99490 CHRNC CARE MGMT STAFF 1ST 20: CPT | Mod: PBBFAC,PO | Performed by: INTERNAL MEDICINE

## 2020-08-31 PROCEDURE — 99490 PR CHRONIC CARE MGMT, 1ST 20 MIN: ICD-10-PCS | Mod: S$PBB,,, | Performed by: INTERNAL MEDICINE

## 2020-08-31 PROCEDURE — 99490 CHRNC CARE MGMT STAFF 1ST 20: CPT | Mod: S$PBB,,, | Performed by: INTERNAL MEDICINE

## 2020-09-04 ENCOUNTER — ANTI-COAG VISIT (OUTPATIENT)
Dept: CARDIOLOGY | Facility: CLINIC | Age: 75
End: 2020-09-04
Payer: MEDICARE

## 2020-09-04 DIAGNOSIS — Z79.01 CHRONIC ANTICOAGULATION: ICD-10-CM

## 2020-09-04 PROCEDURE — 93793 ANTICOAG MGMT PT WARFARIN: CPT | Mod: ,,,

## 2020-09-04 PROCEDURE — 93793 PR ANTICOAGULANT MGMT FOR PT TAKING WARFARIN: ICD-10-PCS | Mod: ,,,

## 2020-09-04 NOTE — PROGRESS NOTES
INR not at goal. Medications, chart, and patient findings reviewed. Reports missing greens this week. See calendar for adjustments to dose and follow up plan.

## 2020-09-10 ENCOUNTER — ANTI-COAG VISIT (OUTPATIENT)
Dept: CARDIOLOGY | Facility: CLINIC | Age: 75
End: 2020-09-10
Payer: MEDICARE

## 2020-09-10 DIAGNOSIS — Z79.01 CHRONIC ANTICOAGULATION: ICD-10-CM

## 2020-09-10 PROCEDURE — 93793 PR ANTICOAGULANT MGMT FOR PT TAKING WARFARIN: ICD-10-PCS | Mod: ,,, | Performed by: PHARMACIST

## 2020-09-10 PROCEDURE — 93793 ANTICOAG MGMT PT WARFARIN: CPT | Mod: ,,, | Performed by: PHARMACIST

## 2020-09-25 ENCOUNTER — ANTI-COAG VISIT (OUTPATIENT)
Dept: CARDIOLOGY | Facility: CLINIC | Age: 75
End: 2020-09-25
Payer: MEDICARE

## 2020-09-25 DIAGNOSIS — Z79.01 CHRONIC ANTICOAGULATION: ICD-10-CM

## 2020-09-25 PROCEDURE — 93793 ANTICOAG MGMT PT WARFARIN: CPT | Mod: ,,,

## 2020-09-25 PROCEDURE — 93793 PR ANTICOAGULANT MGMT FOR PT TAKING WARFARIN: ICD-10-PCS | Mod: ,,,

## 2020-09-28 ENCOUNTER — PATIENT OUTREACH (OUTPATIENT)
Dept: ADMINISTRATIVE | Facility: OTHER | Age: 75
End: 2020-09-28

## 2020-09-29 ENCOUNTER — OFFICE VISIT (OUTPATIENT)
Dept: ORTHOPEDICS | Facility: CLINIC | Age: 75
End: 2020-09-29
Payer: MEDICARE

## 2020-09-29 ENCOUNTER — PATIENT MESSAGE (OUTPATIENT)
Dept: ORTHOPEDICS | Facility: CLINIC | Age: 75
End: 2020-09-29

## 2020-09-29 ENCOUNTER — PATIENT MESSAGE (OUTPATIENT)
Dept: OTHER | Facility: OTHER | Age: 75
End: 2020-09-29

## 2020-09-29 ENCOUNTER — HOSPITAL ENCOUNTER (OUTPATIENT)
Dept: RADIOLOGY | Facility: HOSPITAL | Age: 75
Discharge: HOME | End: 2020-09-29
Attending: NURSE PRACTITIONER
Payer: MEDICARE

## 2020-09-29 DIAGNOSIS — M79.601 PAIN IN RIGHT ARM: ICD-10-CM

## 2020-09-29 DIAGNOSIS — S42.201D CLOSED FRACTURE OF PROXIMAL END OF RIGHT HUMERUS WITH ROUTINE HEALING, UNSPECIFIED FRACTURE MORPHOLOGY, SUBSEQUENT ENCOUNTER: ICD-10-CM

## 2020-09-29 DIAGNOSIS — M79.89 ARM SWELLING: ICD-10-CM

## 2020-09-29 DIAGNOSIS — S42.201D CLOSED FRACTURE OF PROXIMAL END OF RIGHT HUMERUS WITH ROUTINE HEALING, UNSPECIFIED FRACTURE MORPHOLOGY, SUBSEQUENT ENCOUNTER: Primary | ICD-10-CM

## 2020-09-29 PROCEDURE — 99214 OFFICE O/P EST MOD 30 MIN: CPT | Mod: S$PBB,,, | Performed by: NURSE PRACTITIONER

## 2020-09-29 PROCEDURE — 99213 OFFICE O/P EST LOW 20 MIN: CPT | Mod: PBBFAC,25 | Performed by: NURSE PRACTITIONER

## 2020-09-29 PROCEDURE — 73030 XR SHOULDER TRAUMA 3 VIEW RIGHT: ICD-10-PCS | Mod: 26,RT,, | Performed by: RADIOLOGY

## 2020-09-29 PROCEDURE — 99999 PR PBB SHADOW E&M-EST. PATIENT-LVL III: CPT | Mod: PBBFAC,,, | Performed by: NURSE PRACTITIONER

## 2020-09-29 PROCEDURE — 73030 X-RAY EXAM OF SHOULDER: CPT | Mod: 26,RT,, | Performed by: RADIOLOGY

## 2020-09-29 PROCEDURE — 93971 EXTREMITY STUDY: CPT | Mod: TC,RT

## 2020-09-29 PROCEDURE — 99214 PR OFFICE/OUTPT VISIT, EST, LEVL IV, 30-39 MIN: ICD-10-PCS | Mod: S$PBB,,, | Performed by: NURSE PRACTITIONER

## 2020-09-29 PROCEDURE — 99999 PR PBB SHADOW E&M-EST. PATIENT-LVL III: ICD-10-PCS | Mod: PBBFAC,,, | Performed by: NURSE PRACTITIONER

## 2020-09-29 PROCEDURE — 93971 EXTREMITY STUDY: CPT | Mod: 26,RT,, | Performed by: RADIOLOGY

## 2020-09-29 PROCEDURE — 93971 US UPPER EXTREMITY VEINS RIGHT: ICD-10-PCS | Mod: 26,RT,, | Performed by: RADIOLOGY

## 2020-09-29 PROCEDURE — 73030 X-RAY EXAM OF SHOULDER: CPT | Mod: TC,RT

## 2020-09-29 NOTE — PROGRESS NOTES
SUBJECTIVE:     Chief Complaint & History of Present Illness:  Coby Atkinson is a Established 74 y.o. year old female patient following up for her right proximal humerus fracture.  She was last seen by me on 8/18/2020.  She had fractured her humerus due to a mechanical fall.  She was treated with a sling and abductor brace.  She was previously at [facility] then discharged to [facility].  Per my previous note, she is home with her spouse.  She denies injuries or falls since last seen.  She is currently going to therapy with [facility].  She reports still having some pain to her shoulder with certain movements.  Her other concern is right arm swelling since her incidence.  She denies history of lumpectomy on right or breast surgeries.  She does have a history of PE currently on coumadin.  She reports some intermittent numbness to her right hand when she plays solitaire on her phone.      Review of patient's allergies indicates:   Allergen Reactions    Codeine     Ciprofloxacin Rash         Current Outpatient Medications   Medication Sig Dispense Refill    ALPRAZolam (XANAX) 0.5 MG tablet Take 1 tablet (0.5 mg total) by mouth 2 (two) times daily as needed for Insomnia or Anxiety. 60 tablet 1    amLODIPine (NORVASC) 10 MG tablet TAKE 1 TABLET(10 MG) BY MOUTH EVERY DAY 90 tablet 3    doxazosin (CARDURA) 4 MG tablet Take 1 tablet (4 mg total) by mouth every evening. 90 tablet 3    escitalopram oxalate (LEXAPRO) 20 MG tablet TAKE 1 TABLET BY MOUTH EVERY DAY 90 tablet 3    furosemide (LASIX) 20 MG tablet TAKE 1 TABLET(20 MG) BY MOUTH EVERY DAY 90 tablet 0    losartan (COZAAR) 100 MG tablet Take 1 tablet (100 mg total) by mouth once daily. 90 tablet 3    meloxicam (MOBIC) 7.5 MG tablet Take 7.5 mg by mouth once daily.      metoprolol succinate (TOPROL-XL) 25 MG 24 hr tablet TAKE 1 TABLET BY MOUTH EVERY DAY 90 tablet 3    pantoprazole (PROTONIX) 40 MG tablet Take 1 tablet (40 mg total) by  mouth every 12 (twelve) hours. (Patient taking differently: Take 40 mg by mouth once daily. ) 180 tablet 3    potassium chloride (MICRO-K) 10 MEQ CpSR TAKE 2 CAPSULES(20 MEQ) BY MOUTH EVERY  capsule 0    potassium chloride (MICRO-K) 10 MEQ CpSR TAKE 2 CAPSULES(20 MEQ) BY MOUTH EVERY  capsule 3    warfarin (COUMADIN) 1 MG tablet TAKE 1/2 TO 1 TABLET BY MOUTH DAILY AS DIRECTED BY COUMADIN CLINIC 30 tablet 5     No current facility-administered medications for this visit.        Past Medical History:   Diagnosis Date    Anticoagulant long-term use     Cataract     Chronic diarrhea     Depression     Edema     GERD (gastroesophageal reflux disease)     Hypertension 10/2/2012    Osteoarthritis of both knees 8/9/2016    Osteopenia     Osteopenia 11/30/2017    PE (pulmonary thromboembolism) 12/03/2017    Primary localized osteoarthrosis, lower leg 12/3/2014    Sleep apnea 2016    Thalassemia minor        Past Surgical History:   Procedure Laterality Date    APPENDECTOMY      CATARACT EXTRACTION      COLONOSCOPY N/A 11/5/2016    Procedure: COLONOSCOPY;  Surgeon: Tanner Simental MD;  Location: [facility] ([address] [place] [address]);  Service: Endoscopy;  Laterality: N/A;    HYSTERECTOMY  age 39    fibroids    OOPHORECTOMY      TONSILLECTOMY         Family History   Problem Relation Age of Onset    Hypertension Mother     Cancer Father         skin    Cancer Brother         pancreatic    No Known Problems Sister     No Known Problems Maternal Aunt     No Known Problems Maternal Uncle     No Known Problems Paternal Aunt     No Known Problems Paternal Uncle     Coronary artery disease Maternal Grandmother     Heart failure Maternal Grandmother     No Known Problems Maternal Grandfather     Stroke Paternal Grandmother     Coronary artery disease Paternal Grandmother     No Known Problems Paternal Grandfather     Coronary artery disease Brother         with CABG in his 30s    Amblyopia Neg Hx      Blindness Neg Hx     Cataracts Neg Hx     Diabetes Neg Hx     Glaucoma Neg Hx     Macular degeneration Neg Hx     Retinal detachment Neg Hx     Strabismus Neg Hx     Thyroid disease Neg Hx     Colon cancer Neg Hx     Stomach cancer Neg Hx     Esophageal cancer Neg Hx      Review of Systems:  ROS:  Constitutional: no fever or chills  Eyes: no visual changes  ENT: no nasal congestion or sore throat  Respiratory: no cough or shortness of breath  Cardiovascular: no chest pain or palpitations  Gastrointestinal: no nausea or vomiting, tolerating diet  Genitourinary: no hematuria or dysuria  Integument/Breast: no rash or pruritis  Hematologic/Lymphatic: no easy bruising or lymphadenopathy  Musculoskeletal: right proximal humerus fracture  Neurological: no seizures or tremors  Behavioral/Psych: no auditory or visual hallucinations  Endocrine: no heat or cold intolerance      OBJECTIVE:     PHYSICAL EXAM:  Vital Signs (Most Recent)  There were no vitals filed for this visit.     ,   General Appearance: Well nourished, well developed, in no acute distress.  HENT: Normal cephalic, oropharynx pink and moist  Eyes: PERRLA bilaterally and EOM x 4  Respiratory: Even and unlabored  Skin: Warm and Dry.  Psychiatric: AAO x 4, Mood & affect are normal.    Shoulder exam: right  Tenderness: AC joint  ROM: ROM at 0-100 degrees. Normal ROM at the elbow.  ROM at wrist is 20 degree flexion and 10 degree extension.      Shoulder Strength: biceps 4/5, triceps 4/5, abduction 4/5, adduction 4/5,   tenderness over the glenohumeral joint, distal neuromuscular exam negative, sensory exam normal and radial pulse intact    Right arm is swollen when compared to left.    RADIOGRAPHS:  Right shoulder obtained and personally reviewed by me, she has a right proximal humerus fracture appears to be healing.  No new fractures seen.      ASSESSMENT/PLAN:       ICD-10-CM ICD-9-CM   1. Closed fracture of proximal end of right humerus with routine  healing, unspecified fracture morphology, subsequent encounter  S42.201D V54.11   2. Arm swelling  M79.89 729.81   3. Pain in right arm   M79.601 729.5       Plan: We discussed with the patient at length all the different treatment options available for her right shoulder including anti-inflammatories, acetaminophen, rest, ice, Physical therapy to include strengthening exercise, occasional cortisone injections for temporary relief, arthroscopic surgical repair, and finally shoulder arthroplasty.     -Coby Atkinson presents to clinic with c/c right shoulder pain for the past 3 months  -X-ray as above.  -Recommend RICE therapy.  -Will do ultrasound on RUE, if clot is present refer back to cardiology/vascular surgery as she is currently on Coumadin.    -Continue shoulder rehab with therapy:  -Continue pendulums  -Continue PROM   -Continue AAROM   -Continue AROM   -Encourage to elevate arm above heart to help with swelling.    -Ok to remain out of sling and allow ROM as tolerates.  -She will follow up in 6 weeks at which time will repeat her right shoulder x-ray.  -Continue ROM of right hand, fingers and elbow as tolerates.  -Will call with ultrasound results once obtained.  -Call for questions or concerns.

## 2020-09-30 ENCOUNTER — EXTERNAL CHRONIC CARE MANAGEMENT (OUTPATIENT)
Dept: PRIMARY CARE CLINIC | Facility: CLINIC | Age: 75
End: 2020-09-30
Payer: MEDICARE

## 2020-09-30 DIAGNOSIS — S42.201D CLOSED FRACTURE OF PROXIMAL END OF RIGHT HUMERUS WITH ROUTINE HEALING, UNSPECIFIED FRACTURE MORPHOLOGY, SUBSEQUENT ENCOUNTER: Primary | ICD-10-CM

## 2020-09-30 DIAGNOSIS — M79.89 ARM SWELLING: ICD-10-CM

## 2020-09-30 PROCEDURE — 99490 PR CHRONIC CARE MGMT, 1ST 20 MIN: ICD-10-PCS | Mod: S$PBB,,, | Performed by: INTERNAL MEDICINE

## 2020-09-30 PROCEDURE — 99490 CHRNC CARE MGMT STAFF 1ST 20: CPT | Mod: S$PBB,,, | Performed by: INTERNAL MEDICINE

## 2020-09-30 PROCEDURE — 99490 CHRNC CARE MGMT STAFF 1ST 20: CPT | Mod: PBBFAC,PO | Performed by: INTERNAL MEDICINE

## 2020-09-30 NOTE — TELEPHONE ENCOUNTER
Patient called with results of RUE ultrasound.  Spoke to her .  Advised no DVT seen.  Recommend compression sleeve and elevation of extremity.  Verb understanding.

## 2020-10-01 ENCOUNTER — ANTI-COAG VISIT (OUTPATIENT)
Dept: CARDIOLOGY | Facility: CLINIC | Age: 75
End: 2020-10-01
Payer: MEDICARE

## 2020-10-01 DIAGNOSIS — Z79.01 CHRONIC ANTICOAGULATION: ICD-10-CM

## 2020-10-01 PROCEDURE — 93793 PR ANTICOAGULANT MGMT FOR PT TAKING WARFARIN: ICD-10-PCS | Mod: ,,, | Performed by: PHARMACIST

## 2020-10-01 PROCEDURE — 93793 ANTICOAG MGMT PT WARFARIN: CPT | Mod: ,,, | Performed by: PHARMACIST

## 2020-10-07 ENCOUNTER — ANTI-COAG VISIT (OUTPATIENT)
Dept: CARDIOLOGY | Facility: CLINIC | Age: 75
End: 2020-10-07
Payer: MEDICARE

## 2020-10-07 DIAGNOSIS — Z79.01 CHRONIC ANTICOAGULATION: ICD-10-CM

## 2020-10-07 PROCEDURE — 93793 ANTICOAG MGMT PT WARFARIN: CPT | Mod: ,,, | Performed by: PHARMACIST

## 2020-10-07 PROCEDURE — 93793 PR ANTICOAGULANT MGMT FOR PT TAKING WARFARIN: ICD-10-PCS | Mod: ,,, | Performed by: PHARMACIST

## 2020-10-07 NOTE — PROGRESS NOTES
Patient denies any changes in diet, medications, or health that would effect warfarin therapy.  I will lower her weekly dose of coumadin slightly due to rapid increase in INR in less than one week.

## 2020-10-31 ENCOUNTER — EXTERNAL CHRONIC CARE MANAGEMENT (OUTPATIENT)
Dept: PRIMARY CARE CLINIC | Facility: CLINIC | Age: 75
End: 2020-10-31
Payer: MEDICARE

## 2020-10-31 PROCEDURE — 99490 CHRNC CARE MGMT STAFF 1ST 20: CPT | Mod: S$PBB,,, | Performed by: INTERNAL MEDICINE

## 2020-10-31 PROCEDURE — 99490 PR CHRONIC CARE MGMT, 1ST 20 MIN: ICD-10-PCS | Mod: S$PBB,,, | Performed by: INTERNAL MEDICINE

## 2020-10-31 PROCEDURE — 99490 CHRNC CARE MGMT STAFF 1ST 20: CPT | Mod: PBBFAC,PO | Performed by: INTERNAL MEDICINE

## 2020-11-01 ENCOUNTER — PATIENT OUTREACH (OUTPATIENT)
Dept: ADMINISTRATIVE | Facility: OTHER | Age: 75
End: 2020-11-01

## 2020-11-03 ENCOUNTER — OFFICE VISIT (OUTPATIENT)
Dept: CARDIOLOGY | Facility: CLINIC | Age: 75
End: 2020-11-03
Payer: MEDICARE

## 2020-11-03 VITALS
HEIGHT: 64 IN | HEART RATE: 69 BPM | DIASTOLIC BLOOD PRESSURE: 98 MMHG | BODY MASS INDEX: 39.99 KG/M2 | WEIGHT: 234.25 LBS | SYSTOLIC BLOOD PRESSURE: 227 MMHG

## 2020-11-03 DIAGNOSIS — I27.82 OTHER CHRONIC PULMONARY EMBOLISM WITHOUT ACUTE COR PULMONALE: ICD-10-CM

## 2020-11-03 DIAGNOSIS — D56.3 THALASSEMIA MINOR: ICD-10-CM

## 2020-11-03 DIAGNOSIS — I10 ESSENTIAL HYPERTENSION: Primary | ICD-10-CM

## 2020-11-03 DIAGNOSIS — G47.33 OBSTRUCTIVE SLEEP APNEA: ICD-10-CM

## 2020-11-03 DIAGNOSIS — E87.6 HYPOKALEMIA: ICD-10-CM

## 2020-11-03 PROCEDURE — 99999 PR PBB SHADOW E&M-EST. PATIENT-LVL IV: ICD-10-PCS | Mod: PBBFAC,,, | Performed by: INTERNAL MEDICINE

## 2020-11-03 PROCEDURE — 99214 PR OFFICE/OUTPT VISIT, EST, LEVL IV, 30-39 MIN: ICD-10-PCS | Mod: S$PBB,,, | Performed by: INTERNAL MEDICINE

## 2020-11-03 PROCEDURE — 99214 OFFICE O/P EST MOD 30 MIN: CPT | Mod: PBBFAC,PO | Performed by: INTERNAL MEDICINE

## 2020-11-03 PROCEDURE — 99214 OFFICE O/P EST MOD 30 MIN: CPT | Mod: S$PBB,,, | Performed by: INTERNAL MEDICINE

## 2020-11-03 PROCEDURE — 99999 PR PBB SHADOW E&M-EST. PATIENT-LVL IV: CPT | Mod: PBBFAC,,, | Performed by: INTERNAL MEDICINE

## 2020-11-03 RX ORDER — CLONIDINE HYDROCHLORIDE 0.1 MG/1
0.1 TABLET ORAL
Status: COMPLETED | OUTPATIENT
Start: 2020-11-03 | End: 2020-11-03

## 2020-11-03 RX ORDER — AMLODIPINE BESYLATE 10 MG/1
10 TABLET ORAL DAILY
Qty: 90 TABLET | Refills: 3 | Status: SHIPPED | OUTPATIENT
Start: 2020-11-03 | End: 2020-12-15

## 2020-11-03 RX ORDER — DOXAZOSIN 2 MG/1
2 TABLET ORAL NIGHTLY
Qty: 30 TABLET | Refills: 11 | Status: SHIPPED | OUTPATIENT
Start: 2020-11-03 | End: 2020-11-17

## 2020-11-03 RX ORDER — POTASSIUM CHLORIDE 750 MG/1
CAPSULE, EXTENDED RELEASE ORAL
Qty: 180 CAPSULE | Refills: 3 | Status: SHIPPED | OUTPATIENT
Start: 2020-11-03 | End: 2021-10-20 | Stop reason: ALTCHOICE

## 2020-11-03 RX ADMIN — CLONIDINE HYDROCHLORIDE 0.1 MG: 0.1 TABLET ORAL at 10:11
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
## 2020-11-03 NOTE — PATIENT INSTRUCTIONS
Losartan is for blood pressure and the heart.  Amlodipine  (Norvasc) is for blood pressure.  Resume Doxazosin but I am sending a 30 day prescription for a lower dose.  Furosemide (Lasix) is the fluid pill - you can skip in the am if you are going out and take once you come back home.  Take 2 caps of potassium w one tab of Lasix

## 2020-11-04 ENCOUNTER — TELEPHONE (OUTPATIENT)
Dept: INTERNAL MEDICINE | Facility: CLINIC | Age: 75
End: 2020-11-04

## 2020-11-04 NOTE — TELEPHONE ENCOUNTER
----- Message from Nayana Deleon MA sent at 11/3/2020  5:02 PM CST -----  Regarding: FW: Ms. Sanabria Pts nurse [employer] [phone]    ----- Message -----  From: Teodora Amaro  Sent: 11/3/2020  10:58 AM CST  To: Becca PAIGE Staff  Subject: Ms. Sanabria Pts nurse [employer] [phone]    Caller is requesting an earlier appt than we can schedule.  Caller declined first available appointment listed below. Caller will not accept being placed on the wait list and is requesting a message be sent to the provider.  When is the next available appointment:  01/07/2020  Reason for the appointment:  Blood pressure check   Patient preference of timeframe to be scheduled:  11/17/2020, 11/24/2020  Comments:  Patient was told to have a two week follow up for a blood pressure check.

## 2020-11-12 ENCOUNTER — HOSPITAL ENCOUNTER (OUTPATIENT)
Dept: CARDIOLOGY | Facility: HOSPITAL | Age: 75
Discharge: HOME | End: 2020-11-12
Attending: INTERNAL MEDICINE
Payer: MEDICARE

## 2020-11-12 ENCOUNTER — ANTI-COAG VISIT (OUTPATIENT)
Dept: CARDIOLOGY | Facility: CLINIC | Age: 75
End: 2020-11-12
Payer: MEDICARE

## 2020-11-12 VITALS
WEIGHT: 234 LBS | SYSTOLIC BLOOD PRESSURE: 142 MMHG | DIASTOLIC BLOOD PRESSURE: 70 MMHG | HEIGHT: 64 IN | HEART RATE: 72 BPM | BODY MASS INDEX: 39.95 KG/M2

## 2020-11-12 DIAGNOSIS — I27.82 OTHER CHRONIC PULMONARY EMBOLISM WITHOUT ACUTE COR PULMONALE: ICD-10-CM

## 2020-11-12 DIAGNOSIS — Z79.01 CHRONIC ANTICOAGULATION: ICD-10-CM

## 2020-11-12 LAB
ASCENDING AORTA: 3.5 CM
AV INDEX (PROSTH): 0.64
AV MEAN GRADIENT: 7 MMHG
AV PEAK GRADIENT: 12 MMHG
AV VALVE AREA: 2.12 CM2
AV VELOCITY RATIO: 0.61
BSA FOR ECHO PROCEDURE: 2.19 M2
CV ECHO LV RWT: 0.3 CM
DOP CALC AO PEAK VEL: 1.73 M/S
DOP CALC AO VTI: 37.56 CM
DOP CALC LVOT AREA: 3.3 CM2
DOP CALC LVOT DIAMETER: 2.05 CM
DOP CALC LVOT PEAK VEL: 1.05 M/S
DOP CALC LVOT STROKE VOLUME: 79.57 CM3
DOP CALCLVOT PEAK VEL VTI: 24.12 CM
E WAVE DECELERATION TIME: 229.42 MSEC
E/A RATIO: 1.08
E/E' RATIO: 17.5 M/S
ECHO LV POSTERIOR WALL: 0.79 CM (ref 0.6–1.1)
FRACTIONAL SHORTENING: 39 % (ref 28–44)
INTERVENTRICULAR SEPTUM: 0.84 CM (ref 0.6–1.1)
IVRT: 85.63 MSEC
LA MAJOR: 6.15 CM
LA MINOR: 6.12 CM
LA WIDTH: 4.72 CM
LEFT ATRIUM SIZE: 4.84 CM
LEFT ATRIUM VOLUME INDEX: 57 ML/M2
LEFT ATRIUM VOLUME: 119.13 CM3
LEFT INTERNAL DIMENSION IN SYSTOLE: 3.15 CM (ref 2.1–4)
LEFT VENTRICLE DIASTOLIC VOLUME INDEX: 77.05 ML/M2
LEFT VENTRICLE DIASTOLIC VOLUME: 161.12 ML
LEFT VENTRICLE MASS INDEX: 71 G/M2
LEFT VENTRICLE SYSTOLIC VOLUME INDEX: 18.9 ML/M2
LEFT VENTRICLE SYSTOLIC VOLUME: 39.51 ML
LEFT VENTRICULAR INTERNAL DIMENSION IN DIASTOLE: 5.2 CM (ref 3.5–6)
LEFT VENTRICULAR MASS: 148.7 G
LV LATERAL E/E' RATIO: 15 M/S
LV SEPTAL E/E' RATIO: 21 M/S
MV PEAK A VEL: 0.97 M/S
MV PEAK E VEL: 1.05 M/S
MV STENOSIS PRESSURE HALF TIME: 66.53 MS
MV VALVE AREA P 1/2 METHOD: 3.31 CM2
PISA TR MAX VEL: 3.1 M/S
PULM VEIN S/D RATIO: 1.69
PV PEAK D VEL: 0.39 M/S
PV PEAK S VEL: 0.66 M/S
RA MAJOR: 5.74 CM
RA PRESSURE: 3 MMHG
RA WIDTH: 3.76 CM
RIGHT VENTRICULAR END-DIASTOLIC DIMENSION: 3.15 CM
SINUS: 3.11 CM
STJ: 2.84 CM
TDI LATERAL: 0.07 M/S
TDI SEPTAL: 0.05 M/S
TDI: 0.06 M/S
TR MAX PG: 38 MMHG
TRICUSPID ANNULAR PLANE SYSTOLIC EXCURSION: 2.25 CM
TV REST PULMONARY ARTERY PRESSURE: 41 MMHG

## 2020-11-12 PROCEDURE — 93306 TTE W/DOPPLER COMPLETE: CPT

## 2020-11-12 PROCEDURE — 93306 ECHO (CUPID ONLY): ICD-10-PCS | Mod: 26,,, | Performed by: INTERNAL MEDICINE

## 2020-11-12 PROCEDURE — 93793 ANTICOAG MGMT PT WARFARIN: CPT | Mod: ,,,

## 2020-11-12 PROCEDURE — 93306 TTE W/DOPPLER COMPLETE: CPT | Mod: 26,,, | Performed by: INTERNAL MEDICINE

## 2020-11-12 PROCEDURE — 93793 PR ANTICOAGULANT MGMT FOR PT TAKING WARFARIN: ICD-10-PCS | Mod: ,,,

## 2020-11-16 ENCOUNTER — TELEPHONE (OUTPATIENT)
Dept: CARDIOLOGY | Facility: CLINIC | Age: 75
End: 2020-11-16

## 2020-11-16 NOTE — TELEPHONE ENCOUNTER
Pt notified of results. Pt states she does not know how to log into her [employer]. Pt states she will ask her grandson to help her log in and complete the questionaire for the digital hypertension program. Also reminded pt to bring medications with her to her appointment tomorrow. Pt verbalized understanding.

## 2020-11-16 NOTE — TELEPHONE ENCOUNTER
----- Message from Juan Martinez MD sent at 11/15/2020  8:06 PM CST -----  Echo shows good heart function but pressure inside the heart is high because of sleep apnea and blood clot in the lungs. Must use CPAP.  Find out if she enrolled in the Digital Med program for hypertension. It says she is active on [email or web address] but has not logged into the portal in >3 months. Perhaps she clark snot know how to use it. See if she will confide in you.   She has an appt w Dr. Hudson and labs on Tues. She should bring her pill bottles w her. She also needs an appt w Leanna in about a month.

## 2020-11-17 ENCOUNTER — ANTI-COAG VISIT (OUTPATIENT)
Dept: CARDIOLOGY | Facility: CLINIC | Age: 75
End: 2020-11-17
Payer: MEDICARE

## 2020-11-17 ENCOUNTER — OFFICE VISIT (OUTPATIENT)
Dept: INTERNAL MEDICINE | Facility: CLINIC | Age: 75
End: 2020-11-17
Payer: MEDICARE

## 2020-11-17 VITALS
OXYGEN SATURATION: 98 % | BODY MASS INDEX: 39.22 KG/M2 | HEIGHT: 64 IN | RESPIRATION RATE: 15 BRPM | DIASTOLIC BLOOD PRESSURE: 88 MMHG | HEART RATE: 68 BPM | TEMPERATURE: 97 F | SYSTOLIC BLOOD PRESSURE: 138 MMHG | WEIGHT: 229.75 LBS

## 2020-11-17 DIAGNOSIS — I27.20 PULMONARY HYPERTENSION: ICD-10-CM

## 2020-11-17 DIAGNOSIS — F33.0 MILD EPISODE OF RECURRENT MAJOR DEPRESSIVE DISORDER: ICD-10-CM

## 2020-11-17 DIAGNOSIS — K21.9 GASTROESOPHAGEAL REFLUX DISEASE, UNSPECIFIED WHETHER ESOPHAGITIS PRESENT: ICD-10-CM

## 2020-11-17 DIAGNOSIS — N18.30 STAGE 3 CHRONIC KIDNEY DISEASE, UNSPECIFIED WHETHER STAGE 3A OR 3B CKD: ICD-10-CM

## 2020-11-17 DIAGNOSIS — D56.3 THALASSEMIA MINOR: ICD-10-CM

## 2020-11-17 DIAGNOSIS — Z12.31 ENCOUNTER FOR SCREENING MAMMOGRAM FOR BREAST CANCER: ICD-10-CM

## 2020-11-17 DIAGNOSIS — Z79.01 CHRONIC ANTICOAGULATION: ICD-10-CM

## 2020-11-17 DIAGNOSIS — E66.2 OBESITY HYPOVENTILATION SYNDROME: ICD-10-CM

## 2020-11-17 DIAGNOSIS — I82.532 CHRONIC DEEP VEIN THROMBOSIS (DVT) OF LEFT POPLITEAL VEIN: Primary | ICD-10-CM

## 2020-11-17 DIAGNOSIS — E66.01 MORBID OBESITY WITH BMI OF 40.0-44.9, ADULT: ICD-10-CM

## 2020-11-17 DIAGNOSIS — M17.0 PRIMARY OSTEOARTHRITIS OF BOTH KNEES: ICD-10-CM

## 2020-11-17 DIAGNOSIS — Z00.00 ANNUAL PHYSICAL EXAM: ICD-10-CM

## 2020-11-17 DIAGNOSIS — I10 ESSENTIAL HYPERTENSION: ICD-10-CM

## 2020-11-17 PROCEDURE — 90694 VACC AIIV4 NO PRSRV 0.5ML IM: CPT | Mod: PBBFAC,PO

## 2020-11-17 PROCEDURE — 99213 OFFICE O/P EST LOW 20 MIN: CPT | Mod: PBBFAC,PO,25 | Performed by: INTERNAL MEDICINE

## 2020-11-17 PROCEDURE — 99999 PR PBB SHADOW E&M-EST. PATIENT-LVL III: CPT | Mod: PBBFAC,,, | Performed by: INTERNAL MEDICINE

## 2020-11-17 PROCEDURE — G0008 ADMIN INFLUENZA VIRUS VAC: HCPCS | Mod: PBBFAC,PO

## 2020-11-17 PROCEDURE — 99215 PR OFFICE/OUTPT VISIT, EST, LEVL V, 40-54 MIN: ICD-10-PCS | Mod: S$PBB,,, | Performed by: INTERNAL MEDICINE

## 2020-11-17 PROCEDURE — 99999 PR PBB SHADOW E&M-EST. PATIENT-LVL III: ICD-10-PCS | Mod: PBBFAC,,, | Performed by: INTERNAL MEDICINE

## 2020-11-17 PROCEDURE — 99215 OFFICE O/P EST HI 40 MIN: CPT | Mod: S$PBB,,, | Performed by: INTERNAL MEDICINE

## 2020-11-17 RX ORDER — DOXAZOSIN 4 MG/1
4 TABLET ORAL NIGHTLY
Qty: 90 TABLET | Refills: 3 | Status: SHIPPED | OUTPATIENT
Start: 2020-11-17 | End: 2020-12-15

## 2020-11-17 NOTE — PROGRESS NOTES
Subjective:       Patient ID: Coby Atkinson is a 75 y.o. female.    Chief Complaint: Blood Pressure Check and Flu Vaccine    HPI   75 y.o. Female here for annual exam.       Vaccines: Influenza (2020); Tetanus (declined); PNA (done); Zoster (2016)  Eye exam: 10/16  Mammogram: 2/19  Gyn exam: declined  Colonoscopy: 11/16  DEXA: 12/19     Exercise: walks daily  Diet: low carb     Past Medical History:    Depression                                                    Edema                                                         GERD (gastroesophageal reflux disease)                        Hypertension                                    10/2/2012     Osteopenia                                                    Thalassemia minor                                           Past Surgical History:    APPENDECTOMY                                                   TONSILLECTOMY                                                  HYSTERECTOMY                                     age 39          Comment:fibroids    OOPHORECTOMY                                                 Social History    Marital status:              Spouse name:                       Years of education:                 Number of children: 1              Occupational History  Occupation          Employer            Comment               Retired from [employer]*                          Social History Main Topics    Smoking status: Never Smoker                                                                 Smokeless status: Never Used                        Alcohol use: No                 Comment: rare    Drug use: No              Sexual activity: Yes               Partners with: Male      -- Codeine    -- Ciprofloxacin -- Rash  Review of Systems   Constitutional: Negative for activity change, appetite change, chills, diaphoresis, fatigue, fever and unexpected weight change.   HENT: Negative for nasal congestion, mouth sores, postnasal drip, rhinorrhea,  sinus pressure/congestion, sneezing, sore throat, trouble swallowing and voice change.    Eyes: Negative for pain, discharge and visual disturbance.   Respiratory: Negative for cough, shortness of breath and wheezing.    Cardiovascular: Negative for chest pain, palpitations and leg swelling.   Gastrointestinal: Negative for abdominal pain, blood in stool, constipation, diarrhea, nausea and vomiting.   Endocrine: Negative for cold intolerance and heat intolerance.   Genitourinary: Negative for difficulty urinating, dysuria, frequency, hematuria and urgency.   Musculoskeletal: Positive for arthralgias. Negative for myalgias.   Integumentary:  Negative for rash and wound.   Allergic/Immunologic: Negative for environmental allergies and food allergies.   Neurological: Negative for dizziness, tremors, seizures, syncope, weakness, light-headedness and headaches.   Hematological: Negative for adenopathy. Does not bruise/bleed easily.   Psychiatric/Behavioral: Negative for confusion and sleep disturbance. The patient is not nervous/anxious.          Objective:      Physical Exam  Vitals signs and nursing note reviewed.   Constitutional:       General: She is not in acute distress.     Appearance: She is well-developed. She is not diaphoretic.   HENT:      Head: Normocephalic and atraumatic.      Right Ear: Tympanic membrane, ear canal and external ear normal.      Left Ear: Tympanic membrane, ear canal and external ear normal.      Nose: Nose normal.      Mouth/Throat:      Pharynx: No oropharyngeal exudate.   Eyes:      General: No scleral icterus.        Right eye: No discharge.         Left eye: No discharge.      Conjunctiva/sclera: Conjunctivae normal.      Pupils: Pupils are equal, round, and reactive to light.   Neck:      Musculoskeletal: Neck supple.      Thyroid: No thyromegaly.      Vascular: No JVD.   Cardiovascular:      Rate and Rhythm: Normal rate and regular rhythm.      Heart sounds: Normal heart sounds. No  murmur.   Pulmonary:      Effort: Pulmonary effort is normal. No respiratory distress.      Breath sounds: Normal breath sounds. No wheezing or rales.   Chest:      Chest wall: No tenderness.   Abdominal:      General: Bowel sounds are normal. There is no distension.      Palpations: Abdomen is soft.      Tenderness: There is no abdominal tenderness. There is no guarding.   Lymphadenopathy:      Cervical: No cervical adenopathy.   Skin:     General: Skin is warm and dry.      Coloration: Skin is not pale.      Findings: No rash.   Neurological:      Mental Status: She is alert and oriented to person, place, and time.   Psychiatric:         Judgment: Judgment normal.         Assessment:       1. Chronic deep vein thrombosis (DVT) of left popliteal vein    2. Essential hypertension    3. Morbid obesity with BMI of 40.0-44.9, adult    4. Mild episode of recurrent major depressive disorder    5. Gastroesophageal reflux disease, unspecified whether esophagitis present    6. Primary osteoarthritis of both knees    7. Stage 3 chronic kidney disease, unspecified whether stage 3a or 3b CKD    8. Pulmonary hypertension    9. Obesity hypoventilation syndrome    10. Thalassemia minor    11. Encounter for screening mammogram for breast cancer    12. Annual physical exam        Plan:    1. Recurrent PE/DVT- continue coumadin    2. HTN- increase Doxazosin to 4 mg qHS and continue other meds as prescribed    3. Morbid Obesity- pt followed by Bariatric Medicine   4. Depression- fair control on Lexapro 20 mg daily   5. GERD- controlled on Protonix daily   6. OA of knees- Tylenol PRN    7. CKD III- stable   8. Pulmonary HTN- stable, will follow    9. ADRIÁN- stable on CPAP   10. Thalassemia minor  11. Mammo ordered  12. Annual exam- labs reviewed with pt        Vaccines: Influenza (2020); Tetanus (declined); PNA (done); Zoster (2016)        Eye exam: 10/16        Mammogram: 2/19        Gyn exam: declined        Colonoscopy: 11/16         DEXA: 12/19    Over 1/2 of 40 minute visit spent reviewing pt's medical records, education/discussion of pt's medical conditions and medical management\

## 2020-11-24 ENCOUNTER — ANTI-COAG VISIT (OUTPATIENT)
Dept: CARDIOLOGY | Facility: CLINIC | Age: 75
End: 2020-11-24
Payer: MEDICARE

## 2020-11-24 DIAGNOSIS — Z79.01 CHRONIC ANTICOAGULATION: ICD-10-CM

## 2020-11-24 PROCEDURE — 93793 PR ANTICOAGULANT MGMT FOR PT TAKING WARFARIN: ICD-10-PCS | Mod: ,,, | Performed by: PHARMACIST

## 2020-11-24 PROCEDURE — 93793 ANTICOAG MGMT PT WARFARIN: CPT | Mod: ,,, | Performed by: PHARMACIST

## 2020-11-24 NOTE — PROGRESS NOTES
Patient states her ankles and her left foot are swollen and taking 1.5 mg everyday instead of 2mg on sun,mon,wed,sat

## 2020-11-30 ENCOUNTER — EXTERNAL CHRONIC CARE MANAGEMENT (OUTPATIENT)
Dept: PRIMARY CARE CLINIC | Facility: CLINIC | Age: 75
End: 2020-11-30
Payer: MEDICARE

## 2020-11-30 PROCEDURE — 99490 PR CHRONIC CARE MGMT, 1ST 20 MIN: ICD-10-PCS | Mod: S$PBB,,, | Performed by: INTERNAL MEDICINE

## 2020-11-30 PROCEDURE — 99490 CHRNC CARE MGMT STAFF 1ST 20: CPT | Mod: PBBFAC,PO | Performed by: INTERNAL MEDICINE

## 2020-11-30 PROCEDURE — 99490 CHRNC CARE MGMT STAFF 1ST 20: CPT | Mod: S$PBB,,, | Performed by: INTERNAL MEDICINE

## 2020-12-01 ENCOUNTER — ANTI-COAG VISIT (OUTPATIENT)
Dept: CARDIOLOGY | Facility: CLINIC | Age: 75
End: 2020-12-01
Payer: MEDICARE

## 2020-12-01 DIAGNOSIS — Z79.01 CHRONIC ANTICOAGULATION: ICD-10-CM

## 2020-12-01 PROCEDURE — 93793 PR ANTICOAGULANT MGMT FOR PT TAKING WARFARIN: ICD-10-PCS | Mod: ,,, | Performed by: PHARMACIST

## 2020-12-01 PROCEDURE — 93793 ANTICOAG MGMT PT WARFARIN: CPT | Mod: ,,, | Performed by: PHARMACIST

## 2020-12-01 NOTE — PROGRESS NOTES
"Patient could not verify dose of coumadin. I suspect missed doses of coumadin. "This note will not be shared with the patient."  "

## 2020-12-02 ENCOUNTER — TELEPHONE (OUTPATIENT)
Dept: INTERNAL MEDICINE | Facility: CLINIC | Age: 75
End: 2020-12-02

## 2020-12-02 DIAGNOSIS — Z79.01 LONG TERM (CURRENT) USE OF ANTICOAGULANTS: ICD-10-CM

## 2020-12-02 RX ORDER — WARFARIN 1 MG/1
TABLET ORAL
Qty: 60 TABLET | Refills: 5 | Status: SHIPPED | OUTPATIENT
Start: 2020-12-02 | End: 2022-01-18

## 2020-12-02 NOTE — TELEPHONE ENCOUNTER
Sleeping too much, not listening , needs to keep telling her over and over, keeps forgetting and sleeping all day long.  is concerned    Schedule for 12-3-20 tomorrow

## 2020-12-02 NOTE — PROGRESS NOTES
-New Rx for estrogen cream  -referral to PT.  -f/u in about 3 months video visit.      It was a pleasure meeting with you today.  Thank you for allowing me and my team the privilege of caring for you today.  YOU are the reason we are here, and I truly hope we provided you with the excellent service you deserve.  Please let us know if there is anything else we can do for you so that we can be sure you are leaving completely satisfied with your care experience.           Patient denies any changes in diet, medications, or health that would effect warfarin therapy.

## 2020-12-02 NOTE — PROGRESS NOTES
Refill Routing Note   Medication(s) are not appropriate for processing by [facility] for the following reason(s):     - Outside of protocol  [facility] action(s):  Route        Medication reconciliation completed: No   Automatic Epic Generated Protocol Data:        Requested Prescriptions   Pending Prescriptions Disp Refills    warfarin (COUMADIN) 1 MG tablet 30 tablet 5     Sig: TAKE 1/2 TO 1 TABLET BY MOUTH DAILY AS DIRECTED BY COUMADIN CLINIC       There is no refill protocol information for this order           Appointments  past 12m or future 3m with PCP    Date Provider   Last Visit   11/17/2020 Mundo Hudson,    Next Visit   1/4/2021 Mundo Hudson,    ED visits in past 90 days: 0        Note composed:2:23 PM 12/02/2020

## 2020-12-02 NOTE — TELEPHONE ENCOUNTER
----- Message from Shasha Ramos sent at 12/2/2020 12:02 PM CST -----  Regarding: [phone]  Coby is requesting a callback; she states that she needs a consultation with the doctor.    Callback number: [phone]

## 2020-12-03 ENCOUNTER — OFFICE VISIT (OUTPATIENT)
Dept: INTERNAL MEDICINE | Facility: CLINIC | Age: 75
End: 2020-12-03
Payer: MEDICARE

## 2020-12-03 VITALS
TEMPERATURE: 98 F | RESPIRATION RATE: 15 BRPM | OXYGEN SATURATION: 98 % | SYSTOLIC BLOOD PRESSURE: 158 MMHG | HEIGHT: 64 IN | HEART RATE: 76 BPM | WEIGHT: 231.06 LBS | DIASTOLIC BLOOD PRESSURE: 92 MMHG | BODY MASS INDEX: 39.45 KG/M2

## 2020-12-03 DIAGNOSIS — N18.30 STAGE 3 CHRONIC KIDNEY DISEASE, UNSPECIFIED WHETHER STAGE 3A OR 3B CKD: ICD-10-CM

## 2020-12-03 DIAGNOSIS — K21.9 GASTROESOPHAGEAL REFLUX DISEASE, UNSPECIFIED WHETHER ESOPHAGITIS PRESENT: ICD-10-CM

## 2020-12-03 DIAGNOSIS — D56.3 THALASSEMIA MINOR: ICD-10-CM

## 2020-12-03 DIAGNOSIS — R41.82 ALTERED MENTAL STATUS, UNSPECIFIED ALTERED MENTAL STATUS TYPE: ICD-10-CM

## 2020-12-03 DIAGNOSIS — I10 ESSENTIAL HYPERTENSION: ICD-10-CM

## 2020-12-03 DIAGNOSIS — E66.01 MORBID OBESITY WITH BMI OF 40.0-44.9, ADULT: ICD-10-CM

## 2020-12-03 DIAGNOSIS — F33.0 MILD EPISODE OF RECURRENT MAJOR DEPRESSIVE DISORDER: Primary | ICD-10-CM

## 2020-12-03 DIAGNOSIS — I27.20 PULMONARY HYPERTENSION: ICD-10-CM

## 2020-12-03 DIAGNOSIS — G45.9 TRANSIENT CEREBRAL ISCHEMIA, UNSPECIFIED TYPE: ICD-10-CM

## 2020-12-03 DIAGNOSIS — M17.0 PRIMARY OSTEOARTHRITIS OF BOTH KNEES: ICD-10-CM

## 2020-12-03 DIAGNOSIS — G47.33 OBSTRUCTIVE SLEEP APNEA: ICD-10-CM

## 2020-12-03 PROCEDURE — 99999 PR PBB SHADOW E&M-EST. PATIENT-LVL IV: ICD-10-PCS | Mod: PBBFAC,,, | Performed by: INTERNAL MEDICINE

## 2020-12-03 PROCEDURE — 99214 OFFICE O/P EST MOD 30 MIN: CPT | Mod: S$PBB,,, | Performed by: INTERNAL MEDICINE

## 2020-12-03 PROCEDURE — 99999 PR PBB SHADOW E&M-EST. PATIENT-LVL IV: CPT | Mod: PBBFAC,,, | Performed by: INTERNAL MEDICINE

## 2020-12-03 PROCEDURE — 99214 PR OFFICE/OUTPT VISIT, EST, LEVL IV, 30-39 MIN: ICD-10-PCS | Mod: S$PBB,,, | Performed by: INTERNAL MEDICINE

## 2020-12-03 PROCEDURE — 99214 OFFICE O/P EST MOD 30 MIN: CPT | Mod: PBBFAC,PO | Performed by: INTERNAL MEDICINE

## 2020-12-03 RX ORDER — ESCITALOPRAM OXALATE 20 MG/1
TABLET ORAL
Qty: 90 TABLET | Refills: 1 | Status: SHIPPED | OUTPATIENT
Start: 2020-12-03 | End: 2021-06-04 | Stop reason: SDUPTHER

## 2020-12-03 RX ORDER — MELOXICAM 15 MG/1
15 TABLET ORAL DAILY
COMMUNITY
End: 2023-02-10
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
## 2020-12-07 ENCOUNTER — ANTI-COAG VISIT (OUTPATIENT)
Dept: CARDIOLOGY | Facility: CLINIC | Age: 75
End: 2020-12-07
Payer: MEDICARE

## 2020-12-07 ENCOUNTER — HOSPITAL ENCOUNTER (OUTPATIENT)
Dept: RADIOLOGY | Facility: HOSPITAL | Age: 75
Discharge: HOME | End: 2020-12-07
Attending: INTERNAL MEDICINE
Payer: MEDICARE

## 2020-12-07 DIAGNOSIS — Z79.01 CHRONIC ANTICOAGULATION: ICD-10-CM

## 2020-12-07 DIAGNOSIS — Z12.31 ENCOUNTER FOR SCREENING MAMMOGRAM FOR BREAST CANCER: ICD-10-CM

## 2020-12-07 PROCEDURE — 77067 SCR MAMMO BI INCL CAD: CPT | Mod: 26,,, | Performed by: RADIOLOGY

## 2020-12-07 PROCEDURE — 93793 PR ANTICOAGULANT MGMT FOR PT TAKING WARFARIN: ICD-10-PCS | Mod: ,,, | Performed by: PHARMACIST

## 2020-12-07 PROCEDURE — 77063 MAMMO DIGITAL SCREENING BILAT WITH TOMO: ICD-10-PCS | Mod: 26,,, | Performed by: RADIOLOGY

## 2020-12-07 PROCEDURE — 77067 SCR MAMMO BI INCL CAD: CPT | Mod: TC,PO

## 2020-12-07 PROCEDURE — 93793 ANTICOAG MGMT PT WARFARIN: CPT | Mod: ,,, | Performed by: PHARMACIST

## 2020-12-07 PROCEDURE — 77067 MAMMO DIGITAL SCREENING BILAT WITH TOMO: ICD-10-PCS | Mod: 26,,, | Performed by: RADIOLOGY

## 2020-12-07 PROCEDURE — 77063 BREAST TOMOSYNTHESIS BI: CPT | Mod: 26,,, | Performed by: RADIOLOGY

## 2020-12-08 NOTE — PROGRESS NOTES
Patient missed her coumadin dose 12/6. Otherwise, Patient denies any changes in diet, medications, or health that would effect warfarin therapy.

## 2020-12-10 ENCOUNTER — ANTI-COAG VISIT (OUTPATIENT)
Dept: CARDIOLOGY | Facility: CLINIC | Age: 75
End: 2020-12-10
Payer: MEDICARE

## 2020-12-10 DIAGNOSIS — Z79.01 CHRONIC ANTICOAGULATION: ICD-10-CM

## 2020-12-10 PROCEDURE — 93793 PR ANTICOAGULANT MGMT FOR PT TAKING WARFARIN: ICD-10-PCS | Mod: ,,,

## 2020-12-10 PROCEDURE — 93793 ANTICOAG MGMT PT WARFARIN: CPT | Mod: ,,,

## 2020-12-10 NOTE — PROGRESS NOTES
Advised patient, given redraw date and appointment booked   Advised her to take it with her morning meds

## 2020-12-10 NOTE — PROGRESS NOTES
INR low still. Pt has missed several doses this week and likely in recent weeks. Her dose has NEVER needed to be this high. Compliance has been an issue. Follow dosing plan per calendar and close follow up. If compliance improves, this dose will likely be too much.     Patient needs to alter her habits to stop missing doses. Please discuss changing to a better time including AM dosing if that will make sure she is consisten, using a pillbox to stay on track, and setting an alarm in home or on cellphone as a daily reminder to take pills.

## 2020-12-11 ENCOUNTER — PATIENT MESSAGE (OUTPATIENT)
Dept: OTHER | Facility: OTHER | Age: 75
End: 2020-12-11

## 2020-12-14 ENCOUNTER — PATIENT OUTREACH (OUTPATIENT)
Dept: ADMINISTRATIVE | Facility: OTHER | Age: 75
End: 2020-12-14

## 2020-12-15 ENCOUNTER — OFFICE VISIT (OUTPATIENT)
Dept: CARDIOLOGY | Facility: CLINIC | Age: 75
End: 2020-12-15
Payer: MEDICARE

## 2020-12-15 VITALS
SYSTOLIC BLOOD PRESSURE: 195 MMHG | WEIGHT: 232.81 LBS | HEIGHT: 63 IN | DIASTOLIC BLOOD PRESSURE: 76 MMHG | BODY MASS INDEX: 41.25 KG/M2 | HEART RATE: 65 BPM

## 2020-12-15 DIAGNOSIS — Z79.01 CHRONIC ANTICOAGULATION: ICD-10-CM

## 2020-12-15 DIAGNOSIS — I10 ESSENTIAL HYPERTENSION: ICD-10-CM

## 2020-12-15 DIAGNOSIS — I50.32 CHRONIC DIASTOLIC HEART FAILURE: Primary | ICD-10-CM

## 2020-12-15 DIAGNOSIS — G47.33 OBSTRUCTIVE SLEEP APNEA: ICD-10-CM

## 2020-12-15 DIAGNOSIS — Z86.711 HISTORY OF PULMONARY EMBOLUS (PE): ICD-10-CM

## 2020-12-15 DIAGNOSIS — I27.20 PULMONARY HYPERTENSION: ICD-10-CM

## 2020-12-15 DIAGNOSIS — E66.01 MORBID OBESITY WITH BMI OF 40.0-44.9, ADULT: ICD-10-CM

## 2020-12-15 DIAGNOSIS — I82.532 CHRONIC DEEP VEIN THROMBOSIS (DVT) OF LEFT POPLITEAL VEIN: ICD-10-CM

## 2020-12-15 DIAGNOSIS — N18.30 STAGE 3 CHRONIC KIDNEY DISEASE, UNSPECIFIED WHETHER STAGE 3A OR 3B CKD: ICD-10-CM

## 2020-12-15 PROCEDURE — 99999 PR PBB SHADOW E&M-EST. PATIENT-LVL III: ICD-10-PCS | Mod: PBBFAC,,, | Performed by: NURSE PRACTITIONER

## 2020-12-15 PROCEDURE — 99214 OFFICE O/P EST MOD 30 MIN: CPT | Mod: S$PBB,,, | Performed by: NURSE PRACTITIONER

## 2020-12-15 PROCEDURE — 99213 OFFICE O/P EST LOW 20 MIN: CPT | Mod: PBBFAC,PO | Performed by: NURSE PRACTITIONER

## 2020-12-15 PROCEDURE — 99214 PR OFFICE/OUTPT VISIT, EST, LEVL IV, 30-39 MIN: ICD-10-PCS | Mod: S$PBB,,, | Performed by: NURSE PRACTITIONER

## 2020-12-15 PROCEDURE — 99999 PR PBB SHADOW E&M-EST. PATIENT-LVL III: CPT | Mod: PBBFAC,,, | Performed by: NURSE PRACTITIONER

## 2020-12-15 RX ORDER — DOXAZOSIN 8 MG/1
8 TABLET ORAL NIGHTLY
Qty: 90 TABLET | Refills: 3 | Status: SHIPPED | OUTPATIENT
Start: 2020-12-15 | End: 2022-04-21 | Stop reason: SDUPTHER

## 2020-12-15 RX ORDER — AMLODIPINE AND VALSARTAN 10; 320 MG/1; MG/1
1 TABLET ORAL DAILY
Qty: 90 TABLET | Refills: 3 | Status: SHIPPED | OUTPATIENT
Start: 2020-12-15 | End: 2022-03-30

## 2020-12-15 NOTE — PATIENT INSTRUCTIONS
Make a follow up with the sleep clinic to have your machine settings and mask fit checked.     Change the losartan and amlodipine to the amlodipine-valsartan  mg daily.  Make sure to remove the amlodipine and losartan from your pill box before adding the amlodipine-valsartan.     Take the metoprolol succinate 25 mg in the evening with the doxazosin.  I am increasing the dose of the doxazosin to 8 mg.  You can take 2 of the 4 mg tablets of doxazosin a day until you run out.  Then start the new prescription.     Please check your blood pressure daily for 2 weeks and call with your readings in 2 weeks, [phone].     Stop the zantac (ranitidine).  If you need something more than the pantoprazole for your reflux, you can take pepcid (famotadine) over the counter.

## 2020-12-15 NOTE — PROGRESS NOTES
Ms. Atkinson is a patient of Dr. Martinez and was last seen in [facility] 11/3/2020.      Subjective:   Patient ID:  Coby Atkinson is a 75 y.o. 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  She does not check her BP at home routinely.  She does not have a scale at home.  Her weight today is stable.  She gets SOB walking <1 block but this is unchanged.   Patient denies chest pain with exertion or at rest, palpitations, dizziness, syncope, edema, orthopnea, PND, or claudication.  Reports no routine exercise.  She is sleeping with her cpap at night.  LOV was in 2018.  Patient is taking warfain  for thromboembolic prophylaxis.  Denies bleeding gums, epistaxis, hemoptysis, hematuria, and hematochezia.      Review of Systems   Constitution: Negative for decreased appetite, diaphoresis, malaise/fatigue, weight gain and weight loss.   Eyes: Negative for visual disturbance.   Cardiovascular: Positive for dyspnea on exertion. Negative for chest pain, claudication, irregular heartbeat, leg swelling, near-syncope, orthopnea, palpitations, paroxysmal nocturnal dyspnea and syncope.        Denies chest pressure   Respiratory: Negative for cough, hemoptysis, shortness of breath, sleep disturbances due to breathing and snoring.    Endocrine: Negative for cold intolerance and heat intolerance.   Hematologic/Lymphatic: Negative for bleeding problem. Does not bruise/bleed easily.   Musculoskeletal: Negative for myalgias.   Gastrointestinal: Negative for bloating, abdominal pain, anorexia, change in bowel habit, constipation, diarrhea, nausea and vomiting.   Neurological: Negative for difficulty with concentration, disturbances in coordination, excessive daytime sleepiness, dizziness, headaches, light-headedness, loss of  "balance, numbness and weakness.   Psychiatric/Behavioral: The patient does not have insomnia.        Allergies and current medications updated and reviewed:  Review of patient's allergies indicates:   Allergen Reactions    Codeine     Ciprofloxacin Rash     Current Outpatient Medications   Medication Sig    ALPRAZolam (XANAX) 0.5 MG tablet Take 1 tablet (0.5 mg total) by mouth 2 (two) times daily as needed for Insomnia or Anxiety.    amLODIPine (NORVASC) 10 MG tablet Take 1 tablet (10 mg total) by mouth once daily.    doxazosin (CARDURA) 4 MG tablet Take 1 tablet (4 mg total) by mouth every evening.    escitalopram oxalate (LEXAPRO) 20 MG tablet 1/2 tab PO qHS x 4 days, then increase to 1 tab PO qHS (Patient taking differently: Take 20 mg by mouth every evening. )    furosemide (LASIX) 20 MG tablet TAKE 1 TABLET(20 MG) BY MOUTH EVERY DAY    losartan (COZAAR) 100 MG tablet TAKE 1 TABLET BY MOUTH EVERY DAY    meloxicam (MOBIC) 15 MG tablet Take 15 mg by mouth once daily.    metoprolol succinate (TOPROL-XL) 25 MG 24 hr tablet TAKE 1 TABLET BY MOUTH EVERY DAY    pantoprazole (PROTONIX) 40 MG tablet Take 1 tablet (40 mg total) by mouth every 12 (twelve) hours. (Patient taking differently: Take 40 mg by mouth once daily. )    potassium chloride (MICRO-K) 10 MEQ CpSR TAKE 2 CAPSULES(20 MEQ) BY MOUTH EVERY DAY    ranitidine (ZANTAC) 150 MG tablet Take 150 mg by mouth 2 (two) times daily.    warfarin (COUMADIN) 1 MG tablet 1.5mg PO Sun/Fri and 2mg PO all other days -- OR AS DIRECTED BY COUMADIN CLINIC     No current facility-administered medications for this visit.        Objective:          BP (!) 195/76   Pulse 65   Ht 5' 3" (1.6 m)   Wt 105.6 kg (232 lb 12.9 oz)   BMI 41.24 kg/m²       Physical Exam   Constitutional: She is oriented to person, place, and time. Vital signs are normal. She appears well-developed and well-nourished. She is active. No distress.   HENT:   Head: Normocephalic and atraumatic. "   Eyes: Conjunctivae and lids are normal. No scleral icterus.   Neck: Neck supple. Normal carotid pulses, no hepatojugular reflux and no JVD present. Carotid bruit is not present.   Cardiovascular: Normal rate, regular rhythm, S1 normal, S2 normal and intact distal pulses. PMI is not displaced. Exam reveals no gallop and no friction rub.   No murmur heard.  Pulses:       Carotid pulses are 2+ on the right side and 2+ on the left side.       Radial pulses are 2+ on the right side and 2+ on the left side.        Dorsalis pedis pulses are 2+ on the right side and 2+ on the left side.        Posterior tibial pulses are 1+ on the right side and 1+ on the left side.   Pulmonary/Chest: Effort normal and breath sounds normal. No respiratory distress. She has no decreased breath sounds. She has no wheezes. She has no rhonchi. She has no rales. She exhibits no tenderness.   Abdominal: Soft. Normal appearance and bowel sounds are normal. She exhibits no distension, no fluid wave, no abdominal bruit, no ascites and no pulsatile midline mass. There is no hepatosplenomegaly. There is no abdominal tenderness.   Musculoskeletal:         General: No edema.   Neurological: She is alert and oriented to person, place, and time. Gait normal.   Skin: Skin is warm, dry and intact. No rash noted. She is not diaphoretic. Nails show no clubbing.   Psychiatric: She has a normal mood and affect. Her speech is normal and behavior is normal. Judgment and thought content normal. Cognition and memory are normal.   Nursing note and vitals reviewed.      Chemistry        Component Value Date/Time     11/17/2020 0825    K 3.6 11/17/2020 0825     11/17/2020 0825    CO2 23 11/17/2020 0825    BUN 23 11/17/2020 0825    CREATININE 0.9 11/17/2020 0825     11/17/2020 0825        Component Value Date/Time    CALCIUM 9.4 11/17/2020 0825    ALKPHOS 82 11/17/2020 0825    AST 18 11/17/2020 0825    ALT 13 (L) 11/17/2020 0825    BILITOT 0.5  11/17/2020 0825    ESTGFRAFRICA >60.0 11/17/2020 0825    EGFRNONAA >60.0 11/17/2020 0825        Lab Results   Component Value Date    HGBA1C 5.4 06/11/2020         Recent Labs   Lab 11/01/18  1226  06/11/20  0833  11/03/20  0718   WBC  --    < > 14.76 H   < > 8.60   Hemoglobin 11.2 L   < > 10.6 L   < > 11.0 L   Hematocrit 37.4   < > 35.7 L   < > 35.2 L   MCV  --    < > 67 L   < > 66 L   Platelets  --    < > 283   < > 247   BNP 68  --   --   --   --    TSH  --    < > 1.867  --   --    Cholesterol  --    < >  --   --  127   HDL  --    < >  --   --  67   LDL Cholesterol  --    < >  --   --  51   Triglycerides  --    < >  --   --  46   HDL/Cholesterol Ratio  --    < >  --   --  52.8 H    < > = values in this interval not displayed.     Lab Results   Component Value Date    IRON 42 06/11/2020    TIBC 340 06/11/2020    FERRITIN 219 09/13/2016       Recent Labs   Lab 11/24/20  0858 12/01/20  1052 12/07/20  1012 12/10/20  0923   INR 1.4 H 1.3 H 1.4 H 1.5 H        Test(s) Reviewed  I have reviewed the following in detail:  [] Stress test   [] Angiography   [x] Echocardiogram   [x] Labs   [] Other:         Assessment/Plan:   1. Chronic diastolic heart failure  Euvolemic on exam. Suspect ongoing KITCHEN is multifactorial related to uncontrolled HTN, obesity, and deconditioning.  Encouraged increased exercise starting with walking 5 minutes continuously daily and adding 5 minutes weekly with a goal of 30 minutes a day.     2. Essential hypertension  BP not at goal <130/80.  Move the metoprolol to the evening with the doxazosin.  Change the losartan to a more potent ARB.  Will start exforge to decrease pill burden in the morning.  Increase the doxazosin to 8 mg.  If BP remains elevated in 2 weeks, consider changing the metoprolol to bystolic.     - amlodipine-valsartan (EXFORGE)  mg per tablet; Take 1 tablet by mouth once daily.  Dispense: 90 tablet; Refill: 3  - doxazosin (CARDURA) 8 MG Tab; Take 1 tablet (8 mg total) by  mouth every evening.  Dispense: 90 tablet; Refill: 3    3. History of pulmonary embolus (PE)  Continue anticoagulation therapy.     4. Pulmonary hypertension      5. Chronic deep vein thrombosis (DVT) of left popliteal vein      6. Stage 3 chronic kidney disease, unspecified whether stage 3a or 3b CKD      7. Chronic anticoagulation  Denies bleeding.  Discussed when to seek immediate medical attention.       8. Morbid obesity with BMI of 40.0-44.9, adult  BMI 41.2 Encouraged increased CV exercise to 30 minutes a day for 5 days a week.       9. Obstructive sleep apnea  Using cpap. LOV in sleep clinic in 2018.  Instructed to make f/u to evaluate for change in mask fit and need for settings adjustment on the machine.  Encouraged nightly use of cpap and annual f/u with sleep clinic.      A copy of this note will be forwarded to Dr. Martinez and Dr. Hudson.     Follow up in about 6 months (around 6/15/2021).

## 2020-12-18 ENCOUNTER — HOSPITAL ENCOUNTER (OUTPATIENT)
Dept: RADIOLOGY | Facility: HOSPITAL | Age: 75
Discharge: HOME | End: 2020-12-18
Attending: INTERNAL MEDICINE
Payer: MEDICARE

## 2020-12-18 DIAGNOSIS — G45.9 TRANSIENT CEREBRAL ISCHEMIA, UNSPECIFIED TYPE: ICD-10-CM

## 2020-12-18 DIAGNOSIS — R41.82 ALTERED MENTAL STATUS, UNSPECIFIED ALTERED MENTAL STATUS TYPE: ICD-10-CM

## 2020-12-18 PROCEDURE — 70551 MRI BRAIN STEM W/O DYE: CPT | Mod: 26,,, | Performed by: RADIOLOGY

## 2020-12-18 PROCEDURE — 70551 MRI BRAIN WITHOUT CONTRAST: ICD-10-PCS | Mod: 26,,, | Performed by: RADIOLOGY

## 2020-12-18 PROCEDURE — 70551 MRI BRAIN STEM W/O DYE: CPT | Mod: TC

## 2020-12-21 LAB — PATHOLOGIST INTERPRETATION AB/XM: NORMAL

## 2020-12-23 NOTE — PROGRESS NOTES
Halle with [facility] in [place] transferred me over to Ms. Cordoba  Or [place], She states the patient was D/C from  on 8/19/2020.

## 2020-12-28 ENCOUNTER — ANTI-COAG VISIT (OUTPATIENT)
Dept: CARDIOLOGY | Facility: CLINIC | Age: 75
End: 2020-12-28
Payer: MEDICARE

## 2020-12-28 DIAGNOSIS — Z79.01 CHRONIC ANTICOAGULATION: ICD-10-CM

## 2020-12-28 DIAGNOSIS — K26.9 DUODENAL ULCER: ICD-10-CM

## 2020-12-28 PROCEDURE — 93793 PR ANTICOAGULANT MGMT FOR PT TAKING WARFARIN: ICD-10-PCS | Mod: ,,, | Performed by: PHARMACIST

## 2020-12-28 PROCEDURE — 93793 ANTICOAG MGMT PT WARFARIN: CPT | Mod: ,,, | Performed by: PHARMACIST

## 2020-12-28 NOTE — TELEPHONE ENCOUNTER
Critical lab: Nida@ [facility] lab called :  INR: 6.43.    Instructed to call the Coumadin Clinic--number given  [phone]

## 2020-12-28 NOTE — PROGRESS NOTES
Patient denies any changes in diet, medications, or health that would effect warfarin therapy.  INR extremely elevated, no bleeding issues reported. We will hold her coumadin x 2 days and repeat INR on Wednesday

## 2020-12-29 RX ORDER — PANTOPRAZOLE SODIUM 40 MG/1
40 TABLET, DELAYED RELEASE ORAL EVERY 12 HOURS
Qty: 180 TABLET | Refills: 1 | Status: SHIPPED | OUTPATIENT
Start: 2020-12-29 | End: 2022-06-15

## 2020-12-29 NOTE — PROGRESS NOTES
Refill Routing Note   Medication(s) are not appropriate for processing by [facility] for the following reason(s):     - Outside of protocol  [facility] action(s):  Route     Medication Therapy Plan: PPI >40mg daily outside of [facility] protocol  Medication reconciliation completed: No   Automatic Epic Generated Protocol Data:        Requested Prescriptions   Pending Prescriptions Disp Refills    pantoprazole (PROTONIX) 40 MG tablet 180 tablet 3     Sig: Take 1 tablet (40 mg total) by mouth every 12 (twelve) hours.       Gastroenterology: Proton Pump Inhibitors 2 Passed - 12/29/2020  3:29 PM        Passed - Patient is at least 18 years old        Passed - Osteoporosis is not on problem list        Passed - Negative Pregnancy Status Check        Passed - An appropriate indication is on the problem list        Passed - Office visit in past 12 months or future 90 days     Recent Outpatient Visits            2 weeks ago Chronic diastolic heart failure    [facility] 8th[facility] Leanna Caceres NP    3 weeks ago Mild episode of recurrent major depressive disorder    [facility]    1 month ago Chronic deep vein thrombosis (DVT) of left popliteal vein    [facility] Mundo Hudson DO    1 month ago Essential hypertension    [facility] Juan Martinez MD    3 months ago Closed fracture of proximal end of right humerus with routine healing, unspecified fracture morphology, subsequent encounter    [facility] 5th Fl Inderjit Pantoja NP          Future Appointments              Tomorrow LAB, [facility], [facility]    In 6 days Mundo Hudson DO [facility], [facility]    In 1 month Mundo Hudson DO [facility], [place]                      Appointments  past 12m or future 3m with PCP    Date Provider   Last Visit   12/3/2020  Mundo Hudson,    Next Visit   1/4/2021 Mundo Hudson,    ED visits in past 90 days: 0        Note composed:4:25 PM 12/29/2020

## 2020-12-29 NOTE — TELEPHONE ENCOUNTER
No new care gaps identified.  Powered by [employer]. Reference number: [id number]. 12/29/2020 3:29:52 PM CST

## 2020-12-30 ENCOUNTER — ANTI-COAG VISIT (OUTPATIENT)
Dept: CARDIOLOGY | Facility: CLINIC | Age: 75
End: 2020-12-30
Payer: MEDICARE

## 2020-12-30 DIAGNOSIS — Z79.01 CHRONIC ANTICOAGULATION: ICD-10-CM

## 2020-12-30 LAB — INR PPP: 5.5

## 2020-12-30 PROCEDURE — 93793 PR ANTICOAGULANT MGMT FOR PT TAKING WARFARIN: ICD-10-PCS | Mod: ,,, | Performed by: PHARMACIST

## 2020-12-30 PROCEDURE — 93793 ANTICOAG MGMT PT WARFARIN: CPT | Mod: ,,, | Performed by: PHARMACIST

## 2020-12-30 NOTE — PROGRESS NOTES
Patient reports gum bleeding after brushing her teeth. Patient will be advised on holding coumadin per calendar and if bleeding continues/worsens to seek urgent care. Repeat INR Monday 1/4.

## 2020-12-31 ENCOUNTER — PATIENT OUTREACH (OUTPATIENT)
Dept: ADMINISTRATIVE | Facility: HOSPITAL | Age: 75
End: 2020-12-31

## 2020-12-31 ENCOUNTER — EXTERNAL CHRONIC CARE MANAGEMENT (OUTPATIENT)
Dept: PRIMARY CARE CLINIC | Facility: CLINIC | Age: 75
End: 2020-12-31
Payer: MEDICARE

## 2020-12-31 PROCEDURE — 99490 CHRNC CARE MGMT STAFF 1ST 20: CPT | Mod: PBBFAC,PO | Performed by: INTERNAL MEDICINE

## 2020-12-31 PROCEDURE — 99490 PR CHRONIC CARE MGMT, 1ST 20 MIN: ICD-10-PCS | Mod: S$PBB,,, | Performed by: INTERNAL MEDICINE

## 2020-12-31 PROCEDURE — 99490 CHRNC CARE MGMT STAFF 1ST 20: CPT | Mod: S$PBB,,, | Performed by: INTERNAL MEDICINE

## 2021-01-04 ENCOUNTER — ANTI-COAG VISIT (OUTPATIENT)
Dept: CARDIOLOGY | Facility: CLINIC | Age: 76
End: 2021-01-04
Payer: MEDICARE

## 2021-01-04 DIAGNOSIS — Z79.01 CHRONIC ANTICOAGULATION: ICD-10-CM

## 2021-01-04 PROCEDURE — 93793 ANTICOAG MGMT PT WARFARIN: CPT | Mod: ,,, | Performed by: PHARMACIST

## 2021-01-04 PROCEDURE — 93793 PR ANTICOAGULANT MGMT FOR PT TAKING WARFARIN: ICD-10-PCS | Mod: ,,, | Performed by: PHARMACIST

## 2021-01-05 DIAGNOSIS — E87.6 HYPOKALEMIA: ICD-10-CM

## 2021-01-05 DIAGNOSIS — I10 ESSENTIAL HYPERTENSION: Primary | ICD-10-CM

## 2021-01-06 ENCOUNTER — ANTI-COAG VISIT (OUTPATIENT)
Dept: CARDIOLOGY | Facility: CLINIC | Age: 76
End: 2021-01-06
Payer: MEDICARE

## 2021-01-06 DIAGNOSIS — I10 ESSENTIAL HYPERTENSION: Primary | ICD-10-CM

## 2021-01-06 DIAGNOSIS — Z79.01 CHRONIC ANTICOAGULATION: ICD-10-CM

## 2021-01-06 PROCEDURE — 93793 PR ANTICOAGULANT MGMT FOR PT TAKING WARFARIN: ICD-10-PCS | Mod: ,,, | Performed by: PHARMACIST

## 2021-01-06 PROCEDURE — 93793 ANTICOAG MGMT PT WARFARIN: CPT | Mod: ,,, | Performed by: PHARMACIST

## 2021-01-07 ENCOUNTER — TELEPHONE (OUTPATIENT)
Dept: INTERNAL MEDICINE | Facility: CLINIC | Age: 76
End: 2021-01-07

## 2021-01-11 ENCOUNTER — ANTI-COAG VISIT (OUTPATIENT)
Dept: CARDIOLOGY | Facility: CLINIC | Age: 76
End: 2021-01-11
Payer: MEDICARE

## 2021-01-11 DIAGNOSIS — Z79.01 CHRONIC ANTICOAGULATION: ICD-10-CM

## 2021-01-11 PROCEDURE — 93793 PR ANTICOAGULANT MGMT FOR PT TAKING WARFARIN: ICD-10-PCS | Mod: ,,, | Performed by: PHARMACIST

## 2021-01-11 PROCEDURE — 93793 ANTICOAG MGMT PT WARFARIN: CPT | Mod: ,,, | Performed by: PHARMACIST

## 2021-01-14 ENCOUNTER — ANTI-COAG VISIT (OUTPATIENT)
Dept: CARDIOLOGY | Facility: CLINIC | Age: 76
End: 2021-01-14
Payer: MEDICARE

## 2021-01-14 DIAGNOSIS — Z79.01 CHRONIC ANTICOAGULATION: ICD-10-CM

## 2021-01-14 PROCEDURE — 93793 PR ANTICOAGULANT MGMT FOR PT TAKING WARFARIN: ICD-10-PCS | Mod: ,,, | Performed by: PHARMACIST

## 2021-01-14 PROCEDURE — 93793 ANTICOAG MGMT PT WARFARIN: CPT | Mod: ,,, | Performed by: PHARMACIST

## 2021-01-25 ENCOUNTER — IMMUNIZATION (OUTPATIENT)
Dept: PRIMARY CARE CLINIC | Facility: CLINIC | Age: 76
End: 2021-01-25
Payer: MEDICARE

## 2021-01-25 DIAGNOSIS — Z23 NEED FOR VACCINATION: Primary | ICD-10-CM

## 2021-01-25 PROCEDURE — 91300 COVID-19, MRNA, LNP-S, PF, 30 MCG/0.3 ML DOSE VACCINE: CPT | Mod: PBBFAC,PN

## 2021-01-29 ENCOUNTER — OFFICE VISIT (OUTPATIENT)
Dept: INTERNAL MEDICINE | Facility: CLINIC | Age: 76
End: 2021-01-29
Payer: MEDICARE

## 2021-01-29 VITALS
WEIGHT: 238.75 LBS | OXYGEN SATURATION: 99 % | TEMPERATURE: 98 F | HEART RATE: 95 BPM | BODY MASS INDEX: 42.3 KG/M2 | DIASTOLIC BLOOD PRESSURE: 82 MMHG | RESPIRATION RATE: 16 BRPM | SYSTOLIC BLOOD PRESSURE: 126 MMHG | HEIGHT: 63 IN

## 2021-01-29 DIAGNOSIS — F33.0 MILD EPISODE OF RECURRENT MAJOR DEPRESSIVE DISORDER: Primary | ICD-10-CM

## 2021-01-29 PROCEDURE — 99213 PR OFFICE/OUTPT VISIT, EST, LEVL III, 20-29 MIN: ICD-10-PCS | Mod: S$PBB,,, | Performed by: INTERNAL MEDICINE

## 2021-01-29 PROCEDURE — 99213 OFFICE O/P EST LOW 20 MIN: CPT | Mod: S$PBB,,, | Performed by: INTERNAL MEDICINE

## 2021-01-29 PROCEDURE — 99213 OFFICE O/P EST LOW 20 MIN: CPT | Mod: PBBFAC,PO | Performed by: INTERNAL MEDICINE

## 2021-01-29 PROCEDURE — 99999 PR PBB SHADOW E&M-EST. PATIENT-LVL III: CPT | Mod: PBBFAC,,, | Performed by: INTERNAL MEDICINE

## 2021-01-29 PROCEDURE — 99999 PR PBB SHADOW E&M-EST. PATIENT-LVL III: ICD-10-PCS | Mod: PBBFAC,,, | Performed by: INTERNAL MEDICINE

## 2021-01-29 RX ORDER — LOSARTAN POTASSIUM 100 MG/1
100 TABLET ORAL DAILY
COMMUNITY
Start: 2020-12-31 | End: 2021-07-20

## 2021-01-31 ENCOUNTER — EXTERNAL CHRONIC CARE MANAGEMENT (OUTPATIENT)
Dept: PRIMARY CARE CLINIC | Facility: CLINIC | Age: 76
End: 2021-01-31
Payer: MEDICARE

## 2021-01-31 PROCEDURE — 99490 PR CHRONIC CARE MGMT, 1ST 20 MIN: ICD-10-PCS | Mod: S$PBB,,, | Performed by: INTERNAL MEDICINE

## 2021-01-31 PROCEDURE — 99490 CHRNC CARE MGMT STAFF 1ST 20: CPT | Mod: PBBFAC,PO | Performed by: INTERNAL MEDICINE

## 2021-01-31 PROCEDURE — 99490 CHRNC CARE MGMT STAFF 1ST 20: CPT | Mod: S$PBB,,, | Performed by: INTERNAL MEDICINE

## 2021-02-01 ENCOUNTER — PES CALL (OUTPATIENT)
Dept: ADMINISTRATIVE | Facility: CLINIC | Age: 76
End: 2021-02-01

## 2021-02-15 ENCOUNTER — IMMUNIZATION (OUTPATIENT)
Dept: PRIMARY CARE CLINIC | Facility: CLINIC | Age: 76
End: 2021-02-15
Payer: MEDICARE

## 2021-02-15 DIAGNOSIS — Z23 NEED FOR VACCINATION: Primary | ICD-10-CM

## 2021-02-15 PROBLEM — E04.2 MULTINODULAR THYROID: Status: ACTIVE | Noted: 2020-06-01

## 2021-02-15 PROBLEM — I16.0 HYPERTENSIVE URGENCY: Status: RESOLVED | Noted: 2020-06-11 | Resolved: 2021-02-15

## 2021-02-15 PROBLEM — Z51.5 COMFORT MEASURES ONLY STATUS: Status: RESOLVED | Noted: 2020-06-12 | Resolved: 2021-02-15

## 2021-02-15 PROCEDURE — 91300 COVID-19, MRNA, LNP-S, PF, 30 MCG/0.3 ML DOSE VACCINE: CPT | Mod: PBBFAC,PN

## 2021-02-15 PROCEDURE — 0002A COVID-19, MRNA, LNP-S, PF, 30 MCG/0.3 ML DOSE VACCINE: CPT | Mod: PBBFAC,PN

## 2021-02-17 ENCOUNTER — OFFICE VISIT (OUTPATIENT)
Dept: INTERNAL MEDICINE | Facility: CLINIC | Age: 76
End: 2021-02-17
Payer: MEDICARE

## 2021-02-17 VITALS
DIASTOLIC BLOOD PRESSURE: 80 MMHG | BODY MASS INDEX: 42.19 KG/M2 | SYSTOLIC BLOOD PRESSURE: 150 MMHG | RESPIRATION RATE: 15 BRPM | WEIGHT: 238.13 LBS | HEIGHT: 63 IN | HEART RATE: 88 BPM

## 2021-02-17 DIAGNOSIS — G47.33 OBSTRUCTIVE SLEEP APNEA: ICD-10-CM

## 2021-02-17 DIAGNOSIS — I50.32 CHRONIC DIASTOLIC HEART FAILURE: ICD-10-CM

## 2021-02-17 DIAGNOSIS — Z74.09 OTHER REDUCED MOBILITY: ICD-10-CM

## 2021-02-17 DIAGNOSIS — R41.3 MEMORY CHANGES: ICD-10-CM

## 2021-02-17 DIAGNOSIS — I27.82 OTHER CHRONIC PULMONARY EMBOLISM WITHOUT ACUTE COR PULMONALE: ICD-10-CM

## 2021-02-17 DIAGNOSIS — D56.3 THALASSEMIA MINOR: ICD-10-CM

## 2021-02-17 DIAGNOSIS — E66.2 OBESITY HYPOVENTILATION SYNDROME: ICD-10-CM

## 2021-02-17 DIAGNOSIS — Z86.711 HISTORY OF PULMONARY EMBOLUS (PE): ICD-10-CM

## 2021-02-17 DIAGNOSIS — E66.01 MORBID OBESITY: ICD-10-CM

## 2021-02-17 DIAGNOSIS — I27.20 PULMONARY HYPERTENSION: ICD-10-CM

## 2021-02-17 DIAGNOSIS — F43.21 GRIEVING: ICD-10-CM

## 2021-02-17 DIAGNOSIS — K21.9 GASTROESOPHAGEAL REFLUX DISEASE, UNSPECIFIED WHETHER ESOPHAGITIS PRESENT: ICD-10-CM

## 2021-02-17 DIAGNOSIS — R29.898 WEAKNESS OF BOTH LOWER EXTREMITIES: ICD-10-CM

## 2021-02-17 DIAGNOSIS — Z91.81 RISK FOR FALLS: ICD-10-CM

## 2021-02-17 DIAGNOSIS — Z99.89 DEPENDENCE ON OTHER ENABLING MACHINES AND DEVICES: ICD-10-CM

## 2021-02-17 DIAGNOSIS — E66.01 MORBID OBESITY WITH BMI OF 40.0-44.9, ADULT: ICD-10-CM

## 2021-02-17 DIAGNOSIS — F33.0 MILD EPISODE OF RECURRENT MAJOR DEPRESSIVE DISORDER: ICD-10-CM

## 2021-02-17 DIAGNOSIS — E87.6 HYPOKALEMIA: ICD-10-CM

## 2021-02-17 DIAGNOSIS — I25.2 HISTORY OF NON-ST ELEVATION MYOCARDIAL INFARCTION (NSTEMI): ICD-10-CM

## 2021-02-17 DIAGNOSIS — N18.30 STAGE 3 CHRONIC KIDNEY DISEASE, UNSPECIFIED WHETHER STAGE 3A OR 3B CKD: ICD-10-CM

## 2021-02-17 DIAGNOSIS — M17.0 PRIMARY OSTEOARTHRITIS OF BOTH KNEES: ICD-10-CM

## 2021-02-17 DIAGNOSIS — H91.93 DECREASED HEARING OF BOTH EARS: ICD-10-CM

## 2021-02-17 DIAGNOSIS — M71.20 SYNOVIAL CYST OF KNEE, UNSPECIFIED LATERALITY: ICD-10-CM

## 2021-02-17 DIAGNOSIS — S42.294S OTHER CLOSED NONDISPLACED FRACTURE OF PROXIMAL END OF RIGHT HUMERUS, SEQUELA: ICD-10-CM

## 2021-02-17 DIAGNOSIS — Z00.00 ENCOUNTER FOR PREVENTIVE HEALTH EXAMINATION: Primary | ICD-10-CM

## 2021-02-17 DIAGNOSIS — I27.9 PULMONARY HEART DISEASE: ICD-10-CM

## 2021-02-17 DIAGNOSIS — Z13.31 POSITIVE DEPRESSION SCREENING: ICD-10-CM

## 2021-02-17 DIAGNOSIS — Z79.01 CHRONIC ANTICOAGULATION: ICD-10-CM

## 2021-02-17 DIAGNOSIS — R26.9 ABNORMALITY OF GAIT AND MOBILITY: ICD-10-CM

## 2021-02-17 DIAGNOSIS — E04.2 MULTINODULAR THYROID: ICD-10-CM

## 2021-02-17 DIAGNOSIS — I82.532 CHRONIC DEEP VEIN THROMBOSIS (DVT) OF LEFT POPLITEAL VEIN: ICD-10-CM

## 2021-02-17 DIAGNOSIS — I10 ESSENTIAL HYPERTENSION: ICD-10-CM

## 2021-02-17 DIAGNOSIS — H61.21 IMPACTED CERUMEN OF RIGHT EAR: ICD-10-CM

## 2021-02-17 PROCEDURE — G0439 PPPS, SUBSEQ VISIT: HCPCS | Mod: ,,, | Performed by: NURSE PRACTITIONER

## 2021-02-17 PROCEDURE — 99999 PR PBB SHADOW E&M-EST. PATIENT-LVL V: ICD-10-PCS | Mod: PBBFAC,,, | Performed by: NURSE PRACTITIONER

## 2021-02-17 PROCEDURE — 99999 PR PBB SHADOW E&M-EST. PATIENT-LVL V: CPT | Mod: PBBFAC,,, | Performed by: NURSE PRACTITIONER

## 2021-02-17 PROCEDURE — G0439 PR MEDICARE ANNUAL WELLNESS SUBSEQUENT VISIT: ICD-10-PCS | Mod: ,,, | Performed by: NURSE PRACTITIONER

## 2021-02-17 PROCEDURE — 99215 OFFICE O/P EST HI 40 MIN: CPT | Mod: PBBFAC,PO | Performed by: NURSE PRACTITIONER

## 2021-02-22 ENCOUNTER — ANTI-COAG VISIT (OUTPATIENT)
Dept: CARDIOLOGY | Facility: CLINIC | Age: 76
End: 2021-02-22
Payer: MEDICARE

## 2021-02-22 ENCOUNTER — OUTPATIENT CASE MANAGEMENT (OUTPATIENT)
Dept: ADMINISTRATIVE | Facility: OTHER | Age: 76
End: 2021-02-22

## 2021-02-22 ENCOUNTER — TELEPHONE (OUTPATIENT)
Dept: INTERNAL MEDICINE | Facility: CLINIC | Age: 76
End: 2021-02-22

## 2021-02-22 DIAGNOSIS — F32.A DEPRESSION, UNSPECIFIED DEPRESSION TYPE: Primary | ICD-10-CM

## 2021-02-22 DIAGNOSIS — Z79.01 CHRONIC ANTICOAGULATION: Primary | ICD-10-CM

## 2021-02-22 PROCEDURE — 93793 PR ANTICOAGULANT MGMT FOR PT TAKING WARFARIN: ICD-10-PCS | Mod: ,,, | Performed by: PHARMACIST

## 2021-02-22 PROCEDURE — 93793 ANTICOAG MGMT PT WARFARIN: CPT | Mod: ,,, | Performed by: PHARMACIST

## 2021-02-24 ENCOUNTER — OUTPATIENT CASE MANAGEMENT (OUTPATIENT)
Dept: ADMINISTRATIVE | Facility: OTHER | Age: 76
End: 2021-02-24

## 2021-02-28 ENCOUNTER — EXTERNAL CHRONIC CARE MANAGEMENT (OUTPATIENT)
Dept: PRIMARY CARE CLINIC | Facility: CLINIC | Age: 76
End: 2021-02-28
Payer: MEDICARE

## 2021-02-28 PROCEDURE — 99490 CHRNC CARE MGMT STAFF 1ST 20: CPT | Mod: PBBFAC,PO | Performed by: INTERNAL MEDICINE

## 2021-02-28 PROCEDURE — 99490 CHRNC CARE MGMT STAFF 1ST 20: CPT | Mod: S$PBB,,, | Performed by: INTERNAL MEDICINE

## 2021-02-28 PROCEDURE — 99490 PR CHRONIC CARE MGMT, 1ST 20 MIN: ICD-10-PCS | Mod: S$PBB,,, | Performed by: INTERNAL MEDICINE

## 2021-03-15 ENCOUNTER — TELEPHONE (OUTPATIENT)
Dept: INTERNAL MEDICINE | Facility: CLINIC | Age: 76
End: 2021-03-15

## 2021-03-15 DIAGNOSIS — Z00.00 ANNUAL PHYSICAL EXAM: Primary | ICD-10-CM

## 2021-03-15 DIAGNOSIS — I10 ESSENTIAL HYPERTENSION: ICD-10-CM

## 2021-03-15 DIAGNOSIS — E78.5 DYSLIPIDEMIA: ICD-10-CM

## 2021-03-25 ENCOUNTER — PATIENT OUTREACH (OUTPATIENT)
Dept: ADMINISTRATIVE | Facility: OTHER | Age: 76
End: 2021-03-25

## 2021-03-30 ENCOUNTER — ANTI-COAG VISIT (OUTPATIENT)
Dept: CARDIOLOGY | Facility: CLINIC | Age: 76
End: 2021-03-30
Payer: MEDICARE

## 2021-03-30 DIAGNOSIS — Z79.01 CHRONIC ANTICOAGULATION: Primary | ICD-10-CM

## 2021-03-30 PROCEDURE — 93793 ANTICOAG MGMT PT WARFARIN: CPT | Mod: ,,, | Performed by: PHARMACIST

## 2021-03-30 PROCEDURE — 93793 PR ANTICOAGULANT MGMT FOR PT TAKING WARFARIN: ICD-10-PCS | Mod: ,,, | Performed by: PHARMACIST

## 2021-03-31 ENCOUNTER — EXTERNAL CHRONIC CARE MANAGEMENT (OUTPATIENT)
Dept: PRIMARY CARE CLINIC | Facility: CLINIC | Age: 76
End: 2021-03-31
Payer: MEDICARE

## 2021-03-31 PROCEDURE — 99490 CHRNC CARE MGMT STAFF 1ST 20: CPT | Mod: S$PBB,,, | Performed by: INTERNAL MEDICINE

## 2021-03-31 PROCEDURE — 99439 PR CHRONIC CARE MGMT, EA ADDTL 20 MIN: ICD-10-PCS | Mod: S$PBB,,, | Performed by: INTERNAL MEDICINE

## 2021-03-31 PROCEDURE — 99490 CHRNC CARE MGMT STAFF 1ST 20: CPT | Mod: PBBFAC,PO | Performed by: INTERNAL MEDICINE

## 2021-03-31 PROCEDURE — 99490 PR CHRONIC CARE MGMT, 1ST 20 MIN: ICD-10-PCS | Mod: S$PBB,,, | Performed by: INTERNAL MEDICINE

## 2021-03-31 PROCEDURE — 99439 CHRNC CARE MGMT STAF EA ADDL: CPT | Mod: PBBFAC,PO | Performed by: INTERNAL MEDICINE

## 2021-03-31 PROCEDURE — 99439 CHRNC CARE MGMT STAF EA ADDL: CPT | Mod: S$PBB,,, | Performed by: INTERNAL MEDICINE

## 2021-04-13 ENCOUNTER — ANTI-COAG VISIT (OUTPATIENT)
Dept: CARDIOLOGY | Facility: CLINIC | Age: 76
End: 2021-04-13
Payer: MEDICARE

## 2021-04-13 DIAGNOSIS — Z79.01 CHRONIC ANTICOAGULATION: Primary | ICD-10-CM

## 2021-04-20 ENCOUNTER — ANTI-COAG VISIT (OUTPATIENT)
Dept: CARDIOLOGY | Facility: CLINIC | Age: 76
End: 2021-04-20
Payer: MEDICARE

## 2021-04-20 DIAGNOSIS — Z79.01 CHRONIC ANTICOAGULATION: Primary | ICD-10-CM

## 2021-04-20 PROCEDURE — 93793 PR ANTICOAGULANT MGMT FOR PT TAKING WARFARIN: ICD-10-PCS | Mod: ,,, | Performed by: PHARMACIST

## 2021-04-20 PROCEDURE — 93793 ANTICOAG MGMT PT WARFARIN: CPT | Mod: ,,, | Performed by: PHARMACIST

## 2021-04-29 ENCOUNTER — ANTI-COAG VISIT (OUTPATIENT)
Dept: CARDIOLOGY | Facility: CLINIC | Age: 76
End: 2021-04-29
Payer: MEDICARE

## 2021-04-29 DIAGNOSIS — Z79.01 CHRONIC ANTICOAGULATION: Primary | ICD-10-CM

## 2021-04-29 PROCEDURE — 93793 ANTICOAG MGMT PT WARFARIN: CPT | Mod: ,,,

## 2021-04-29 PROCEDURE — 93793 PR ANTICOAGULANT MGMT FOR PT TAKING WARFARIN: ICD-10-PCS | Mod: ,,,

## 2021-04-30 ENCOUNTER — EXTERNAL CHRONIC CARE MANAGEMENT (OUTPATIENT)
Dept: PRIMARY CARE CLINIC | Facility: CLINIC | Age: 76
End: 2021-04-30
Payer: MEDICARE

## 2021-04-30 PROCEDURE — 99490 CHRNC CARE MGMT STAFF 1ST 20: CPT | Mod: S$PBB,,, | Performed by: INTERNAL MEDICINE

## 2021-04-30 PROCEDURE — 99490 PR CHRONIC CARE MGMT, 1ST 20 MIN: ICD-10-PCS | Mod: S$PBB,,, | Performed by: INTERNAL MEDICINE

## 2021-04-30 PROCEDURE — 99490 CHRNC CARE MGMT STAFF 1ST 20: CPT | Mod: PBBFAC,PO | Performed by: INTERNAL MEDICINE

## 2021-05-06 ENCOUNTER — OFFICE VISIT (OUTPATIENT)
Dept: PSYCHIATRY | Facility: CLINIC | Age: 76
End: 2021-05-06
Payer: MEDICARE

## 2021-05-06 DIAGNOSIS — F43.21 GRIEF: ICD-10-CM

## 2021-05-06 DIAGNOSIS — F33.0 MILD EPISODE OF RECURRENT MAJOR DEPRESSIVE DISORDER: Primary | ICD-10-CM

## 2021-05-06 PROCEDURE — 90791 PR PSYCHIATRIC DIAGNOSTIC EVALUATION: ICD-10-PCS | Mod: ,,, | Performed by: SOCIAL WORKER

## 2021-05-06 PROCEDURE — 90791 PSYCH DIAGNOSTIC EVALUATION: CPT | Mod: ,,, | Performed by: SOCIAL WORKER

## 2021-05-11 ENCOUNTER — ANTI-COAG VISIT (OUTPATIENT)
Dept: CARDIOLOGY | Facility: CLINIC | Age: 76
End: 2021-05-11
Payer: MEDICARE

## 2021-05-11 DIAGNOSIS — Z79.01 CHRONIC ANTICOAGULATION: Primary | ICD-10-CM

## 2021-05-11 PROCEDURE — 93793 PR ANTICOAGULANT MGMT FOR PT TAKING WARFARIN: ICD-10-PCS | Mod: ,,, | Performed by: PHARMACIST

## 2021-05-11 PROCEDURE — 93793 ANTICOAG MGMT PT WARFARIN: CPT | Mod: ,,, | Performed by: PHARMACIST

## 2021-05-18 ENCOUNTER — OFFICE VISIT (OUTPATIENT)
Dept: INTERNAL MEDICINE | Facility: CLINIC | Age: 76
End: 2021-05-18
Payer: MEDICARE

## 2021-05-18 ENCOUNTER — TELEPHONE (OUTPATIENT)
Dept: INTERNAL MEDICINE | Facility: CLINIC | Age: 76
End: 2021-05-18

## 2021-05-18 ENCOUNTER — LAB VISIT (OUTPATIENT)
Dept: LAB | Facility: HOSPITAL | Age: 76
End: 2021-05-18
Attending: INTERNAL MEDICINE
Payer: MEDICARE

## 2021-05-18 VITALS
DIASTOLIC BLOOD PRESSURE: 80 MMHG | RESPIRATION RATE: 16 BRPM | BODY MASS INDEX: 41.79 KG/M2 | WEIGHT: 235.88 LBS | HEART RATE: 91 BPM | OXYGEN SATURATION: 96 % | TEMPERATURE: 98 F | HEIGHT: 63 IN | SYSTOLIC BLOOD PRESSURE: 130 MMHG

## 2021-05-18 DIAGNOSIS — Z00.00 ANNUAL PHYSICAL EXAM: ICD-10-CM

## 2021-05-18 DIAGNOSIS — Z86.711 HISTORY OF PULMONARY EMBOLUS (PE): ICD-10-CM

## 2021-05-18 DIAGNOSIS — F33.0 MILD EPISODE OF RECURRENT MAJOR DEPRESSIVE DISORDER: Primary | ICD-10-CM

## 2021-05-18 DIAGNOSIS — E87.6 HYPOKALEMIA: ICD-10-CM

## 2021-05-18 DIAGNOSIS — I10 ESSENTIAL HYPERTENSION: Primary | ICD-10-CM

## 2021-05-18 DIAGNOSIS — I27.20 PULMONARY HYPERTENSION: ICD-10-CM

## 2021-05-18 DIAGNOSIS — E66.2 OBESITY HYPOVENTILATION SYNDROME: ICD-10-CM

## 2021-05-18 DIAGNOSIS — D56.3 THALASSEMIA MINOR: ICD-10-CM

## 2021-05-18 DIAGNOSIS — I82.532 CHRONIC DEEP VEIN THROMBOSIS (DVT) OF LEFT POPLITEAL VEIN: ICD-10-CM

## 2021-05-18 DIAGNOSIS — K21.9 GASTROESOPHAGEAL REFLUX DISEASE, UNSPECIFIED WHETHER ESOPHAGITIS PRESENT: ICD-10-CM

## 2021-05-18 DIAGNOSIS — I10 ESSENTIAL HYPERTENSION: ICD-10-CM

## 2021-05-18 DIAGNOSIS — N18.30 STAGE 3 CHRONIC KIDNEY DISEASE, UNSPECIFIED WHETHER STAGE 3A OR 3B CKD: ICD-10-CM

## 2021-05-18 DIAGNOSIS — E66.01 MORBID OBESITY WITH BMI OF 40.0-44.9, ADULT: ICD-10-CM

## 2021-05-18 DIAGNOSIS — Z79.01 CHRONIC ANTICOAGULATION: ICD-10-CM

## 2021-05-18 DIAGNOSIS — E78.5 DYSLIPIDEMIA: ICD-10-CM

## 2021-05-18 LAB
INR PPP: 1.1 (ref 0.8–1.2)
PROTHROMBIN TIME: 11.7 SEC (ref 9–12.5)

## 2021-05-18 PROCEDURE — 85610 PROTHROMBIN TIME: CPT | Performed by: INTERNAL MEDICINE

## 2021-05-18 PROCEDURE — 36415 COLL VENOUS BLD VENIPUNCTURE: CPT | Mod: PO | Performed by: INTERNAL MEDICINE

## 2021-05-18 PROCEDURE — 99213 OFFICE O/P EST LOW 20 MIN: CPT | Mod: PBBFAC,PO | Performed by: INTERNAL MEDICINE

## 2021-05-18 PROCEDURE — 99214 PR OFFICE/OUTPT VISIT, EST, LEVL IV, 30-39 MIN: ICD-10-PCS | Mod: S$PBB,,, | Performed by: INTERNAL MEDICINE

## 2021-05-18 PROCEDURE — 99214 OFFICE O/P EST MOD 30 MIN: CPT | Mod: S$PBB,,, | Performed by: INTERNAL MEDICINE

## 2021-05-18 PROCEDURE — 99999 PR PBB SHADOW E&M-EST. PATIENT-LVL III: ICD-10-PCS | Mod: PBBFAC,,, | Performed by: INTERNAL MEDICINE

## 2021-05-18 PROCEDURE — 99999 PR PBB SHADOW E&M-EST. PATIENT-LVL III: CPT | Mod: PBBFAC,,, | Performed by: INTERNAL MEDICINE

## 2021-05-18 RX ORDER — AMLODIPINE BESYLATE 10 MG/1
10 TABLET ORAL DAILY
COMMUNITY
Start: 2021-05-01 | End: 2021-07-20

## 2021-05-19 ENCOUNTER — TELEPHONE (OUTPATIENT)
Dept: INTERNAL MEDICINE | Facility: CLINIC | Age: 76
End: 2021-05-19

## 2021-05-19 ENCOUNTER — ANTI-COAG VISIT (OUTPATIENT)
Dept: CARDIOLOGY | Facility: CLINIC | Age: 76
End: 2021-05-19
Payer: MEDICARE

## 2021-05-19 DIAGNOSIS — Z79.01 CHRONIC ANTICOAGULATION: Primary | ICD-10-CM

## 2021-05-19 PROCEDURE — 93793 ANTICOAG MGMT PT WARFARIN: CPT | Mod: ,,, | Performed by: PHARMACIST

## 2021-05-19 PROCEDURE — 93793 PR ANTICOAGULANT MGMT FOR PT TAKING WARFARIN: ICD-10-PCS | Mod: ,,, | Performed by: PHARMACIST

## 2021-05-24 RX ORDER — METOPROLOL SUCCINATE 25 MG/1
25 TABLET, EXTENDED RELEASE ORAL DAILY
Qty: 90 TABLET | Refills: 0 | Status: SHIPPED | OUTPATIENT
Start: 2021-05-24 | End: 2021-07-20 | Stop reason: ALTCHOICE

## 2021-05-24 RX ORDER — FUROSEMIDE 20 MG/1
20 TABLET ORAL DAILY
Qty: 90 TABLET | Refills: 0 | Status: SHIPPED | OUTPATIENT
Start: 2021-05-24 | End: 2021-10-20 | Stop reason: SDUPTHER

## 2021-05-31 ENCOUNTER — EXTERNAL CHRONIC CARE MANAGEMENT (OUTPATIENT)
Dept: PRIMARY CARE CLINIC | Facility: CLINIC | Age: 76
End: 2021-05-31
Payer: MEDICARE

## 2021-05-31 PROCEDURE — 99490 CHRNC CARE MGMT STAFF 1ST 20: CPT | Mod: PBBFAC,PO | Performed by: INTERNAL MEDICINE

## 2021-05-31 PROCEDURE — 99490 CHRNC CARE MGMT STAFF 1ST 20: CPT | Mod: S$PBB,,, | Performed by: INTERNAL MEDICINE

## 2021-05-31 PROCEDURE — 99490 PR CHRONIC CARE MGMT, 1ST 20 MIN: ICD-10-PCS | Mod: S$PBB,,, | Performed by: INTERNAL MEDICINE

## 2021-06-04 RX ORDER — ESCITALOPRAM OXALATE 20 MG/1
20 TABLET ORAL NIGHTLY
Qty: 90 TABLET | Refills: 3 | Status: SHIPPED | OUTPATIENT
Start: 2021-06-04 | End: 2022-07-11

## 2021-06-10 ENCOUNTER — ANTI-COAG VISIT (OUTPATIENT)
Dept: CARDIOLOGY | Facility: CLINIC | Age: 76
End: 2021-06-10
Payer: MEDICARE

## 2021-06-10 DIAGNOSIS — Z79.01 CHRONIC ANTICOAGULATION: Primary | ICD-10-CM

## 2021-06-10 PROCEDURE — 93793 ANTICOAG MGMT PT WARFARIN: CPT | Mod: ,,, | Performed by: PHARMACIST

## 2021-06-10 PROCEDURE — 93793 PR ANTICOAGULANT MGMT FOR PT TAKING WARFARIN: ICD-10-PCS | Mod: ,,, | Performed by: PHARMACIST

## 2021-06-15 ENCOUNTER — TELEPHONE (OUTPATIENT)
Dept: ORTHOPEDICS | Facility: CLINIC | Age: 76
End: 2021-06-15

## 2021-06-16 ENCOUNTER — PATIENT OUTREACH (OUTPATIENT)
Dept: ADMINISTRATIVE | Facility: OTHER | Age: 76
End: 2021-06-16

## 2021-06-17 ENCOUNTER — OFFICE VISIT (OUTPATIENT)
Dept: ORTHOPEDICS | Facility: CLINIC | Age: 76
End: 2021-06-17
Payer: MEDICARE

## 2021-06-17 ENCOUNTER — TELEPHONE (OUTPATIENT)
Dept: CARDIOLOGY | Facility: CLINIC | Age: 76
End: 2021-06-17

## 2021-06-17 ENCOUNTER — HOSPITAL ENCOUNTER (OUTPATIENT)
Dept: RADIOLOGY | Facility: HOSPITAL | Age: 76
Discharge: HOME | End: 2021-06-17
Attending: PHYSICIAN ASSISTANT
Payer: MEDICARE

## 2021-06-17 VITALS — WEIGHT: 235.88 LBS | BODY MASS INDEX: 41.79 KG/M2 | HEIGHT: 63 IN

## 2021-06-17 DIAGNOSIS — M79.672 LEFT FOOT PAIN: ICD-10-CM

## 2021-06-17 DIAGNOSIS — M19.072 ARTHROSIS OF LEFT MIDFOOT: Primary | ICD-10-CM

## 2021-06-17 DIAGNOSIS — M79.89 SWELLING OF LEFT LOWER EXTREMITY: ICD-10-CM

## 2021-06-17 PROCEDURE — 99999 PR PBB SHADOW E&M-EST. PATIENT-LVL III: ICD-10-PCS | Mod: PBBFAC,,, | Performed by: PHYSICIAN ASSISTANT

## 2021-06-17 PROCEDURE — 73630 X-RAY EXAM OF FOOT: CPT | Mod: 26,LT,, | Performed by: RADIOLOGY

## 2021-06-17 PROCEDURE — 73630 X-RAY EXAM OF FOOT: CPT | Mod: TC,LT

## 2021-06-17 PROCEDURE — 73630 XR FOOT COMPLETE 3 VIEW LEFT: ICD-10-PCS | Mod: 26,LT,, | Performed by: RADIOLOGY

## 2021-06-17 PROCEDURE — 99213 PR OFFICE/OUTPT VISIT, EST, LEVL III, 20-29 MIN: ICD-10-PCS | Mod: S$PBB,,, | Performed by: PHYSICIAN ASSISTANT

## 2021-06-17 PROCEDURE — 99213 OFFICE O/P EST LOW 20 MIN: CPT | Mod: PBBFAC,25 | Performed by: PHYSICIAN ASSISTANT

## 2021-06-17 PROCEDURE — 99213 OFFICE O/P EST LOW 20 MIN: CPT | Mod: S$PBB,,, | Performed by: PHYSICIAN ASSISTANT

## 2021-06-17 PROCEDURE — 99999 PR PBB SHADOW E&M-EST. PATIENT-LVL III: CPT | Mod: PBBFAC,,, | Performed by: PHYSICIAN ASSISTANT

## 2021-06-22 ENCOUNTER — OFFICE VISIT (OUTPATIENT)
Dept: BARIATRICS | Facility: CLINIC | Age: 76
End: 2021-06-22
Payer: MEDICARE

## 2021-06-22 VITALS
WEIGHT: 240.31 LBS | OXYGEN SATURATION: 95 % | HEIGHT: 61 IN | HEART RATE: 82 BPM | DIASTOLIC BLOOD PRESSURE: 68 MMHG | SYSTOLIC BLOOD PRESSURE: 142 MMHG | BODY MASS INDEX: 45.37 KG/M2

## 2021-06-22 DIAGNOSIS — E66.01 CLASS 3 SEVERE OBESITY DUE TO EXCESS CALORIES WITH SERIOUS COMORBIDITY AND BODY MASS INDEX (BMI) OF 45.0 TO 49.9 IN ADULT: Primary | ICD-10-CM

## 2021-06-22 DIAGNOSIS — Z79.01 CHRONIC ANTICOAGULATION: ICD-10-CM

## 2021-06-22 DIAGNOSIS — E66.01 MORBID OBESITY: ICD-10-CM

## 2021-06-22 PROCEDURE — 99999 PR PBB SHADOW E&M-EST. PATIENT-LVL IV: ICD-10-PCS | Mod: PBBFAC,,, | Performed by: INTERNAL MEDICINE

## 2021-06-22 PROCEDURE — 99214 PR OFFICE/OUTPT VISIT, EST, LEVL IV, 30-39 MIN: ICD-10-PCS | Mod: S$PBB,,, | Performed by: INTERNAL MEDICINE

## 2021-06-22 PROCEDURE — 99999 PR PBB SHADOW E&M-EST. PATIENT-LVL IV: CPT | Mod: PBBFAC,,, | Performed by: INTERNAL MEDICINE

## 2021-06-22 PROCEDURE — 99214 OFFICE O/P EST MOD 30 MIN: CPT | Mod: S$PBB,,, | Performed by: INTERNAL MEDICINE

## 2021-06-22 PROCEDURE — 99214 OFFICE O/P EST MOD 30 MIN: CPT | Mod: PBBFAC | Performed by: INTERNAL MEDICINE

## 2021-06-22 RX ORDER — TOPIRAMATE 25 MG/1
25 TABLET ORAL 2 TIMES DAILY
Qty: 180 TABLET | Refills: 0 | Status: SHIPPED | OUTPATIENT
Start: 2021-06-22 | End: 2021-07-28 | Stop reason: SDUPTHER

## 2021-06-23 ENCOUNTER — TELEPHONE (OUTPATIENT)
Dept: CARDIOLOGY | Facility: CLINIC | Age: 76
End: 2021-06-23

## 2021-06-30 ENCOUNTER — EXTERNAL CHRONIC CARE MANAGEMENT (OUTPATIENT)
Dept: PRIMARY CARE CLINIC | Facility: CLINIC | Age: 76
End: 2021-06-30
Payer: MEDICARE

## 2021-06-30 PROCEDURE — 99490 PR CHRONIC CARE MGMT, 1ST 20 MIN: ICD-10-PCS | Mod: S$PBB,,, | Performed by: INTERNAL MEDICINE

## 2021-06-30 PROCEDURE — 99490 CHRNC CARE MGMT STAFF 1ST 20: CPT | Mod: PBBFAC,PO | Performed by: INTERNAL MEDICINE

## 2021-06-30 PROCEDURE — 99490 CHRNC CARE MGMT STAFF 1ST 20: CPT | Mod: S$PBB,,, | Performed by: INTERNAL MEDICINE

## 2021-07-06 ENCOUNTER — TELEPHONE (OUTPATIENT)
Dept: INTERNAL MEDICINE | Facility: CLINIC | Age: 76
End: 2021-07-06

## 2021-07-07 ENCOUNTER — ANTI-COAG VISIT (OUTPATIENT)
Dept: CARDIOLOGY | Facility: CLINIC | Age: 76
End: 2021-07-07
Payer: MEDICARE

## 2021-07-07 DIAGNOSIS — Z79.01 CHRONIC ANTICOAGULATION: Primary | ICD-10-CM

## 2021-07-07 PROCEDURE — 93793 ANTICOAG MGMT PT WARFARIN: CPT | Mod: ,,, | Performed by: PHARMACIST

## 2021-07-07 PROCEDURE — 93793 PR ANTICOAGULANT MGMT FOR PT TAKING WARFARIN: ICD-10-PCS | Mod: ,,, | Performed by: PHARMACIST

## 2021-07-08 ENCOUNTER — TELEPHONE (OUTPATIENT)
Dept: INTERNAL MEDICINE | Facility: CLINIC | Age: 76
End: 2021-07-08

## 2021-07-08 ENCOUNTER — ANTI-COAG VISIT (OUTPATIENT)
Dept: CARDIOLOGY | Facility: CLINIC | Age: 76
End: 2021-07-08
Payer: MEDICARE

## 2021-07-08 DIAGNOSIS — Z79.01 CHRONIC ANTICOAGULATION: Primary | ICD-10-CM

## 2021-07-08 PROCEDURE — 93793 ANTICOAG MGMT PT WARFARIN: CPT | Mod: ,,, | Performed by: PHARMACIST

## 2021-07-08 PROCEDURE — 93793 PR ANTICOAGULANT MGMT FOR PT TAKING WARFARIN: ICD-10-PCS | Mod: ,,, | Performed by: PHARMACIST

## 2021-07-09 ENCOUNTER — TELEPHONE (OUTPATIENT)
Dept: INTERNAL MEDICINE | Facility: CLINIC | Age: 76
End: 2021-07-09

## 2021-07-12 ENCOUNTER — TELEPHONE (OUTPATIENT)
Dept: INTERNAL MEDICINE | Facility: CLINIC | Age: 76
End: 2021-07-12

## 2021-07-12 RX ORDER — BUSPIRONE HYDROCHLORIDE 10 MG/1
10 TABLET ORAL 3 TIMES DAILY PRN
Qty: 90 TABLET | Refills: 2 | Status: SHIPPED | OUTPATIENT
Start: 2021-07-12 | End: 2022-11-23 | Stop reason: SDUPTHER

## 2021-07-19 ENCOUNTER — TELEPHONE (OUTPATIENT)
Dept: INTERNAL MEDICINE | Facility: CLINIC | Age: 76
End: 2021-07-19

## 2021-07-19 ENCOUNTER — PATIENT OUTREACH (OUTPATIENT)
Dept: ADMINISTRATIVE | Facility: OTHER | Age: 76
End: 2021-07-19

## 2021-07-19 ENCOUNTER — ANTI-COAG VISIT (OUTPATIENT)
Dept: CARDIOLOGY | Facility: CLINIC | Age: 76
End: 2021-07-19
Payer: MEDICARE

## 2021-07-19 DIAGNOSIS — Z79.01 CHRONIC ANTICOAGULATION: Primary | ICD-10-CM

## 2021-07-19 LAB — INR PPP: 5.5

## 2021-07-19 PROCEDURE — 93793 ANTICOAG MGMT PT WARFARIN: CPT | Mod: ,,, | Performed by: PHARMACIST

## 2021-07-19 PROCEDURE — 93793 PR ANTICOAGULANT MGMT FOR PT TAKING WARFARIN: ICD-10-PCS | Mod: ,,, | Performed by: PHARMACIST

## 2021-07-19 NOTE — CONSULTS
[facility]  Interventional Cardiology  Consult Note    Patient Name: Coby Atkinson  MRN: [id number]  Admission Date: 12/2/2017  Hospital Length of Stay: 1 days  Code Status: Full Code   Attending Provider: Erick Briscoe MD  Consulting Provider: Lisa Child MD  Primary Care Physician: Mundo Hudson DO  Principal Problem:Acute pulmonary embolism    Patient information was obtained from patient and ER records.     Inpatient consult to Interventional Cardiology  Consult performed by: LISA CHILD  Consult ordered by: EVONNE GUZMAN        Subjective:     Chief Complaint:  KITCHEN and near syncope.     HPI:  72 y.o female with a PMhx of HTN, ADRIÁN and obesity that presented to the ER earlier today with the complaint of worsening KITCHEN and two episodes of near-syncope and found to have submassive PE on CTA with RV strain with elevated troponin of 0.4 and BNP of 202. Interventional cardiology consulted for catheter directed tPA assessment    She reports she was walking to a restaurant from her car and started having worsening KITCHEN and started feeling lightheaded. She was able to make to the restaurant but had to stop and sit down when she arrived with improvement of her lightheadedness and KITCHEN. She was able to complete her meal and upon leaving, had another recurrence of KITCHEN and lightheadedness that required her to stop and sit down with improvement of her sx. She reports she has had multiple similar episodes recently (presented to the ER about 2 months ago with an episode of near-syncope), but these episodes were the closest she has ever come to actually having a syncopal episode. Per her and her , her KITCHEN has worsened over the past few months. She denies any chest pain, SOB at rest, palpitations, lightheadedness, LE edema or syncope.   She denies any recent extended periods of immobility or recent surgeries.     No smoking hx. Reports no family hx of early CAD. She has had 2 ESEs in  the last 5 years that showed no evidence of ischemia.       Past Medical History:   Diagnosis Date    Cataract     Depression     Edema     GERD (gastroesophageal reflux disease)     Hypertension 10/2/2012    Osteopenia     Thalassemia minor        Past Surgical History:   Procedure Laterality Date    APPENDECTOMY      CATARACT EXTRACTION      COLONOSCOPY N/A 11/5/2016    Procedure: COLONOSCOPY;  Surgeon: Tanner Simental MD;  Location: [facility] ([address] [place] [address]);  Service: Endoscopy;  Laterality: N/A;    HYSTERECTOMY  age 39    fibroids    OOPHORECTOMY      TONSILLECTOMY         Review of patient's allergies indicates:   Allergen Reactions    Codeine     Ciprofloxacin Rash       PTA Medications   Medication Sig    amlodipine (NORVASC) 10 MG tablet TAKE 1 TABLET(10 MG) BY MOUTH EVERY DAY    benzonatate (TESSALON PERLES) 100 MG capsule Take 1 capsule (100 mg total) by mouth every 6 (six) hours as needed for Cough.    cefUROXime (CEFTIN) 500 MG tablet Take 1 tablet (500 mg total) by mouth 2 (two) times daily.    doxazosin (CARDURA) 2 MG tablet TAKE 1 TABLET(2 MG) BY MOUTH EVERY EVENING    escitalopram oxalate (LEXAPRO) 20 MG tablet TAKE 1 TABLET BY MOUTH ONCE DAILY    furosemide (LASIX) 20 MG tablet TAKE 1 TABLET BY MOUTH TWICE DAILY; MAY TAKE ADDITIONAL DOSE DAILY AS NEEDED FOR SWELLING    meloxicam (MOBIC) 7.5 MG tablet TAKE 1 TABLET BY MOUTH EVERY DAY    metoprolol succinate (TOPROL-XL) 25 MG 24 hr tablet TAKE 1 TABLET BY MOUTH ONCE DAILY    pantoprazole (PROTONIX) 40 MG tablet Take 1 tablet (40 mg total) by mouth once daily.    potassium chloride (MICRO-K) 10 MEQ CpSR Take 2 capsules (20 mEq total) by mouth once daily.    topiramate (TOPAMAX) 50 MG tablet Take 1 tablet (50 mg total) by mouth 2 (two) times daily.    conjugated estrogens (PREMARIN) vaginal cream Place 0.5 g vaginally twice a week.    estrogens, conjugated, (PREMARIN) 0.45 MG tablet Take 1 tablet (0.45 mg total) by mouth once  daily.    losartan-hydrochlorothiazide 100-25 mg (HYZAAR) 100-25 mg per tablet Take 1 tablet by mouth once daily.    ondansetron (ZOFRAN) 4 MG tablet Take 1 tablet (4 mg total) by mouth every 6 (six) hours.     Family History     Problem Relation (Age of Onset)    Cancer Father, Brother    Hypertension Mother    No Known Problems Sister, Maternal Aunt, Maternal Uncle, Paternal Aunt, Paternal Uncle, Maternal Grandmother, Maternal Grandfather, Paternal Grandmother, Paternal Grandfather        Social History Main Topics    Smoking status: Never Smoker    Smokeless tobacco: Never Used    Alcohol use No      Comment: rare    Drug use: No    Sexual activity: Yes     Partners: Male     Review of Systems   Constitution: Negative for chills, decreased appetite, fever, weakness, night sweats, weight gain and weight loss.   HENT: Negative for congestion, hoarse voice, stridor and tinnitus.    Eyes: Negative for blurred vision, pain and visual disturbance.   Cardiovascular: Positive for dyspnea on exertion and near-syncope. Negative for chest pain, claudication, irregular heartbeat, leg swelling, orthopnea, palpitations, paroxysmal nocturnal dyspnea and syncope.   Respiratory: Positive for shortness of breath. Negative for cough, hemoptysis, snoring and wheezing.    Endocrine: Negative for cold intolerance, heat intolerance and polyuria.   Hematologic/Lymphatic: Negative for bleeding problem. Does not bruise/bleed easily.   Skin: Negative for color change and rash.   Musculoskeletal: Negative for arthritis, back pain, joint pain, muscle cramps, myalgias and stiffness.   Gastrointestinal: Negative for abdominal pain, anorexia, constipation, diarrhea, dysphagia, heartburn, hemorrhoids, melena, nausea and vomiting.   Genitourinary: Negative for frequency, hematuria, hesitancy, nocturia and urgency.   Neurological: Negative for difficulty with concentration, dizziness, focal weakness, headaches, light-headedness, numbness,  seizures, tremors and vertigo.   Psychiatric/Behavioral: Negative for altered mental status and depression. The patient does not have insomnia.    Allergic/Immunologic: Negative for hives.     Objective:     Vital Signs (Most Recent):  Temp: 97.6 °F (36.4 °C) (12/02/17 2234)  Pulse: 80 (12/03/17 0017)  Resp: 19 (12/03/17 0017)  BP: 138/81 (12/03/17 0017)  SpO2: 99 % (12/03/17 0017) Vital Signs (24h Range):  Temp:  [97.6 °F (36.4 °C)-98.5 °F (36.9 °C)] 97.6 °F (36.4 °C)  Pulse:  [] 80  Resp:  [16-20] 19  SpO2:  [95 %-99 %] 99 %  BP: ()/(55-97) 138/81     Weight: 103.9 kg (229 lb)  Body mass index is 40.57 kg/m².    SpO2: 99 %  O2 Device (Oxygen Therapy): room air      Intake/Output Summary (Last 24 hours) at 12/03/17 0133  Last data filed at 12/02/17 2300   Gross per 24 hour   Intake                0 ml   Output                0 ml   Net                0 ml       Lines/Drains/Airways     Peripheral Intravenous Line                 Peripheral IV - Single Lumen 11/01/17 1808 Left Antecubital 31 days         Peripheral IV - Single Lumen 12/02/17 1703 Right Antecubital less than 1 day         Peripheral IV - Single Lumen 12/02/17 2123 Left Antecubital less than 1 day                Physical Exam   Constitutional: She appears well-developed and well-nourished.   HENT:   Head: Normocephalic and atraumatic.   Right Ear: External ear normal.   Left Ear: External ear normal.   Eyes: Conjunctivae and EOM are normal. Pupils are equal, round, and reactive to light.   Neck: Normal range of motion. Neck supple. No JVD present. No thyromegaly present.   Cardiovascular: Normal rate, regular rhythm, S1 normal, S2 normal, normal heart sounds and intact distal pulses.  Exam reveals no gallop, no S3 and no friction rub.    No murmur heard.  Pulses:       Radial pulses are 2+ on the right side, and 2+ on the left side.        Femoral pulses are 2+ on the right side, and 2+ on the left side.       Dorsalis pedis pulses are 2+  on the right side, and 2+ on the left side.        Posterior tibial pulses are 2+ on the right side, and 2+ on the left side.   Pulmonary/Chest: Effort normal. No stridor. She has no wheezes. She has no rales. She exhibits no tenderness.   Abdominal: Soft. Bowel sounds are normal. She exhibits no distension. There is no tenderness. There is no rebound and no guarding.   Musculoskeletal: Normal range of motion. She exhibits no edema or tenderness.   Psychiatric: She has a normal mood and affect.       Significant Labs: All pertinent lab results from the last 24 hours have been reviewed.      Assessment and Plan:     * Acute pulmonary embolism    Patient with submassive PE (peripheral clots) - hemodynamically stable but with mild RV dysfunction/troponin and BNP elevation. She belongs to intermediate - high risk submassive PE category. She had mild RV dysfunction on bedside echo with TAPSE of 1.6 and sPAP of 30 mmHG.    Continue with heparin anticoagulation. Monitor hemodynamics in ICU. Repeat Echo, HR, RR, O2Sat on Room air in AM and determine whether catheter directed thrombolysis will be clinically beneficial. She does not have any contraindications to tPA     Will seek CTS consult to see if she is a candidate of thrombectomy.    Patient seen and case discussed with Dr. Briscoe          Patient Active Problem List   Diagnosis    Depression    Edema    Primary localized osteoarthrosis, lower leg    Baker's cyst of knee    SOB (shortness of breath) on exertion    Gastroesophageal reflux disease    Osteoarthritis of both knees    ADRIÁN (obstructive sleep apnea)    Microcytic anemia    Essential hypertension    Thalassemia minor    Obesity (BMI 30-39.9)    Osteopenia    CKD (chronic kidney disease) stage 3, GFR 30-59 ml/min    NSTEMI (non-ST elevated myocardial infarction)    Acute pulmonary embolism    Elevated troponin    Leukocytosis         VTE Risk Mitigation         Ordered     High Risk of VTE  Once       12/02/17 2220     heparin 25,000 units in dextrose 5% 250 mL (100 units/mL) infusion  Continuous     Route:  Intravenous        12/02/17 1947     heparin 25,000 units in dextrose 5% 250 mL (100 units/mL) bolus from bag; ADDITIONAL PRN BOLUS  As needed (PRN)     Route:  Intravenous        12/02/17 1947     heparin 25,000 units in dextrose 5% 250 mL (100 units/mL) bolus from bag; ADDITIONAL PRN BOLUS  As needed (PRN)     Route:  Intravenous        12/02/17 1947          Thank you for your consult. I will follow-up with patient. Please contact us if you have any additional questions.    Jake Blanco MD  Interventional Cardiology   [facility]       Additional Notes: Consult with  laser resurfacing vs. peels Detail Level: Simple Render Risk Assessment In Note?: no

## 2021-07-20 ENCOUNTER — OFFICE VISIT (OUTPATIENT)
Dept: CARDIOLOGY | Facility: CLINIC | Age: 76
End: 2021-07-20
Payer: MEDICARE

## 2021-07-20 VITALS
HEIGHT: 63 IN | DIASTOLIC BLOOD PRESSURE: 90 MMHG | BODY MASS INDEX: 42.43 KG/M2 | SYSTOLIC BLOOD PRESSURE: 150 MMHG | HEART RATE: 84 BPM | WEIGHT: 239.44 LBS

## 2021-07-20 DIAGNOSIS — E66.01 MORBID OBESITY WITH BMI OF 40.0-44.9, ADULT: ICD-10-CM

## 2021-07-20 DIAGNOSIS — Z86.711 HISTORY OF PULMONARY EMBOLUS (PE): ICD-10-CM

## 2021-07-20 DIAGNOSIS — I50.32 CHRONIC DIASTOLIC HEART FAILURE: Primary | ICD-10-CM

## 2021-07-20 DIAGNOSIS — D56.3 THALASSEMIA MINOR: ICD-10-CM

## 2021-07-20 DIAGNOSIS — G47.33 OBSTRUCTIVE SLEEP APNEA: ICD-10-CM

## 2021-07-20 DIAGNOSIS — I10 ESSENTIAL HYPERTENSION: ICD-10-CM

## 2021-07-20 DIAGNOSIS — Z79.01 CHRONIC ANTICOAGULATION: ICD-10-CM

## 2021-07-20 DIAGNOSIS — I82.532 CHRONIC DEEP VEIN THROMBOSIS (DVT) OF LEFT POPLITEAL VEIN: ICD-10-CM

## 2021-07-20 PROBLEM — N18.30 CKD (CHRONIC KIDNEY DISEASE) STAGE 3, GFR 30-59 ML/MIN: Status: RESOLVED | Noted: 2017-11-30 | Resolved: 2021-07-20

## 2021-07-20 PROCEDURE — 93010 ELECTROCARDIOGRAM REPORT: CPT | Mod: S$PBB,,, | Performed by: INTERNAL MEDICINE

## 2021-07-20 PROCEDURE — 99214 OFFICE O/P EST MOD 30 MIN: CPT | Mod: S$PBB,,, | Performed by: NURSE PRACTITIONER

## 2021-07-20 PROCEDURE — 93010 EKG 12-LEAD: ICD-10-PCS | Mod: S$PBB,,, | Performed by: INTERNAL MEDICINE

## 2021-07-20 PROCEDURE — 99999 PR PBB SHADOW E&M-EST. PATIENT-LVL IV: ICD-10-PCS | Mod: PBBFAC,,, | Performed by: NURSE PRACTITIONER

## 2021-07-20 PROCEDURE — 99214 OFFICE O/P EST MOD 30 MIN: CPT | Mod: PBBFAC,PO | Performed by: NURSE PRACTITIONER

## 2021-07-20 PROCEDURE — 93005 ELECTROCARDIOGRAM TRACING: CPT | Mod: PBBFAC,PO | Performed by: INTERNAL MEDICINE

## 2021-07-20 PROCEDURE — 99214 PR OFFICE/OUTPT VISIT, EST, LEVL IV, 30-39 MIN: ICD-10-PCS | Mod: S$PBB,,, | Performed by: NURSE PRACTITIONER

## 2021-07-20 PROCEDURE — 99999 PR PBB SHADOW E&M-EST. PATIENT-LVL IV: CPT | Mod: PBBFAC,,, | Performed by: NURSE PRACTITIONER

## 2021-07-20 RX ORDER — BISOPROLOL FUMARATE 5 MG/1
10 TABLET, FILM COATED ORAL DAILY
Qty: 60 TABLET | Refills: 11 | Status: SHIPPED | OUTPATIENT
Start: 2021-07-20 | End: 2022-04-21

## 2021-07-21 ENCOUNTER — ANTI-COAG VISIT (OUTPATIENT)
Dept: CARDIOLOGY | Facility: CLINIC | Age: 76
End: 2021-07-21
Payer: MEDICARE

## 2021-07-21 DIAGNOSIS — Z79.01 CHRONIC ANTICOAGULATION: Primary | ICD-10-CM

## 2021-07-21 PROCEDURE — 93793 PR ANTICOAGULANT MGMT FOR PT TAKING WARFARIN: ICD-10-PCS | Mod: ,,, | Performed by: PHARMACIST

## 2021-07-21 PROCEDURE — 93793 ANTICOAG MGMT PT WARFARIN: CPT | Mod: ,,, | Performed by: PHARMACIST

## 2021-07-26 ENCOUNTER — ANTI-COAG VISIT (OUTPATIENT)
Dept: CARDIOLOGY | Facility: CLINIC | Age: 76
End: 2021-07-26
Payer: MEDICARE

## 2021-07-26 DIAGNOSIS — Z79.01 CHRONIC ANTICOAGULATION: Primary | ICD-10-CM

## 2021-07-26 PROCEDURE — 93793 PR ANTICOAGULANT MGMT FOR PT TAKING WARFARIN: ICD-10-PCS | Mod: ,,, | Performed by: PHARMACIST

## 2021-07-26 PROCEDURE — 93793 ANTICOAG MGMT PT WARFARIN: CPT | Mod: ,,, | Performed by: PHARMACIST

## 2021-07-28 ENCOUNTER — OFFICE VISIT (OUTPATIENT)
Dept: BARIATRICS | Facility: CLINIC | Age: 76
End: 2021-07-28
Payer: MEDICARE

## 2021-07-28 VITALS
WEIGHT: 236.69 LBS | SYSTOLIC BLOOD PRESSURE: 142 MMHG | OXYGEN SATURATION: 98 % | BODY MASS INDEX: 41.93 KG/M2 | HEART RATE: 69 BPM | DIASTOLIC BLOOD PRESSURE: 74 MMHG

## 2021-07-28 DIAGNOSIS — E66.01 CLASS 3 SEVERE OBESITY DUE TO EXCESS CALORIES WITH SERIOUS COMORBIDITY AND BODY MASS INDEX (BMI) OF 40.0 TO 44.9 IN ADULT: Primary | ICD-10-CM

## 2021-07-28 PROCEDURE — 99999 PR PBB SHADOW E&M-EST. PATIENT-LVL IV: ICD-10-PCS | Mod: PBBFAC,,, | Performed by: INTERNAL MEDICINE

## 2021-07-28 PROCEDURE — 99213 OFFICE O/P EST LOW 20 MIN: CPT | Mod: S$PBB,,, | Performed by: INTERNAL MEDICINE

## 2021-07-28 PROCEDURE — 99999 PR PBB SHADOW E&M-EST. PATIENT-LVL IV: CPT | Mod: PBBFAC,,, | Performed by: INTERNAL MEDICINE

## 2021-07-28 PROCEDURE — 99214 OFFICE O/P EST MOD 30 MIN: CPT | Mod: PBBFAC | Performed by: INTERNAL MEDICINE

## 2021-07-28 PROCEDURE — 99213 PR OFFICE/OUTPT VISIT, EST, LEVL III, 20-29 MIN: ICD-10-PCS | Mod: S$PBB,,, | Performed by: INTERNAL MEDICINE

## 2021-07-28 RX ORDER — TOPIRAMATE 25 MG/1
25 TABLET ORAL 2 TIMES DAILY
Qty: 180 TABLET | Refills: 0 | Status: SHIPPED | OUTPATIENT
Start: 2021-07-28 | End: 2021-10-28 | Stop reason: SDUPTHER

## 2021-07-31 ENCOUNTER — EXTERNAL CHRONIC CARE MANAGEMENT (OUTPATIENT)
Dept: PRIMARY CARE CLINIC | Facility: CLINIC | Age: 76
End: 2021-07-31
Payer: MEDICARE

## 2021-07-31 PROCEDURE — 99490 CHRNC CARE MGMT STAFF 1ST 20: CPT | Mod: PBBFAC,PO | Performed by: INTERNAL MEDICINE

## 2021-07-31 PROCEDURE — 99490 CHRNC CARE MGMT STAFF 1ST 20: CPT | Mod: S$PBB,,, | Performed by: INTERNAL MEDICINE

## 2021-07-31 PROCEDURE — 99490 PR CHRONIC CARE MGMT, 1ST 20 MIN: ICD-10-PCS | Mod: S$PBB,,, | Performed by: INTERNAL MEDICINE

## 2021-08-02 ENCOUNTER — ANTI-COAG VISIT (OUTPATIENT)
Dept: CARDIOLOGY | Facility: CLINIC | Age: 76
End: 2021-08-02
Payer: MEDICARE

## 2021-08-02 DIAGNOSIS — Z79.01 CHRONIC ANTICOAGULATION: Primary | ICD-10-CM

## 2021-08-02 PROCEDURE — 93793 ANTICOAG MGMT PT WARFARIN: CPT | Mod: ,,, | Performed by: PHARMACIST

## 2021-08-02 PROCEDURE — 93793 PR ANTICOAGULANT MGMT FOR PT TAKING WARFARIN: ICD-10-PCS | Mod: ,,, | Performed by: PHARMACIST

## 2021-08-10 ENCOUNTER — ANTI-COAG VISIT (OUTPATIENT)
Dept: CARDIOLOGY | Facility: CLINIC | Age: 76
End: 2021-08-10
Payer: MEDICARE

## 2021-08-10 DIAGNOSIS — Z79.01 CHRONIC ANTICOAGULATION: Primary | ICD-10-CM

## 2021-08-10 PROCEDURE — 93793 PR ANTICOAGULANT MGMT FOR PT TAKING WARFARIN: ICD-10-PCS | Mod: ,,, | Performed by: PHARMACIST

## 2021-08-10 PROCEDURE — 93793 ANTICOAG MGMT PT WARFARIN: CPT | Mod: ,,, | Performed by: PHARMACIST

## 2021-08-16 ENCOUNTER — ANTI-COAG VISIT (OUTPATIENT)
Dept: CARDIOLOGY | Facility: CLINIC | Age: 76
End: 2021-08-16
Payer: MEDICARE

## 2021-08-16 DIAGNOSIS — Z79.01 CHRONIC ANTICOAGULATION: Primary | ICD-10-CM

## 2021-08-16 PROCEDURE — 93793 PR ANTICOAGULANT MGMT FOR PT TAKING WARFARIN: ICD-10-PCS | Mod: ,,, | Performed by: PHARMACIST

## 2021-08-16 PROCEDURE — 93793 ANTICOAG MGMT PT WARFARIN: CPT | Mod: ,,, | Performed by: PHARMACIST

## 2021-08-26 ENCOUNTER — ANTI-COAG VISIT (OUTPATIENT)
Dept: CARDIOLOGY | Facility: CLINIC | Age: 76
End: 2021-08-26
Payer: MEDICARE

## 2021-08-26 DIAGNOSIS — Z79.01 CHRONIC ANTICOAGULATION: Primary | ICD-10-CM

## 2021-08-26 PROCEDURE — 93793 PR ANTICOAGULANT MGMT FOR PT TAKING WARFARIN: ICD-10-PCS | Mod: ,,, | Performed by: PHARMACIST

## 2021-08-26 PROCEDURE — 93793 ANTICOAG MGMT PT WARFARIN: CPT | Mod: ,,, | Performed by: PHARMACIST

## 2021-08-31 ENCOUNTER — EXTERNAL CHRONIC CARE MANAGEMENT (OUTPATIENT)
Dept: PRIMARY CARE CLINIC | Facility: CLINIC | Age: 76
End: 2021-08-31
Payer: MEDICARE

## 2021-08-31 PROCEDURE — 99490 CHRNC CARE MGMT STAFF 1ST 20: CPT | Mod: PBBFAC,PO | Performed by: INTERNAL MEDICINE

## 2021-08-31 PROCEDURE — 99490 CHRNC CARE MGMT STAFF 1ST 20: CPT | Mod: S$PBB,,, | Performed by: INTERNAL MEDICINE

## 2021-08-31 PROCEDURE — 99490 PR CHRONIC CARE MGMT, 1ST 20 MIN: ICD-10-PCS | Mod: S$PBB,,, | Performed by: INTERNAL MEDICINE

## 2021-09-09 ENCOUNTER — TELEPHONE (OUTPATIENT)
Dept: INTERNAL MEDICINE | Facility: CLINIC | Age: 76
End: 2021-09-09

## 2021-09-13 ENCOUNTER — ANTI-COAG VISIT (OUTPATIENT)
Dept: CARDIOLOGY | Facility: CLINIC | Age: 76
End: 2021-09-13
Payer: MEDICARE

## 2021-09-13 ENCOUNTER — TELEPHONE (OUTPATIENT)
Dept: INTERNAL MEDICINE | Facility: CLINIC | Age: 76
End: 2021-09-13

## 2021-09-13 DIAGNOSIS — Z79.01 CHRONIC ANTICOAGULATION: Primary | ICD-10-CM

## 2021-09-13 PROCEDURE — 93793 ANTICOAG MGMT PT WARFARIN: CPT | Mod: ,,, | Performed by: PHARMACIST

## 2021-09-13 PROCEDURE — 93793 PR ANTICOAGULANT MGMT FOR PT TAKING WARFARIN: ICD-10-PCS | Mod: ,,, | Performed by: PHARMACIST

## 2021-09-14 ENCOUNTER — HOSPITAL ENCOUNTER (OUTPATIENT)
Dept: RADIOLOGY | Facility: HOSPITAL | Age: 76
Discharge: HOME | End: 2021-09-14
Attending: INTERNAL MEDICINE
Payer: MEDICARE

## 2021-09-14 ENCOUNTER — OFFICE VISIT (OUTPATIENT)
Dept: INTERNAL MEDICINE | Facility: CLINIC | Age: 76
End: 2021-09-14
Payer: MEDICARE

## 2021-09-14 VITALS
SYSTOLIC BLOOD PRESSURE: 100 MMHG | WEIGHT: 236.75 LBS | HEART RATE: 83 BPM | HEIGHT: 63 IN | TEMPERATURE: 98 F | BODY MASS INDEX: 41.95 KG/M2 | OXYGEN SATURATION: 97 % | DIASTOLIC BLOOD PRESSURE: 60 MMHG

## 2021-09-14 DIAGNOSIS — M25.562 CHRONIC PAIN OF BOTH KNEES: Primary | ICD-10-CM

## 2021-09-14 DIAGNOSIS — M25.561 CHRONIC PAIN OF BOTH KNEES: ICD-10-CM

## 2021-09-14 DIAGNOSIS — M25.561 CHRONIC PAIN OF BOTH KNEES: Primary | ICD-10-CM

## 2021-09-14 DIAGNOSIS — G89.29 CHRONIC PAIN OF BOTH KNEES: ICD-10-CM

## 2021-09-14 DIAGNOSIS — G89.29 CHRONIC PAIN OF BOTH KNEES: Primary | ICD-10-CM

## 2021-09-14 DIAGNOSIS — M25.562 CHRONIC PAIN OF BOTH KNEES: ICD-10-CM

## 2021-09-14 PROCEDURE — 73560 XR KNEE 1 OR 2 VIEW BILATERAL: ICD-10-PCS | Mod: 26,50,, | Performed by: RADIOLOGY

## 2021-09-14 PROCEDURE — 99213 OFFICE O/P EST LOW 20 MIN: CPT | Mod: S$PBB,,, | Performed by: INTERNAL MEDICINE

## 2021-09-14 PROCEDURE — 73560 X-RAY EXAM OF KNEE 1 OR 2: CPT | Mod: 26,50,, | Performed by: RADIOLOGY

## 2021-09-14 PROCEDURE — 99999 PR PBB SHADOW E&M-EST. PATIENT-LVL IV: CPT | Mod: PBBFAC,,, | Performed by: INTERNAL MEDICINE

## 2021-09-14 PROCEDURE — 99999 PR PBB SHADOW E&M-EST. PATIENT-LVL IV: ICD-10-PCS | Mod: PBBFAC,,, | Performed by: INTERNAL MEDICINE

## 2021-09-14 PROCEDURE — 99213 PR OFFICE/OUTPT VISIT, EST, LEVL III, 20-29 MIN: ICD-10-PCS | Mod: S$PBB,,, | Performed by: INTERNAL MEDICINE

## 2021-09-14 PROCEDURE — 73560 X-RAY EXAM OF KNEE 1 OR 2: CPT | Mod: TC,50

## 2021-09-14 PROCEDURE — 99214 OFFICE O/P EST MOD 30 MIN: CPT | Mod: PBBFAC | Performed by: INTERNAL MEDICINE

## 2021-09-14 RX ORDER — TRAMADOL HYDROCHLORIDE 50 MG/1
50 TABLET ORAL EVERY 8 HOURS PRN
Qty: 20 TABLET | Refills: 0 | Status: SHIPPED | OUTPATIENT
Start: 2021-09-14 | End: 2022-06-15

## 2021-09-14 RX ORDER — AMLODIPINE BESYLATE 10 MG/1
TABLET ORAL
COMMUNITY
Start: 2021-07-26 | End: 2021-10-20

## 2021-09-24 ENCOUNTER — TELEPHONE (OUTPATIENT)
Dept: INTERNAL MEDICINE | Facility: CLINIC | Age: 76
End: 2021-09-24

## 2021-09-27 ENCOUNTER — ANTI-COAG VISIT (OUTPATIENT)
Dept: CARDIOLOGY | Facility: CLINIC | Age: 76
End: 2021-09-27
Payer: MEDICARE

## 2021-09-27 DIAGNOSIS — Z79.01 CHRONIC ANTICOAGULATION: Primary | ICD-10-CM

## 2021-09-27 PROCEDURE — 93793 PR ANTICOAGULANT MGMT FOR PT TAKING WARFARIN: ICD-10-PCS | Mod: ,,, | Performed by: PHARMACIST

## 2021-09-27 PROCEDURE — 93793 ANTICOAG MGMT PT WARFARIN: CPT | Mod: ,,, | Performed by: PHARMACIST

## 2021-09-28 ENCOUNTER — TELEPHONE (OUTPATIENT)
Dept: INTERNAL MEDICINE | Facility: CLINIC | Age: 76
End: 2021-09-28

## 2021-09-30 ENCOUNTER — EXTERNAL CHRONIC CARE MANAGEMENT (OUTPATIENT)
Dept: PRIMARY CARE CLINIC | Facility: CLINIC | Age: 76
End: 2021-09-30
Payer: MEDICARE

## 2021-09-30 PROCEDURE — 99490 CHRNC CARE MGMT STAFF 1ST 20: CPT | Mod: S$PBB,,, | Performed by: INTERNAL MEDICINE

## 2021-09-30 PROCEDURE — 99490 CHRNC CARE MGMT STAFF 1ST 20: CPT | Mod: PBBFAC,PO | Performed by: INTERNAL MEDICINE

## 2021-09-30 PROCEDURE — 99490 PR CHRONIC CARE MGMT, 1ST 20 MIN: ICD-10-PCS | Mod: S$PBB,,, | Performed by: INTERNAL MEDICINE

## 2021-10-04 ENCOUNTER — HOSPITAL ENCOUNTER (EMERGENCY)
Facility: HOSPITAL | Age: 76
Discharge: HOME | End: 2021-10-04
Attending: EMERGENCY MEDICINE
Payer: MEDICARE

## 2021-10-04 VITALS
HEART RATE: 68 BPM | OXYGEN SATURATION: 98 % | SYSTOLIC BLOOD PRESSURE: 132 MMHG | TEMPERATURE: 98 F | HEIGHT: 63 IN | BODY MASS INDEX: 41.82 KG/M2 | DIASTOLIC BLOOD PRESSURE: 71 MMHG | WEIGHT: 236 LBS | RESPIRATION RATE: 16 BRPM

## 2021-10-04 DIAGNOSIS — R11.0 NAUSEA: ICD-10-CM

## 2021-10-04 DIAGNOSIS — R55 NEAR SYNCOPE: Primary | ICD-10-CM

## 2021-10-04 LAB
ALBUMIN SERPL BCP-MCNC: 3.4 G/DL (ref 3.5–5.2)
ALP SERPL-CCNC: 88 U/L (ref 55–135)
ALT SERPL W/O P-5'-P-CCNC: 13 U/L (ref 10–44)
ANION GAP SERPL CALC-SCNC: 9 MMOL/L (ref 8–16)
AST SERPL-CCNC: 16 U/L (ref 10–40)
BASOPHILS # BLD AUTO: 0.03 K/UL (ref 0–0.2)
BASOPHILS NFR BLD: 0.3 % (ref 0–1.9)
BILIRUB SERPL-MCNC: 0.4 MG/DL (ref 0.1–1)
BILIRUB UR QL STRIP: NEGATIVE
BNP SERPL-MCNC: 56 PG/ML (ref 0–99)
BUN SERPL-MCNC: 18 MG/DL (ref 8–23)
CALCIUM SERPL-MCNC: 9.2 MG/DL (ref 8.7–10.5)
CHLORIDE SERPL-SCNC: 110 MMOL/L (ref 95–110)
CLARITY UR REFRACT.AUTO: CLEAR
CO2 SERPL-SCNC: 21 MMOL/L (ref 23–29)
COLOR UR AUTO: YELLOW
CREAT SERPL-MCNC: 1 MG/DL (ref 0.5–1.4)
CTP QC/QA: YES
DIFFERENTIAL METHOD: ABNORMAL
EOSINOPHIL # BLD AUTO: 0.1 K/UL (ref 0–0.5)
EOSINOPHIL NFR BLD: 0.5 % (ref 0–8)
ERYTHROCYTE [DISTWIDTH] IN BLOOD BY AUTOMATED COUNT: 15.8 % (ref 11.5–14.5)
EST. GFR  (AFRICAN AMERICAN): >60 ML/MIN/1.73 M^2
EST. GFR  (NON AFRICAN AMERICAN): 54.9 ML/MIN/1.73 M^2
GLUCOSE SERPL-MCNC: 91 MG/DL (ref 70–110)
GLUCOSE UR QL STRIP: NEGATIVE
HCT VFR BLD AUTO: 34.8 % (ref 37–48.5)
HGB BLD-MCNC: 10.4 G/DL (ref 12–16)
HGB UR QL STRIP: NEGATIVE
IMM GRANULOCYTES # BLD AUTO: 0.04 K/UL (ref 0–0.04)
IMM GRANULOCYTES NFR BLD AUTO: 0.4 % (ref 0–0.5)
KETONES UR QL STRIP: NEGATIVE
LEUKOCYTE ESTERASE UR QL STRIP: NEGATIVE
LYMPHOCYTES # BLD AUTO: 1.7 K/UL (ref 1–4.8)
LYMPHOCYTES NFR BLD: 15.1 % (ref 18–48)
MCH RBC QN AUTO: 19.9 PG (ref 27–31)
MCHC RBC AUTO-ENTMCNC: 29.9 G/DL (ref 32–36)
MCV RBC AUTO: 67 FL (ref 82–98)
MONOCYTES # BLD AUTO: 0.7 K/UL (ref 0.3–1)
MONOCYTES NFR BLD: 6.2 % (ref 4–15)
NEUTROPHILS # BLD AUTO: 8.8 K/UL (ref 1.8–7.7)
NEUTROPHILS NFR BLD: 77.5 % (ref 38–73)
NITRITE UR QL STRIP: NEGATIVE
NRBC BLD-RTO: 0 /100 WBC
PH UR STRIP: 6 [PH] (ref 5–8)
PLATELET # BLD AUTO: 268 K/UL (ref 150–450)
PMV BLD AUTO: 9.5 FL (ref 9.2–12.9)
POTASSIUM SERPL-SCNC: 3.8 MMOL/L (ref 3.5–5.1)
PROT SERPL-MCNC: 6.6 G/DL (ref 6–8.4)
PROT UR QL STRIP: NEGATIVE
RBC # BLD AUTO: 5.22 M/UL (ref 4–5.4)
SARS-COV-2 RDRP RESP QL NAA+PROBE: NEGATIVE
SODIUM SERPL-SCNC: 140 MMOL/L (ref 136–145)
SP GR UR STRIP: 1.01 (ref 1–1.03)
TROPONIN I SERPL DL<=0.01 NG/ML-MCNC: <0.006 NG/ML (ref 0–0.03)
URN SPEC COLLECT METH UR: NORMAL
WBC # BLD AUTO: 11.4 K/UL (ref 3.9–12.7)

## 2021-10-04 PROCEDURE — 85025 COMPLETE CBC W/AUTO DIFF WBC: CPT | Performed by: PHYSICIAN ASSISTANT

## 2021-10-04 PROCEDURE — 84484 ASSAY OF TROPONIN QUANT: CPT | Performed by: PHYSICIAN ASSISTANT

## 2021-10-04 PROCEDURE — 99285 EMERGENCY DEPT VISIT HI MDM: CPT | Mod: CS,,, | Performed by: PHYSICIAN ASSISTANT

## 2021-10-04 PROCEDURE — U0002 COVID-19 LAB TEST NON-CDC: HCPCS | Performed by: PHYSICIAN ASSISTANT

## 2021-10-04 PROCEDURE — 80053 COMPREHEN METABOLIC PANEL: CPT | Performed by: PHYSICIAN ASSISTANT

## 2021-10-04 PROCEDURE — 99285 PR EMERGENCY DEPT VISIT,LEVEL V: ICD-10-PCS | Mod: CS,,, | Performed by: PHYSICIAN ASSISTANT

## 2021-10-04 PROCEDURE — 25000003 PHARM REV CODE 250: Performed by: PHYSICIAN ASSISTANT

## 2021-10-04 PROCEDURE — 99285 EMERGENCY DEPT VISIT HI MDM: CPT | Mod: 25

## 2021-10-04 PROCEDURE — 96360 HYDRATION IV INFUSION INIT: CPT

## 2021-10-04 PROCEDURE — 96361 HYDRATE IV INFUSION ADD-ON: CPT

## 2021-10-04 PROCEDURE — 83880 ASSAY OF NATRIURETIC PEPTIDE: CPT | Performed by: PHYSICIAN ASSISTANT

## 2021-10-04 PROCEDURE — 81003 URINALYSIS AUTO W/O SCOPE: CPT | Performed by: PHYSICIAN ASSISTANT

## 2021-10-04 RX ORDER — ONDANSETRON 4 MG/1
4 TABLET, ORALLY DISINTEGRATING ORAL EVERY 8 HOURS PRN
Qty: 12 TABLET | Refills: 0 | Status: SHIPPED | OUTPATIENT
Start: 2021-10-04 | End: 2022-04-21

## 2021-10-04 RX ORDER — ACETAMINOPHEN 500 MG
1000 TABLET ORAL
Status: COMPLETED | OUTPATIENT
Start: 2021-10-04 | End: 2021-10-04

## 2021-10-04 RX ORDER — ONDANSETRON 2 MG/ML
4 INJECTION INTRAMUSCULAR; INTRAVENOUS
Status: DISCONTINUED | OUTPATIENT
Start: 2021-10-04 | End: 2021-10-05 | Stop reason: HOSPADM

## 2021-10-04 RX ADMIN — ACETAMINOPHEN 1000 MG: 500 TABLET ORAL at 09:10

## 2021-10-04 RX ADMIN — SODIUM CHLORIDE 1000 ML: 0.9 INJECTION, SOLUTION INTRAVENOUS at 05:10

## 2021-10-07 ENCOUNTER — OFFICE VISIT (OUTPATIENT)
Dept: INTERNAL MEDICINE | Facility: CLINIC | Age: 76
End: 2021-10-07
Payer: MEDICARE

## 2021-10-07 ENCOUNTER — LAB VISIT (OUTPATIENT)
Dept: LAB | Facility: HOSPITAL | Age: 76
End: 2021-10-07
Attending: INTERNAL MEDICINE
Payer: MEDICARE

## 2021-10-07 VITALS
OXYGEN SATURATION: 98 % | HEART RATE: 70 BPM | BODY MASS INDEX: 41.99 KG/M2 | HEIGHT: 63 IN | TEMPERATURE: 97 F | WEIGHT: 237 LBS | DIASTOLIC BLOOD PRESSURE: 64 MMHG | SYSTOLIC BLOOD PRESSURE: 130 MMHG

## 2021-10-07 DIAGNOSIS — F32.A DEPRESSION, UNSPECIFIED DEPRESSION TYPE: ICD-10-CM

## 2021-10-07 DIAGNOSIS — M17.0 PRIMARY OSTEOARTHRITIS OF BOTH KNEES: Primary | ICD-10-CM

## 2021-10-07 DIAGNOSIS — Z79.01 CHRONIC ANTICOAGULATION: ICD-10-CM

## 2021-10-07 LAB
INR PPP: 4.7 (ref 0.8–1.2)
PROTHROMBIN TIME: 46.6 SEC (ref 9–12.5)

## 2021-10-07 PROCEDURE — 99213 OFFICE O/P EST LOW 20 MIN: CPT | Mod: S$PBB,GC,, | Performed by: STUDENT IN AN ORGANIZED HEALTH CARE EDUCATION/TRAINING PROGRAM

## 2021-10-07 PROCEDURE — 85610 PROTHROMBIN TIME: CPT | Performed by: INTERNAL MEDICINE

## 2021-10-07 PROCEDURE — 99213 OFFICE O/P EST LOW 20 MIN: CPT | Mod: PBBFAC,PO | Performed by: STUDENT IN AN ORGANIZED HEALTH CARE EDUCATION/TRAINING PROGRAM

## 2021-10-07 PROCEDURE — 99213 PR OFFICE/OUTPT VISIT, EST, LEVL III, 20-29 MIN: ICD-10-PCS | Mod: S$PBB,GC,, | Performed by: STUDENT IN AN ORGANIZED HEALTH CARE EDUCATION/TRAINING PROGRAM

## 2021-10-07 PROCEDURE — 99999 PR PBB SHADOW E&M-EST. PATIENT-LVL III: CPT | Mod: PBBFAC,GC,, | Performed by: STUDENT IN AN ORGANIZED HEALTH CARE EDUCATION/TRAINING PROGRAM

## 2021-10-07 PROCEDURE — 99999 PR PBB SHADOW E&M-EST. PATIENT-LVL III: ICD-10-PCS | Mod: PBBFAC,GC,, | Performed by: STUDENT IN AN ORGANIZED HEALTH CARE EDUCATION/TRAINING PROGRAM

## 2021-10-07 PROCEDURE — 36415 COLL VENOUS BLD VENIPUNCTURE: CPT | Mod: PO | Performed by: INTERNAL MEDICINE

## 2021-10-08 ENCOUNTER — TELEPHONE (OUTPATIENT)
Dept: SPORTS MEDICINE | Facility: CLINIC | Age: 76
End: 2021-10-08

## 2021-10-08 ENCOUNTER — ANTI-COAG VISIT (OUTPATIENT)
Dept: CARDIOLOGY | Facility: CLINIC | Age: 76
End: 2021-10-08
Payer: MEDICARE

## 2021-10-08 DIAGNOSIS — M25.561 PAIN IN BOTH KNEES, UNSPECIFIED CHRONICITY: Primary | ICD-10-CM

## 2021-10-08 DIAGNOSIS — Z79.01 CHRONIC ANTICOAGULATION: Primary | ICD-10-CM

## 2021-10-08 DIAGNOSIS — M25.562 PAIN IN BOTH KNEES, UNSPECIFIED CHRONICITY: Primary | ICD-10-CM

## 2021-10-08 PROCEDURE — 93793 ANTICOAG MGMT PT WARFARIN: CPT | Mod: ,,,

## 2021-10-08 PROCEDURE — 93793 PR ANTICOAGULANT MGMT FOR PT TAKING WARFARIN: ICD-10-PCS | Mod: ,,,

## 2021-10-11 ENCOUNTER — ANTI-COAG VISIT (OUTPATIENT)
Dept: CARDIOLOGY | Facility: CLINIC | Age: 76
End: 2021-10-11
Payer: MEDICARE

## 2021-10-11 ENCOUNTER — TELEPHONE (OUTPATIENT)
Dept: SPORTS MEDICINE | Facility: CLINIC | Age: 76
End: 2021-10-11

## 2021-10-11 DIAGNOSIS — Z79.01 CHRONIC ANTICOAGULATION: Primary | ICD-10-CM

## 2021-10-11 DIAGNOSIS — M25.562 PAIN IN BOTH KNEES, UNSPECIFIED CHRONICITY: Primary | ICD-10-CM

## 2021-10-11 DIAGNOSIS — M25.561 PAIN IN BOTH KNEES, UNSPECIFIED CHRONICITY: Primary | ICD-10-CM

## 2021-10-11 PROCEDURE — 93793 ANTICOAG MGMT PT WARFARIN: CPT | Mod: ,,, | Performed by: PHARMACIST

## 2021-10-11 PROCEDURE — 93793 PR ANTICOAGULANT MGMT FOR PT TAKING WARFARIN: ICD-10-PCS | Mod: ,,, | Performed by: PHARMACIST

## 2021-10-14 ENCOUNTER — OFFICE VISIT (OUTPATIENT)
Dept: SPORTS MEDICINE | Facility: CLINIC | Age: 76
End: 2021-10-14
Payer: MEDICARE

## 2021-10-14 ENCOUNTER — HOSPITAL ENCOUNTER (OUTPATIENT)
Dept: RADIOLOGY | Facility: HOSPITAL | Age: 76
Discharge: HOME | End: 2021-10-14
Attending: NEUROMUSCULOSKELETAL MEDICINE & OMM
Payer: MEDICARE

## 2021-10-14 VITALS
HEIGHT: 63 IN | WEIGHT: 236 LBS | BODY MASS INDEX: 41.82 KG/M2 | DIASTOLIC BLOOD PRESSURE: 90 MMHG | SYSTOLIC BLOOD PRESSURE: 130 MMHG

## 2021-10-14 DIAGNOSIS — M25.561 CHRONIC PAIN OF BOTH KNEES: ICD-10-CM

## 2021-10-14 DIAGNOSIS — M25.562 CHRONIC PAIN OF BOTH KNEES: ICD-10-CM

## 2021-10-14 DIAGNOSIS — M17.0 PRIMARY OSTEOARTHRITIS OF BOTH KNEES: Primary | ICD-10-CM

## 2021-10-14 DIAGNOSIS — M25.561 PAIN IN BOTH KNEES, UNSPECIFIED CHRONICITY: ICD-10-CM

## 2021-10-14 DIAGNOSIS — G89.29 CHRONIC PAIN OF BOTH KNEES: ICD-10-CM

## 2021-10-14 DIAGNOSIS — M25.562 PAIN IN BOTH KNEES, UNSPECIFIED CHRONICITY: ICD-10-CM

## 2021-10-14 PROCEDURE — 99204 OFFICE O/P NEW MOD 45 MIN: CPT | Mod: 25,S$PBB,, | Performed by: NEUROMUSCULOSKELETAL MEDICINE & OMM

## 2021-10-14 PROCEDURE — 20611 DRAIN/INJ JOINT/BURSA W/US: CPT | Mod: 50,S$PBB,, | Performed by: NEUROMUSCULOSKELETAL MEDICINE & OMM

## 2021-10-14 PROCEDURE — 20611 PR DRAIN/ASP/INJECT MAJOR JOINT/BURSA W/US GUIDANCE: ICD-10-PCS | Mod: 50,S$PBB,, | Performed by: NEUROMUSCULOSKELETAL MEDICINE & OMM

## 2021-10-14 PROCEDURE — 99213 OFFICE O/P EST LOW 20 MIN: CPT | Mod: PBBFAC,PO,25 | Performed by: NEUROMUSCULOSKELETAL MEDICINE & OMM

## 2021-10-14 PROCEDURE — 99999 PR PBB SHADOW E&M-EST. PATIENT-LVL III: ICD-10-PCS | Mod: PBBFAC,,, | Performed by: NEUROMUSCULOSKELETAL MEDICINE & OMM

## 2021-10-14 PROCEDURE — 20611 DRAIN/INJ JOINT/BURSA W/US: CPT | Mod: 50,PBBFAC,PO | Performed by: NEUROMUSCULOSKELETAL MEDICINE & OMM

## 2021-10-14 PROCEDURE — 73562 X-RAY EXAM OF KNEE 3: CPT | Mod: 26,50,, | Performed by: RADIOLOGY

## 2021-10-14 PROCEDURE — 73562 X-RAY EXAM OF KNEE 3: CPT | Mod: TC,50,PO

## 2021-10-14 PROCEDURE — 99999 PR PBB SHADOW E&M-EST. PATIENT-LVL III: CPT | Mod: PBBFAC,,, | Performed by: NEUROMUSCULOSKELETAL MEDICINE & OMM

## 2021-10-14 PROCEDURE — 73562 XR KNEE 3 VIEW BILATERAL: ICD-10-PCS | Mod: 26,50,, | Performed by: RADIOLOGY

## 2021-10-14 PROCEDURE — 99204 PR OFFICE/OUTPT VISIT, NEW, LEVL IV, 45-59 MIN: ICD-10-PCS | Mod: 25,S$PBB,, | Performed by: NEUROMUSCULOSKELETAL MEDICINE & OMM

## 2021-10-14 RX ORDER — TRIAMCINOLONE ACETONIDE 40 MG/ML
40 INJECTION, SUSPENSION INTRA-ARTICULAR; INTRAMUSCULAR
Status: COMPLETED | OUTPATIENT
Start: 2021-10-14 | End: 2021-10-14

## 2021-10-14 RX ADMIN — TRIAMCINOLONE ACETONIDE 40 MG: 40 INJECTION, SUSPENSION INTRA-ARTICULAR; INTRAMUSCULAR at 12:10

## 2021-10-18 ENCOUNTER — ANTI-COAG VISIT (OUTPATIENT)
Dept: CARDIOLOGY | Facility: CLINIC | Age: 76
End: 2021-10-18
Payer: MEDICARE

## 2021-10-18 ENCOUNTER — TELEPHONE (OUTPATIENT)
Dept: ORTHOPEDICS | Facility: CLINIC | Age: 76
End: 2021-10-18

## 2021-10-18 DIAGNOSIS — Z79.01 CHRONIC ANTICOAGULATION: Primary | ICD-10-CM

## 2021-10-18 PROCEDURE — 93793 ANTICOAG MGMT PT WARFARIN: CPT | Mod: ,,, | Performed by: PHARMACIST

## 2021-10-18 PROCEDURE — 93793 PR ANTICOAGULANT MGMT FOR PT TAKING WARFARIN: ICD-10-PCS | Mod: ,,, | Performed by: PHARMACIST

## 2021-10-19 ENCOUNTER — PATIENT OUTREACH (OUTPATIENT)
Dept: ADMINISTRATIVE | Facility: OTHER | Age: 76
End: 2021-10-19

## 2021-10-20 ENCOUNTER — OFFICE VISIT (OUTPATIENT)
Dept: CARDIOLOGY | Facility: CLINIC | Age: 76
End: 2021-10-20
Payer: MEDICARE

## 2021-10-20 VITALS
SYSTOLIC BLOOD PRESSURE: 140 MMHG | WEIGHT: 232.81 LBS | DIASTOLIC BLOOD PRESSURE: 78 MMHG | HEART RATE: 65 BPM | HEIGHT: 63 IN | BODY MASS INDEX: 41.25 KG/M2

## 2021-10-20 DIAGNOSIS — I27.20 PULMONARY HYPERTENSION: ICD-10-CM

## 2021-10-20 DIAGNOSIS — I82.532 CHRONIC DEEP VEIN THROMBOSIS (DVT) OF LEFT POPLITEAL VEIN: ICD-10-CM

## 2021-10-20 DIAGNOSIS — Z86.711 HISTORY OF PULMONARY EMBOLUS (PE): ICD-10-CM

## 2021-10-20 DIAGNOSIS — Z79.01 CHRONIC ANTICOAGULATION: ICD-10-CM

## 2021-10-20 DIAGNOSIS — I50.32 CHRONIC DIASTOLIC HEART FAILURE: Primary | ICD-10-CM

## 2021-10-20 DIAGNOSIS — I25.2 HISTORY OF NON-ST ELEVATION MYOCARDIAL INFARCTION (NSTEMI): ICD-10-CM

## 2021-10-20 DIAGNOSIS — E66.01 MORBID OBESITY WITH BMI OF 40.0-44.9, ADULT: ICD-10-CM

## 2021-10-20 DIAGNOSIS — I10 ESSENTIAL HYPERTENSION: ICD-10-CM

## 2021-10-20 PROCEDURE — 99214 PR OFFICE/OUTPT VISIT, EST, LEVL IV, 30-39 MIN: ICD-10-PCS | Mod: S$PBB,,, | Performed by: NURSE PRACTITIONER

## 2021-10-20 PROCEDURE — 99999 PR PBB SHADOW E&M-EST. PATIENT-LVL IV: CPT | Mod: PBBFAC,,, | Performed by: NURSE PRACTITIONER

## 2021-10-20 PROCEDURE — 99999 PR PBB SHADOW E&M-EST. PATIENT-LVL IV: ICD-10-PCS | Mod: PBBFAC,,, | Performed by: NURSE PRACTITIONER

## 2021-10-20 PROCEDURE — 99214 OFFICE O/P EST MOD 30 MIN: CPT | Mod: S$PBB,,, | Performed by: NURSE PRACTITIONER

## 2021-10-20 PROCEDURE — 99214 OFFICE O/P EST MOD 30 MIN: CPT | Mod: PBBFAC,PO | Performed by: NURSE PRACTITIONER

## 2021-10-20 RX ORDER — FUROSEMIDE 20 MG/1
20 TABLET ORAL DAILY
Qty: 90 TABLET | Refills: 3 | Status: SHIPPED | OUTPATIENT
Start: 2021-10-20 | End: 2022-10-11

## 2021-10-20 RX ORDER — SPIRONOLACTONE 25 MG/1
25 TABLET ORAL DAILY
Qty: 30 TABLET | Refills: 11 | Status: SHIPPED | OUTPATIENT
Start: 2021-10-20 | End: 2022-04-21 | Stop reason: SDUPTHER

## 2021-10-26 ENCOUNTER — ANTI-COAG VISIT (OUTPATIENT)
Dept: CARDIOLOGY | Facility: CLINIC | Age: 76
End: 2021-10-26
Payer: MEDICARE

## 2021-10-26 DIAGNOSIS — Z79.01 CHRONIC ANTICOAGULATION: Primary | ICD-10-CM

## 2021-10-26 PROCEDURE — 93793 ANTICOAG MGMT PT WARFARIN: CPT | Mod: ,,, | Performed by: PHARMACIST

## 2021-10-26 PROCEDURE — 93793 PR ANTICOAGULANT MGMT FOR PT TAKING WARFARIN: ICD-10-PCS | Mod: ,,, | Performed by: PHARMACIST

## 2021-10-27 ENCOUNTER — TELEPHONE (OUTPATIENT)
Dept: ORTHOPEDICS | Facility: CLINIC | Age: 76
End: 2021-10-27
Payer: MEDICARE

## 2021-10-28 ENCOUNTER — OFFICE VISIT (OUTPATIENT)
Dept: BARIATRICS | Facility: CLINIC | Age: 76
End: 2021-10-28
Payer: MEDICARE

## 2021-10-28 VITALS
SYSTOLIC BLOOD PRESSURE: 186 MMHG | DIASTOLIC BLOOD PRESSURE: 85 MMHG | HEART RATE: 76 BPM | BODY MASS INDEX: 41.23 KG/M2 | OXYGEN SATURATION: 96 % | HEIGHT: 63 IN | WEIGHT: 232.69 LBS

## 2021-10-28 DIAGNOSIS — E66.01 CLASS 3 SEVERE OBESITY DUE TO EXCESS CALORIES WITH SERIOUS COMORBIDITY AND BODY MASS INDEX (BMI) OF 40.0 TO 44.9 IN ADULT: Primary | ICD-10-CM

## 2021-10-28 DIAGNOSIS — I10 HYPERTENSION, UNSPECIFIED TYPE: ICD-10-CM

## 2021-10-28 PROCEDURE — 99999 PR PBB SHADOW E&M-EST. PATIENT-LVL IV: CPT | Mod: PBBFAC,,, | Performed by: INTERNAL MEDICINE

## 2021-10-28 PROCEDURE — 99999 PR PBB SHADOW E&M-EST. PATIENT-LVL IV: ICD-10-PCS | Mod: PBBFAC,,, | Performed by: INTERNAL MEDICINE

## 2021-10-28 PROCEDURE — 99214 OFFICE O/P EST MOD 30 MIN: CPT | Mod: PBBFAC | Performed by: INTERNAL MEDICINE

## 2021-10-28 PROCEDURE — 99213 PR OFFICE/OUTPT VISIT, EST, LEVL III, 20-29 MIN: ICD-10-PCS | Mod: S$PBB,,, | Performed by: INTERNAL MEDICINE

## 2021-10-28 PROCEDURE — 99213 OFFICE O/P EST LOW 20 MIN: CPT | Mod: S$PBB,,, | Performed by: INTERNAL MEDICINE

## 2021-10-28 RX ORDER — TOPIRAMATE 25 MG/1
25 TABLET ORAL 2 TIMES DAILY
Qty: 180 TABLET | Refills: 0 | Status: SHIPPED | OUTPATIENT
Start: 2021-10-28 | End: 2023-02-10

## 2021-10-31 ENCOUNTER — EXTERNAL CHRONIC CARE MANAGEMENT (OUTPATIENT)
Dept: PRIMARY CARE CLINIC | Facility: CLINIC | Age: 76
End: 2021-10-31
Payer: MEDICARE

## 2021-10-31 PROCEDURE — 99490 CHRNC CARE MGMT STAFF 1ST 20: CPT | Mod: PBBFAC,PO | Performed by: INTERNAL MEDICINE

## 2021-10-31 PROCEDURE — 99490 CHRNC CARE MGMT STAFF 1ST 20: CPT | Mod: S$PBB,,, | Performed by: INTERNAL MEDICINE

## 2021-10-31 PROCEDURE — 99490 PR CHRONIC CARE MGMT, 1ST 20 MIN: ICD-10-PCS | Mod: S$PBB,,, | Performed by: INTERNAL MEDICINE

## 2021-11-02 ENCOUNTER — TELEPHONE (OUTPATIENT)
Dept: ORTHOPEDICS | Facility: CLINIC | Age: 76
End: 2021-11-02
Payer: MEDICARE

## 2021-11-02 ENCOUNTER — ANTI-COAG VISIT (OUTPATIENT)
Dept: CARDIOLOGY | Facility: CLINIC | Age: 76
End: 2021-11-02
Payer: MEDICARE

## 2021-11-02 DIAGNOSIS — Z79.01 CHRONIC ANTICOAGULATION: Primary | ICD-10-CM

## 2021-11-02 PROCEDURE — 93793 ANTICOAG MGMT PT WARFARIN: CPT | Mod: ,,, | Performed by: PHARMACIST

## 2021-11-02 PROCEDURE — 93793 PR ANTICOAGULANT MGMT FOR PT TAKING WARFARIN: ICD-10-PCS | Mod: ,,, | Performed by: PHARMACIST

## 2021-11-09 ENCOUNTER — ANTI-COAG VISIT (OUTPATIENT)
Dept: CARDIOLOGY | Facility: CLINIC | Age: 76
End: 2021-11-09
Payer: MEDICARE

## 2021-11-09 DIAGNOSIS — Z79.01 CHRONIC ANTICOAGULATION: Primary | ICD-10-CM

## 2021-11-09 PROCEDURE — 93793 ANTICOAG MGMT PT WARFARIN: CPT | Mod: ,,, | Performed by: PHARMACIST

## 2021-11-09 PROCEDURE — 93793 PR ANTICOAGULANT MGMT FOR PT TAKING WARFARIN: ICD-10-PCS | Mod: ,,, | Performed by: PHARMACIST

## 2021-11-10 ENCOUNTER — LAB VISIT (OUTPATIENT)
Dept: LAB | Facility: HOSPITAL | Age: 76
End: 2021-11-10
Attending: INTERNAL MEDICINE
Payer: MEDICARE

## 2021-11-10 ENCOUNTER — TELEPHONE (OUTPATIENT)
Dept: CARDIOLOGY | Facility: CLINIC | Age: 76
End: 2021-11-10
Payer: MEDICARE

## 2021-11-10 DIAGNOSIS — I50.32 CHRONIC DIASTOLIC HEART FAILURE: ICD-10-CM

## 2021-11-10 DIAGNOSIS — E87.6 HYPOKALEMIA: ICD-10-CM

## 2021-11-10 DIAGNOSIS — I27.20 PULMONARY HYPERTENSION: ICD-10-CM

## 2021-11-10 DIAGNOSIS — Z00.00 ANNUAL PHYSICAL EXAM: ICD-10-CM

## 2021-11-10 DIAGNOSIS — I10 ESSENTIAL HYPERTENSION: ICD-10-CM

## 2021-11-10 DIAGNOSIS — E78.5 DYSLIPIDEMIA: ICD-10-CM

## 2021-11-10 LAB
ALBUMIN SERPL BCP-MCNC: 3.6 G/DL (ref 3.5–5.2)
ALP SERPL-CCNC: 100 U/L (ref 55–135)
ALT SERPL W/O P-5'-P-CCNC: 12 U/L (ref 10–44)
ANION GAP SERPL CALC-SCNC: 9 MMOL/L (ref 8–16)
AST SERPL-CCNC: 13 U/L (ref 10–40)
BASOPHILS # BLD AUTO: 0.04 K/UL (ref 0–0.2)
BASOPHILS NFR BLD: 0.4 % (ref 0–1.9)
BILIRUB SERPL-MCNC: 0.5 MG/DL (ref 0.1–1)
BUN SERPL-MCNC: 16 MG/DL (ref 8–23)
CALCIUM SERPL-MCNC: 9.8 MG/DL (ref 8.7–10.5)
CHLORIDE SERPL-SCNC: 109 MMOL/L (ref 95–110)
CHOLEST SERPL-MCNC: 127 MG/DL (ref 120–199)
CHOLEST/HDLC SERPL: 2.2 {RATIO} (ref 2–5)
CO2 SERPL-SCNC: 23 MMOL/L (ref 23–29)
CREAT SERPL-MCNC: 0.8 MG/DL (ref 0.5–1.4)
DIFFERENTIAL METHOD: ABNORMAL
EOSINOPHIL # BLD AUTO: 0.2 K/UL (ref 0–0.5)
EOSINOPHIL NFR BLD: 1.9 % (ref 0–8)
ERYTHROCYTE [DISTWIDTH] IN BLOOD BY AUTOMATED COUNT: 16.8 % (ref 11.5–14.5)
EST. GFR  (AFRICAN AMERICAN): >60 ML/MIN/1.73 M^2
EST. GFR  (NON AFRICAN AMERICAN): >60 ML/MIN/1.73 M^2
GLUCOSE SERPL-MCNC: 96 MG/DL (ref 70–110)
HCT VFR BLD AUTO: 40.2 % (ref 37–48.5)
HDLC SERPL-MCNC: 58 MG/DL (ref 40–75)
HDLC SERPL: 45.7 % (ref 20–50)
HGB BLD-MCNC: 11.5 G/DL (ref 12–16)
IMM GRANULOCYTES # BLD AUTO: 0.07 K/UL (ref 0–0.04)
IMM GRANULOCYTES NFR BLD AUTO: 0.7 % (ref 0–0.5)
LDLC SERPL CALC-MCNC: 56.8 MG/DL (ref 63–159)
LYMPHOCYTES # BLD AUTO: 3.3 K/UL (ref 1–4.8)
LYMPHOCYTES NFR BLD: 33.9 % (ref 18–48)
MCH RBC QN AUTO: 19.6 PG (ref 27–31)
MCHC RBC AUTO-ENTMCNC: 28.6 G/DL (ref 32–36)
MCV RBC AUTO: 69 FL (ref 82–98)
MONOCYTES # BLD AUTO: 0.8 K/UL (ref 0.3–1)
MONOCYTES NFR BLD: 8.2 % (ref 4–15)
NEUTROPHILS # BLD AUTO: 5.3 K/UL (ref 1.8–7.7)
NEUTROPHILS NFR BLD: 54.9 % (ref 38–73)
NONHDLC SERPL-MCNC: 69 MG/DL
NRBC BLD-RTO: 0 /100 WBC
PLATELET # BLD AUTO: 352 K/UL (ref 150–450)
PMV BLD AUTO: 9.8 FL (ref 9.2–12.9)
POTASSIUM SERPL-SCNC: 4.2 MMOL/L (ref 3.5–5.1)
PROT SERPL-MCNC: 6.8 G/DL (ref 6–8.4)
RBC # BLD AUTO: 5.86 M/UL (ref 4–5.4)
SODIUM SERPL-SCNC: 141 MMOL/L (ref 136–145)
T4 FREE SERPL-MCNC: 0.87 NG/DL (ref 0.71–1.51)
TRIGL SERPL-MCNC: 61 MG/DL (ref 30–150)
TSH SERPL DL<=0.005 MIU/L-ACNC: 2.44 UIU/ML (ref 0.4–4)
WBC # BLD AUTO: 9.67 K/UL (ref 3.9–12.7)

## 2021-11-10 PROCEDURE — 80053 COMPREHEN METABOLIC PANEL: CPT | Performed by: NURSE PRACTITIONER

## 2021-11-10 PROCEDURE — 36415 COLL VENOUS BLD VENIPUNCTURE: CPT | Mod: PO | Performed by: INTERNAL MEDICINE

## 2021-11-10 PROCEDURE — 80061 LIPID PANEL: CPT | Performed by: INTERNAL MEDICINE

## 2021-11-10 PROCEDURE — 85025 COMPLETE CBC W/AUTO DIFF WBC: CPT | Performed by: INTERNAL MEDICINE

## 2021-11-10 PROCEDURE — 84439 ASSAY OF FREE THYROXINE: CPT | Performed by: INTERNAL MEDICINE

## 2021-11-10 PROCEDURE — 84443 ASSAY THYROID STIM HORMONE: CPT | Performed by: INTERNAL MEDICINE

## 2021-11-16 ENCOUNTER — ANTI-COAG VISIT (OUTPATIENT)
Dept: CARDIOLOGY | Facility: CLINIC | Age: 76
End: 2021-11-16
Payer: MEDICARE

## 2021-11-16 DIAGNOSIS — Z79.01 CHRONIC ANTICOAGULATION: Primary | ICD-10-CM

## 2021-11-16 PROCEDURE — 93793 PR ANTICOAGULANT MGMT FOR PT TAKING WARFARIN: ICD-10-PCS | Mod: ,,, | Performed by: PHARMACIST

## 2021-11-16 PROCEDURE — 93793 ANTICOAG MGMT PT WARFARIN: CPT | Mod: ,,, | Performed by: PHARMACIST

## 2021-11-18 ENCOUNTER — OFFICE VISIT (OUTPATIENT)
Dept: PSYCHIATRY | Facility: CLINIC | Age: 76
End: 2021-11-18
Payer: MEDICARE

## 2021-11-18 DIAGNOSIS — F32.1 CURRENT MODERATE EPISODE OF MAJOR DEPRESSIVE DISORDER, UNSPECIFIED WHETHER RECURRENT: Primary | ICD-10-CM

## 2021-11-18 PROCEDURE — 90792 PSYCH DIAG EVAL W/MED SRVCS: CPT | Mod: ,,, | Performed by: PSYCHIATRY & NEUROLOGY

## 2021-11-18 PROCEDURE — 90792 PR PSYCHIATRIC DIAGNOSTIC EVALUATION W/MEDICAL SERVICES: ICD-10-PCS | Mod: ,,, | Performed by: PSYCHIATRY & NEUROLOGY

## 2021-11-24 ENCOUNTER — ANTI-COAG VISIT (OUTPATIENT)
Dept: CARDIOLOGY | Facility: CLINIC | Age: 76
End: 2021-11-24
Payer: MEDICARE

## 2021-11-24 DIAGNOSIS — Z79.01 CHRONIC ANTICOAGULATION: Primary | ICD-10-CM

## 2021-11-24 PROCEDURE — 93793 PR ANTICOAGULANT MGMT FOR PT TAKING WARFARIN: ICD-10-PCS | Mod: ,,, | Performed by: PHARMACIST

## 2021-11-24 PROCEDURE — 93793 ANTICOAG MGMT PT WARFARIN: CPT | Mod: ,,, | Performed by: PHARMACIST

## 2021-11-30 ENCOUNTER — ANTI-COAG VISIT (OUTPATIENT)
Dept: CARDIOLOGY | Facility: CLINIC | Age: 76
End: 2021-11-30
Payer: MEDICARE

## 2021-11-30 DIAGNOSIS — Z79.01 CHRONIC ANTICOAGULATION: Primary | ICD-10-CM

## 2021-11-30 PROCEDURE — 93793 PR ANTICOAGULANT MGMT FOR PT TAKING WARFARIN: ICD-10-PCS | Mod: ,,, | Performed by: PHARMACIST

## 2021-11-30 PROCEDURE — 93793 ANTICOAG MGMT PT WARFARIN: CPT | Mod: ,,, | Performed by: PHARMACIST

## 2021-12-01 ENCOUNTER — OFFICE VISIT (OUTPATIENT)
Dept: PSYCHIATRY | Facility: CLINIC | Age: 76
End: 2021-12-01
Payer: MEDICARE

## 2021-12-01 DIAGNOSIS — F41.1 GAD (GENERALIZED ANXIETY DISORDER): ICD-10-CM

## 2021-12-01 DIAGNOSIS — F33.1 MDD (MAJOR DEPRESSIVE DISORDER), RECURRENT EPISODE, MODERATE: Primary | ICD-10-CM

## 2021-12-01 PROCEDURE — 90853 GROUP PSYCHOTHERAPY: CPT | Mod: ,,, | Performed by: SOCIAL WORKER

## 2021-12-01 PROCEDURE — 90853 PR GROUP PSYCHOTHERAPY: ICD-10-PCS | Mod: ,,, | Performed by: SOCIAL WORKER

## 2021-12-06 ENCOUNTER — ANTI-COAG VISIT (OUTPATIENT)
Dept: CARDIOLOGY | Facility: CLINIC | Age: 76
End: 2021-12-06
Payer: MEDICARE

## 2021-12-06 DIAGNOSIS — Z79.01 CHRONIC ANTICOAGULATION: Primary | ICD-10-CM

## 2021-12-06 DIAGNOSIS — I82.532 CHRONIC DEEP VEIN THROMBOSIS (DVT) OF LEFT POPLITEAL VEIN: ICD-10-CM

## 2021-12-06 DIAGNOSIS — Z86.711 HISTORY OF PULMONARY EMBOLUS (PE): ICD-10-CM

## 2021-12-06 PROCEDURE — 93793 ANTICOAG MGMT PT WARFARIN: CPT | Mod: ,,,

## 2021-12-06 PROCEDURE — 93793 PR ANTICOAGULANT MGMT FOR PT TAKING WARFARIN: ICD-10-PCS | Mod: ,,,

## 2021-12-13 ENCOUNTER — ANTI-COAG VISIT (OUTPATIENT)
Dept: CARDIOLOGY | Facility: CLINIC | Age: 76
End: 2021-12-13
Payer: MEDICARE

## 2021-12-13 DIAGNOSIS — Z86.711 HISTORY OF PULMONARY EMBOLUS (PE): Primary | ICD-10-CM

## 2021-12-13 DIAGNOSIS — Z79.01 CHRONIC ANTICOAGULATION: ICD-10-CM

## 2021-12-13 DIAGNOSIS — I82.532 CHRONIC DEEP VEIN THROMBOSIS (DVT) OF LEFT POPLITEAL VEIN: ICD-10-CM

## 2021-12-13 PROCEDURE — 93793 PR ANTICOAGULANT MGMT FOR PT TAKING WARFARIN: ICD-10-PCS | Mod: ,,, | Performed by: PHARMACIST

## 2021-12-13 PROCEDURE — 93793 ANTICOAG MGMT PT WARFARIN: CPT | Mod: ,,, | Performed by: PHARMACIST

## 2021-12-15 ENCOUNTER — OFFICE VISIT (OUTPATIENT)
Dept: PSYCHIATRY | Facility: CLINIC | Age: 76
End: 2021-12-15
Payer: MEDICARE

## 2021-12-15 DIAGNOSIS — F33.0 MILD EPISODE OF RECURRENT MAJOR DEPRESSIVE DISORDER: Primary | ICD-10-CM

## 2021-12-15 PROCEDURE — 99999 PR PBB SHADOW E&M-EST. PATIENT-LVL I: ICD-10-PCS | Mod: PBBFAC,,, | Performed by: SOCIAL WORKER

## 2021-12-15 PROCEDURE — 90853 GROUP PSYCHOTHERAPY: CPT | Mod: ,,, | Performed by: SOCIAL WORKER

## 2021-12-15 PROCEDURE — 90853 PR GROUP PSYCHOTHERAPY: ICD-10-PCS | Mod: ,,, | Performed by: SOCIAL WORKER

## 2021-12-15 PROCEDURE — 99999 PR PBB SHADOW E&M-EST. PATIENT-LVL I: CPT | Mod: PBBFAC,,, | Performed by: SOCIAL WORKER

## 2021-12-15 PROCEDURE — 99211 OFF/OP EST MAY X REQ PHY/QHP: CPT | Mod: PBBFAC | Performed by: SOCIAL WORKER

## 2021-12-20 ENCOUNTER — ANTI-COAG VISIT (OUTPATIENT)
Dept: CARDIOLOGY | Facility: CLINIC | Age: 76
End: 2021-12-20
Payer: MEDICARE

## 2021-12-20 DIAGNOSIS — I82.532 CHRONIC DEEP VEIN THROMBOSIS (DVT) OF LEFT POPLITEAL VEIN: ICD-10-CM

## 2021-12-20 DIAGNOSIS — Z86.711 HISTORY OF PULMONARY EMBOLUS (PE): Primary | ICD-10-CM

## 2021-12-20 DIAGNOSIS — Z79.01 CHRONIC ANTICOAGULATION: ICD-10-CM

## 2021-12-20 PROCEDURE — 93793 PR ANTICOAGULANT MGMT FOR PT TAKING WARFARIN: ICD-10-PCS | Mod: ,,, | Performed by: PHARMACIST

## 2021-12-20 PROCEDURE — 93793 ANTICOAG MGMT PT WARFARIN: CPT | Mod: ,,, | Performed by: PHARMACIST

## 2021-12-22 ENCOUNTER — IMMUNIZATION (OUTPATIENT)
Dept: INTERNAL MEDICINE | Facility: CLINIC | Age: 76
End: 2021-12-22
Payer: MEDICARE

## 2021-12-22 DIAGNOSIS — Z23 NEED FOR VACCINATION: Primary | ICD-10-CM

## 2021-12-22 PROCEDURE — 0004A COVID-19, MRNA, LNP-S, PF, 30 MCG/0.3 ML DOSE VACCINE: CPT | Mod: PBBFAC

## 2021-12-28 ENCOUNTER — ANTI-COAG VISIT (OUTPATIENT)
Dept: CARDIOLOGY | Facility: CLINIC | Age: 76
End: 2021-12-28
Payer: MEDICARE

## 2021-12-28 DIAGNOSIS — I82.532 CHRONIC DEEP VEIN THROMBOSIS (DVT) OF LEFT POPLITEAL VEIN: ICD-10-CM

## 2021-12-28 DIAGNOSIS — Z86.711 HISTORY OF PULMONARY EMBOLUS (PE): Primary | ICD-10-CM

## 2021-12-28 DIAGNOSIS — Z79.01 CHRONIC ANTICOAGULATION: ICD-10-CM

## 2021-12-28 PROCEDURE — 93793 PR ANTICOAGULANT MGMT FOR PT TAKING WARFARIN: ICD-10-PCS | Mod: ,,,

## 2021-12-28 PROCEDURE — 93793 ANTICOAG MGMT PT WARFARIN: CPT | Mod: ,,,

## 2021-12-31 ENCOUNTER — EXTERNAL CHRONIC CARE MANAGEMENT (OUTPATIENT)
Dept: PRIMARY CARE CLINIC | Facility: CLINIC | Age: 76
End: 2021-12-31
Payer: MEDICARE

## 2021-12-31 PROCEDURE — 99490 PR CHRONIC CARE MGMT, 1ST 20 MIN: ICD-10-PCS | Mod: S$PBB,,, | Performed by: INTERNAL MEDICINE

## 2021-12-31 PROCEDURE — 99490 CHRNC CARE MGMT STAFF 1ST 20: CPT | Mod: S$PBB,,, | Performed by: INTERNAL MEDICINE

## 2021-12-31 PROCEDURE — 99490 CHRNC CARE MGMT STAFF 1ST 20: CPT | Mod: PBBFAC,PO | Performed by: INTERNAL MEDICINE

## 2022-01-03 ENCOUNTER — TELEPHONE (OUTPATIENT)
Dept: INTERNAL MEDICINE | Facility: CLINIC | Age: 77
End: 2022-01-03
Payer: MEDICARE

## 2022-01-03 ENCOUNTER — ANTI-COAG VISIT (OUTPATIENT)
Dept: CARDIOLOGY | Facility: CLINIC | Age: 77
End: 2022-01-03
Payer: MEDICARE

## 2022-01-03 DIAGNOSIS — Z86.711 HISTORY OF PULMONARY EMBOLUS (PE): Primary | ICD-10-CM

## 2022-01-03 DIAGNOSIS — Z12.31 ENCOUNTER FOR SCREENING MAMMOGRAM FOR BREAST CANCER: Primary | ICD-10-CM

## 2022-01-03 DIAGNOSIS — I82.532 CHRONIC DEEP VEIN THROMBOSIS (DVT) OF LEFT POPLITEAL VEIN: ICD-10-CM

## 2022-01-03 DIAGNOSIS — Z79.01 CHRONIC ANTICOAGULATION: ICD-10-CM

## 2022-01-03 PROCEDURE — 93793 ANTICOAG MGMT PT WARFARIN: CPT | Mod: ,,, | Performed by: PHARMACIST

## 2022-01-03 PROCEDURE — 93793 PR ANTICOAGULANT MGMT FOR PT TAKING WARFARIN: ICD-10-PCS | Mod: ,,, | Performed by: PHARMACIST

## 2022-01-03 NOTE — TELEPHONE ENCOUNTER
----- Message from Delma Gonsalez sent at 1/3/2022  1:03 PM CST -----  Regarding: pt  Pt is calling to speak with the nurse pt received a letter in the mail pt needs a new mmg order put in can you please call pt at [phone].    KATIE

## 2022-01-03 NOTE — PROGRESS NOTES
Patient took Coumadin per calendar and denies change to health or medications.  Patient will now avoid high vitamin K foods as her diet plan and she verified she is using a pillbox for her medications.

## 2022-01-20 ENCOUNTER — TELEPHONE (OUTPATIENT)
Dept: INTERNAL MEDICINE | Facility: CLINIC | Age: 77
End: 2022-01-20
Payer: MEDICARE

## 2022-01-20 ENCOUNTER — ANTI-COAG VISIT (OUTPATIENT)
Dept: CARDIOLOGY | Facility: CLINIC | Age: 77
End: 2022-01-20
Payer: MEDICARE

## 2022-01-20 DIAGNOSIS — I82.532 CHRONIC DEEP VEIN THROMBOSIS (DVT) OF LEFT POPLITEAL VEIN: ICD-10-CM

## 2022-01-20 DIAGNOSIS — Z86.711 HISTORY OF PULMONARY EMBOLUS (PE): Primary | ICD-10-CM

## 2022-01-20 DIAGNOSIS — Z79.01 CHRONIC ANTICOAGULATION: ICD-10-CM

## 2022-01-20 PROCEDURE — 93793 ANTICOAG MGMT PT WARFARIN: CPT | Mod: ,,, | Performed by: PHARMACIST

## 2022-01-20 PROCEDURE — 93793 PR ANTICOAGULANT MGMT FOR PT TAKING WARFARIN: ICD-10-PCS | Mod: ,,, | Performed by: PHARMACIST

## 2022-01-26 ENCOUNTER — ANTI-COAG VISIT (OUTPATIENT)
Dept: CARDIOLOGY | Facility: CLINIC | Age: 77
End: 2022-01-26
Payer: MEDICARE

## 2022-01-26 DIAGNOSIS — I82.532 CHRONIC DEEP VEIN THROMBOSIS (DVT) OF LEFT POPLITEAL VEIN: ICD-10-CM

## 2022-01-26 DIAGNOSIS — Z79.01 CHRONIC ANTICOAGULATION: ICD-10-CM

## 2022-01-26 DIAGNOSIS — Z86.711 HISTORY OF PULMONARY EMBOLUS (PE): Primary | ICD-10-CM

## 2022-01-26 PROCEDURE — 93793 PR ANTICOAGULANT MGMT FOR PT TAKING WARFARIN: ICD-10-PCS | Mod: ,,, | Performed by: PHARMACIST

## 2022-01-26 PROCEDURE — 93793 ANTICOAG MGMT PT WARFARIN: CPT | Mod: ,,, | Performed by: PHARMACIST

## 2022-01-26 NOTE — PROGRESS NOTES
Patient denies any changes in diet, medications, or health that would effect warfarin therapy.  ]

## 2022-01-27 ENCOUNTER — TELEPHONE (OUTPATIENT)
Dept: BARIATRICS | Facility: CLINIC | Age: 77
End: 2022-01-27
Payer: MEDICARE

## 2022-01-27 NOTE — TELEPHONE ENCOUNTER
----- Message from Aida Suárez sent at 1/27/2022 12:22 PM CST -----  Regarding: appt  Contact: pt  Pt requesting a call back in regards to upcoming appt may need to r/s for pt  surgery.      Pt @ [phone]

## 2022-01-31 ENCOUNTER — EXTERNAL CHRONIC CARE MANAGEMENT (OUTPATIENT)
Dept: PRIMARY CARE CLINIC | Facility: CLINIC | Age: 77
End: 2022-01-31
Payer: MEDICARE

## 2022-01-31 PROCEDURE — 99490 PR CHRONIC CARE MGMT, 1ST 20 MIN: ICD-10-PCS | Mod: S$PBB,,, | Performed by: INTERNAL MEDICINE

## 2022-01-31 PROCEDURE — 99490 CHRNC CARE MGMT STAFF 1ST 20: CPT | Mod: PBBFAC,PO | Performed by: INTERNAL MEDICINE

## 2022-01-31 PROCEDURE — 99490 CHRNC CARE MGMT STAFF 1ST 20: CPT | Mod: S$PBB,,, | Performed by: INTERNAL MEDICINE

## 2022-02-09 ENCOUNTER — LAB VISIT (OUTPATIENT)
Dept: LAB | Facility: HOSPITAL | Age: 77
End: 2022-02-09
Attending: INTERNAL MEDICINE
Payer: MEDICARE

## 2022-02-09 ENCOUNTER — OFFICE VISIT (OUTPATIENT)
Dept: PSYCHIATRY | Facility: CLINIC | Age: 77
End: 2022-02-09
Payer: MEDICARE

## 2022-02-09 DIAGNOSIS — F33.1 MDD (MAJOR DEPRESSIVE DISORDER), RECURRENT EPISODE, MODERATE: Primary | ICD-10-CM

## 2022-02-09 DIAGNOSIS — Z79.01 CHRONIC ANTICOAGULATION: ICD-10-CM

## 2022-02-09 LAB
INR PPP: 2.6 (ref 0.8–1.2)
PROTHROMBIN TIME: 25.7 SEC (ref 9–12.5)

## 2022-02-09 PROCEDURE — 99211 OFF/OP EST MAY X REQ PHY/QHP: CPT | Mod: PBBFAC | Performed by: SOCIAL WORKER

## 2022-02-09 PROCEDURE — 36415 COLL VENOUS BLD VENIPUNCTURE: CPT | Mod: PO | Performed by: INTERNAL MEDICINE

## 2022-02-09 PROCEDURE — 99999 PR PBB SHADOW E&M-EST. PATIENT-LVL I: CPT | Mod: PBBFAC,,, | Performed by: SOCIAL WORKER

## 2022-02-09 PROCEDURE — 90853 GROUP PSYCHOTHERAPY: CPT | Mod: ,,, | Performed by: SOCIAL WORKER

## 2022-02-09 PROCEDURE — 99999 PR PBB SHADOW E&M-EST. PATIENT-LVL I: ICD-10-PCS | Mod: PBBFAC,,, | Performed by: SOCIAL WORKER

## 2022-02-09 PROCEDURE — 85610 PROTHROMBIN TIME: CPT | Performed by: INTERNAL MEDICINE

## 2022-02-09 PROCEDURE — 90853 PR GROUP PSYCHOTHERAPY: ICD-10-PCS | Mod: ,,, | Performed by: SOCIAL WORKER

## 2022-02-10 ENCOUNTER — ANTI-COAG VISIT (OUTPATIENT)
Dept: CARDIOLOGY | Facility: CLINIC | Age: 77
End: 2022-02-10
Payer: MEDICARE

## 2022-02-10 DIAGNOSIS — Z79.01 CHRONIC ANTICOAGULATION: ICD-10-CM

## 2022-02-10 DIAGNOSIS — I82.532 CHRONIC DEEP VEIN THROMBOSIS (DVT) OF LEFT POPLITEAL VEIN: ICD-10-CM

## 2022-02-10 DIAGNOSIS — Z86.711 HISTORY OF PULMONARY EMBOLUS (PE): Primary | ICD-10-CM

## 2022-02-10 PROCEDURE — 93793 ANTICOAG MGMT PT WARFARIN: CPT | Mod: ,,, | Performed by: PHARMACIST

## 2022-02-10 PROCEDURE — 93793 PR ANTICOAGULANT MGMT FOR PT TAKING WARFARIN: ICD-10-PCS | Mod: ,,, | Performed by: PHARMACIST

## 2022-02-10 NOTE — PROGRESS NOTES
Patient denies any changes in diet, medications, or health that would effect warfarin therapy.      
WDL

## 2022-02-11 ENCOUNTER — PATIENT OUTREACH (OUTPATIENT)
Dept: ADMINISTRATIVE | Facility: OTHER | Age: 77
End: 2022-02-11
Payer: MEDICARE

## 2022-02-11 NOTE — PROGRESS NOTES
LINKS immunization registry updated  Care Everywhere updated  Health Maintenance updated  Chart reviewed for overdue Proactive [facility]) health maintenance testing (CRS, Breast Ca, Diabetic Eye Exam)   Orders entered:N/A

## 2022-02-13 NOTE — PROGRESS NOTES
Group Psychotherapy    Site: [facility]    Clinical status of patient: Outpatient    2/9/2022    Length of service:[id number]    Referred by: MD     Number of patients in attendance: 5    Target symptoms: depression, recurrent depression, anxiety , mood swings, mood disorder, adjustment, grief    Patient's response to intervention:  The patient's response to intervention is active listening, frequent questions, self-disclosure, feedback to other patients.    Progress toward goals and other mental status changes:  The patient's progress toward goals is limited.    Interval history: discussed coping skills, grief and loss, depression, anxiety, sleep, health and medical issues, family issues, and how to take care of herself and get support, client has not attended for awhile, and hopefully she will be more consistent,  Fits weill with group members. And issues of medical and health and challenges with these areas also addressed.    Diagnosis: 296.32    Plan: individual psychotherapy, group psychotherapy, consult psychiatrist for medication evaluation and medication management by physician    Return to clinic: 1 week

## 2022-02-14 ENCOUNTER — TELEPHONE (OUTPATIENT)
Dept: ORTHOPEDICS | Facility: CLINIC | Age: 77
End: 2022-02-14
Payer: MEDICARE

## 2022-02-14 NOTE — TELEPHONE ENCOUNTER
No answer. Voicemail box full. Unable to leave message. Will attempt to call again at a later time.     Need to discuss appt on 2/16/22 with Dr Youssef needing to be rescheduled.

## 2022-02-16 ENCOUNTER — OFFICE VISIT (OUTPATIENT)
Dept: PSYCHIATRY | Facility: CLINIC | Age: 77
End: 2022-02-16
Payer: MEDICARE

## 2022-02-16 DIAGNOSIS — F41.1 GAD (GENERALIZED ANXIETY DISORDER): ICD-10-CM

## 2022-02-16 DIAGNOSIS — F33.0 MDD (MAJOR DEPRESSIVE DISORDER), RECURRENT EPISODE, MILD: Primary | ICD-10-CM

## 2022-02-16 PROCEDURE — 99999 PR PBB SHADOW E&M-EST. PATIENT-LVL I: ICD-10-PCS | Mod: PBBFAC,,, | Performed by: SOCIAL WORKER

## 2022-02-16 PROCEDURE — 90853 PR GROUP PSYCHOTHERAPY: ICD-10-PCS | Mod: S$PBB,,, | Performed by: SOCIAL WORKER

## 2022-02-16 PROCEDURE — 99211 OFF/OP EST MAY X REQ PHY/QHP: CPT | Mod: PBBFAC | Performed by: SOCIAL WORKER

## 2022-02-16 PROCEDURE — 90853 GROUP PSYCHOTHERAPY: CPT | Mod: S$PBB,,, | Performed by: SOCIAL WORKER

## 2022-02-16 PROCEDURE — 99999 PR PBB SHADOW E&M-EST. PATIENT-LVL I: CPT | Mod: PBBFAC,,, | Performed by: SOCIAL WORKER

## 2022-02-17 ENCOUNTER — TELEPHONE (OUTPATIENT)
Dept: ORTHOPEDICS | Facility: CLINIC | Age: 77
End: 2022-02-17
Payer: MEDICARE

## 2022-02-17 ENCOUNTER — HOSPITAL ENCOUNTER (OUTPATIENT)
Dept: RADIOLOGY | Facility: HOSPITAL | Age: 77
Discharge: HOME | End: 2022-02-17
Attending: INTERNAL MEDICINE
Payer: MEDICARE

## 2022-02-17 DIAGNOSIS — Z12.31 ENCOUNTER FOR SCREENING MAMMOGRAM FOR BREAST CANCER: ICD-10-CM

## 2022-02-17 PROCEDURE — 77067 SCR MAMMO BI INCL CAD: CPT | Mod: 26,,, | Performed by: RADIOLOGY

## 2022-02-17 PROCEDURE — 77063 BREAST TOMOSYNTHESIS BI: CPT | Mod: 26,,, | Performed by: RADIOLOGY

## 2022-02-17 PROCEDURE — 77067 SCR MAMMO BI INCL CAD: CPT | Mod: TC,PO

## 2022-02-17 PROCEDURE — 77063 MAMMO DIGITAL SCREENING BILAT WITH TOMO: ICD-10-PCS | Mod: 26,,, | Performed by: RADIOLOGY

## 2022-02-17 PROCEDURE — 77063 BREAST TOMOSYNTHESIS BI: CPT | Mod: TC,PO

## 2022-02-17 PROCEDURE — 77067 MAMMO DIGITAL SCREENING BILAT WITH TOMO: ICD-10-PCS | Mod: 26,,, | Performed by: RADIOLOGY

## 2022-02-17 NOTE — TELEPHONE ENCOUNTER
----- Message from Yuval Kumar MA sent at 2/17/2022  2:22 PM CST -----  Contact: pt  For Dr. Diehl staff  ----- Message -----  From: Krys Sanchez  Sent: 2/17/2022  11:53 AM CST  To: Mercedes Collins Staff    Pt requesting call back in regards to rescheduling chanell. Please call     Confirmed patient's contact info below:  Contact Name: Coby Atkinson  Phone Number: [phone]

## 2022-02-19 NOTE — PROGRESS NOTES
Group Psychotherapy    Site: [facility]    Clinical status of patient: Outpatient    2/16/2022    Length of service:[id number]    Referred by: MD     Number of patients in attendance: 3    Target symptoms: depression, recurrent depression, anxiety , mood swings, mood disorder, adjustment, grief    Patient's response to intervention:  The patient's response to intervention is active listening, frequent questions, self-disclosure, feedback to other patients.    Progress toward goals and other mental status changes:  The patient's progress toward goals is limited.    Interval history: discussed family issues, coping skills, how to take care of herself, medical issues, worry, how to relax, and how to use breathing to help herself, and positive thinking and ways to calm down addressed and getting support also focused on.    Diagnosis: TJ, 309,.28, 296.31    Plan: individual psychotherapy, group psychotherapy, consult psychiatrist for medication evaluation and medication management by physician    Return to clinic: 1 week

## 2022-02-22 ENCOUNTER — OFFICE VISIT (OUTPATIENT)
Dept: ORTHOPEDICS | Facility: CLINIC | Age: 77
End: 2022-02-22
Payer: MEDICARE

## 2022-02-22 VITALS
DIASTOLIC BLOOD PRESSURE: 75 MMHG | WEIGHT: 233.44 LBS | BODY MASS INDEX: 41.36 KG/M2 | SYSTOLIC BLOOD PRESSURE: 150 MMHG | HEART RATE: 71 BPM

## 2022-02-22 DIAGNOSIS — M17.0 BILATERAL PRIMARY OSTEOARTHRITIS OF KNEE: Primary | ICD-10-CM

## 2022-02-22 PROCEDURE — 99999 PR PBB SHADOW E&M-EST. PATIENT-LVL IV: CPT | Mod: PBBFAC,,, | Performed by: PHYSICIAN ASSISTANT

## 2022-02-22 PROCEDURE — 20610 DRAIN/INJ JOINT/BURSA W/O US: CPT | Mod: 50,PBBFAC,PN | Performed by: PHYSICIAN ASSISTANT

## 2022-02-22 PROCEDURE — 99213 PR OFFICE/OUTPT VISIT, EST, LEVL III, 20-29 MIN: ICD-10-PCS | Mod: S$PBB,25,, | Performed by: PHYSICIAN ASSISTANT

## 2022-02-22 PROCEDURE — 20610 PR DRAIN/INJECT LARGE JOINT/BURSA: ICD-10-PCS | Mod: 50,S$PBB,, | Performed by: PHYSICIAN ASSISTANT

## 2022-02-22 PROCEDURE — 20610 DRAIN/INJ JOINT/BURSA W/O US: CPT | Mod: 50,S$PBB,, | Performed by: PHYSICIAN ASSISTANT

## 2022-02-22 PROCEDURE — 99999 PR PBB SHADOW E&M-EST. PATIENT-LVL IV: ICD-10-PCS | Mod: PBBFAC,,, | Performed by: PHYSICIAN ASSISTANT

## 2022-02-22 PROCEDURE — 99214 OFFICE O/P EST MOD 30 MIN: CPT | Mod: PBBFAC,PN,25 | Performed by: PHYSICIAN ASSISTANT

## 2022-02-22 PROCEDURE — 99213 OFFICE O/P EST LOW 20 MIN: CPT | Mod: S$PBB,25,, | Performed by: PHYSICIAN ASSISTANT

## 2022-02-22 RX ORDER — TRIAMCINOLONE ACETONIDE 40 MG/ML
40 INJECTION, SUSPENSION INTRA-ARTICULAR; INTRAMUSCULAR ONCE
Status: COMPLETED | OUTPATIENT
Start: 2022-02-22 | End: 2022-02-22

## 2022-02-22 RX ORDER — AMLODIPINE BESYLATE 10 MG/1
TABLET ORAL
COMMUNITY
Start: 2021-10-28 | End: 2022-03-30

## 2022-02-22 RX ADMIN — TRIAMCINOLONE ACETONIDE 40 MG: 40 INJECTION, SUSPENSION INTRA-ARTICULAR; INTRAMUSCULAR at 04:02

## 2022-02-22 NOTE — PROGRESS NOTES
Subjective:      Patient ID: Coby Atkinson is a 76 y.o. female.    Chief Complaint: Pain of the Right Knee and Pain of the Left Knee    77 yo female with bilateral knee pain   Patient states she has had knee pain for many, many years  Patient denies any new trauma or injury  Achy/stiff pain located mostly to lateral knees.  Worse with bending knees and walking and better with rest.  She received bilateral knee injections from [employer] in 10/2021, which temporarily relieved her symptoms  She is leaving for a cruise on March 7th, would like to receive another joint injection to help with the pain before her trip  She denies any numbness or tingling.  No catching/locking/giving out of knees.        Review of Systems   Constitutional: Negative for chills and fever.   HENT: Negative for ear pain.    Eyes: Negative for pain.   Cardiovascular: Negative for chest pain.   Respiratory: Negative for cough and shortness of breath.    Skin: Negative for color change and rash.   Musculoskeletal: Positive for joint pain (bilateral knee) and joint swelling (bilateral knee, bilateral legs). Negative for back pain and neck pain.   Gastrointestinal: Negative for abdominal pain.   Neurological: Negative for numbness and paresthesias.         Objective:            General    Constitutional: She is oriented to person, place, and time. She appears well-developed and well-nourished. No distress.   HENT:   Head: Normocephalic and atraumatic.   Eyes: EOM are normal.   Neck: Neck supple.   Cardiovascular: Normal rate.    Pulmonary/Chest: Effort normal. No respiratory distress.   Neurological: She is alert and oriented to person, place, and time.   Psychiatric: She has a normal mood and affect. Her behavior is normal. Judgment and thought content normal.           Right Knee Exam     Inspection   Erythema: absent  Scars: absent  Swelling: absent  Effusion: absent  Deformity: absent  Bruising: absent    Tenderness   The patient is  tender to palpation of the medial joint line and lateral joint line.    Crepitus   The patient has crepitus of the patella, medial joint line and lateral joint line.    Range of Motion   Extension: normal   Flexion: normal     Tests   Meniscus   Kodi:  Medial - negative Lateral - negative  Ligament Examination Lachman: normal (-1 to 2mm) PCL-Posterior Drawer: normal (0 to 2mm)     MCL - Valgus: normal (0 to 2mm)  LCL - Varus: normal    Other   Popliteal (Baker's) Cyst: absent  Sensation: normal    Left Knee Exam     Inspection   Erythema: absent  Scars: absent  Swelling: absent  Effusion: absent  Deformity: absent  Bruising: absent    Tenderness   The patient tender to palpation of the medial joint line and lateral joint line.    Crepitus   The patient has crepitus of the medial joint line, lateral joint line and patella.    Range of Motion   Extension: normal   Flexion: normal     Tests   Meniscus   oKdi:  Medial - negative Lateral - negative  Stability Lachman: normal (-1 to 2mm) PCL-Posterior Drawer: normal (0 to 2mm)  MCL - Valgus: normal (0 to 2mm)  LCL - Varus: normal (0 to 2mm)    Other   Popliteal (Baker's) Cyst: absent  Sensation: normal    Muscle Strength   Right Lower Extremity   Hip Abduction: 4/5   Quadriceps:  4/5   Hamstrin/5   Left Lower Extremity   Hip Abduction: 4/5   Quadriceps:  4/5   Hamstrin/5     Vascular Exam       Edema  Right Lower Leg: present  Left Lower Leg: present        Assessment:       XR Bilateral Knees 10/11/21:  No displaced fracture on provided views.  There is severe bilateral loss of tibiofemoral cartilage space with osteophyte production    Encounter Diagnosis   Name Primary?    Bilateral primary osteoarthritis of knee Yes            Plan:       Coby was seen today for pain and pain.    Diagnoses and all orders for this visit:    Bilateral primary osteoarthritis of knee  -     triamcinolone acetonide injection 40 mg  -     triamcinolone acetonide injection  40 mg        The total face-to-face encounter time with this patient was 30 minutes and greater than 50% of of the encounter time was spent counseling the patient, coordinating care, and education regarding the pathology and natural history of her diagnosis. We have discussed a variety of treatment options including medications, injections, physical therapy and other alternative treatments. Pt is requesting CSI and home exercise program at this time. She declines referral to surgeon, states that she does not wish to consider surgery.  Today, the patient chooses  a CSI and understands a minimum of 3 months time must lapse after injection, prior to a surgical procedure due to increased risk of infection.     I made the decision to obtain old records of the patient including previous notes and imaging.      1. Injection Procedure  A time out was performed, including verification of patient ID, procedure, site and side, availability of information and equipment, review of safety issues, and agreement with consent, the procedure site was marked.    After time out was performed, the patient was prepped aseptically with chloraprep swabsticks. A diagnostic and therapeutic injection of 1:3cc Kenalog/Marcaine was given under sterile technique using a 22g x 1.5 needle from the Anteromedial aspect of the bilateral Knee Joint in the sitting position.      Coby Atkinson had no adverse reactions to the medication. Pain decreased. She was instructed to apply ice to the joint for 20 minutes and avoid strenuous activities for 24-36 hours following the injection. She was warned of possible blood sugar and/or blood pressure changes during that time. Following that time, she can resume regular activities.    She was reminded to call the clinic immediately for any adverse side effects as explained in clinic today.    2. Home exercise program as demonstrated. Pt provided with [employer] handout of exercises.  3. Ice compress to the  affected area 2-3x a day for 15-20 minutes as needed for pain management.  4. RTC in 6 weeks for follow-up, sooner if needed.    Patient voices understanding of and agreement with treatment plan. All of the patient's questions were answered and the patient will contact us if she has any questions or concerns in the interim.

## 2022-02-25 ENCOUNTER — ANTI-COAG VISIT (OUTPATIENT)
Dept: CARDIOLOGY | Facility: CLINIC | Age: 77
End: 2022-02-25
Payer: MEDICARE

## 2022-02-25 DIAGNOSIS — Z79.01 CHRONIC ANTICOAGULATION: ICD-10-CM

## 2022-02-25 DIAGNOSIS — Z86.711 HISTORY OF PULMONARY EMBOLUS (PE): Primary | ICD-10-CM

## 2022-02-25 DIAGNOSIS — I82.532 CHRONIC DEEP VEIN THROMBOSIS (DVT) OF LEFT POPLITEAL VEIN: ICD-10-CM

## 2022-02-25 PROCEDURE — 93793 PR ANTICOAGULANT MGMT FOR PT TAKING WARFARIN: ICD-10-PCS | Mod: S$PBB,,, | Performed by: PHARMACIST

## 2022-02-25 PROCEDURE — 93793 ANTICOAG MGMT PT WARFARIN: CPT | Mod: S$PBB,,, | Performed by: PHARMACIST

## 2022-02-25 NOTE — PROGRESS NOTES
Patient reports taking a lower than prescribed dose of coumadin 1mg daily. Patient was re-educated on situations that would require placing a call to the coumadin clinic, including bleeding or unusual bruising issues, changes in health, diet or medications, upcoming procedures that require warfarin interruption, and missed coumadin dose(s). Patient expressed understanding that avoidance of consistency with these parameters could cause fluctuations in INR, leading to more frequent visits and increase risk of adverse events.

## 2022-02-28 ENCOUNTER — EXTERNAL CHRONIC CARE MANAGEMENT (OUTPATIENT)
Dept: PRIMARY CARE CLINIC | Facility: CLINIC | Age: 77
End: 2022-02-28
Payer: MEDICARE

## 2022-02-28 PROCEDURE — 99439 CHRNC CARE MGMT STAF EA ADDL: CPT | Mod: S$PBB,,, | Performed by: INTERNAL MEDICINE

## 2022-02-28 PROCEDURE — 99490 CHRNC CARE MGMT STAFF 1ST 20: CPT | Mod: PBBFAC,PO | Performed by: INTERNAL MEDICINE

## 2022-02-28 PROCEDURE — 99439 PR CHRONIC CARE MGMT, EA ADDTL 20 MIN: ICD-10-PCS | Mod: S$PBB,,, | Performed by: INTERNAL MEDICINE

## 2022-02-28 PROCEDURE — 99490 PR CHRONIC CARE MGMT, 1ST 20 MIN: ICD-10-PCS | Mod: S$PBB,,, | Performed by: INTERNAL MEDICINE

## 2022-02-28 PROCEDURE — 99490 CHRNC CARE MGMT STAFF 1ST 20: CPT | Mod: S$PBB,,, | Performed by: INTERNAL MEDICINE

## 2022-02-28 PROCEDURE — 99439 CHRNC CARE MGMT STAF EA ADDL: CPT | Mod: PBBFAC,27,PO | Performed by: INTERNAL MEDICINE

## 2022-03-17 ENCOUNTER — ANTI-COAG VISIT (OUTPATIENT)
Dept: CARDIOLOGY | Facility: CLINIC | Age: 77
End: 2022-03-17
Payer: MEDICARE

## 2022-03-17 DIAGNOSIS — I82.532 CHRONIC DEEP VEIN THROMBOSIS (DVT) OF LEFT POPLITEAL VEIN: ICD-10-CM

## 2022-03-17 DIAGNOSIS — Z79.01 CHRONIC ANTICOAGULATION: ICD-10-CM

## 2022-03-17 DIAGNOSIS — Z86.711 HISTORY OF PULMONARY EMBOLUS (PE): Primary | ICD-10-CM

## 2022-03-17 PROCEDURE — 93793 ANTICOAG MGMT PT WARFARIN: CPT | Mod: ,,, | Performed by: PHARMACIST

## 2022-03-17 PROCEDURE — 93793 PR ANTICOAGULANT MGMT FOR PT TAKING WARFARIN: ICD-10-PCS | Mod: ,,, | Performed by: PHARMACIST

## 2022-03-17 NOTE — PROGRESS NOTES
Patient states she was on a cruise and had to go to the doctor and was given tramadol.  She confirms taking warfarin 1mg everyday , which again is a much lower dose of warfarin than prescribed.  The importance of compliance will be re-emphasized

## 2022-03-29 ENCOUNTER — ANTI-COAG VISIT (OUTPATIENT)
Dept: CARDIOLOGY | Facility: CLINIC | Age: 77
End: 2022-03-29
Payer: MEDICARE

## 2022-03-29 DIAGNOSIS — Z79.01 CHRONIC ANTICOAGULATION: ICD-10-CM

## 2022-03-29 DIAGNOSIS — I82.532 CHRONIC DEEP VEIN THROMBOSIS (DVT) OF LEFT POPLITEAL VEIN: ICD-10-CM

## 2022-03-29 DIAGNOSIS — Z86.711 HISTORY OF PULMONARY EMBOLUS (PE): Primary | ICD-10-CM

## 2022-03-29 NOTE — PROGRESS NOTES
Patient denies any changes in diet, medications, or health that would effect warfarin therapy.  INR not at goal. Medications, chart, and patient findings reviewed. See calendar for adjustments to dose and follow up plan.

## 2022-03-31 ENCOUNTER — EXTERNAL CHRONIC CARE MANAGEMENT (OUTPATIENT)
Dept: PRIMARY CARE CLINIC | Facility: CLINIC | Age: 77
End: 2022-03-31
Payer: MEDICARE

## 2022-03-31 ENCOUNTER — PATIENT OUTREACH (OUTPATIENT)
Dept: ADMINISTRATIVE | Facility: OTHER | Age: 77
End: 2022-03-31
Payer: MEDICARE

## 2022-03-31 PROCEDURE — 99490 PR CHRONIC CARE MGMT, 1ST 20 MIN: ICD-10-PCS | Mod: S$PBB,,, | Performed by: INTERNAL MEDICINE

## 2022-03-31 PROCEDURE — 99490 CHRNC CARE MGMT STAFF 1ST 20: CPT | Mod: S$PBB,,, | Performed by: INTERNAL MEDICINE

## 2022-03-31 PROCEDURE — 99490 CHRNC CARE MGMT STAFF 1ST 20: CPT | Mod: PBBFAC,PO | Performed by: INTERNAL MEDICINE

## 2022-04-04 ENCOUNTER — OFFICE VISIT (OUTPATIENT)
Dept: ORTHOPEDICS | Facility: CLINIC | Age: 77
End: 2022-04-04
Payer: MEDICARE

## 2022-04-04 VITALS
SYSTOLIC BLOOD PRESSURE: 169 MMHG | HEART RATE: 58 BPM | HEIGHT: 63 IN | BODY MASS INDEX: 41.53 KG/M2 | WEIGHT: 234.38 LBS | DIASTOLIC BLOOD PRESSURE: 75 MMHG

## 2022-04-04 DIAGNOSIS — M17.0 BILATERAL PRIMARY OSTEOARTHRITIS OF KNEE: Primary | ICD-10-CM

## 2022-04-04 PROCEDURE — 99214 OFFICE O/P EST MOD 30 MIN: CPT | Mod: PBBFAC,PN | Performed by: ORTHOPAEDIC SURGERY

## 2022-04-04 PROCEDURE — 99999 PR PBB SHADOW E&M-EST. PATIENT-LVL IV: CPT | Mod: PBBFAC,,, | Performed by: ORTHOPAEDIC SURGERY

## 2022-04-04 PROCEDURE — 99213 OFFICE O/P EST LOW 20 MIN: CPT | Mod: S$PBB,,, | Performed by: ORTHOPAEDIC SURGERY

## 2022-04-04 PROCEDURE — 99213 PR OFFICE/OUTPT VISIT, EST, LEVL III, 20-29 MIN: ICD-10-PCS | Mod: S$PBB,,, | Performed by: ORTHOPAEDIC SURGERY

## 2022-04-04 PROCEDURE — 99999 PR PBB SHADOW E&M-EST. PATIENT-LVL IV: ICD-10-PCS | Mod: PBBFAC,,, | Performed by: ORTHOPAEDIC SURGERY

## 2022-04-04 NOTE — PROGRESS NOTES
"Subjective:    Patient ID:  Coby Atkinson is a 76 y.o. y.o. female who presents for f/u visit for Follow-up of the Right Knee and Follow-up of the Left Knee      Patient returns for follow-up for bilateral knee pain. She was seen previously by PETER Waite and her history is well-documented in the 2/22/2022 office note. Reports few week relief with CSIs provided at that visit.         Objective:     BP (!) 169/75 (BP Location: Right arm, Patient Position: Sitting, BP Method: Large (Automatic))   Pulse (!) 58   Ht 5' 3" (1.6 m)   Wt 106.3 kg (234 lb 5.6 oz)   BMI 41.51 kg/m²     Ortho Exam     Morbidly obese 77 yo female in NAD; alert, oriented x 3    Head: atraumatic  Eyes: EOM are normal. Right eye exhibits no discharge. Left eye exhibits no discharge  Cardiovascular: normal rate    Pulmonary/Chest: effort normal; no respiratory distress  Abdominal: soft    BLE: N/V intact    Bilateral knee: no effusion; PF crepitus; ROM: 0-120 flexion; no gross ligamentous laxity       Assessment & Plan:     1. Bilateral primary osteoarthritis of knee      1.  Treatment options discussed. Patient with multiple medical co-morbidities and states she is not interested in considering TKR at this time.  2.  PT  3.  Walker support for ambulation  4.  Health/orthopedic benefits of weight loss discussed  5.  Follow-up prn; consider future trial of viscosupplement injections bilateral knee    "

## 2022-04-05 ENCOUNTER — ANTI-COAG VISIT (OUTPATIENT)
Dept: CARDIOLOGY | Facility: CLINIC | Age: 77
End: 2022-04-05
Payer: MEDICARE

## 2022-04-05 DIAGNOSIS — Z86.711 HISTORY OF PULMONARY EMBOLUS (PE): Primary | ICD-10-CM

## 2022-04-05 DIAGNOSIS — I82.532 CHRONIC DEEP VEIN THROMBOSIS (DVT) OF LEFT POPLITEAL VEIN: ICD-10-CM

## 2022-04-05 DIAGNOSIS — Z79.01 CHRONIC ANTICOAGULATION: ICD-10-CM

## 2022-04-05 PROCEDURE — 93793 PR ANTICOAGULANT MGMT FOR PT TAKING WARFARIN: ICD-10-PCS | Mod: ,,, | Performed by: PHARMACIST

## 2022-04-05 PROCEDURE — 93793 ANTICOAG MGMT PT WARFARIN: CPT | Mod: ,,, | Performed by: PHARMACIST

## 2022-04-07 PROBLEM — M85.859 OSTEOPENIA OF NECK OF FEMUR: Status: ACTIVE | Noted: 2017-11-30

## 2022-04-07 PROBLEM — E04.1 THYROID NODULE: Status: ACTIVE | Noted: 2022-04-07

## 2022-04-12 ENCOUNTER — PES CALL (OUTPATIENT)
Dept: ADMINISTRATIVE | Facility: CLINIC | Age: 77
End: 2022-04-12
Payer: MEDICARE

## 2022-04-19 ENCOUNTER — ANTI-COAG VISIT (OUTPATIENT)
Dept: CARDIOLOGY | Facility: CLINIC | Age: 77
End: 2022-04-19
Payer: MEDICARE

## 2022-04-19 DIAGNOSIS — Z86.711 HISTORY OF PULMONARY EMBOLUS (PE): Primary | ICD-10-CM

## 2022-04-19 DIAGNOSIS — I82.532 CHRONIC DEEP VEIN THROMBOSIS (DVT) OF LEFT POPLITEAL VEIN: ICD-10-CM

## 2022-04-19 DIAGNOSIS — Z79.01 CHRONIC ANTICOAGULATION: ICD-10-CM

## 2022-04-19 PROCEDURE — 93793 ANTICOAG MGMT PT WARFARIN: CPT | Mod: ,,, | Performed by: PHARMACIST

## 2022-04-19 PROCEDURE — 93793 PR ANTICOAGULANT MGMT FOR PT TAKING WARFARIN: ICD-10-PCS | Mod: ,,, | Performed by: PHARMACIST

## 2022-04-20 NOTE — PROGRESS NOTES
Ms. Atkinson is a patient of Dr. Martinez and was last seen in [facility] Cardiology Visit 10/20/21    Subjective:   Patient ID:  Coby Atkinson is a 76 y.o. 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  Her blood pressure is not at goal today.  She is not familiar with her medications.  She does not monitor her blood pressure at home.  She has a cpap but has not been using it for the last 3 months.  She was last seen in the sleep clinic in 2018.       Review of Systems   Constitutional: Negative for decreased appetite, diaphoresis, malaise/fatigue, weight gain and weight loss.   Eyes: Negative for visual disturbance.   Cardiovascular: Negative for chest pain, claudication, dyspnea on exertion, irregular heartbeat, leg swelling, near-syncope, orthopnea, palpitations, paroxysmal nocturnal dyspnea and syncope.        Denies chest pressure   Respiratory: Positive for snoring. Negative for cough, hemoptysis, shortness of breath and sleep disturbances due to breathing.    Endocrine: Negative for cold intolerance and heat intolerance.   Hematologic/Lymphatic: Negative for bleeding problem. Does not bruise/bleed easily.   Musculoskeletal: Negative for myalgias.   Gastrointestinal: Negative for bloating, abdominal pain, anorexia, change in bowel habit, constipation, diarrhea, nausea and vomiting.   Neurological: Positive for excessive daytime sleepiness. Negative for difficulty with concentration, disturbances in coordination, dizziness, headaches, light-headedness, loss of balance, numbness and weakness.   Psychiatric/Behavioral: The patient does not have insomnia.        Allergies and current medications updated and reviewed:  Review of patient's allergies indicates:   Allergen Reactions    Codeine      "Ciprofloxacin Rash     Current Outpatient Medications   Medication Sig    ALPRAZolam (XANAX) 0.5 MG tablet Take 1 tablet (0.5 mg total) by mouth 2 (two) times daily as needed for Insomnia or Anxiety.    amlodipine-valsartan (EXFORGE)  mg per tablet TAKE 1 TABLET BY MOUTH EVERY DAY    bisoprolol (ZEBETA) 5 MG tablet Take 2 tablets (10 mg total) by mouth once daily.    busPIRone (BUSPAR) 10 MG tablet Take 1 tablet (10 mg total) by mouth 3 (three) times daily as needed (anxiety).    doxazosin (CARDURA) 8 MG Tab Take 1 tablet (8 mg total) by mouth every evening.    EScitalopram oxalate (LEXAPRO) 20 MG tablet Take 1 tablet (20 mg total) by mouth every evening.    furosemide (LASIX) 20 MG tablet Take 1 tablet (20 mg total) by mouth once daily.    meloxicam (MOBIC) 15 MG tablet Take 15 mg by mouth once daily.    ondansetron (ZOFRAN-ODT) 4 MG TbDL Take 1 tablet (4 mg total) by mouth every 8 (eight) hours as needed. (Patient not taking: No sig reported)    pantoprazole (PROTONIX) 40 MG tablet Take 1 tablet (40 mg total) by mouth every 12 (twelve) hours.    spironolactone (ALDACTONE) 25 MG tablet Take 1 tablet (25 mg total) by mouth once daily.    topiramate (TOPAMAX) 25 MG tablet Take 1 tablet (25 mg total) by mouth 2 (two) times daily.    traMADoL (ULTRAM) 50 mg tablet Take 1 tablet (50 mg total) by mouth every 8 (eight) hours as needed for Pain.    warfarin (COUMADIN) 1 MG tablet Take 1.5-2 tablets (1.5-2 mg total) by mouth Daily. AS DIRECTED BY COUMADIN CLINIC     No current facility-administered medications for this visit.       Objective:        BP (!) 154/80   Pulse 66   Ht 5' 3" (1.6 m)   Wt 104.1 kg (229 lb 8 oz)   SpO2 98%   BMI 40.65 kg/m²       Physical Exam  Vitals and nursing note reviewed.   Constitutional:       General: She is not in acute distress.     Appearance: Normal appearance. She is well-developed. She is not diaphoretic.   HENT:      Head: Normocephalic and atraumatic. "   Eyes:      General: Lids are normal. No scleral icterus.     Conjunctiva/sclera: Conjunctivae normal.   Neck:      Vascular: Normal carotid pulses. No carotid bruit, hepatojugular reflux or JVD.   Cardiovascular:      Rate and Rhythm: Normal rate and regular rhythm.      Chest Wall: PMI is not displaced.      Pulses: Intact distal pulses.           Carotid pulses are 2+ on the right side and 2+ on the left side.       Radial pulses are 2+ on the right side and 2+ on the left side.        Dorsalis pedis pulses are 2+ on the right side and 2+ on the left side.        Posterior tibial pulses are 1+ on the right side and 1+ on the left side.      Heart sounds: S1 normal and S2 normal. No murmur heard.    No friction rub. No gallop.   Pulmonary:      Effort: Pulmonary effort is normal. No respiratory distress.      Breath sounds: Normal breath sounds. No decreased breath sounds, wheezing, rhonchi or rales.   Chest:      Chest wall: No tenderness.   Abdominal:      General: Bowel sounds are normal. There is no distension or abdominal bruit.      Palpations: Abdomen is soft. There is no fluid wave or pulsatile mass.      Tenderness: There is no abdominal tenderness.   Musculoskeletal:      Cervical back: Neck supple.   Skin:     General: Skin is warm and dry.      Findings: No rash.      Nails: There is no clubbing.   Neurological:      Mental Status: She is alert and oriented to person, place, and time.      Gait: Gait normal.   Psychiatric:         Speech: Speech normal.         Behavior: Behavior normal.         Thought Content: Thought content normal.         Judgment: Judgment normal.         Chemistry        Component Value Date/Time     04/19/2022 0916    K 4.3 04/19/2022 0916     04/19/2022 0916    CO2 23 04/19/2022 0916    BUN 19 04/19/2022 0916    CREATININE 0.9 04/19/2022 0916    GLU 93 04/19/2022 0916        Component Value Date/Time    CALCIUM 9.6 04/19/2022 0916    ALKPHOS 94 04/19/2022 0916     AST 19 2022 0916    ALT 19 2022 0916    BILITOT 0.5 2022 0916    ESTGFRAFRICA >60.0 2022 0916    EGFRNONAA >60.0 2022 0916        Lab Results   Component Value Date    HGBA1C 5.4 2020     Recent Labs   Lab 21  1106 10/04/21  1752 11/10/21  0912 22  0916   WBC 9.10 11.40 9.67 11.14   Hemoglobin 10.8 L 10.4 L 11.5 L 12.8   Hematocrit 35.0 L 34.8 L 40.2 43.4   MCV 64 L 67 L 69 L 70 L   Platelets 299 268 352 299   BNP 32 56  --   --    TSH  --   --  2.439  --    Cholesterol  --   --  127 145   HDL  --   --  58 66   LDL Cholesterol  --   --  56.8 L 68.6   Triglycerides  --   --  61 52   HDL/Cholesterol Ratio  --   --  45.7 45.5       Recent Labs   Lab 22  1321 22  1159 22  1058 22  0916   INR 1.3 H 1.5 H 2.5 H 1.2        Test(s) Reviewed  I have reviewed the following in detail:  [] Stress test   [] Angiography   [x] Echocardiogram   [x] Labs   [] Other:         Assessment/Plan:   1. Chronic diastolic heart failure  Well compensated. Stable. Monitor.     - spironolactone (ALDACTONE) 25 MG tablet; Take 1 tablet (25 mg total) by mouth once daily.  Dispense: 90 tablet; Refill: 3    2. Pulmonary hypertension  Mild PH.  Encouraged nightly use of cpap.     - Ambulatory referral/consult to Sleep Disorders; Future    3. Essential hypertension  BP not at goal <130/80.  Refill all her medications and refer to digital HTN program.  I don't think she's taking the doxazosin since the prescription is .  Last eye exam in .  Refer to optometry to evaluate for hypertensive changes.    - amlodipine-valsartan (EXFORGE)  mg per tablet; Take 1 tablet by mouth once daily.  Dispense: 90 tablet; Refill: 3  - bisoprolol (ZEBETA) 10 MG tablet; Take 1 tablet (10 mg total) by mouth once daily.  Dispense: 90 tablet; Refill: 3  - spironolactone (ALDACTONE) 25 MG tablet; Take 1 tablet (25 mg total) by mouth once daily.  Dispense: 90 tablet; Refill: 3  - NURSING  COMMUNICATION: Create [facility] Account  - Hypertension Digital Medicine ([facility]) Enrollment Order  - Hypertension Digital Medicine ([facility]): Assign Onboarding Questionnaires  - Ambulatory referral/consult to Sleep Disorders; Future    4. History of pulmonary embolus (PE)--12/2017      5. Chronic anticoagulation  Denies bleeding.  Discussed when to seek immediate medical attention.       6. Thalassemia minor      7. Obstructive sleep apnea    - Ambulatory referral/consult to Sleep Disorders; Future    8. Morbid obesity with BMI of 40.0-44.9, adult  BMI 40.7 Encouraged CV exercise for 30 minutes a day for 5 days a week.        A copy of this note will be forwarded to Dr. Martinez and Dr. Hudson     Follow up in about 6 months (around 10/21/2022).

## 2022-04-21 ENCOUNTER — OFFICE VISIT (OUTPATIENT)
Dept: CARDIOLOGY | Facility: CLINIC | Age: 77
End: 2022-04-21
Payer: MEDICARE

## 2022-04-21 VITALS
BODY MASS INDEX: 40.66 KG/M2 | HEART RATE: 66 BPM | OXYGEN SATURATION: 98 % | HEIGHT: 63 IN | DIASTOLIC BLOOD PRESSURE: 80 MMHG | SYSTOLIC BLOOD PRESSURE: 154 MMHG | WEIGHT: 229.5 LBS

## 2022-04-21 DIAGNOSIS — D56.3 THALASSEMIA MINOR: ICD-10-CM

## 2022-04-21 DIAGNOSIS — I50.32 CHRONIC DIASTOLIC HEART FAILURE: Primary | ICD-10-CM

## 2022-04-21 DIAGNOSIS — E66.01 MORBID OBESITY WITH BMI OF 40.0-44.9, ADULT: ICD-10-CM

## 2022-04-21 DIAGNOSIS — Z86.711 HISTORY OF PULMONARY EMBOLUS (PE): ICD-10-CM

## 2022-04-21 DIAGNOSIS — I27.20 PULMONARY HYPERTENSION: ICD-10-CM

## 2022-04-21 DIAGNOSIS — I10 ESSENTIAL HYPERTENSION: ICD-10-CM

## 2022-04-21 DIAGNOSIS — G47.33 OBSTRUCTIVE SLEEP APNEA: ICD-10-CM

## 2022-04-21 DIAGNOSIS — Z79.01 CHRONIC ANTICOAGULATION: ICD-10-CM

## 2022-04-21 PROCEDURE — 99215 OFFICE O/P EST HI 40 MIN: CPT | Mod: PBBFAC,PO | Performed by: NURSE PRACTITIONER

## 2022-04-21 PROCEDURE — 99999 PR PBB SHADOW E&M-EST. PATIENT-LVL V: CPT | Mod: PBBFAC,,, | Performed by: NURSE PRACTITIONER

## 2022-04-21 PROCEDURE — 99999 PR PBB SHADOW E&M-EST. PATIENT-LVL V: ICD-10-PCS | Mod: PBBFAC,,, | Performed by: NURSE PRACTITIONER

## 2022-04-21 PROCEDURE — 99214 PR OFFICE/OUTPT VISIT, EST, LEVL IV, 30-39 MIN: ICD-10-PCS | Mod: S$PBB,,, | Performed by: NURSE PRACTITIONER

## 2022-04-21 PROCEDURE — 99214 OFFICE O/P EST MOD 30 MIN: CPT | Mod: S$PBB,,, | Performed by: NURSE PRACTITIONER

## 2022-04-21 RX ORDER — DOXAZOSIN 8 MG/1
8 TABLET ORAL NIGHTLY
Qty: 90 TABLET | Refills: 3 | Status: SHIPPED | OUTPATIENT
Start: 2022-04-21 | End: 2022-06-15

## 2022-04-21 RX ORDER — BISOPROLOL FUMARATE 10 MG/1
10 TABLET, FILM COATED ORAL DAILY
Qty: 90 TABLET | Refills: 3 | Status: SHIPPED | OUTPATIENT
Start: 2022-04-21 | End: 2023-02-10

## 2022-04-21 RX ORDER — SPIRONOLACTONE 25 MG/1
25 TABLET ORAL DAILY
Qty: 90 TABLET | Refills: 3 | Status: SHIPPED | OUTPATIENT
Start: 2022-04-21 | End: 2023-02-10

## 2022-04-21 RX ORDER — AMLODIPINE AND VALSARTAN 10; 320 MG/1; MG/1
1 TABLET ORAL DAILY
Qty: 90 TABLET | Refills: 3 | Status: SHIPPED | OUTPATIENT
Start: 2022-04-21 | End: 2023-06-22 | Stop reason: SDUPTHER

## 2022-04-21 NOTE — PROGRESS NOTES
Patient denies any changes in diet, medications, or health that would effect warfarin therapy.  Of note, this INR is from 2 days ago.   INR not at goal. Medications, chart, and patient findings reviewed. See calendar for adjustments to dose and follow up plan.

## 2022-04-21 NOTE — PATIENT INSTRUCTIONS
Sleep apnea results in sudden drops in blood oxygen levels and increases blood pressure and strains the cardiovascular system.  ADRIÁN may increase the risk of recurrent heart attack and arrhythmias such as atrial fibrillation.  Stroke risk is increased by untreated ADRIÁN.  If there's underlying heart disease, these multiple episodes hypoxia or hypoxemia can lead to sudden death from an irregular heartbeat.  Additionally, the repeated night time awakenings contribute to daytime sleepiness, irritability, and fatigue. Poor sleep contributes to decreased concentration and increased risk for motor vehicle accidents.    Please make an appointment with the sleep clinic.     Increase cardiovascular exercise to 30 minutes of brisk walking a day for 4-5 days a week.  You can use a stationary bike or swim for 30 minutes a day instead of walking.  Whatever exercise you choose, make sure you are working hard enough to increase your heart rate.     Check to see if you're taking the doxazosin at home.    Morning  Spironolactone 25 mg  Furosemide 20 mg  Bisoprolol 10 mg    Dinner/Evening  Amlodipine-valsartan  mg  Doxazosin 8 mg

## 2022-04-30 ENCOUNTER — EXTERNAL CHRONIC CARE MANAGEMENT (OUTPATIENT)
Dept: PRIMARY CARE CLINIC | Facility: CLINIC | Age: 77
End: 2022-04-30
Payer: MEDICARE

## 2022-04-30 PROCEDURE — 99439 CHRNC CARE MGMT STAF EA ADDL: CPT | Mod: PBBFAC,PO | Performed by: INTERNAL MEDICINE

## 2022-04-30 PROCEDURE — 99490 PR CHRONIC CARE MGMT, 1ST 20 MIN: ICD-10-PCS | Mod: S$PBB,,, | Performed by: INTERNAL MEDICINE

## 2022-04-30 PROCEDURE — 99439 PR CHRONIC CARE MGMT, EA ADDTL 20 MIN: ICD-10-PCS | Mod: S$PBB,,, | Performed by: INTERNAL MEDICINE

## 2022-04-30 PROCEDURE — 99439 CHRNC CARE MGMT STAF EA ADDL: CPT | Mod: S$PBB,,, | Performed by: INTERNAL MEDICINE

## 2022-04-30 PROCEDURE — 99490 CHRNC CARE MGMT STAFF 1ST 20: CPT | Mod: PBBFAC,PO | Performed by: INTERNAL MEDICINE

## 2022-04-30 PROCEDURE — 99490 CHRNC CARE MGMT STAFF 1ST 20: CPT | Mod: S$PBB,,, | Performed by: INTERNAL MEDICINE

## 2022-05-02 ENCOUNTER — PATIENT OUTREACH (OUTPATIENT)
Dept: ADMINISTRATIVE | Facility: OTHER | Age: 77
End: 2022-05-02
Payer: MEDICARE

## 2022-05-03 ENCOUNTER — OFFICE VISIT (OUTPATIENT)
Dept: SLEEP MEDICINE | Facility: CLINIC | Age: 77
End: 2022-05-03
Payer: MEDICARE

## 2022-05-03 VITALS
HEART RATE: 63 BPM | BODY MASS INDEX: 41.41 KG/M2 | WEIGHT: 233.69 LBS | SYSTOLIC BLOOD PRESSURE: 141 MMHG | HEIGHT: 63 IN | DIASTOLIC BLOOD PRESSURE: 68 MMHG

## 2022-05-03 DIAGNOSIS — G47.33 OSA (OBSTRUCTIVE SLEEP APNEA): Primary | ICD-10-CM

## 2022-05-03 DIAGNOSIS — G47.10 HYPERSOMNOLENCE: ICD-10-CM

## 2022-05-03 DIAGNOSIS — I27.20 PULMONARY HYPERTENSION: ICD-10-CM

## 2022-05-03 DIAGNOSIS — I10 ESSENTIAL HYPERTENSION: ICD-10-CM

## 2022-05-03 DIAGNOSIS — G47.33 OBSTRUCTIVE SLEEP APNEA: ICD-10-CM

## 2022-05-03 PROCEDURE — 99999 PR PBB SHADOW E&M-EST. PATIENT-LVL III: CPT | Mod: PBBFAC,,, | Performed by: INTERNAL MEDICINE

## 2022-05-03 PROCEDURE — 99203 OFFICE O/P NEW LOW 30 MIN: CPT | Mod: S$PBB,,, | Performed by: INTERNAL MEDICINE

## 2022-05-03 PROCEDURE — 99203 PR OFFICE/OUTPT VISIT, NEW, LEVL III, 30-44 MIN: ICD-10-PCS | Mod: S$PBB,,, | Performed by: INTERNAL MEDICINE

## 2022-05-03 PROCEDURE — 99999 PR PBB SHADOW E&M-EST. PATIENT-LVL III: ICD-10-PCS | Mod: PBBFAC,,, | Performed by: INTERNAL MEDICINE

## 2022-05-03 PROCEDURE — 99213 OFFICE O/P EST LOW 20 MIN: CPT | Mod: PBBFAC | Performed by: INTERNAL MEDICINE

## 2022-05-03 NOTE — PROGRESS NOTES
Referred by Self, Dee     NEW PATIENT VISIT  LOV 11/27/2018: NP Bryan    Coby Atkinson  is a pleasant 76 y.o. female  with PMH significant for PH, HTN, HFpEF, hx PE, DVT, elevated BMI, ADRIÁN dx 2016 who presents today for evaluation of ADRIÁN.    She is wearing CPAP regularly.     PAP history   Problems    Mask FFM   Pressure tolerating   Benefit Sleeps better   DME HME   Machine age circa 2016,    Download Does not have machine with her       SLEEP SCHEDULE   Bed Time 11p-3AM   Sleep Latency Not long when she tries   Arousals    Nocturia none   Back to sleep    Wake time 8-9AM   Naps none   Work      No flowsheet data found.  No flowsheet data found.    Past Medical History:   Diagnosis Date    Anticoagulant long-term use     Cataract     Chronic diarrhea     Depression     Edema     GERD (gastroesophageal reflux disease)     Hypertension 10/2/2012    Osteoarthritis of both knees 8/9/2016    Osteopenia     Osteopenia 11/30/2017    PE (pulmonary thromboembolism) 12/03/2017    Primary localized osteoarthrosis, lower leg 12/3/2014    Sleep apnea 2016    Thalassemia minor      Patient Active Problem List   Diagnosis    Baker's cyst of knee    Gastroesophageal reflux disease    Obstructive sleep apnea    Essential hypertension    Thalassemia minor    Osteopenia of neck of femur    History of pulmonary embolus (PE)--12/2017    Chronic anticoagulation    Chronic deep vein thrombosis (DVT) of left popliteal vein    History of non-ST elevation myocardial infarction (NSTEMI)    Mild episode of recurrent major depressive disorder    Morbid obesity with BMI of 40.0-44.9, adult    Pulmonary hypertension    Primary osteoarthritis of both knees    Obesity hypoventilation syndrome    Pulmonary heart disease    Memory changes    Grieving    Risk for falls    Multinodular thyroid    Hypokalemia    Closed fracture of right proximal humerus 8/18/2020    Chronic diastolic heart failure  "   Chronic pulmonary embolism without acute cor pulmonale    Thyroid nodule       Current Outpatient Medications:     ALPRAZolam (XANAX) 0.5 MG tablet, Take 1 tablet (0.5 mg total) by mouth 2 (two) times daily as needed for Insomnia or Anxiety., Disp: 60 tablet, Rfl: 1    amlodipine-valsartan (EXFORGE)  mg per tablet, Take 1 tablet by mouth once daily., Disp: 90 tablet, Rfl: 3    bisoprolol (ZEBETA) 10 MG tablet, Take 1 tablet (10 mg total) by mouth once daily., Disp: 90 tablet, Rfl: 3    busPIRone (BUSPAR) 10 MG tablet, Take 1 tablet (10 mg total) by mouth 3 (three) times daily as needed (anxiety)., Disp: 90 tablet, Rfl: 2    doxazosin (CARDURA) 8 MG Tab, Take 1 tablet (8 mg total) by mouth every evening., Disp: 90 tablet, Rfl: 3    EScitalopram oxalate (LEXAPRO) 20 MG tablet, Take 1 tablet (20 mg total) by mouth every evening., Disp: 90 tablet, Rfl: 3    furosemide (LASIX) 20 MG tablet, Take 1 tablet (20 mg total) by mouth once daily., Disp: 90 tablet, Rfl: 3    meloxicam (MOBIC) 15 MG tablet, Take 15 mg by mouth once daily., Disp: , Rfl:     pantoprazole (PROTONIX) 40 MG tablet, Take 1 tablet (40 mg total) by mouth every 12 (twelve) hours., Disp: 180 tablet, Rfl: 1    spironolactone (ALDACTONE) 25 MG tablet, Take 1 tablet (25 mg total) by mouth once daily., Disp: 90 tablet, Rfl: 3    topiramate (TOPAMAX) 25 MG tablet, Take 1 tablet (25 mg total) by mouth 2 (two) times daily., Disp: 180 tablet, Rfl: 0    traMADoL (ULTRAM) 50 mg tablet, Take 1 tablet (50 mg total) by mouth every 8 (eight) hours as needed for Pain., Disp: 20 tablet, Rfl: 0    warfarin (COUMADIN) 1 MG tablet, Take 1.5-2 tablets (1.5-2 mg total) by mouth Daily. AS DIRECTED BY COUMADIN CLINIC, Disp: 60 tablet, Rfl: 5     Vitals:    05/03/22 1323   BP: (!) 141/68   Pulse: 63   Weight: 106 kg (233 lb 11 oz)   Height: 5' 3" (1.6 m)     Physical Exam:    GEN:   Well-appearing  Psych:  Appropriate affect, demonstrates insight  SKIN:  No " rash on the face or bridge of the nose  HEENT: MP4,      LABS:   Lab Results   Component Value Date    HGB 12.8 04/19/2022    CO2 23 04/19/2022       RECORDS REVIEWED PREVIOUSLY:    08/18/2016  lb. The overall AHI was 5.7 and overall RDI 8.5 with an oxygen laura of 88.0%. The supine AHI was 8.5 and the REM AHI was 26.9.   CPAP 11/29/2018: CPAP = 13 recommended    ASSESSMENT      PROBLEM DESCRIPTION/ Sx on Presentation Interval Hx STATUS   Hx  ADRIÁN   Mild in 2016   Reports good usage  No download today New   Daytime Sx   + sleepiness when inactive (watching TV)  denies sleepiness when driving   ESS 11/24 on intake Dozes unintentionally watching TV New   Other issues:     PLAN     -will try to get download from HME from her current machine  -will order new machine as her prior machine is nearing the end of its usable life (auto 5-12, FFM)  -- we discussed [employer] recall of CPAPs/BIPAPs due to potentially dangerous particles or gas that could come from a foam insert and their recommendation that the patient stop using the machine until replaced.    more information at [employer] website : https://www.[employer]/healthcare/e/sleep/communications/src-update  --discussed registering the machine for recall  -the patient is using and benefiting from PAP therapy  -driving precautions were discussed with the patient    RTC          The patient was given open opportunity to ask questions and/or express concerns about treatment plan.   All questions/concerns were discussed.     Two patient identifiers used prior to evaluation.

## 2022-05-09 ENCOUNTER — ANTI-COAG VISIT (OUTPATIENT)
Dept: CARDIOLOGY | Facility: CLINIC | Age: 77
End: 2022-05-09
Payer: MEDICARE

## 2022-05-09 ENCOUNTER — PES CALL (OUTPATIENT)
Dept: ADMINISTRATIVE | Facility: CLINIC | Age: 77
End: 2022-05-09
Payer: MEDICARE

## 2022-05-09 DIAGNOSIS — I82.532 CHRONIC DEEP VEIN THROMBOSIS (DVT) OF LEFT POPLITEAL VEIN: ICD-10-CM

## 2022-05-09 DIAGNOSIS — Z86.711 HISTORY OF PULMONARY EMBOLUS (PE): Primary | ICD-10-CM

## 2022-05-09 DIAGNOSIS — Z79.01 CHRONIC ANTICOAGULATION: ICD-10-CM

## 2022-05-09 PROCEDURE — 93793 ANTICOAG MGMT PT WARFARIN: CPT | Mod: ,,, | Performed by: PHARMACIST

## 2022-05-09 PROCEDURE — 93793 PR ANTICOAGULANT MGMT FOR PT TAKING WARFARIN: ICD-10-PCS | Mod: ,,, | Performed by: PHARMACIST

## 2022-05-17 ENCOUNTER — PES CALL (OUTPATIENT)
Dept: ADMINISTRATIVE | Facility: CLINIC | Age: 77
End: 2022-05-17
Payer: MEDICARE

## 2022-05-25 ENCOUNTER — ANTI-COAG VISIT (OUTPATIENT)
Dept: CARDIOLOGY | Facility: CLINIC | Age: 77
End: 2022-05-25
Payer: MEDICARE

## 2022-05-25 DIAGNOSIS — Z86.711 HISTORY OF PULMONARY EMBOLUS (PE): Primary | ICD-10-CM

## 2022-05-25 DIAGNOSIS — I27.82 OTHER CHRONIC PULMONARY EMBOLISM WITHOUT ACUTE COR PULMONALE: ICD-10-CM

## 2022-05-25 DIAGNOSIS — I82.532 CHRONIC DEEP VEIN THROMBOSIS (DVT) OF LEFT POPLITEAL VEIN: ICD-10-CM

## 2022-05-25 DIAGNOSIS — Z79.01 CHRONIC ANTICOAGULATION: ICD-10-CM

## 2022-05-25 PROCEDURE — 93793 ANTICOAG MGMT PT WARFARIN: CPT | Mod: ,,,

## 2022-05-25 PROCEDURE — 93793 PR ANTICOAGULANT MGMT FOR PT TAKING WARFARIN: ICD-10-PCS | Mod: ,,,

## 2022-05-26 NOTE — PROGRESS NOTES
INR not at goal. Medications, chart, and patient findings reviewed. See calendar for adjustments to dose and follow up plan.    Pt could not verify dose. Could not find paper with dose written on it. She reports she followed her paper though. Will recommend pt use weekly pillbox if not already doing so due to past issues with dose discrepancies

## 2022-05-31 ENCOUNTER — EXTERNAL CHRONIC CARE MANAGEMENT (OUTPATIENT)
Dept: PRIMARY CARE CLINIC | Facility: CLINIC | Age: 77
End: 2022-05-31
Payer: MEDICARE

## 2022-05-31 PROCEDURE — 99439 CHRNC CARE MGMT STAF EA ADDL: CPT | Mod: S$PBB,,, | Performed by: INTERNAL MEDICINE

## 2022-05-31 PROCEDURE — 99490 CHRNC CARE MGMT STAFF 1ST 20: CPT | Mod: S$PBB,,, | Performed by: INTERNAL MEDICINE

## 2022-05-31 PROCEDURE — 99490 PR CHRONIC CARE MGMT, 1ST 20 MIN: ICD-10-PCS | Mod: S$PBB,,, | Performed by: INTERNAL MEDICINE

## 2022-05-31 PROCEDURE — 99439 PR CHRONIC CARE MGMT, EA ADDTL 20 MIN: ICD-10-PCS | Mod: S$PBB,,, | Performed by: INTERNAL MEDICINE

## 2022-05-31 PROCEDURE — 99439 CHRNC CARE MGMT STAF EA ADDL: CPT | Mod: PBBFAC,PO,27 | Performed by: INTERNAL MEDICINE

## 2022-05-31 PROCEDURE — 99490 CHRNC CARE MGMT STAFF 1ST 20: CPT | Mod: PBBFAC,PO,27 | Performed by: INTERNAL MEDICINE

## 2022-06-01 ENCOUNTER — ANTI-COAG VISIT (OUTPATIENT)
Dept: CARDIOLOGY | Facility: CLINIC | Age: 77
End: 2022-06-01
Payer: MEDICARE

## 2022-06-01 DIAGNOSIS — Z79.01 CHRONIC ANTICOAGULATION: ICD-10-CM

## 2022-06-01 DIAGNOSIS — Z86.711 HISTORY OF PULMONARY EMBOLUS (PE): Primary | ICD-10-CM

## 2022-06-01 DIAGNOSIS — I82.532 CHRONIC DEEP VEIN THROMBOSIS (DVT) OF LEFT POPLITEAL VEIN: ICD-10-CM

## 2022-06-01 PROCEDURE — 93793 PR ANTICOAGULANT MGMT FOR PT TAKING WARFARIN: ICD-10-PCS | Mod: ,,, | Performed by: PHARMACIST

## 2022-06-01 PROCEDURE — 93793 ANTICOAG MGMT PT WARFARIN: CPT | Mod: ,,, | Performed by: PHARMACIST

## 2022-06-01 RX ORDER — METOPROLOL SUCCINATE 25 MG/1
TABLET, EXTENDED RELEASE ORAL
Qty: 90 TABLET | Refills: 0 | OUTPATIENT
Start: 2022-06-01

## 2022-06-01 NOTE — TELEPHONE ENCOUNTER
No new care gaps identified.  [facility] Embedded Care Gaps. Reference number: [id number]. 6/01/2022   3:42:49 PM CDT

## 2022-06-02 NOTE — PROGRESS NOTES
Patient reports warfarin 2mg thurs/sat and 1.5mg all other day and does not remember what she took 5/26

## 2022-06-06 ENCOUNTER — HOSPITAL ENCOUNTER (EMERGENCY)
Facility: HOSPITAL | Age: 77
Discharge: HOME | End: 2022-06-06
Attending: EMERGENCY MEDICINE
Payer: MEDICARE

## 2022-06-06 VITALS
DIASTOLIC BLOOD PRESSURE: 58 MMHG | WEIGHT: 233 LBS | BODY MASS INDEX: 41.27 KG/M2 | RESPIRATION RATE: 16 BRPM | TEMPERATURE: 98 F | OXYGEN SATURATION: 96 % | SYSTOLIC BLOOD PRESSURE: 120 MMHG | HEART RATE: 60 BPM

## 2022-06-06 DIAGNOSIS — R53.1 WEAKNESS: ICD-10-CM

## 2022-06-06 DIAGNOSIS — R11.10 VOMITING, INTRACTABILITY OF VOMITING NOT SPECIFIED, PRESENCE OF NAUSEA NOT SPECIFIED, UNSPECIFIED VOMITING TYPE: Primary | ICD-10-CM

## 2022-06-06 LAB
ALBUMIN SERPL BCP-MCNC: 3.2 G/DL (ref 3.5–5.2)
ALP SERPL-CCNC: 78 U/L (ref 55–135)
ALT SERPL W/O P-5'-P-CCNC: 12 U/L (ref 10–44)
ANION GAP SERPL CALC-SCNC: 9 MMOL/L (ref 8–16)
AST SERPL-CCNC: 14 U/L (ref 10–40)
BASOPHILS # BLD AUTO: 0.04 K/UL (ref 0–0.2)
BASOPHILS NFR BLD: 0.3 % (ref 0–1.9)
BILIRUB SERPL-MCNC: 0.4 MG/DL (ref 0.1–1)
BILIRUB UR QL STRIP: NEGATIVE
BUN SERPL-MCNC: 19 MG/DL (ref 8–23)
CALCIUM SERPL-MCNC: 9 MG/DL (ref 8.7–10.5)
CHLORIDE SERPL-SCNC: 107 MMOL/L (ref 95–110)
CLARITY UR REFRACT.AUTO: CLEAR
CO2 SERPL-SCNC: 23 MMOL/L (ref 23–29)
COLOR UR AUTO: YELLOW
CREAT SERPL-MCNC: 0.9 MG/DL (ref 0.5–1.4)
CTP QC/QA: YES
DIFFERENTIAL METHOD: ABNORMAL
EOSINOPHIL # BLD AUTO: 0.1 K/UL (ref 0–0.5)
EOSINOPHIL NFR BLD: 0.5 % (ref 0–8)
ERYTHROCYTE [DISTWIDTH] IN BLOOD BY AUTOMATED COUNT: 16 % (ref 11.5–14.5)
EST. GFR  (AFRICAN AMERICAN): >60 ML/MIN/1.73 M^2
EST. GFR  (NON AFRICAN AMERICAN): >60 ML/MIN/1.73 M^2
GLUCOSE SERPL-MCNC: 130 MG/DL (ref 70–110)
GLUCOSE UR QL STRIP: NEGATIVE
HCT VFR BLD AUTO: 33.1 % (ref 37–48.5)
HGB BLD-MCNC: 10.1 G/DL (ref 12–16)
HGB UR QL STRIP: NEGATIVE
IMM GRANULOCYTES # BLD AUTO: 0.11 K/UL (ref 0–0.04)
IMM GRANULOCYTES NFR BLD AUTO: 0.8 % (ref 0–0.5)
INR PPP: 1.3 (ref 0.8–1.2)
KETONES UR QL STRIP: NEGATIVE
LEUKOCYTE ESTERASE UR QL STRIP: NEGATIVE
LYMPHOCYTES # BLD AUTO: 1.3 K/UL (ref 1–4.8)
LYMPHOCYTES NFR BLD: 9.7 % (ref 18–48)
MCH RBC QN AUTO: 20.1 PG (ref 27–31)
MCHC RBC AUTO-ENTMCNC: 30.5 G/DL (ref 32–36)
MCV RBC AUTO: 66 FL (ref 82–98)
MONOCYTES # BLD AUTO: 0.7 K/UL (ref 0.3–1)
MONOCYTES NFR BLD: 5.2 % (ref 4–15)
NEUTROPHILS # BLD AUTO: 10.9 K/UL (ref 1.8–7.7)
NEUTROPHILS NFR BLD: 83.5 % (ref 38–73)
NITRITE UR QL STRIP: NEGATIVE
NRBC BLD-RTO: 0 /100 WBC
PH UR STRIP: 5 [PH] (ref 5–8)
PLATELET # BLD AUTO: 218 K/UL (ref 150–450)
PMV BLD AUTO: 9.2 FL (ref 9.2–12.9)
POTASSIUM SERPL-SCNC: 3.8 MMOL/L (ref 3.5–5.1)
PROT SERPL-MCNC: 6.1 G/DL (ref 6–8.4)
PROT UR QL STRIP: NEGATIVE
PROTHROMBIN TIME: 13.6 SEC (ref 9–12.5)
RBC # BLD AUTO: 5.02 M/UL (ref 4–5.4)
SARS-COV-2 RDRP RESP QL NAA+PROBE: NEGATIVE
SODIUM SERPL-SCNC: 139 MMOL/L (ref 136–145)
SP GR UR STRIP: 1.01 (ref 1–1.03)
TROPONIN I SERPL DL<=0.01 NG/ML-MCNC: <0.006 NG/ML (ref 0–0.03)
URN SPEC COLLECT METH UR: NORMAL
WBC # BLD AUTO: 13.01 K/UL (ref 3.9–12.7)

## 2022-06-06 PROCEDURE — 93005 ELECTROCARDIOGRAM TRACING: CPT

## 2022-06-06 PROCEDURE — 25000003 PHARM REV CODE 250: Performed by: EMERGENCY MEDICINE

## 2022-06-06 PROCEDURE — 93010 ELECTROCARDIOGRAM REPORT: CPT | Mod: ,,, | Performed by: INTERNAL MEDICINE

## 2022-06-06 PROCEDURE — 63600175 PHARM REV CODE 636 W HCPCS: Performed by: EMERGENCY MEDICINE

## 2022-06-06 PROCEDURE — 99285 EMERGENCY DEPT VISIT HI MDM: CPT | Mod: 25

## 2022-06-06 PROCEDURE — 81003 URINALYSIS AUTO W/O SCOPE: CPT | Performed by: EMERGENCY MEDICINE

## 2022-06-06 PROCEDURE — 85610 PROTHROMBIN TIME: CPT | Performed by: EMERGENCY MEDICINE

## 2022-06-06 PROCEDURE — 99284 EMERGENCY DEPT VISIT MOD MDM: CPT | Mod: CS,,, | Performed by: EMERGENCY MEDICINE

## 2022-06-06 PROCEDURE — 80053 COMPREHEN METABOLIC PANEL: CPT | Performed by: EMERGENCY MEDICINE

## 2022-06-06 PROCEDURE — 96374 THER/PROPH/DIAG INJ IV PUSH: CPT

## 2022-06-06 PROCEDURE — 85025 COMPLETE CBC W/AUTO DIFF WBC: CPT | Performed by: EMERGENCY MEDICINE

## 2022-06-06 PROCEDURE — U0002 COVID-19 LAB TEST NON-CDC: HCPCS | Performed by: EMERGENCY MEDICINE

## 2022-06-06 PROCEDURE — 84484 ASSAY OF TROPONIN QUANT: CPT | Performed by: EMERGENCY MEDICINE

## 2022-06-06 PROCEDURE — 96361 HYDRATE IV INFUSION ADD-ON: CPT

## 2022-06-06 PROCEDURE — 93010 EKG 12-LEAD: ICD-10-PCS | Mod: ,,, | Performed by: INTERNAL MEDICINE

## 2022-06-06 PROCEDURE — 99284 PR EMERGENCY DEPT VISIT,LEVEL IV: ICD-10-PCS | Mod: CS,,, | Performed by: EMERGENCY MEDICINE

## 2022-06-06 RX ORDER — ONDANSETRON 2 MG/ML
4 INJECTION INTRAMUSCULAR; INTRAVENOUS
Status: COMPLETED | OUTPATIENT
Start: 2022-06-06 | End: 2022-06-06

## 2022-06-06 RX ORDER — ONDANSETRON 4 MG/1
4 TABLET, FILM COATED ORAL EVERY 8 HOURS PRN
Qty: 12 TABLET | Refills: 0 | Status: SHIPPED | OUTPATIENT
Start: 2022-06-06 | End: 2022-06-09

## 2022-06-06 RX ADMIN — ONDANSETRON 4 MG: 2 INJECTION INTRAMUSCULAR; INTRAVENOUS at 12:06

## 2022-06-06 RX ADMIN — SODIUM CHLORIDE 500 ML: 0.9 INJECTION, SOLUTION INTRAVENOUS at 12:06

## 2022-06-06 NOTE — ED NOTES
Coby Atkinson, an 76 y.o. female presents to the ED via EMS s/p near syncopal episode 30 minutes PTA. Pt explains she became nauseated and felt like she needed to have a BM when she became dizzy. Patient endorses chronic leg swelling to her left ankle and leg.       Chief Complaint   Patient presents with    Dizziness     Near syncopal episode Pt brought in from [facility] by EMS after reporting she was not feeling well and became dizzy and vomiting.      Review of patient's allergies indicates:   Allergen Reactions    Codeine     Ciprofloxacin Rash     Past Medical History:   Diagnosis Date    Anticoagulant long-term use     Cataract     Chronic diarrhea     Depression     Edema     GERD (gastroesophageal reflux disease)     Hypertension 10/2/2012    Osteoarthritis of both knees 8/9/2016    Osteopenia     Osteopenia 11/30/2017    PE (pulmonary thromboembolism) 12/03/2017    Primary localized osteoarthrosis, lower leg 12/3/2014    Sleep apnea 2016    Thalassemia minor

## 2022-06-06 NOTE — ED PROVIDER NOTES
Encounter Date: 6/6/2022       History     Chief Complaint   Patient presents with    Dizziness     Near syncopal episode Pt brought in from [facility] by EMS after reporting she was not feeling well and became dizzy and vomiting.      76-year-old female with history of pulmonary embolus, hypertension, thalassemia minor presents with an episode of nausea vomiting and diarrhea with associated lightheadedness.  She feels generally weak.  Symptoms happened abruptly this morning around 10:00 a.m..  She has been feeling well otherwise.  She ate a normal dinner of hot dogs and chili last night.  She had coffee and toast this morning.  Her  had the same thing and he is not having symptoms.  She denies any headache, vertigo, blurred vision.  There is no focal weakness.  She feels like she moves her arms and legs normally.  Denies any chest discomfort or shortness of breath.  There is no abdominal pain.  She has not been having infectious symptoms such as dysuria fever sore throat or cough.        Review of patient's allergies indicates:   Allergen Reactions    Codeine     Ciprofloxacin Rash     Past Medical History:   Diagnosis Date    Anticoagulant long-term use     Cataract     Chronic diarrhea     Depression     Edema     GERD (gastroesophageal reflux disease)     Hypertension 10/2/2012    Osteoarthritis of both knees 8/9/2016    Osteopenia     Osteopenia 11/30/2017    PE (pulmonary thromboembolism) 12/03/2017    Primary localized osteoarthrosis, lower leg 12/3/2014    Sleep apnea 2016    Thalassemia minor      Past Surgical History:   Procedure Laterality Date    APPENDECTOMY      CATARACT EXTRACTION      COLONOSCOPY N/A 11/5/2016    Procedure: COLONOSCOPY;  Surgeon: Tanner Simental MD;  Location: [address];  Service: Endoscopy;  Laterality: N/A;    HYSTERECTOMY  age 39    fibroids    OOPHORECTOMY      TONSILLECTOMY       Family History   Problem Relation Age of Onset     Hypertension Mother     Cancer Father         skin    Cancer Brother         pancreatic    No Known Problems Sister     No Known Problems Maternal Aunt     No Known Problems Maternal Uncle     No Known Problems Paternal Aunt     No Known Problems Paternal Uncle     Coronary artery disease Maternal Grandmother     Heart failure Maternal Grandmother     No Known Problems Maternal Grandfather     Stroke Paternal Grandmother     Coronary artery disease Paternal Grandmother     No Known Problems Paternal Grandfather     Coronary artery disease Brother         with CABG in his 30s    Amblyopia Neg Hx     Blindness Neg Hx     Cataracts Neg Hx     Diabetes Neg Hx     Glaucoma Neg Hx     Macular degeneration Neg Hx     Retinal detachment Neg Hx     Strabismus Neg Hx     Thyroid disease Neg Hx     Colon cancer Neg Hx     Stomach cancer Neg Hx     Esophageal cancer Neg Hx      Social History     Tobacco Use    Smoking status: Never Smoker    Smokeless tobacco: Never Used   Substance Use Topics    Alcohol use: No     Comment: rare    Drug use: No     Review of Systems   Constitutional: Negative for fever.   HENT: Negative for sore throat.    Respiratory: Negative for shortness of breath.    Cardiovascular: Negative for chest pain.   Gastrointestinal: Positive for diarrhea, nausea and vomiting.   Genitourinary: Negative for dysuria.   Musculoskeletal: Negative for back pain.   Skin: Negative for rash.   Neurological: Positive for light-headedness. Negative for tremors, seizures, syncope, speech difficulty, weakness, numbness and headaches.   Hematological: Does not bruise/bleed easily.   Psychiatric/Behavioral: Negative for agitation.       Physical Exam     Initial Vitals [06/06/22 1153]   BP Pulse Resp Temp SpO2   (!) 122/56 60 20 98 °F (36.7 °C) (!) 94 %      MAP       --         Physical Exam    Constitutional: She appears well-developed and well-nourished. She is not diaphoretic. No distress.    HENT:   Head: Normocephalic and atraumatic.   Eyes: Conjunctivae are normal.   Neck: Neck supple. No tracheal deviation present. No JVD present.   Normal range of motion.  Cardiovascular: Normal rate, regular rhythm, normal heart sounds and intact distal pulses.   Pulmonary/Chest: Breath sounds normal. No respiratory distress. She has no wheezes. She has no rhonchi. She has no rales.   Abdominal: Abdomen is soft. Bowel sounds are normal. She exhibits no distension. There is no abdominal tenderness. There is no rebound.   Musculoskeletal:         General: No edema.      Cervical back: Normal range of motion and neck supple.     Neurological: She is alert. She has normal strength. No sensory deficit. GCS score is 15. GCS eye subscore is 4. GCS verbal subscore is 5. GCS motor subscore is 6.   Skin: Skin is warm. No rash noted.   Psychiatric: She has a normal mood and affect.         ED Course   Procedures  Labs Reviewed   CBC W/ AUTO DIFFERENTIAL - Abnormal; Notable for the following components:       Result Value    WBC 13.01 (*)     Hemoglobin 10.1 (*)     Hematocrit 33.1 (*)     MCV 66 (*)     MCH 20.1 (*)     MCHC 30.5 (*)     RDW 16.0 (*)     Immature Granulocytes 0.8 (*)     Gran # (ANC) 10.9 (*)     Immature Grans (Abs) 0.11 (*)     Gran % 83.5 (*)     Lymph % 9.7 (*)     All other components within normal limits   COMPREHENSIVE METABOLIC PANEL - Abnormal; Notable for the following components:    Glucose 130 (*)     Albumin 3.2 (*)     All other components within normal limits   PROTIME-INR - Abnormal; Notable for the following components:    Prothrombin Time 13.6 (*)     INR 1.3 (*)     All other components within normal limits   TROPONIN I   URINALYSIS, REFLEX TO URINE CULTURE    Narrative:     Specimen Source->Urine   SARS-COV-2 RDRP GENE    Narrative:     This test utilizes isothermal nucleic acid amplification   technology to detect the SARS-CoV-2 RdRp nucleic acid segment.   The analytical sensitivity  (limit of detection) is 125 genome   equivalents/mL.   A POSITIVE result implies infection with the SARS-CoV-2 virus;   the patient is presumed to be contagious.     A NEGATIVE result means that SARS-CoV-2 nucleic acids are not   present above the limit of detection. A NEGATIVE result should be   treated as presumptive. It does not rule out the possibility of   COVID-19 and should not be the sole basis for treatment decisions.   If COVID-19 is strongly suspected based on clinical and exposure   history, re-testing using an alternate molecular assay should be   considered.   This test is only for use under the Food and Drug   Administration s Emergency Use Authorization (EUA).   Commercial kits are provided by [employer].   Performance characteristics of the EUA have been independently   verified by [facility] Department of   Pathology and Laboratory Medicine.   _________________________________________________________________   The authorized Fact Sheet for Healthcare Providers and the authorized Fact   Sheet for Patients of the ID NOW COVID-19 are available on the FDA   website:     https://www.fda.gov/media/563733/download  https://www.fda.gov/media/352395/download            EKG Readings: (Independently Interpreted)   Sinus bradycardia at 55 without ischemic changes     ECG Results          EKG 12-lead (Final result)  Result time 06/06/22 13:18:32    Final result by Interface, Lab In [facility] (06/06/22 13:18:32)                 Narrative:    Test Reason : R53.1,    Vent. Rate : 055 BPM     Atrial Rate : 055 BPM     P-R Int : 192 ms          QRS Dur : 082 ms      QT Int : 472 ms       P-R-T Axes : -34 -38 -41 degrees     QTc Int : 451 ms    Unusual P axis, possible ectopic atrial bradycardia  Left axis deviation  Nonspecific ST and/or T wave abnormalities  Abnormal ECG  When compared with ECG of 06-JUN-2022 11:51,  Ectopic atrial rhythm has replaced Sinus rhythm    Confirmed by Sarita CORDOVA, Kathy  ([id number]) on 6/6/2022 1:18:21 PM    Referred By: ROSALIND   SELF           Confirmed By:Kathy Stein MD                            Imaging Results          X-Ray Chest 1 View (Final result)  Result time 06/06/22 14:06:04    Final result by Mundo Kebede MD (06/06/22 14:06:04)                 Impression:      No detrimental change or radiographic acute intrathoracic process seen on this limited single view.      Electronically signed by: Mundo Kebede MD  Date:    06/06/2022  Time:    14:06             Narrative:    EXAMINATION:  XR CHEST 1 VIEW    CLINICAL HISTORY:  Weakness    TECHNIQUE:  Single frontal view of the chest was performed.    COMPARISON:  Chest radiograph 10/04/2021 and CT thorax 12/02/2017    FINDINGS:  Monitoring leads and clothing artifact overlie the chest.  Patient is slightly rotated.  Resolution is limited by body habitus with underpenetration.    Cardiomediastinal silhouette is midline and stable without evidence of failure.  Pulmonary vasculature and hilar contours are within normal limits.  Bibasilar few scattered linear opacities consistent with platelike scarring versus atelectasis.  The lungs are otherwise well expanded without large consolidation, pleural effusion or pneumothorax.  No acute osseous process seen.  PA and lateral views can be obtained.                                 Medications   sodium chloride 0.9% bolus 500 mL (0 mLs Intravenous Stopped 6/6/22 1334)   ondansetron injection 4 mg (4 mg Intravenous Given 6/6/22 1233)     Medical Decision Making:   Initial Assessment:   Patient with lightheadedness and accompanied nausea vomiting diarrhea.  She has a reassuring physical exam.  No symptoms or findings to suggest an acute neurologic event.  No vertiginous symptoms.  She has a benign abdomen and no sign of bowel obstruction.  Will run an EKG, basic labs.  Will check her INR.  Will give fluids and some antiemetics and reassess.  ED Management:  2:26 PM  Reassessed- patient  feeling stronger and nausea resolved.  Repeat abd exam benign.  Had a mildly elevated WBC.  Will check CXR and Urine     4:15 PM  Patient continues to feel better.  Vital signs stable.  Abdomen benign.  Able to get up and walk.  Chest x-ray and urine showed no sign of infection.  I suspect she has a mildly elevated white count from the nausea vomiting diarrhea.  Highly doubt sepsis surgical abdomen abscess.  She has a benign abdomen and benign appearance.  She has remained afebrile throughout her stay.  Will discharge home in stable condition                      Clinical Impression:   Final diagnoses:  [R53.1] Weakness  [R11.10] Vomiting, intractability of vomiting not specified, presence of nausea not specified, unspecified vomiting type (Primary)          ED Disposition Condition    Discharge Stable        ED Prescriptions     Medication Sig Dispense Start Date End Date Auth. Provider    ondansetron (ZOFRAN) 4 MG tablet Take 1 tablet (4 mg total) by mouth every 8 (eight) hours as needed for Nausea. 12 tablet 6/6/2022 6/9/2022 Galindo Lynn MD        Follow-up Information     Follow up With Specialties Details Why Contact Info    Mundo Hudson, DO Internal Medicine Schedule an appointment as soon as possible for a visit in 2 days  [address] [facility] [address]  [phone]      [facility] Emergency Medicine  If symptoms worsen [address]  [facility] [address] [phone]           Galindo Lynn MD  06/06/22 8711

## 2022-06-07 NOTE — ED NOTES
Patient demonstrated she is able to walk on her own without issue. Patient normally uses a walker at home and was able to use her 's walker to ambulate in the EDOU. Patient stated she feels good walking. MD made aware.

## 2022-06-09 PROBLEM — F43.21 GRIEVING: Status: RESOLVED | Noted: 2019-11-25 | Resolved: 2022-06-09

## 2022-06-09 PROBLEM — S42.201A CLOSED FRACTURE OF RIGHT PROXIMAL HUMERUS: Status: RESOLVED | Noted: 2020-06-11 | Resolved: 2022-06-09

## 2022-06-09 PROBLEM — E87.6 HYPOKALEMIA: Status: RESOLVED | Noted: 2020-06-11 | Resolved: 2022-06-09

## 2022-06-09 PROBLEM — Z87.81 HISTORY OF HUMERUS FRACTURE: Status: ACTIVE | Noted: 2022-06-09

## 2022-06-13 ENCOUNTER — OFFICE VISIT (OUTPATIENT)
Dept: INTERNAL MEDICINE | Facility: CLINIC | Age: 77
End: 2022-06-13
Payer: MEDICARE

## 2022-06-13 ENCOUNTER — OFFICE VISIT (OUTPATIENT)
Dept: URGENT CARE | Facility: CLINIC | Age: 77
End: 2022-06-13
Payer: MEDICARE

## 2022-06-13 VITALS
DIASTOLIC BLOOD PRESSURE: 70 MMHG | HEART RATE: 56 BPM | SYSTOLIC BLOOD PRESSURE: 120 MMHG | WEIGHT: 234.88 LBS | HEIGHT: 63 IN | BODY MASS INDEX: 41.62 KG/M2 | OXYGEN SATURATION: 98 % | RESPIRATION RATE: 14 BRPM

## 2022-06-13 VITALS
HEART RATE: 72 BPM | OXYGEN SATURATION: 96 % | BODY MASS INDEX: 41.46 KG/M2 | HEIGHT: 63 IN | RESPIRATION RATE: 19 BRPM | TEMPERATURE: 98 F | DIASTOLIC BLOOD PRESSURE: 76 MMHG | WEIGHT: 234 LBS | SYSTOLIC BLOOD PRESSURE: 150 MMHG

## 2022-06-13 DIAGNOSIS — I10 ESSENTIAL HYPERTENSION: ICD-10-CM

## 2022-06-13 DIAGNOSIS — Z11.59 NEED FOR HEPATITIS C SCREENING TEST: ICD-10-CM

## 2022-06-13 DIAGNOSIS — M17.0 PRIMARY OSTEOARTHRITIS OF BOTH KNEES: ICD-10-CM

## 2022-06-13 DIAGNOSIS — E66.2 OBESITY HYPOVENTILATION SYNDROME: ICD-10-CM

## 2022-06-13 DIAGNOSIS — Z91.81 RISK FOR FALLS: ICD-10-CM

## 2022-06-13 DIAGNOSIS — M85.859 OSTEOPENIA OF NECK OF FEMUR, UNSPECIFIED LATERALITY: ICD-10-CM

## 2022-06-13 DIAGNOSIS — M85.89 OTHER SPECIFIED DISORDERS OF BONE DENSITY AND STRUCTURE, MULTIPLE SITES: ICD-10-CM

## 2022-06-13 DIAGNOSIS — I27.82 CHRONIC PULMONARY EMBOLISM WITHOUT ACUTE COR PULMONALE, UNSPECIFIED PULMONARY EMBOLISM TYPE: ICD-10-CM

## 2022-06-13 DIAGNOSIS — Z87.81 HISTORY OF HUMERUS FRACTURE: ICD-10-CM

## 2022-06-13 DIAGNOSIS — I25.2 HISTORY OF NON-ST ELEVATION MYOCARDIAL INFARCTION (NSTEMI): ICD-10-CM

## 2022-06-13 DIAGNOSIS — Z86.711 HISTORY OF PULMONARY EMBOLUS (PE): ICD-10-CM

## 2022-06-13 DIAGNOSIS — G47.33 OBSTRUCTIVE SLEEP APNEA: ICD-10-CM

## 2022-06-13 DIAGNOSIS — R41.3 MEMORY CHANGES: ICD-10-CM

## 2022-06-13 DIAGNOSIS — I82.532 CHRONIC DEEP VEIN THROMBOSIS (DVT) OF LEFT POPLITEAL VEIN: ICD-10-CM

## 2022-06-13 DIAGNOSIS — I27.9 PULMONARY HEART DISEASE: ICD-10-CM

## 2022-06-13 DIAGNOSIS — I50.32 CHRONIC DIASTOLIC HEART FAILURE: Primary | ICD-10-CM

## 2022-06-13 DIAGNOSIS — Z00.00 ENCOUNTER FOR PREVENTIVE HEALTH EXAMINATION: ICD-10-CM

## 2022-06-13 DIAGNOSIS — M71.20 SYNOVIAL CYST OF KNEE, UNSPECIFIED LATERALITY: ICD-10-CM

## 2022-06-13 DIAGNOSIS — Z79.01 CHRONIC ANTICOAGULATION: ICD-10-CM

## 2022-06-13 DIAGNOSIS — D56.3 THALASSEMIA MINOR: ICD-10-CM

## 2022-06-13 DIAGNOSIS — R53.83 FATIGUE, UNSPECIFIED TYPE: Primary | ICD-10-CM

## 2022-06-13 DIAGNOSIS — E04.1 THYROID NODULE: ICD-10-CM

## 2022-06-13 DIAGNOSIS — K21.9 GASTROESOPHAGEAL REFLUX DISEASE, UNSPECIFIED WHETHER ESOPHAGITIS PRESENT: ICD-10-CM

## 2022-06-13 DIAGNOSIS — I27.20 PULMONARY HYPERTENSION: ICD-10-CM

## 2022-06-13 DIAGNOSIS — F33.0 MILD EPISODE OF RECURRENT MAJOR DEPRESSIVE DISORDER: ICD-10-CM

## 2022-06-13 DIAGNOSIS — E04.2 MULTINODULAR THYROID: ICD-10-CM

## 2022-06-13 DIAGNOSIS — Z99.89 DEPENDENCE ON OTHER ENABLING MACHINES AND DEVICES: ICD-10-CM

## 2022-06-13 DIAGNOSIS — E66.01 MORBID OBESITY WITH BMI OF 40.0-44.9, ADULT: ICD-10-CM

## 2022-06-13 DIAGNOSIS — R26.9 ABNORMALITY OF GAIT AND MOBILITY: ICD-10-CM

## 2022-06-13 LAB
BILIRUB UR QL STRIP: NEGATIVE
CTP QC/QA: YES
GLUCOSE UR QL STRIP: NEGATIVE
HGB, POC: 11.1 G/DL (ref 12–15)
KETONES UR QL STRIP: NEGATIVE
LEUKOCYTE ESTERASE UR QL STRIP: NEGATIVE
PH, POC UA: 6 (ref 5–8)
POC BLOOD, URINE: NEGATIVE
POC NITRATES, URINE: NEGATIVE
PROT UR QL STRIP: NEGATIVE
SARS-COV-2 RDRP RESP QL NAA+PROBE: NEGATIVE
SP GR UR STRIP: 1.01 (ref 1–1.03)
UROBILINOGEN UR STRIP-ACNC: 1 (ref 0.1–1.1)

## 2022-06-13 PROCEDURE — U0002: ICD-10-PCS | Mod: QW,CR,S$GLB, | Performed by: FAMILY MEDICINE

## 2022-06-13 PROCEDURE — 93005 EKG 12-LEAD: ICD-10-PCS | Mod: S$GLB,,, | Performed by: FAMILY MEDICINE

## 2022-06-13 PROCEDURE — 99999 PR PBB SHADOW E&M-EST. PATIENT-LVL V: CPT | Mod: PBBFAC,,, | Performed by: NURSE PRACTITIONER

## 2022-06-13 PROCEDURE — 81003 URINALYSIS AUTO W/O SCOPE: CPT | Mod: QW,S$GLB,, | Performed by: FAMILY MEDICINE

## 2022-06-13 PROCEDURE — 99999 PR PBB SHADOW E&M-EST. PATIENT-LVL V: ICD-10-PCS | Mod: PBBFAC,,, | Performed by: NURSE PRACTITIONER

## 2022-06-13 PROCEDURE — 81003 POCT URINALYSIS, DIPSTICK, AUTOMATED, W/O SCOPE: ICD-10-PCS | Mod: QW,S$GLB,, | Performed by: FAMILY MEDICINE

## 2022-06-13 PROCEDURE — 85018 HEMOGLOBIN: CPT | Mod: QW,S$GLB,, | Performed by: FAMILY MEDICINE

## 2022-06-13 PROCEDURE — G0439 PPPS, SUBSEQ VISIT: HCPCS | Mod: ,,, | Performed by: NURSE PRACTITIONER

## 2022-06-13 PROCEDURE — 93010 ELECTROCARDIOGRAM REPORT: CPT | Mod: S$PBB,,, | Performed by: INTERNAL MEDICINE

## 2022-06-13 PROCEDURE — U0002 COVID-19 LAB TEST NON-CDC: HCPCS | Mod: QW,CR,S$GLB, | Performed by: FAMILY MEDICINE

## 2022-06-13 PROCEDURE — 93010 EKG 12-LEAD: ICD-10-PCS | Mod: S$PBB,,, | Performed by: INTERNAL MEDICINE

## 2022-06-13 PROCEDURE — 93005 ELECTROCARDIOGRAM TRACING: CPT | Mod: S$GLB,,, | Performed by: FAMILY MEDICINE

## 2022-06-13 PROCEDURE — 99215 OFFICE O/P EST HI 40 MIN: CPT | Mod: PBBFAC,PO | Performed by: NURSE PRACTITIONER

## 2022-06-13 PROCEDURE — 85018 POCT HEMOGLOBIN: ICD-10-PCS | Mod: QW,S$GLB,, | Performed by: FAMILY MEDICINE

## 2022-06-13 PROCEDURE — 99213 OFFICE O/P EST LOW 20 MIN: CPT | Mod: S$GLB,,, | Performed by: FAMILY MEDICINE

## 2022-06-13 PROCEDURE — 99213 PR OFFICE/OUTPT VISIT, EST, LEVL III, 20-29 MIN: ICD-10-PCS | Mod: S$GLB,,, | Performed by: FAMILY MEDICINE

## 2022-06-13 PROCEDURE — G0439 PR MEDICARE ANNUAL WELLNESS SUBSEQUENT VISIT: ICD-10-PCS | Mod: ,,, | Performed by: NURSE PRACTITIONER

## 2022-06-13 RX ORDER — POTASSIUM CHLORIDE 750 MG/1
10 CAPSULE, EXTENDED RELEASE ORAL ONCE
COMMUNITY
End: 2022-09-15 | Stop reason: SDUPTHER

## 2022-06-13 NOTE — PROGRESS NOTES
"Coby Atkinson presented for a  Medicare AWV and comprehensive Health Risk Assessment today. The following components were reviewed and updated:    · Medical history  · Family History  · Social history  · Allergies and Current Medications  · Health Risk Assessment  · Health Maintenance  · Care Team     ** See Completed Assessments for Annual Wellness Visit within the encounter summary.**       The following assessments were completed:  · Living Situation  · CAGE  · Depression Screening  · Timed Get Up and Go  · Whisper Test  · Cognitive Function Screening  · Nutrition Screening  · ADL Screening  · PAQ Screening    Vitals:    06/13/22 0932 06/13/22 1015   BP: 120/70    BP Location: Left arm    Pulse: 76 (!) 56   Resp: 14    SpO2: 96% 98%   Weight: 106.5 kg (234 lb 14.4 oz)    Height: 5' 3" (1.6 m)      Body mass index is 41.61 kg/m².  Physical Exam  Constitutional:       Appearance: She is well-developed.   HENT:      Head: Normocephalic.      Comments: Wears face mask     Right Ear: External ear normal.      Left Ear: External ear normal.   Eyes:      General: No scleral icterus.  Cardiovascular:      Rate and Rhythm: Normal rate.   Pulmonary:      Breath sounds: Normal breath sounds.   Abdominal:      Palpations: Abdomen is soft.      Comments: obese   Musculoskeletal:         General: No swelling.      Comments: Uses rollator   Skin:     General: Skin is warm and dry.      Findings: No rash.   Neurological:      Mental Status: She is alert and oriented to person, place, and time.      Motor: No abnormal muscle tone.      Deep Tendon Reflexes: Reflexes are normal and symmetric.   Psychiatric:         Mood and Affect: Mood normal.         Behavior: Behavior normal.         Thought Content: Thought content normal.         Judgment: Judgment normal.           Lab Results   Component Value Date    HGBA1C 5.4 06/11/2020    CHOL 145 04/19/2022    HDL 66 04/19/2022    LDLCALC 68.6 04/19/2022    TRIG 52 04/19/2022    " CHOLHDL 45.5 04/19/2022      Results for orders placed in visit on 12/03/19    DXA Bone Density Spine And Hip    Narrative  EXAMINATION:  DEXA BONE DENSITY SPINE HIP    CLINICAL HISTORY:  Disorder of bone density and structure, unspecified.  75 y/o female with no history of fractures.  She had surgical menopause at 39 y/o.  She does not exercise or smoke.    TECHNIQUE:  DXA Specification: [employer] Quotient Horizon [id number]    Bone Mineral Density scanning was performed over the hip and lumbar spine. Review of the images confirms satisfactory positioning and technique.    COMPARISON:  Comparison study done on 10/20/2016.    Lumbar spine:  BMD 1.061 g/cm2 and T-score 0.1.    Total Hip:        BMD 0.891 g/cm2 and T-score -0.4.    FINDINGS:  There is a 9.4% risk of a major osteoporotic fracture and a 1.6% risk of hip fracture in the next 10 years (FRAX)    Compared with previous DXA, BMD at the lumbar spine has decreased by -11.7%, and the BMD at the total hip has decreased by -6.4%.    Lumbar spine (L1-L4):   BMD is 0.938 g/cm2, T-score is -1.0, and Z-score is 1.4.    Total hip:                    BMD is 0.834 g/cm2, T-score is -0.9, and Z-score is 0.9.    Femoral neck:              BMD is 0.684 g/cm2, T-score is -1.5, and Z-score is 0.6.    Impression  1. Osteopenia of the femoral neck.  FRAX calculations do not suggest treatment.  RECOMMENDATIONS of [facility]:    1.  Recommended daily calcium intake 9179-4924 mg, dietary sources preferred; Vitamin D 3435-5276 IU daily.    2.  Adequate exercise and fall precautions.    3.  Repeat in 2 years.    EXPLANATION OF RESULTS:    The T-score compares these results to the average bone density of a 20 year-old of the same gender. The Z-score compares this result to the average bone density to people of the same age, gender, and race. The amounts indicate the number of standard deviations above or below the mean.    * Osteoporosis is  generally defined as having a T-score between less than or equal to -2.5.    * Osteopenia is generally defined as having a T-score between -1.0 and -2.5.    * The normal range is generally defined as having a t-score greater than or equal to -1.0.    * Calculated FRAX scores for fracture risk prediction may not be accurate in the setting of certain clinical factors such as pharmacologic therapy for osteoporosis, prior fragility fractures, high dose glucocorticoid use etc.      Electronically signed by: Shanda Huang MD  Date:    12/11/2019  Time:    16:29    No results found for this or any previous visit.                Diagnoses and health risks identified today and associated recommendations/orders:    1. Chronic diastolic heart failure  Stable- followed by cardiology    2. History of humerus fracture right proximal 8/18/2020  Stable- followed by PCP    3. Gastroesophageal reflux disease, unspecified whether esophagitis present  Stable- followed by PCP    4. Essential hypertension  Stable- followed by cardiology    5. History of pulmonary embolus (PE)--12/2017  Stable- followed by PCP    6. Chronic deep vein thrombosis (DVT) of left popliteal vein  Stable- followed by cardiology    7. Chronic anticoagulation  Stable- followed by cardiology    8. Chronic pulmonary embolism without acute cor pulmonale, unspecified pulmonary embolism type  Stable- followed by cardiology    9. Synovial cyst of knee, unspecified laterality  Stable- followed by PCP, ortho    10. History of non-ST elevation myocardial infarction (NSTEMI)  Stable- followed by cardiology    11. Memory changes  Stable- followed by PCP  - Ambulatory referral/consult to Outpatient Case Management    12. Mild episode of recurrent major depressive disorder  Stable- followed by PCP    13. Multinodular thyroid  Stable- followed by PCP    14. Obstructive sleep apnea  Stable- followed by PCP, sleep med    15. Obesity hypoventilation syndrome  Stable-  followed by PCP    16. Thyroid nodule  Stable- followed by PCP    17. Thalassemia minor  Stable- followed by PCP    18. Risk for falls  Stable- followed by PCP    19. Pulmonary hypertension  Stable- followed by PCP  - Ambulatory referral/consult to Outpatient Case Management    20. Pulmonary heart disease  Stable- followed by cardiology    21. Primary osteoarthritis of both knees  Stable- followed by PCP    22. Osteopenia of neck of femur, unspecified laterality  Stable- followed by PCP    23. Need for hepatitis C screening test  Stable- followed by PCP  - Hepatitis C Antibody; Future    24. Dependence on other enabling machines and devices  Stable- followed by PCP    25. Abnormality of gait and mobility  Stable- followed by PCP    26. Encounter for preventive health examination  Here for Health Risk Assessment/Annual Wellness Visit.  Health maintenance reviewed and updated. Follow up in one year.       27. Morbid obesity with BMI of 40.0-44.9, adult  Chronic . Followed by PCP.   Centers for Disease Control and Prevention (CDC)  weight recommendations for current BMI & ideal BMI range discussed with patient.  Recommended  gradual weight loss,  mediterranean diet ,  no added salt diet, start structured regular exercise every day.        Provided Coby with a 5-10 year written screening schedule and personal prevention plan. Recommendations were developed using the USPSTF age appropriate recommendations. Education, counseling, and referrals were provided as needed. After Visit Summary printed and given to patient which includes a list of additional screenings\tests needed. Here w lalito. Lalito verbalizes concern over pt's skin color & not acting normal- generalized weakness - fainted last Friday on commode- went to ED. Eat & drinking OK. No further diarrhea or vomiting.Deneis CP , cough- Referred to urgent care today for re-assessment.  Abnormal memory screen- has appt on Friday w PCP. ADRIÁN- not using CPAP- recommended  to start. Referral to outpt CM-. No structured exercise- pt states limited by chronic KITCHEN. On coumadin- denies bleeding.   Fall prevention handouts.Hep c  Screen & DXA ordered-medicare care gap.     Follow up in about 1 year (around 6/13/2023).    Flor Gautam NP   I offered to discuss advanced care planning, including how to pick a person who would make decisions for you if you were unable to make them for yourself, called a health care power of , and what kind of decisions you might make such as use of life sustaining treatments such as ventilators and tube feeding when faced with a life limiting illness recorded on a living will that they will need to know. (How you want to be cared for as you near the end of your natural life)     X Patient is interested in learning more about how to make advanced directives.  I provided them paperwork and offered to discuss this with them.

## 2022-06-13 NOTE — PATIENT INSTRUCTIONS
PLEASE READ YOUR DISCHARGE INSTRUCTIONS ENTIRELY AS IT CONTAINS IMPORTANT INFORMATION.    Continue your meds    Fluids, rest    Please go to the emergency room if you experience chest pain, shortness of breath, funny heart beats, headache, blurred vision, weakness in one arm or leg, slurred speech, numbness, inability to walk or talk, confusion.     Please keep your appt Wednesday with your pcp      You must understand that you have received an Urgent Care treatment only and that you may be released before all of your medical problems are known or treated.

## 2022-06-13 NOTE — PATIENT INSTRUCTIONS
Counseling and Referral of Other Preventative  (Italic type indicates deductible and co-insurance are waived)    Patient Name: Coby Atkinson  Today's Date: 6/13/2022    Health Maintenance       Date Due Completion Date    Hepatitis C Screening ordered-please schedule   ---    TETANUS VACCINE In the future for injury'   ---    COVID-19 Vaccine (4 - Booster for Pfizer series) 08/28/2022 12/22/2021    Shingles Vaccine (2 of 2) 06/13/2023 11/25/2019 (Done)        Influenza Vaccine (Season Ended) 09/01/2022 11/17/2020    DEXA Scan Ordered   12/3/2019    Lipid Panel    Abnormal cogntive function screen- follow upwith PCP      referral to out patient case management    Suggest urgent care followup today- syncopal episode Friday on commode- went to ED- still with general weakness.    Prevention of falls handouts    Gradual weight loss-mediterranean diet  Handouts         04/19/2023 4/19/2022        Orders Placed This Encounter   Procedures    Hepatitis C Antibody    Ambulatory referral/consult to Outpatient Case Management     The following information is provided to all patients.  This information is to help you find resources for any of the problems found today that may be affecting your health:                Living healthy guide: www.[facility].[place].gov      Understanding Diabetes: www.diabetes.org      Eating healthy: www.cdc.gov/healthyweight      CDC home safety checklist: www.cdc.gov/steadi/patient.html      Agency on Aging: www.goea.[place].[facility]      Alcoholics anonymous (AA): www.aa.org      Physical Activity: www.[place].nih.gov/qf8ofaw      Tobacco use: www.quitwithusla.org

## 2022-06-13 NOTE — PROGRESS NOTES
"Subjective:       Patient ID: Coby Atkinson is a 76 y.o. female.    Vitals:  height is 5' 3" (1.6 m) and weight is 106.1 kg (234 lb). Her temperature is 98.4 °F (36.9 °C). Her blood pressure is 150/76 (abnormal) and her pulse is 72. Her respiration is 19 and oxygen saturation is 96%.     Chief Complaint: Nausea        Patient states a week ago she passed out on the toilet had an episode of vomiting and was seen in the ER.  She had a negative workup and was sent home.  She had 1 more episode of vomiting on Wednesday.  Thursday and Saturday she had loose stool that has since resolved.  She never had anything bloody or black in her monitor stool.  She is not having any fever cough congestion chest pain shortness of breath abdominal pain.  She was seen at her primary care doctor's office today and her and  were concerned about her low blood count that was done in the ER and she was referred here for eval. She is going to see her pcp  On Wednesday.    Nausea  This is a new problem. The current episode started in the past 7 days. The problem occurs daily. The problem has been gradually worsening. Associated symptoms include fatigue and vomiting. Pertinent negatives include no nausea. Nothing aggravates the symptoms. She has tried acetaminophen for the symptoms. The treatment provided mild relief.       Constitution: Positive for fatigue.   Gastrointestinal: Positive for vomiting and diarrhea. Negative for nausea.       Objective:      Physical Exam   Constitutional: She is oriented to person, place, and time. She appears well-developed.      Comments:Alert oriented     HENT:   Head: Normocephalic and atraumatic.   Ears:   Right Ear: External ear normal.   Left Ear: External ear normal.   Nose: Nose normal.   Mouth/Throat: Mucous membranes are normal.   Eyes: Conjunctivae and lids are normal.      Comments: PERRLA conjunctiva pink  Tongue pink   Neck: Trachea normal. Neck supple.   Cardiovascular: Normal " rate, regular rhythm and normal heart sounds.   Pulmonary/Chest: Effort normal and breath sounds normal. No accessory muscle usage. No tachypnea. No respiratory distress. She has no decreased breath sounds. She has no wheezes. She has no rhonchi. She has no rales.   NAD able to speak in clear complete sentences without difficulty           Comments: NAD able to speak in clear complete sentences without difficulty      Abdominal: Normal appearance and bowel sounds are normal. She exhibits no distension, no abdominal bruit, no pulsatile midline mass and no mass. Soft. There is no abdominal tenderness. There is no rebound, no guarding, no tenderness at McBurney's point and negative Espinosa's sign.   Musculoskeletal: Normal range of motion.         General: Normal range of motion.   Neurological: She is alert and oriented to person, place, and time. She has normal strength.   Skin: Skin is warm, dry, intact, not diaphoretic and not pale.   Psychiatric: Her speech is normal and behavior is normal. Judgment and thought content normal.   Nursing note and vitals reviewed.        Results for orders placed or performed in visit on 06/13/22   POCT Urinalysis, Dipstick, Automated, W/O Scope   Result Value Ref Range    POC Blood, Urine Negative Negative    POC Bilirubin, Urine Negative Negative    POC Urobilinogen, Urine 1.0 0.1 - 1.1    POC Ketones, Urine Negative Negative    POC Protein, Urine Negative Negative    POC Nitrates, Urine Negative Negative    POC Glucose, Urine Negative Negative    pH, UA 6.0 5 - 8    POC Specific Gravity, Urine 1.010 1.003 - 1.029    POC Leukocytes, Urine Negative Negative   POCT HEMOGLOBIN   Result Value Ref Range    Hemoglobin 11.1 (A) 12.0 - 15.0 g/dL   POCT COVID-19 Rapid Screening   Result Value Ref Range    POC Rapid COVID Negative Negative     Acceptable Yes      *Note: Due to a large number of results and/or encounters for the requested time period, some results have not been  displayed. A complete set of results can be found in Results Review.     EKG: normal EKG, normal sinus rhythm, unchanged from previous tracings. Rate of 62 bpm. No ectopy. No STEMI. Inverted t wave v2    Assessment:       1. Fatigue, unspecified type          Plan:         Fatigue, unspecified type  -     POCT Urinalysis, Dipstick, Automated, W/O Scope  -     POCT HEMOGLOBIN  -     POCT COVID-19 Rapid Screening  -     EKG 12-lead    hbg improved from a week ago, no concerning ekg changes  Discussed ER precautions  Keep appt with pcp wed for further eval    Patient Instructions   PLEASE READ YOUR DISCHARGE INSTRUCTIONS ENTIRELY AS IT CONTAINS IMPORTANT INFORMATION.    Continue your meds    Fluids, rest    Please go to the emergency room if you experience chest pain, shortness of breath, funny heart beats, headache, blurred vision, weakness in one arm or leg, slurred speech, numbness, inability to walk or talk, confusion.     Please keep your appt Wednesday with your pcp      You must understand that you have received an Urgent Care treatment only and that you may be released before all of your medical problems are known or treated.

## 2022-06-14 ENCOUNTER — OUTPATIENT CASE MANAGEMENT (OUTPATIENT)
Dept: ADMINISTRATIVE | Facility: OTHER | Age: 77
End: 2022-06-14
Payer: MEDICARE

## 2022-06-14 NOTE — LETTER
June 17, 2022             Dear Mrs. Coby Atkinson,    Welcome to [facility] Complex [facility].  It was a pleasure talking with you today.  My name is Eugenia Duong, and I look forward to being your Care Manager.  My goal is to help you function at the healthiest and highest level possible.  You can contact me directly at [phone].    As an [facility] patient, some of the services we may be able to provide include:      Development of an individualized care plan with a Registered Nurse    Connection with a    Connection with available resources and services     Coordinate communication among your care team members    Provide coaching and education    Help you understand your doctors treatment plan   Help you obtain information about your insurance coverage.     All services provided by [facility] Complex Care Managers and other care team members are coordinated with and communicated to your primary care team.      As part of your enrollment, you will be receiving education materials and more information about these services in your [facility] account, by phone or through the mail.  If you do not wish to participate or receive information, please contact our office at [phone].      Sincerely,        Eugenia Duong RN  [facility]   Out-patient RN Complex Care Manager

## 2022-06-15 ENCOUNTER — OFFICE VISIT (OUTPATIENT)
Dept: INTERNAL MEDICINE | Facility: CLINIC | Age: 77
End: 2022-06-15
Payer: MEDICARE

## 2022-06-15 VITALS
TEMPERATURE: 97 F | SYSTOLIC BLOOD PRESSURE: 104 MMHG | WEIGHT: 234 LBS | RESPIRATION RATE: 19 BRPM | HEART RATE: 58 BPM | OXYGEN SATURATION: 95 % | HEIGHT: 63 IN | DIASTOLIC BLOOD PRESSURE: 52 MMHG | BODY MASS INDEX: 41.46 KG/M2

## 2022-06-15 DIAGNOSIS — E66.01 MORBID OBESITY WITH BMI OF 40.0-44.9, ADULT: ICD-10-CM

## 2022-06-15 DIAGNOSIS — D56.3 THALASSEMIA MINOR: ICD-10-CM

## 2022-06-15 DIAGNOSIS — I27.82 CHRONIC PULMONARY EMBOLISM WITHOUT ACUTE COR PULMONALE, UNSPECIFIED PULMONARY EMBOLISM TYPE: ICD-10-CM

## 2022-06-15 DIAGNOSIS — F33.0 MILD EPISODE OF RECURRENT MAJOR DEPRESSIVE DISORDER: ICD-10-CM

## 2022-06-15 DIAGNOSIS — E04.1 THYROID NODULE: ICD-10-CM

## 2022-06-15 DIAGNOSIS — I27.20 PULMONARY HYPERTENSION: ICD-10-CM

## 2022-06-15 DIAGNOSIS — M17.0 PRIMARY OSTEOARTHRITIS OF BOTH KNEES: ICD-10-CM

## 2022-06-15 DIAGNOSIS — Z00.00 ANNUAL PHYSICAL EXAM: Primary | ICD-10-CM

## 2022-06-15 DIAGNOSIS — K21.9 GASTROESOPHAGEAL REFLUX DISEASE, UNSPECIFIED WHETHER ESOPHAGITIS PRESENT: ICD-10-CM

## 2022-06-15 DIAGNOSIS — Z79.01 CHRONIC ANTICOAGULATION: ICD-10-CM

## 2022-06-15 DIAGNOSIS — I82.532 CHRONIC DEEP VEIN THROMBOSIS (DVT) OF LEFT POPLITEAL VEIN: ICD-10-CM

## 2022-06-15 DIAGNOSIS — E66.2 OBESITY HYPOVENTILATION SYNDROME: ICD-10-CM

## 2022-06-15 PROCEDURE — 99999 PR PBB SHADOW E&M-EST. PATIENT-LVL IV: ICD-10-PCS | Mod: PBBFAC,,, | Performed by: INTERNAL MEDICINE

## 2022-06-15 PROCEDURE — 99214 OFFICE O/P EST MOD 30 MIN: CPT | Mod: PBBFAC,PO,25 | Performed by: INTERNAL MEDICINE

## 2022-06-15 PROCEDURE — 99999 PR PBB SHADOW E&M-EST. PATIENT-LVL IV: CPT | Mod: PBBFAC,,, | Performed by: INTERNAL MEDICINE

## 2022-06-15 PROCEDURE — 99215 OFFICE O/P EST HI 40 MIN: CPT | Mod: S$PBB,,, | Performed by: INTERNAL MEDICINE

## 2022-06-15 PROCEDURE — 90677 PCV20 VACCINE IM: CPT | Mod: PBBFAC,PO

## 2022-06-15 PROCEDURE — 99215 PR OFFICE/OUTPT VISIT, EST, LEVL V, 40-54 MIN: ICD-10-PCS | Mod: S$PBB,,, | Performed by: INTERNAL MEDICINE

## 2022-06-15 RX ORDER — DOXAZOSIN 4 MG/1
4 TABLET ORAL NIGHTLY
Qty: 90 TABLET | Refills: 3 | Status: SHIPPED | OUTPATIENT
Start: 2022-06-15 | End: 2022-08-23

## 2022-06-15 NOTE — PROGRESS NOTES
Subjective:       Patient ID: Coby Atkinson is a 76 y.o. female.    Chief Complaint: Dizziness, Nausea, and Annual Exam    HPI   76 y.o. Female here for annual exam.     Vaccines: Influenza (2020); Tetanus (declined); PNA (current); Shingrix (will consider)  Eye exam: 10/16  Mammogram: 2/19  Gyn exam: declined  Colonoscopy: 11/16  DEXA: 12/19     Exercise: walks daily  Diet: low carb    Past Medical History:  No date: Anticoagulant long-term use  No date: Cataract  No date: Chronic diarrhea  No date: Depression  No date: Edema  No date: GERD (gastroesophageal reflux disease)  10/2/2012: Hypertension  8/9/2016: Osteoarthritis of both knees  No date: Osteopenia  11/30/2017: Osteopenia  12/03/2017: PE (pulmonary thromboembolism)  12/3/2014: Primary localized osteoarthrosis, lower leg  2016: Sleep apnea  No date: Thalassemia minor  Past Surgical History:  No date: APPENDECTOMY  No date: CATARACT EXTRACTION  11/5/2016: COLONOSCOPY; N/A      Comment:  Procedure: COLONOSCOPY;  Surgeon: Tanner Simental MD;                Location: [address];  Service: Endoscopy;                 Laterality: N/A;  age 39: HYSTERECTOMY      Comment:  fibroids  No date: OOPHORECTOMY  No date: TONSILLECTOMY  Social History    Socioeconomic History      Marital status:       Number of children: 1    Occupational History      Occupation: Retired from [employer]     Tobacco Use      Smoking status: Never Smoker      Smokeless tobacco: Never Used    Substance and Sexual Activity      Alcohol use: No        Comment: rare      Drug use: No      Sexual activity: Yes        Partners: Male        Birth control/protection: None    Social Determinants of Health  Financial Resource Strain: Medium Risk      Difficulty of Paying Living Expenses: Somewhat hard  Food Insecurity: No Food Insecurity      Worried About Running Out of Food in the Last Year: Never true      Ran Out of Food in the Last Year: Never  true  Transportation Needs: No Transportation Needs      Lack of Transportation (Medical): No      Lack of Transportation (Non-Medical): No  Stress: Stress Concern Present      Feeling of Stress : Very much  Social Connections: Unknown      Frequency of Communication with Friends and Family: Three times a week      Frequency of Social Gatherings with Friends and Family: Three times a week      Marital Status:   Housing Stability: Unknown      Unable to Pay for Housing in the Last Year: No      Unstable Housing in the Last Year: No  Review of patient's allergies indicates:   -- Codeine    -- Ciprofloxacin -- Rash  Mrs. Coby Atkinson had no medications administered during this visit.  Review of Systems   Constitutional: Negative for activity change, appetite change, chills, diaphoresis, fatigue, fever and unexpected weight change.   HENT: Negative for nasal congestion, mouth sores, postnasal drip, rhinorrhea, sinus pressure/congestion, sneezing, sore throat, trouble swallowing and voice change.    Eyes: Negative for pain, discharge and visual disturbance.   Respiratory: Negative for cough, shortness of breath and wheezing.    Cardiovascular: Negative for chest pain, palpitations and leg swelling.   Gastrointestinal: Negative for abdominal pain, blood in stool, constipation, diarrhea, nausea and vomiting.   Endocrine: Negative for cold intolerance and heat intolerance.   Genitourinary: Negative for difficulty urinating, dysuria, frequency, hematuria and urgency.   Musculoskeletal: Positive for arthralgias. Negative for myalgias.   Integumentary:  Negative for rash and wound.   Allergic/Immunologic: Negative for environmental allergies and food allergies.   Neurological: Negative for dizziness, tremors, seizures, syncope, weakness, light-headedness and headaches.   Hematological: Negative for adenopathy. Does not bruise/bleed easily.   Psychiatric/Behavioral: Positive for dysphoric mood. Negative for confusion  and sleep disturbance. The patient is not nervous/anxious.          Objective:      Physical Exam  Constitutional:       General: She is not in acute distress.     Appearance: She is well-developed. She is not diaphoretic.   HENT:      Head: Normocephalic and atraumatic.      Right Ear: External ear normal.      Left Ear: External ear normal.      Nose: Nose normal.      Mouth/Throat:      Pharynx: No oropharyngeal exudate.   Eyes:      General: No scleral icterus.        Right eye: No discharge.         Left eye: No discharge.      Conjunctiva/sclera: Conjunctivae normal.      Pupils: Pupils are equal, round, and reactive to light.   Neck:      Vascular: No JVD.   Cardiovascular:      Rate and Rhythm: Normal rate and regular rhythm.      Heart sounds: Normal heart sounds.   Pulmonary:      Effort: Pulmonary effort is normal. No respiratory distress.      Breath sounds: Normal breath sounds. No wheezing or rales.   Abdominal:      General: Bowel sounds are normal.      Tenderness: There is no abdominal tenderness.   Musculoskeletal:      Cervical back: Neck supple.      Right lower leg: No edema.      Left lower leg: No edema.   Lymphadenopathy:      Cervical: No cervical adenopathy.   Skin:     General: Skin is warm and dry.      Coloration: Skin is not pale.      Findings: No rash.   Neurological:      Mental Status: She is alert and oriented to person, place, and time.         Assessment:       Problem List Items Addressed This Visit        Psychiatric    Mild episode of recurrent major depressive disorder       Pulmonary    Pulmonary hypertension       Hematology    Chronic pulmonary embolism without acute cor pulmonale    Chronic deep vein thrombosis (DVT) of left popliteal vein    Chronic anticoagulation       Oncology    Thalassemia minor       Endocrine    Thyroid nodule    Morbid obesity with BMI of 40.0-44.9, adult       GI    Gastroesophageal reflux disease       Orthopedic    Primary osteoarthritis of  both knees       Other    Obesity hypoventilation syndrome      Other Visit Diagnoses     Annual physical exam    -  Primary    Relevant Orders    (In Office Administered) Pneumococcal Conjugate Vaccine (20 Valent) (IM) (Completed)          Plan:    Recurrent PE/DVT- continue coumadin       HTN- decrease Doxazosin to 4 mg qHS and continue other meds as prescribed       Morbid Obesity- pt followed by Bariatric Medicine      Depression- fair control on Lexapro 20 mg daily      GERD- controlled on Protonix daily      OA of knees- Tylenol PRN       CKD III- stable      Pulmonary HTN- stable, will follow       ADRIÁN- stable on CPAP      Thalassemia minor     hx of UGIB      Over 1/2 of 40 minute visit spent reviewing pt's medical records, education/discussion of pt's medical conditions and medical management

## 2022-06-17 ENCOUNTER — ANTI-COAG VISIT (OUTPATIENT)
Dept: CARDIOLOGY | Facility: CLINIC | Age: 77
End: 2022-06-17
Payer: MEDICARE

## 2022-06-17 DIAGNOSIS — Z79.01 CHRONIC ANTICOAGULATION: ICD-10-CM

## 2022-06-17 DIAGNOSIS — I82.532 CHRONIC DEEP VEIN THROMBOSIS (DVT) OF LEFT POPLITEAL VEIN: ICD-10-CM

## 2022-06-17 DIAGNOSIS — Z86.711 HISTORY OF PULMONARY EMBOLUS (PE): Primary | ICD-10-CM

## 2022-06-17 PROCEDURE — 93793 ANTICOAG MGMT PT WARFARIN: CPT | Mod: ,,,

## 2022-06-17 PROCEDURE — 93793 PR ANTICOAGULANT MGMT FOR PT TAKING WARFARIN: ICD-10-PCS | Mod: ,,,

## 2022-06-17 NOTE — PROGRESS NOTES
Outpatient Care Management  Initial Patient Assessment    Patient: Coby Atkinson  MRN: [id number]  Date of Service: 06/14/2022  Completed by: Eugenia Duong RN  Referral Date: 06/13/2022  Program: High Risk  Status: Ongoing  Effective Dates: 6/17/2022 - present  Responsible Staff: Eugenia Duong RN        Reason for Visit   Patient presents with    OPCM Chart Review     6/14/22    Initial Assessment     6/17/22    OPCM Enrollment Call     6/17/22    OPCM Welcome Letter     6/17/22    PHQ-9     6/17/22    Plan Of Care     6/17/22       Brief Summary:  Coby Atkinson was referred by Flor Gautam NP for Pulmonary Hypertension and Memory changes. Patient qualifies for program based on risk score of 66%.   Active problem list, medical, surgical and social history reviewed. Active comorbidities include CHF and Depression. Patient denies any recent issues with memory. Areas of need identified by patient include education on Hypertension. Complex care plan created with patient/caregiver input. By next encounter, patient agrees to read through educational material on Hypertension.     Assessment Documentation     OPCM Initial Assessment    Involvement of Care  Do I have permission to speak with other family members about your care?: Yes (Comment: Isma Atkinson, )  Assessment completed by: Patient  Identified Areas of Need  Advanced Care Planning: No  Housing: no  Medication Adherence: No  *Active medication list was reviewed and reconciled with patient and/or caregiver:   Nutrition: no  Lab Adherence: no  Depression: No (Comment: PHQ score 2)  Cognitive/Behavioral Health: no  Communication: no  Health Literacy: Yes  Fall risk?: No  Equipment/Supplies/Services: no          Problem List and History     Problems Addressed This Visit    Depression: Not identified by patient as current problem  Heart Failure: Not identified by patient as current problem  Hypertension: Identified as  current problem  Anemia: Not identified by patient as current problem  Hyperlipidemia: Not identified by patient as current problem  Heart Disease: Not identified by patient as current problem         Reviewed medical and social history with patient and/or caregiver. A complex care plan was discussed and completed today, with input from patient and/or caregiver.    Patient Instructions     Instructions were provided via the [employer] patient resources and are available for the patient to view on the patient portal, if active.       Clinical Reference Documents Added to Patient Instructions       Document    DASH DIET (ENGLISH)    HIGH BLOOD PRESSURE EMERGENCIES (ENGLISH)    HIGH BLOOD PRESSURE IN ADULTS (ENGLISH)        Todays OPCM Self-Management Care Plan was developed with the patients/caregivers input and was based on identified barriers from todays assessment.  Goals were written today with the patient/caregiver and the patient has agreed to work towards these goals to improve his/her overall well-being. Patient verbalized understanding of the care plan, goals, and all of today's instructions. Encouraged patient/caregiver to communicate with his/her physician and health care team about health conditions and the treatment plan.  Provided my contact information today and encouraged patient/caregiver to call me with any questions as needed.

## 2022-06-17 NOTE — PROGRESS NOTES
INR very low. Pt reports incorrect dose which has been a recurrent issue. S/w pt who reports she must have looked at old instructions. We discussed using her pillbox and writing her new dose down and keeping it with her meds. Pt advised to date her instructions and cross out or throw away old instructions. Increase to correct dose. Repeat INR next week

## 2022-06-20 DIAGNOSIS — I10 ESSENTIAL HYPERTENSION: Primary | ICD-10-CM

## 2022-06-20 DIAGNOSIS — E04.1 THYROID NODULE: ICD-10-CM

## 2022-06-30 ENCOUNTER — EXTERNAL CHRONIC CARE MANAGEMENT (OUTPATIENT)
Dept: PRIMARY CARE CLINIC | Facility: CLINIC | Age: 77
End: 2022-06-30
Payer: MEDICARE

## 2022-06-30 ENCOUNTER — ANTI-COAG VISIT (OUTPATIENT)
Dept: CARDIOLOGY | Facility: CLINIC | Age: 77
End: 2022-06-30
Payer: MEDICARE

## 2022-06-30 DIAGNOSIS — Z79.01 CHRONIC ANTICOAGULATION: ICD-10-CM

## 2022-06-30 DIAGNOSIS — Z86.711 HISTORY OF PULMONARY EMBOLUS (PE): Primary | ICD-10-CM

## 2022-06-30 DIAGNOSIS — I82.532 CHRONIC DEEP VEIN THROMBOSIS (DVT) OF LEFT POPLITEAL VEIN: ICD-10-CM

## 2022-06-30 PROCEDURE — 99490 PR CHRONIC CARE MGMT, 1ST 20 MIN: ICD-10-PCS | Mod: S$PBB,,, | Performed by: INTERNAL MEDICINE

## 2022-06-30 PROCEDURE — 99490 CHRNC CARE MGMT STAFF 1ST 20: CPT | Mod: S$PBB,,, | Performed by: INTERNAL MEDICINE

## 2022-06-30 PROCEDURE — 99490 CHRNC CARE MGMT STAFF 1ST 20: CPT | Mod: PBBFAC,PO | Performed by: INTERNAL MEDICINE

## 2022-06-30 NOTE — PROGRESS NOTES
INR not at goal. Medications, chart, and patient findings reviewed. Pt could not state what dose she had been taking so difficult to determine if the current dose is to high or if she possibly took a self-increased dose. See calendar for adjustments to dose and follow up plan.

## 2022-07-09 NOTE — TELEPHONE ENCOUNTER
No new care gaps identified.  [facility] Embedded Care Gaps. Reference number: [id number]. 7/09/2022   3:41:03 PM CDT

## 2022-07-11 RX ORDER — ESCITALOPRAM OXALATE 20 MG/1
20 TABLET ORAL NIGHTLY
Qty: 90 TABLET | Refills: 3 | Status: SHIPPED | OUTPATIENT
Start: 2022-07-11 | End: 2022-11-23

## 2022-07-12 ENCOUNTER — OUTPATIENT CASE MANAGEMENT (OUTPATIENT)
Dept: ADMINISTRATIVE | Facility: OTHER | Age: 77
End: 2022-07-12

## 2022-07-12 NOTE — LETTER
July 15, 2022    Coby Atkinson  [address] [place] [address]             [facility] [address] [place] [address] Dear Mrs. Atkinson,    I work with [facility] Outpatient Case Management Department. I have been unsuccessful at reaching you to follow up to see how you have been doing. Please call me back at your earliest convenience to discuss your health care needs.    I can be reached at [phone] from 8:00 AM to 4:30 PM on Monday through Friday. [facility] is a program offered through [facility] where a nurse is available 24/7 to answer questions or provide medical advice. Their number is [phone].      Kind Regards,    Eugenia Duong RN  Outpatient Case Management

## 2022-07-15 ENCOUNTER — PATIENT MESSAGE (OUTPATIENT)
Dept: ADMINISTRATIVE | Facility: OTHER | Age: 77
End: 2022-07-15
Payer: MEDICARE

## 2022-07-15 NOTE — PROGRESS NOTES
Outpatient Care Management  Patient Not Qualified    Patient: Coby Atkinson  MRN:  [id number]  Date of Service:  8/18/2022  Completed by:  Eugenia Duong RN    Chief Complaint   Patient presents with    OPCM Chart Review     7/12/22    OPCM RN First Follow-Up Attempt     7/15/22    OPCM RN Second Follow-Up Attempt     7/21/22    OPCM RN Third Follow-Up Attempt     8/15/22    Case Closure     8/18/22       Patient Summary              8/18/22 Unable to reach patient. Closing case.    8/15/22 3rd attempt to complete follow-up for Outpatient Care Management: Left message for patient requesting a return call. Will close case if patient does not respond.    7/21/22 2nd attempt to complete follow-up for Outpatient Care Management: Left message for patient requesting a return call. Will attempt to contact patient again at a later date.    7/15/22 1st attempt to complete follow-up for Outpatient Care Management: Left message for patient requesting a return call. OPCM letter has been sent. Will attempt to contact patient again at a later date.

## 2022-07-19 ENCOUNTER — ANTI-COAG VISIT (OUTPATIENT)
Dept: CARDIOLOGY | Facility: CLINIC | Age: 77
End: 2022-07-19
Payer: MEDICARE

## 2022-07-19 DIAGNOSIS — Z79.01 CHRONIC ANTICOAGULATION: ICD-10-CM

## 2022-07-19 DIAGNOSIS — Z86.711 HISTORY OF PULMONARY EMBOLUS (PE): Primary | ICD-10-CM

## 2022-07-19 DIAGNOSIS — I82.532 CHRONIC DEEP VEIN THROMBOSIS (DVT) OF LEFT POPLITEAL VEIN: ICD-10-CM

## 2022-07-19 LAB — INR PPP: 5.36

## 2022-07-19 PROCEDURE — 93793 PR ANTICOAGULANT MGMT FOR PT TAKING WARFARIN: ICD-10-PCS | Mod: ,,, | Performed by: PHARMACIST

## 2022-07-19 PROCEDURE — 93793 ANTICOAG MGMT PT WARFARIN: CPT | Mod: ,,, | Performed by: PHARMACIST

## 2022-07-19 NOTE — PROGRESS NOTES
INR not at goal. Medications, chart, and patient findings reviewed. See calendar for adjustments to dose and follow up plan.  Patient denies any changes in diet, medications, or health that would effect warfarin therapy.  Patient was re-educated on situations that would require placing a call to the coumadin clinic, including bleeding or unusual bruising issues, changes in health, diet or medications, upcoming procedures that require warfarin interruption, and missed coumadin dose(s). Patient expressed understanding that avoidance of consistency with these parameters could cause fluctuations in INR, leading to more frequent visits and increase risk of adverse events.

## 2022-07-28 ENCOUNTER — PATIENT MESSAGE (OUTPATIENT)
Dept: CARDIOLOGY | Facility: CLINIC | Age: 77
End: 2022-07-28
Payer: MEDICARE

## 2022-07-31 ENCOUNTER — EXTERNAL CHRONIC CARE MANAGEMENT (OUTPATIENT)
Dept: PRIMARY CARE CLINIC | Facility: CLINIC | Age: 77
End: 2022-07-31
Payer: MEDICARE

## 2022-07-31 PROCEDURE — 99490 CHRNC CARE MGMT STAFF 1ST 20: CPT | Mod: S$PBB,,, | Performed by: INTERNAL MEDICINE

## 2022-07-31 PROCEDURE — 99490 PR CHRONIC CARE MGMT, 1ST 20 MIN: ICD-10-PCS | Mod: S$PBB,,, | Performed by: INTERNAL MEDICINE

## 2022-07-31 PROCEDURE — 99490 CHRNC CARE MGMT STAFF 1ST 20: CPT | Mod: PBBFAC,PO | Performed by: INTERNAL MEDICINE

## 2022-08-01 ENCOUNTER — ANTI-COAG VISIT (OUTPATIENT)
Dept: CARDIOLOGY | Facility: CLINIC | Age: 77
End: 2022-08-01
Payer: MEDICARE

## 2022-08-01 DIAGNOSIS — Z79.01 CHRONIC ANTICOAGULATION: ICD-10-CM

## 2022-08-01 DIAGNOSIS — I82.532 CHRONIC DEEP VEIN THROMBOSIS (DVT) OF LEFT POPLITEAL VEIN: ICD-10-CM

## 2022-08-01 DIAGNOSIS — Z86.711 HISTORY OF PULMONARY EMBOLUS (PE): Primary | ICD-10-CM

## 2022-08-01 PROCEDURE — 93793 PR ANTICOAGULANT MGMT FOR PT TAKING WARFARIN: ICD-10-PCS | Mod: ,,, | Performed by: PHARMACIST

## 2022-08-01 PROCEDURE — 93793 ANTICOAG MGMT PT WARFARIN: CPT | Mod: ,,, | Performed by: PHARMACIST

## 2022-08-01 NOTE — PROGRESS NOTES
Previous INR was elevated and she was supposed to hold coumadin x 2 days with repeat INR on the third day (7/21).  INR was due on 7/21/22 but patient did not have INR on this date. Patient does not know what dose of coumadin she took on 7/19-7/21. INR not at goal. Medications, chart, and patient findings reviewed. See calendar for adjustments to dose and follow up plan.  INR extremely elevated, patient denies bleeding. Again, we will have her hold coumadin x 2 consecutive days with repeat INR in 2 days. We will emphasize the importance of her showing up to lab appointment in 2 days before restarting coumadin. We will also advise she eats a serving of dark greens 8/1 & 8/2.      Patient was re-educated on situations that would require placing a call to the coumadin clinic, including bleeding or unusual bruising issues, changes in health, diet or medications, upcoming procedures that require warfarin interruption, and missed coumadin dose(s). Patient expressed understanding that avoidance of consistency with these parameters could cause fluctuations in INR, leading to more frequent visits and increase risk of adverse events. ER if bleeding issues arise.

## 2022-08-03 ENCOUNTER — PATIENT MESSAGE (OUTPATIENT)
Dept: BARIATRICS | Facility: CLINIC | Age: 77
End: 2022-08-03
Payer: MEDICARE

## 2022-08-04 ENCOUNTER — ANTI-COAG VISIT (OUTPATIENT)
Dept: CARDIOLOGY | Facility: CLINIC | Age: 77
End: 2022-08-04
Payer: MEDICARE

## 2022-08-04 DIAGNOSIS — Z86.711 HISTORY OF PULMONARY EMBOLUS (PE): Primary | ICD-10-CM

## 2022-08-04 DIAGNOSIS — I82.532 CHRONIC DEEP VEIN THROMBOSIS (DVT) OF LEFT POPLITEAL VEIN: ICD-10-CM

## 2022-08-04 DIAGNOSIS — Z79.01 CHRONIC ANTICOAGULATION: ICD-10-CM

## 2022-08-04 PROCEDURE — 93793 ANTICOAG MGMT PT WARFARIN: CPT | Mod: ,,, | Performed by: PHARMACIST

## 2022-08-04 PROCEDURE — 93793 PR ANTICOAGULANT MGMT FOR PT TAKING WARFARIN: ICD-10-PCS | Mod: ,,, | Performed by: PHARMACIST

## 2022-08-04 NOTE — PROGRESS NOTES
Patient has two red bruises, no bleeding. She only ate dark greens on 8/1 and not 8/2 and has been holding her coumadin.

## 2022-08-11 ENCOUNTER — ANTI-COAG VISIT (OUTPATIENT)
Dept: CARDIOLOGY | Facility: CLINIC | Age: 77
End: 2022-08-11
Payer: MEDICARE

## 2022-08-11 DIAGNOSIS — Z86.711 HISTORY OF PULMONARY EMBOLUS (PE): Primary | ICD-10-CM

## 2022-08-11 DIAGNOSIS — I82.532 CHRONIC DEEP VEIN THROMBOSIS (DVT) OF LEFT POPLITEAL VEIN: ICD-10-CM

## 2022-08-11 DIAGNOSIS — Z79.01 CHRONIC ANTICOAGULATION: ICD-10-CM

## 2022-08-11 PROCEDURE — 93793 PR ANTICOAGULANT MGMT FOR PT TAKING WARFARIN: ICD-10-PCS | Mod: ,,, | Performed by: PHARMACIST

## 2022-08-11 PROCEDURE — 93793 ANTICOAG MGMT PT WARFARIN: CPT | Mod: ,,, | Performed by: PHARMACIST

## 2022-08-15 NOTE — PROGRESS NOTES
Catheter removed intact.   INR low for no apparent reason. Patient has bruises, states she fell and hit her head and knees, she did have a negative CT scan of her head. She denies any bleeding or other changes. Will boost dose today and re challenge weekly dose until follow-up in 2 weeks. Advised her to call with any changes or concerns.

## 2022-08-16 ENCOUNTER — ANTI-COAG VISIT (OUTPATIENT)
Dept: CARDIOLOGY | Facility: CLINIC | Age: 77
End: 2022-08-16
Payer: MEDICARE

## 2022-08-16 DIAGNOSIS — Z86.711 HISTORY OF PULMONARY EMBOLUS (PE): Primary | ICD-10-CM

## 2022-08-16 DIAGNOSIS — I82.532 CHRONIC DEEP VEIN THROMBOSIS (DVT) OF LEFT POPLITEAL VEIN: ICD-10-CM

## 2022-08-16 DIAGNOSIS — Z79.01 CHRONIC ANTICOAGULATION: ICD-10-CM

## 2022-08-16 PROCEDURE — 93793 PR ANTICOAGULANT MGMT FOR PT TAKING WARFARIN: ICD-10-PCS | Mod: ,,, | Performed by: PHARMACIST

## 2022-08-16 PROCEDURE — 93793 ANTICOAG MGMT PT WARFARIN: CPT | Mod: ,,, | Performed by: PHARMACIST

## 2022-08-16 NOTE — PROGRESS NOTES
Patient is reporting a coumadin weekly dose of 2mg fri/mon and 1.5mg all other days, which is a much higher than prescribed dose. Patient has long history of taoing dose differently than prescribed. She will be reminded to repeat dose back and write down dose. It is very important that she follow our dosing recommendations.

## 2022-08-22 ENCOUNTER — TELEPHONE (OUTPATIENT)
Dept: INTERNAL MEDICINE | Facility: CLINIC | Age: 77
End: 2022-08-22
Payer: MEDICARE

## 2022-08-22 RX ORDER — DIPHENOXYLATE HYDROCHLORIDE AND ATROPINE SULFATE 2.5; .025 MG/1; MG/1
1 TABLET ORAL 4 TIMES DAILY PRN
Qty: 20 TABLET | Refills: 0 | Status: SHIPPED | OUTPATIENT
Start: 2022-08-22 | End: 2022-09-01

## 2022-08-22 NOTE — TELEPHONE ENCOUNTER
----- Message from Bettylorene Hobbs sent at 8/22/2022  9:38 AM CDT -----  Contact: Pt [phone]  1MEDICALADVICE     Patient is calling for Medical Advice regarding: Loose Bowels     How long has patient had these symptoms: 1 week     Pharmacy name and phone#:  [id number] [place] - [address] [facility] [address]  [address] [address]  Phone: [phone] Fax: [phone]        Would like response via [facility]:  call back     Comments:

## 2022-08-23 ENCOUNTER — LAB VISIT (OUTPATIENT)
Dept: LAB | Facility: HOSPITAL | Age: 77
End: 2022-08-23
Attending: FAMILY MEDICINE
Payer: MEDICARE

## 2022-08-23 ENCOUNTER — OFFICE VISIT (OUTPATIENT)
Dept: INTERNAL MEDICINE | Facility: CLINIC | Age: 77
End: 2022-08-23
Payer: MEDICARE

## 2022-08-23 VITALS
WEIGHT: 236.56 LBS | BODY MASS INDEX: 41.9 KG/M2 | DIASTOLIC BLOOD PRESSURE: 58 MMHG | SYSTOLIC BLOOD PRESSURE: 104 MMHG | HEART RATE: 57 BPM | OXYGEN SATURATION: 96 %

## 2022-08-23 DIAGNOSIS — I95.9 HYPOTENSION, UNSPECIFIED HYPOTENSION TYPE: ICD-10-CM

## 2022-08-23 DIAGNOSIS — R53.83 FATIGUE, UNSPECIFIED TYPE: ICD-10-CM

## 2022-08-23 DIAGNOSIS — R19.7 DIARRHEA, UNSPECIFIED TYPE: ICD-10-CM

## 2022-08-23 DIAGNOSIS — R53.83 FATIGUE, UNSPECIFIED TYPE: Primary | ICD-10-CM

## 2022-08-23 DIAGNOSIS — R11.0 NAUSEA: ICD-10-CM

## 2022-08-23 LAB
ALBUMIN SERPL BCP-MCNC: 3.6 G/DL (ref 3.5–5.2)
ALP SERPL-CCNC: 84 U/L (ref 55–135)
ALT SERPL W/O P-5'-P-CCNC: 9 U/L (ref 10–44)
ANION GAP SERPL CALC-SCNC: 9 MMOL/L (ref 8–16)
AST SERPL-CCNC: 13 U/L (ref 10–40)
BASOPHILS # BLD AUTO: 0.04 K/UL (ref 0–0.2)
BASOPHILS NFR BLD: 0.3 % (ref 0–1.9)
BILIRUB SERPL-MCNC: 0.5 MG/DL (ref 0.1–1)
BUN SERPL-MCNC: 22 MG/DL (ref 8–23)
CALCIUM SERPL-MCNC: 9.4 MG/DL (ref 8.7–10.5)
CHLORIDE SERPL-SCNC: 106 MMOL/L (ref 95–110)
CO2 SERPL-SCNC: 23 MMOL/L (ref 23–29)
CREAT SERPL-MCNC: 1 MG/DL (ref 0.5–1.4)
DIFFERENTIAL METHOD: ABNORMAL
EOSINOPHIL # BLD AUTO: 0.1 K/UL (ref 0–0.5)
EOSINOPHIL NFR BLD: 0.7 % (ref 0–8)
ERYTHROCYTE [DISTWIDTH] IN BLOOD BY AUTOMATED COUNT: 15.4 % (ref 11.5–14.5)
EST. GFR  (NO RACE VARIABLE): 58 ML/MIN/1.73 M^2
GLUCOSE SERPL-MCNC: 93 MG/DL (ref 70–110)
HCT VFR BLD AUTO: 36.4 % (ref 37–48.5)
HGB BLD-MCNC: 11.1 G/DL (ref 12–16)
IMM GRANULOCYTES # BLD AUTO: 0.08 K/UL (ref 0–0.04)
IMM GRANULOCYTES NFR BLD AUTO: 0.6 % (ref 0–0.5)
LYMPHOCYTES # BLD AUTO: 3.1 K/UL (ref 1–4.8)
LYMPHOCYTES NFR BLD: 24.5 % (ref 18–48)
MCH RBC QN AUTO: 21.2 PG (ref 27–31)
MCHC RBC AUTO-ENTMCNC: 30.5 G/DL (ref 32–36)
MCV RBC AUTO: 70 FL (ref 82–98)
MONOCYTES # BLD AUTO: 0.8 K/UL (ref 0.3–1)
MONOCYTES NFR BLD: 6.6 % (ref 4–15)
NEUTROPHILS # BLD AUTO: 8.6 K/UL (ref 1.8–7.7)
NEUTROPHILS NFR BLD: 67.3 % (ref 38–73)
NRBC BLD-RTO: 0 /100 WBC
PLATELET # BLD AUTO: 333 K/UL (ref 150–450)
PMV BLD AUTO: 10.9 FL (ref 9.2–12.9)
POTASSIUM SERPL-SCNC: 4.7 MMOL/L (ref 3.5–5.1)
PROT SERPL-MCNC: 7.2 G/DL (ref 6–8.4)
RBC # BLD AUTO: 5.23 M/UL (ref 4–5.4)
SODIUM SERPL-SCNC: 138 MMOL/L (ref 136–145)
WBC # BLD AUTO: 12.71 K/UL (ref 3.9–12.7)

## 2022-08-23 PROCEDURE — 99213 OFFICE O/P EST LOW 20 MIN: CPT | Mod: PBBFAC,PO | Performed by: FAMILY MEDICINE

## 2022-08-23 PROCEDURE — 99214 OFFICE O/P EST MOD 30 MIN: CPT | Mod: S$PBB,,, | Performed by: FAMILY MEDICINE

## 2022-08-23 PROCEDURE — 99214 PR OFFICE/OUTPT VISIT, EST, LEVL IV, 30-39 MIN: ICD-10-PCS | Mod: S$PBB,,, | Performed by: FAMILY MEDICINE

## 2022-08-23 PROCEDURE — 99999 PR PBB SHADOW E&M-EST. PATIENT-LVL III: CPT | Mod: PBBFAC,,, | Performed by: FAMILY MEDICINE

## 2022-08-23 PROCEDURE — 85025 COMPLETE CBC W/AUTO DIFF WBC: CPT | Performed by: FAMILY MEDICINE

## 2022-08-23 PROCEDURE — 80053 COMPREHEN METABOLIC PANEL: CPT | Performed by: FAMILY MEDICINE

## 2022-08-23 PROCEDURE — 36415 COLL VENOUS BLD VENIPUNCTURE: CPT | Mod: PO | Performed by: FAMILY MEDICINE

## 2022-08-23 PROCEDURE — 99999 PR PBB SHADOW E&M-EST. PATIENT-LVL III: ICD-10-PCS | Mod: PBBFAC,,, | Performed by: FAMILY MEDICINE

## 2022-08-23 NOTE — PROGRESS NOTES
Called pt to reschedule missed INR 8/22. Pt says she is feeling unwell and may go to ER. She will contact Coumadin Clinic tomorrow or when she is discharged.

## 2022-08-23 NOTE — PROGRESS NOTES
Subjective:       Patient ID: Coby Atkinson is a 76 y.o. female.    Chief Complaint: Fatigue and Nausea    HPI 76-year-old white female patient of Dr. Hudson presents to clinic today accompanied by her  secondary to concerns of increased fatigue, nausea, and episodes of diarrhea which they report have been ongoing over the past month but have worsened over the past week.  The patient notes symptoms over the past week.  She reports 1 formed bowel movement daily but then notes urges to pass gas; however, she reports that if she is not close to the toilet or sitting on the toilet she will frequently pass loose stool when she passes gas.  She denies any change in her appetite but notes on off nausea.  The  reports that he has noticed increased fatigue and decreased desire to do activities over the past month.  He reports that approximately 2 months ago they want on a cruise to [place] and while on the cruise she became sick.  He has noted symptoms ever since.  The patient was seen by her PCP in June who noted hypotension; therefore, Cardura was decreased to 4 mg daily.  The patient continues to have hypotension at this time.  Review of Systems   Constitutional: Positive for fatigue. Negative for appetite change, chills and fever.   HENT: Positive for sneezing. Negative for nasal congestion, ear pain, hearing loss, postnasal drip, rhinorrhea, sinus pressure/congestion, sore throat and tinnitus.    Eyes: Negative for redness, itching and visual disturbance.   Respiratory: Negative for cough, chest tightness and shortness of breath.    Cardiovascular: Negative for chest pain and palpitations.   Gastrointestinal: Positive for diarrhea and nausea. Negative for abdominal pain, constipation and vomiting.   Genitourinary: Negative for decreased urine volume, difficulty urinating, dysuria, frequency, hematuria and urgency.   Musculoskeletal: Negative for back pain, myalgias, neck pain and neck  stiffness.   Integumentary:  Negative for rash.   Neurological: Negative for dizziness, light-headedness and headaches.   Psychiatric/Behavioral: Negative.          Objective:      Physical Exam  Vitals and nursing note reviewed.   Constitutional:       General: She is not in acute distress.     Appearance: She is well-developed. She is not diaphoretic.   HENT:      Head: Normocephalic and atraumatic.      Right Ear: External ear normal.      Left Ear: External ear normal.      Nose: Nose normal.      Mouth/Throat:      Mouth: Mucous membranes are pale and dry.      Pharynx: No oropharyngeal exudate.   Eyes:      General: No scleral icterus.        Right eye: No discharge.         Left eye: No discharge.      Conjunctiva/sclera: Conjunctivae normal.      Pupils: Pupils are equal, round, and reactive to light.   Neck:      Thyroid: No thyromegaly.      Vascular: No JVD.      Trachea: No tracheal deviation.   Cardiovascular:      Rate and Rhythm: Normal rate and regular rhythm.      Heart sounds: Normal heart sounds. No murmur heard.    No friction rub. No gallop.   Pulmonary:      Effort: Pulmonary effort is normal. No respiratory distress.      Breath sounds: Normal breath sounds. No stridor. No wheezing or rales.   Abdominal:      General: Bowel sounds are normal. There is no distension.      Palpations: Abdomen is soft. There is no mass.      Tenderness: There is no abdominal tenderness. There is no guarding or rebound.   Musculoskeletal:         General: No tenderness. Normal range of motion.      Cervical back: Normal range of motion and neck supple.   Lymphadenopathy:      Cervical: No cervical adenopathy.   Skin:     General: Skin is warm and dry.      Coloration: Skin is not pale.      Findings: No erythema or rash.   Neurological:      Mental Status: She is alert and oriented to person, place, and time.   Psychiatric:         Behavior: Behavior normal.         Thought Content: Thought content normal.          Judgment: Judgment normal.         Assessment:       Problem List Items Addressed This Visit    None     Visit Diagnoses     Fatigue, unspecified type    -  Primary    Relevant Orders    CBC Auto Differential    Comprehensive Metabolic Panel    Diarrhea, unspecified type        Relevant Orders    CBC Auto Differential    Comprehensive Metabolic Panel    Stool Exam-Ova,Cysts,Parasites    CULTURE, STOOL    Giardia / Cryptosporidum, EIA    Occult blood x 1, stool    Rotavirus antigen, stool    CLOSTRIDIUM DIFFICILE    Nausea        Relevant Orders    CBC Auto Differential    Comprehensive Metabolic Panel    Hypotension, unspecified hypotension type        Relevant Orders    CBC Auto Differential    Comprehensive Metabolic Panel          Plan:       1. CBC and CMP now.  2. Recommend stool studies for further evaluation of diarrhea.  3. Recommend discontinuing Cardura secondary to continued hypotension.  4. Return to clinic as needed if symptoms persist or worsen.

## 2022-08-24 ENCOUNTER — LAB VISIT (OUTPATIENT)
Dept: LAB | Facility: HOSPITAL | Age: 77
End: 2022-08-24
Attending: FAMILY MEDICINE
Payer: MEDICARE

## 2022-08-24 DIAGNOSIS — R19.7 DIARRHEA, UNSPECIFIED TYPE: ICD-10-CM

## 2022-08-24 PROCEDURE — 87046 STOOL CULTR AEROBIC BACT EA: CPT | Mod: 59 | Performed by: FAMILY MEDICINE

## 2022-08-24 PROCEDURE — 87425 ROTAVIRUS AG IA: CPT | Performed by: FAMILY MEDICINE

## 2022-08-24 PROCEDURE — 82272 OCCULT BLD FECES 1-3 TESTS: CPT | Performed by: FAMILY MEDICINE

## 2022-08-24 PROCEDURE — 87045 FECES CULTURE AEROBIC BACT: CPT | Performed by: FAMILY MEDICINE

## 2022-08-24 PROCEDURE — 87427 SHIGA-LIKE TOXIN AG IA: CPT | Performed by: FAMILY MEDICINE

## 2022-08-24 PROCEDURE — 87329 GIARDIA AG IA: CPT | Performed by: FAMILY MEDICINE

## 2022-08-24 PROCEDURE — 87449 NOS EACH ORGANISM AG IA: CPT | Performed by: FAMILY MEDICINE

## 2022-08-25 LAB
C DIFF GDH STL QL: NEGATIVE
C DIFF TOX A+B STL QL IA: NEGATIVE
CRYPTOSP AG STL QL IA: NEGATIVE
E COLI SXT1 STL QL IA: NEGATIVE
E COLI SXT2 STL QL IA: NEGATIVE
G LAMBLIA AG STL QL IA: NEGATIVE
OB PNL STL: NEGATIVE
RV AG STL QL IA.RAPID: NEGATIVE

## 2022-08-29 LAB
BACTERIA STL CULT: NORMAL
O+P STL MICRO: NORMAL

## 2022-08-31 ENCOUNTER — ANTI-COAG VISIT (OUTPATIENT)
Dept: CARDIOLOGY | Facility: CLINIC | Age: 77
End: 2022-08-31
Payer: MEDICARE

## 2022-08-31 ENCOUNTER — EXTERNAL CHRONIC CARE MANAGEMENT (OUTPATIENT)
Dept: PRIMARY CARE CLINIC | Facility: CLINIC | Age: 77
End: 2022-08-31
Payer: MEDICARE

## 2022-08-31 DIAGNOSIS — Z86.711 HISTORY OF PULMONARY EMBOLUS (PE): Primary | ICD-10-CM

## 2022-08-31 DIAGNOSIS — I27.82 CHRONIC PULMONARY EMBOLISM WITHOUT ACUTE COR PULMONALE, UNSPECIFIED PULMONARY EMBOLISM TYPE: ICD-10-CM

## 2022-08-31 DIAGNOSIS — Z79.01 CHRONIC ANTICOAGULATION: ICD-10-CM

## 2022-08-31 DIAGNOSIS — I82.532 CHRONIC DEEP VEIN THROMBOSIS (DVT) OF LEFT POPLITEAL VEIN: ICD-10-CM

## 2022-08-31 PROCEDURE — 99490 PR CHRONIC CARE MGMT, 1ST 20 MIN: ICD-10-PCS | Mod: S$PBB,,, | Performed by: INTERNAL MEDICINE

## 2022-08-31 PROCEDURE — 99439 CHRNC CARE MGMT STAF EA ADDL: CPT | Mod: S$PBB,,, | Performed by: INTERNAL MEDICINE

## 2022-08-31 PROCEDURE — 93793 PR ANTICOAGULANT MGMT FOR PT TAKING WARFARIN: ICD-10-PCS | Mod: ,,, | Performed by: PHARMACIST

## 2022-08-31 PROCEDURE — 99439 PR CHRONIC CARE MGMT, EA ADDTL 20 MIN: ICD-10-PCS | Mod: S$PBB,,, | Performed by: INTERNAL MEDICINE

## 2022-08-31 PROCEDURE — 93793 ANTICOAG MGMT PT WARFARIN: CPT | Mod: ,,, | Performed by: PHARMACIST

## 2022-08-31 PROCEDURE — 99439 CHRNC CARE MGMT STAF EA ADDL: CPT | Mod: PBBFAC,PO | Performed by: INTERNAL MEDICINE

## 2022-08-31 PROCEDURE — 99490 CHRNC CARE MGMT STAFF 1ST 20: CPT | Mod: S$PBB,,, | Performed by: INTERNAL MEDICINE

## 2022-08-31 PROCEDURE — 99490 CHRNC CARE MGMT STAFF 1ST 20: CPT | Mod: PBBFAC,PO | Performed by: INTERNAL MEDICINE

## 2022-08-31 NOTE — PROGRESS NOTES
Patient denies any changes in diet, medications, or health that would effect warfarin therapy.  INR not at goal. Medications, chart, and patient findings reviewed. See calendar for adjustments to dose and follow up plan.  Patient was re-educated on situations that would require placing a call to the coumadin clinic, including bleeding or unusual bruising issues, changes in health, diet or medications, upcoming procedures that require warfarin interruption, and missed coumadin dose(s). Patient expressed understanding that avoidance of consistency with these parameters could cause fluctuations in INR, leading to more frequent visits and increase risk of adverse events.

## 2022-09-06 ENCOUNTER — ANTI-COAG VISIT (OUTPATIENT)
Dept: CARDIOLOGY | Facility: CLINIC | Age: 77
End: 2022-09-06
Payer: MEDICARE

## 2022-09-06 DIAGNOSIS — I82.532 CHRONIC DEEP VEIN THROMBOSIS (DVT) OF LEFT POPLITEAL VEIN: ICD-10-CM

## 2022-09-06 DIAGNOSIS — Z79.01 CHRONIC ANTICOAGULATION: ICD-10-CM

## 2022-09-06 DIAGNOSIS — I27.82 CHRONIC PULMONARY EMBOLISM WITHOUT ACUTE COR PULMONALE, UNSPECIFIED PULMONARY EMBOLISM TYPE: ICD-10-CM

## 2022-09-06 DIAGNOSIS — Z86.711 HISTORY OF PULMONARY EMBOLUS (PE): Primary | ICD-10-CM

## 2022-09-06 PROCEDURE — 93793 ANTICOAG MGMT PT WARFARIN: CPT | Mod: ,,,

## 2022-09-06 PROCEDURE — 93793 PR ANTICOAGULANT MGMT FOR PT TAKING WARFARIN: ICD-10-PCS | Mod: ,,,

## 2022-09-15 ENCOUNTER — ANTI-COAG VISIT (OUTPATIENT)
Dept: CARDIOLOGY | Facility: CLINIC | Age: 77
End: 2022-09-15
Payer: MEDICARE

## 2022-09-15 DIAGNOSIS — Z79.01 CHRONIC ANTICOAGULATION: ICD-10-CM

## 2022-09-15 DIAGNOSIS — I27.82 CHRONIC PULMONARY EMBOLISM WITHOUT ACUTE COR PULMONALE, UNSPECIFIED PULMONARY EMBOLISM TYPE: ICD-10-CM

## 2022-09-15 DIAGNOSIS — Z86.711 HISTORY OF PULMONARY EMBOLUS (PE): Primary | ICD-10-CM

## 2022-09-15 DIAGNOSIS — I82.532 CHRONIC DEEP VEIN THROMBOSIS (DVT) OF LEFT POPLITEAL VEIN: ICD-10-CM

## 2022-09-15 PROCEDURE — 93793 ANTICOAG MGMT PT WARFARIN: CPT | Mod: ,,, | Performed by: PHARMACIST

## 2022-09-15 PROCEDURE — 93793 PR ANTICOAGULANT MGMT FOR PT TAKING WARFARIN: ICD-10-PCS | Mod: ,,, | Performed by: PHARMACIST

## 2022-09-15 NOTE — TELEPHONE ENCOUNTER
No new care gaps identified.  [facility] Embedded Care Gaps. Reference number: [id number]. 9/15/2022   12:55:08 PM CDT

## 2022-09-15 NOTE — TELEPHONE ENCOUNTER
----- Message from Raul Tan sent at 9/15/2022 12:20 PM CDT -----  Contact: self [phone]  Requesting an RX refill or new RX.  Is this a refill or new RX: refill  RX name and strength (copy/paste from chart):  potassium chloride (MICRO-K) 10 MEQ CpSR  Is this a 30 day or 90 day RX:   Pharmacy name and phone # (copy/paste from chart):    [id number] - [place] - [address] AT [facility] [address]  [address] [address]  Phone: [phone] Fax: [phone]      The doctors have asked that we provide their patients with the following 2 reminders -- prescription refills can take up to 72 hours, and a friendly reminder that in the future you can use your [facility] account to request refills: yes    Please call and advise

## 2022-09-15 NOTE — PROGRESS NOTES
Patient could not verify dose of coumadin she has taken. INR not at goal. Medications, chart, and patient findings reviewed. See calendar for adjustments to dose and follow up plan.

## 2022-09-16 RX ORDER — POTASSIUM CHLORIDE 750 MG/1
10 CAPSULE, EXTENDED RELEASE ORAL DAILY
Qty: 90 CAPSULE | Refills: 3 | Status: SHIPPED | OUTPATIENT
Start: 2022-09-16 | End: 2023-02-10 | Stop reason: SDUPTHER

## 2022-09-22 ENCOUNTER — ANTI-COAG VISIT (OUTPATIENT)
Dept: CARDIOLOGY | Facility: CLINIC | Age: 77
End: 2022-09-22
Payer: MEDICARE

## 2022-09-22 DIAGNOSIS — Z86.711 HISTORY OF PULMONARY EMBOLUS (PE): Primary | ICD-10-CM

## 2022-09-22 DIAGNOSIS — I27.82 CHRONIC PULMONARY EMBOLISM WITHOUT ACUTE COR PULMONALE, UNSPECIFIED PULMONARY EMBOLISM TYPE: ICD-10-CM

## 2022-09-22 DIAGNOSIS — I82.532 CHRONIC DEEP VEIN THROMBOSIS (DVT) OF LEFT POPLITEAL VEIN: ICD-10-CM

## 2022-09-22 DIAGNOSIS — Z79.01 CHRONIC ANTICOAGULATION: ICD-10-CM

## 2022-09-22 PROCEDURE — 93793 ANTICOAG MGMT PT WARFARIN: CPT | Mod: ,,,

## 2022-09-22 PROCEDURE — 93793 PR ANTICOAGULANT MGMT FOR PT TAKING WARFARIN: ICD-10-PCS | Mod: ,,,

## 2022-09-22 NOTE — PROGRESS NOTES
INR not at goal. Patient has a history of poor compliance. Medications, chart, and patient findings reviewed. See calendar for adjustments to dose and follow up plan.

## 2022-09-29 ENCOUNTER — TELEPHONE (OUTPATIENT)
Dept: ORTHOPEDICS | Facility: CLINIC | Age: 77
End: 2022-09-29
Payer: MEDICARE

## 2022-09-29 DIAGNOSIS — M17.0 BILATERAL PRIMARY OSTEOARTHRITIS OF KNEE: Primary | ICD-10-CM

## 2022-09-29 NOTE — TELEPHONE ENCOUNTER
----- Message from Mayte Concepcion sent at 9/29/2022  3:07 PM CDT -----  Needing to schedule a follow-up [facility]     [phone]

## 2022-09-29 NOTE — TELEPHONE ENCOUNTER
Spoke with pt. Appt scheduled. Pt advised she will need updated xrays. All questions answered. Pt verbalized understanding.

## 2022-09-30 ENCOUNTER — EXTERNAL CHRONIC CARE MANAGEMENT (OUTPATIENT)
Dept: PRIMARY CARE CLINIC | Facility: CLINIC | Age: 77
End: 2022-09-30
Payer: MEDICARE

## 2022-09-30 PROCEDURE — 99490 CHRNC CARE MGMT STAFF 1ST 20: CPT | Mod: PBBFAC,PO | Performed by: INTERNAL MEDICINE

## 2022-09-30 PROCEDURE — 99490 PR CHRONIC CARE MGMT, 1ST 20 MIN: ICD-10-PCS | Mod: S$PBB,,, | Performed by: INTERNAL MEDICINE

## 2022-09-30 PROCEDURE — 99439 CHRNC CARE MGMT STAF EA ADDL: CPT | Mod: PBBFAC,PO | Performed by: INTERNAL MEDICINE

## 2022-09-30 PROCEDURE — 99439 PR CHRONIC CARE MGMT, EA ADDTL 20 MIN: ICD-10-PCS | Mod: S$PBB,,, | Performed by: INTERNAL MEDICINE

## 2022-09-30 PROCEDURE — 99490 CHRNC CARE MGMT STAFF 1ST 20: CPT | Mod: S$PBB,,, | Performed by: INTERNAL MEDICINE

## 2022-09-30 PROCEDURE — 99439 CHRNC CARE MGMT STAF EA ADDL: CPT | Mod: S$PBB,,, | Performed by: INTERNAL MEDICINE

## 2022-10-06 ENCOUNTER — PATIENT MESSAGE (OUTPATIENT)
Dept: BARIATRICS | Facility: CLINIC | Age: 77
End: 2022-10-06
Payer: MEDICARE

## 2022-10-06 ENCOUNTER — ANTI-COAG VISIT (OUTPATIENT)
Dept: CARDIOLOGY | Facility: CLINIC | Age: 77
End: 2022-10-06
Payer: MEDICARE

## 2022-10-06 DIAGNOSIS — Z86.711 HISTORY OF PULMONARY EMBOLUS (PE): Primary | ICD-10-CM

## 2022-10-06 DIAGNOSIS — Z79.01 CHRONIC ANTICOAGULATION: ICD-10-CM

## 2022-10-06 DIAGNOSIS — I82.532 CHRONIC DEEP VEIN THROMBOSIS (DVT) OF LEFT POPLITEAL VEIN: ICD-10-CM

## 2022-10-06 PROCEDURE — 93793 PR ANTICOAGULANT MGMT FOR PT TAKING WARFARIN: ICD-10-PCS | Mod: ,,,

## 2022-10-06 PROCEDURE — 93793 ANTICOAG MGMT PT WARFARIN: CPT | Mod: ,,,

## 2022-10-06 NOTE — PROGRESS NOTES
INR not at goal. Medications, chart, and patient findings reviewed. Patient reports to taking warfarin 0.5mg daily, instead of increased maintenance dose per calendar. Recommend increased maintenance dose per calendar. See calendar for adjustments to dose and follow up plan.

## 2022-10-11 ENCOUNTER — LAB VISIT (OUTPATIENT)
Dept: LAB | Facility: HOSPITAL | Age: 77
End: 2022-10-11
Attending: INTERNAL MEDICINE
Payer: MEDICARE

## 2022-10-11 ENCOUNTER — OFFICE VISIT (OUTPATIENT)
Dept: INTERNAL MEDICINE | Facility: CLINIC | Age: 77
End: 2022-10-11
Payer: MEDICARE

## 2022-10-11 VITALS
DIASTOLIC BLOOD PRESSURE: 76 MMHG | BODY MASS INDEX: 41.82 KG/M2 | SYSTOLIC BLOOD PRESSURE: 138 MMHG | HEIGHT: 63 IN | TEMPERATURE: 97 F | HEART RATE: 64 BPM | WEIGHT: 236 LBS | RESPIRATION RATE: 16 BRPM

## 2022-10-11 DIAGNOSIS — G47.33 OBSTRUCTIVE SLEEP APNEA: ICD-10-CM

## 2022-10-11 DIAGNOSIS — I50.32 CHRONIC DIASTOLIC HEART FAILURE: ICD-10-CM

## 2022-10-11 DIAGNOSIS — R15.9 INCONTINENCE OF FECES, UNSPECIFIED FECAL INCONTINENCE TYPE: ICD-10-CM

## 2022-10-11 DIAGNOSIS — R25.1 TREMOR: ICD-10-CM

## 2022-10-11 DIAGNOSIS — I82.532 CHRONIC DEEP VEIN THROMBOSIS (DVT) OF LEFT POPLITEAL VEIN: ICD-10-CM

## 2022-10-11 DIAGNOSIS — I10 ESSENTIAL HYPERTENSION: ICD-10-CM

## 2022-10-11 DIAGNOSIS — R41.3 MEMORY CHANGES: Primary | ICD-10-CM

## 2022-10-11 DIAGNOSIS — Z79.01 CHRONIC ANTICOAGULATION: ICD-10-CM

## 2022-10-11 DIAGNOSIS — M17.0 PRIMARY OSTEOARTHRITIS OF BOTH KNEES: ICD-10-CM

## 2022-10-11 DIAGNOSIS — R41.3 MEMORY CHANGES: ICD-10-CM

## 2022-10-11 DIAGNOSIS — R60.9 EDEMA, UNSPECIFIED TYPE: ICD-10-CM

## 2022-10-11 PROCEDURE — 80048 BASIC METABOLIC PNL TOTAL CA: CPT | Performed by: INTERNAL MEDICINE

## 2022-10-11 PROCEDURE — 99999 PR PBB SHADOW E&M-EST. PATIENT-LVL V: ICD-10-PCS | Mod: PBBFAC,,, | Performed by: INTERNAL MEDICINE

## 2022-10-11 PROCEDURE — 99215 OFFICE O/P EST HI 40 MIN: CPT | Mod: PBBFAC,PO | Performed by: INTERNAL MEDICINE

## 2022-10-11 PROCEDURE — 83880 ASSAY OF NATRIURETIC PEPTIDE: CPT | Performed by: INTERNAL MEDICINE

## 2022-10-11 PROCEDURE — 99214 PR OFFICE/OUTPT VISIT, EST, LEVL IV, 30-39 MIN: ICD-10-PCS | Mod: S$PBB,,, | Performed by: INTERNAL MEDICINE

## 2022-10-11 PROCEDURE — 82607 VITAMIN B-12: CPT | Performed by: INTERNAL MEDICINE

## 2022-10-11 PROCEDURE — 99214 OFFICE O/P EST MOD 30 MIN: CPT | Mod: S$PBB,,, | Performed by: INTERNAL MEDICINE

## 2022-10-11 PROCEDURE — 84425 ASSAY OF VITAMIN B-1: CPT | Performed by: INTERNAL MEDICINE

## 2022-10-11 PROCEDURE — 99999 PR PBB SHADOW E&M-EST. PATIENT-LVL V: CPT | Mod: PBBFAC,,, | Performed by: INTERNAL MEDICINE

## 2022-10-11 RX ORDER — FUROSEMIDE 20 MG/1
40 TABLET ORAL DAILY
Qty: 180 TABLET | Refills: 3
Start: 2022-10-11 | End: 2022-11-29

## 2022-10-11 NOTE — PROGRESS NOTES
Ms. Atkinson is a patient of Dr. Martinez and was last seen in [facility] Cardiology Visit 11/1/18.      Subjective:   Patient ID:  Coby Atkinson is a 73 y.o. 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  She did not take her blood pressure medications today.  Her bp is running 90-140s.  Patient denies chest pain with exertion or at rest, palpitations, SOB, KITCHEN, dizziness, syncope, edema, orthopnea, PND, or claudication.  Reports no routine exercise.  Patient is taking warfarin for thromboembolic prophylaxis.  Denies bleeding gums, epistaxis, hematuria, and hematochezia.  ECG today shows NSR without any ischemic changes.  She is using her cpap and it was last checked in 11/2018 by the sleep clinic.     Review of Systems   Constitution: Positive for malaise/fatigue. Negative for decreased appetite, diaphoresis, weight gain and weight loss.   Eyes: Negative for visual disturbance.   Cardiovascular: Positive for leg swelling. Negative for chest pain, claudication, dyspnea on exertion, irregular heartbeat, near-syncope, orthopnea, palpitations, paroxysmal nocturnal dyspnea and syncope.        Denies chest pressure   Respiratory: Negative for cough, hemoptysis, shortness of breath, sleep disturbances due to breathing and snoring.    Endocrine: Negative for cold intolerance and heat intolerance.   Hematologic/Lymphatic: Negative for bleeding problem. Does not bruise/bleed easily.   Musculoskeletal: Negative for myalgias.   Gastrointestinal: Negative for bloating, abdominal pain, anorexia, change in bowel habit, constipation, diarrhea, nausea and vomiting.   Neurological: Negative for difficulty with concentration, disturbances in coordination, excessive daytime sleepiness, dizziness, headaches,  "light-headedness, loss of balance, numbness and weakness.   Psychiatric/Behavioral: The patient does not have insomnia.        Allergies and current medications updated and reviewed:  Review of patient's allergies indicates:   Allergen Reactions    Codeine     Ciprofloxacin Rash     Current Outpatient Medications   Medication Sig    ALPRAZolam (XANAX) 0.5 MG tablet Take 0.5 mg by mouth every evening.    amLODIPine (NORVASC) 10 MG tablet TAKE 1 TABLET(10 MG) BY MOUTH EVERY DAY    doxazosin (CARDURA) 2 MG tablet TAKE 1 TABLET(2 MG) BY MOUTH EVERY EVENING    escitalopram oxalate (LEXAPRO) 20 MG tablet TAKE 1 TABLET BY MOUTH EVERY DAY    furosemide (LASIX) 20 MG tablet Take 1 tablet (20 mg total) by mouth once daily.    losartan (COZAAR) 100 MG tablet Take 1 tablet (100 mg total) by mouth once daily.    meloxicam (MOBIC) 7.5 MG tablet Take 7.5 mg by mouth once daily.    metoprolol succinate (TOPROL-XL) 25 MG 24 hr tablet TAKE 1 TABLET BY MOUTH EVERY DAY    potassium chloride (MICRO-K) 10 MEQ CpSR TAKE 2 CAPSULES(20 MEQ) BY MOUTH EVERY DAY    warfarin (COUMADIN) 1 MG tablet Take 0.5-1 tablets (0.5-1 mg total) by mouth Daily. As directed by coumadin clinic (Patient taking differently: Take by mouth Daily. 1 tablet 4 days per week, 1.5 tablets 3 days per week, followed by Coumadin clinic)    pantoprazole (PROTONIX) 40 MG tablet Take 1 tablet (40 mg total) by mouth every 12 (twelve) hours. (Patient taking differently: Take 40 mg by mouth once daily. )     No current facility-administered medications for this visit.        Objective:        BP (!) 166/84   Pulse 84   Ht 5' 3" (1.6 m)   Wt 111.5 kg (245 lb 14.8 oz)   SpO2 96%   BMI 43.56 kg/m²       Physical Exam   Constitutional: She is oriented to person, place, and time. Vital signs are normal. She appears well-developed and well-nourished. She is active. No distress.   HENT:   Head: Normocephalic and atraumatic.   Eyes: Conjunctivae and lids are normal. " No scleral icterus.   Neck: Neck supple. Normal carotid pulses, no hepatojugular reflux and no JVD present. Carotid bruit is not present.   Cardiovascular: Normal rate, regular rhythm, S1 normal, S2 normal and intact distal pulses. PMI is not displaced. Exam reveals no gallop and no friction rub.   No murmur heard.  Pulses:       Carotid pulses are 2+ on the right side, and 2+ on the left side.       Radial pulses are 2+ on the right side, and 2+ on the left side.        Dorsalis pedis pulses are 2+ on the right side, and 2+ on the left side.        Posterior tibial pulses are 1+ on the right side, and 1+ on the left side.   Pulmonary/Chest: Effort normal and breath sounds normal. No respiratory distress. She has no decreased breath sounds. She has no wheezes. She has no rhonchi. She has no rales. She exhibits no tenderness.   Abdominal: Soft. Normal appearance and bowel sounds are normal. She exhibits no distension, no fluid wave, no abdominal bruit, no ascites and no pulsatile midline mass. There is no hepatosplenomegaly. There is no tenderness.   Musculoskeletal: She exhibits no edema.   Neurological: She is alert and oriented to person, place, and time. Gait normal.   Skin: Skin is warm, dry and intact. No rash noted. She is not diaphoretic. Nails show no clubbing.   Psychiatric: She has a normal mood and affect. Her speech is normal and behavior is normal. Judgment and thought content normal. Cognition and memory are normal.   Nursing note and vitals reviewed.      Chemistry        Component Value Date/Time     09/12/2019 1457    K 3.5 09/12/2019 1457     09/12/2019 1457    CO2 25 09/12/2019 1457    BUN 20 09/12/2019 1457    CREATININE 1.0 09/12/2019 1457    GLU 77 09/12/2019 1457        Component Value Date/Time    CALCIUM 9.4 09/12/2019 1457    ALKPHOS 104 02/08/2019 0915    AST 18 09/12/2019 1457    ALT 15 09/12/2019 1457    BILITOT 0.7 02/08/2019 0915    ESTGFRAFRICA >60.0 09/12/2019 1457     EGFRNONAA 55.6 (A) 09/12/2019 1457        No results found for: LABA1C, HGBA1C    Recent Labs   Lab 11/01/17  1840  12/02/17  1704  11/01/18  1226 02/08/19  0915 09/12/19  1457   WBC 15.04 H   < > 15.16 H   < >  --  9.41  --    Hemoglobin 10.6 L   < > 10.7 L   < > 11.2 L 11.6 L 11.0 L   Hematocrit 35.1 L   < > 34.6 L   < > 37.4 39.1 37.4   Mean Corpuscular Volume 66 L   < > 65 L   < >  --  68 L  --    Platelets 262   < > 294   < >  --  287  --    BNP 29  --  202 H  --  68  --   --    TSH  --    < >  --   --   --  2.579  --    Cholesterol  --    < >  --   --   --  144 137   HDL  --    < >  --   --   --  69 62   LDL Cholesterol  --    < >  --   --   --  61.8 L 60.8 L   Triglycerides  --    < >  --   --   --  66 71   Hdl/Cholesterol Ratio  --    < >  --   --   --  47.9 45.3    < > = values in this interval not displayed.     Lab Results   Component Value Date    IRON 118 09/13/2016    TIBC 383 09/13/2016    FERRITIN 219 09/13/2016       Recent Labs   Lab 08/06/19  1539 08/20/19  0956 09/03/19  1035 09/17/19  1050   INR 1.6 A 1.5 A 1.4 A 1.6 A        Test(s) Reviewed  I have reviewed the following in detail:  [] Stress test   [] Angiography   [x] Echocardiogram   [x] Labs   [] Other:         Assessment/Plan:   1. Essential hypertension  BP not at goal <130/80 but did not take anti-hypertensives today.  Reports wide range in sbp.  Will refer to digital hypertension program.  Continue current regimen for now.  If her bp remains above goal, would consider changing the losartan to a stronger ARB such as valsartan or candesartan.  She could go up on the doxazosin or change the metoprolol succinate 25 mg to carvedilol 6.25 mg BID.  Would need to see how close her bp is to goal to decide definitively.     - IN OFFICE EKG 12-LEAD (to Muse); Future  - [facility] Enroll[facility]  - Hypertension Digital Medicine ([facility]): Assign Onboarding Questionnaires    2. History of non-ST elevation myocardial  infarction (NSTEMI)      3. CKD (chronic kidney disease) stage 3, GFR 30-59 ml/min      4. History of pulmonary embolism      5. Chronic anticoagulation  Denies bleeding.  Discussed when to seek immediate medical attention.       6. Thalassemia minor      7. Morbid obesity with BMI of 40.0-44.9, adult  BMI 43.6 Encouraged increased CV exercise to 30 minutes a day for 5 days a week.       8. Obstructive sleep apnea  Reports nightly cpap use.  LOV in 11/2018 with sleep clinic.     9. Fatigue, unspecified type  Mild anemia not enough to cause fatigue.  She is largely sedentary.  Discussed increasing CV exercise to increase her energy level.  She has been on PPI for a number of years and her last B12 was in 2009.  She has not had a vitamin D level before and rarely is outside.  Encouraged to f/u with Dr. Hudson to see if getting a vitamin B12 and vitamin D level drawn would be appropriate.      - IN OFFICE EKG 12-LEAD (to [facility]); Future      Patient was discussed with but not examined by Dr. Martinez    Follow up in about 1 year (around 9/19/2020).   Topical Ketoconazole Counseling: Patient counseled that this medication may cause skin irritation or allergic reactions.  In the event of skin irritation, the patient was advised to reduce the amount of the drug applied or use it less frequently.   The patient verbalized understanding of the proper use and possible adverse effects of ketoconazole.  All of the patient's questions and concerns were addressed.

## 2022-10-12 LAB
ANION GAP SERPL CALC-SCNC: 9 MMOL/L (ref 8–16)
BNP SERPL-MCNC: 104 PG/ML (ref 0–99)
BUN SERPL-MCNC: 17 MG/DL (ref 8–23)
CALCIUM SERPL-MCNC: 9.9 MG/DL (ref 8.7–10.5)
CHLORIDE SERPL-SCNC: 105 MMOL/L (ref 95–110)
CO2 SERPL-SCNC: 24 MMOL/L (ref 23–29)
CREAT SERPL-MCNC: 0.8 MG/DL (ref 0.5–1.4)
EST. GFR  (NO RACE VARIABLE): >60 ML/MIN/1.73 M^2
GLUCOSE SERPL-MCNC: 86 MG/DL (ref 70–110)
POTASSIUM SERPL-SCNC: 4.6 MMOL/L (ref 3.5–5.1)
SODIUM SERPL-SCNC: 138 MMOL/L (ref 136–145)
VIT B12 SERPL-MCNC: 328 PG/ML (ref 210–950)

## 2022-10-12 NOTE — PROGRESS NOTES
Subjective:       Patient ID: Coby Atkinson is a 76 y.o. female.    Chief Complaint: Knee Pain (Bilat.  They told her that \her dr needs to ok some more shots in knees.  Arthritis. 5 knees/), Leg Swelling (Been having that.  Feels like getting worse.  ), Anxiety (Wondering if needs to restart medication. ), Memory Loss ( concerned because seems to forget something he just told her. ), and stool incontinence (Looses control  on herself. )    HPI  The patient presents today accompanied by her  for evaluation of several medical concerns.  The patient has had a flare pain in both knees.  She has osteoarthritis.  She is seeking knee injections for relief.  She is currently using Tylenol for pain.  She is unable to climb stairs due to the knee pain.    Bilateral lower extremity edema is also present.  She is currently taking furosemide 20 mg daily.  Edema is alleviated somewhat with elevation of the legs.  Compression stockings usually helping she does not use those consistently.  She does not report PND or orthopnea.  No exertional chest pain is present.  She denies having any wheezing.    The patient is on chronic anticoagulation for DVT involving the left lower extremity and pulmonary embolus.    She has obstructive sleep apnea and uses her CPAP regularly every night.    Her memory has gotten worse according to her .  She forgets conversations.  Her recent memory appears to be primarily affected.  She forgets daily routines such as eating and cooking.  She also has been experiencing a tremor in her hands particularly on the right side compared to the left.  She also has been experiencing fecal incontinence for the past 6 months.  She states bowel movements have been very soft to loose in consistency.  No blood has been noted in the stool.  She denies having any abdominal pain.  She denies having any bladder incontinence.  She has not exhibited any agitation.  She plays computer games in the  evenings.    Active medical conditions include pulmonary hypertension, essential hypertension, recurrent PE/DVT, depression, GERD, osteoarthritis, chronic kidney disease, obstructive sleep apnea-on CPAP therapy, morbid obesity.  The patient is followed by bariatric medicine.    Review of Systems   Constitutional:  Positive for activity change. Negative for chills, fever and unexpected weight change.   Respiratory:  Negative for cough, shortness of breath and wheezing.    Cardiovascular:  Positive for leg swelling. Negative for chest pain.   Gastrointestinal:  Positive for change in bowel habit, fecal incontinence and change in bowel habit.   Genitourinary:  Negative for bladder incontinence.   Musculoskeletal:  Positive for arthralgias, gait problem and joint swelling.   Neurological:  Positive for tremors and memory loss. Negative for dizziness, seizures, syncope, speech difficulty and headaches.   Psychiatric/Behavioral:  Positive for confusion. Negative for agitation, hallucinations, sleep disturbance and suicidal ideas. The patient is not nervous/anxious.           Physical Exam  Vitals and nursing note reviewed.   Constitutional:       General: She is not in acute distress.     Appearance: Normal appearance. She is well-developed.      Comments: The patient's weight has remained stable since 08/23/2022.   HENT:      Head: Normocephalic and atraumatic.   Eyes:      General: No scleral icterus.     Extraocular Movements: Extraocular movements intact.      Conjunctiva/sclera: Conjunctivae normal.   Neck:      Thyroid: No thyromegaly.      Vascular: No JVD.   Cardiovascular:      Rate and Rhythm: Normal rate and regular rhythm.      Heart sounds: Normal heart sounds. No murmur heard.    No friction rub. No gallop.   Pulmonary:      Effort: Pulmonary effort is normal. No respiratory distress.      Breath sounds: Normal breath sounds. No rales.   Abdominal:      General: Bowel sounds are normal.      Palpations:  Abdomen is soft. There is no mass.      Tenderness: There is no abdominal tenderness.   Musculoskeletal:         General: Swelling and tenderness present.      Cervical back: Normal range of motion and neck supple.      Right lower leg: Edema present.      Left lower leg: Edema present.      Comments: Knees:  Decreased flexion is noted bilaterally.  Diffuse tenderness is also present.  Hypertrophic joint changes are present bilaterally.   Lymphadenopathy:      Cervical: No cervical adenopathy.   Skin:     General: Skin is warm and dry.      Findings: No rash.   Neurological:      Mental Status: She is alert and oriented to person, place, and time.      Comments: The patient is unable to transfer from the chair to the examination table due to knee joint stiffness and limited mobility.  The patient is using a Rollator walker for assisted ambulation.    I tremor is present bilaterally but is more prominent on the right side.  There is no tremor noted at rest however.  No bradykinesia is noted.   Psychiatric:         Behavior: Behavior normal.      Comments: The patient is cooperative and responsive.  She appears apathetic however.           Lab Visit on 10/11/2022   Component Date Value Ref Range Status    TSH 10/11/2022 2.238  0.400 - 4.000 uIU/mL Final   Lab Visit on 10/11/2022   Component Date Value Ref Range Status    Sodium 10/11/2022 138  136 - 145 mmol/L Final    Potassium 10/11/2022 4.6  3.5 - 5.1 mmol/L Final    Chloride 10/11/2022 105  95 - 110 mmol/L Final    CO2 10/11/2022 24  23 - 29 mmol/L Final    Glucose 10/11/2022 86  70 - 110 mg/dL Final    BUN 10/11/2022 17  8 - 23 mg/dL Final    Creatinine 10/11/2022 0.8  0.5 - 1.4 mg/dL Final    Calcium 10/11/2022 9.9  8.7 - 10.5 mg/dL Final    Anion Gap 10/11/2022 9  8 - 16 mmol/L Final    eGFR 10/11/2022 >60.0  >60 mL/min/1.73 m^2 Final    Vitamin B-12 10/11/2022 328  210 - 950 pg/mL Final    BNP 10/11/2022 104 (H)  0 - 99 pg/mL Final    Values of less than 100  pg/ml are consistent with non-CHF populations.    WBC 09/22/2022 9.30  3.90 - 12.70 K/uL Final    RBC 09/22/2022 5.61 (H)  4.00 - 5.40 M/uL Final    Hemoglobin 09/22/2022 11.4 (L)  12.0 - 16.0 g/dL Final    Hematocrit 09/22/2022 38.6  37.0 - 48.5 % Final    MCV 09/22/2022 69 (L)  82 - 98 fL Final    MCH 09/22/2022 20.3 (L)  27.0 - 31.0 pg Final    MCHC 09/22/2022 29.5 (L)  32.0 - 36.0 g/dL Final    RDW 09/22/2022 15.1 (H)  11.5 - 14.5 % Final    Platelets 09/22/2022 307  150 - 450 K/uL Final    MPV 09/22/2022 9.2  9.2 - 12.9 fL Final    Sodium 09/22/2022 138  136 - 145 mmol/L Final    Potassium 09/22/2022 4.2  3.5 - 5.1 mmol/L Final    Chloride 09/22/2022 107  95 - 110 mmol/L Final    CO2 09/22/2022 23  23 - 29 mmol/L Final    Glucose 09/22/2022 95  70 - 110 mg/dL Final    BUN 09/22/2022 13  8 - 23 mg/dL Final    Creatinine 09/22/2022 0.9  0.5 - 1.4 mg/dL Final    Calcium 09/22/2022 9.3  8.7 - 10.5 mg/dL Final    Total Protein 09/22/2022 7.1  6.0 - 8.4 g/dL Final    Albumin 09/22/2022 3.5  3.5 - 5.2 g/dL Final    Total Bilirubin 09/22/2022 0.7  0.1 - 1.0 mg/dL Final    Comment: For infants and newborns, interpretation of results should be based  on gestational age, weight and in agreement with clinical  observations.    Premature Infant recommended reference ranges:  Up to 24 hours.............<8.0 mg/dL  Up to 48 hours............<12.0 mg/dL  3-5 days..................<15.0 mg/dL  6-29 days.................<15.0 mg/dL      Alkaline Phosphatase 09/22/2022 77  55 - 135 U/L Final    AST 09/22/2022 14  10 - 40 U/L Final    ALT 09/22/2022 9 (L)  10 - 44 U/L Final    Anion Gap 09/22/2022 8  8 - 16 mmol/L Final    eGFR 09/22/2022 >60.0  >60 mL/min/1.73 m^2 Final    Cholesterol 09/22/2022 125  120 - 199 mg/dL Final    Comment: The National Cholesterol Education Program (NCEP) has set the  following guidelines (reference ranges) for Cholesterol:  Optimal.....................<200 mg/dL  Borderline High.............200-239  mg/dL  High........................> or = 240 mg/dL      Triglycerides 09/22/2022 68  30 - 150 mg/dL Final    Comment: The National Cholesterol Education Program (NCEP) has set the  following guidelines (reference values) for triglycerides:  Normal......................<150 mg/dL  Borderline High.............150-199 mg/dL  High........................200-499 mg/dL      HDL 09/22/2022 55  40 - 75 mg/dL Final    Comment: The National Cholesterol Education Program (NCEP) has set the  following guidelines (reference values) for HDL Cholesterol:  Low...............<40 mg/dL  Optimal...........>60 mg/dL      LDL Cholesterol 09/22/2022 56.4 (L)  63.0 - 159.0 mg/dL Final    Comment: The National Cholesterol Education Program (NCEP) has set the  following guidelines (reference values) for LDL Cholesterol:  Optimal.......................<130 mg/dL  Borderline High...............130-159 mg/dL  High..........................160-189 mg/dL  Very High.....................>190 mg/dL      HDL/Cholesterol Ratio 09/22/2022 44.0  20.0 - 50.0 % Final    Total Cholesterol/HDL Ratio 09/22/2022 2.3  2.0 - 5.0 Final    Non-HDL Cholesterol 09/22/2022 70  mg/dL Final    Comment: Risk category and Non-HDL cholesterol goals:  Coronary heart disease (CHD)or equivalent (10-year risk of CHD >20%):  Non-HDL cholesterol goal     <130 mg/dL  Two or more CHD risk factors and 10-year risk of CHD <= 20%:  Non-HDL cholesterol goal     <160 mg/dL  0 to 1 CHD risk factor:  Non-HDL cholesterol goal     <190 mg/dL      Magnesium 09/22/2022 2.1  1.6 - 2.6 mg/dL Final   Lab Visit on 08/24/2022   Component Date Value Ref Range Status    Stool Exam-Ova,Cysts,Parasites 08/24/2022 FINAL 08/29/2022 1149   Final    Comment: SOURCE: STOOL  OVA AND PARASITE, MICROSCOPY, F                        FINAL    No parasites seen.   Cryptosporidium, Cyclospora, and microsporidia are not   readily detected by this method.   Single negative specimen does not rule out   parasitic  infection.    Test Performed by:  [address] [address]  : Berhane Carrillo M.D. Ph.D.; CLIA# [id number]      Stool Culture 08/24/2022 No Salmonella,Shigella,Vibrio,Campylobacter,Yersinia isolated.   Final    Giardia Antigen - EIA 08/24/2022 Negative  Negative Final    Cryptosporidium Antigen 08/24/2022 Negative  Negative Final    Occult Blood 08/24/2022 Negative  Negative Final    Rotavirus 08/24/2022 Negative  Negative Final    C. diff Antigen 08/24/2022 Negative  Negative Final    C difficile Toxins A+B, EIA 08/24/2022 Negative  Negative Final    Testing not recommended for children <24 months old.    Shiga Toxin 1 E.coli 08/24/2022 Negative   Final    Shiga Toxin 2 E.coli 08/24/2022 Negative   Final   Lab Visit on 08/23/2022   Component Date Value Ref Range Status    WBC 08/23/2022 12.71 (H)  3.90 - 12.70 K/uL Final    RBC 08/23/2022 5.23  4.00 - 5.40 M/uL Final    Hemoglobin 08/23/2022 11.1 (L)  12.0 - 16.0 g/dL Final    Hematocrit 08/23/2022 36.4 (L)  37.0 - 48.5 % Final    MCV 08/23/2022 70 (L)  82 - 98 fL Final    MCH 08/23/2022 21.2 (L)  27.0 - 31.0 pg Final    MCHC 08/23/2022 30.5 (L)  32.0 - 36.0 g/dL Final    RDW 08/23/2022 15.4 (H)  11.5 - 14.5 % Final    Platelets 08/23/2022 333  150 - 450 K/uL Final    MPV 08/23/2022 10.9  9.2 - 12.9 fL Final    Immature Granulocytes 08/23/2022 0.6 (H)  0.0 - 0.5 % Final    Gran # (ANC) 08/23/2022 8.6 (H)  1.8 - 7.7 K/uL Final    Immature Grans (Abs) 08/23/2022 0.08 (H)  0.00 - 0.04 K/uL Final    Comment: Mild elevation in immature granulocytes is non specific and   can be seen in a variety of conditions including stress response,   acute inflammation, trauma and pregnancy. Correlation with other   laboratory and clinical findings is essential.      Lymph # 08/23/2022 3.1  1.0 - 4.8 K/uL Final    Mono # 08/23/2022 0.8  0.3 - 1.0 K/uL Final    Eos # 08/23/2022 0.1  0.0 - 0.5 K/uL Final     Baso # 08/23/2022 0.04  0.00 - 0.20 K/uL Final    nRBC 08/23/2022 0  0 /100 WBC Final    Gran % 08/23/2022 67.3  38.0 - 73.0 % Final    Lymph % 08/23/2022 24.5  18.0 - 48.0 % Final    Mono % 08/23/2022 6.6  4.0 - 15.0 % Final    Eosinophil % 08/23/2022 0.7  0.0 - 8.0 % Final    Basophil % 08/23/2022 0.3  0.0 - 1.9 % Final    Differential Method 08/23/2022 Automated   Final    Sodium 08/23/2022 138  136 - 145 mmol/L Final    Potassium 08/23/2022 4.7  3.5 - 5.1 mmol/L Final    Chloride 08/23/2022 106  95 - 110 mmol/L Final    CO2 08/23/2022 23  23 - 29 mmol/L Final    Glucose 08/23/2022 93  70 - 110 mg/dL Final    BUN 08/23/2022 22  8 - 23 mg/dL Final    Creatinine 08/23/2022 1.0  0.5 - 1.4 mg/dL Final    Calcium 08/23/2022 9.4  8.7 - 10.5 mg/dL Final    Total Protein 08/23/2022 7.2  6.0 - 8.4 g/dL Final    Albumin 08/23/2022 3.6  3.5 - 5.2 g/dL Final    Total Bilirubin 08/23/2022 0.5  0.1 - 1.0 mg/dL Final    Comment: For infants and newborns, interpretation of results should be based  on gestational age, weight and in agreement with clinical  observations.    Premature Infant recommended reference ranges:  Up to 24 hours.............<8.0 mg/dL  Up to 48 hours............<12.0 mg/dL  3-5 days..................<15.0 mg/dL  6-29 days.................<15.0 mg/dL      Alkaline Phosphatase 08/23/2022 84  55 - 135 U/L Final    AST 08/23/2022 13  10 - 40 U/L Final    ALT 08/23/2022 9 (L)  10 - 44 U/L Final    Anion Gap 08/23/2022 9  8 - 16 mmol/L Final    eGFR 08/23/2022 58.0 (A)  >60 mL/min/1.73 m^2 Final       Assessment & Plan:      Coby was seen today for knee pain, leg swelling, anxiety, memory loss and stool incontinence.  Additional lab studies were drawn and are reviewed.  Neurology consultation will be obtained regarding memory impairment, fecal incontinence, and tremor.    The patient was encouraged to use her compression stockings for lower extremities on a daily basis.  The dose of furosemide will be increased  to 40 mg daily for the next week in an effort to reduced dependent edema.  A venous ultrasound of both lower extremities will be obtained.      Orthopedic consultation will be obtained regarding bilateral knee pain.    Diagnoses and all orders for this visit:    Memory changes  -     Ambulatory referral/consult to Neurology; Future  -     Basic Metabolic Panel; Future  -     Vitamin B12; Future  -     B-TYPE NATRIURETIC PEPTIDE; Future  -     VITAMIN B1; Future  -     TSH; Future    Obstructive sleep apnea    Essential hypertension  -     Basic Metabolic Panel; Future  -     Vitamin B12; Future  -     B-TYPE NATRIURETIC PEPTIDE; Future  -     VITAMIN B1; Future  -     TSH; Future  -     Basic Metabolic Panel; Future    Chronic anticoagulation  -     Basic Metabolic Panel; Future  -     Vitamin B12; Future  -     B-TYPE NATRIURETIC PEPTIDE; Future  -     VITAMIN B1; Future  -     TSH; Future    Chronic deep vein thrombosis (DVT) of left popliteal vein  -     CV Ultrasound doppler venous legs bilat; Future  -     Basic Metabolic Panel; Future  -     Vitamin B12; Future  -     B-TYPE NATRIURETIC PEPTIDE; Future  -     VITAMIN B1; Future  -     TSH; Future    Primary osteoarthritis of both knees  -     Ambulatory referral/consult to Sports Medicine; Future  -     Basic Metabolic Panel; Future  -     Vitamin B12; Future  -     B-TYPE NATRIURETIC PEPTIDE; Future  -     VITAMIN B1; Future  -     TSH; Future    Chronic diastolic heart failure  -     furosemide (LASIX) 20 MG tablet; Take 2 tablets (40 mg total) by mouth once daily.  -     Basic Metabolic Panel; Future  -     Vitamin B12; Future  -     B-TYPE NATRIURETIC PEPTIDE; Future  -     VITAMIN B1; Future  -     TSH; Future    Incontinence of feces, unspecified fecal incontinence type  -     Basic Metabolic Panel; Future  -     Vitamin B12; Future  -     B-TYPE NATRIURETIC PEPTIDE; Future  -     VITAMIN B1; Future  -     TSH; Future    Tremor  -     Basic Metabolic  Panel; Future  -     Vitamin B12; Future  -     B-TYPE NATRIURETIC PEPTIDE; Future  -     VITAMIN B1; Future  -     TSH; Future    Edema, unspecified type  -     CV Ultrasound doppler venous legs bilat; Future       No follow-ups on file.     Leonid Martin MD

## 2022-10-13 ENCOUNTER — OFFICE VISIT (OUTPATIENT)
Dept: ORTHOPEDICS | Facility: CLINIC | Age: 77
End: 2022-10-13
Payer: MEDICARE

## 2022-10-13 ENCOUNTER — ANTI-COAG VISIT (OUTPATIENT)
Dept: CARDIOLOGY | Facility: CLINIC | Age: 77
End: 2022-10-13
Payer: MEDICARE

## 2022-10-13 ENCOUNTER — HOSPITAL ENCOUNTER (OUTPATIENT)
Dept: RADIOLOGY | Facility: HOSPITAL | Age: 77
Discharge: HOME | End: 2022-10-13
Attending: NURSE PRACTITIONER
Payer: MEDICARE

## 2022-10-13 VITALS — WEIGHT: 235.88 LBS | BODY MASS INDEX: 41.79 KG/M2 | HEIGHT: 63 IN

## 2022-10-13 DIAGNOSIS — I27.82 CHRONIC PULMONARY EMBOLISM WITHOUT ACUTE COR PULMONALE, UNSPECIFIED PULMONARY EMBOLISM TYPE: ICD-10-CM

## 2022-10-13 DIAGNOSIS — M17.0 PRIMARY OSTEOARTHRITIS OF BOTH KNEES: ICD-10-CM

## 2022-10-13 DIAGNOSIS — M25.562 ACUTE PAIN OF BOTH KNEES: ICD-10-CM

## 2022-10-13 DIAGNOSIS — Z86.711 HISTORY OF PULMONARY EMBOLUS (PE): Primary | ICD-10-CM

## 2022-10-13 DIAGNOSIS — M25.561 ACUTE PAIN OF BOTH KNEES: Primary | ICD-10-CM

## 2022-10-13 DIAGNOSIS — M25.561 ACUTE PAIN OF BOTH KNEES: ICD-10-CM

## 2022-10-13 DIAGNOSIS — I82.532 CHRONIC DEEP VEIN THROMBOSIS (DVT) OF LEFT POPLITEAL VEIN: ICD-10-CM

## 2022-10-13 DIAGNOSIS — Z79.01 CHRONIC ANTICOAGULATION: ICD-10-CM

## 2022-10-13 DIAGNOSIS — M25.562 ACUTE PAIN OF BOTH KNEES: Primary | ICD-10-CM

## 2022-10-13 PROCEDURE — 73564 XR KNEE ORTHO BILAT WITH FLEXION: ICD-10-PCS | Mod: 26,50,, | Performed by: RADIOLOGY

## 2022-10-13 PROCEDURE — 99215 OFFICE O/P EST HI 40 MIN: CPT | Mod: S$PBB,,, | Performed by: NURSE PRACTITIONER

## 2022-10-13 PROCEDURE — 73564 X-RAY EXAM KNEE 4 OR MORE: CPT | Mod: 26,50,, | Performed by: RADIOLOGY

## 2022-10-13 PROCEDURE — 99213 OFFICE O/P EST LOW 20 MIN: CPT | Mod: PBBFAC | Performed by: NURSE PRACTITIONER

## 2022-10-13 PROCEDURE — 73564 X-RAY EXAM KNEE 4 OR MORE: CPT | Mod: TC,50

## 2022-10-13 PROCEDURE — 99999 PR PBB SHADOW E&M-EST. PATIENT-LVL III: CPT | Mod: PBBFAC,,, | Performed by: NURSE PRACTITIONER

## 2022-10-13 PROCEDURE — 93793 PR ANTICOAGULANT MGMT FOR PT TAKING WARFARIN: ICD-10-PCS | Mod: ,,, | Performed by: PHARMACIST

## 2022-10-13 PROCEDURE — 93793 ANTICOAG MGMT PT WARFARIN: CPT | Mod: ,,, | Performed by: PHARMACIST

## 2022-10-13 PROCEDURE — 99215 PR OFFICE/OUTPT VISIT, EST, LEVL V, 40-54 MIN: ICD-10-PCS | Mod: S$PBB,,, | Performed by: NURSE PRACTITIONER

## 2022-10-13 PROCEDURE — 99999 PR PBB SHADOW E&M-EST. PATIENT-LVL III: ICD-10-PCS | Mod: PBBFAC,,, | Performed by: NURSE PRACTITIONER

## 2022-10-13 NOTE — PROGRESS NOTES
Patient reports lower than prescribed dose on Thursdays (0.5mg instead of 1mg). INR not at goal. Medications, chart, and patient findings reviewed. See calendar for adjustments to dose and follow up plan.

## 2022-10-13 NOTE — PROGRESS NOTES
SUBJECTIVE:     Chief Complaint & History of Present Illness:  Coby Atkinson is a Established 76 y.o. year old female patient here with a history of constant bilateral knee pain which started years ago.  There is not a history of trauma.  The pain is located in the medial, lateral aspect of the knee.  The pain is described as achy, 9/10.  There is radiation.  There is catching or locking.  Aggravating factors include going up and down stairs, lateral movements, pivoting, standing, and walking.  Associated symptoms include knee giving out, popping sensation.  There is numbness or tingling of the lower extremity.  There is back pain. Previous treatments include acetaminophen, rest, topical injections (CSI last given 2-22-22) which have provided adequate relief.  There is not a history of previous injury or surgery to the knee.  The patient does use an assistive device.    Patient last seen by Dr. Youssef on 4/4/22, per their note, he discussed TKR but patient was not interested at the time.  He then referred her to therapy.  She reports she gets some relief with steroid injections but they don't last long.  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    Review of patient's allergies indicates:   Allergen Reactions    Codeine     Ciprofloxacin Rash         Current Outpatient Medications   Medication Sig Dispense Refill    ALPRAZolam (XANAX) 0.5 MG tablet Take 1 tablet (0.5 mg total) by mouth 2 (two) times daily as needed for Insomnia or Anxiety. 60 tablet 1    amlodipine-valsartan (EXFORGE)  mg per tablet Take 1 tablet by mouth once daily. 90 tablet 3    bisoprolol (ZEBETA) 10 MG tablet Take 1 tablet (10 mg total) by mouth once daily. 90 tablet 3    EScitalopram oxalate (LEXAPRO) 20 MG tablet Take 1 tablet (20 mg total) by mouth every evening. 90 tablet 3    furosemide (LASIX) 20 MG tablet Take 2 tablets (40 mg total) by mouth once daily. 180 tablet 3    meloxicam  (MOBIC) 15 MG tablet Take 15 mg by mouth once daily.      potassium chloride (MICRO-K) 10 MEQ CpSR Take 1 capsule (10 mEq total) by mouth once daily. 90 capsule 3    spironolactone (ALDACTONE) 25 MG tablet Take 1 tablet (25 mg total) by mouth once daily. 90 tablet 3    topiramate (TOPAMAX) 25 MG tablet Take 1 tablet (25 mg total) by mouth 2 (two) times daily. 180 tablet 0    warfarin (COUMADIN) 1 MG tablet Take 1.5-2 tablets (1.5-2 mg total) by mouth Daily. AS DIRECTED BY COUMADIN CLINIC 60 tablet 5    busPIRone (BUSPAR) 10 MG tablet Take 1 tablet (10 mg total) by mouth 3 (three) times daily as needed (anxiety). 90 tablet 2     No current facility-administered medications for this visit.       Past Medical History:   Diagnosis Date    Anticoagulant long-term use     Cataract     Chronic diarrhea     Depression     Edema     GERD (gastroesophageal reflux disease)     Hypertension 10/2/2012    Osteoarthritis of both knees 8/9/2016    Osteopenia     Osteopenia 11/30/2017    PE (pulmonary thromboembolism) 12/03/2017    Primary localized osteoarthrosis, lower leg 12/3/2014    Sleep apnea 2016    Thalassemia minor        Past Surgical History:   Procedure Laterality Date    APPENDECTOMY      CATARACT EXTRACTION      COLONOSCOPY N/A 11/5/2016    Procedure: COLONOSCOPY;  Surgeon: Tanner Simental MD;  Location: [facility] ([address] [place] [address]);  Service: Endoscopy;  Laterality: N/A;    HYSTERECTOMY  age 39    fibroids    OOPHORECTOMY      TONSILLECTOMY         Family History   Problem Relation Age of Onset    Hypertension Mother     Cancer Father         skin    Cancer Brother         pancreatic    No Known Problems Sister     No Known Problems Maternal Aunt     No Known Problems Maternal Uncle     No Known Problems Paternal Aunt     No Known Problems Paternal Uncle     Coronary artery disease Maternal Grandmother     Heart failure Maternal Grandmother     No Known Problems Maternal Grandfather     Stroke Paternal Grandmother      "Coronary artery disease Paternal Grandmother     No Known Problems Paternal Grandfather     Coronary artery disease Brother         with CABG in his 30s    Amblyopia Neg Hx     Blindness Neg Hx     Cataracts Neg Hx     Diabetes Neg Hx     Glaucoma Neg Hx     Macular degeneration Neg Hx     Retinal detachment Neg Hx     Strabismus Neg Hx     Thyroid disease Neg Hx     Colon cancer Neg Hx     Stomach cancer Neg Hx     Esophageal cancer Neg Hx        Review of Systems:  ROS:  Constitutional: no fever or chills  Eyes: no visual changes  ENT: no nasal congestion or sore throat  Respiratory: no cough or shortness of breath  Cardiovascular: no chest pain or palpitations  Gastrointestinal: no nausea or vomiting, tolerating diet  Genitourinary: no hematuria or dysuria  Integument/Breast: no rash or pruritis  Hematologic/Lymphatic: no easy bruising or lymphadenopathy  Musculoskeletal: positive for arthralgias  Neurological: no seizures or tremors  Behavioral/Psych: no auditory or visual hallucinations  Endocrine: no heat or cold intolerance      OBJECTIVE:     PHYSICAL EXAM:  Vital Signs (Most Recent)  There were no vitals filed for this visit.  Height: 5' 3" (160 cm) Weight: 107 kg (235 lb 14.3 oz),   Estimated body mass index is 41.79 kg/m² as calculated from the following:    Height as of this encounter: 5' 3" (1.6 m).    Weight as of this encounter: 107 kg (235 lb 14.3 oz).   General Appearance: Well nourished, well developed, in no acute distress.  HENT: Normal cephalic, oropharynx pink and moist  Eyes: PERRLA bilaterally and EOM x 4  Respiratory: Even and unlabored  Skin: Warm and Dry.   Psychiatric: AAO x 4, Mood & affect are normal.    right  Knee Exam:  Knee Range of Motion:normal   Effusion:none  Condition of skin:intact  Location of tenderness:Medial joint line   Strength:normal  Stability:  stable to testing, Lachman: stable, LCL: stable, MCL: stable, and PCL: stable  Varus /Valgus stress:   Severe " valgus  Kodi:   negative    left  Knee Exam:  Knee Range of Motion:normal   Effusion:none  Condition of skin:intact  Location of tenderness:Medial joint line   Strength:normal  Stability:  stable to testing, Lachman: stable, LCL: stable, MCL: stable, and PCL: stable  Varus /Valgus stress:   Severe valgus  Kodi:   negative      Hip Examination:  normal    RADIOGRAPHS:  Bilateral knee x-ray were obtained, findings show she has severe lateral tibiofemoral joint space narrowing bilaterally and patellar femoral joint space narrowing bilaterally.  No fractures seen.  No significant changes from prior x-ray of knees dated 10/11/2021.  All radiographs were personally reviewed by me.    ASSESSMENT/PLAN:       ICD-10-CM ICD-9-CM   1. Primary osteoarthritis of both knees  M17.0 715.16       Plan: We discussed with the patient at length all the different treatment options available for  the knee including anti-inflammatories, acetaminophen, rest, ice, knee strengthening exercise, occasional cortisone injections for temporary relief, Viscosupplimentation injections, arthroscopic debridement osteotomy, and finally knee arthroplasty.     -Coby Atkinson presents to clinic today with c/c bilateral knee pain for the past several years.  -X-ray as above.  -Recommend RICE therapy.  -I discussed x-ray findings with her.  She is open to speaking with surgeon regarding knee replacement.  She is planning a cruise late November and wanted to see if she could get steroid injections but would like to wait to see what surgeon tells her.  She would like to see surgeon before her schedule cruise.  -In the meantime, may use Tylenol PRN or topical creams PRN.    -In the meantime, recommend she work on weight reduction, BMI is 41.79.  -Will get her scheduled with Dr. Chavira.    Future Appointments   Date Time Provider Department Center   10/17/2022 10:30 AM [facility] XR1 [facility] XRAY [facility]osp   10/17/2022 11:15 AM Moe Wilson MD  [facility] ORTHO [facility]   10/17/2022  2:00 PM VASCULAR, [place] [facility] [place]   10/20/2022  9:30 AM LAB, [facility] LAB [facility]   10/21/2022  2:30 PM Juan Martinez MD [facility] CARDIO [place]   10/25/2022  8:20 AM LAB, [facility] LAB [facility]   10/26/2022  1:30 PM Nicole Owen NP [facility] NEURO [facility]   11/9/2022  9:40 AM Mundo Hudson DO [facility] IM [place]   11/17/2022  1:30 PM Kit Chavira MD [facility] ORTHO [address]   12/16/2022 10:20 AM Mundo Hudson DO [facility] IM [place]

## 2022-10-17 ENCOUNTER — HOSPITAL ENCOUNTER (OUTPATIENT)
Dept: CARDIOLOGY | Facility: HOSPITAL | Age: 77
Discharge: HOME | End: 2022-10-17
Attending: INTERNAL MEDICINE
Payer: MEDICARE

## 2022-10-17 ENCOUNTER — OFFICE VISIT (OUTPATIENT)
Dept: ORTHOPEDICS | Facility: CLINIC | Age: 77
End: 2022-10-17
Payer: MEDICARE

## 2022-10-17 VITALS
WEIGHT: 237.44 LBS | SYSTOLIC BLOOD PRESSURE: 145 MMHG | HEIGHT: 63 IN | HEART RATE: 52 BPM | DIASTOLIC BLOOD PRESSURE: 66 MMHG | RESPIRATION RATE: 18 BRPM | BODY MASS INDEX: 42.07 KG/M2

## 2022-10-17 DIAGNOSIS — R60.9 EDEMA, UNSPECIFIED TYPE: ICD-10-CM

## 2022-10-17 DIAGNOSIS — I82.532 CHRONIC DEEP VEIN THROMBOSIS (DVT) OF LEFT POPLITEAL VEIN: ICD-10-CM

## 2022-10-17 DIAGNOSIS — M17.0 PRIMARY OSTEOARTHRITIS OF BOTH KNEES: Primary | ICD-10-CM

## 2022-10-17 LAB — VIT B1 BLD-MCNC: 60 UG/L (ref 38–122)

## 2022-10-17 PROCEDURE — 99214 OFFICE O/P EST MOD 30 MIN: CPT | Mod: S$PBB,25,, | Performed by: ORTHOPAEDIC SURGERY

## 2022-10-17 PROCEDURE — 99214 PR OFFICE/OUTPT VISIT, EST, LEVL IV, 30-39 MIN: ICD-10-PCS | Mod: S$PBB,25,, | Performed by: ORTHOPAEDIC SURGERY

## 2022-10-17 PROCEDURE — 20610 DRAIN/INJ JOINT/BURSA W/O US: CPT | Mod: 50,PBBFAC,PN | Performed by: ORTHOPAEDIC SURGERY

## 2022-10-17 PROCEDURE — 93970 CV US DOPPLER VENOUS LEGS BILATERAL (CUPID ONLY): ICD-10-PCS | Mod: 26,,, | Performed by: INTERNAL MEDICINE

## 2022-10-17 PROCEDURE — 93970 EXTREMITY STUDY: CPT

## 2022-10-17 PROCEDURE — 20610 LARGE JOINT ASPIRATION/INJECTION: BILATERAL KNEE: ICD-10-PCS | Mod: 50,S$PBB,, | Performed by: ORTHOPAEDIC SURGERY

## 2022-10-17 PROCEDURE — 99999 PR PBB SHADOW E&M-EST. PATIENT-LVL IV: CPT | Mod: PBBFAC,,, | Performed by: ORTHOPAEDIC SURGERY

## 2022-10-17 PROCEDURE — 99999 PR PBB SHADOW E&M-EST. PATIENT-LVL IV: ICD-10-PCS | Mod: PBBFAC,,, | Performed by: ORTHOPAEDIC SURGERY

## 2022-10-17 PROCEDURE — 99214 OFFICE O/P EST MOD 30 MIN: CPT | Mod: PBBFAC,PN,25 | Performed by: ORTHOPAEDIC SURGERY

## 2022-10-17 PROCEDURE — 93970 EXTREMITY STUDY: CPT | Mod: 26,,, | Performed by: INTERNAL MEDICINE

## 2022-10-17 RX ORDER — TRIAMCINOLONE ACETONIDE 40 MG/ML
40 INJECTION, SUSPENSION INTRA-ARTICULAR; INTRAMUSCULAR
Status: DISCONTINUED | OUTPATIENT
Start: 2022-10-17 | End: 2022-10-17 | Stop reason: HOSPADM

## 2022-10-17 RX ADMIN — TRIAMCINOLONE ACETONIDE 40 MG: 40 INJECTION, SUSPENSION INTRA-ARTICULAR; INTRAMUSCULAR at 11:10

## 2022-10-17 NOTE — PROGRESS NOTES
Patient ID: Coby Atkinson is a 76 y.o. female    Chief Complaint:   Chief Complaint   Patient presents with    Left Knee - Pain    Right Knee - Pain       History of Present Illness:    Coby Atkinson is a pleasant 76 y.o. female who presents for evaluation of bilateral knee pain. She  reports pain for the past several years. She does not endorse a history of trauma.  Recently had bilateral knee steroid injections about 1 year ago which she states began wearing off a couple of months ago.  Prolonged standing or walking makes it worse and rest makes it better. The pain is mostly global in the knee. She does endorse nighttime pain.   She is independent with all activities of daily living.  She does have history of PEs in the past and is on Coumadin chronically.    In the past she has tried the following treatments:    NSAIDS   Cortisone injection  Physical Therapy   Analgesics     She currently does not work.   Occupation:  Retired    Instability: Yes  Mechanical Symptoms: Yes    Diabetic:  No  Smoker:  No          PAST MEDICAL HISTORY:   Past Medical History:   Diagnosis Date    Anticoagulant long-term use     Cataract     Chronic diarrhea     Depression     Edema     GERD (gastroesophageal reflux disease)     Hypertension 10/2/2012    Osteoarthritis of both knees 8/9/2016    Osteopenia     Osteopenia 11/30/2017    PE (pulmonary thromboembolism) 12/03/2017    Primary localized osteoarthrosis, lower leg 12/3/2014    Sleep apnea 2016    Thalassemia minor      PAST SURGICAL HISTORY:   Past Surgical History:   Procedure Laterality Date    APPENDECTOMY      CATARACT EXTRACTION      COLONOSCOPY N/A 11/5/2016    Procedure: COLONOSCOPY;  Surgeon: Tanner Simental MD;  Location: [address]);  Service: Endoscopy;  Laterality: N/A;    HYSTERECTOMY  age 39    fibroids    OOPHORECTOMY      TONSILLECTOMY       FAMILY HISTORY:   Family History   Problem Relation Age of Onset    Hypertension Mother     Cancer  Father         skin    Cancer Brother         pancreatic    No Known Problems Sister     No Known Problems Maternal Aunt     No Known Problems Maternal Uncle     No Known Problems Paternal Aunt     No Known Problems Paternal Uncle     Coronary artery disease Maternal Grandmother     Heart failure Maternal Grandmother     No Known Problems Maternal Grandfather     Stroke Paternal Grandmother     Coronary artery disease Paternal Grandmother     No Known Problems Paternal Grandfather     Coronary artery disease Brother         with CABG in his 30s    Amblyopia Neg Hx     Blindness Neg Hx     Cataracts Neg Hx     Diabetes Neg Hx     Glaucoma Neg Hx     Macular degeneration Neg Hx     Retinal detachment Neg Hx     Strabismus Neg Hx     Thyroid disease Neg Hx     Colon cancer Neg Hx     Stomach cancer Neg Hx     Esophageal cancer Neg Hx      SOCIAL HISTORY:   Social History     Occupational History    Occupation: Retired from [employer]    Tobacco Use    Smoking status: Never    Smokeless tobacco: Never   Substance and Sexual Activity    Alcohol use: No     Comment: rare    Drug use: No    Sexual activity: Yes     Partners: Male     Birth control/protection: None        MEDICATIONS:   Current Outpatient Medications:     ALPRAZolam (XANAX) 0.5 MG tablet, Take 1 tablet (0.5 mg total) by mouth 2 (two) times daily as needed for Insomnia or Anxiety., Disp: 60 tablet, Rfl: 1    amlodipine-valsartan (EXFORGE)  mg per tablet, Take 1 tablet by mouth once daily., Disp: 90 tablet, Rfl: 3    bisoprolol (ZEBETA) 10 MG tablet, Take 1 tablet (10 mg total) by mouth once daily., Disp: 90 tablet, Rfl: 3    EScitalopram oxalate (LEXAPRO) 20 MG tablet, Take 1 tablet (20 mg total) by mouth every evening., Disp: 90 tablet, Rfl: 3    furosemide (LASIX) 20 MG tablet, Take 2 tablets (40 mg total) by mouth once daily., Disp: 180 tablet, Rfl: 3    meloxicam (MOBIC) 15 MG tablet, Take 15 mg by mouth once daily., Disp: , Rfl:      potassium chloride (MICRO-K) 10 MEQ CpSR, Take 1 capsule (10 mEq total) by mouth once daily., Disp: 90 capsule, Rfl: 3    spironolactone (ALDACTONE) 25 MG tablet, Take 1 tablet (25 mg total) by mouth once daily., Disp: 90 tablet, Rfl: 3    topiramate (TOPAMAX) 25 MG tablet, Take 1 tablet (25 mg total) by mouth 2 (two) times daily., Disp: 180 tablet, Rfl: 0    warfarin (COUMADIN) 1 MG tablet, Take 1.5-2 tablets (1.5-2 mg total) by mouth Daily. AS DIRECTED BY COUMADIN CLINIC, Disp: 60 tablet, Rfl: 5    busPIRone (BUSPAR) 10 MG tablet, Take 1 tablet (10 mg total) by mouth 3 (three) times daily as needed (anxiety)., Disp: 90 tablet, Rfl: 2  ALLERGIES:   Review of patient's allergies indicates:   Allergen Reactions    Codeine     Ciprofloxacin Rash         Physical Exam     Vitals:    10/17/22 1216   BP: (!) 145/66   Pulse: (!) 52   Resp: 18     Alert and oriented to person, place and time. No acute distress. Well-groomed, not ill appearing. Pupils round and reactive, normal respiratory effort, no audible wheezing.     GENERAL:  A well-developed, well-nourished 76 y.o. female who is alert and       oriented in no acute distress.      Gait: She  walks with a antalgic gait.  Using a rolling walker                 EXTREMITIES:  Examination of lower extremities reveals there is no visible mass or deformity.    Left knee:  ROM     Ligamentously stable to varus/valgus stress.    Valgus knee, passively correctable to neutral    Anterior and posterior drawers negative.    No pain over pes bursa.    No warmth    No erythema     Effusion Yes    medial, lateral joint line tenderness    Positive Patellofemoral grind/crepitus     Right knee:  ROM 5-125    Ligamentously stable to varus/valgus stress.    Anterior and posterior drawers negative.    No pain over pes bursa.    No warmth    No erythema    Effusion Yes    medial, lateral joint line tenderness    Positive Patellofemoral grind/crepitus     The skin over both lower  extremities is normal and unremarkable.  She has a  painless range of motion of the hips and ankles bilaterally.   Sensation is intact in both lower extremities.    There are no motor deficits in the lower extremities bilaterally.   Pedal pulses are palpable distally bilaterally.    She has no calf tenderness to palpation nor edema.         Imaging:     X-Ray: I have reviewed all pertinent results/findings and my personal findings are:  Severe end-stage osteoarthritis of the bilateral knees with tricompartmental joint narrowing.  Subchondral sclerosis and osteophytes.  Valgus deformity bilaterally.      Assessment & Plan    Primary osteoarthritis of both knees       I made the decision to obtain old records of the patient including previous notes and imaging. New imaging, if ordered today of the extremity or extremities, were evaluated. I independently reviewed and interpreted the radiographs and/or MRIs/CT scan today as well as prior imaging.      We discussed at length different treatment options including anti-inflammatories, acetaminophen, rest, ice, heat, formal physical therapy including strengthening and stretching exercises, home exercise programs, injections, dry needling, and finally surgical intervention if conservative treatment fails.       Patient would like to proceed with a trial of:     Corticosteroid Injection bilateral knees today     Consider TKA if pain is refractory to conservative treatment.  Patient understands she is high risk for surgery however has severe end-stage arthritis with deformity.    All questions were answered and patient is agreeable to the above plan.           Follow up: in 3 months  X-rays next visit:  None

## 2022-10-17 NOTE — PROCEDURES
Large Joint Aspiration/Injection: bilateral knee    Date/Time: 10/17/2022 11:15 AM  Performed by: Moe Wilson MD  Authorized by: Moe Wilson MD     Consent Done?:  Yes (Verbal)  Indications:  Pain  Site marked: the procedure site was marked    Timeout: prior to procedure the correct patient, procedure, and site was verified    Prep: patient was prepped and draped in usual sterile fashion      Local anesthesia used?: Yes    Anesthesia:  Local infiltration  Local anesthetic:  Bupivacaine 0.25% without epinephrine and lidocaine 2% without epinephrine  Anesthetic total (ml):  6      Details:  Needle Size:  22 G  Ultrasonic Guidance for needle placement?: No    Approach:  Anterolateral  Location:  Knee  Laterality:  Bilateral  Site:  Bilateral knee  Medications (Right):  40 mg triamcinolone acetonide 40 mg/mL  Medications (Left):  40 mg triamcinolone acetonide 40 mg/mL  Patient tolerance:  Patient tolerated the procedure well with no immediate complications

## 2022-10-20 ENCOUNTER — ANTI-COAG VISIT (OUTPATIENT)
Dept: CARDIOLOGY | Facility: CLINIC | Age: 77
End: 2022-10-20
Payer: MEDICARE

## 2022-10-20 DIAGNOSIS — Z86.711 HISTORY OF PULMONARY EMBOLUS (PE): Primary | ICD-10-CM

## 2022-10-20 DIAGNOSIS — I27.82 CHRONIC PULMONARY EMBOLISM WITHOUT ACUTE COR PULMONALE, UNSPECIFIED PULMONARY EMBOLISM TYPE: ICD-10-CM

## 2022-10-20 DIAGNOSIS — I82.532 CHRONIC DEEP VEIN THROMBOSIS (DVT) OF LEFT POPLITEAL VEIN: ICD-10-CM

## 2022-10-20 DIAGNOSIS — Z79.01 CHRONIC ANTICOAGULATION: ICD-10-CM

## 2022-10-20 PROCEDURE — 93793 ANTICOAG MGMT PT WARFARIN: CPT | Mod: ,,, | Performed by: PHARMACIST

## 2022-10-20 PROCEDURE — 93793 PR ANTICOAGULANT MGMT FOR PT TAKING WARFARIN: ICD-10-PCS | Mod: ,,, | Performed by: PHARMACIST

## 2022-10-27 ENCOUNTER — ANTI-COAG VISIT (OUTPATIENT)
Dept: CARDIOLOGY | Facility: CLINIC | Age: 77
End: 2022-10-27
Payer: MEDICARE

## 2022-10-27 DIAGNOSIS — I82.532 CHRONIC DEEP VEIN THROMBOSIS (DVT) OF LEFT POPLITEAL VEIN: ICD-10-CM

## 2022-10-27 DIAGNOSIS — Z86.711 HISTORY OF PULMONARY EMBOLUS (PE): Primary | ICD-10-CM

## 2022-10-27 DIAGNOSIS — I27.82 CHRONIC PULMONARY EMBOLISM WITHOUT ACUTE COR PULMONALE, UNSPECIFIED PULMONARY EMBOLISM TYPE: ICD-10-CM

## 2022-10-27 DIAGNOSIS — Z79.01 CHRONIC ANTICOAGULATION: ICD-10-CM

## 2022-10-27 PROCEDURE — 93793 PR ANTICOAGULANT MGMT FOR PT TAKING WARFARIN: ICD-10-PCS | Mod: ,,,

## 2022-10-27 PROCEDURE — 93793 ANTICOAG MGMT PT WARFARIN: CPT | Mod: ,,,

## 2022-10-27 NOTE — PROGRESS NOTES
INR not at goal. Medications, chart, and patient findings reviewed. Patient reported taking incorrect/old dose. Recommend 2mg 10/27 and dosing per calendar. See calendar for adjustments to dose and follow up plan.

## 2022-10-31 ENCOUNTER — EXTERNAL CHRONIC CARE MANAGEMENT (OUTPATIENT)
Dept: PRIMARY CARE CLINIC | Facility: CLINIC | Age: 77
End: 2022-10-31
Payer: MEDICARE

## 2022-10-31 PROCEDURE — 99490 CHRNC CARE MGMT STAFF 1ST 20: CPT | Mod: S$PBB,,, | Performed by: INTERNAL MEDICINE

## 2022-10-31 PROCEDURE — 99490 PR CHRONIC CARE MGMT, 1ST 20 MIN: ICD-10-PCS | Mod: S$PBB,,, | Performed by: INTERNAL MEDICINE

## 2022-10-31 PROCEDURE — 99490 CHRNC CARE MGMT STAFF 1ST 20: CPT | Mod: PBBFAC,PO | Performed by: INTERNAL MEDICINE

## 2022-11-10 NOTE — PROGRESS NOTES
We were sent a message that the pt needs to schedule an EGD in the near future.  She was found to have a submassive PE and L-popliteal DVT per imaging on 12/2/2017.  Therefore, it is not recommended for her anti-coagulation to be interrupted x 3 months after this time.  After 3/2/2018, she can be approved to hold coumadin x ~3 days prior, but will require a lovenox bridge while holding.  I have asked that we are informed when this procedure is scheduled, so detailed holding instructions can be provided to the pt.   Yes

## 2022-11-15 ENCOUNTER — ANTI-COAG VISIT (OUTPATIENT)
Dept: CARDIOLOGY | Facility: CLINIC | Age: 77
End: 2022-11-15
Payer: MEDICARE

## 2022-11-15 DIAGNOSIS — I27.82 CHRONIC PULMONARY EMBOLISM WITHOUT ACUTE COR PULMONALE, UNSPECIFIED PULMONARY EMBOLISM TYPE: ICD-10-CM

## 2022-11-15 DIAGNOSIS — Z79.01 CHRONIC ANTICOAGULATION: ICD-10-CM

## 2022-11-15 DIAGNOSIS — I82.532 CHRONIC DEEP VEIN THROMBOSIS (DVT) OF LEFT POPLITEAL VEIN: ICD-10-CM

## 2022-11-15 DIAGNOSIS — Z86.711 HISTORY OF PULMONARY EMBOLUS (PE): Primary | ICD-10-CM

## 2022-11-15 PROCEDURE — 93793 PR ANTICOAGULANT MGMT FOR PT TAKING WARFARIN: ICD-10-PCS | Mod: ,,, | Performed by: PHARMACIST

## 2022-11-15 PROCEDURE — 93793 ANTICOAG MGMT PT WARFARIN: CPT | Mod: ,,, | Performed by: PHARMACIST

## 2022-11-16 NOTE — PROGRESS NOTES
Patient reports taking warfarin 1mg daily. I suspect missed doses as inr is at baseline on a higher than prescribed dose of coumadin. INR not at goal. Medications, chart, and patient findings reviewed. See calendar for adjustments to dose and follow up plan.

## 2022-11-22 ENCOUNTER — ANTI-COAG VISIT (OUTPATIENT)
Dept: CARDIOLOGY | Facility: CLINIC | Age: 77
End: 2022-11-22
Payer: MEDICARE

## 2022-11-22 DIAGNOSIS — Z86.711 HISTORY OF PULMONARY EMBOLUS (PE): Primary | ICD-10-CM

## 2022-11-22 DIAGNOSIS — Z79.01 CHRONIC ANTICOAGULATION: ICD-10-CM

## 2022-11-22 DIAGNOSIS — I82.532 CHRONIC DEEP VEIN THROMBOSIS (DVT) OF LEFT POPLITEAL VEIN: ICD-10-CM

## 2022-11-22 PROCEDURE — 93793 PR ANTICOAGULANT MGMT FOR PT TAKING WARFARIN: ICD-10-PCS | Mod: ,,,

## 2022-11-22 PROCEDURE — 93793 ANTICOAG MGMT PT WARFARIN: CPT | Mod: ,,,

## 2022-11-22 NOTE — TELEPHONE ENCOUNTER
Julian PAIGE Staff  Phone Number: [phone]     Good morning,   During monthly CCM call, pt expressed depression and anxiety. She believes it worsens with holidays and her son who has passed birthday just passing. I seen in patient's med list that she had an old prescription for buspirone as needed but patient reports not having this medication. Pt would like something to help with these feelings of distress and anxiety. Please let me know if there is any way I can help.   Johnny RODRIGUEZ   CareCoordinator   [facility]   [phone]

## 2022-11-22 NOTE — PROGRESS NOTES
INR not at goal. Medications, chart, and patient findings reviewed. Patient reports taking warfarin 1mg daily. I suspect patient is not consistently taking warfarin as prescribed as INR is at baseline on higher than prescribed dose of warfarin. See calendar for adjustments to dose and follow up plan.

## 2022-11-23 RX ORDER — BUSPIRONE HYDROCHLORIDE 10 MG/1
10 TABLET ORAL 3 TIMES DAILY PRN
Qty: 90 TABLET | Refills: 2 | Status: SHIPPED | OUTPATIENT
Start: 2022-11-23 | End: 2023-08-09

## 2022-11-23 RX ORDER — ESCITALOPRAM OXALATE 10 MG/1
10 TABLET ORAL NIGHTLY
Qty: 90 TABLET | Refills: 3 | Status: SHIPPED | OUTPATIENT
Start: 2022-11-23 | End: 2023-02-10

## 2022-11-23 RX ORDER — ESCITALOPRAM OXALATE 20 MG/1
20 TABLET ORAL NIGHTLY
Qty: 90 TABLET | Refills: 3 | OUTPATIENT
Start: 2022-11-23

## 2022-11-23 NOTE — TELEPHONE ENCOUNTER
No new care gaps identified.  [facility] Embedded Care Gaps. Reference number: [id number]. 11/23/2022   4:55:07 PM CST

## 2022-11-25 DIAGNOSIS — I50.32 CHRONIC DIASTOLIC HEART FAILURE: ICD-10-CM

## 2022-11-25 NOTE — PROGRESS NOTES
Patient called today, Friday 11/25/22 to report she will not be in town for her INR which is scheduled for Monday 11/28 because she will be going on a cruise from 11/27-12/11. She reports she will not have access to a lab. I reminded patient to always notify us immediately of inability to make her INR since if given sooner notice we could have planned to check it this am prior to her trip, especially since her INRs have been so labile. She is asking what dose of coumadin to take. Without a current INR, I cannot make any adjustments at this time.Patient was re-educated on situations that would require placing a call to the coumadin clinic or seeking urgent care while out of town. including bleeding or unusual bruising issues, changes in health, diet or medications, upcoming procedures that require warfarin interruption, and missed coumadin dose(s). Patient expressed understanding that avoidance of consistency with these parameters could cause fluctuations in INR, leading to more frequent visits and increase risk of adverse events.

## 2022-11-25 NOTE — PROGRESS NOTES
11/25/22 Patient called to get warfarin dose--lost paper where it was written down, also reports going on a cruise--for 2 weeks starting Sunday -11/27 and will be unable to get lab 11/28

## 2022-11-29 RX ORDER — FUROSEMIDE 20 MG/1
40 TABLET ORAL DAILY
Qty: 180 TABLET | Refills: 3 | Status: SHIPPED | OUTPATIENT
Start: 2022-11-29

## 2022-11-30 ENCOUNTER — EXTERNAL CHRONIC CARE MANAGEMENT (OUTPATIENT)
Dept: PRIMARY CARE CLINIC | Facility: CLINIC | Age: 77
End: 2022-11-30
Payer: MEDICARE

## 2022-11-30 PROCEDURE — 99490 CHRNC CARE MGMT STAFF 1ST 20: CPT | Mod: PBBFAC,PO | Performed by: INTERNAL MEDICINE

## 2022-11-30 PROCEDURE — 99439 PR CHRONIC CARE MGMT, EA ADDTL 20 MIN: ICD-10-PCS | Mod: S$PBB,,, | Performed by: INTERNAL MEDICINE

## 2022-11-30 PROCEDURE — 99439 CHRNC CARE MGMT STAF EA ADDL: CPT | Mod: PBBFAC,PO | Performed by: INTERNAL MEDICINE

## 2022-11-30 PROCEDURE — 99490 PR CHRONIC CARE MGMT, 1ST 20 MIN: ICD-10-PCS | Mod: S$PBB,,, | Performed by: INTERNAL MEDICINE

## 2022-11-30 PROCEDURE — 99490 CHRNC CARE MGMT STAFF 1ST 20: CPT | Mod: S$PBB,,, | Performed by: INTERNAL MEDICINE

## 2022-11-30 PROCEDURE — 99439 CHRNC CARE MGMT STAF EA ADDL: CPT | Mod: S$PBB,,, | Performed by: INTERNAL MEDICINE

## 2022-12-12 ENCOUNTER — TELEPHONE (OUTPATIENT)
Dept: INTERNAL MEDICINE | Facility: CLINIC | Age: 77
End: 2022-12-12
Payer: MEDICARE

## 2022-12-12 NOTE — TELEPHONE ENCOUNTER
Spoke to patient and suggested that she keep the appointment she has  schedule  on tomorrow to get check for the flu .

## 2022-12-12 NOTE — TELEPHONE ENCOUNTER
----- Message from Kamini REX Kaufman sent at 12/12/2022 12:07 PM CST -----  Contact: [phone]  .1 Patient would like to get medical advice.  Symptoms (please be specific): dizzy, light head, stuffy nose, cough  How long has patient had these symptoms:a week  Pharmacy name and phone#:[id number] [id number] [address] [place] [facility] [place] [address]   Phone:  [phone]  Fax:  [phone]  Any drug allergies: on file  Comments: Patient would like to get medical advice. Patient scheduled to be seen tomorrow, but would like to talk with pcp's nurse. Thank you

## 2022-12-12 NOTE — PROGRESS NOTES
Subjective:       Patient ID: Coby Atkinson is a 77 y.o. female.    Chief Complaint: Dizziness, Fatigue, Memory Loss, Sinus Problem, and Frostbite      Patient is a 77 y.o. female who traditionally follows with Mundo Hudson DO presenting today for follow up.     Patient and  were on cruise from 11/26-12/10 and during that time patient began to feel weak, dizzy, ect. Labs and evaluation were done by pedro luis CORDOVA and found to have a BNP of 129 with negative Troponins and a UTI. She is only alternating a few pounds per day per patient. Denies chest pain or SoB.     Endorses cough, stuffy nose, headache and fatigue. During the cruise she was tested for COVID which was negative.     States she finished ABX as prescribed on ship for UTI.      concerned about her memory issues.    Review of patient's allergies indicates:   Allergen Reactions    Codeine     Ciprofloxacin Rash       Medication List with Changes/Refills   New Medications    BENZONATATE (TESSALON) 200 MG CAPSULE    Take 1 capsule (200 mg total) by mouth 3 (three) times daily as needed for Cough.    OSELTAMIVIR (TAMIFLU) 75 MG CAPSULE    Take 1 capsule (75 mg total) by mouth 2 (two) times daily. for 5 days   Current Medications    AMLODIPINE-VALSARTAN (EXFORGE)  MG PER TABLET    Take 1 tablet by mouth once daily.    BISOPROLOL (ZEBETA) 10 MG TABLET    Take 1 tablet (10 mg total) by mouth once daily.    BUSPIRONE (BUSPAR) 10 MG TABLET    Take 1 tablet (10 mg total) by mouth 3 (three) times daily as needed (anxiety).    DOXAZOSIN (CARDURA) 8 MG TAB    Take 8 mg by mouth.    ESCITALOPRAM OXALATE (LEXAPRO) 10 MG TABLET    Take 1 tablet (10 mg total) by mouth every evening.    FUROSEMIDE (LASIX) 20 MG TABLET    Take 2 tablets (40 mg total) by mouth once daily.    MELOXICAM (MOBIC) 15 MG TABLET    Take 15 mg by mouth once daily.    POTASSIUM CHLORIDE (MICRO-K) 10 MEQ CPSR    Take 1 capsule (10 mEq total) by mouth once daily.     "SPIRONOLACTONE (ALDACTONE) 25 MG TABLET    Take 1 tablet (25 mg total) by mouth once daily.    TOPIRAMATE (TOPAMAX) 25 MG TABLET    Take 1 tablet (25 mg total) by mouth 2 (two) times daily.    WARFARIN (COUMADIN) 1 MG TABLET    Take 1.5-2 tablets (1.5-2 mg total) by mouth Daily. AS DIRECTED BY COUMADIN CLINIC     Medical, social and surgical history has been reviewed with the patient.      Review of Systems   Constitutional:  Negative for chills and fever.   HENT:  Positive for nasal congestion. Negative for ear pain and sore throat.    Respiratory:  Positive for cough. Negative for shortness of breath.    Cardiovascular:  Negative for chest pain and leg swelling.   Neurological:  Positive for headaches. Negative for dizziness.       Objective:   /80 (BP Location: Left arm, Patient Position: Sitting, BP Method: Large (Manual))   Pulse 91   Temp 97.5 °F (36.4 °C) (Skin)   Ht 5' 3" (1.6 m)   Wt 104.4 kg (230 lb 2.6 oz)   SpO2 95%   BMI 40.77 kg/m²     Physical Exam  Vitals reviewed.   Constitutional:       Appearance: Normal appearance.   HENT:      Head: Normocephalic and atraumatic.      Right Ear: There is impacted cerumen.      Left Ear: Tympanic membrane is injected and bulging.      Nose: Rhinorrhea present. Rhinorrhea is clear.      Right Turbinates: Swollen and pale.      Left Turbinates: Swollen and pale.      Mouth/Throat:      Pharynx: Oropharynx is clear.   Cardiovascular:      Rate and Rhythm: Normal rate and regular rhythm.      Heart sounds: Normal heart sounds. No murmur heard.  Pulmonary:      Effort: Pulmonary effort is normal.      Breath sounds: Normal breath sounds. No wheezing.   Musculoskeletal:      Right lower leg: No edema.      Left lower leg: No edema.   Skin:     General: Skin is warm and dry.   Neurological:      Mental Status: She is alert and oriented to person, place, and time.     Last Labs:  Glucose   Date Value Ref Range Status   10/25/2022 124 (H) 70 - 110 mg/dL Final "   10/11/2022 86 70 - 110 mg/dL Final     BUN   Date Value Ref Range Status   10/25/2022 18 8 - 23 mg/dL Final   10/11/2022 17 8 - 23 mg/dL Final     Creatinine   Date Value Ref Range Status   10/25/2022 0.9 0.5 - 1.4 mg/dL Final   10/11/2022 0.8 0.5 - 1.4 mg/dL Final     Cholesterol   Date Value Ref Range Status   09/22/2022 125 120 - 199 mg/dL Final     Comment:     The National Cholesterol Education Program (NCEP) has set the  following guidelines (reference ranges) for Cholesterol:  Optimal.....................<200 mg/dL  Borderline High.............200-239 mg/dL  High........................> or = 240 mg/dL     04/19/2022 145 120 - 199 mg/dL Final     Comment:     The National Cholesterol Education Program (NCEP) has set the  following guidelines (reference ranges) for Cholesterol:  Optimal.....................<200 mg/dL  Borderline High.............200-239 mg/dL  High........................> or = 240 mg/dL       Hemoglobin A1C   Date Value Ref Range Status   06/11/2020 5.4 4.0 - 5.6 % Final     Comment:     ADA Screening Guidelines:  5.7-6.4%  Consistent with prediabetes  >or=6.5%  Consistent with diabetes  High levels of fetal hemoglobin interfere with the HbA1C  assay. Heterozygous hemoglobin variants (HbS, HgC, etc)do  not significantly interfere with this assay.   However, presence of multiple variants may affect accuracy.       Hemoglobin   Date Value Ref Range Status   09/22/2022 11.4 (L) 12.0 - 16.0 g/dL Final   08/23/2022 11.1 (L) 12.0 - 16.0 g/dL Final     Hematocrit   Date Value Ref Range Status   09/22/2022 38.6 37.0 - 48.5 % Final   08/23/2022 36.4 (L) 37.0 - 48.5 % Final       I have reviewed the following:     Details / Date    [x]   Labs     []   Micro     []   Pathology     []   Imaging     []   Cardiology Procedures     []   Other        Assessment and Plan:     1. Acute cough    - Influenza A & B by Molecular  - benzonatate (TESSALON) 200 MG capsule; Take 1 capsule (200 mg total) by mouth 3  (three) times daily as needed for Cough.  Dispense: 30 capsule; Refill: 0    2. History of UTI    - Urinalysis, Reflex to Urine Culture Urine, Clean Catch; Future    3. Memory changes    - Ambulatory referral/consult to Neurology; Future    4. Anemia, unspecified type    - CBC Auto Differential; Future  - Iron and TIBC; Future    5. Thalassemia minor    6. IFG (impaired fasting glucose)    - Hemoglobin A1C; Future    7. Essential hypertension    8. Chronic diastolic heart failure    - B-TYPE NATRIURETIC PEPTIDE; Future    9. Chronic anticoagulation    10. Encounter for long-term (current) use of medications    - Vitamin D; Future    11. Morbid obesity with BMI of 40.0-44.9, adult  12. Uses walker

## 2022-12-13 ENCOUNTER — LAB VISIT (OUTPATIENT)
Dept: LAB | Facility: HOSPITAL | Age: 77
End: 2022-12-13
Attending: INTERNAL MEDICINE
Payer: MEDICARE

## 2022-12-13 ENCOUNTER — OFFICE VISIT (OUTPATIENT)
Dept: INTERNAL MEDICINE | Facility: CLINIC | Age: 77
End: 2022-12-13
Payer: MEDICARE

## 2022-12-13 ENCOUNTER — TELEPHONE (OUTPATIENT)
Dept: INTERNAL MEDICINE | Facility: CLINIC | Age: 77
End: 2022-12-13

## 2022-12-13 ENCOUNTER — TELEPHONE (OUTPATIENT)
Dept: INTERNAL MEDICINE | Facility: CLINIC | Age: 77
End: 2022-12-13
Payer: MEDICARE

## 2022-12-13 VITALS
OXYGEN SATURATION: 95 % | SYSTOLIC BLOOD PRESSURE: 130 MMHG | DIASTOLIC BLOOD PRESSURE: 80 MMHG | BODY MASS INDEX: 40.79 KG/M2 | WEIGHT: 230.19 LBS | HEART RATE: 91 BPM | HEIGHT: 63 IN | TEMPERATURE: 98 F

## 2022-12-13 DIAGNOSIS — R05.1 ACUTE COUGH: Primary | ICD-10-CM

## 2022-12-13 DIAGNOSIS — I50.32 CHRONIC DIASTOLIC HEART FAILURE: ICD-10-CM

## 2022-12-13 DIAGNOSIS — R41.3 MEMORY CHANGES: ICD-10-CM

## 2022-12-13 DIAGNOSIS — D56.3 THALASSEMIA MINOR: ICD-10-CM

## 2022-12-13 DIAGNOSIS — Z87.440 HISTORY OF UTI: ICD-10-CM

## 2022-12-13 DIAGNOSIS — I27.82 CHRONIC PULMONARY EMBOLISM WITHOUT ACUTE COR PULMONALE, UNSPECIFIED PULMONARY EMBOLISM TYPE: ICD-10-CM

## 2022-12-13 DIAGNOSIS — D64.9 ANEMIA, UNSPECIFIED TYPE: ICD-10-CM

## 2022-12-13 DIAGNOSIS — Z79.899 ENCOUNTER FOR LONG-TERM (CURRENT) USE OF MEDICATIONS: ICD-10-CM

## 2022-12-13 DIAGNOSIS — Z86.711 HISTORY OF PULMONARY EMBOLUS (PE): ICD-10-CM

## 2022-12-13 DIAGNOSIS — Z99.89 USES WALKER: ICD-10-CM

## 2022-12-13 DIAGNOSIS — I82.532 CHRONIC DEEP VEIN THROMBOSIS (DVT) OF LEFT POPLITEAL VEIN: ICD-10-CM

## 2022-12-13 DIAGNOSIS — R73.01 IFG (IMPAIRED FASTING GLUCOSE): ICD-10-CM

## 2022-12-13 DIAGNOSIS — I10 ESSENTIAL HYPERTENSION: ICD-10-CM

## 2022-12-13 DIAGNOSIS — Z79.01 CHRONIC ANTICOAGULATION: ICD-10-CM

## 2022-12-13 DIAGNOSIS — J10.1 INFLUENZA A: Primary | ICD-10-CM

## 2022-12-13 DIAGNOSIS — E66.01 MORBID OBESITY WITH BMI OF 40.0-44.9, ADULT: ICD-10-CM

## 2022-12-13 LAB
25(OH)D3+25(OH)D2 SERPL-MCNC: 11 NG/ML (ref 30–96)
BASOPHILS # BLD AUTO: 0.04 K/UL (ref 0–0.2)
BASOPHILS NFR BLD: 0.4 % (ref 0–1.9)
BNP SERPL-MCNC: 23 PG/ML (ref 0–99)
DIFFERENTIAL METHOD: ABNORMAL
EOSINOPHIL # BLD AUTO: 0.1 K/UL (ref 0–0.5)
EOSINOPHIL NFR BLD: 1 % (ref 0–8)
ERYTHROCYTE [DISTWIDTH] IN BLOOD BY AUTOMATED COUNT: 16.6 % (ref 11.5–14.5)
ESTIMATED AVG GLUCOSE: 120 MG/DL (ref 68–131)
HBA1C MFR BLD: 5.8 % (ref 4–5.6)
HCT VFR BLD AUTO: 38.1 % (ref 37–48.5)
HGB BLD-MCNC: 11.7 G/DL (ref 12–16)
IMM GRANULOCYTES # BLD AUTO: 0.13 K/UL (ref 0–0.04)
IMM GRANULOCYTES NFR BLD AUTO: 1.4 % (ref 0–0.5)
INFLUENZA A, MOLECULAR: POSITIVE
INFLUENZA B, MOLECULAR: NEGATIVE
INR PPP: 3.6 (ref 0.8–1.2)
IRON SERPL-MCNC: 105 UG/DL (ref 30–160)
LYMPHOCYTES # BLD AUTO: 2.8 K/UL (ref 1–4.8)
LYMPHOCYTES NFR BLD: 30.8 % (ref 18–48)
MCH RBC QN AUTO: 20.3 PG (ref 27–31)
MCHC RBC AUTO-ENTMCNC: 30.7 G/DL (ref 32–36)
MCV RBC AUTO: 66 FL (ref 82–98)
MONOCYTES # BLD AUTO: 0.8 K/UL (ref 0.3–1)
MONOCYTES NFR BLD: 8.3 % (ref 4–15)
NEUTROPHILS # BLD AUTO: 5.3 K/UL (ref 1.8–7.7)
NEUTROPHILS NFR BLD: 58.1 % (ref 38–73)
NRBC BLD-RTO: 0 /100 WBC
PLATELET # BLD AUTO: 292 K/UL (ref 150–450)
PMV BLD AUTO: 9.9 FL (ref 9.2–12.9)
PROTHROMBIN TIME: 35.4 SEC (ref 9–12.5)
RBC # BLD AUTO: 5.75 M/UL (ref 4–5.4)
SATURATED IRON: 30 % (ref 20–50)
SPECIMEN SOURCE: ABNORMAL
TOTAL IRON BINDING CAPACITY: 352 UG/DL (ref 250–450)
TRANSFERRIN SERPL-MCNC: 238 MG/DL (ref 200–375)
WBC # BLD AUTO: 9.18 K/UL (ref 3.9–12.7)

## 2022-12-13 PROCEDURE — 83880 ASSAY OF NATRIURETIC PEPTIDE: CPT | Performed by: NURSE PRACTITIONER

## 2022-12-13 PROCEDURE — 84466 ASSAY OF TRANSFERRIN: CPT | Performed by: NURSE PRACTITIONER

## 2022-12-13 PROCEDURE — 99214 PR OFFICE/OUTPT VISIT, EST, LEVL IV, 30-39 MIN: ICD-10-PCS | Mod: S$PBB,,, | Performed by: NURSE PRACTITIONER

## 2022-12-13 PROCEDURE — 99999 PR PBB SHADOW E&M-EST. PATIENT-LVL IV: CPT | Mod: PBBFAC,,, | Performed by: NURSE PRACTITIONER

## 2022-12-13 PROCEDURE — 83036 HEMOGLOBIN GLYCOSYLATED A1C: CPT | Performed by: NURSE PRACTITIONER

## 2022-12-13 PROCEDURE — 87502 INFLUENZA DNA AMP PROBE: CPT | Performed by: NURSE PRACTITIONER

## 2022-12-13 PROCEDURE — 99999 PR PBB SHADOW E&M-EST. PATIENT-LVL IV: ICD-10-PCS | Mod: PBBFAC,,, | Performed by: NURSE PRACTITIONER

## 2022-12-13 PROCEDURE — 99214 OFFICE O/P EST MOD 30 MIN: CPT | Mod: S$PBB,,, | Performed by: NURSE PRACTITIONER

## 2022-12-13 PROCEDURE — 99214 OFFICE O/P EST MOD 30 MIN: CPT | Mod: PBBFAC,PO | Performed by: NURSE PRACTITIONER

## 2022-12-13 PROCEDURE — 85610 PROTHROMBIN TIME: CPT | Performed by: INTERNAL MEDICINE

## 2022-12-13 PROCEDURE — 82306 VITAMIN D 25 HYDROXY: CPT | Performed by: NURSE PRACTITIONER

## 2022-12-13 PROCEDURE — 85025 COMPLETE CBC W/AUTO DIFF WBC: CPT | Performed by: NURSE PRACTITIONER

## 2022-12-13 PROCEDURE — 36415 COLL VENOUS BLD VENIPUNCTURE: CPT | Mod: PO | Performed by: INTERNAL MEDICINE

## 2022-12-13 RX ORDER — DOXAZOSIN 8 MG/1
8 TABLET ORAL DAILY
COMMUNITY
Start: 2022-11-17

## 2022-12-13 RX ORDER — OSELTAMIVIR PHOSPHATE 75 MG/1
75 CAPSULE ORAL 2 TIMES DAILY
Qty: 10 CAPSULE | Refills: 0 | Status: SHIPPED | OUTPATIENT
Start: 2022-12-13 | End: 2022-12-18

## 2022-12-13 RX ORDER — BENZONATATE 200 MG/1
200 CAPSULE ORAL 3 TIMES DAILY PRN
Qty: 30 CAPSULE | Refills: 0 | Status: SHIPPED | OUTPATIENT
Start: 2022-12-13 | End: 2022-12-23

## 2022-12-13 NOTE — TELEPHONE ENCOUNTER
Please advise patient she is + for Influenza A -- I have sent over Tamiflu for her to take if she would like.   As her other results come in I will keep her updated.

## 2022-12-13 NOTE — TELEPHONE ENCOUNTER
LVM informing pt that Tamiflu has been sent to the pharmacy and will be updated on other test results.

## 2022-12-13 NOTE — TELEPHONE ENCOUNTER
Called pt and lvm in regards to lab calling stating that not enough urine was left from pt . Called to reschedule urine with pt , pt did not answer .

## 2022-12-14 ENCOUNTER — ANTI-COAG VISIT (OUTPATIENT)
Dept: CARDIOLOGY | Facility: CLINIC | Age: 77
End: 2022-12-14
Payer: MEDICARE

## 2022-12-14 DIAGNOSIS — Z79.01 CHRONIC ANTICOAGULATION: ICD-10-CM

## 2022-12-14 DIAGNOSIS — I27.82 CHRONIC PULMONARY EMBOLISM WITHOUT ACUTE COR PULMONALE, UNSPECIFIED PULMONARY EMBOLISM TYPE: ICD-10-CM

## 2022-12-14 DIAGNOSIS — I82.532 CHRONIC DEEP VEIN THROMBOSIS (DVT) OF LEFT POPLITEAL VEIN: ICD-10-CM

## 2022-12-14 DIAGNOSIS — Z86.711 HISTORY OF PULMONARY EMBOLUS (PE): Primary | ICD-10-CM

## 2022-12-14 PROCEDURE — 93793 PR ANTICOAGULANT MGMT FOR PT TAKING WARFARIN: ICD-10-PCS | Mod: ,,, | Performed by: PHARMACIST

## 2022-12-14 PROCEDURE — 93793 ANTICOAG MGMT PT WARFARIN: CPT | Mod: ,,, | Performed by: PHARMACIST

## 2022-12-15 ENCOUNTER — TELEPHONE (OUTPATIENT)
Dept: INTERNAL MEDICINE | Facility: CLINIC | Age: 77
End: 2022-12-15
Payer: MEDICARE

## 2022-12-15 DIAGNOSIS — E55.9 VITAMIN D DEFICIENCY: Primary | ICD-10-CM

## 2022-12-15 RX ORDER — ERGOCALCIFEROL 1.25 MG/1
50000 CAPSULE ORAL
Qty: 12 CAPSULE | Refills: 1 | Status: SHIPPED | OUTPATIENT
Start: 2022-12-15

## 2022-12-15 NOTE — PROGRESS NOTES
We have been trying to reach patient to question her on elevated INR. Left several messages for callback. If reached today, we will advise her to hold coumadin this evening.

## 2022-12-15 NOTE — TELEPHONE ENCOUNTER
Please advise patient that her vitamin D level is low - I have sent over medication to be taken weekly. This can contribute to her fatigue.     Additionally her A1c indicates prediabetes so she needs to work on diet and exercise to prevent this from becoming diabetes.

## 2022-12-16 NOTE — PROGRESS NOTES
We were able to reach patient today. INR is now 3 days old. Patient denies any changes in diet, medications, or health that would effect warfarin therapy.  Patient was re-educated on situations that would require placing a call to the coumadin clinic, including bleeding or unusual bruising issues, changes in health, diet or medications, upcoming procedures that require warfarin interruption, and missed coumadin dose(s). Patient expressed understanding that avoidance of consistency with these parameters could cause fluctuations in INR, leading to more frequent visits and increase risk of adverse events.        Alert and oriented to person, place and time. Normal mood and affect, no apparent risk to self or others

## 2022-12-22 NOTE — TELEPHONE ENCOUNTER
Pt notified of lab results via portal since unable to reach over the phone. Informed pt Vit D level low and script sent to the pharmacy. And A1c indicates prediabetes and to incorporate diet and exercise.

## 2022-12-31 ENCOUNTER — EXTERNAL CHRONIC CARE MANAGEMENT (OUTPATIENT)
Dept: PRIMARY CARE CLINIC | Facility: CLINIC | Age: 77
End: 2022-12-31
Payer: MEDICARE

## 2022-12-31 PROCEDURE — 99490 PR CHRONIC CARE MGMT, 1ST 20 MIN: ICD-10-PCS | Mod: S$PBB,,, | Performed by: INTERNAL MEDICINE

## 2022-12-31 PROCEDURE — 99490 CHRNC CARE MGMT STAFF 1ST 20: CPT | Mod: PBBFAC,PO | Performed by: INTERNAL MEDICINE

## 2022-12-31 PROCEDURE — 99490 CHRNC CARE MGMT STAFF 1ST 20: CPT | Mod: S$PBB,,, | Performed by: INTERNAL MEDICINE

## 2023-01-04 ENCOUNTER — ANTI-COAG VISIT (OUTPATIENT)
Dept: CARDIOLOGY | Facility: CLINIC | Age: 78
End: 2023-01-04
Payer: MEDICARE

## 2023-01-04 DIAGNOSIS — Z86.711 HISTORY OF PULMONARY EMBOLUS (PE): Primary | ICD-10-CM

## 2023-01-04 DIAGNOSIS — Z79.01 CHRONIC ANTICOAGULATION: ICD-10-CM

## 2023-01-04 DIAGNOSIS — I27.82 CHRONIC PULMONARY EMBOLISM WITHOUT ACUTE COR PULMONALE, UNSPECIFIED PULMONARY EMBOLISM TYPE: ICD-10-CM

## 2023-01-04 DIAGNOSIS — I82.532 CHRONIC DEEP VEIN THROMBOSIS (DVT) OF LEFT POPLITEAL VEIN: ICD-10-CM

## 2023-01-04 PROCEDURE — 93793 PR ANTICOAGULANT MGMT FOR PT TAKING WARFARIN: ICD-10-PCS | Mod: ,,, | Performed by: PHARMACIST

## 2023-01-04 PROCEDURE — 93793 ANTICOAG MGMT PT WARFARIN: CPT | Mod: ,,, | Performed by: PHARMACIST

## 2023-01-04 NOTE — PROGRESS NOTES
Patient questioned regarding elevated INR. Improper warfarin doses taken. Patient took 1mg of warfarin on 12/31; the correct dose was 0.5mg. Patient's dose is 0.5mg of warfarin every Saturday, and 1mg all other days. She reported a dose of 1 mg daily. In addition, the patient had a respiratory illness last week and took Robitussin x 2 doses, as well as  ASA x doses to mitigate symptoms. Patient denied any other changes at this time, and endorsed bruising on her hands. Patient advised to report to the ED should bruising worsen or for other s/sx of bleeding. INR not at goal. Medications, chart, and patient findings reviewed. See calendar for adjustments to dose and follow up plan.  Patient was re-educated on situations that would require placing a call to the coumadin clinic, including bleeding or unusual bruising issues, changes in health, diet or medications, upcoming procedures that require warfarin interruption, and missed coumadin dose(s). Patient expressed understanding that avoidance of consistency with these parameters could cause fluctuations in INR, leading to more frequent visits and increase risk of adverse events. Repeat INR 1/9/23

## 2023-01-09 ENCOUNTER — ANTI-COAG VISIT (OUTPATIENT)
Dept: CARDIOLOGY | Facility: CLINIC | Age: 78
End: 2023-01-09
Payer: MEDICARE

## 2023-01-09 ENCOUNTER — TELEPHONE (OUTPATIENT)
Dept: INTERNAL MEDICINE | Facility: CLINIC | Age: 78
End: 2023-01-09
Payer: MEDICARE

## 2023-01-09 DIAGNOSIS — Z79.01 CHRONIC ANTICOAGULATION: ICD-10-CM

## 2023-01-09 DIAGNOSIS — Z86.711 HISTORY OF PULMONARY EMBOLUS (PE): Primary | ICD-10-CM

## 2023-01-09 DIAGNOSIS — I27.82 CHRONIC PULMONARY EMBOLISM WITHOUT ACUTE COR PULMONALE, UNSPECIFIED PULMONARY EMBOLISM TYPE: ICD-10-CM

## 2023-01-09 DIAGNOSIS — I82.532 CHRONIC DEEP VEIN THROMBOSIS (DVT) OF LEFT POPLITEAL VEIN: ICD-10-CM

## 2023-01-09 PROCEDURE — 93793 PR ANTICOAGULANT MGMT FOR PT TAKING WARFARIN: ICD-10-PCS | Mod: ,,, | Performed by: PHARMACIST

## 2023-01-09 PROCEDURE — 93793 ANTICOAG MGMT PT WARFARIN: CPT | Mod: ,,, | Performed by: PHARMACIST

## 2023-01-09 NOTE — PROGRESS NOTES
Pt confirmed correct warfarin dose per calendar. She reports bruising to bilateral hands but seems to be improving. She reports use of PRN buspirone 1-3x/day since last week. Pt denies any other changes. Despite consecutive days of holding coumadin and restarting at a lower dose,  INR not decreasing as desired. No bleeding issues reported. We will hold coumadin x 2 days, ask patient to ear a serving of dark greens 1/9 and 1/10 with repeat  INR 1/11.

## 2023-01-09 NOTE — TELEPHONE ENCOUNTER
----- Message from Roseanne Martin sent at 1/9/2023 12:45 PM CST -----  Contact: [phone] Lizzie with [facility]  Lizzie is requesting a call back about a critical lab value. Please call and advise.

## 2023-01-12 DIAGNOSIS — I10 ESSENTIAL HYPERTENSION: ICD-10-CM

## 2023-01-12 DIAGNOSIS — I27.20 PULMONARY HYPERTENSION: ICD-10-CM

## 2023-01-12 DIAGNOSIS — I50.32 CHRONIC DIASTOLIC HEART FAILURE: Primary | ICD-10-CM

## 2023-01-12 NOTE — PROGRESS NOTES
Called pt to get her back into lab for a redraw asap. [place] [facility] did collect her INR yesterday 1/10 but lab had an error within their system that had them unable to run the lab. Pt needs to be redrawn. Left pt a  so we can get this scheduled.

## 2023-01-13 ENCOUNTER — ANTI-COAG VISIT (OUTPATIENT)
Dept: CARDIOLOGY | Facility: CLINIC | Age: 78
End: 2023-01-13
Payer: MEDICARE

## 2023-01-13 DIAGNOSIS — I27.82 CHRONIC PULMONARY EMBOLISM WITHOUT ACUTE COR PULMONALE, UNSPECIFIED PULMONARY EMBOLISM TYPE: ICD-10-CM

## 2023-01-13 DIAGNOSIS — Z79.01 CHRONIC ANTICOAGULATION: ICD-10-CM

## 2023-01-13 DIAGNOSIS — Z86.711 HISTORY OF PULMONARY EMBOLUS (PE): Primary | ICD-10-CM

## 2023-01-13 DIAGNOSIS — I82.532 CHRONIC DEEP VEIN THROMBOSIS (DVT) OF LEFT POPLITEAL VEIN: ICD-10-CM

## 2023-01-13 PROCEDURE — 93793 ANTICOAG MGMT PT WARFARIN: CPT | Mod: ,,, | Performed by: PHARMACIST

## 2023-01-13 PROCEDURE — 93793 PR ANTICOAGULANT MGMT FOR PT TAKING WARFARIN: ICD-10-PCS | Mod: ,,, | Performed by: PHARMACIST

## 2023-01-13 NOTE — PROGRESS NOTES
1/13 Pts INR draw was messed up by [facility] lab on 1/11 was r/s to 1/12 missed her lab appt INR was 5.7 on 1/9. Pt is going to lab today 1/13 to get her INR drawn. States she took 1mg on 1/11 and 1mg on 1/12. Nadja ChavesD notified of this progress.

## 2023-01-24 NOTE — PROGRESS NOTES
Pt called about her missed INR from 1/17 stated she can make it today 1/24 or tomorrow 1/25 I r/s pt INR to today 1/24.

## 2023-01-26 ENCOUNTER — OFFICE VISIT (OUTPATIENT)
Dept: NEUROLOGY | Facility: CLINIC | Age: 78
End: 2023-01-26
Payer: MEDICARE

## 2023-01-26 VITALS
WEIGHT: 237.88 LBS | DIASTOLIC BLOOD PRESSURE: 74 MMHG | HEIGHT: 63 IN | HEART RATE: 92 BPM | SYSTOLIC BLOOD PRESSURE: 184 MMHG | BODY MASS INDEX: 42.15 KG/M2

## 2023-01-26 DIAGNOSIS — R41.89 COGNITIVE IMPAIRMENT: Primary | ICD-10-CM

## 2023-01-26 DIAGNOSIS — F32.A DEPRESSION, UNSPECIFIED DEPRESSION TYPE: ICD-10-CM

## 2023-01-26 DIAGNOSIS — R41.3 MEMORY CHANGES: ICD-10-CM

## 2023-01-26 PROBLEM — F33.0 MILD EPISODE OF RECURRENT MAJOR DEPRESSIVE DISORDER: Status: RESOLVED | Noted: 2018-06-14 | Resolved: 2023-01-26

## 2023-01-26 PROCEDURE — 99213 OFFICE O/P EST LOW 20 MIN: CPT | Mod: PBBFAC | Performed by: STUDENT IN AN ORGANIZED HEALTH CARE EDUCATION/TRAINING PROGRAM

## 2023-01-26 PROCEDURE — 99999 PR PBB SHADOW E&M-EST. PATIENT-LVL III: ICD-10-PCS | Mod: PBBFAC,,, | Performed by: STUDENT IN AN ORGANIZED HEALTH CARE EDUCATION/TRAINING PROGRAM

## 2023-01-26 PROCEDURE — 99204 OFFICE O/P NEW MOD 45 MIN: CPT | Mod: S$PBB,,, | Performed by: STUDENT IN AN ORGANIZED HEALTH CARE EDUCATION/TRAINING PROGRAM

## 2023-01-26 PROCEDURE — 99999 PR PBB SHADOW E&M-EST. PATIENT-LVL III: CPT | Mod: PBBFAC,,, | Performed by: STUDENT IN AN ORGANIZED HEALTH CARE EDUCATION/TRAINING PROGRAM

## 2023-01-26 PROCEDURE — 99204 PR OFFICE/OUTPT VISIT, NEW, LEVL IV, 45-59 MIN: ICD-10-PCS | Mod: S$PBB,,, | Performed by: STUDENT IN AN ORGANIZED HEALTH CARE EDUCATION/TRAINING PROGRAM

## 2023-01-26 RX ORDER — MULTIVITAMIN
1 TABLET ORAL DAILY
Qty: 30 TABLET | Refills: 5 | Status: SHIPPED | OUTPATIENT
Start: 2023-01-26 | End: 2023-02-10

## 2023-01-26 NOTE — PROGRESS NOTES
Chief Complaint and Duration     Chief Complaint   Patient presents with    Memory Loss   Worsened past 2-3 years since son passed away    History of Present Illness      Coby Atkinson is a 77 y.o. female brought in by her  for evaluation and treatment of cognitive problems. She is accompanied by patient and . She takes care of her own ADLs, dresses and bathes herself, cooks, drives without getting lost. Both patient and her  endorse problems w short term memory, concentration, and immediate recall. Long term memory intact. States that she has had a rough few years - her mother passed away, and then her brother and her son.     On warfarin for hx of blood clots. Denies any tremors, no audio or visual hallucinations.     Functional Assessment:   Activities of Daily Living (ADLs):    She is independent in the following: ambulation, bathing and hygiene, feeding, continence, grooming, toileting, and dressing    Review of Systems: (positive bolded)  Constitutional: Negative for fatigue, activity change, fevers, or chills.   HENT:  Negative for new visual disturbances or difficulty swallowing.    Respiratory:  Negative for shortness of breath.    Cardiovascular:  Negative for palpitations.   Gastrointestinal:  Negative for constipation, nausea, or vomiting.   Endocrine: Negative for temperature instability/intolerance.   Genitourinary:  Negative for difficulty urinating.   Musculoskeletal:  Negative for neck pain, back pain, myalgias, joint swelling, or gait problem.  Skin:  Negative for rash or lesions.   Neurological:  Negative for seizures, headaches, dizziness, tremors, syncope, speech difficulty, weakness, light-headedness, or numbness.   Hematological:  Does not bruise/bleed easily.   Psychiatric/Behavioral: Negative for decreased concentration or sleep disturbance. Negative for hallucinations.     Review of patient's allergies indicates:   Allergen Reactions    Codeine      Ciprofloxacin Rash     Current Outpatient Medications   Medication Sig Dispense Refill    amlodipine-valsartan (EXFORGE)  mg per tablet Take 1 tablet by mouth once daily. 90 tablet 3    bisoprolol (ZEBETA) 10 MG tablet Take 1 tablet (10 mg total) by mouth once daily. 90 tablet 3    busPIRone (BUSPAR) 10 MG tablet Take 1 tablet (10 mg total) by mouth 3 (three) times daily as needed (anxiety). 90 tablet 2    doxazosin (CARDURA) 8 MG Tab Take 8 mg by mouth.      ergocalciferol (ERGOCALCIFEROL) 50,000 unit Cap Take 1 capsule (50,000 Units total) by mouth every 7 days. 12 capsule 1    EScitalopram oxalate (LEXAPRO) 10 MG tablet Take 1 tablet (10 mg total) by mouth every evening. 90 tablet 3    furosemide (LASIX) 20 MG tablet Take 2 tablets (40 mg total) by mouth once daily. 180 tablet 3    meloxicam (MOBIC) 15 MG tablet Take 15 mg by mouth once daily.      multivitamin (THERAGRAN) per tablet Take 1 tablet by mouth once daily. 30 tablet 5    potassium chloride (MICRO-K) 10 MEQ CpSR Take 1 capsule (10 mEq total) by mouth once daily. 90 capsule 3    spironolactone (ALDACTONE) 25 MG tablet Take 1 tablet (25 mg total) by mouth once daily. 90 tablet 3    topiramate (TOPAMAX) 25 MG tablet Take 1 tablet (25 mg total) by mouth 2 (two) times daily. 180 tablet 0    warfarin (COUMADIN) 1 MG tablet TAKE 1 AND 1/2 TO 2 TABLETS(1.5 TO 2 MG) BY MOUTH DAILY AS DIRECTED BY COUMADIN CLINIC 60 tablet 5     No current facility-administered medications for this visit.       Medical History     Past Medical History:   Diagnosis Date    Anticoagulant long-term use     Cataract     Chronic diarrhea     Depression     Edema     GERD (gastroesophageal reflux disease)     Hypertension 10/2/2012    Osteoarthritis of both knees 8/9/2016    Osteopenia     Osteopenia 11/30/2017    PE (pulmonary thromboembolism) 12/03/2017    Primary localized osteoarthrosis, lower leg 12/3/2014    Sleep apnea 2016    Thalassemia minor      Past Surgical History:    Procedure Laterality Date    APPENDECTOMY      CATARACT EXTRACTION      COLONOSCOPY N/A 11/5/2016    Procedure: COLONOSCOPY;  Surgeon: Tanner Simental MD;  Location: [facility] ([address] [place] [address]);  Service: Endoscopy;  Laterality: N/A;    HYSTERECTOMY  age 39    fibroids    OOPHORECTOMY      TONSILLECTOMY       Family History   Problem Relation Age of Onset    Hypertension Mother     Cancer Father         skin    Cancer Brother         pancreatic    No Known Problems Sister     No Known Problems Maternal Aunt     No Known Problems Maternal Uncle     No Known Problems Paternal Aunt     No Known Problems Paternal Uncle     Coronary artery disease Maternal Grandmother     Heart failure Maternal Grandmother     No Known Problems Maternal Grandfather     Stroke Paternal Grandmother     Coronary artery disease Paternal Grandmother     No Known Problems Paternal Grandfather     Coronary artery disease Brother         with CABG in his 30s    Amblyopia Neg Hx     Blindness Neg Hx     Cataracts Neg Hx     Diabetes Neg Hx     Glaucoma Neg Hx     Macular degeneration Neg Hx     Retinal detachment Neg Hx     Strabismus Neg Hx     Thyroid disease Neg Hx     Colon cancer Neg Hx     Stomach cancer Neg Hx     Esophageal cancer Neg Hx      Social History     Socioeconomic History    Marital status:     Number of children: 1   Occupational History    Occupation: Retired from [employer]    Tobacco Use    Smoking status: Never     Passive exposure: Never    Smokeless tobacco: Never   Substance and Sexual Activity    Alcohol use: No     Comment: rare    Drug use: No    Sexual activity: Yes     Partners: Male     Birth control/protection: None     Social Determinants of Health     Financial Resource Strain: Medium Risk    Difficulty of Paying Living Expenses: Somewhat hard   Food Insecurity: No Food Insecurity    Worried About Running Out of Food in the Last Year: Never true    Ran Out of Food in the Last Year: Never  "true   Transportation Needs: No Transportation Needs    Lack of Transportation (Medical): No    Lack of Transportation (Non-Medical): No   Stress: Stress Concern Present    Feeling of Stress : Very much   Social Connections: Unknown    Frequency of Communication with Friends and Family: Three times a week    Frequency of Social Gatherings with Friends and Family: Three times a week    Marital Status:    Housing Stability: Unknown    Unable to Pay for Housing in the Last Year: No    Unstable Housing in the Last Year: No       Exam     Vitals:    01/26/23 0737   BP: (!) 184/74   Pulse: 92      Physical Exam:  General: She is not in acute distress. She is not ill-appearing.  Tearful on exam  HENT: Normocephalic and atraumatic. Moist mucous membranes.  Eyes: Conjunctivae normal.   Pulmonary: Pulmonary effort is normal.   Abdominal: Abdomen is soft and flat.   Skin: Skin is warm and dry. No rashes.   Psychiatric: Mood normal.        Neurologic Exam   Mental status: oriented to person, place, and time  Attention: Normal. Concentration: normal.able to spell "world" backwards and do serial 7s  Speech: speech is normal.  Cranial Nerves: PERRL, EOMI intact, V1-V3 Facial sensation intact. Symmetric facies. Hearing grossly intact. Palate and uvula midline, symmetric. No tongue deviation. Trapezius strength intact.     Motor exam: bulk and tone normal. Strength 5/5 in bilateral upper extremities: deltoids, biceps, triceps, wrist flexion/extension, finger abduction/adduction. Strength 5/5 in bilateral lower extremities: hip flexion/extension, thigh adduction/abduction, knee flexion/extension, dorsiflexion/plantarflexion, foot eversion/inversion.    Reflexes: 2+ in bilateral upper extremities: biceps and brachiaradialis, 1+ in bilateral lower extremities: patellar    Sensory exam: light touch intact    Gait exam: uses a walker 2/2 to significant knee pain, otherwise intact  Romberg: negative  Coordination: normal    Tremor: " none    Labs and Imaging     Labs: reviewed  A1C 5.8, TSH wnl, B12 328      Assessment and Plan     Problem List Items Addressed This Visit          Neuro    Cognitive impairment - Primary    Relevant Medications    multivitamin (THERAGRAN) per tablet    Other Relevant Orders    Ambulatory consult to Psychology       Psychiatric    Depression    Relevant Orders    Ambulatory consult to Psychology     This is a 77 year old female w hx of HTN, blood clots (on warfarin). Here with cognitive changes 2/2 to untreated depression. Patient able to take care of her own ADLs, impaired w short term memory.   Continue followup w PCP for depression - on lexapro and buspirone  Placed order for psychology for therapy  Ordered daily MTV for patient  Discussed vascular risk factors w patient and  - patient hypertensive today to 184/74 - denies any acute vision changes, no chest pain or palpitations     Follow-up: PRN

## 2023-01-31 ENCOUNTER — EXTERNAL CHRONIC CARE MANAGEMENT (OUTPATIENT)
Dept: PRIMARY CARE CLINIC | Facility: CLINIC | Age: 78
End: 2023-01-31
Payer: MEDICARE

## 2023-01-31 PROCEDURE — 99490 CHRNC CARE MGMT STAFF 1ST 20: CPT | Mod: S$PBB,,, | Performed by: INTERNAL MEDICINE

## 2023-01-31 PROCEDURE — 99490 PR CHRONIC CARE MGMT, 1ST 20 MIN: ICD-10-PCS | Mod: S$PBB,,, | Performed by: INTERNAL MEDICINE

## 2023-01-31 PROCEDURE — 99490 CHRNC CARE MGMT STAFF 1ST 20: CPT | Mod: PBBFAC,PO | Performed by: INTERNAL MEDICINE

## 2023-02-02 ENCOUNTER — ANTI-COAG VISIT (OUTPATIENT)
Dept: CARDIOLOGY | Facility: CLINIC | Age: 78
End: 2023-02-02
Payer: MEDICARE

## 2023-02-02 DIAGNOSIS — I27.82 CHRONIC PULMONARY EMBOLISM WITHOUT ACUTE COR PULMONALE, UNSPECIFIED PULMONARY EMBOLISM TYPE: ICD-10-CM

## 2023-02-02 DIAGNOSIS — Z79.01 CHRONIC ANTICOAGULATION: ICD-10-CM

## 2023-02-02 DIAGNOSIS — I82.532 CHRONIC DEEP VEIN THROMBOSIS (DVT) OF LEFT POPLITEAL VEIN: ICD-10-CM

## 2023-02-02 DIAGNOSIS — Z86.711 HISTORY OF PULMONARY EMBOLUS (PE): Primary | ICD-10-CM

## 2023-02-02 PROCEDURE — 93793 PR ANTICOAGULANT MGMT FOR PT TAKING WARFARIN: ICD-10-PCS | Mod: ,,, | Performed by: PHARMACIST

## 2023-02-02 PROCEDURE — 93793 ANTICOAG MGMT PT WARFARIN: CPT | Mod: ,,, | Performed by: PHARMACIST

## 2023-02-10 ENCOUNTER — OFFICE VISIT (OUTPATIENT)
Dept: INTERNAL MEDICINE | Facility: CLINIC | Age: 78
End: 2023-02-10
Payer: MEDICARE

## 2023-02-10 ENCOUNTER — OFFICE VISIT (OUTPATIENT)
Dept: CARDIOLOGY | Facility: CLINIC | Age: 78
End: 2023-02-10
Payer: MEDICARE

## 2023-02-10 VITALS
TEMPERATURE: 96 F | BODY MASS INDEX: 42.28 KG/M2 | OXYGEN SATURATION: 98 % | WEIGHT: 238.63 LBS | HEART RATE: 65 BPM | SYSTOLIC BLOOD PRESSURE: 120 MMHG | DIASTOLIC BLOOD PRESSURE: 60 MMHG | RESPIRATION RATE: 18 BRPM | HEIGHT: 63 IN

## 2023-02-10 VITALS
WEIGHT: 238 LBS | SYSTOLIC BLOOD PRESSURE: 126 MMHG | HEIGHT: 63 IN | BODY MASS INDEX: 42.17 KG/M2 | DIASTOLIC BLOOD PRESSURE: 77 MMHG | HEART RATE: 69 BPM

## 2023-02-10 DIAGNOSIS — M17.0 PRIMARY OSTEOARTHRITIS OF BOTH KNEES: ICD-10-CM

## 2023-02-10 DIAGNOSIS — E66.2 OBESITY HYPOVENTILATION SYNDROME: ICD-10-CM

## 2023-02-10 DIAGNOSIS — I82.532 CHRONIC DEEP VEIN THROMBOSIS (DVT) OF LEFT POPLITEAL VEIN: ICD-10-CM

## 2023-02-10 DIAGNOSIS — I50.32 CHRONIC DIASTOLIC HEART FAILURE: Primary | ICD-10-CM

## 2023-02-10 DIAGNOSIS — G47.33 OBSTRUCTIVE SLEEP APNEA: ICD-10-CM

## 2023-02-10 DIAGNOSIS — F33.0 MILD EPISODE OF RECURRENT MAJOR DEPRESSIVE DISORDER: Primary | ICD-10-CM

## 2023-02-10 DIAGNOSIS — E66.01 MORBID OBESITY WITH BMI OF 40.0-44.9, ADULT: ICD-10-CM

## 2023-02-10 DIAGNOSIS — D56.3 THALASSEMIA MINOR: ICD-10-CM

## 2023-02-10 DIAGNOSIS — I27.82 CHRONIC PULMONARY EMBOLISM WITHOUT ACUTE COR PULMONALE, UNSPECIFIED PULMONARY EMBOLISM TYPE: ICD-10-CM

## 2023-02-10 DIAGNOSIS — R73.9 BLOOD GLUCOSE ELEVATED: ICD-10-CM

## 2023-02-10 DIAGNOSIS — I10 ESSENTIAL HYPERTENSION: ICD-10-CM

## 2023-02-10 DIAGNOSIS — I27.20 PULMONARY HYPERTENSION: ICD-10-CM

## 2023-02-10 DIAGNOSIS — Z79.01 CHRONIC ANTICOAGULATION: ICD-10-CM

## 2023-02-10 DIAGNOSIS — K21.9 GASTROESOPHAGEAL REFLUX DISEASE, UNSPECIFIED WHETHER ESOPHAGITIS PRESENT: ICD-10-CM

## 2023-02-10 DIAGNOSIS — Z00.00 ANNUAL PHYSICAL EXAM: ICD-10-CM

## 2023-02-10 DIAGNOSIS — E04.1 THYROID NODULE: ICD-10-CM

## 2023-02-10 DIAGNOSIS — I27.82 OTHER CHRONIC PULMONARY EMBOLISM WITHOUT ACUTE COR PULMONALE: ICD-10-CM

## 2023-02-10 DIAGNOSIS — I27.20 PULMONARY HYPERTENSION: Primary | ICD-10-CM

## 2023-02-10 DIAGNOSIS — I50.32 CHRONIC DIASTOLIC HEART FAILURE: ICD-10-CM

## 2023-02-10 PROCEDURE — G0008 ADMIN INFLUENZA VIRUS VAC: HCPCS | Mod: PBBFAC,PO

## 2023-02-10 PROCEDURE — 99999 PR PBB SHADOW E&M-EST. PATIENT-LVL III: CPT | Mod: PBBFAC,,, | Performed by: INTERNAL MEDICINE

## 2023-02-10 PROCEDURE — 99214 PR OFFICE/OUTPT VISIT, EST, LEVL IV, 30-39 MIN: ICD-10-PCS | Mod: S$PBB,,, | Performed by: INTERNAL MEDICINE

## 2023-02-10 PROCEDURE — 99999 PR PBB SHADOW E&M-EST. PATIENT-LVL IV: ICD-10-PCS | Mod: PBBFAC,,, | Performed by: INTERNAL MEDICINE

## 2023-02-10 PROCEDURE — 99999 PR PBB SHADOW E&M-EST. PATIENT-LVL III: ICD-10-PCS | Mod: PBBFAC,,, | Performed by: INTERNAL MEDICINE

## 2023-02-10 PROCEDURE — 99214 OFFICE O/P EST MOD 30 MIN: CPT | Mod: S$PBB,,, | Performed by: INTERNAL MEDICINE

## 2023-02-10 PROCEDURE — 99213 OFFICE O/P EST LOW 20 MIN: CPT | Mod: PBBFAC,27,PO,25 | Performed by: INTERNAL MEDICINE

## 2023-02-10 PROCEDURE — 99999 PR PBB SHADOW E&M-EST. PATIENT-LVL IV: CPT | Mod: PBBFAC,,, | Performed by: INTERNAL MEDICINE

## 2023-02-10 PROCEDURE — 99214 OFFICE O/P EST MOD 30 MIN: CPT | Mod: PBBFAC,PO | Performed by: INTERNAL MEDICINE

## 2023-02-10 RX ORDER — POTASSIUM CHLORIDE 750 MG/1
10 CAPSULE, EXTENDED RELEASE ORAL DAILY
Qty: 90 CAPSULE | Refills: 3 | Status: SHIPPED | OUTPATIENT
Start: 2023-02-10

## 2023-02-10 RX ORDER — ESCITALOPRAM OXALATE 20 MG/1
20 TABLET ORAL DAILY
COMMUNITY

## 2023-02-10 NOTE — PROGRESS NOTES
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 female who presents for follow-up of chronic diastolic heart failure  .  Accompanied by her  Cal.She feels generally well. She reports mild swelling in the left ankle. She does not report angina or shortness of breath with exertion.     She remains on warfarin for anticoagulation. INR is therapeutic but increased from 0.9 to 3.6 in 12/2022 to 6.2 in 1/2023. She does not report excessive bruising, nose bleeds, melena or bleeding from other sites.   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  On multiple antihypertensive meds.       Review of patient's allergies indicates:   Allergen Reactions    Codeine     Ciprofloxacin Rash        Current Outpatient Medications   Medication Sig    amlodipine-valsartan (EXFORGE)  mg per tablet Take 1 tablet by mouth once daily.    bisoprolol (ZEBETA) 10 MG tablet Take 1 tablet (10 mg total) by mouth once daily.    busPIRone (BUSPAR) 10 MG tablet Take 1 tablet (10 mg total) by mouth 3 (three) times daily as needed (anxiety).    doxazosin (CARDURA) 8 MG Tab Take 8 mg by mouth.    ergocalciferol (ERGOCALCIFEROL) 50,000 unit Cap Take 1 capsule (50,000 Units total) by mouth every 7 days.    EScitalopram oxalate (LEXAPRO) 10 MG tablet Take 1 tablet (10 mg total) by mouth every evening.    furosemide (LASIX) 20 MG tablet Take 2 tablets (40 mg total) by mouth once daily.    meloxicam (MOBIC) 15 MG tablet Take 15 mg by mouth once daily.    multivitamin (THERAGRAN) per tablet Take 1 tablet by mouth once daily.    potassium chloride (MICRO-K) 10 MEQ CpSR Take 1 capsule (10 mEq total) by mouth once daily.    spironolactone (ALDACTONE) 25 MG tablet Take 1 tablet (25 mg total) by mouth once daily.    topiramate (TOPAMAX) 25 MG tablet Take 1 tablet (25 mg total) by mouth  "2 (two) times daily.    warfarin (COUMADIN) 1 MG tablet TAKE 1 AND 1/2 TO 2 TABLETS(1.5 TO 2 MG) BY MOUTH DAILY AS DIRECTED BY COUMADIN CLINIC     No current facility-administered medications for this visit.       Social history:  Coby Atkinson  reports that she has never smoked. She has never been exposed to tobacco smoke. She has never used smokeless tobacco. She reports that she does not drink alcohol and does not use drugs.      Objective:   /77   Pulse 69   Ht 5' 3" (1.6 m)   Wt 108 kg (238 lb)   BMI 42.16 kg/m²    Physical Exam  Constitutional:       Appearance: Normal appearance. She is well-developed.   Neck:      Vascular: No JVD.   Cardiovascular:      Rate and Rhythm: Normal rate and regular rhythm.      Pulses:           Radial pulses are 2+ on the right side and 2+ on the left side.        Dorsalis pedis pulses are 1+ on the right side and 1+ on the left side.      Heart sounds: No murmur heard.  Pulmonary:      Breath sounds: No decreased breath sounds, wheezing or rales.   Chest:      Chest wall: There is no dullness to percussion.   Abdominal:      Palpations: Abdomen is soft. There is no hepatomegaly or splenomegaly.      Tenderness: There is no abdominal tenderness.   Musculoskeletal:      Right lower leg: No swelling. No edema.      Left lower leg: Swelling present. No edema.   Skin:     General: Skin is warm.      Findings: No bruising.      Nails: There is no clubbing.   Neurological:      Mental Status: She is alert and oriented to person, place, and time.   Psychiatric:         Speech: Speech normal.         Behavior: Behavior normal.           Lab Results   Component Value Date    CHOL 141 02/09/2023    HDL 62 02/09/2023    LDLCALC 64.0 02/09/2023    TRIG 75 02/09/2023    CHOLHDL 44.0 02/09/2023     Lab Results   Component Value Date    GLU 98 02/09/2023    CREATININE 0.9 02/09/2023    BUN 18 02/09/2023     02/09/2023    K 3.8 02/09/2023     02/09/2023    CO2 25 " 02/09/2023     Lab Results   Component Value Date    ALT 12 02/09/2023    AST 19 02/09/2023    ALKPHOS 79 02/09/2023    BILITOT 0.6 02/09/2023       Results for orders placed during the hospital encounter of 11/12/20    Echo Color Flow Doppler? Yes    Interpretation Summary  · The left ventricle is normal in size with normal systolic function. The estimated ejection fraction is 65%.  · Grade II diastolic dysfunction.  · Normal right ventricular size with normal right ventricular systolic function.  · Severe left atrial enlargement.  · Moderate right atrial enlargement.  · The estimated PA systolic pressure is 41 mmHg.  · There is pulmonary hypertension.  · Normal central venous pressure (3 mmHg).       No valid procedures specified.        Assessment and Plan:       ICD-10-CM ICD-9-CM   1. Chronic diastolic heart failure  I50.32 428.32   2. Chronic pulmonary embolism without acute cor pulmonale  I27.82 416.2   3. Pulmonary hypertension  I27.20 416.8   4. Obstructive sleep apnea  G47.33 327.23   5. Thalassemia minor  D56.3 282.46   6. Essential hypertension  I10 401.9   7. Chronic anticoagulation  Z79.01 V58.61        CHF has been stable. She rarely requires an extra furosemide. Salt restriction. Daily weights. Extra dose of diuretic if weight increases 2 or more lbs. in a 24 hr period, or 3-4 lbs. over a 3-4 day period. Instructed patient to contact our office if weight does not return to baseline. PA pressure was mildly elevated. Obtain an echo to reassess PA pressure.  Continue anticoagulation for secondary prevention of venous thromboembolism. Risk of s risk of bleeding with anticoagulation and duration of cessation prior to elective surgery discussed with patient. Also discussed the benefits and disadvantages of warfarin vs the newer direct oral anticoagulants.   On multiple antihypertensive meds. BP appears to be well controlled.     Orders placed during this encounter:     Chronic diastolic heart failure  -      Echo; Future; Expected date: 02/10/2024  -     Comprehensive Metabolic Panel; Future; Expected date: 02/11/2024    Chronic pulmonary embolism without acute cor pulmonale  -     Echo; Future; Expected date: 02/10/2024    Pulmonary hypertension  -     Echo; Future; Expected date: 02/10/2024    Obstructive sleep apnea    Thalassemia minor  -     CBC Without Differential; Future; Expected date: 02/10/2024    Essential hypertension    Chronic anticoagulation  -     CBC Without Differential; Future; Expected date: 02/10/2024         Follow up in about 1 year (around 2/10/2024).

## 2023-02-10 NOTE — PROGRESS NOTES
Subjective:       Patient ID: Coby Atkinson is a 77 y.o. female.    Chief Complaint: Follow-up (6 mo)    HPI  Pt with recent B/L PE's, hx of UGIB, HTN, Morbid Obesity, Depression, GERD, OA, pulmonary HTN of knee is here for 6 month f/u.  Review of Systems   Constitutional:  Negative for activity change, appetite change, chills, diaphoresis, fatigue, fever and unexpected weight change.   HENT:  Negative for postnasal drip, rhinorrhea, sinus pressure/congestion, sneezing, sore throat, trouble swallowing and voice change.    Respiratory:  Negative for cough, shortness of breath and wheezing.    Cardiovascular:  Negative for chest pain, palpitations and leg swelling.   Gastrointestinal:  Negative for abdominal pain, blood in stool, constipation, diarrhea, nausea and vomiting.   Genitourinary:  Negative for dysuria.   Musculoskeletal:  Positive for arthralgias. Negative for myalgias.   Integumentary:  Negative for rash and wound.   Allergic/Immunologic: Negative for environmental allergies and food allergies.   Hematological:  Negative for adenopathy. Does not bruise/bleed easily.   Psychiatric/Behavioral:  Positive for dysphoric mood. Negative for self-injury and suicidal ideas.        Objective:      Physical Exam  Constitutional:       General: She is not in acute distress.     Appearance: Normal appearance. She is well-developed. She is not diaphoretic.   HENT:      Head: Normocephalic and atraumatic.      Right Ear: External ear normal.      Left Ear: External ear normal.      Nose: Nose normal.      Mouth/Throat:      Pharynx: No oropharyngeal exudate.   Eyes:      General: No scleral icterus.        Right eye: No discharge.         Left eye: No discharge.      Conjunctiva/sclera: Conjunctivae normal.      Pupils: Pupils are equal, round, and reactive to light.   Neck:      Vascular: No JVD.   Cardiovascular:      Rate and Rhythm: Normal rate and regular rhythm.      Pulses: Normal pulses.      Heart sounds:  Normal heart sounds.   Pulmonary:      Effort: Pulmonary effort is normal. No respiratory distress.      Breath sounds: Normal breath sounds. No wheezing, rhonchi or rales.   Abdominal:      General: Bowel sounds are normal. There is no distension.      Palpations: Abdomen is soft.      Tenderness: There is no abdominal tenderness. There is no guarding or rebound.   Musculoskeletal:      Cervical back: Neck supple.      Right lower leg: No edema.      Left lower leg: No edema.   Lymphadenopathy:      Cervical: No cervical adenopathy.   Skin:     General: Skin is warm and dry.      Coloration: Skin is not pale.      Findings: No rash.   Neurological:      General: No focal deficit present.      Mental Status: She is alert and oriented to person, place, and time.      Gait: Gait normal.   Psychiatric:         Behavior: Behavior normal.         Thought Content: Thought content normal.       Assessment:       Problem List Items Addressed This Visit          Psychiatric    Depression - Primary    Relevant Orders    Ambulatory referral/consult to Psychology       Pulmonary    Pulmonary hypertension       Cardiac/Vascular    Chronic diastolic heart failure       Hematology    Chronic pulmonary embolism without acute cor pulmonale    Chronic deep vein thrombosis (DVT) of left popliteal vein       Oncology    Thalassemia minor       Endocrine    Morbid obesity with BMI of 40.0-44.9, adult       GI    Gastroesophageal reflux disease       Orthopedic    Primary osteoarthritis of both knees       Other    Obesity hypoventilation syndrome       Plan:    Recurrent PE/DVT- continue coumadin       Chronic diastolic heart failure- stable      HTN- on Doxazosin/Exforge      Morbid Obesity- pt followed by Bariatric Medicine      Depression- fair control on Lexapro 20 mg daily and Buspar 10 mg TID PRN   -referral to Psychology      GERD- controlled on Protonix daily      OA of knees- Tylenol PRN    -seeing Ortho, getting steroid joint  injections      CKD III- stable      Pulmonary HTN- stable, will follow       ADRIÁN- stable on CPAP       Thalassemia minor      hx of UGIB     F/u in 6 months for annual exam

## 2023-02-10 NOTE — PATIENT INSTRUCTIONS
"Xarelto - $55  Eliquis - ? $    Extra dose of Lasix (furosemide) if weight increases 2 or more lbs. in a 24 hr period, or 3-4 lbs. over a 3-4 day period. Contact our office if weight does not return to baseline within 1-2 days.  Foods labeled Hint of Sodium" will contain less sodium than foods carrying the label "Low Sodium".   "

## 2023-02-15 ENCOUNTER — ANTI-COAG VISIT (OUTPATIENT)
Dept: CARDIOLOGY | Facility: CLINIC | Age: 78
End: 2023-02-15
Payer: MEDICARE

## 2023-02-15 DIAGNOSIS — Z79.01 CHRONIC ANTICOAGULATION: ICD-10-CM

## 2023-02-15 DIAGNOSIS — Z86.711 HISTORY OF PULMONARY EMBOLUS (PE): Primary | ICD-10-CM

## 2023-02-15 DIAGNOSIS — I27.82 CHRONIC PULMONARY EMBOLISM WITHOUT ACUTE COR PULMONALE, UNSPECIFIED PULMONARY EMBOLISM TYPE: ICD-10-CM

## 2023-02-15 DIAGNOSIS — I82.532 CHRONIC DEEP VEIN THROMBOSIS (DVT) OF LEFT POPLITEAL VEIN: ICD-10-CM

## 2023-02-15 PROCEDURE — 93793 PR ANTICOAGULANT MGMT FOR PT TAKING WARFARIN: ICD-10-PCS | Mod: ,,, | Performed by: PHARMACIST

## 2023-02-15 PROCEDURE — 93793 ANTICOAG MGMT PT WARFARIN: CPT | Mod: ,,, | Performed by: PHARMACIST

## 2023-02-22 ENCOUNTER — PATIENT MESSAGE (OUTPATIENT)
Dept: BARIATRICS | Facility: CLINIC | Age: 78
End: 2023-02-22
Payer: MEDICARE

## 2023-02-28 ENCOUNTER — EXTERNAL CHRONIC CARE MANAGEMENT (OUTPATIENT)
Dept: PRIMARY CARE CLINIC | Facility: CLINIC | Age: 78
End: 2023-02-28
Payer: MEDICARE

## 2023-02-28 PROCEDURE — 99490 CHRNC CARE MGMT STAFF 1ST 20: CPT | Mod: PBBFAC,PO | Performed by: INTERNAL MEDICINE

## 2023-02-28 PROCEDURE — 99490 PR CHRONIC CARE MGMT, 1ST 20 MIN: ICD-10-PCS | Mod: S$PBB,,, | Performed by: INTERNAL MEDICINE

## 2023-02-28 PROCEDURE — 99490 CHRNC CARE MGMT STAFF 1ST 20: CPT | Mod: S$PBB,,, | Performed by: INTERNAL MEDICINE

## 2023-03-02 ENCOUNTER — ANTI-COAG VISIT (OUTPATIENT)
Dept: CARDIOLOGY | Facility: CLINIC | Age: 78
End: 2023-03-02
Payer: MEDICARE

## 2023-03-02 DIAGNOSIS — I27.82 CHRONIC PULMONARY EMBOLISM WITHOUT ACUTE COR PULMONALE, UNSPECIFIED PULMONARY EMBOLISM TYPE: ICD-10-CM

## 2023-03-02 DIAGNOSIS — I82.532 CHRONIC DEEP VEIN THROMBOSIS (DVT) OF LEFT POPLITEAL VEIN: ICD-10-CM

## 2023-03-02 DIAGNOSIS — Z79.01 CHRONIC ANTICOAGULATION: ICD-10-CM

## 2023-03-02 DIAGNOSIS — Z86.711 HISTORY OF PULMONARY EMBOLUS (PE): Primary | ICD-10-CM

## 2023-03-02 PROCEDURE — 93793 ANTICOAG MGMT PT WARFARIN: CPT | Mod: ,,, | Performed by: PHARMACIST

## 2023-03-02 PROCEDURE — 93793 PR ANTICOAGULANT MGMT FOR PT TAKING WARFARIN: ICD-10-PCS | Mod: ,,, | Performed by: PHARMACIST

## 2023-03-28 ENCOUNTER — OFFICE VISIT (OUTPATIENT)
Dept: PRIMARY CARE CLINIC | Facility: CLINIC | Age: 78
End: 2023-03-28
Payer: MEDICARE

## 2023-03-28 VITALS
WEIGHT: 232.56 LBS | SYSTOLIC BLOOD PRESSURE: 126 MMHG | HEART RATE: 75 BPM | HEIGHT: 63 IN | BODY MASS INDEX: 41.21 KG/M2 | DIASTOLIC BLOOD PRESSURE: 72 MMHG | OXYGEN SATURATION: 99 %

## 2023-03-28 DIAGNOSIS — F33.41 RECURRENT MAJOR DEPRESSIVE DISORDER, IN PARTIAL REMISSION: ICD-10-CM

## 2023-03-28 DIAGNOSIS — A08.4 VIRAL GASTROENTERITIS: Primary | ICD-10-CM

## 2023-03-28 PROCEDURE — 99214 OFFICE O/P EST MOD 30 MIN: CPT | Mod: S$PBB,,, | Performed by: STUDENT IN AN ORGANIZED HEALTH CARE EDUCATION/TRAINING PROGRAM

## 2023-03-28 PROCEDURE — 99999 PR PBB SHADOW E&M-EST. PATIENT-LVL III: CPT | Mod: PBBFAC,,, | Performed by: STUDENT IN AN ORGANIZED HEALTH CARE EDUCATION/TRAINING PROGRAM

## 2023-03-28 PROCEDURE — 99999 PR PBB SHADOW E&M-EST. PATIENT-LVL III: ICD-10-PCS | Mod: PBBFAC,,, | Performed by: STUDENT IN AN ORGANIZED HEALTH CARE EDUCATION/TRAINING PROGRAM

## 2023-03-28 PROCEDURE — 99213 OFFICE O/P EST LOW 20 MIN: CPT | Mod: PBBFAC | Performed by: STUDENT IN AN ORGANIZED HEALTH CARE EDUCATION/TRAINING PROGRAM

## 2023-03-28 PROCEDURE — 99214 PR OFFICE/OUTPT VISIT, EST, LEVL IV, 30-39 MIN: ICD-10-PCS | Mod: S$PBB,,, | Performed by: STUDENT IN AN ORGANIZED HEALTH CARE EDUCATION/TRAINING PROGRAM

## 2023-03-28 RX ORDER — ONDANSETRON 4 MG/1
4 TABLET, ORALLY DISINTEGRATING ORAL EVERY 8 HOURS PRN
Qty: 30 TABLET | Refills: 0 | Status: SHIPPED | OUTPATIENT
Start: 2023-03-28 | End: 2023-09-27

## 2023-03-28 RX ORDER — SPIRONOLACTONE 25 MG/1
25 TABLET ORAL DAILY
COMMUNITY

## 2023-03-28 NOTE — PROGRESS NOTES
INTERNAL MEDICINE SAME DAY PRIMARY CARE VISIT NOTE    Subjective:     Chief Complaint: Nausea and Headache     Patient ID: Coby Atkinson is a 77 y.o. female , here today for focused same-day primary care visit.    Today, patient with complaint of nausea.    Symptoms of nausea for the past 1 week.  Associated with decrease in appetite and occasional loose bowels.  No vomiting.  No other recent illnesses.  No fevers.  No runny nose, sore throat.  No abdominal pain.  No blood in stool.    History of depression and anxiety:  On Lexapro 20 mg daily and BuSpar 10 mg daily.  Does have increased recent emotional stressors.  Feels symptoms currently help her symptoms but questions if her recent symptoms of nausea or related to worsening of her anxiety.    Past Medical History:  Past Medical History:   Diagnosis Date    Anticoagulant long-term use     Cataract     Chronic diarrhea     Depression     Edema     GERD (gastroesophageal reflux disease)     Hypertension 10/2/2012    Osteoarthritis of both knees 8/9/2016    Osteopenia     Osteopenia 11/30/2017    PE (pulmonary thromboembolism) 12/03/2017    Primary localized osteoarthrosis, lower leg 12/3/2014    Sleep apnea 2016    Thalassemia minor        Home Medications:  Prior to Admission medications    Medication Sig Start Date End Date Taking? Authorizing Provider   amlodipine-valsartan (EXFORGE)  mg per tablet Take 1 tablet by mouth once daily. 4/21/22  Yes Leanna Caceres NP   busPIRone (BUSPAR) 10 MG tablet Take 1 tablet (10 mg total) by mouth 3 (three) times daily as needed (anxiety). 11/23/22 11/23/23 Yes Mundo Hudson,    doxazosin (CARDURA) 8 MG Tab Take 8 mg by mouth once daily. 11/17/22  Yes Historical Provider   ergocalciferol (ERGOCALCIFEROL) 50,000 unit Cap Take 1 capsule (50,000 Units total) by mouth every 7 days. 12/15/22  Yes Criselda Pro NP   EScitalopram oxalate (LEXAPRO) 20 MG tablet Take 20 mg by mouth once daily.   Yes  "Historical Provider   furosemide (LASIX) 20 MG tablet Take 2 tablets (40 mg total) by mouth once daily. 11/29/22  Yes Juan Martinez MD   potassium chloride (MICRO-K) 10 MEQ CpSR Take 1 capsule (10 mEq total) by mouth once daily. 2/10/23  Yes Mundo Hudson DO   spironolactone (ALDACTONE) 25 MG tablet Take 25 mg by mouth once daily.   Yes Historical Provider   warfarin (COUMADIN) 1 MG tablet TAKE 1 AND 1/2 TO 2 TABLETS(1.5 TO 2 MG) BY MOUTH DAILY AS DIRECTED BY COUMADIN CLINIC  Patient taking differently: Take 0.5 mg by mouth Daily. Followed by Coumadin clinic 1/20/23  Yes Mundo Hudson,    ondansetron (ZOFRAN-ODT) 4 MG TbDL Take 1 tablet (4 mg total) by mouth every 8 (eight) hours as needed (nausea). 3/28/23   Eugenia John MD       Allergies:  Review of patient's allergies indicates:   Allergen Reactions    Codeine     Ciprofloxacin Rash       Social History:  Social History     Tobacco Use    Smoking status: Never     Passive exposure: Never    Smokeless tobacco: Never   Substance Use Topics    Alcohol use: No     Comment: rare    Drug use: No         Review of Systems   Constitutional:  Negative for diaphoresis, fatigue and fever.   HENT:  Negative for congestion, ear pain and voice change.    Eyes:  Negative for discharge, redness and itching.   Respiratory:  Negative for shortness of breath and wheezing.    Cardiovascular:  Negative for chest pain.   Gastrointestinal:  Positive for nausea. Negative for abdominal pain.   Musculoskeletal:  Negative for gait problem and joint swelling.   Skin:  Negative for color change, pallor, rash and wound.   Neurological:  Negative for weakness.         Objective:   /72 (BP Location: Right arm, Patient Position: Sitting, BP Method: Large (Manual))   Pulse 75   Ht 5' 3" (1.6 m)   Wt 105.5 kg (232 lb 9.4 oz)   SpO2 99%   BMI 41.20 kg/m²        General: AAO x3, no apparent distress  HEENT: PERRL, OP clear  CV: RRR, no m/r/g  Pulm: Lungs " CTAB, no crackles, no wheezes  Abd: s/NT/ND +BS  Extremities: no c/c/e    Labs:         Assessment/Plan     Coby was seen today for nausea and headache.    Diagnoses and all orders for this visit:    Viral gastroenteritis  -     ondansetron (ZOFRAN-ODT) 4 MG TbDL; Take 1 tablet (4 mg total) by mouth every 8 (eight) hours as needed (nausea).  Explained etiology of symptoms. Advised prn zofran for nausea. Hydrate with water or electrolyte replacement drink. Tylenol as needed for fevers. ED precautions given, patient verbalizes understanding. Advised following a BRAT diet as appetite improves.    Recurrent major depressive disorder, in partial remission  Lexapro 20 mg daily and BuSpar 10 t.i.d. p.r.n..  Stable on medications, continue regimen        RTC prn and with PCP as per routine.    Eugenia John MD  Department of Internal Medicine - [facility]

## 2023-03-31 ENCOUNTER — EXTERNAL CHRONIC CARE MANAGEMENT (OUTPATIENT)
Dept: PRIMARY CARE CLINIC | Facility: CLINIC | Age: 78
End: 2023-03-31
Payer: MEDICARE

## 2023-03-31 PROCEDURE — 99490 PR CHRONIC CARE MGMT, 1ST 20 MIN: ICD-10-PCS | Mod: S$PBB,,, | Performed by: INTERNAL MEDICINE

## 2023-03-31 PROCEDURE — 99490 CHRNC CARE MGMT STAFF 1ST 20: CPT | Mod: S$PBB,,, | Performed by: INTERNAL MEDICINE

## 2023-03-31 PROCEDURE — 99490 CHRNC CARE MGMT STAFF 1ST 20: CPT | Mod: PBBFAC,PO | Performed by: INTERNAL MEDICINE

## 2023-04-12 ENCOUNTER — ANTI-COAG VISIT (OUTPATIENT)
Dept: CARDIOLOGY | Facility: CLINIC | Age: 78
End: 2023-04-12
Payer: MEDICARE

## 2023-04-12 DIAGNOSIS — Z86.711 HISTORY OF PULMONARY EMBOLUS (PE): Primary | ICD-10-CM

## 2023-04-12 DIAGNOSIS — I82.532 CHRONIC DEEP VEIN THROMBOSIS (DVT) OF LEFT POPLITEAL VEIN: ICD-10-CM

## 2023-04-12 DIAGNOSIS — I27.82 CHRONIC PULMONARY EMBOLISM WITHOUT ACUTE COR PULMONALE, UNSPECIFIED PULMONARY EMBOLISM TYPE: ICD-10-CM

## 2023-04-12 DIAGNOSIS — Z79.01 CHRONIC ANTICOAGULATION: ICD-10-CM

## 2023-04-12 PROCEDURE — 93793 PR ANTICOAGULANT MGMT FOR PT TAKING WARFARIN: ICD-10-PCS | Mod: ,,, | Performed by: PHARMACIST

## 2023-04-12 PROCEDURE — 93793 ANTICOAG MGMT PT WARFARIN: CPT | Mod: ,,, | Performed by: PHARMACIST

## 2023-05-01 ENCOUNTER — TELEPHONE (OUTPATIENT)
Dept: INTERNAL MEDICINE | Facility: CLINIC | Age: 78
End: 2023-05-01
Payer: COMMERCIAL

## 2023-05-01 DIAGNOSIS — Z12.31 ENCOUNTER FOR SCREENING MAMMOGRAM FOR MALIGNANT NEOPLASM OF BREAST: Primary | ICD-10-CM

## 2023-05-01 NOTE — TELEPHONE ENCOUNTER
----- Message from Neyda Soto sent at 5/1/2023  9:01 AM CDT -----  Regarding: referral request  Name of Who is Calling:MYRNA [facility] [[id number]]          What is the request in detail: pt would like a mammo referral           Can the clinic reply by [facility]: no           What Number to Call Back if not in [facility]: [phone] (home)

## 2023-05-04 ENCOUNTER — ANTI-COAG VISIT (OUTPATIENT)
Dept: CARDIOLOGY | Facility: CLINIC | Age: 78
End: 2023-05-04
Payer: MEDICARE

## 2023-05-04 DIAGNOSIS — I82.532 CHRONIC DEEP VEIN THROMBOSIS (DVT) OF LEFT POPLITEAL VEIN: ICD-10-CM

## 2023-05-04 DIAGNOSIS — I27.82 CHRONIC PULMONARY EMBOLISM WITHOUT ACUTE COR PULMONALE, UNSPECIFIED PULMONARY EMBOLISM TYPE: ICD-10-CM

## 2023-05-04 DIAGNOSIS — Z86.711 HISTORY OF PULMONARY EMBOLUS (PE): Primary | ICD-10-CM

## 2023-05-04 DIAGNOSIS — Z79.01 CHRONIC ANTICOAGULATION: ICD-10-CM

## 2023-05-04 PROCEDURE — 93793 ANTICOAG MGMT PT WARFARIN: CPT | Mod: ,,, | Performed by: PHARMACIST

## 2023-05-04 PROCEDURE — 93793 PR ANTICOAGULANT MGMT FOR PT TAKING WARFARIN: ICD-10-PCS | Mod: ,,, | Performed by: PHARMACIST

## 2023-05-08 ENCOUNTER — HOSPITAL ENCOUNTER (OUTPATIENT)
Dept: RADIOLOGY | Facility: HOSPITAL | Age: 78
Discharge: HOME | End: 2023-05-08
Attending: INTERNAL MEDICINE
Payer: MEDICARE

## 2023-05-08 DIAGNOSIS — Z12.31 ENCOUNTER FOR SCREENING MAMMOGRAM FOR MALIGNANT NEOPLASM OF BREAST: ICD-10-CM

## 2023-05-08 PROCEDURE — 77063 BREAST TOMOSYNTHESIS BI: CPT | Mod: 26,,, | Performed by: RADIOLOGY

## 2023-05-08 PROCEDURE — 77067 MAMMO DIGITAL SCREENING BILAT WITH TOMO: ICD-10-PCS | Mod: 26,,, | Performed by: RADIOLOGY

## 2023-05-08 PROCEDURE — 77067 SCR MAMMO BI INCL CAD: CPT | Mod: 26,,, | Performed by: RADIOLOGY

## 2023-05-08 PROCEDURE — 77067 SCR MAMMO BI INCL CAD: CPT | Mod: TC

## 2023-05-08 PROCEDURE — 77063 MAMMO DIGITAL SCREENING BILAT WITH TOMO: ICD-10-PCS | Mod: 26,,, | Performed by: RADIOLOGY

## 2023-05-17 ENCOUNTER — ANTI-COAG VISIT (OUTPATIENT)
Dept: CARDIOLOGY | Facility: CLINIC | Age: 78
End: 2023-05-17
Payer: MEDICARE

## 2023-05-17 DIAGNOSIS — I27.82 CHRONIC PULMONARY EMBOLISM WITHOUT ACUTE COR PULMONALE, UNSPECIFIED PULMONARY EMBOLISM TYPE: ICD-10-CM

## 2023-05-17 DIAGNOSIS — Z79.01 CHRONIC ANTICOAGULATION: ICD-10-CM

## 2023-05-17 DIAGNOSIS — I82.532 CHRONIC DEEP VEIN THROMBOSIS (DVT) OF LEFT POPLITEAL VEIN: ICD-10-CM

## 2023-05-17 DIAGNOSIS — Z86.711 HISTORY OF PULMONARY EMBOLUS (PE): Primary | ICD-10-CM

## 2023-05-17 PROCEDURE — 93793 PR ANTICOAGULANT MGMT FOR PT TAKING WARFARIN: ICD-10-PCS | Mod: ,,, | Performed by: PHARMACIST

## 2023-05-17 PROCEDURE — 93793 ANTICOAG MGMT PT WARFARIN: CPT | Mod: ,,, | Performed by: PHARMACIST

## 2023-05-17 NOTE — PROGRESS NOTES
Patient states her feet are swollen and she confirms warfarin 0.5mg everyday and could not verify 5/4-6.  INR not at goal. Medications, chart, and patient findings reviewed. See calendar for adjustments to dose and follow up plan.

## 2023-05-30 ENCOUNTER — ANTI-COAG VISIT (OUTPATIENT)
Dept: CARDIOLOGY | Facility: CLINIC | Age: 78
End: 2023-05-30
Payer: MEDICARE

## 2023-05-30 DIAGNOSIS — I82.532 CHRONIC DEEP VEIN THROMBOSIS (DVT) OF LEFT POPLITEAL VEIN: ICD-10-CM

## 2023-05-30 DIAGNOSIS — Z86.711 HISTORY OF PULMONARY EMBOLUS (PE): Primary | ICD-10-CM

## 2023-05-30 DIAGNOSIS — I27.82 CHRONIC PULMONARY EMBOLISM WITHOUT ACUTE COR PULMONALE, UNSPECIFIED PULMONARY EMBOLISM TYPE: ICD-10-CM

## 2023-05-30 DIAGNOSIS — Z79.01 CHRONIC ANTICOAGULATION: ICD-10-CM

## 2023-05-30 PROCEDURE — 93793 PR ANTICOAGULANT MGMT FOR PT TAKING WARFARIN: ICD-10-PCS | Mod: ,,, | Performed by: PHARMACIST

## 2023-05-30 PROCEDURE — 93793 ANTICOAG MGMT PT WARFARIN: CPT | Mod: ,,, | Performed by: PHARMACIST

## 2023-05-30 NOTE — PROGRESS NOTES
Patient reports a current coumadin dose of 0.5mg daily.  INR not at goal. Medications, chart, and patient findings reviewed. See calendar for adjustments to dose and follow up plan.

## 2023-06-07 ENCOUNTER — ANTI-COAG VISIT (OUTPATIENT)
Dept: CARDIOLOGY | Facility: CLINIC | Age: 78
End: 2023-06-07
Payer: MEDICARE

## 2023-06-07 DIAGNOSIS — Z79.01 CHRONIC ANTICOAGULATION: ICD-10-CM

## 2023-06-07 DIAGNOSIS — I27.82 CHRONIC PULMONARY EMBOLISM WITHOUT ACUTE COR PULMONALE, UNSPECIFIED PULMONARY EMBOLISM TYPE: ICD-10-CM

## 2023-06-07 DIAGNOSIS — E55.9 VITAMIN D DEFICIENCY: ICD-10-CM

## 2023-06-07 DIAGNOSIS — Z86.711 HISTORY OF PULMONARY EMBOLUS (PE): Primary | ICD-10-CM

## 2023-06-07 DIAGNOSIS — I82.532 CHRONIC DEEP VEIN THROMBOSIS (DVT) OF LEFT POPLITEAL VEIN: ICD-10-CM

## 2023-06-07 PROCEDURE — 93793 ANTICOAG MGMT PT WARFARIN: CPT | Mod: ,,, | Performed by: PHARMACIST

## 2023-06-07 PROCEDURE — 93793 PR ANTICOAGULANT MGMT FOR PT TAKING WARFARIN: ICD-10-PCS | Mod: ,,, | Performed by: PHARMACIST

## 2023-06-08 RX ORDER — ERGOCALCIFEROL 1.25 MG/1
CAPSULE ORAL
Qty: 12 CAPSULE | Refills: 1 | OUTPATIENT
Start: 2023-06-08

## 2023-06-19 ENCOUNTER — ANTI-COAG VISIT (OUTPATIENT)
Dept: CARDIOLOGY | Facility: CLINIC | Age: 78
End: 2023-06-19
Payer: MEDICARE

## 2023-06-19 DIAGNOSIS — Z86.711 HISTORY OF PULMONARY EMBOLUS (PE): Primary | ICD-10-CM

## 2023-06-19 DIAGNOSIS — I27.82 CHRONIC PULMONARY EMBOLISM WITHOUT ACUTE COR PULMONALE, UNSPECIFIED PULMONARY EMBOLISM TYPE: ICD-10-CM

## 2023-06-19 DIAGNOSIS — Z79.01 CHRONIC ANTICOAGULATION: ICD-10-CM

## 2023-06-19 DIAGNOSIS — I82.532 CHRONIC DEEP VEIN THROMBOSIS (DVT) OF LEFT POPLITEAL VEIN: ICD-10-CM

## 2023-06-19 PROCEDURE — 93793 ANTICOAG MGMT PT WARFARIN: CPT | Mod: ,,, | Performed by: PHARMACIST

## 2023-06-19 PROCEDURE — 93793 PR ANTICOAGULANT MGMT FOR PT TAKING WARFARIN: ICD-10-PCS | Mod: ,,, | Performed by: PHARMACIST

## 2023-06-19 NOTE — PROGRESS NOTES
Patient reports incorrect weekly dose of coumadin: 0mg tue,1mg wed/thu/sat and 0.5mg all other day.  INR not at goal. Medications, chart, and patient findings reviewed. See calendar for adjustments to dose and follow up plan.

## 2023-06-22 DIAGNOSIS — I10 ESSENTIAL HYPERTENSION: ICD-10-CM

## 2023-06-22 RX ORDER — AMLODIPINE AND VALSARTAN 10; 320 MG/1; MG/1
1 TABLET ORAL DAILY
Qty: 90 TABLET | Refills: 3 | Status: SHIPPED | OUTPATIENT
Start: 2023-06-22

## 2023-06-22 NOTE — TELEPHONE ENCOUNTER
----- Message from Haylee Luna sent at 6/22/2023 10:52 AM CDT -----  Contact: [phone] @ patient  Requesting an RX refill or new RX.amlodipine-valsartan (EXFORGE)  mg per tablet  Is this a refill or new RX: refill  RX name and strength (copy/paste from chart):    Is this a 30 day or 90 day RX:   Pharmacy name and phone # [id number] - [place], [place] - [address] [place] [facility] [place] [address]  The doctors have asked that we provide their patients with the following 2 reminders -- prescription refills can take up to 72 hours, and a friendly reminder that in the future you can use your [facility] account to request refills:     NOTE: Patient is out of med

## 2023-06-22 NOTE — TELEPHONE ENCOUNTER
No care due was identified.  [facility] Embedded Care Due Messages. Reference number: [id number].   6/22/2023 11:04:24 AM CDT

## 2023-06-27 ENCOUNTER — ANTI-COAG VISIT (OUTPATIENT)
Dept: CARDIOLOGY | Facility: CLINIC | Age: 78
End: 2023-06-27
Payer: MEDICARE

## 2023-06-27 DIAGNOSIS — Z86.711 HISTORY OF PULMONARY EMBOLUS (PE): Primary | ICD-10-CM

## 2023-06-27 DIAGNOSIS — Z79.01 CHRONIC ANTICOAGULATION: ICD-10-CM

## 2023-06-27 DIAGNOSIS — I27.82 CHRONIC PULMONARY EMBOLISM WITHOUT ACUTE COR PULMONALE, UNSPECIFIED PULMONARY EMBOLISM TYPE: ICD-10-CM

## 2023-06-27 DIAGNOSIS — I82.532 CHRONIC DEEP VEIN THROMBOSIS (DVT) OF LEFT POPLITEAL VEIN: ICD-10-CM

## 2023-06-27 PROCEDURE — 93793 ANTICOAG MGMT PT WARFARIN: CPT | Mod: ,,, | Performed by: PHARMACIST

## 2023-06-27 PROCEDURE — 93793 PR ANTICOAGULANT MGMT FOR PT TAKING WARFARIN: ICD-10-PCS | Mod: ,,, | Performed by: PHARMACIST

## 2023-06-27 NOTE — PROGRESS NOTES
Patient has swelling in feet and confirms taking warfarin 1mg tue/thu/sat/sun, 0.5mg wed/fri and 1.5mg mon.  INR not at goal. Medications, chart, and patient findings reviewed. See calendar for adjustments to dose and follow up plan.

## 2023-07-10 ENCOUNTER — ANTI-COAG VISIT (OUTPATIENT)
Dept: CARDIOLOGY | Facility: CLINIC | Age: 78
End: 2023-07-10
Payer: MEDICARE

## 2023-07-10 DIAGNOSIS — I27.82 CHRONIC PULMONARY EMBOLISM WITHOUT ACUTE COR PULMONALE, UNSPECIFIED PULMONARY EMBOLISM TYPE: ICD-10-CM

## 2023-07-10 DIAGNOSIS — Z79.01 CHRONIC ANTICOAGULATION: ICD-10-CM

## 2023-07-10 DIAGNOSIS — Z86.711 HISTORY OF PULMONARY EMBOLUS (PE): Primary | ICD-10-CM

## 2023-07-10 DIAGNOSIS — I82.532 CHRONIC DEEP VEIN THROMBOSIS (DVT) OF LEFT POPLITEAL VEIN: ICD-10-CM

## 2023-07-10 PROCEDURE — 93793 PR ANTICOAGULANT MGMT FOR PT TAKING WARFARIN: ICD-10-PCS | Mod: ,,, | Performed by: PHARMACIST

## 2023-07-10 PROCEDURE — 93793 ANTICOAG MGMT PT WARFARIN: CPT | Mod: ,,, | Performed by: PHARMACIST

## 2023-07-10 NOTE — PROGRESS NOTES
Patient missed 2 doses of coumadin last week. INR not at goal. Medications, chart, and patient findings reviewed. See calendar for adjustments to dose and follow up plan.

## 2023-07-21 ENCOUNTER — PES CALL (OUTPATIENT)
Dept: ADMINISTRATIVE | Facility: CLINIC | Age: 78
End: 2023-07-21
Payer: COMMERCIAL

## 2023-07-31 ENCOUNTER — ANTI-COAG VISIT (OUTPATIENT)
Dept: CARDIOLOGY | Facility: CLINIC | Age: 78
End: 2023-07-31
Payer: MEDICARE

## 2023-07-31 DIAGNOSIS — Z86.711 HISTORY OF PULMONARY EMBOLUS (PE): Primary | ICD-10-CM

## 2023-07-31 DIAGNOSIS — I27.82 CHRONIC PULMONARY EMBOLISM WITHOUT ACUTE COR PULMONALE, UNSPECIFIED PULMONARY EMBOLISM TYPE: ICD-10-CM

## 2023-07-31 DIAGNOSIS — Z79.01 CHRONIC ANTICOAGULATION: ICD-10-CM

## 2023-07-31 DIAGNOSIS — I82.532 CHRONIC DEEP VEIN THROMBOSIS (DVT) OF LEFT POPLITEAL VEIN: ICD-10-CM

## 2023-07-31 PROCEDURE — 93793 ANTICOAG MGMT PT WARFARIN: CPT | Mod: ,,, | Performed by: PHARMACIST

## 2023-07-31 PROCEDURE — 93793 PR ANTICOAGULANT MGMT FOR PT TAKING WARFARIN: ICD-10-PCS | Mod: ,,, | Performed by: PHARMACIST

## 2023-07-31 NOTE — PROGRESS NOTES
[facility]    2023 3:10 PM    Assessment/Plan:    Patient presents today with subtherapeutic  INR.    Assessment of patient findings and chart review: INR not at goal. Medications, chart, and patient findings reviewed. See calendar for adjustments to dose and follow up plan.      Recommendation for patient's warfarin regimen: Boost dose today to 1.5mg then resume current maintenance dose    Recommend repeat INR in 1 week  _________________________________________________________________    Coby Atkinson (77 y.o.) is followed by the [facility].    Anticoagulation Summary  As of 2023      INR goal:  2.0-3.0   TTR:  34.1 % (5.6 y)   INR used for dosin.6 (2023)   Warfarin maintenance plan:  0.5 mg (1 mg x 0.5) every Sun, Fri; 1 mg (1 mg x 1) all other days   Weekly warfarin total:  6 mg   Plan last modified:  Josefina De Leon, PharmD (2023)   Next INR check:  2023   Target end date:      Indications    History of pulmonary embolus (PE)--2017 [Z86.711]  Chronic anticoagulation [Z79.01]  Chronic deep vein thrombosis (DVT) of left popliteal vein [I82.532]  Chronic pulmonary embolism without acute cor pulmonale [I27.82]                 Anticoagulation Episode Summary       INR check location:  Clinic Lab    Preferred lab:      Send INR reminders to:  [facility] COUMADIN MONITORING POOL    Comments:  [facility] // pt to write down dosing instructions & repeat back          Anticoagulation Care Providers       Provider Role Specialty Phone number    Mundo Hudson DO [facility] Internal Medicine [phone]            Patient Findings       Positives:  Missed doses, Change in diet/appetite, Other complaints    Negatives:  Signs/symptoms of thrombosis, Signs/symptoms of bleeding, Laboratory test error suspected, Change in health, Change in alcohol use, Change in  activity, Upcoming invasive procedure, Emergency department visit, Upcoming dental procedure, Extra doses, Change in medications, Hospital admission, Bruising    Comments:  Pt confirmed taking 0.5mg SAT & FRI; 1mg all other days; possibly missed one dose last week due to falling asleep  2-3 florets of broccoli once last & coleslaw 7/30; loose bowels lately   No other changes reports

## 2023-08-04 ENCOUNTER — PES CALL (OUTPATIENT)
Dept: ADMINISTRATIVE | Facility: CLINIC | Age: 78
End: 2023-08-04
Payer: COMMERCIAL

## 2023-08-08 ENCOUNTER — ANTI-COAG VISIT (OUTPATIENT)
Dept: CARDIOLOGY | Facility: CLINIC | Age: 78
End: 2023-08-08
Payer: MEDICARE

## 2023-08-08 DIAGNOSIS — I82.532 CHRONIC DEEP VEIN THROMBOSIS (DVT) OF LEFT POPLITEAL VEIN: ICD-10-CM

## 2023-08-08 DIAGNOSIS — Z86.711 HISTORY OF PULMONARY EMBOLUS (PE): Primary | ICD-10-CM

## 2023-08-08 DIAGNOSIS — Z79.01 CHRONIC ANTICOAGULATION: ICD-10-CM

## 2023-08-08 DIAGNOSIS — I27.82 CHRONIC PULMONARY EMBOLISM WITHOUT ACUTE COR PULMONALE, UNSPECIFIED PULMONARY EMBOLISM TYPE: ICD-10-CM

## 2023-08-08 PROCEDURE — 93793 ANTICOAG MGMT PT WARFARIN: CPT | Mod: ,,, | Performed by: PHARMACIST

## 2023-08-08 PROCEDURE — 93793 PR ANTICOAGULANT MGMT FOR PT TAKING WARFARIN: ICD-10-PCS | Mod: ,,, | Performed by: PHARMACIST

## 2023-08-08 NOTE — PROGRESS NOTES
Patient denies any changes in diet, medications, or health that would effect warfarin therapy.  INR increased rapidly since last week. We will lower her weekly coumadin dose slightly and keep close watch.      Sibling  Still living? Unknown  FH: type 2 diabetes, Age at diagnosis: Age Unknown     Father  Still living? Unknown  FHx: heart disease, Age at diagnosis: Age Unknown

## 2023-08-09 RX ORDER — BUSPIRONE HYDROCHLORIDE 10 MG/1
TABLET ORAL
Qty: 90 TABLET | Refills: 2 | Status: SHIPPED | OUTPATIENT
Start: 2023-08-09

## 2023-08-09 NOTE — TELEPHONE ENCOUNTER
Refill Routing Note     Refill Routing Note   Medication(s) are not appropriate for processing by [facility] for the following reason(s):      Medication outside of protocol    [facility] action(s):  Route Care Due:  None identified            Appointments  past 12m or future 3m with PCP    Date Provider   Last Visit   2/10/2023 Mundo Hudson, DO   Next Visit   8/10/2023 Mundo Hudson, DO   ED visits in past 90 days: 0        Note composed:8:45 AM 08/09/2023

## 2023-08-16 ENCOUNTER — ANTI-COAG VISIT (OUTPATIENT)
Dept: CARDIOLOGY | Facility: CLINIC | Age: 78
End: 2023-08-16
Payer: MEDICARE

## 2023-08-16 DIAGNOSIS — Z79.01 CHRONIC ANTICOAGULATION: ICD-10-CM

## 2023-08-16 DIAGNOSIS — Z86.711 HISTORY OF PULMONARY EMBOLUS (PE): Primary | ICD-10-CM

## 2023-08-16 DIAGNOSIS — I82.532 CHRONIC DEEP VEIN THROMBOSIS (DVT) OF LEFT POPLITEAL VEIN: ICD-10-CM

## 2023-08-16 DIAGNOSIS — I27.82 CHRONIC PULMONARY EMBOLISM WITHOUT ACUTE COR PULMONALE, UNSPECIFIED PULMONARY EMBOLISM TYPE: ICD-10-CM

## 2023-08-16 PROCEDURE — 93793 ANTICOAG MGMT PT WARFARIN: CPT | Mod: ,,, | Performed by: PHARMACIST

## 2023-08-16 PROCEDURE — 93793 PR ANTICOAGULANT MGMT FOR PT TAKING WARFARIN: ICD-10-PCS | Mod: ,,, | Performed by: PHARMACIST

## 2023-08-31 ENCOUNTER — ANTI-COAG VISIT (OUTPATIENT)
Dept: CARDIOLOGY | Facility: CLINIC | Age: 78
End: 2023-08-31
Payer: MEDICARE

## 2023-08-31 DIAGNOSIS — Z79.01 CHRONIC ANTICOAGULATION: ICD-10-CM

## 2023-08-31 DIAGNOSIS — Z86.711 HISTORY OF PULMONARY EMBOLUS (PE): Primary | ICD-10-CM

## 2023-08-31 DIAGNOSIS — I82.532 CHRONIC DEEP VEIN THROMBOSIS (DVT) OF LEFT POPLITEAL VEIN: ICD-10-CM

## 2023-08-31 DIAGNOSIS — I27.82 CHRONIC PULMONARY EMBOLISM WITHOUT ACUTE COR PULMONALE, UNSPECIFIED PULMONARY EMBOLISM TYPE: ICD-10-CM

## 2023-08-31 PROCEDURE — 93793 PR ANTICOAGULANT MGMT FOR PT TAKING WARFARIN: ICD-10-PCS | Mod: ,,, | Performed by: PHARMACIST

## 2023-08-31 PROCEDURE — 93793 ANTICOAG MGMT PT WARFARIN: CPT | Mod: ,,, | Performed by: PHARMACIST

## 2023-09-14 ENCOUNTER — ANTI-COAG VISIT (OUTPATIENT)
Dept: CARDIOLOGY | Facility: CLINIC | Age: 78
End: 2023-09-14
Payer: MEDICARE

## 2023-09-14 DIAGNOSIS — I82.532 CHRONIC DEEP VEIN THROMBOSIS (DVT) OF LEFT POPLITEAL VEIN: ICD-10-CM

## 2023-09-14 DIAGNOSIS — I27.82 CHRONIC PULMONARY EMBOLISM WITHOUT ACUTE COR PULMONALE, UNSPECIFIED PULMONARY EMBOLISM TYPE: ICD-10-CM

## 2023-09-14 DIAGNOSIS — Z79.01 CHRONIC ANTICOAGULATION: ICD-10-CM

## 2023-09-14 DIAGNOSIS — Z86.711 HISTORY OF PULMONARY EMBOLUS (PE): Primary | ICD-10-CM

## 2023-09-14 PROCEDURE — 93793 ANTICOAG MGMT PT WARFARIN: CPT | Mod: ,,, | Performed by: PHARMACIST

## 2023-09-14 PROCEDURE — 93793 PR ANTICOAGULANT MGMT FOR PT TAKING WARFARIN: ICD-10-PCS | Mod: ,,, | Performed by: PHARMACIST

## 2023-09-14 NOTE — PROGRESS NOTES
[facility]    09/14/2023 2:25 PM    Assessment/Plan:    Patient presents today with supratherapeutic INR.    Assessment of patient findings and chart review: Patient denies any changes in diet, medications, or health that would effect warfarin therapy. She could not verify dosing of warfarin since she was not at home.       Recommendation for patient's warfarin regimen: Hold dose today then decrease maintenance dose    Recommend repeat INR in 1.5 weeks  _________________________________________________________________    Coby Conn Bradleygonzaolia (77 y.o.) is followed by the [facility] [facility].    Anticoagulation Summary  As of 9/14/2023      INR goal:  2.0-3.0   TTR:  33.7 % (5.7 y)   INR used for dosing:  3.9 (9/14/2023)   Warfarin maintenance plan:  1 mg (1 mg x 1) every Mon, Sat; 0.5 mg (1 mg x 0.5) all other days   Weekly warfarin total:  4.5 mg   Plan last modified:  Josefina De Leon, PharmD (9/14/2023)   Next INR check:  9/25/2023   Target end date:      Indications    History of pulmonary embolus (PE)--12/2017 [Z86.711]  Chronic anticoagulation [Z79.01]  Chronic deep vein thrombosis (DVT) of left popliteal vein [I82.532]  Chronic pulmonary embolism without acute cor pulmonale [I27.82]                 Anticoagulation Episode Summary       INR check location:  Clinic Lab    Preferred lab:      Send INR reminders to:  [facility] COUMADIN MONITORING POOL    Comments:  [facility] // pt to write down dosing instructions & repeat back          Anticoagulation Care Providers       Provider Role Specialty Phone number    Mundo Hudson, [facility] Internal Medicine [phone]            Patient Findings       Negatives:  Signs/symptoms of thrombosis, Signs/symptoms of bleeding, Laboratory test error suspected, Change in health, Change in alcohol use, Change in activity, Upcoming invasive procedure, Emergency  department visit, Upcoming dental procedure, Missed doses, Extra doses, Change in medications, Change in diet/appetite, Hospital admission, Bruising, Other complaints    Comments:  Pt could not verify dose due to not being home

## 2023-09-18 PROBLEM — E04.1 THYROID NODULE: Status: RESOLVED | Noted: 2022-04-07 | Resolved: 2023-09-18

## 2023-09-19 ENCOUNTER — OFFICE VISIT (OUTPATIENT)
Dept: INTERNAL MEDICINE | Facility: CLINIC | Age: 78
End: 2023-09-19
Payer: MEDICARE

## 2023-09-19 VITALS
HEIGHT: 63 IN | RESPIRATION RATE: 15 BRPM | HEART RATE: 74 BPM | BODY MASS INDEX: 40.95 KG/M2 | DIASTOLIC BLOOD PRESSURE: 72 MMHG | WEIGHT: 231.13 LBS | SYSTOLIC BLOOD PRESSURE: 130 MMHG

## 2023-09-19 DIAGNOSIS — I27.9 PULMONARY HEART DISEASE: ICD-10-CM

## 2023-09-19 DIAGNOSIS — M85.89 OTHER SPECIFIED DISORDERS OF BONE DENSITY AND STRUCTURE, MULTIPLE SITES: ICD-10-CM

## 2023-09-19 DIAGNOSIS — I82.532 CHRONIC DEEP VEIN THROMBOSIS (DVT) OF LEFT POPLITEAL VEIN: ICD-10-CM

## 2023-09-19 DIAGNOSIS — I27.20 PULMONARY HYPERTENSION: ICD-10-CM

## 2023-09-19 DIAGNOSIS — M25.561 CHRONIC PAIN OF BOTH KNEES: ICD-10-CM

## 2023-09-19 DIAGNOSIS — F32.0 CURRENT MILD EPISODE OF MAJOR DEPRESSIVE DISORDER, UNSPECIFIED WHETHER RECURRENT: ICD-10-CM

## 2023-09-19 DIAGNOSIS — E66.2 OBESITY HYPOVENTILATION SYNDROME: ICD-10-CM

## 2023-09-19 DIAGNOSIS — R41.89 COGNITIVE IMPAIRMENT: ICD-10-CM

## 2023-09-19 DIAGNOSIS — I50.32 CHRONIC DIASTOLIC HEART FAILURE: ICD-10-CM

## 2023-09-19 DIAGNOSIS — I10 ESSENTIAL HYPERTENSION: ICD-10-CM

## 2023-09-19 DIAGNOSIS — E04.2 MULTINODULAR THYROID: ICD-10-CM

## 2023-09-19 DIAGNOSIS — G89.29 CHRONIC PAIN OF BOTH KNEES: ICD-10-CM

## 2023-09-19 DIAGNOSIS — R26.9 ABNORMALITY OF GAIT AND MOBILITY: ICD-10-CM

## 2023-09-19 DIAGNOSIS — M25.562 CHRONIC PAIN OF BOTH KNEES: ICD-10-CM

## 2023-09-19 DIAGNOSIS — K21.9 GASTROESOPHAGEAL REFLUX DISEASE, UNSPECIFIED WHETHER ESOPHAGITIS PRESENT: ICD-10-CM

## 2023-09-19 DIAGNOSIS — D56.3 THALASSEMIA MINOR: ICD-10-CM

## 2023-09-19 DIAGNOSIS — M17.0 PRIMARY OSTEOARTHRITIS OF BOTH KNEES: ICD-10-CM

## 2023-09-19 DIAGNOSIS — M85.80 OSTEOPENIA, UNSPECIFIED LOCATION: ICD-10-CM

## 2023-09-19 DIAGNOSIS — Z79.01 CHRONIC ANTICOAGULATION: Primary | ICD-10-CM

## 2023-09-19 DIAGNOSIS — R60.0 BILATERAL LEG EDEMA: ICD-10-CM

## 2023-09-19 DIAGNOSIS — G47.33 OBSTRUCTIVE SLEEP APNEA: ICD-10-CM

## 2023-09-19 DIAGNOSIS — I27.82 CHRONIC PULMONARY EMBOLISM WITHOUT ACUTE COR PULMONALE, UNSPECIFIED PULMONARY EMBOLISM TYPE: ICD-10-CM

## 2023-09-19 DIAGNOSIS — E66.01 MORBID OBESITY WITH BMI OF 40.0-44.9, ADULT: ICD-10-CM

## 2023-09-19 DIAGNOSIS — Z99.89 DEPENDENCE ON OTHER ENABLING MACHINES AND DEVICES: ICD-10-CM

## 2023-09-19 DIAGNOSIS — H61.20 IMPACTED CERUMEN, UNSPECIFIED LATERALITY: ICD-10-CM

## 2023-09-19 DIAGNOSIS — Z99.89 WALKER AS AMBULATION AID: ICD-10-CM

## 2023-09-19 DIAGNOSIS — Z00.00 ENCOUNTER FOR PREVENTIVE HEALTH EXAMINATION: ICD-10-CM

## 2023-09-19 DIAGNOSIS — R54 FRAIL ELDERLY: ICD-10-CM

## 2023-09-19 PROCEDURE — G0439 PR MEDICARE ANNUAL WELLNESS SUBSEQUENT VISIT: ICD-10-PCS | Mod: ,,, | Performed by: NURSE PRACTITIONER

## 2023-09-19 PROCEDURE — G0439 PPPS, SUBSEQ VISIT: HCPCS | Mod: ,,, | Performed by: NURSE PRACTITIONER

## 2023-09-19 PROCEDURE — 99999 PR PBB SHADOW E&M-EST. PATIENT-LVL V: ICD-10-PCS | Mod: PBBFAC,,, | Performed by: NURSE PRACTITIONER

## 2023-09-19 PROCEDURE — 99999 PR PBB SHADOW E&M-EST. PATIENT-LVL V: CPT | Mod: PBBFAC,,, | Performed by: NURSE PRACTITIONER

## 2023-09-19 PROCEDURE — 99215 OFFICE O/P EST HI 40 MIN: CPT | Mod: PBBFAC,PO | Performed by: NURSE PRACTITIONER

## 2023-09-19 RX ORDER — BISOPROLOL FUMARATE 10 MG/1
10 TABLET, FILM COATED ORAL DAILY
COMMUNITY
End: 2023-09-29 | Stop reason: SDUPTHER

## 2023-09-19 RX ORDER — PANTOPRAZOLE SODIUM 40 MG/1
40 TABLET, DELAYED RELEASE ORAL 2 TIMES DAILY
Status: ON HOLD | COMMUNITY
End: 2024-03-14

## 2023-09-19 NOTE — PATIENT INSTRUCTIONS
Counseling and Referral of Other Preventative  (Italic type indicates deductible and co-insurance are waived)    Patient Name: Coby Atkinson  Today's Date: 9/19/2023    Health Maintenance         Date Due Completion Date    DEXA Scan Ordered   12/3/2019    Influenza Vaccine (1) 10/04/2023 (Originally 9/1/2023) 2/10/2023    TETANUS VACCINE 10/01/2024 (Originally 10/22/1963) ---    Hemoglobin A1c (Prediabetes) 04/11/2024 4/11/2023    Lipid Panel      Abnormal whisper screen- ENT to clean ear wax then audiology to check hearing if no improvement    Referral to vascular medicine- check leg swelling & circulation    Check insurance benefits for weight loss options-     Referral to orthopedic- knee pain    Positive depression screen- Follow up with PCP    ABnormal cognitive function screen- follow up with PCP    Prevention of falls handouts               04/11/2024 4/11/2023          Orders Placed This Encounter   Procedures    DXA Bone Density Axial Skeleton 1 or more sites    Ambulatory referral/consult to Orthopedics     The following information is provided to all patients.  This information is to help you find resources for any of the problems found today that may be affecting your health:                Living healthy guide: www.[facility].[place].gov      Understanding Diabetes: www.diabetes.org      Eating healthy: www.cdc.gov/healthyweight      CDC home safety checklist: www.cdc.gov/steadi/patient.html      Agency on Aging: www.goea.[place].[facility]      Alcoholics anonymous (AA): www.aa.org      Physical Activity: www.erica.nih.gov/gx9vcoj      Tobacco use: www.quitwithusla.org

## 2023-09-19 NOTE — PROGRESS NOTES
"Coby Atkinson presented for a  Medicare AWV and comprehensive Health Risk Assessment today. The following components were reviewed and updated:    Medical history  Family History  Social history  Allergies and Current Medications  Health Risk Assessment  Health Maintenance  Care Team     ** See Completed Assessments for Annual Wellness Visit within the encounter summary.**       The following assessments were completed:  Living Situation  CAGE  Depression Screening=3  Timed Get Up and Go  Whisper Test-abnormal  Cognitive Function Screening=2  Nutrition Screening  ADL Screening  PAQ Screening      Vitals:    09/19/23 1003   BP: 130/72   BP Location: Left arm   Patient Position: Sitting   Pulse: 74   Resp: 15   Weight: 104.9 kg (231 lb 2.4 oz)   Height: 5' 3" (1.6 m)     Body mass index is 40.95 kg/m².  Physical Exam  Constitutional:       Comments: Younger in appearance than age   HENT:      Right Ear: There is impacted cerumen.      Left Ear: There is no impacted cerumen.   Eyes:      General: No scleral icterus.  Cardiovascular:      Rate and Rhythm: Normal rate and regular rhythm.   Pulmonary:      Effort: Pulmonary effort is normal.      Breath sounds: Normal breath sounds.   Abdominal:      Palpations: Abdomen is soft.   Musculoskeletal:         General: No swelling.      Comments: Slow gait w rollator   Skin:     General: Skin is warm and dry.   Neurological:      Mental Status: She is alert and oriented to person, place, and time.   Psychiatric:         Mood and Affect: Mood normal.         Behavior: Behavior normal.         Thought Content: Thought content normal.         Judgment: Judgment normal.            Medication review & Opioid screening perform during rooming section. No prescribed opioid medications noted.  Review for substance use disorder screening performed during rooming section. No substance abuse noted on screening.      Diagnoses and health risks identified today and associated " recommendations/orders:    1. Chronic anticoagulation  Stable on regimen & followed by cardiology    2. Chronic pulmonary embolism without acute cor pulmonale, unspecified pulmonary embolism type  Stable on regimen & followed by cardiology      3. Current mild episode of major depressive disorder, unspecified whether recurrent  Stable on regimen & followed by PCP-PHQ2=3- F/u with PCP- pt states baseline    4. Obstructive sleep apnea  Stable on regimen & followed by sleep, PCP- uses CPAP     5. Gastroesophageal reflux disease, unspecified whether esophagitis present  Stable on regimen & followed by PCP    6. Thalassemia minor  Stable on regimen & followed by PCP    7. Essential hypertension  Stable on regimen & followed by cardiology    8. Multinodular thyroid  Stable on regimen & followed by PCP    9. Cognitive impairment  Stable on regimen & followed by PCP    10. Pulmonary heart disease  Stable on regimen & followed by PCP, cardiology    11. Obesity hypoventilation syndrome  Stable on regimen & followed by PCP,sleep    12. Morbid obesity with BMI of 40.0-44.9, adult  Chronic problem - no recent weight loss. Followed by PCP.   Centers for Disease Control and Prevention (CDC)  weight recommendations for current BMI & ideal BMI range discussed with patient.  Recommended  gradual weight loss,  mediterranean diet ,  no added salt diet structured regular exercise-limited by chronic knee pain-referral to ortho     - Ambulatory referral/consult to Bariatric Medicine; Future    13. Chronic diastolic heart failure  Stable on regimen & followed by cardiology    14. Primary osteoarthritis of both knees  Stable on regimen & followed by PCP  - Ambulatory referral/consult to Orthopedics; Future    15. Chronic pain of both knees  Stable on regimen & followed by PCP  - Ambulatory referral/consult to Orthopedics; Future    16. Osteopenia, unspecified location  Stable on regimen & followed by PCP  - DXA Bone Density Axial Skeleton 1  or more sites; Future    17. Other specified disorders of bone density and structure, multiple sites  - DXA Bone Density Axial Skeleton 1 or more sites; Future    18. Dependence on other enabling machines and devices  Stable on regimen & followed by PCP-rollator    19. Abnormality of gait and mobility  Stable on regimen & followed by PCP    20. Encounter for preventive health examination  Here for Health Risk Assessment/Annual Wellness Visit.  Health maintenance reviewed and updated. Follow up in one year.        21. Bilateral leg edema  Stable on regimen & followed by PCP  - Ambulatory referral/consult to Vascular Medicine; Future    22. Impacted cerumen, unspecified laterality  - Ambulatory referral/consult to ENT; Future    23. Chronic deep vein thrombosis (DVT) of left popliteal vein  Stable on regimen & followed by PCP, pilmonary    24. Pulmonary hypertension  Stable on regimen & followed by Pulmonary,cardiology    25. Frail elderly  Stable on regimen & followed by PCP    26. Walker as ambulation aid-rollator  Stable on regimen & followed by PCP      Provided Coby with a 5-10 year written screening schedule and personal prevention plan. Recommendations were developed using the USPSTF age appropriate recommendations. Education, counseling, and referrals were provided as needed. After Visit Summary printed and given to patient which includes a list of additional screenings\tests needed. HRA follow-up in 1 yr.Here w .  Abnormal whisper screen- ENT to clean ear wax then audiology to check hearing if no improvement.    DXA ordered    Referral to vascular medicine- check leg swelling & circulation    Check insurance benefits for weight loss options-     Referral to orthopedic- knee pain    Positive depression screen- Follow up with PCP    ABnormal cognitive function screen- follow up with PCP    Prevention of falls handouts  Flor Gautam NP   I offered to discuss advanced care planning, including how to  pick a person who would make decisions for you if you were unable to make them for yourself, called a health care power of , and what kind of decisions you might make such as use of life sustaining treatments such as ventilators and tube feeding when faced with a life limiting illness recorded on a living will that they will need to know. (How you want to be cared for as you near the end of your natural life)     X Patient is interested in learning more about how to make advanced directives.  I provided them paperwork and offered to discuss this with them.

## 2023-09-20 ENCOUNTER — PATIENT MESSAGE (OUTPATIENT)
Dept: INTERNAL MEDICINE | Facility: CLINIC | Age: 78
End: 2023-09-20
Payer: COMMERCIAL

## 2023-09-21 ENCOUNTER — PATIENT MESSAGE (OUTPATIENT)
Dept: INTERNAL MEDICINE | Facility: CLINIC | Age: 78
End: 2023-09-21
Payer: COMMERCIAL

## 2023-09-21 NOTE — TELEPHONE ENCOUNTER
Your DXA results have been released. You have osteoporosis. Please follow up with your PCP for further evaluation.

## 2023-09-25 ENCOUNTER — TELEPHONE (OUTPATIENT)
Dept: INTERNAL MEDICINE | Facility: CLINIC | Age: 78
End: 2023-09-25
Payer: COMMERCIAL

## 2023-09-25 DIAGNOSIS — M81.0 OSTEOPOROSIS, UNSPECIFIED OSTEOPOROSIS TYPE, UNSPECIFIED PATHOLOGICAL FRACTURE PRESENCE: Primary | ICD-10-CM

## 2023-09-25 RX ORDER — ALENDRONATE SODIUM 70 MG/1
TABLET ORAL
Qty: 12 TABLET | Refills: 3 | Status: SHIPPED | OUTPATIENT
Start: 2023-09-25

## 2023-09-25 NOTE — TELEPHONE ENCOUNTER
----- Message from Flor Gautam NP sent at 9/21/2023  7:41 AM CDT -----    ----- Message -----  From: Interface, Rad Results In  Sent: 9/20/2023   1:25 PM CDT  To: Flor Gautam NP

## 2023-09-27 ENCOUNTER — OFFICE VISIT (OUTPATIENT)
Dept: CARDIOLOGY | Facility: CLINIC | Age: 78
End: 2023-09-27
Payer: MEDICARE

## 2023-09-27 ENCOUNTER — LAB VISIT (OUTPATIENT)
Dept: LAB | Facility: HOSPITAL | Age: 78
End: 2023-09-27
Payer: MEDICARE

## 2023-09-27 ENCOUNTER — ANTI-COAG VISIT (OUTPATIENT)
Dept: CARDIOLOGY | Facility: CLINIC | Age: 78
End: 2023-09-27
Payer: MEDICARE

## 2023-09-27 VITALS
DIASTOLIC BLOOD PRESSURE: 63 MMHG | SYSTOLIC BLOOD PRESSURE: 129 MMHG | WEIGHT: 234.81 LBS | HEART RATE: 75 BPM | BODY MASS INDEX: 40.09 KG/M2 | HEIGHT: 64 IN

## 2023-09-27 DIAGNOSIS — E66.01 MORBID OBESITY WITH BMI OF 40.0-44.9, ADULT: ICD-10-CM

## 2023-09-27 DIAGNOSIS — I82.532 CHRONIC DEEP VEIN THROMBOSIS (DVT) OF LEFT POPLITEAL VEIN: ICD-10-CM

## 2023-09-27 DIAGNOSIS — I50.32 CHRONIC DIASTOLIC HEART FAILURE: ICD-10-CM

## 2023-09-27 DIAGNOSIS — I27.82 CHRONIC PULMONARY EMBOLISM WITHOUT ACUTE COR PULMONALE, UNSPECIFIED PULMONARY EMBOLISM TYPE: ICD-10-CM

## 2023-09-27 DIAGNOSIS — Z79.01 CHRONIC ANTICOAGULATION: ICD-10-CM

## 2023-09-27 DIAGNOSIS — I89.0 LYMPHEDEMA OF BOTH LOWER EXTREMITIES: ICD-10-CM

## 2023-09-27 DIAGNOSIS — M25.561 CHRONIC PAIN OF BOTH KNEES: ICD-10-CM

## 2023-09-27 DIAGNOSIS — Z86.718 HISTORY OF DVT OF LOWER EXTREMITY: ICD-10-CM

## 2023-09-27 DIAGNOSIS — Z86.711 HISTORY OF PULMONARY EMBOLUS (PE): ICD-10-CM

## 2023-09-27 DIAGNOSIS — R60.0 EDEMA OF BOTH LOWER EXTREMITIES: ICD-10-CM

## 2023-09-27 DIAGNOSIS — Z86.711 HISTORY OF PULMONARY EMBOLUS (PE): Primary | ICD-10-CM

## 2023-09-27 DIAGNOSIS — I10 ESSENTIAL HYPERTENSION: ICD-10-CM

## 2023-09-27 DIAGNOSIS — Z79.01 CURRENT USE OF LONG TERM ANTICOAGULATION: ICD-10-CM

## 2023-09-27 DIAGNOSIS — R60.0 BILATERAL LEG EDEMA: Primary | ICD-10-CM

## 2023-09-27 DIAGNOSIS — I83.11 VARICOSE VEINS OF BOTH LOWER EXTREMITIES WITH INFLAMMATION: ICD-10-CM

## 2023-09-27 DIAGNOSIS — M25.562 CHRONIC PAIN OF BOTH KNEES: ICD-10-CM

## 2023-09-27 DIAGNOSIS — I83.12 VARICOSE VEINS OF BOTH LOWER EXTREMITIES WITH INFLAMMATION: ICD-10-CM

## 2023-09-27 DIAGNOSIS — G89.29 CHRONIC PAIN OF BOTH KNEES: ICD-10-CM

## 2023-09-27 PROBLEM — I83.93 VARICOSE VEINS OF BOTH LOWER EXTREMITIES: Status: ACTIVE | Noted: 2023-09-27

## 2023-09-27 LAB
INR PPP: 4.2 (ref 0.8–1.2)
PROTHROMBIN TIME: 40.8 SEC (ref 9–12.5)

## 2023-09-27 PROCEDURE — 99205 OFFICE O/P NEW HI 60 MIN: CPT | Mod: S$PBB,,, | Performed by: INTERNAL MEDICINE

## 2023-09-27 PROCEDURE — 36415 COLL VENOUS BLD VENIPUNCTURE: CPT | Mod: PO | Performed by: INTERNAL MEDICINE

## 2023-09-27 PROCEDURE — 93793 ANTICOAG MGMT PT WARFARIN: CPT | Mod: ,,,

## 2023-09-27 PROCEDURE — 99215 OFFICE O/P EST HI 40 MIN: CPT | Mod: PBBFAC,PO | Performed by: INTERNAL MEDICINE

## 2023-09-27 PROCEDURE — 99999 PR PBB SHADOW E&M-EST. PATIENT-LVL V: ICD-10-PCS | Mod: PBBFAC,,, | Performed by: INTERNAL MEDICINE

## 2023-09-27 PROCEDURE — 93793 PR ANTICOAGULANT MGMT FOR PT TAKING WARFARIN: ICD-10-PCS | Mod: ,,,

## 2023-09-27 PROCEDURE — 99999 PR PBB SHADOW E&M-EST. PATIENT-LVL V: CPT | Mod: PBBFAC,,, | Performed by: INTERNAL MEDICINE

## 2023-09-27 PROCEDURE — 99205 PR OFFICE/OUTPT VISIT, NEW, LEVL V, 60-74 MIN: ICD-10-PCS | Mod: S$PBB,,, | Performed by: INTERNAL MEDICINE

## 2023-09-27 PROCEDURE — 85610 PROTHROMBIN TIME: CPT | Performed by: INTERNAL MEDICINE

## 2023-09-27 NOTE — PROGRESS NOTES
[facility]    2023 4:21 PM    Assessment/Plan:    Patient presents today with supratherapeutic INR.    Assessment of patient findings and chart review: Patient reports taking 1 mg daily and missing a dose on     Recommendation for patient's warfarin regimen: Hold dose today then resume current maintenance dose    Recommend repeat INR in 1 week  _________________________________________________________________    Coby Atkinson (77 y.o.) is followed by the [facility].    Anticoagulation Summary  As of 2023      INR goal:  2.0-3.0   TTR:  33.5 % (5.8 y)   INR used for dosin.2 (2023)   Warfarin maintenance plan:  1 mg (1 mg x 1) every Mon, Sat; 0.5 mg (1 mg x 0.5) all other days   Weekly warfarin total:  4.5 mg   Plan last modified:  Rashad Arriaga, PharmD (2023)   Next INR check:  10/4/2023   Target end date:      Indications    History of pulmonary embolus (PE)--2017 [Z86.711]  Chronic anticoagulation [Z79.01]  Chronic deep vein thrombosis (DVT) of left popliteal vein [I82.532]  Chronic pulmonary embolism without acute cor pulmonale [I27.82]                 Anticoagulation Episode Summary       INR check location:  Clinic Lab    Preferred lab:      Send INR reminders to:  [facility] COUMADIN MONITORING POOL    Comments:  [facility] // pt to write down dosing instructions & repeat back          Anticoagulation Care Providers       Provider Role Specialty Phone number    Mundo Hudson, [facility] Internal Medicine [phone]            Patient Findings       Positives:  Missed doses    Negatives:  Signs/symptoms of thrombosis, Signs/symptoms of bleeding, Laboratory test error suspected, Change in health, Change in alcohol use, Change in activity, Upcoming invasive procedure, Emergency department visit, Upcoming dental procedure, Extra doses, Change in medications,  Change in diet/appetite, Hospital admission, Bruising, Other complaints    Comments:  1mg everyday  Missed 9/19 and does not remember 9/14

## 2023-09-27 NOTE — PATIENT INSTRUCTIONS
Assessment/Plan:  Coby Atkinson is a 77 y.o. female with chronic diastolic heart failure, HTN, history of DVT/PE in 2017 (on warfarin), morbid obesity, who presents for an initial appointment.    Bilateral leg edema- Due to BLE lymphedema and possible venous insufficiency.  Check BLE venous reflux study and REYES study.  Refer to lymphedema clinic.  Recommend wearing graduated compression hose.  Limit sodium intake to 2000 mg daily.  Limit volume intake to 1.5 L daily.  Elevate legs when resting.    2. Bilateral Knee Pain- Refer to Ortho for evaluation.    3. Diastolic Heart Failure- Stable.  Continue current medications.    4. Morbid Obesity- Encourage diet, exercise, and weight loss.     Follow up in 4 months

## 2023-09-27 NOTE — PROGRESS NOTES
[facility]      Chief Complaint   Patient presents with    Bilateral leg edema       Patient ID: Coby Atkinson is a 77 y.o. female with chronic diastolic heart failure, HTN, history of DVT/PE in 2017 (on warfarin), morbid obesity, who presents for an initial appointment.  Pertinent history/events are as follows:     -Pt kindly referred by Flor Gautam NP for evaluation of Bilateral leg edema    HPI:  Mrs. Atkinson is accompanied by her .  She reports leg swelling for several years.  She report severe chronic knee pain in both knees.  Does not ambulate much and has no claudication or tissue loss.      Past Medical History:   Diagnosis Date    Anticoagulant long-term use     Cataract     Chronic diarrhea     Depression     Edema     GERD (gastroesophageal reflux disease)     Hypertension 10/2/2012    Osteoarthritis of both knees 8/9/2016    Osteopenia     Osteopenia 11/30/2017    PE (pulmonary thromboembolism) 12/03/2017    Primary localized osteoarthrosis, lower leg 12/3/2014    Sleep apnea 2016    Thalassemia minor      Past Surgical History:   Procedure Laterality Date    APPENDECTOMY      CATARACT EXTRACTION      COLONOSCOPY N/A 11/5/2016    Procedure: COLONOSCOPY;  Surgeon: Tanner Simental MD;  Location: [address]);  Service: Endoscopy;  Laterality: N/A;    HYSTERECTOMY  age 39    fibroids    OOPHORECTOMY      TONSILLECTOMY       Social History     Socioeconomic History    Marital status:     Number of children: 1   Occupational History    Occupation: Retired from [employer]    Tobacco Use    Smoking status: Never     Passive exposure: Never    Smokeless tobacco: Never   Substance and Sexual Activity    Alcohol use: No     Comment: rare    Drug use: No    Sexual activity: Yes     Partners: Male     Birth control/protection: None     Social Determinants of Health     Financial Resource Strain: Low Risk  (9/19/2023)    Overall Financial Resource  Strain (CARDIA)     Difficulty of Paying Living Expenses: Not very hard   Food Insecurity: No Food Insecurity (9/19/2023)    Hunger Vital Sign     Worried About Running Out of Food in the Last Year: Never true     Ran Out of Food in the Last Year: Never true   Transportation Needs: No Transportation Needs (9/19/2023)    PRAPARE - Transportation     Lack of Transportation (Medical): No     Lack of Transportation (Non-Medical): No   Stress: No Stress Concern Present (9/19/2023)    [place] [employer] - Occupational Stress Questionnaire     Feeling of Stress : Only a little   Social Connections: Unknown (9/19/2023)    Social Connection and Isolation Panel [NHANES]     Marital Status:    Housing Stability: Unknown (9/19/2023)    Housing Stability Vital Sign     Unable to Pay for Housing in the Last Year: No     Unstable Housing in the Last Year: No     Family History   Problem Relation Age of Onset    Hypertension Mother     Cancer Father         skin    No Known Problems Sister     Cancer Brother         pancreatic    No Known Problems Maternal Aunt     No Known Problems Maternal Uncle     No Known Problems Paternal Aunt     No Known Problems Paternal Uncle     Coronary artery disease Maternal Grandmother     Heart failure Maternal Grandmother     No Known Problems Maternal Grandfather     Stroke Paternal Grandmother     Coronary artery disease Paternal Grandmother     No Known Problems Paternal Grandfather     Amblyopia Neg Hx     Blindness Neg Hx     Cataracts Neg Hx     Diabetes Neg Hx     Glaucoma Neg Hx     Macular degeneration Neg Hx     Retinal detachment Neg Hx     Strabismus Neg Hx     Thyroid disease Neg Hx     Colon cancer Neg Hx     Stomach cancer Neg Hx     Esophageal cancer Neg Hx        Review of patient's allergies indicates:   Allergen Reactions    Codeine     Ciprofloxacin Rash       Medication List with Changes/Refills   Current Medications    ALENDRONATE (FOSAMAX) 70 MG  "TABLET    Take 1 tablet once weekly in the morning with a full glass of water on an empty stomach. Do not consume food or lie down for at least 30 minutes afterwards.    AMLODIPINE-VALSARTAN (EXFORGE)  MG PER TABLET    Take 1 tablet by mouth once daily.    BISOPROLOL (ZEBETA) 10 MG TABLET    Take 10 mg by mouth once daily.    BUSPIRONE (BUSPAR) 10 MG TABLET    TAKE 1 TABLET(10 MG) BY MOUTH THREE TIMES DAILY AS NEEDED FOR ANXIETY    DOXAZOSIN (CARDURA) 8 MG TAB    Take 8 mg by mouth once daily.    ERGOCALCIFEROL (ERGOCALCIFEROL) 50,000 UNIT CAP    Take 1 capsule (50,000 Units total) by mouth every 7 days.    ESCITALOPRAM OXALATE (LEXAPRO) 20 MG TABLET    Take 20 mg by mouth once daily.    FUROSEMIDE (LASIX) 20 MG TABLET    Take 2 tablets (40 mg total) by mouth once daily.    PANTOPRAZOLE (PROTONIX) 40 MG TABLET    Take 40 mg by mouth 2 (two) times daily.    POTASSIUM CHLORIDE (MICRO-K) 10 MEQ CPSR    Take 1 capsule (10 mEq total) by mouth once daily.    SPIRONOLACTONE (ALDACTONE) 25 MG TABLET    Take 25 mg by mouth once daily.    WARFARIN (COUMADIN) 1 MG TABLET    TAKE 1 AND 1/2 TO 2 TABLETS(1.5 TO 2 MG) BY MOUTH DAILY AS DIRECTED BY COUMADIN CLINIC   Discontinued Medications    ONDANSETRON (ZOFRAN-ODT) 4 MG TBDL    Take 1 tablet (4 mg total) by mouth every 8 (eight) hours as needed (nausea).       Review of Systems  Constitution: Denies chills, fever, and sweats.  HENT: Denies headaches or blurry vision.  Cardiovascular: Denies chest pain or irregular heart beat.  Respiratory: Denies cough or shortness of breath.  Gastrointestinal: Denies abdominal pain, nausea, or vomiting.  Musculoskeletal: Positive for leg swelling.    Neurological: Denies dizziness or focal weakness.  Psychiatric/Behavioral: Normal mental status.  Hematologic/Lymphatic: Denies bleeding problem or easy bruising/bleeding.  Skin: Denies rash or suspicious lesions    Physical Examination  /63   Pulse 75   Ht 5' 4" (1.626 m)   Wt 106.5 " kg (234 lb 12.6 oz)   BMI 40.30 kg/m²     Constitutional: No acute distress, conversant  HEENT: Sclera anicteric, Pupils equal, round and reactive to light, extraocular motions intact, Oropharynx clear  Neck: No JVD, no carotid bruits  Cardiovascular: regular rate and rhythm, no murmur, rubs or gallops, normal S1/S2  Pulmonary: Clear to auscultation bilaterally  Abdominal: Abdomen soft, nontender, nondistended, positive bowel sounds  Extremities: BLE's with trace pitting edema, prominent varicose veins, and changes consistent with lymphedema (L>R)   Pulses:  Carotid pulses are 2+ on the right side, and 2+ on the left side.  Radial pulses are 2+ on the right side, and 2+ on the left side.   Femoral pulses are 2+ on the right side, and 2+ on the left side.  Popliteal pulses are 2+ on the right side, and 2+ on the left side.   Dorsalis pedis pulses are 2+ on the right side, and 2+ on the left side.   Posterior tibial pulses are 2+ on the right side, and 2+ on the left side.    Skin: No ecchymosis, erythema, or ulcers  Psych: Alert and oriented x 3, appropriate affect  Neuro: CNII-XII intact, no focal deficits    Labs:  Most Recent Data  CBC:   Lab Results   Component Value Date    WBC 9.36 04/11/2023    HGB 11.7 (L) 04/11/2023    HCT 40.8 04/11/2023     04/11/2023    MCV 70 (L) 04/11/2023    RDW 15.2 (H) 04/11/2023     BMP:   Lab Results   Component Value Date     04/11/2023    K 3.8 04/11/2023     04/11/2023    CO2 21 (L) 04/11/2023    BUN 17 04/11/2023    CREATININE 0.9 04/11/2023    GLU 92 04/11/2023    CALCIUM 9.5 04/11/2023    MG 2.1 09/22/2022    PHOS 4.2 06/12/2020     LFTS;   Lab Results   Component Value Date    PROT 7.1 04/11/2023    ALBUMIN 3.6 04/11/2023    BILITOT 0.5 04/11/2023    AST 15 04/11/2023    ALKPHOS 79 04/11/2023    ALT 12 04/11/2023     COAGS:   Lab Results   Component Value Date    INR 3.9 (H) 09/14/2023     FLP:   Lab Results   Component Value Date    CHOL 126 04/11/2023     HDL 57 04/11/2023    LDLCALC 56.2 (L) 04/11/2023    TRIG 64 04/11/2023    CHOLHDL 45.2 04/11/2023     CARDIAC:   Lab Results   Component Value Date    TROPONINI <0.006 06/06/2022    BNP 23 12/13/2022       Imaging:    BLE Venous Ultrasound 10/27/2022:  There is no evidence of a right lower extremity DVT.  A Baker's cyst measuring 3.33 cm x 1.3 cm was seen in the right extremity.  There is no evidence of a left lower extremity DVT. (Compression of left femoral vein cound not be performed due to pt discomfort, but vessel completely fills with color Doppler signal suggesting absence of thrombus).    Assessment/Plan:  Coby Atkinson is a 77 y.o. female with chronic diastolic heart failure, HTN, history of DVT/PE in 2017 (on warfarin), morbid obesity, who presents for an initial appointment.    Bilateral leg edema- Due to BLE lymphedema and possible venous insufficiency.  Check BLE venous reflux study and REYES study.  Refer to lymphedema clinic.  Recommend wearing graduated compression hose.  Limit sodium intake to 2000 mg daily.  Limit volume intake to 1.5 L daily.  Elevate legs when resting.    2. Bilateral Knee Pain- Refer to Ortho for evaluation.    3. Diastolic Heart Failure- Stable.  Continue current medications.    4. Morbid Obesity- Encourage diet, exercise, and weight loss.     Follow up in 4 months    Total duration of face to face visit time 45 minutes.  Total time spent counseling greater than fifty percent of total visit time.  Counseling included discussion regarding imaging findings, diagnosis, possibilities, treatment options, risks and benefits.  The patient had many questions regarding the options and long-term effects.    Berhane Castrejon MD, PhD  Interventional Cardiology

## 2023-09-29 RX ORDER — BISOPROLOL FUMARATE 10 MG/1
10 TABLET, FILM COATED ORAL DAILY
Qty: 90 TABLET | Refills: 3 | Status: SHIPPED | OUTPATIENT
Start: 2023-09-29

## 2023-10-02 ENCOUNTER — OFFICE VISIT (OUTPATIENT)
Dept: ORTHOPEDICS | Facility: CLINIC | Age: 78
End: 2023-10-02
Payer: MEDICARE

## 2023-10-02 VITALS
HEIGHT: 64 IN | DIASTOLIC BLOOD PRESSURE: 79 MMHG | BODY MASS INDEX: 40.42 KG/M2 | WEIGHT: 236.75 LBS | SYSTOLIC BLOOD PRESSURE: 166 MMHG | HEART RATE: 70 BPM

## 2023-10-02 DIAGNOSIS — M17.0 PRIMARY OSTEOARTHRITIS OF BOTH KNEES: ICD-10-CM

## 2023-10-02 DIAGNOSIS — M25.562 CHRONIC PAIN OF BOTH KNEES: ICD-10-CM

## 2023-10-02 DIAGNOSIS — M25.561 CHRONIC PAIN OF BOTH KNEES: ICD-10-CM

## 2023-10-02 DIAGNOSIS — G89.29 CHRONIC PAIN OF BOTH KNEES: ICD-10-CM

## 2023-10-02 PROCEDURE — 99214 OFFICE O/P EST MOD 30 MIN: CPT | Mod: S$PBB,,, | Performed by: ORTHOPAEDIC SURGERY

## 2023-10-02 PROCEDURE — 99999 PR PBB SHADOW E&M-EST. PATIENT-LVL III: CPT | Mod: PBBFAC,,, | Performed by: ORTHOPAEDIC SURGERY

## 2023-10-02 PROCEDURE — 99999 PR PBB SHADOW E&M-EST. PATIENT-LVL III: ICD-10-PCS | Mod: PBBFAC,,, | Performed by: ORTHOPAEDIC SURGERY

## 2023-10-02 PROCEDURE — 99214 PR OFFICE/OUTPT VISIT, EST, LEVL IV, 30-39 MIN: ICD-10-PCS | Mod: S$PBB,,, | Performed by: ORTHOPAEDIC SURGERY

## 2023-10-02 PROCEDURE — 99213 OFFICE O/P EST LOW 20 MIN: CPT | Mod: PBBFAC,PN | Performed by: ORTHOPAEDIC SURGERY

## 2023-10-02 NOTE — PROGRESS NOTES
Patient ID: Coby Atkinson is a 77 y.o. female    Chief Complaint:   Chief Complaint   Patient presents with    Left Knee - Swelling, Pain    Right Knee - Pain, Swelling       History of Present Illness:    Pleasant 77-year-old female with history of PE, on Coumadin, who presents for evaluation of bilateral knee pain right worse than left.  Reports pain for several years now.  Did have an injection about a year ago which did help.  Reports mechanical symptoms and pain globally in the right knee and left knee.  Walks with a rolling walker for the past 2 years.  Pain is interfering with activities of daily living.  Reports swelling.    PAST MEDICAL HISTORY:   Past Medical History:   Diagnosis Date    Anticoagulant long-term use     Cataract     Chronic diarrhea     Depression     Edema     GERD (gastroesophageal reflux disease)     Hypertension 10/2/2012    Osteoarthritis of both knees 8/9/2016    Osteopenia     Osteopenia 11/30/2017    PE (pulmonary thromboembolism) 12/03/2017    Primary localized osteoarthrosis, lower leg 12/3/2014    Sleep apnea 2016    Thalassemia minor      PAST SURGICAL HISTORY:   Past Surgical History:   Procedure Laterality Date    APPENDECTOMY      CATARACT EXTRACTION      COLONOSCOPY N/A 11/5/2016    Procedure: COLONOSCOPY;  Surgeon: Tanner Simental MD;  Location: [address];  Service: Endoscopy;  Laterality: N/A;    HYSTERECTOMY  age 39    fibroids    OOPHORECTOMY      TONSILLECTOMY       FAMILY HISTORY:   Family History   Problem Relation Age of Onset    Hypertension Mother     Cancer Father         skin    No Known Problems Sister     Cancer Brother         pancreatic    No Known Problems Maternal Aunt     No Known Problems Maternal Uncle     No Known Problems Paternal Aunt     No Known Problems Paternal Uncle     Coronary artery disease Maternal Grandmother     Heart failure Maternal Grandmother     No Known Problems Maternal Grandfather     Stroke Paternal Grandmother      Coronary artery disease Paternal Grandmother     No Known Problems Paternal Grandfather     Amblyopia Neg Hx     Blindness Neg Hx     Cataracts Neg Hx     Diabetes Neg Hx     Glaucoma Neg Hx     Macular degeneration Neg Hx     Retinal detachment Neg Hx     Strabismus Neg Hx     Thyroid disease Neg Hx     Colon cancer Neg Hx     Stomach cancer Neg Hx     Esophageal cancer Neg Hx      SOCIAL HISTORY:   Social History     Occupational History    Occupation: Retired from [employer]    Tobacco Use    Smoking status: Never     Passive exposure: Never    Smokeless tobacco: Never   Substance and Sexual Activity    Alcohol use: No     Comment: rare    Drug use: No    Sexual activity: Yes     Partners: Male     Birth control/protection: None        MEDICATIONS:   Current Outpatient Medications:     alendronate (FOSAMAX) 70 MG tablet, Take 1 tablet once weekly in the morning with a full glass of water on an empty stomach. Do not consume food or lie down for at least 30 minutes afterwards., Disp: 12 tablet, Rfl: 3    amlodipine-valsartan (EXFORGE)  mg per tablet, Take 1 tablet by mouth once daily., Disp: 90 tablet, Rfl: 3    bisoprolol (ZEBETA) 10 MG tablet, Take 1 tablet (10 mg total) by mouth once daily., Disp: 90 tablet, Rfl: 3    busPIRone (BUSPAR) 10 MG tablet, TAKE 1 TABLET(10 MG) BY MOUTH THREE TIMES DAILY AS NEEDED FOR ANXIETY, Disp: 90 tablet, Rfl: 2    doxazosin (CARDURA) 8 MG Tab, Take 8 mg by mouth once daily., Disp: , Rfl:     EScitalopram oxalate (LEXAPRO) 20 MG tablet, Take 20 mg by mouth once daily., Disp: , Rfl:     furosemide (LASIX) 20 MG tablet, Take 2 tablets (40 mg total) by mouth once daily., Disp: 180 tablet, Rfl: 3    pantoprazole (PROTONIX) 40 MG tablet, Take 40 mg by mouth 2 (two) times daily., Disp: , Rfl:     potassium chloride (MICRO-K) 10 MEQ CpSR, Take 1 capsule (10 mEq total) by mouth once daily., Disp: 90 capsule, Rfl: 3    warfarin (COUMADIN) 1 MG tablet, TAKE 1 AND  1/2 TO 2 TABLETS(1.5 TO 2 MG) BY MOUTH DAILY AS DIRECTED BY COUMADIN CLINIC (Patient taking differently: Take 0.5 mg by mouth Daily. Followed by Coumadin clinic), Disp: 60 tablet, Rfl: 5    ergocalciferol (ERGOCALCIFEROL) 50,000 unit Cap, Take 1 capsule (50,000 Units total) by mouth every 7 days. (Patient not taking: Reported on 10/2/2023), Disp: 12 capsule, Rfl: 1    spironolactone (ALDACTONE) 25 MG tablet, Take 25 mg by mouth once daily., Disp: , Rfl:   ALLERGIES:   Review of patient's allergies indicates:   Allergen Reactions    Codeine     Ciprofloxacin Rash         Physical Exam     Vitals:    10/02/23 0834   BP: (!) 166/79   Pulse: 70     Alert and oriented to person, place and time. No acute distress. Well-groomed, not ill appearing. Pupils round and reactive, normal respiratory effort, no audible wheezing.     GENERAL:  A well-developed, well-nourished 77 y.o. female who is alert and       oriented in no acute distress.      Gait: She  walks with a antalgic gait.                   EXTREMITIES:  Examination of lower extremities reveals there is no visible mass or deformity.    Bilateral knee: ROM     Ligamentously stable to varus/valgus stress.    Anterior and posterior drawers negative.    No pain over pes bursa.    No warmth    No erythema     Effusion Yes    medial, lateral joint line tenderness    Positive Patellofemoral grind/crepitus     Valgus deformity, correctable to neutral      The skin over both lower extremities is normal and unremarkable.  She has a  painless range of motion of the hips and ankles bilaterally.   Sensation is intact in both lower extremities.    There are no motor deficits in the lower extremities bilaterally.   Pedal pulses are palpable distally bilaterally.    She has no calf tenderness to palpation nor edema.       Imaging:       X-Ray: I have reviewed all pertinent results/findings and my personal findings are:  Severe bilateral DJD of the knees with valgus deformity.   Obliteration of the joint space and osteophytes subchondral sclerosis      Assessment & Plan    Primary osteoarthritis of both knees  -     Ambulatory referral/consult to Orthopedics    Chronic pain of both knees  -     Ambulatory referral/consult to Orthopedics         Treatment options discussed with her in detail.  She has severe bilateral knee arthritis with valgus deformity bilaterally.  Her right knee is the most bothersome at this time.  She is interested in total knee replacement.  We discussed medical clearance prior to surgery as she would need to be off her Coumadin prior to surgery and restart afterwards.  We will send a note to her PCP for clearance.  We also discussed the risks of surgery including risk of blood clot, DVT, infection, loosening and failure of fixation.  Patient also does have osteoporosis.  She is currently on Fosamax.  This also puts her at increased risk for periprosthetic fracture.    Will follow up after medical clearance with Yolie for preoperative appointment

## 2023-10-04 ENCOUNTER — ANTI-COAG VISIT (OUTPATIENT)
Dept: CARDIOLOGY | Facility: CLINIC | Age: 78
End: 2023-10-04
Payer: MEDICARE

## 2023-10-04 DIAGNOSIS — I82.532 CHRONIC DEEP VEIN THROMBOSIS (DVT) OF LEFT POPLITEAL VEIN: ICD-10-CM

## 2023-10-04 DIAGNOSIS — I27.82 CHRONIC PULMONARY EMBOLISM WITHOUT ACUTE COR PULMONALE, UNSPECIFIED PULMONARY EMBOLISM TYPE: ICD-10-CM

## 2023-10-04 DIAGNOSIS — Z79.01 CHRONIC ANTICOAGULATION: ICD-10-CM

## 2023-10-04 DIAGNOSIS — Z86.711 HISTORY OF PULMONARY EMBOLUS (PE): Primary | ICD-10-CM

## 2023-10-04 PROCEDURE — 93793 ANTICOAG MGMT PT WARFARIN: CPT | Mod: ,,, | Performed by: PHARMACIST

## 2023-10-04 PROCEDURE — 93793 PR ANTICOAGULANT MGMT FOR PT TAKING WARFARIN: ICD-10-PCS | Mod: ,,, | Performed by: PHARMACIST

## 2023-10-05 ENCOUNTER — CLINICAL SUPPORT (OUTPATIENT)
Dept: REHABILITATION | Facility: HOSPITAL | Age: 78
End: 2023-10-05
Attending: INTERNAL MEDICINE
Payer: MEDICARE

## 2023-10-05 DIAGNOSIS — I89.0 LYMPHEDEMA OF BOTH LOWER EXTREMITIES: ICD-10-CM

## 2023-10-05 PROCEDURE — 97530 THERAPEUTIC ACTIVITIES: CPT | Mod: PO

## 2023-10-06 ENCOUNTER — HOSPITAL ENCOUNTER (OUTPATIENT)
Dept: CARDIOLOGY | Facility: HOSPITAL | Age: 78
Discharge: HOME | End: 2023-10-06
Attending: INTERNAL MEDICINE
Payer: MEDICARE

## 2023-10-06 DIAGNOSIS — R60.0 BILATERAL LEG EDEMA: ICD-10-CM

## 2023-10-06 LAB
LEFT GREAT SAPHENOUS DISTAL THIGH DIA: 0.51 CM
LEFT GREAT SAPHENOUS DISTAL THIGH REFLUX: 765 MS
LEFT GREAT SAPHENOUS JUNCTION DIA: 0.79 CM
LEFT GREAT SAPHENOUS JUNCTION REFLUX: 3436 MS
LEFT GREAT SAPHENOUS KNEE DIA: 0.43 CM
LEFT GREAT SAPHENOUS MIDDLE THIGH DIA: 0.58 CM
LEFT GREAT SAPHENOUS PROXIMAL CALF DIA: 0.28 CM
LEFT SMALL SAPHENOUS KNEE DIA: 0.41 CM
LEFT SMALL SAPHENOUS SPJ DIA: 0.36 CM
RIGHT GIAC DIA: 0.33 MM
RIGHT GREAT SAPHENOUS DISTAL THIGH DIA: 0.49 CM
RIGHT GREAT SAPHENOUS JUNCTION DIA: 0.56 CM
RIGHT GREAT SAPHENOUS JUNCTION REFLUX: 4407 MS
RIGHT GREAT SAPHENOUS KNEE DIA: 0.36 CM
RIGHT GREAT SAPHENOUS KNEE REFLUX: 4191 MS
RIGHT GREAT SAPHENOUS MIDDLE THIGH DIA: 0.38 CM
RIGHT GREAT SAPHENOUS MIDDLE THIGH REFLUX: 2780 MS
RIGHT GREAT SAPHENOUS PROXIMAL CALF DIA: 0.24 CM
RIGHT GREAT SAPHENOUS PROXIMAL CALF REFLUX: 782 MS
RIGHT SMALL SAPHENOUS KNEE DIA: 0.36 CM
RIGHT SMALL SAPHENOUS SPJ DIA: 0.32 CM

## 2023-10-06 PROCEDURE — 93922 UPR/L XTREMITY ART 2 LEVELS: CPT | Mod: 26,,, | Performed by: INTERNAL MEDICINE

## 2023-10-06 PROCEDURE — 93922 UPR/L XTREMITY ART 2 LEVELS: CPT

## 2023-10-06 PROCEDURE — 93970 CV US LOWER VENOUS INSUFFICIENCY BILATERAL (CUPID ONLY): ICD-10-PCS | Mod: 26,,, | Performed by: INTERNAL MEDICINE

## 2023-10-06 PROCEDURE — 93970 EXTREMITY STUDY: CPT | Mod: 26,,, | Performed by: INTERNAL MEDICINE

## 2023-10-06 PROCEDURE — 93970 EXTREMITY STUDY: CPT | Mod: TC

## 2023-10-06 PROCEDURE — 93922 ANKLE BRACHIAL INDICES (ABI): ICD-10-PCS | Mod: 26,,, | Performed by: INTERNAL MEDICINE

## 2023-10-06 NOTE — PLAN OF CARE
[facility]  Occupational Therapy Initial Evaluation    Date: 10/5/2023  Name: Coby Atkinson  Clinic Number: [id number]    Therapy Diagnosis:   Encounter Diagnosis   Name Primary?    Lymphedema of both lower extremities      Physician: Berhane Castrejon MD*    Physician Orders: evaluate and treat  Medical Diagnosis: lymphedema  Surgical Procedure and Date: na,   Evaluation Date: 10/5/2023  Insurance Authorization Period Expiration: 9-  Plan of Care Certification Period: 12-  Progress Note Due: same   Date of Return to MD: to be determined  Visit # / Visits authorized: 1 / 1  FOTO: intake completed/    Precautions:  Standard    Time In:1400  Time Out: 1445  Total Appointment Time (timed & untimed codes): 45 minutes    SUBJECTIVE     Date of Onset: > 10 years    History of Current Condition/Mechanism of Injury: Coby Atkinson is a 77 y.o. female who presents to [facility] for evaluation secondary to lymphedema. Patient was referred to therapy by Berhane Castrejon MD* , which is the patient's cardiologist. Patient reports that she started with left leg swelling following a plane trip many years ago. This became worse following 2 incidents of DVT left lower leg. She reports right leg swelling as well, started a couple of years ago. . Patient was accompanied to the evaluation by her .    Falls: 1 in past 12 months. Patient very fearful of falling.    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     Current Functional Status   Home/Living environment: lives with their spouse      Limitation of Functional Status as follows:   Coby manages her self care with adaptive equipment as needed, ( shower chair, reacher). Does manage home, not as particular  as she once was. Reports that she is less mobile and more sedentary due to swelling.   Pain:  Functional Pain Scale Rating 0-10: Current 3/10, worst 8/10, best 2/10   Location: lower legs, especially left leg  Description: Aching, Throbbing, Deep, Variable, and constant  Aggravating Factors: everything, no one thing  Easing Factors: lying down and rest    Patient's Goals for Therapy: for reduction of pain, reduce risk of infection and orthopedic problems    Medical History:   Past Medical History:   Diagnosis Date    Anticoagulant long-term use     Cataract     Chronic diarrhea     Depression     Edema     GERD (gastroesophageal reflux disease)     Hypertension 10/2/2012    Osteoarthritis of both knees 8/9/2016    Osteopenia     Osteopenia 11/30/2017    PE (pulmonary thromboembolism) 12/03/2017    Primary localized osteoarthrosis, lower leg 12/3/2014    Sleep apnea 2016    Thalassemia minor        Surgical History:    has a past surgical history that includes Appendectomy; Tonsillectomy; Hysterectomy (age 39); Oophorectomy; Cataract extraction; and Colonoscopy (N/A, 11/5/2016).    Medications:   has a current medication list which includes the following prescription(s): alendronate, amlodipine-valsartan, bisoprolol, buspirone, doxazosin, ergocalciferol, escitalopram oxalate, furosemide, pantoprazole, potassium chloride, spironolactone, and warfarin.    Allergies:   Review of patient's allergies indicates:   Allergen Reactions    Codeine     Ciprofloxacin Rash          OBJECTIVE     Coby is a reliable historian. Expresses concern about her balance with the current level  of swelling. Reports pain related to neuropathy, that she feels is getting worse as her legs swell up more.  No history of cellulitis, blisters or weeping.  Limb inspection reveals much increased density in lower legs, more on left leg. Non pitting but significant. Color is normal. Range of motion is impaired at ankles and knees. Left knee has large  "bruise with a hardened area that is very painful to touch: feels like a blood vessel was slammed, snapped. Coby reports very much pain and states that the doctor caused this by poking at her knee.  and patient report that doctor adbised patient that she needs bilateral knee replacements and also that she has an infecton in left knee.  Girth Measurements (in centimeters)  LANDMARK LEFT leg RIGHT leg    forefoot 24 cm 22 cm    ankle  31.50 cm 29 cm    4" 36.75 cm 34 cm    calf 48.75 cm 46 cm    Below knee 47 cm 47.25 cm    Above knee 60 cm 60 cm       Treatment   Total Treatment time (time-based codes) separate from Evaluation: 15 minutes    Coby received the treatments listed below:   Therapeutic activities to improve functional performance for 15  minutes, including:  Patient presented  for evaluation . Following evaluation, extra time was spent, explaining in depth, that Complete Decongestive Therapy protocol and the expectations of that patient during treatment. Applied tubigrip to bilateral lower legs to relive discomfort, hypersensitivity.    Patient Education and Home Exercises    Education provided:   1. Educated on definition of lymphedema.  2. Explained the Complete Decongestive Therapy protocol in depth  3. Educated on Phase 1 and 2 of protocol.  4. Reviewed treatment frequency and likely duration of weeks  5.Contraindications for treatment.  6. Plan of care and goals.  7. Educated on home management protocols.     Written Home Exercises Provided:  no .    Patient/Family Education: role of OT, goals for OT, scheduling/cancellations - pt verbalized understanding. Discussed insurance limitations with patient.      ASSESSMENT     Coby Atkinson is a 77 y.o. female referred to outpatient occupational therapy and presents with a medical diagnosis of lymphedema. Lymphedema, left untreated increases risk of infection, gait deviation causing ortho problems and poor body image. Patient presents with " the following therapy deficits: lymphedema of bilateral lower limbs and demonstrates limitations as described in the chart below. Following medical record review it is determined that pt will benefit from complete decongestive therapy services for the treatment and management of this chronic condition. The following goals were discussed with the patient and patient is in agreement with them as to be addressed in the treatment plan. The patient's rehab potential is Good.     Anticipated barriers to occupational therapy: compliance  Pt has no cultural, educational or language barriers to learning provided.    Profile and History Assessment of Occupational Performance Level of Clinical Decision Making Complexity Score   Occupational Profile:   Coby Atkinson is a 77 y.o. female who lives with their spouse and is retired Coby Atkinson has difficulty with  ADLs and IADLs as listed previously, which  Affecting herdaily functional abilities.      Comorbidities:    has a past medical history of Anticoagulant long-term use, Cataract, Chronic diarrhea, Depression, Edema, GERD (gastroesophageal reflux disease), Hypertension, Osteoarthritis of both knees, Osteopenia, Osteopenia, PE (pulmonary thromboembolism), Primary localized osteoarthrosis, lower leg, Sleep apnea, and Thalassemia minor.    Medical and Therapy History Review:   Expanded               Performance Deficits    Physical:  Joint Mobility  Joint Stability  Muscle Endurance  Skin Integrity/Scar Formation  Edema  Postural Control  Pain    Cognitive:  No Deficits    Psychosocial:    No Deficits     Clinical Decision Making:  moderate    Assessment Process:  Detailed Assessments    Modification/Need for Assistance:  Not Necessary    Intervention Selection:  Limited Treatment Options       moderate  Based on PMHX, co morbidities , data from assessments and functional level of assistance required with task and clinical presentation directly impacting  function.       The following goals were discussed with the patient and patient is in agreement with them as to be addressed in the treatment plan.     Goals:   Short Term Goals for 6 weeks: (phase 1 of protocol) initiate at first treatment  Patient will be educated on lymphedema precautions and signs of infection. - Ongoing 10/5/2023   Patient will perform deep abdominal breathing TID- initiate at first treatment  Patient will tolerate daily activities with multilayered bandaging.- initiate at first treatment  Appropriate compression garments to be ordered/delivered- Ongoing 10/5/2023    to be instructed in compression bandaging   Long Term Goals for 10 weeks: (Phase 2 of goals)  Patient/spouse will be independent with home management of this chronic condition.  Patient/spouse to aliza/doff compression garment.   Patient will demonstrate compliance with all home management recommendations.  Patient will maintain reduction at monthly follow up appointments for 3 months     PLAN   Plan of Care Certification: 10/5/2023 to 12-.     Outpatient Occupational Therapy 3 times weekly for 10 weeks to include the following interventions: Complete Decongestive Therapy  .      BANDAR Philip, VILLA/DEONNA      I CERTIFY THE NEED FOR THESE SERVICES FURNISHED UNDER THIS PLAN OF TREATMENT AND WHILE UNDER MY CARE  Physician's comments:      Physician's Signature: ___________________________________________________

## 2023-10-09 LAB
LEFT ABI: 0.98
LEFT ARM BP: 164 MMHG
LEFT DORSALIS PEDIS: 170 MMHG
LEFT POSTERIOR TIBIAL: 151 MMHG
RIGHT ABI: 1.02
RIGHT ARM BP: 173 MMHG
RIGHT DORSALIS PEDIS: 143 MMHG
RIGHT POSTERIOR TIBIAL: 176 MMHG

## 2023-10-11 ENCOUNTER — CLINICAL SUPPORT (OUTPATIENT)
Dept: REHABILITATION | Facility: HOSPITAL | Age: 78
End: 2023-10-11
Payer: MEDICARE

## 2023-10-11 ENCOUNTER — ANTI-COAG VISIT (OUTPATIENT)
Dept: CARDIOLOGY | Facility: CLINIC | Age: 78
End: 2023-10-11
Payer: MEDICARE

## 2023-10-11 DIAGNOSIS — Z79.01 CHRONIC ANTICOAGULATION: ICD-10-CM

## 2023-10-11 DIAGNOSIS — I27.82 CHRONIC PULMONARY EMBOLISM WITHOUT ACUTE COR PULMONALE, UNSPECIFIED PULMONARY EMBOLISM TYPE: ICD-10-CM

## 2023-10-11 DIAGNOSIS — I82.532 CHRONIC DEEP VEIN THROMBOSIS (DVT) OF LEFT POPLITEAL VEIN: ICD-10-CM

## 2023-10-11 DIAGNOSIS — Z86.711 HISTORY OF PULMONARY EMBOLUS (PE): Primary | ICD-10-CM

## 2023-10-11 DIAGNOSIS — I89.0 LYMPHEDEMA OF BOTH LOWER EXTREMITIES: Primary | ICD-10-CM

## 2023-10-11 PROCEDURE — 93793 PR ANTICOAGULANT MGMT FOR PT TAKING WARFARIN: ICD-10-PCS | Mod: S$GLB,,, | Performed by: PHARMACIST

## 2023-10-11 PROCEDURE — 93793 ANTICOAG MGMT PT WARFARIN: CPT | Mod: S$GLB,,, | Performed by: PHARMACIST

## 2023-10-11 PROCEDURE — 97530 THERAPEUTIC ACTIVITIES: CPT | Mod: PO

## 2023-10-12 NOTE — PROGRESS NOTES
[facility]    10/12/2023 4:13 PM    Assessment/Plan:    Patient presents today with subtherapeutic  INR.    Assessment of patient findings and chart review: INR not at goal. Medications, chart, and patient findings reviewed. See calendar for adjustments to dose and follow up plan.      Recommendation for patient's warfarin regimen: Boost dose today to 1mg then increase maintenance dose    Recommend repeat INR in 1 week  _________________________________________________________________    Coby Conn China (77 y.o.) is followed by the [facility].    Anticoagulation Summary  As of 10/11/2023      INR goal:  2.0-3.0   TTR:  33.4 % (5.8 y)   INR used for dosin.7 (10/11/2023)   Warfarin maintenance plan:  1 mg (1 mg x 1) every Mon, Wed, Sat; 0.5 mg (1 mg x 0.5) all other days   Weekly warfarin total:  5 mg   Plan last modified:  Josefina De Leon, PharmD (10/12/2023)   Next INR check:  10/18/2023   Target end date:      Indications    History of pulmonary embolus (PE)--2017 [Z86.711]  Chronic anticoagulation [Z79.01]  Chronic deep vein thrombosis (DVT) of left popliteal vein [I82.532]  Chronic pulmonary embolism without acute cor pulmonale [I27.82]                 Anticoagulation Episode Summary       INR check location:  Clinic Lab    Preferred lab:      Send INR reminders to:  [facility] COUMADIN MONITORING POOL    Comments:  [facility] // pt to write down dosing instructions & repeat back          Anticoagulation Care Providers       Provider Role Specialty Phone number    Mundo Hudson,  [facility] [phone]            Patient Findings       Negatives:  Signs/symptoms of thrombosis, Signs/symptoms of bleeding, Laboratory test error suspected, Change in health, Change in alcohol use, Change in activity, Upcoming invasive procedure, Emergency department visit, Upcoming  dental procedure, Missed doses, Extra doses, Change in medications, Change in diet/appetite, Hospital admission, Bruising, Other complaints    Comments:  Pt could not verify dose due to not being home

## 2023-10-12 NOTE — PROGRESS NOTES
"  [facility]  Occupational Therapy Treatment Note    Date: 10/11/2023  Name: Coby Atkinson  Clinic Number: [id number]    Therapy Diagnosis:   Encounter Diagnosis   Name Primary?    Lymphedema of both lower extremities Yes   Physician: Berhane Castrejon MD*     Physician Orders: evaluate and treat  Medical Diagnosis: lymphedema  Surgical Procedure and Date: na,   Evaluation Date: 10/5/2023  Insurance Authorization Period Expiration: 9-  Plan of Care Certification Period: 12-  Progress Note Due: same   Date of Return to MD: to be determined  Visit # / Visits authorized: 1/24  FOTO: intake completed/     Precautions:  Standard    Time In: 1109  Time Out: 1205  Total Billable Time: 54 minutes    SUBJECTIVE     Pt reports: reports being exhausted    Response to previous treatment: this is first treatment  Functional change: na    Pain: 5/10  Location: bilateral lower legs , hypersensitive to light touch, has been consistent without relief.     OBJECTIVE     Girth Measurements (in centimeters)  LANDMARK LEFT leg RIGHT leg     forefoot 24 cm 22 cm     ankle  31.50 cm 29 cm     4" 36.75 cm 34 cm     calf 48.75 cm 46 cm     Below knee 47 cm 47.25 cm     Above knee 60 cm 60 cm       Treatment     Coby received the treatments listed below:     Therapeutic activities  for 54  minutes, including:  Patient presented  for first treatment .  accompanied Steph. Skin care completed. Measurements taken and documented. Reviewed all the education that was provided at initial evaluation on 10/5/2023. Instructed patient verbally and physically on deep abdominal breathing technique. Fair return demonstration todat. Will repest this education each session. Manual therapy deferred this date as patient reporting inability to otlerate any light touch of lower quadrants. Compression bandages were applied to left leg up to knee today. Reminded patient that this compression should not be " painful and if she experienced pain, to remove promptly.     Patient Education and Home Exercises      Education provided:   1. Educated on definition of lymphedema.  2. Explained the Complete Decongestive Therapy protocol in depth  3. Educated on Phase 1 and 2 of protocol.  4. Reviewed treatment frequency and likely duration of weeks  5.Contraindications for treatment.  6. Plan of care and goals.  7. Educated on home management protocols.     Written Home Exercises Provided:  verbal and physical instruction of deep abdominal breathing technique today. .  Exercises were reviewed and Coby will require further instruction.   Assessment     Pt would continue to benefit from skilled OT. Yes. Coby was scheduled for 2 subsequent sessions this week, but cancelled one of them. Coby does not return to this clinic until 10/17 and so it is a strong possibility that her compression will be removed before returning to therapy. Therapist did explain the Complete Decongestive Therapy protocol and rationales to Coby again today.   Coby Atkinson is a 77 y.o. female referred to outpatient occupational therapy and presents with a medical diagnosis of lymphedema. Lymphedema, left untreated increases risk of infection, gait deviation causing ortho problems and poor body image. Patient presents with the following therapy deficits: lymphedema of bilateral lower limbs and demonstrates limitations as described in the chart below. Following medical record review it is determined that pt may benefit from complete decongestive therapy services for the treatment and management of this chronic condition.    Anticipated barriers to occupational therapy: compliance  Pt has no cultural, educational or language barriers to learning provided.      Coby is progressing well towards her goals and there are no updates to goals at this time. Pt prognosis is Fair.     Pt will continue to benefit from skilled outpatient occupational therapy to  address the deficits listed in the problem list on initial evaluation provide pt/family education and to maximize pt's level of independence in the home and community environment.     Pt's spiritual, cultural and educational needs considered and pt agreeable to plan of care and goals.    Anticipated barriers to occupational therapy: compliance and insight in to the chronicity and treatment of lymphedema.     The following goals were discussed with the patient and patient is in agreement with them as to be addressed in the treatment plan.      Goals:   Short Term Goals for 6 weeks: (phase 1 of protocol)   Patient will be educated on lymphedema precautions and signs of infection. - Ongoing 10/5/2023   Patient will perform deep abdominal breathing TID- initiated instruction today  at first treatment  Patient will tolerate daily activities with multilayered bandaging.- initiated today, at first treatment  Appropriate compression garments to be ordered/delivered- Ongoing 10/5/2023    to be instructed in compression bandaging   Long Term Goals for 10 weeks: (Phase 2 of goals)  Patient/spouse will be independent with home management of this chronic condition.  Patient/spouse to aliza/doff compression garment.   Patient will demonstrate compliance with all home management recommendations.  Patient will maintain reduction at monthly follow up appointments for 3 months      PLAN   Plan of Care Certification: 10/5/2023 to 12-.      Outpatient Occupational Therapy 3 times weekly for 10 weeks to include the following interventions: Complete Decongestive Therapy  .        BANDAR Philip, VILLA/DEONNA

## 2023-10-16 ENCOUNTER — CLINICAL SUPPORT (OUTPATIENT)
Dept: REHABILITATION | Facility: HOSPITAL | Age: 78
End: 2023-10-16
Payer: MEDICARE

## 2023-10-16 DIAGNOSIS — M17.0 PRIMARY OSTEOARTHRITIS OF BOTH KNEES: Primary | ICD-10-CM

## 2023-10-16 DIAGNOSIS — I89.0 LYMPHEDEMA OF BOTH LOWER EXTREMITIES: Primary | ICD-10-CM

## 2023-10-16 PROCEDURE — 97140 MANUAL THERAPY 1/> REGIONS: CPT | Mod: PO

## 2023-10-16 PROCEDURE — 97530 THERAPEUTIC ACTIVITIES: CPT | Mod: PO

## 2023-10-17 ENCOUNTER — HOSPITAL ENCOUNTER (OUTPATIENT)
Dept: RADIOLOGY | Facility: HOSPITAL | Age: 78
Discharge: HOME | End: 2023-10-17
Attending: ORTHOPAEDIC SURGERY
Payer: MEDICARE

## 2023-10-17 ENCOUNTER — OFFICE VISIT (OUTPATIENT)
Dept: ORTHOPEDICS | Facility: CLINIC | Age: 78
End: 2023-10-17
Payer: MEDICARE

## 2023-10-17 VITALS — WEIGHT: 236.75 LBS | BODY MASS INDEX: 40.42 KG/M2 | HEIGHT: 64 IN

## 2023-10-17 DIAGNOSIS — M17.0 PRIMARY OSTEOARTHRITIS OF BOTH KNEES: Primary | ICD-10-CM

## 2023-10-17 DIAGNOSIS — M17.0 PRIMARY OSTEOARTHRITIS OF BOTH KNEES: ICD-10-CM

## 2023-10-17 DIAGNOSIS — G89.29 CHRONIC PAIN OF BOTH KNEES: ICD-10-CM

## 2023-10-17 DIAGNOSIS — M25.561 CHRONIC PAIN OF BOTH KNEES: ICD-10-CM

## 2023-10-17 DIAGNOSIS — M25.562 CHRONIC PAIN OF BOTH KNEES: ICD-10-CM

## 2023-10-17 PROCEDURE — 99999 PR PBB SHADOW E&M-EST. PATIENT-LVL III: CPT | Mod: PBBFAC,,, | Performed by: ORTHOPAEDIC SURGERY

## 2023-10-17 PROCEDURE — 99213 OFFICE O/P EST LOW 20 MIN: CPT | Mod: S$PBB,,, | Performed by: ORTHOPAEDIC SURGERY

## 2023-10-17 PROCEDURE — 73564 X-RAY EXAM KNEE 4 OR MORE: CPT | Mod: 26,50,, | Performed by: RADIOLOGY

## 2023-10-17 PROCEDURE — 99213 OFFICE O/P EST LOW 20 MIN: CPT | Mod: PBBFAC | Performed by: ORTHOPAEDIC SURGERY

## 2023-10-17 PROCEDURE — 73564 X-RAY EXAM KNEE 4 OR MORE: CPT | Mod: TC,50

## 2023-10-17 PROCEDURE — 99999 PR PBB SHADOW E&M-EST. PATIENT-LVL III: ICD-10-PCS | Mod: PBBFAC,,, | Performed by: ORTHOPAEDIC SURGERY

## 2023-10-17 PROCEDURE — 99213 PR OFFICE/OUTPT VISIT, EST, LEVL III, 20-29 MIN: ICD-10-PCS | Mod: S$PBB,,, | Performed by: ORTHOPAEDIC SURGERY

## 2023-10-17 PROCEDURE — 73564 XR KNEE ORTHO BILAT WITH FLEXION: ICD-10-PCS | Mod: 26,50,, | Performed by: RADIOLOGY

## 2023-10-17 NOTE — PROGRESS NOTES
Subjective:       Patient ID: Coby Atkinson is a 77 y.o. female.    Chief Complaint:   Pain of the Left Knee and Pain of the Right Knee    Coby Atkinson is a 77 y.o. female with past medical history significant for obesity, pulmonary hypertension, CHF, NSTEMI, PE, DVT of left lower extremity on Coumadin, and morbid obesity who presents for continued bilateral knee pain for the past 3 years.  She states that the left knee hurts worse than the right knee at this time, and that her pain is currently a 7/10.  She states that she is not interested in knee replacement today, and she is looking for another option.      Past Medical History:   Diagnosis Date    Anticoagulant long-term use     Cataract     Chronic diarrhea     Depression     Edema     GERD (gastroesophageal reflux disease)     Hypertension 10/2/2012    Osteoarthritis of both knees 8/9/2016    Osteopenia     Osteopenia 11/30/2017    PE (pulmonary thromboembolism) 12/03/2017    Primary localized osteoarthrosis, lower leg 12/3/2014    Sleep apnea 2016    Thalassemia minor      Past Surgical History:   Procedure Laterality Date    APPENDECTOMY      CATARACT EXTRACTION      COLONOSCOPY N/A 11/5/2016    Procedure: COLONOSCOPY;  Surgeon: Tanner Simental MD;  Location: [facility] ([address] [place] [address]);  Service: Endoscopy;  Laterality: N/A;    HYSTERECTOMY  age 39    fibroids    OOPHORECTOMY      TONSILLECTOMY       Family History   Problem Relation Age of Onset    Hypertension Mother     Cancer Father         skin    No Known Problems Sister     Cancer Brother         pancreatic    No Known Problems Maternal Aunt     No Known Problems Maternal Uncle     No Known Problems Paternal Aunt     No Known Problems Paternal Uncle     Coronary artery disease Maternal Grandmother     Heart failure Maternal Grandmother     No Known Problems Maternal Grandfather     Stroke Paternal Grandmother     Coronary artery disease Paternal Grandmother     No Known Problems Paternal  Grandfather     Amblyopia Neg Hx     Blindness Neg Hx     Cataracts Neg Hx     Diabetes Neg Hx     Glaucoma Neg Hx     Macular degeneration Neg Hx     Retinal detachment Neg Hx     Strabismus Neg Hx     Thyroid disease Neg Hx     Colon cancer Neg Hx     Stomach cancer Neg Hx     Esophageal cancer Neg Hx      Social History     Socioeconomic History    Marital status:     Number of children: 1   Occupational History    Occupation: Retired from [employer]    Tobacco Use    Smoking status: Never     Passive exposure: Never    Smokeless tobacco: Never   Substance and Sexual Activity    Alcohol use: No     Comment: rare    Drug use: No    Sexual activity: Yes     Partners: Male     Birth control/protection: None     Social Determinants of Health     Financial Resource Strain: Low Risk  (9/19/2023)    Overall Financial Resource Strain (CARDIA)     Difficulty of Paying Living Expenses: Not very hard   Food Insecurity: No Food Insecurity (9/19/2023)    Hunger Vital Sign     Worried About Running Out of Food in the Last Year: Never true     Ran Out of Food in the Last Year: Never true   Transportation Needs: No Transportation Needs (9/19/2023)    PRAPARE - Transportation     Lack of Transportation (Medical): No     Lack of Transportation (Non-Medical): No   Stress: No Stress Concern Present (9/19/2023)    [employer] - Occupational Stress Questionnaire     Feeling of Stress : Only a little   Social Connections: Unknown (9/19/2023)    Social Connection and Isolation Panel [NHANES]     Marital Status:    Housing Stability: Unknown (9/19/2023)    Housing Stability Vital Sign     Unable to Pay for Housing in the Last Year: No     Unstable Housing in the Last Year: No       Current Outpatient Medications   Medication Sig Dispense Refill    alendronate (FOSAMAX) 70 MG tablet Take 1 tablet once weekly in the morning with a full glass of water on an empty stomach. Do not  "consume food or lie down for at least 30 minutes afterwards. 12 tablet 3    amlodipine-valsartan (EXFORGE)  mg per tablet Take 1 tablet by mouth once daily. 90 tablet 3    bisoprolol (ZEBETA) 10 MG tablet Take 1 tablet (10 mg total) by mouth once daily. 90 tablet 3    busPIRone (BUSPAR) 10 MG tablet TAKE 1 TABLET(10 MG) BY MOUTH THREE TIMES DAILY AS NEEDED FOR ANXIETY 90 tablet 2    doxazosin (CARDURA) 8 MG Tab Take 8 mg by mouth once daily.      ergocalciferol (ERGOCALCIFEROL) 50,000 unit Cap Take 1 capsule (50,000 Units total) by mouth every 7 days. 12 capsule 1    EScitalopram oxalate (LEXAPRO) 20 MG tablet Take 20 mg by mouth once daily.      furosemide (LASIX) 20 MG tablet Take 2 tablets (40 mg total) by mouth once daily. 180 tablet 3    pantoprazole (PROTONIX) 40 MG tablet Take 40 mg by mouth 2 (two) times daily.      potassium chloride (MICRO-K) 10 MEQ CpSR Take 1 capsule (10 mEq total) by mouth once daily. 90 capsule 3    spironolactone (ALDACTONE) 25 MG tablet Take 25 mg by mouth once daily.      warfarin (COUMADIN) 1 MG tablet TAKE 1 AND 1/2 TO 2 TABLETS(1.5 TO 2 MG) BY MOUTH DAILY AS DIRECTED BY COUMADIN CLINIC (Patient taking differently: Take 0.5 mg by mouth Daily. Followed by Coumadin clinic) 60 tablet 5     No current facility-administered medications for this visit.     Review of patient's allergies indicates:   Allergen Reactions    Codeine     Ciprofloxacin Rash         Objective:      Vitals:    10/17/23 1457   Weight: 107.4 kg (236 lb 12.4 oz)   Height: 5' 4" (1.626 m)     Physical Exam    There is is a mild varus deformity, which is partially passively correctable.  Active range of motion is 0-105 degrees bilaterally.  Anterior crepitus with active extension.  Patellar mobility is limited.  Boggy synovitis without effusion.  Lateral joint line tenderness predominates.  No instability to varus/valgus/Lachman's stressing.  No pain in the groin with flexion and internal rotation of the hip " which is not limited.  Skin intact.  Distal neurovascular intact.  Flip test negative.      Imaging Review:     X-ray of the knees bilaterally shows genu valgus knees with bilateral Kellgren Stephan grade 4 lateral compartment osteoarthritis.  No acute fractures, dislocation, or tumors visualized at this time.    Assessment:   Primary osteoarthritis of the bilateral knees  Plan:       Referral to pain management for left-sided knee pain at this time.  The patient will return to clinic if symptoms progress.      The patient's pathophysiology was explained in detail with reference to x-rays, models, other visual aids as appropriate.  Treatment options were discussed in detail.  Questions were invited and answered to the patient's satisfaction.    Kit Chavira MD  Orthopaedic Surgery

## 2023-10-18 ENCOUNTER — TELEPHONE (OUTPATIENT)
Dept: PAIN MEDICINE | Facility: CLINIC | Age: 78
End: 2023-10-18
Payer: COMMERCIAL

## 2023-10-18 ENCOUNTER — ANTI-COAG VISIT (OUTPATIENT)
Dept: CARDIOLOGY | Facility: CLINIC | Age: 78
End: 2023-10-18
Payer: MEDICARE

## 2023-10-18 DIAGNOSIS — Z79.01 CHRONIC ANTICOAGULATION: ICD-10-CM

## 2023-10-18 DIAGNOSIS — Z86.711 HISTORY OF PULMONARY EMBOLUS (PE): Primary | ICD-10-CM

## 2023-10-18 DIAGNOSIS — I82.532 CHRONIC DEEP VEIN THROMBOSIS (DVT) OF LEFT POPLITEAL VEIN: ICD-10-CM

## 2023-10-18 PROCEDURE — 93793 PR ANTICOAGULANT MGMT FOR PT TAKING WARFARIN: ICD-10-PCS | Mod: S$GLB,,,

## 2023-10-18 PROCEDURE — 93793 ANTICOAG MGMT PT WARFARIN: CPT | Mod: S$GLB,,,

## 2023-10-18 NOTE — TELEPHONE ENCOUNTER
Spoke with patient. Discussed scheduling an office visit per a request from Dr Chavira for bilateral knee pain. Date/time selected by patient. No further issues discussed.

## 2023-10-18 NOTE — PROGRESS NOTES
[facility]    10/18/2023 2:18 PM    Assessment/Plan:    Patient presents today with subtherapeutic  INR.    Assessment of patient findings and chart review: patient denies any changes to account for low INR    Recommendation for patient's warfarin regimen: Boost dose today to 1.5mg then resume current maintenance dose    Recommend repeat INR in 1 week  _________________________________________________________________    Coby Atkinson (77 y.o.) is followed by the [facility].    Anticoagulation Summary  As of 10/18/2023      INR goal:  2.0-3.0   TTR:  33.3 % (5.8 y)   INR used for dosin.4 (10/18/2023)   Warfarin maintenance plan:  1 mg (1 mg x 1) every Mon, Wed, Sat; 0.5 mg (1 mg x 0.5) all other days   Weekly warfarin total:  5 mg   Plan last modified:  Josefina De Leon, PharmD (10/12/2023)   Next INR check:  10/25/2023   Target end date:      Indications    History of pulmonary embolus (PE)--2017 [Z86.711]  Chronic anticoagulation [Z79.01]  Chronic deep vein thrombosis (DVT) of left popliteal vein [I82.532]  Chronic pulmonary embolism without acute cor pulmonale [I27.82]                 Anticoagulation Episode Summary       INR check location:  Clinic Lab    Preferred lab:      Send INR reminders to:  [facility] COUMADIN MONITORING POOL    Comments:  [facility] Lab // pt to write down dosing instructions & repeat back          Anticoagulation Care Providers       Provider Role Specialty Phone number    Mundo Hudson,  [facility] [phone]            Patient Findings       Negatives:  Signs/symptoms of thrombosis, Signs/symptoms of bleeding, Laboratory test error suspected, Change in health, Change in alcohol use, Change in activity, Upcoming invasive procedure, Emergency department visit, Upcoming dental procedure, Missed doses, Extra doses, Change in medications,  Change in diet/appetite, Hospital admission, Bruising, Other complaints    Comments:  Pt is getting a shot in her knee on 10/30 and could not verify dose due to not being home

## 2023-10-18 NOTE — PROGRESS NOTES
[facility]  Occupational Therapy Treatment Note    Date: 10/16/2023  Name: Coby Atkinson  Clinic Number: [id number]    Therapy Diagnosis:   Encounter Diagnosis   Name Primary?    Lymphedema of both lower extremities Yes   Physician: Berhane Castrejon MD*     Physician Orders: evaluate and treat  Medical Diagnosis: lymphedema  Surgical Procedure and Date: na,   Evaluation Date: 10/5/2023  Insurance Authorization Period Expiration: 9-  Plan of Care Certification Period: 12-  Progress Note Due: same   Date of Return to MD: to be determined  Visit # / Visits authorized: 3/24  FOTO: intake completed/     Precautions:  Standard    Time In: 1630  Time Out: 1730  Total Billable Time: 60 minutes    SUBJECTIVE     Pt reports: reports being exhausted    Response to previous treatment: reports wearing bandages for 48 hours  Functional change: na    Pain: 5/10  Location: bilateral lower legs , hypersensitive to light touch, has been consistent without relief.     OBJECTIVE       Treatment     Coby received the treatments listed below:     Therapeutic activities  for 30 minutes, including:  Patient presented without compression. Reported that Tactile pump was delivered.and doesn't understand why she needs to be bandaged. Re- educated  And Mrs. Atkinson in detail the Complete Decongestive Therapy  Protocol and the use of pneumatic pumps during the process. Skin care completed. Reviewed all the education that was provided at initial evaluation on 10/5/2023. Instructed patient verbally and physically on deep abdominal breathing technique. Fair return demonstration today Will repest this education each session.  Compression bandages were applied to left leg up to knee today. Reminded patient that this compression should not be painful and if she experienced pain, to remove promptly.   Manual Therapy      MANUAL LYMPHATIC DRAINAGE:    Supine with lower extremities elevated:  Short  "Neck  Axillary lymph nodes on right  Activate and work over the INGUINAL-AXILLARY ANASTOMOSES   Deep abdominal techniques  Rework both INGUINAL-AXILLARY ANASTOMOSES  Activate "bulk flow" on lateral thigh  Leg treatment (anterior/lateral thigh, femoral vein vasa-vasorum, knee, lower leg, ankle, dorsum of foot)  Rework: leg, inguinal lymph nodes, INGUINAL-AXILLARY ANASTOMOSES, axillary lymph nodes      Patient Education and Home Exercises      Education provided:   1. Educated on definition of lymphedema.  2. Explained the Complete Decongestive Therapy protocol in depth  3. Educated on Phase 1 and 2 of protocol.  4. Reviewed treatment frequency and likely duration of weeks  5.Contraindications for treatment.  6.Use of pneumatic pump  7. Educated on home management protocols.     Written Home Exercises Provided:  verbal and physical instruction of deep abdominal breathing technique today. .  Exercises were reviewed and Coby will require further instruction.   Assessment     Pt may continue to benefit from skilled OT.  Therapist did explain the Complete Decongestive Therapy protocol and rationales to Coby and her  again today with no evidence of improved insight into the Complete Decongestive Therapy protocol.    Coby Atkinson is a 77 y.o. female referred to outpatient occupational therapy and presents with a medical diagnosis of lymphedema. Lymphedema, left untreated increases risk of infection, gait deviation causing ortho problems and poor body image. Patient presents with the following therapy deficits: lymphedema of bilateral lower limbs and demonstrates limitations as described in the chart below. Following medical record review it is determined that pt may benefit from complete decongestive therapy services for the treatment and management of this chronic condition.    Anticipated barriers to occupational therapy: poor insight in to rationale  Pt has no cultural, educational or language barriers " to learning provided.      Coby is progressing minimally towards her goals and there are no updates to goals at this time. Pt prognosis is Fair.     Pt will continue to benefit from skilled outpatient occupational therapy to address the deficits listed in the problem list on initial evaluation provide pt/family education and to maximize pt's level of independence in the home and community environment.     Pt's spiritual, cultural and educational needs considered and pt agreeable to plan of care and goals.    Anticipated barriers to occupational therapy: compliance and insight in to the chronicity and treatment of lymphedema.     The following goals were discussed with the patient and patient is in agreement with them as to be addressed in the treatment plan.      Goals:   Short Term Goals for 6 weeks: (phase 1 of protocol)   Patient will be educated on lymphedema precautions and signs of infection. - Ongoing 10/5/2023   Patient will perform deep abdominal breathing TID- initiated instruction today  at first treatment  Patient will tolerate daily activities with multilayered bandaging.- initiated today, at first treatment  Appropriate compression garments to be ordered/delivered- Ongoing 10/5/2023    to be instructed in compression bandaging   Long Term Goals for 10 weeks: (Phase 2 of goals)  Patient/spouse will be independent with home management of this chronic condition.  Patient/spouse to aliza/doff compression garment.   Patient will demonstrate compliance with all home management recommendations.  Patient will maintain reduction at monthly follow up appointments for 3 months      PLAN   Plan of Care Certification: 10/5/2023 to 12-.      Outpatient Occupational Therapy 3 times weekly for 10 weeks to include the following interventions: Complete Decongestive Therapy  .        BANDAR Philip, VILLA/DEONNA

## 2023-10-19 ENCOUNTER — CLINICAL SUPPORT (OUTPATIENT)
Dept: REHABILITATION | Facility: HOSPITAL | Age: 78
End: 2023-10-19
Attending: INTERNAL MEDICINE
Payer: MEDICARE

## 2023-10-19 DIAGNOSIS — I89.0 LYMPHEDEMA OF BOTH LOWER EXTREMITIES: Primary | ICD-10-CM

## 2023-10-19 PROCEDURE — 97530 THERAPEUTIC ACTIVITIES: CPT | Mod: PO

## 2023-10-19 PROCEDURE — 97140 MANUAL THERAPY 1/> REGIONS: CPT | Mod: PO

## 2023-10-23 NOTE — PROGRESS NOTES
[facility]  Occupational Therapy Treatment Note    Date: 10/19/2023  Name: Coby Atkinson  Clinic Number: [id number]    Therapy Diagnosis:   Encounter Diagnosis   Name Primary?    Lymphedema of both lower extremities Yes   Physician: Berhane Castrejon MD*     Physician Orders: evaluate and treat  Medical Diagnosis: lymphedema  Surgical Procedure and Date: na,   Evaluation Date: 10/5/2023  Insurance Authorization Period Expiration: 9-  Plan of Care Certification Period: 12-  Progress Note Due: same   Date of Return to MD: to be determined  Visit # / Visits authorized: 4/24  FOTO: intake completed/     Precautions:  Standard    Time In: 0800  Time Out: 0900  Total Billable Time: 60 minutes    SUBJECTIVE     Pt reports: removed bandages 24 hours before (appointment was scheduled that day but she missed it.)    Response to previous treatment: reports wearing bandages for 48 hours  Functional change: na    Pain: 5/10  Location: bilateral lower legs , hypersensitive to light touch, has been consistent without relief.     OBJECTIVE       Treatment     Coby received the treatments listed below:     Therapeutic activities  for 30 minutes, including:  Patient presented without compression. Re- educated  And Mrs. Atkinson in detail the Complete Decongestive Therapy  Protocol and the use of pneumatic pumps during the process. Skin care completed. Reviewed all the education that was provided at initial evaluation on 10/5/2023. Instructed patient verbally and physically on deep abdominal breathing technique.Better than fair  return demonstration today.  Compression bandages were applied to left leg up to knee today. Reminded patient that this compression should not be painful and if she experienced pain, to remove promptly.   Manual Therapy      MANUAL LYMPHATIC DRAINAGE:    Supine with lower extremities elevated:  Short Neck  Axillary lymph nodes on right  Activate and work  "over the INGUINAL-AXILLARY ANASTOMOSES   Deep abdominal techniques  Rework both INGUINAL-AXILLARY ANASTOMOSES  Activate "bulk flow" on lateral thigh  Leg treatment (anterior/lateral thigh, femoral vein vasa-vasorum, knee, lower leg, ankle, dorsum of foot)  Rework: leg, inguinal lymph nodes, INGUINAL-AXILLARY ANASTOMOSES, axillary lymph nodes      Patient Education and Home Exercises      Education provided:   1. Educated on definition of lymphedema.  2. Explained the Complete Decongestive Therapy protocol in depth  3. Educated on Phase 1 and 2 of protocol.  4. Reviewed treatment frequency and likely duration of weeks  5.Contraindications for treatment.  6.Use of pneumatic pump  7. Educated on home management protocols.     Written Home Exercises Provided:  verbal and physical instruction of deep abdominal breathing technique today. .  Exercises were reviewed and Coby will require further instruction.   Assessment     Pt may continue to benefit from skilled OT.  Therapist did explain the Complete Decongestive Therapy protocol and rationales to Coby and her  again today with no evidence of improved insight into the Complete Decongestive Therapy protocol.    Coby Atkinson is a 77 y.o. female referred to outpatient occupational therapy and presents with a medical diagnosis of lymphedema. Lymphedema, left untreated increases risk of infection, gait deviation causing ortho problems and poor body image. Patient presents with the following therapy deficits: lymphedema of bilateral lower limbs and demonstrates limitations as described in the chart below. Following medical record review it is determined that pt may benefit from complete decongestive therapy services for the treatment and management of this chronic condition.    Anticipated barriers to occupational therapy: poor insight in to rationale  Pt has no cultural, educational or language barriers to learning provided.      Coby is progressing " minimally towards her goals and there are no updates to goals at this time. Pt prognosis is Fair.     Pt will continue to benefit from skilled outpatient occupational therapy to address the deficits listed in the problem list on initial evaluation provide pt/family education and to maximize pt's level of independence in the home and community environment.     Pt's spiritual, cultural and educational needs considered and pt agreeable to plan of care and goals.    Anticipated barriers to occupational therapy: compliance and insight in to the chronicity and treatment of lymphedema.     The following goals were discussed with the patient and patient is in agreement with them as to be addressed in the treatment plan.      Goals:   Short Term Goals for 6 weeks: (phase 1 of protocol)   Patient will be educated on lymphedema precautions and signs of infection. - Ongoing 10/5/2023   Patient will perform deep abdominal breathing TID- initiated instruction today  at first treatment  Patient will tolerate daily activities with multilayered bandaging.- initiated today, at first treatment  Appropriate compression garments to be ordered/delivered- Ongoing 10/5/2023    to be instructed in compression bandaging   Long Term Goals for 10 weeks: (Phase 2 of goals)  Patient/spouse will be independent with home management of this chronic condition.  Patient/spouse to aliza/doff compression garment.   Patient will demonstrate compliance with all home management recommendations.  Patient will maintain reduction at monthly follow up appointments for 3 months      PLAN   Plan of Care Certification: 10/5/2023 to 12-.      Outpatient Occupational Therapy 3 times weekly for 10 weeks to include the following interventions: Complete Decongestive Therapy  .        BANDAR Philip, VILLA/DEONNA

## 2023-10-24 ENCOUNTER — TELEPHONE (OUTPATIENT)
Dept: SURGERY | Facility: CLINIC | Age: 78
End: 2023-10-24
Payer: COMMERCIAL

## 2023-10-24 ENCOUNTER — CLINICAL SUPPORT (OUTPATIENT)
Dept: REHABILITATION | Facility: HOSPITAL | Age: 78
End: 2023-10-24
Attending: INTERNAL MEDICINE
Payer: MEDICARE

## 2023-10-24 DIAGNOSIS — I89.0 LYMPHEDEMA OF BOTH LOWER EXTREMITIES: Primary | ICD-10-CM

## 2023-10-24 PROCEDURE — 97530 THERAPEUTIC ACTIVITIES: CPT | Mod: PO

## 2023-10-24 PROCEDURE — 97140 MANUAL THERAPY 1/> REGIONS: CPT | Mod: PO

## 2023-10-25 NOTE — PROGRESS NOTES
[facility]  Occupational Therapy Treatment Note    Date: 10/24/2023  Name: Coby Atkinson  Clinic Number: [id number]    Therapy Diagnosis:   Encounter Diagnosis   Name Primary?    Lymphedema of both lower extremities Yes   Physician: Berhane Castrejon MD*     Physician Orders: evaluate and treat  Medical Diagnosis: lymphedema  Surgical Procedure and Date: na,   Evaluation Date: 10/5/2023  Insurance Authorization Period Expiration: 9-  Plan of Care Certification Period: 12-  Progress Note Due: same   Date of Return to MD: to be determined  Visit # / Visits authorized: 4/24  FOTO: intake completed/     Precautions:  Standard    Time In: 0900  Time Out: 1000  Total Billable Time: 60 minutes    SUBJECTIVE     Pt reports: removed bandages 24 hours before   Response to previous treatment: reports wearing bandages for 48 hours  Functional change: na    Pain: 5/10  Location: bilateral lower legs , hypersensitive to light touch, has been consistent without relief.     OBJECTIVE       Treatment     Coby received the treatments listed below:     Therapeutic activities  for 45 minutes, including:  Patient presented without compression. Patient arrived alone.Reviewed steps of  Complete Decongestive Therapy  Protocol in detail and explained where she is in process.. Skin care completed. Reviewed all the education that was provided at initial evaluation on 10/5/2023. Instructed patient verbally and physically on deep abdominal breathing technique.Better than fair  return demonstration today.  Compression bandages were applied to left leg up to knee today. Reminded patient that this compression should not be painful and if she experienced pain, to remove promptly. Wrote down how to order her compression garments  and also simulated the process for hwer on computer.   Manual Therapy 15 minutes     MANUAL LYMPHATIC DRAINAGE:    Supine with lower extremities elevated:  Short  "Neck  Axillary lymph nodes on right  Activate and work over the INGUINAL-AXILLARY ANASTOMOSES   Deep abdominal techniques  Rework both INGUINAL-AXILLARY ANASTOMOSES  Activate "bulk flow" on lateral thigh  Leg treatment (anterior/lateral thigh, femoral vein vasa-vasorum, knee, lower leg, ankle, dorsum of foot)  Rework: leg, inguinal lymph nodes, INGUINAL-AXILLARY ANASTOMOSES, axillary lymph nodes      Patient Education and Home Exercises      Education provided:   1. Educated on definition of lymphedema.  2. Explained the Complete Decongestive Therapy protocol in depth  3. Educated on Phase 1 and 2 of protocol.  4. Reviewed treatment frequency and likely duration of weeks  5.Contraindications for treatment.  6.Use of pneumatic pump  7. Educated on home management protocols.     Written Home Exercises Provided:  verbal and physical instruction of deep abdominal breathing technique today. .  Exercises were reviewed and Coby will require further instruction.   Assessment     Pt may continue to benefit from skilled OT.  Therapist did explain the Complete Decongestive Therapy protocol and rationales to Coby and her  again today with no evidence of improved insight into the Complete Decongestive Therapy protocol.    Coby Atkinson is a 77 y.o. female referred to outpatient occupational therapy and presents with a medical diagnosis of lymphedema. Lymphedema, left untreated increases risk of infection, gait deviation causing ortho problems and poor body image. Patient presents with the following therapy deficits: lymphedema of bilateral lower limbs and demonstrates limitations as described in the chart below. Following medical record review it is determined that pt may benefit from complete decongestive therapy services for the treatment and management of this chronic condition.    Anticipated barriers to occupational therapy: poor insight in to rationale  Pt has no cultural, educational or language barriers " to learning provided.      Coby is progressing minimally towards her goals and there are no updates to goals at this time. Pt prognosis is Fair.     Pt will continue to benefit from skilled outpatient occupational therapy to address the deficits listed in the problem list on initial evaluation provide pt/family education and to maximize pt's level of independence in the home and community environment.     Pt's spiritual, cultural and educational needs considered and pt agreeable to plan of care and goals.    Anticipated barriers to occupational therapy: compliance and insight in to the chronicity and treatment of lymphedema.     The following goals were discussed with the patient and patient is in agreement with them as to be addressed in the treatment plan.      Goals:   Short Term Goals for 6 weeks: (phase 1 of protocol)   Patient will be educated on lymphedema precautions and signs of infection. - Ongoing 10/5/2023   Patient will perform deep abdominal breathing TID- initiated instruction today  at first treatment  Patient will tolerate daily activities with multilayered bandaging.- initiated today, at first treatment  Appropriate compression garments to be ordered/delivered- Ongoing 10/5/2023    to be instructed in compression bandaging   Long Term Goals for 10 weeks: (Phase 2 of goals)  Patient/spouse will be independent with home management of this chronic condition.  Patient/spouse to aliza/doff compression garment.   Patient will demonstrate compliance with all home management recommendations.  Patient will maintain reduction at monthly follow up appointments for 3 months      PLAN   Plan of Care Certification: 10/5/2023 to 12-.      Outpatient Occupational Therapy 3 times weekly for 10 weeks to include the following interventions: Complete Decongestive Therapy  .        BANDAR Philip, VILLA/DEONNA

## 2023-10-26 ENCOUNTER — CLINICAL SUPPORT (OUTPATIENT)
Dept: REHABILITATION | Facility: HOSPITAL | Age: 78
End: 2023-10-26
Payer: MEDICARE

## 2023-10-26 ENCOUNTER — TELEPHONE (OUTPATIENT)
Dept: BARIATRICS | Facility: CLINIC | Age: 78
End: 2023-10-26
Payer: COMMERCIAL

## 2023-10-26 DIAGNOSIS — I89.0 LYMPHEDEMA OF BOTH LOWER EXTREMITIES: Primary | ICD-10-CM

## 2023-10-26 PROCEDURE — 97140 MANUAL THERAPY 1/> REGIONS: CPT | Mod: PO

## 2023-10-26 PROCEDURE — 97530 THERAPEUTIC ACTIVITIES: CPT | Mod: PO

## 2023-10-27 ENCOUNTER — ANTI-COAG VISIT (OUTPATIENT)
Dept: CARDIOLOGY | Facility: CLINIC | Age: 78
End: 2023-10-27
Payer: MEDICARE

## 2023-10-27 DIAGNOSIS — Z79.01 CHRONIC ANTICOAGULATION: ICD-10-CM

## 2023-10-27 DIAGNOSIS — I82.532 CHRONIC DEEP VEIN THROMBOSIS (DVT) OF LEFT POPLITEAL VEIN: ICD-10-CM

## 2023-10-27 DIAGNOSIS — Z86.711 HISTORY OF PULMONARY EMBOLUS (PE): Primary | ICD-10-CM

## 2023-10-27 PROCEDURE — 93793 PR ANTICOAGULANT MGMT FOR PT TAKING WARFARIN: ICD-10-PCS | Mod: S$GLB,,,

## 2023-10-27 PROCEDURE — 93793 ANTICOAG MGMT PT WARFARIN: CPT | Mod: S$GLB,,,

## 2023-10-27 NOTE — PROGRESS NOTES
INR not at goal. Medications, chart, and patient findings reviewed. See calendar for adjustments to dose and follow up plan.    Known history of noncompliance to dosing. Bolus again today. Reevaluate next week.

## 2023-10-31 ENCOUNTER — CLINICAL SUPPORT (OUTPATIENT)
Dept: REHABILITATION | Facility: HOSPITAL | Age: 78
End: 2023-10-31
Payer: MEDICARE

## 2023-10-31 DIAGNOSIS — I89.0 LYMPHEDEMA OF BOTH LOWER EXTREMITIES: Primary | ICD-10-CM

## 2023-10-31 PROCEDURE — 97530 THERAPEUTIC ACTIVITIES: CPT | Mod: PO

## 2023-10-31 PROCEDURE — 97140 MANUAL THERAPY 1/> REGIONS: CPT | Mod: PO

## 2023-10-31 NOTE — PROGRESS NOTES
"  [facility]  Occupational Therapy Treatment Note    Date: 10/26/2023  Name: Coby Atkinson  Clinic Number: [id number]    Therapy Diagnosis:   Encounter Diagnosis   Name Primary?    Lymphedema of both lower extremities Yes   Physician: Berhane Castrejon MD*     Physician Orders: evaluate and treat  Medical Diagnosis: lymphedema  Surgical Procedure and Date: na,   Evaluation Date: 10/5/2023  Insurance Authorization Period Expiration: 9-  Plan of Care Certification Period: 12-  Progress Note Due: same   Date of Return to MD: to be determined  Visit # / Visits authorized: 6/24  FOTO: intake completed/     Precautions:  Standard    Time In: 1100  Time Out: 1200  Total Billable Time: 60 minutes    SUBJECTIVE     Pt reports: bandages intact from previous visit.  Response to previous treatment: na  Pain: 2/10  Location: bilateral lower legs , improved from previous visit    OBJECTIVE   Girth Measurements (in centimeters)  LANDMARK LEFT leg 10/5, 10/26 RIGHT leg     forefoot 24/23.5 cm 22 cm     ankle  31.50/30.50 cm 29 cm     4" 36.75/33 cm 34 cm     calf 48.75/47 cm 46 cm     Below knee 47/46 cm 47.25 cm     Above knee 60 cm 60 cm         Treatment     Coby received the treatments listed below:     Therapeutic activities  for 45 minutes, including:  Patient presented with compression bandages intact.These were removed, measurements were taken and skin care was completed.Patient arrived alone.Reviewed steps of  Complete Decongestive Therapy  Protocol in detail and explained where she is in process.. Skin care completed. Reviewed all the education that was provided at initial evaluation on 10/5/2023. Instructed patient verbally and physically on deep abdominal breathing technique.Better than fair  return demonstration today.  Compression bandages were applied to left leg up to knee today. Reminded patient that this compression should not be painful and if she experienced " "pain, to remove promptly. Coby reported that she was unable to complete the order for her compression garments and so we completed that here today.  Manual Therapy 15 minutes     MANUAL LYMPHATIC DRAINAGE:    Supine with lower extremities elevated:  Short Neck  Axillary lymph nodes on right  Activate and work over the INGUINAL-AXILLARY ANASTOMOSES   Deep abdominal techniques  Rework both INGUINAL-AXILLARY ANASTOMOSES  Activate "bulk flow" on lateral thigh  Leg treatment (anterior/lateral thigh, femoral vein vasa-vasorum, knee, lower leg, ankle, dorsum of foot)  Rework: leg, inguinal lymph nodes, INGUINAL-AXILLARY ANASTOMOSES, axillary lymph nodes      Patient Education and Home Exercises      Education provided:   1. Educated on definition of lymphedema.  2. Explained the Complete Decongestive Therapy protocol in depth  3. Educated on Phase 1 and 2 of protocol.  4. Reviewed treatment frequency and likely duration of weeks  5.Contraindications for treatment.  6.Use of pneumatic pump  7. Educated on home management protocols.     Written Home Exercises Provided:  verbal and physical instruction of deep abdominal breathing technique today. .  Exercises were reviewed and Coby will require further instruction.   Assessment     Pt may continue to benefit from skilled OT.  Therapist did explain the Complete Decongestive Therapy protocol and rationales to Coby and her  again today with no evidence of improved insight into the Complete Decongestive Therapy protocol.    Coby Atkinson is a 77 y.o. female referred to outpatient occupational therapy and presents with a medical diagnosis of lymphedema. Lymphedema, left untreated increases risk of infection, gait deviation causing ortho problems and poor body image. Patient presents with the following therapy deficits: lymphedema of bilateral lower limbs and demonstrates limitations as described in the chart below. Following medical record review it is determined " that pt may benefit from complete decongestive therapy services for the treatment and management of this chronic condition.    Anticipated barriers to occupational therapy: poor insight in to rationale, but Alexandrea much improved insight at this visit, good carry over.  Pt has no cultural, educational or language barriers to learning provided.      Coby is progressing minimally towards her goals and there are no updates to goals at this time. Pt prognosis is Fair.     Pt will continue to benefit from skilled outpatient occupational therapy to address the deficits listed in the problem list on initial evaluation provide pt/family education and to maximize pt's level of independence in the home and community environment.     Pt's spiritual, cultural and educational needs considered and pt agreeable to plan of care and goals.    Anticipated barriers to occupational therapy: compliance and insight in to the chronicity and treatment of lymphedema.     The following goals were discussed with the patient and patient is in agreement with them as to be addressed in the treatment plan.      Goals:   Short Term Goals for 6 weeks: (phase 1 of protocol)   Patient will be educated on lymphedema precautions and signs of infection. - Ongoing 10/5/2023   Patient will perform deep abdominal breathing TID-ongoing  Patient will tolerate daily activities with multilayered bandaging.- ongoing  Appropriate compression garments to be ordered/delivered- Ongoing 10/5/2023    to be instructed in compression bandaging   Long Term Goals for 10 weeks: (Phase 2 of goals)  Patient/spouse will be independent with home management of this chronic condition.  Patient/spouse to aliza/doff compression garment.   Patient will demonstrate compliance with all home management recommendations.  Patient will maintain reduction at monthly follow up appointments for 3 months      PLAN   Plan of Care Certification: 10/5/2023 to 12-.      Outpatient  Occupational Therapy 3 times weekly for 10 weeks to include the following interventions: Complete Decongestive Therapy  .        BANDAR Philip, VILLA/DEONNA

## 2023-11-01 NOTE — PROGRESS NOTES
"  [facility]  Occupational Therapy Treatment Note    Date: 10/31/2023  Name: Coby Atkinson  Clinic Number: [id number]    Therapy Diagnosis:   Encounter Diagnosis   Name Primary?    Lymphedema of both lower extremities Yes   Physician: Berhane Castrejon MD*     Physician Orders: evaluate and treat  Medical Diagnosis: lymphedema  Surgical Procedure and Date: na,   Evaluation Date: 10/5/2023  Insurance Authorization Period Expiration: 9-  Plan of Care Certification Period: 12-  Progress Note Due: same   Date of Return to MD: to be determined  Visit # / Visits authorized: 6/24  FOTO: intake completed/     Precautions:  Standard    Time In: 1100  Time Out: 1154  Total Billable Time: 54 minutes    SUBJECTIVE     Pt reports: bandages intact from previous visit.  Response to previous treatment: na  Pain: 2/10  Location: bilateral lower legs , improved from previous visit    OBJECTIVE   Girth Measurements (in centimeters)  LANDMARK LEFT leg 10/5, 10/26, 10/31 RIGHT leg     forefoot 24/23.5/24 cm 22 cm     ankle  31.50/30.50/29 cm 29 cm     4" 36.75/33/33/34 cm 34 cm     calf 48.75/47 /46cm 46 cm     Below knee 47/46 cm 47.25 cm     Above knee 60 cm 60 cm         Treatment     Coby received the treatments listed below:     Therapeutic activities  for 45 minutes, including:  Patient presented with compression bandages intact.These were removed, measurements were taken and skin care was completed.Patient arrived alone.Reviewed steps of  Complete Decongestive Therapy  Protocol in detail and explained where she is in process.. Skin care completed. Reviewed all the education that was provided at initial evaluation on 10/5/2023. Instructed patient verbally and physically on deep abdominal breathing technique.Better than fair  return demonstration today.  Compression bandages were applied to right leg up to knee today. Reminded patient that this compression should not be painful and " "if she experienced pain, to remove promptly. Coby to check shipping update in her email.  Manual Therapy 15 minutes     MANUAL LYMPHATIC DRAINAGE:    Supine with lower extremities elevated:  Short Neck  Axillary lymph nodes on right  Activate and work over the INGUINAL-AXILLARY ANASTOMOSES   Deep abdominal techniques  Rework both INGUINAL-AXILLARY ANASTOMOSES  Activate "bulk flow" on lateral thigh  Leg treatment (anterior/lateral thigh, femoral vein vasa-vasorum, knee, lower leg, ankle, dorsum of foot)  Rework: leg, inguinal lymph nodes, INGUINAL-AXILLARY ANASTOMOSES, axillary lymph nodes      Patient Education and Home Exercises      Education provided:   1. Educated on definition of lymphedema.  2. Explained the Complete Decongestive Therapy protocol in depth  3. Educated on Phase 1 and 2 of protocol.  4. Reviewed treatment frequency and likely duration of weeks  5.Contraindications for treatment.  6.Use of pneumatic pump  7. Educated on home management protocols.     Written Home Exercises Provided:  verbal and physical instruction of deep abdominal breathing technique today. .  Exercises were reviewed and Coby will require further instruction.   Assessment     Pt may continue to benefit from skilled OT.  Therapist did explain the Complete Decongestive Therapy protocol and rationales to Coby and her  again today with no evidence of improved insight into the Complete Decongestive Therapy protocol.    Coby Atkinson is a 77 y.o. female referred to outpatient occupational therapy and presents with a medical diagnosis of lymphedema. Lymphedema, left untreated increases risk of infection, gait deviation causing ortho problems and poor body image. Patient presents with the following therapy deficits: lymphedema of bilateral lower limbs and demonstrates limitations as described in the chart below. Following medical record review it is determined that pt may benefit from complete decongestive therapy " services for the treatment and management of this chronic condition.    Anticipated barriers to occupational therapy: poor insight in to rationale, but Alexandrea much improved insight at this visit, good carry over.  Pt has no cultural, educational or language barriers to learning provided.      Coby is progressing minimally towards her goals and there are no updates to goals at this time. Pt prognosis is Fair.     Pt will continue to benefit from skilled outpatient occupational therapy to address the deficits listed in the problem list on initial evaluation provide pt/family education and to maximize pt's level of independence in the home and community environment.     Pt's spiritual, cultural and educational needs considered and pt agreeable to plan of care and goals.    Anticipated barriers to occupational therapy: compliance and insight in to the chronicity and treatment of lymphedema.     The following goals were discussed with the patient and patient is in agreement with them as to be addressed in the treatment plan.      Goals:   Short Term Goals for 6 weeks: (phase 1 of protocol)   Patient will be educated on lymphedema precautions and signs of infection. - Ongoing 10/5/2023   Patient will perform deep abdominal breathing TID-ongoing  Patient will tolerate daily activities with multilayered bandaging.- ongoing  Appropriate compression garments to be ordered/delivered- Ongoing 10/5/2023    to be instructed in compression bandaging   Long Term Goals for 10 weeks: (Phase 2 of goals)  Patient/spouse will be independent with home management of this chronic condition.  Patient/spouse to aliza/doff compression garment.   Patient will demonstrate compliance with all home management recommendations.  Patient will maintain reduction at monthly follow up appointments for 3 months      PLAN   Plan of Care Certification: 10/5/2023 to 12-.      Outpatient Occupational Therapy 3 times weekly for 10 weeks to  include the following interventions: Complete Decongestive Therapy  .        BANDAR Philip, VILLA/DEONNA

## 2023-11-03 ENCOUNTER — TELEPHONE (OUTPATIENT)
Dept: CARDIOLOGY | Facility: CLINIC | Age: 78
End: 2023-11-03
Payer: COMMERCIAL

## 2023-11-03 NOTE — TELEPHONE ENCOUNTER
Lymphedema Pump Progress:  [x] Order, clinical notes, and face sheet faxed to [facility] for home pneumatic compression.  [x] Reached out to patient for follow up. Wish to inquire about symptoms of lymphedema and if conservative therapy has been working.  [x] Updated progress note sent to [facility].  [x] Patient pneumatic compression pump approved and shipped by [employer].

## 2023-11-10 ENCOUNTER — TELEPHONE (OUTPATIENT)
Dept: OTOLARYNGOLOGY | Facility: CLINIC | Age: 78
End: 2023-11-10
Payer: COMMERCIAL

## 2023-11-16 ENCOUNTER — ANTI-COAG VISIT (OUTPATIENT)
Dept: CARDIOLOGY | Facility: CLINIC | Age: 78
End: 2023-11-16
Payer: MEDICARE

## 2023-11-16 DIAGNOSIS — I82.532 CHRONIC DEEP VEIN THROMBOSIS (DVT) OF LEFT POPLITEAL VEIN: ICD-10-CM

## 2023-11-16 DIAGNOSIS — I27.82 CHRONIC PULMONARY EMBOLISM WITHOUT ACUTE COR PULMONALE, UNSPECIFIED PULMONARY EMBOLISM TYPE: ICD-10-CM

## 2023-11-16 DIAGNOSIS — Z79.01 CHRONIC ANTICOAGULATION: ICD-10-CM

## 2023-11-16 DIAGNOSIS — Z86.711 HISTORY OF PULMONARY EMBOLUS (PE): Primary | ICD-10-CM

## 2023-11-16 PROCEDURE — 93793 ANTICOAG MGMT PT WARFARIN: CPT | Mod: S$GLB,,, | Performed by: PHARMACIST

## 2023-11-16 PROCEDURE — 93793 PR ANTICOAGULANT MGMT FOR PT TAKING WARFARIN: ICD-10-PCS | Mod: S$GLB,,, | Performed by: PHARMACIST

## 2023-12-04 ENCOUNTER — ANTI-COAG VISIT (OUTPATIENT)
Dept: CARDIOLOGY | Facility: CLINIC | Age: 78
End: 2023-12-04
Payer: MEDICARE

## 2023-12-04 DIAGNOSIS — I27.82 CHRONIC PULMONARY EMBOLISM WITHOUT ACUTE COR PULMONALE, UNSPECIFIED PULMONARY EMBOLISM TYPE: ICD-10-CM

## 2023-12-04 DIAGNOSIS — Z86.711 HISTORY OF PULMONARY EMBOLUS (PE): Primary | ICD-10-CM

## 2023-12-04 DIAGNOSIS — I82.532 CHRONIC DEEP VEIN THROMBOSIS (DVT) OF LEFT POPLITEAL VEIN: ICD-10-CM

## 2023-12-04 DIAGNOSIS — Z79.01 CHRONIC ANTICOAGULATION: ICD-10-CM

## 2023-12-04 PROCEDURE — 93793 ANTICOAG MGMT PT WARFARIN: CPT | Mod: S$GLB,,, | Performed by: PHARMACIST

## 2023-12-04 PROCEDURE — 93793 PR ANTICOAGULANT MGMT FOR PT TAKING WARFARIN: ICD-10-PCS | Mod: S$GLB,,, | Performed by: PHARMACIST

## 2023-12-21 ENCOUNTER — ANTI-COAG VISIT (OUTPATIENT)
Dept: CARDIOLOGY | Facility: CLINIC | Age: 78
End: 2023-12-21
Payer: MEDICARE

## 2023-12-21 DIAGNOSIS — I27.82 CHRONIC PULMONARY EMBOLISM WITHOUT ACUTE COR PULMONALE, UNSPECIFIED PULMONARY EMBOLISM TYPE: ICD-10-CM

## 2023-12-21 DIAGNOSIS — Z79.01 CHRONIC ANTICOAGULATION: ICD-10-CM

## 2023-12-21 DIAGNOSIS — I82.532 CHRONIC DEEP VEIN THROMBOSIS (DVT) OF LEFT POPLITEAL VEIN: ICD-10-CM

## 2023-12-21 DIAGNOSIS — Z86.711 HISTORY OF PULMONARY EMBOLUS (PE): Primary | ICD-10-CM

## 2023-12-21 PROCEDURE — 93793 ANTICOAG MGMT PT WARFARIN: CPT | Mod: S$GLB,,, | Performed by: PHARMACIST

## 2023-12-21 PROCEDURE — 93793 PR ANTICOAGULANT MGMT FOR PT TAKING WARFARIN: ICD-10-PCS | Mod: S$GLB,,, | Performed by: PHARMACIST

## 2023-12-29 NOTE — PROGRESS NOTES
Called pt to reschedule missed 12/28 lab- pt is leaving for a cruise on 1/2/24 and will not return until 1/9/23. She said the earliest she can get lab in 1/12/24 early AM.

## 2024-01-12 ENCOUNTER — OFFICE VISIT (OUTPATIENT)
Dept: OTOLARYNGOLOGY | Facility: CLINIC | Age: 79
End: 2024-01-12
Payer: MEDICARE

## 2024-01-12 VITALS — HEART RATE: 56 BPM | DIASTOLIC BLOOD PRESSURE: 64 MMHG | SYSTOLIC BLOOD PRESSURE: 139 MMHG

## 2024-01-12 DIAGNOSIS — H61.21 RIGHT EAR IMPACTED CERUMEN: ICD-10-CM

## 2024-01-12 DIAGNOSIS — H91.90 HEARING LOSS, UNSPECIFIED HEARING LOSS TYPE, UNSPECIFIED LATERALITY: Primary | ICD-10-CM

## 2024-01-12 PROCEDURE — 99203 OFFICE O/P NEW LOW 30 MIN: CPT | Mod: 25,S$PBB,, | Performed by: OTOLARYNGOLOGY

## 2024-01-12 PROCEDURE — 99999 PR PBB SHADOW E&M-EST. PATIENT-LVL IV: CPT | Mod: PBBFAC,,, | Performed by: OTOLARYNGOLOGY

## 2024-01-12 PROCEDURE — 99214 OFFICE O/P EST MOD 30 MIN: CPT | Mod: PBBFAC,PN | Performed by: OTOLARYNGOLOGY

## 2024-01-12 NOTE — PROCEDURES
"Ear Cerumen Removal    Date/Time: 1/12/2024 10:20 AM    Performed by: Jason Polanco MD  Authorized by: Jason Polanco MD    Time out: Immediately prior to procedure a "time out" was called to verify the correct patient, procedure, equipment, support staff and site/side marked as required.    Consent Done?:  Yes (Verbal)    Local anesthetic:  None  Location details:  Right ear  Cerumen  Removal Results:  Cerumen completely removed  Patient tolerance:  Patient tolerated the procedure well with no immediate complications     Alligator.  No trauma or bleeding on Coumadin.    "

## 2024-01-12 NOTE — PROGRESS NOTES
[facility]    Subjective:      Patient: Coby Atkinson Patient PCP: Mundo Hudson DO         :  1945     Sex:  female      MRN:  [id number]          Date of Visit: 2024      Chief Complaint: Cerumen Impaction (Patient was referred to ENT for bilateral cerumen impaction./)      Patient ID: Coby Atkinson is a 78 y.o. female     Patient is a  lifelong NON-smoker with a past medical history of obesity hypoventilation syndrome, HTN, history of MI, pulmonary hypertension, pulmonary heart disease, history of DVT on Coumadin, ADRIÁN but no history of diabetes or immunosuppression referred to me by Flor Gautam in consultation for wax impactions.  Patient denies pain or drainage.  She does not wear hearing aids.  No audiogram.    Labs:  WBC   Date Value Ref Range Status   2023 9.36 3.90 - 12.70 K/uL Final     Hemoglobin   Date Value Ref Range Status   2023 11.7 (L) 12.0 - 16.0 g/dL Final     Platelets   Date Value Ref Range Status   2023 299 150 - 450 K/uL Final     Creatinine   Date Value Ref Range Status   2023 0.9 0.5 - 1.4 mg/dL Final     TSH   Date Value Ref Range Status   2023 3.266 0.400 - 4.000 uIU/mL Final     Hemoglobin A1C   Date Value Ref Range Status   2023 5.3 4.0 - 5.6 % Final     Comment:     ADA Screening Guidelines:  5.7-6.4%  Consistent with prediabetes  >or=6.5%  Consistent with diabetes    High levels of fetal hemoglobin interfere with the HbA1C  assay. Heterozygous hemoglobin variants (HbS, HgC, etc)do  not significantly interfere with this assay.   However, presence of multiple variants may affect accuracy.         Past Medical History  She has a past medical history of Anticoagulant long-term use, Cataract, Chronic diarrhea, Depression, Edema, GERD (gastroesophageal reflux disease), Hypertension, Osteoarthritis of both knees, Osteopenia, Osteopenia, PE (pulmonary thromboembolism), Primary localized osteoarthrosis, lower leg,  "Sleep apnea, and Thalassemia minor.    Family / Surgical / Social History  Her family history includes Cancer in her brother and father; Coronary artery disease in her maternal grandmother and paternal grandmother; Heart failure in her maternal grandmother; Hypertension in her mother; No Known Problems in her maternal aunt, maternal grandfather, maternal uncle, paternal aunt, paternal grandfather, paternal uncle, and sister; Stroke in her paternal grandmother.    Past Surgical History:   Procedure Laterality Date    APPENDECTOMY      CATARACT EXTRACTION      COLONOSCOPY N/A 11/5/2016    Procedure: COLONOSCOPY;  Surgeon: Tanner Simental MD;  Location: [address];  Service: Endoscopy;  Laterality: N/A;    HYSTERECTOMY  age 39    fibroids    OOPHORECTOMY      TONSILLECTOMY         Social History     Tobacco Use    Smoking status: Never     Passive exposure: Never    Smokeless tobacco: Never   Substance and Sexual Activity    Alcohol use: No     Comment: rare    Drug use: No    Sexual activity: Yes     Partners: Male     Birth control/protection: None       Medications  She has a current medication list which includes the following prescription(s): alendronate, amlodipine-valsartan, bisoprolol, buspirone, doxazosin, ergocalciferol, escitalopram oxalate, furosemide, pantoprazole, potassium chloride, spironolactone, and warfarin.      Allergies  Review of patient's allergies indicates:   Allergen Reactions    Codeine     Ciprofloxacin Rash       All medications, allergies, and past history have been reviewed.    Objective:      Vitals:      10/2/2023     8:34 AM 10/17/2023     2:57 PM 1/12/2024    10:28 AM   Vitals - 1 value per visit   SYSTOLIC 166  139   DIASTOLIC 79  64   Pulse 70  56   Weight (lb) 236.77 236.77    Weight (kg) 107.4 107.4    Height 5' 4" (1.626 m) 5' 4" (1.626 m)    BMI (Calculated) 40.6 40.6    Pain Score Five Three Zero       There is no height or weight on file to calculate " BSA.    Physical Exam:    GENERAL  APPEARANCE -  alert, appears stated age, and cooperative  BARRIER(S) TO COMMUNICATION -  none VOICE - appropriate for age and gender    INTEGUMENTARY  no suspicious head and neck lesions    HEENT  HEAD: Normocephalic, without obvious abnormality, atraumatic  FACE: INSPECTION - Symmetric, no signs of trauma, no suspicious lesion(s)      STRENGTH - facial symmetry intact     EYES  Normal occular alignment and mobility with no visible nystagmus at rest    EARS/NOSE/MOUTH/THROAT  EARS  PINNAE AND EXTERNAL EARS - no suspicious lesion OTOSCOPIC EXAM (surgical microscopy wax used for visualization/instrumentation): EAR EXAM - excessive wax preventing exam cleared with micro and instruments without trauma to reveal a normal EAC, TM and ME exam bilaterally.    HEARING - decreased but functional, misses some conversation    CHEST AND LUNG   INSPECTION & AUSCULTATION - normal effort, no stridor    NEUROLOGIC  MENTAL STATUS - alert, interactive CRANIAL NERVES - normal      Procedure(s):  Cerumen removal performed.  See procedure note.          Assessment:      Problem List Items Addressed This Visit    None  Visit Diagnoses       Impacted cerumen, unspecified laterality                     Plan:      Wax care.  Routine audiogram.    Follow up prn          Voice recognition software was used in the creation of this note/communication and any sound-alike errors which may have occurred from its use should be taken in context when interpreting.  If such errors prevent a clear understanding of the note/communication, please contact the office for clarification.

## 2024-01-17 ENCOUNTER — TELEPHONE (OUTPATIENT)
Dept: INTERNAL MEDICINE | Facility: CLINIC | Age: 79
End: 2024-01-17
Payer: MEDICARE

## 2024-01-17 ENCOUNTER — ANTI-COAG VISIT (OUTPATIENT)
Dept: CARDIOLOGY | Facility: CLINIC | Age: 79
End: 2024-01-17
Payer: MEDICARE

## 2024-01-17 DIAGNOSIS — I27.82 CHRONIC PULMONARY EMBOLISM WITHOUT ACUTE COR PULMONALE, UNSPECIFIED PULMONARY EMBOLISM TYPE: ICD-10-CM

## 2024-01-17 DIAGNOSIS — Z86.711 HISTORY OF PULMONARY EMBOLUS (PE): Primary | ICD-10-CM

## 2024-01-17 DIAGNOSIS — Z79.01 LONG TERM (CURRENT) USE OF ANTICOAGULANTS: ICD-10-CM

## 2024-01-17 DIAGNOSIS — I82.532 CHRONIC DEEP VEIN THROMBOSIS (DVT) OF LEFT POPLITEAL VEIN: ICD-10-CM

## 2024-01-17 DIAGNOSIS — Z79.01 CHRONIC ANTICOAGULATION: ICD-10-CM

## 2024-01-17 PROCEDURE — 93793 ANTICOAG MGMT PT WARFARIN: CPT | Mod: S$GLB,,, | Performed by: PHARMACIST

## 2024-01-17 NOTE — PROGRESS NOTES
[facility]    2024 1:42 PM    Assessment/Plan:    Patient presents today with supratherapeutic INR.    Assessment of patient findings and chart review: INR not at goal. Medications, chart, and patient findings reviewed. See calendar for adjustments to dose and follow up plan.      Recommendation for patient's warfarin regimen: Hold dose for 3 days    Recommend repeat INR in 1 week  _________________________________________________________________    Coby Atkinson (78 y.o.) is followed by the [facility].    Anticoagulation Summary  As of 2024      INR goal:  2.0-3.0   TTR:  32.3 % (6.1 y)   INR used for dosin.2 (2024)   Warfarin maintenance plan:  0.5 mg (1 mg x 0.5) every Thu, Sat; 1 mg (1 mg x 1) all other days   Weekly warfarin total:  6 mg   Plan last modified:  Josefina De Leon, PharmD (2024)   Next INR check:  2024   Target end date:      Indications    History of pulmonary embolus (PE)--2017 [Z86.711]  Chronic anticoagulation [Z79.01]  Chronic deep vein thrombosis (DVT) of left popliteal vein [I82.532]  Chronic pulmonary embolism without acute cor pulmonale [I27.82]                 Anticoagulation Episode Summary       INR check location:  Clinic Lab    Preferred lab:      Send INR reminders to:  [facility] COUMADIN MONITORING POOL    Comments:  [facility] // pt to write down dosing instructions & repeat back          Anticoagulation Care Providers       Provider Role Specialty Phone number    Mundo Hudson,  [facility] [phone]            Patient Findings       Positives:  Other complaints    Negatives:  Signs/symptoms of thrombosis, Signs/symptoms of bleeding, Laboratory test error suspected, Change in health, Change in alcohol use, Change in activity, Upcoming invasive procedure, Emergency department visit, Upcoming dental  procedure, Missed doses, Extra doses, Change in medications, Change in diet/appetite, Hospital admission, Bruising    Comments:  Pt confirmed correct dosing per calendar. No changes to diet or mediations. Pt confirmed she has been feeling cold recently. No s/sx of bleeding.

## 2024-01-22 RX ORDER — WARFARIN 1 MG/1
TABLET ORAL
Qty: 45 TABLET | Refills: 5 | Status: SHIPPED | OUTPATIENT
Start: 2024-01-22

## 2024-01-22 NOTE — PROGRESS NOTES
1/22/24 Patient called requesting a prescription for Warfarin - 1mg tablets be called in to [facility] [phone], states has enough for 2 days

## 2024-01-23 ENCOUNTER — ANTI-COAG VISIT (OUTPATIENT)
Dept: CARDIOLOGY | Facility: CLINIC | Age: 79
End: 2024-01-23
Payer: MEDICARE

## 2024-01-23 ENCOUNTER — TELEPHONE (OUTPATIENT)
Dept: INTERNAL MEDICINE | Facility: CLINIC | Age: 79
End: 2024-01-23
Payer: MEDICARE

## 2024-01-23 DIAGNOSIS — Z79.01 CHRONIC ANTICOAGULATION: ICD-10-CM

## 2024-01-23 DIAGNOSIS — Z86.711 HISTORY OF PULMONARY EMBOLUS (PE): Primary | ICD-10-CM

## 2024-01-23 DIAGNOSIS — I82.532 CHRONIC DEEP VEIN THROMBOSIS (DVT) OF LEFT POPLITEAL VEIN: ICD-10-CM

## 2024-01-23 DIAGNOSIS — I27.82 CHRONIC PULMONARY EMBOLISM WITHOUT ACUTE COR PULMONALE, UNSPECIFIED PULMONARY EMBOLISM TYPE: ICD-10-CM

## 2024-01-23 PROCEDURE — 93793 ANTICOAG MGMT PT WARFARIN: CPT | Mod: S$GLB,,, | Performed by: PHARMACIST

## 2024-01-23 NOTE — PROGRESS NOTES
[facility]    2024 1:12 PM    Assessment/Plan:    Patient presents today with supratherapeutic INR.    Assessment of patient findings and chart review: INR not at goal. Medications, chart, and patient findings reviewed. See calendar for adjustments to dose and follow up plan.  INR very elevated for holding x 3 doses of warfarin last week.  She has had a history of taking dosing incorrectly. We will hold coumadin today and tomorrow and repeat INR on Thursday prior to restarting the warfarin so we can assure INR moving in the right direction.     Recommendation for patient's warfarin regimen: Hold coumadin until further notice    Recommend repeat INR in 3 days  _________________________________________________________________    Coby Davidesperanza Atkinson (78 y.o.) is followed by the [facility].    Anticoagulation Summary  As of 2024      INR goal:  2.0-3.0   TTR:  32.2 % (6.1 y)   INR used for dosin.5 (2024)   Warfarin maintenance plan:  0.5 mg (1 mg x 0.5) every Thu, Sat; 1 mg (1 mg x 1) all other days   Weekly warfarin total:  6 mg   Plan last modified:  Josefina De Leon, PharmD (2024)   Next INR check:  2024   Target end date:      Indications    History of pulmonary embolus (PE)--2017 [Z86.711]  Chronic anticoagulation [Z79.01]  Chronic deep vein thrombosis (DVT) of left popliteal vein [I82.532]  Chronic pulmonary embolism without acute cor pulmonale [I27.82]                 Anticoagulation Episode Summary       INR check location:  Clinic Lab    Preferred lab:      Send INR reminders to:  [facility] COUMADIN MONITORING POOL    Comments:  [facility] // pt to write down dosing instructions & repeat back          Anticoagulation Care Providers       Provider Role Specialty Phone number    Mundo Hudson DO [facility] Internal Medicine [phone]            Patient  Findings       Negatives:  Signs/symptoms of thrombosis, Signs/symptoms of bleeding, Laboratory test error suspected, Change in health, Change in alcohol use, Change in activity, Upcoming invasive procedure, Emergency department visit, Upcoming dental procedure, Missed doses, Extra doses, Change in medications, Change in diet/appetite, Hospital admission, Bruising, Other complaints    Comments:  Pt confirmed correct dosing per calendar. No new diet or medication changes. She has some swelling in her L leg that she is currently being treated for using a decompression machine and is not a new issue. No s/sx of bleeding. No other changes noted.                    '

## 2024-01-23 NOTE — TELEPHONE ENCOUNTER
Received  A critical value on patient INR 5.5    Spoke to patient and she had not been notify by the coumadin clinic yet .  I reached out to coumadin clinic and the received the value  and will be contacting patient soon.

## 2024-01-24 DIAGNOSIS — Z79.01 CHRONIC ANTICOAGULATION: Primary | ICD-10-CM

## 2024-01-24 DIAGNOSIS — I50.32 CHRONIC DIASTOLIC HEART FAILURE: ICD-10-CM

## 2024-01-24 DIAGNOSIS — I10 ESSENTIAL HYPERTENSION: ICD-10-CM

## 2024-01-25 ENCOUNTER — TELEPHONE (OUTPATIENT)
Dept: INTERNAL MEDICINE | Facility: CLINIC | Age: 79
End: 2024-01-25
Payer: MEDICARE

## 2024-01-25 ENCOUNTER — OFFICE VISIT (OUTPATIENT)
Dept: PAIN MEDICINE | Facility: CLINIC | Age: 79
End: 2024-01-25
Payer: MEDICARE

## 2024-01-25 ENCOUNTER — ANTI-COAG VISIT (OUTPATIENT)
Dept: CARDIOLOGY | Facility: CLINIC | Age: 79
End: 2024-01-25
Payer: MEDICARE

## 2024-01-25 VITALS
HEART RATE: 71 BPM | SYSTOLIC BLOOD PRESSURE: 99 MMHG | WEIGHT: 236 LBS | DIASTOLIC BLOOD PRESSURE: 62 MMHG | HEIGHT: 64 IN | BODY MASS INDEX: 40.29 KG/M2

## 2024-01-25 DIAGNOSIS — M25.562 BILATERAL CHRONIC KNEE PAIN: Primary | ICD-10-CM

## 2024-01-25 DIAGNOSIS — Z79.01 CHRONIC ANTICOAGULATION: ICD-10-CM

## 2024-01-25 DIAGNOSIS — M17.0 PRIMARY OSTEOARTHRITIS OF BOTH KNEES: ICD-10-CM

## 2024-01-25 DIAGNOSIS — Z86.711 HISTORY OF PULMONARY EMBOLUS (PE): Primary | ICD-10-CM

## 2024-01-25 DIAGNOSIS — I82.532 CHRONIC DEEP VEIN THROMBOSIS (DVT) OF LEFT POPLITEAL VEIN: ICD-10-CM

## 2024-01-25 DIAGNOSIS — G89.29 BILATERAL CHRONIC KNEE PAIN: Primary | ICD-10-CM

## 2024-01-25 DIAGNOSIS — M25.561 BILATERAL CHRONIC KNEE PAIN: Primary | ICD-10-CM

## 2024-01-25 DIAGNOSIS — I27.82 CHRONIC PULMONARY EMBOLISM WITHOUT ACUTE COR PULMONALE, UNSPECIFIED PULMONARY EMBOLISM TYPE: ICD-10-CM

## 2024-01-25 PROCEDURE — 99213 OFFICE O/P EST LOW 20 MIN: CPT | Mod: PBBFAC,PN | Performed by: PAIN MEDICINE

## 2024-01-25 PROCEDURE — 99999 PR PBB SHADOW E&M-EST. PATIENT-LVL III: CPT | Mod: PBBFAC,,, | Performed by: PAIN MEDICINE

## 2024-01-25 PROCEDURE — 93793 ANTICOAG MGMT PT WARFARIN: CPT | Mod: S$GLB,,, | Performed by: PHARMACIST

## 2024-01-25 PROCEDURE — 99204 OFFICE O/P NEW MOD 45 MIN: CPT | Mod: S$PBB,,, | Performed by: PAIN MEDICINE

## 2024-01-25 NOTE — PROGRESS NOTES
Subjective:     Patient ID: Coby Atkinson is a 78 y.o. female    Chief Complaint: Leg Pain      Referred by: Kit Chavira MD      HPI:    Initial Encounter (1/25/24):  Coby Atkinson is a 78 y.o. female who presents today with chronic bilateral knee pain secondary to osteoarthritis.  Patient is followed by Orthopedic surgery.  Has failed intra-articular injections.  Not an ideal candidate for surgery, and patient is not very interested in knee replacement surgery at this time.  She is here today to discuss genicular nerve blocks/RFA as a potential way to manage her pain.   This pain is described in detail below.    Physical Therapy:  No.    Non-pharmacologic Treatment:  Rest helps         TENS?  No    Pain Medications:         Currently taking:  Nothing    Has tried in the past:  NSAIDs, Tylenol    Has not tried:  Opioids, Muscle relaxants, TCAs, SNRIs, anticonvulsants, topical creams    Blood thinners:  Coumadin    Interventional Therapies:  None    Relevant Surgeries:  None    Affecting sleep?  Yes    Affecting daily activities? yes    Depressive symptoms? No          SI/HI? No    Work status: Retired    Pain Scores:    Best:       3/10  Worst:     6/10  Usually:   3/10  Today:    3/10    Pain Disability Index  Family/Home Responsibilities:: 3  Recreation:: 3  Social Activity:: 3  Occupation:: 0  Sexual Behavior:: 0  Self Care:: 3  Life-Support Activities:: 3  Pain Disability Index (PDI): 15    Review of Systems   Constitutional:  Negative for activity change, appetite change, chills, fatigue, fever and unexpected weight change.   HENT:  Negative for hearing loss.    Eyes:  Negative for visual disturbance.   Respiratory:  Negative for chest tightness and shortness of breath.    Cardiovascular:  Negative for chest pain.   Gastrointestinal:  Negative for abdominal pain, constipation, diarrhea, nausea and vomiting.   Genitourinary:  Negative for difficulty urinating.   Musculoskeletal:  Positive  for arthralgias, gait problem and myalgias. Negative for back pain and neck pain.   Skin:  Negative for rash.   Neurological:  Positive for weakness. Negative for dizziness, light-headedness, numbness and headaches.   Psychiatric/Behavioral:  Positive for sleep disturbance. Negative for hallucinations and suicidal ideas. The patient is not nervous/anxious.        Past Medical History:   Diagnosis Date    Anticoagulant long-term use     Cataract     Chronic diarrhea     Depression     Edema     GERD (gastroesophageal reflux disease)     Hypertension 10/2/2012    Osteoarthritis of both knees 8/9/2016    Osteopenia     Osteopenia 11/30/2017    PE (pulmonary thromboembolism) 12/03/2017    Primary localized osteoarthrosis, lower leg 12/3/2014    Sleep apnea 2016    Thalassemia minor        Past Surgical History:   Procedure Laterality Date    APPENDECTOMY      CATARACT EXTRACTION      COLONOSCOPY N/A 11/5/2016    Procedure: COLONOSCOPY;  Surgeon: Tanner Simental MD;  Location: [address];  Service: Endoscopy;  Laterality: N/A;    HYSTERECTOMY  age 39    fibroids    OOPHORECTOMY      TONSILLECTOMY         Social History     Socioeconomic History    Marital status:     Number of children: 1   Occupational History    Occupation: Retired from [employer]    Tobacco Use    Smoking status: Never     Passive exposure: Never    Smokeless tobacco: Never   Substance and Sexual Activity    Alcohol use: No     Comment: rare    Drug use: No    Sexual activity: Yes     Partners: Male     Birth control/protection: None     Social Determinants of Health     Financial Resource Strain: Low Risk  (9/19/2023)    Overall Financial Resource Strain (CARDIA)     Difficulty of Paying Living Expenses: Not very hard   Food Insecurity: No Food Insecurity (9/19/2023)    Hunger Vital Sign     Worried About Running Out of Food in the Last Year: Never true     Ran Out of Food in the Last Year: Never true    Transportation Needs: No Transportation Needs (9/19/2023)    PRAPARE - Transportation     Lack of Transportation (Medical): No     Lack of Transportation (Non-Medical): No   Stress: No Stress Concern Present (9/19/2023)    [employer] - Occupational Stress Questionnaire     Feeling of Stress : Only a little   Social Connections: Unknown (9/19/2023)    Social Connection and Isolation Panel [NHANES]     Marital Status:    Housing Stability: Unknown (9/19/2023)    Housing Stability Vital Sign     Unable to Pay for Housing in the Last Year: No     Unstable Housing in the Last Year: No       Review of patient's allergies indicates:   Allergen Reactions    Codeine     Ciprofloxacin Rash       Current Outpatient Medications on File Prior to Visit   Medication Sig Dispense Refill    alendronate (FOSAMAX) 70 MG tablet Take 1 tablet once weekly in the morning with a full glass of water on an empty stomach. Do not consume food or lie down for at least 30 minutes afterwards. 12 tablet 3    amlodipine-valsartan (EXFORGE)  mg per tablet Take 1 tablet by mouth once daily. 90 tablet 3    bisoprolol (ZEBETA) 10 MG tablet Take 1 tablet (10 mg total) by mouth once daily. 90 tablet 3    busPIRone (BUSPAR) 10 MG tablet TAKE 1 TABLET(10 MG) BY MOUTH THREE TIMES DAILY AS NEEDED FOR ANXIETY 90 tablet 2    doxazosin (CARDURA) 8 MG Tab Take 8 mg by mouth once daily.      ergocalciferol (ERGOCALCIFEROL) 50,000 unit Cap Take 1 capsule (50,000 Units total) by mouth every 7 days. 12 capsule 1    EScitalopram oxalate (LEXAPRO) 20 MG tablet Take 20 mg by mouth once daily.      furosemide (LASIX) 20 MG tablet Take 2 tablets (40 mg total) by mouth once daily. 180 tablet 3    pantoprazole (PROTONIX) 40 MG tablet Take 40 mg by mouth 2 (two) times daily.      potassium chloride (MICRO-K) 10 MEQ CpSR Take 1 capsule (10 mEq total) by mouth once daily. 90 capsule 3    spironolactone (ALDACTONE) 25 MG tablet Take  "25 mg by mouth once daily.      warfarin (COUMADIN) 1 MG tablet Take 1mg daily, except take 0.5mg on Thursdays and Saturdays OR as directed by Coumadin Clinic 45 tablet 5     No current facility-administered medications on file prior to visit.       Objective:      BP 99/62   Pulse 71   Ht 5' 4" (1.626 m)   Wt 107 kg (236 lb)   BMI 40.51 kg/m²     Exam:  GEN:  Well developed, well nourished.  No acute distress.   HEENT:  No trauma.  Mucous membranes moist.  Nares patent bilaterally.  PSYCH: Normal affect. Thought content appropriate.  CHEST:  Breathing symmetric.  No audible wheezing.  ABD: Soft, non-distended.  SKIN:  Warm, pink, dry.  No rash on exposed areas.    EXT:  No cyanosis, clubbing, or edema.  No color change or changes in nail or hair growth.  NEURO/MUSCULOSKELETAL:  Fully alert, oriented, and appropriate. Speech normal mansoor. No cranial nerve deficits.   Gait:  Antalgic.  No focal motor deficits.     No gross warmth, swelling, erythema to bilateral knees   Full active range of motion both knees         Imaging:    Narrative & Impression    EXAMINATION:  XR KNEE ORTHO BILAT WITH FLEXION     CLINICAL HISTORY:  Bilateral primary osteoarthritis of knee     TECHNIQUE:  AP standing of both knees, PA flexion standing views of both knees, and Merchant views of both knees were performed.  Lateral views of both knees were also performed.     COMPARISON:  10/17/2022     FINDINGS:  Five views bilateral knees.     Right knee:     There is tricompartmental degenerative change.  There is osteopenia.  No acute displaced fracture or dislocation of the right knee.  No large knee joint effusion.  There is prominent lateral compartmental narrowing peer     Left knee:     There is tricompartmental degenerative change noting prominent lateral compartmental narrowing.  There is osteopenia.  No acute displaced fracture or dislocation of the knee.  No radiopaque foreign body.  No large knee joint effusion.   "   Impression:     1. No acute displaced fracture or dislocation of the left or right knee noting prominent degenerative changes.        Electronically signed by: Kofi Gallego MD  Date:                                            10/17/2023  Time:                                           14:58       Assessment:       Encounter Diagnoses   Name Primary?    Primary osteoarthritis of both knees     Bilateral chronic knee pain Yes     Plan:       Coby was seen today for leg pain.    Diagnoses and all orders for this visit:    Bilateral chronic knee pain    Primary osteoarthritis of both knees  -     Ambulatory referral/consult to Pain Clinic      Coby Atkinson is a 78 y.o. female with chronic bilateral knee pain secondary to osteoarthritis.  Has failed intra-articular injections, and not an ideal candidate for surgery..    Pertinent imaging studies reviewed by me. Imaging results were discussed with patient.  Schedule for bilateral genicular nerve blocks under fluoroscopy x2.  If diagnostic can proceed with cooled radiofrequency ablations.    Return to clinic after procedures to discuss results.            This note was created by combination of typed  and M-Modal dictation. Transcription and phonetic errors may be present.  If there are any questions, please contact me.

## 2024-01-25 NOTE — TELEPHONE ENCOUNTER
Received critical INR of 6.1 from [facility] [place]. Result verbally given to Dr COLBY Hudson. Advises to make coumadin clinic aware and to have pt hold coumadin today. Noted that coumadin clinic is aware of result and instructed pt to hold med today. Contacted pt to verified she has is going to skip med today. She states she has already taken med today.     . Instructed to hold med tomorrow. And wait for further instructions from coumadin clinic.   Pt repeated instructions accurately but appears confused about taking medication.

## 2024-01-25 NOTE — PROGRESS NOTES
[facility]    2024 1:04 PM    Assessment/Plan:    Patient presents today with supratherapeutic INR.    Assessment of patient findings and chart review: INR not at goal. Medications, chart, and patient findings reviewed. See calendar for adjustments to dose and follow up plan.  INR increased despite patient reportedly holding her warfarin over the last 2 days. Unfortunately, she took her coumadin dose already today. She will be advised to HOLD coumadin until further notice; repeat INR 24 - Pt MUST write down dose & read back & use weekly pillbox. We will reiterate how important it is to follow dosing instructions. We will also question her to make sure she is not accidentally taking the coumadin when we advise her to hold. Patient was re-educated on situations that would require placing a call to the coumadin clinic, including bleeding or unusual bruising issues, changes in health, diet or medications, upcoming procedures that require warfarin interruption, and missed coumadin dose(s). Patient expressed understanding that avoidance of consistency with these parameters could cause fluctuations in INR, leading to more frequent visits and increase risk of adverse events.   In addition to holding coumadin, we will advise her to eat a large serving of dark greens today 24 to counteract the dose of warfarin she already took today      Recommendation for patient's warfarin regimen: Hold dose for 4 days    Recommend repeat INR in 4 days  _________________________________________________________________    Coby Atkinson (78 y.o.) is followed by the [facility].    Anticoagulation Summary  As of 2024      INR goal:  2.0-3.0   TTR:  32.2 % (6.1 y)   INR used for dosin.1 (2024)   Warfarin maintenance plan:  0.5 mg (1 mg x 0.5) every Thu, Sat; 1 mg (1 mg x 1) all other days   Weekly warfarin total:  6 mg    Plan last modified:  Josefina De Leon, PharmD (1/23/2024)   Next INR check:  1/29/2024   Target end date:      Indications    History of pulmonary embolus (PE)--12/2017 [Z86.711]  Chronic anticoagulation [Z79.01]  Chronic deep vein thrombosis (DVT) of left popliteal vein [I82.532]  Chronic pulmonary embolism without acute cor pulmonale [I27.82]                 Anticoagulation Episode Summary       INR check location:  Clinic Lab    Preferred lab:      Send INR reminders to:  [facility] COUMADIN MONITORING POOL    Comments:  [facility] // pt to write down dosing instructions & repeat back          Anticoagulation Care Providers       Provider Role Specialty Phone number    AfshinmsMundo thomas, [facility] Internal Medicine [phone]            Patient Findings       Negatives:  Signs/symptoms of thrombosis, Signs/symptoms of bleeding, Laboratory test error suspected, Change in health, Change in alcohol use, Change in activity, Upcoming invasive procedure, Emergency department visit, Upcoming dental procedure, Missed doses, Extra doses, Change in medications, Change in diet/appetite, Hospital admission, Bruising, Other complaints    Comments:  Pt confirmed she took 0mg warfarin on 1/23 and 1/24. Pt confirmed that she already took 1mg today.  No changes to diet or medications. No pain or other issues confirmed. No s/sx of bleeding.

## 2024-01-26 ENCOUNTER — OFFICE VISIT (OUTPATIENT)
Dept: INTERNAL MEDICINE | Facility: CLINIC | Age: 79
End: 2024-01-26
Payer: MEDICARE

## 2024-01-26 VITALS
TEMPERATURE: 97 F | BODY MASS INDEX: 40.63 KG/M2 | WEIGHT: 238 LBS | OXYGEN SATURATION: 98 % | HEIGHT: 64 IN | SYSTOLIC BLOOD PRESSURE: 114 MMHG | HEART RATE: 63 BPM | DIASTOLIC BLOOD PRESSURE: 60 MMHG

## 2024-01-26 DIAGNOSIS — I82.532 CHRONIC DEEP VEIN THROMBOSIS (DVT) OF LEFT POPLITEAL VEIN: ICD-10-CM

## 2024-01-26 DIAGNOSIS — Z79.01 CHRONIC ANTICOAGULATION: ICD-10-CM

## 2024-01-26 DIAGNOSIS — I27.9 PULMONARY HEART DISEASE: ICD-10-CM

## 2024-01-26 DIAGNOSIS — F32.0 CURRENT MILD EPISODE OF MAJOR DEPRESSIVE DISORDER, UNSPECIFIED WHETHER RECURRENT: ICD-10-CM

## 2024-01-26 DIAGNOSIS — I27.82 CHRONIC PULMONARY EMBOLISM WITHOUT ACUTE COR PULMONALE, UNSPECIFIED PULMONARY EMBOLISM TYPE: ICD-10-CM

## 2024-01-26 DIAGNOSIS — E66.01 MORBID OBESITY WITH BMI OF 40.0-44.9, ADULT: ICD-10-CM

## 2024-01-26 DIAGNOSIS — D56.3 THALASSEMIA MINOR: ICD-10-CM

## 2024-01-26 DIAGNOSIS — I27.20 PULMONARY HYPERTENSION: ICD-10-CM

## 2024-01-26 DIAGNOSIS — M17.0 PRIMARY OSTEOARTHRITIS OF BOTH KNEES: ICD-10-CM

## 2024-01-26 DIAGNOSIS — E66.2 OBESITY HYPOVENTILATION SYNDROME: Primary | ICD-10-CM

## 2024-01-26 DIAGNOSIS — Z86.711 HISTORY OF PULMONARY EMBOLUS (PE): ICD-10-CM

## 2024-01-26 DIAGNOSIS — F32.1 CURRENT MODERATE EPISODE OF MAJOR DEPRESSIVE DISORDER, UNSPECIFIED WHETHER RECURRENT: ICD-10-CM

## 2024-01-26 DIAGNOSIS — I50.32 CHRONIC DIASTOLIC HEART FAILURE: ICD-10-CM

## 2024-01-26 DIAGNOSIS — I10 ESSENTIAL HYPERTENSION: ICD-10-CM

## 2024-01-26 PROCEDURE — 99215 OFFICE O/P EST HI 40 MIN: CPT | Mod: S$PBB,,, | Performed by: INTERNAL MEDICINE

## 2024-01-26 PROCEDURE — 99999 PR PBB SHADOW E&M-EST. PATIENT-LVL V: CPT | Mod: PBBFAC,,, | Performed by: INTERNAL MEDICINE

## 2024-01-26 PROCEDURE — 99215 OFFICE O/P EST HI 40 MIN: CPT | Mod: PBBFAC,PO | Performed by: INTERNAL MEDICINE

## 2024-01-26 NOTE — PROGRESS NOTES
Subjective     Patient ID: Coby Atkinson is a 78 y.o. female.    Chief Complaint: Leg Swelling    HPI  78 y.o. Female here for annual exam.      Vaccines: Influenza (2020); Tetanus (declined); PNA (current); Shingrix (will consider)  Eye exam: 10/16  Mammogram: 2/19  Gyn exam: declined  Colonoscopy: 11/16  DEXA: 12/19     Exercise: walks daily  Diet: low carb    Past Medical History:  No date: Anticoagulant long-term use  No date: Cataract  No date: Chronic diarrhea  No date: Depression  No date: Edema  No date: GERD (gastroesophageal reflux disease)  10/2/2012: Hypertension  8/9/2016: Osteoarthritis of both knees  No date: Osteopenia  11/30/2017: Osteopenia  12/03/2017: PE (pulmonary thromboembolism)  12/3/2014: Primary localized osteoarthrosis, lower leg  2016: Sleep apnea  No date: Thalassemia minor  Past Surgical History:  No date: APPENDECTOMY  No date: CATARACT EXTRACTION  11/5/2016: COLONOSCOPY; N/A      Comment:  Procedure: COLONOSCOPY;  Surgeon: Tanner Simental MD;                Location: [address];  Service: Endoscopy;                 Laterality: N/A;  age 39: HYSTERECTOMY      Comment:  fibroids  No date: OOPHORECTOMY  No date: TONSILLECTOMY  Social History    Socioeconomic History      Marital status:       Number of children: 1    Occupational History      Occupation: Retired from [employer]     Tobacco Use      Smoking status: Never      Smokeless tobacco: Never    Substance and Sexual Activity      Alcohol use: No        Comment: rare      Drug use: No      Sexual activity: Yes        Partners: Male        Birth control/protection: None    Social Determinants of Health  Financial Resource Strain: Low Risk  (9/19/2023)      Overall Financial Resource Strain (CARDIA)          Difficulty of Paying Living Expenses: Not very hard  Food Insecurity: No Food Insecurity (9/19/2023)      Hunger Vital Sign          Worried About Running Out of Food in the Last Year:  Never true          Ran Out of Food in the Last Year: Never true  Transportation Needs: No Transportation Needs (9/19/2023)      PRAPARE - Transportation          Lack of Transportation (Medical): No          Lack of Transportation (Non-Medical): No  Stress: No Stress Concern Present (9/19/2023)      [place] [employer] [place] [employer] - Occupational Stress Questionnaire          Feeling of Stress : Only a little  Social Connections: Unknown (9/19/2023)      Social Connection and Isolation Panel [NHANES]          Marital Status:   Housing Stability: Unknown (9/19/2023)      Housing Stability Vital Sign          Unable to Pay for Housing in the Last Year: No          Unstable Housing in the Last Year: No  Review of patient's allergies indicates:   -- Codeine    -- Ciprofloxacin -- Rash  Mrs. Coby Atkinson had no medications administered during this visit.  Review of Systems   Constitutional:  Negative for activity change, appetite change, chills, diaphoresis, fatigue, fever and unexpected weight change.   HENT:  Negative for nasal congestion, mouth sores, postnasal drip, rhinorrhea, sinus pressure/congestion, sneezing, sore throat, trouble swallowing and voice change.    Eyes:  Negative for pain, discharge and visual disturbance.   Respiratory:  Negative for cough, shortness of breath and wheezing.    Cardiovascular:  Negative for chest pain, palpitations and leg swelling.   Gastrointestinal:  Negative for abdominal pain, blood in stool, constipation, diarrhea, nausea and vomiting.   Endocrine: Negative for cold intolerance and heat intolerance.   Genitourinary:  Negative for difficulty urinating, dysuria, frequency, hematuria and urgency.   Musculoskeletal:  Positive for arthralgias. Negative for myalgias.   Integumentary:  Negative for rash and wound.   Allergic/Immunologic: Negative for environmental allergies and food allergies.   Neurological:  Negative for dizziness, tremors, seizures, syncope,  weakness, light-headedness and headaches.   Hematological:  Negative for adenopathy. Does not bruise/bleed easily.   Psychiatric/Behavioral:  Positive for dysphoric mood. Negative for confusion, self-injury, sleep disturbance and suicidal ideas. The patient is not nervous/anxious.           Objective     Physical Exam  Vitals and nursing note reviewed.   Constitutional:       General: She is not in acute distress.     Appearance: Normal appearance. She is well-developed. She is not diaphoretic.   HENT:      Head: Normocephalic and atraumatic.      Right Ear: External ear normal.      Left Ear: External ear normal.      Nose: Nose normal.      Mouth/Throat:      Pharynx: No oropharyngeal exudate.   Eyes:      General: No scleral icterus.        Right eye: No discharge.         Left eye: No discharge.      Conjunctiva/sclera: Conjunctivae normal.      Pupils: Pupils are equal, round, and reactive to light.   Neck:      Thyroid: No thyromegaly.      Vascular: No JVD.   Cardiovascular:      Rate and Rhythm: Normal rate and regular rhythm.      Pulses: Normal pulses.      Heart sounds: Normal heart sounds. No murmur heard.  Pulmonary:      Effort: Pulmonary effort is normal. No respiratory distress.      Breath sounds: Normal breath sounds. No wheezing, rhonchi or rales.   Chest:      Chest wall: No tenderness.   Abdominal:      General: Bowel sounds are normal. There is no distension.      Palpations: Abdomen is soft.      Tenderness: There is no abdominal tenderness. There is no guarding or rebound.   Musculoskeletal:      Cervical back: Neck supple.      Right lower leg: No edema.      Left lower leg: No edema.   Lymphadenopathy:      Cervical: No cervical adenopathy.   Skin:     General: Skin is warm and dry.      Coloration: Skin is not pale.      Findings: No rash.   Neurological:      General: No focal deficit present.      Mental Status: She is alert and oriented to person, place, and time.      Gait: Gait  normal.   Psychiatric:         Behavior: Behavior normal.         Thought Content: Thought content normal.         Judgment: Judgment normal.            Assessment and Plan     1. Obesity hypoventilation syndrome    2. Current mild episode of major depressive disorder, unspecified whether recurrent    3. Essential hypertension    4. Pulmonary hypertension  Overview:  Likely due to a combination of   1) sleep apnea - diagnosed 8/16  2) sub-massive pulmonary embolism in 12/17  3) LV diastolic dysfunction (severe LAE, indeterminate LV diastolic dysfunction)      5. Pulmonary heart disease    6. Chronic diastolic heart failure    7. History of pulmonary embolus (PE)--12/2017  Overview:  Submassive PE, s/p catheter-directed tPA      8. Chronic deep vein thrombosis (DVT) of left popliteal vein    9. Chronic anticoagulation    10. Thalassemia minor    11. Morbid obesity with BMI of 40.0-44.9, adult  -     Ambulatory referral/consult to Bariatric/Obesity Medicine; Future; Expected date: 02/02/2024    12. Primary osteoarthritis of both knees    13. Current moderate episode of major depressive disorder, unspecified whether recurrent    14. Chronic pulmonary embolism without acute cor pulmonale, unspecified pulmonary embolism type         Recurrent PE/DVT- continue coumadin       Chronic diastolic heart failure- stable       HTN- on Doxazosin/Exforge      Morbid Obesity- referral in Bariatric Medicine      Depression- fair control on Lexapro 20 mg daily and Buspar 10 mg TID PRN   -referral to Psychology      GERD- controlled on Protonix daily      OA of knees- Tylenol PRN    -seeing Pain management, planning for RFA      CKD III- stable      Pulmonary HTN- stable, will follow       ADRIÁN- stable on CPAP       Thalassemia minor      hx of UGIB      F/u in 6 months     Over 1/2 of 40 minute visit spent reviewing pt's medical records, education/discussion of pt's medical conditions and medical management

## 2024-01-29 ENCOUNTER — TELEPHONE (OUTPATIENT)
Dept: INTERNAL MEDICINE | Facility: CLINIC | Age: 79
End: 2024-01-29
Payer: MEDICARE

## 2024-01-29 ENCOUNTER — ANTI-COAG VISIT (OUTPATIENT)
Dept: CARDIOLOGY | Facility: CLINIC | Age: 79
End: 2024-01-29
Payer: MEDICARE

## 2024-01-29 DIAGNOSIS — Z79.01 CHRONIC ANTICOAGULATION: ICD-10-CM

## 2024-01-29 DIAGNOSIS — I10 ESSENTIAL HYPERTENSION: Primary | ICD-10-CM

## 2024-01-29 DIAGNOSIS — Z86.711 HISTORY OF PULMONARY EMBOLUS (PE): Primary | ICD-10-CM

## 2024-01-29 DIAGNOSIS — I27.82 CHRONIC PULMONARY EMBOLISM WITHOUT ACUTE COR PULMONALE, UNSPECIFIED PULMONARY EMBOLISM TYPE: ICD-10-CM

## 2024-01-29 DIAGNOSIS — I82.532 CHRONIC DEEP VEIN THROMBOSIS (DVT) OF LEFT POPLITEAL VEIN: ICD-10-CM

## 2024-01-29 PROCEDURE — 93793 ANTICOAG MGMT PT WARFARIN: CPT | Mod: S$GLB,,, | Performed by: PHARMACIST

## 2024-01-29 NOTE — PROGRESS NOTES
INR not at goal. Medications, chart, and patient findings reviewed. See calendar for adjustments to dose and follow up plan.  We will continue to hold warfarin x 2 more days and check INR on Wednesday.

## 2024-02-07 ENCOUNTER — OFFICE VISIT (OUTPATIENT)
Dept: CARDIOLOGY | Facility: CLINIC | Age: 79
End: 2024-02-07
Payer: MEDICARE

## 2024-02-07 ENCOUNTER — LAB VISIT (OUTPATIENT)
Dept: LAB | Facility: HOSPITAL | Age: 79
End: 2024-02-07
Attending: INTERNAL MEDICINE
Payer: MEDICARE

## 2024-02-07 VITALS
HEIGHT: 63 IN | BODY MASS INDEX: 41.56 KG/M2 | DIASTOLIC BLOOD PRESSURE: 68 MMHG | SYSTOLIC BLOOD PRESSURE: 110 MMHG | HEART RATE: 84 BPM | WEIGHT: 234.56 LBS

## 2024-02-07 DIAGNOSIS — E66.01 MORBID OBESITY WITH BMI OF 40.0-44.9, ADULT: ICD-10-CM

## 2024-02-07 DIAGNOSIS — G89.29 BILATERAL CHRONIC KNEE PAIN: ICD-10-CM

## 2024-02-07 DIAGNOSIS — I89.0 LYMPHEDEMA OF BOTH LOWER EXTREMITIES: ICD-10-CM

## 2024-02-07 DIAGNOSIS — I10 ESSENTIAL HYPERTENSION: ICD-10-CM

## 2024-02-07 DIAGNOSIS — R60.0 EDEMA OF BOTH LOWER EXTREMITIES: Primary | ICD-10-CM

## 2024-02-07 DIAGNOSIS — Z79.01 CHRONIC ANTICOAGULATION: ICD-10-CM

## 2024-02-07 DIAGNOSIS — I50.32 CHRONIC DIASTOLIC HEART FAILURE: ICD-10-CM

## 2024-02-07 DIAGNOSIS — M25.562 BILATERAL CHRONIC KNEE PAIN: ICD-10-CM

## 2024-02-07 DIAGNOSIS — Z86.718 HISTORY OF DVT OF LOWER EXTREMITY: ICD-10-CM

## 2024-02-07 DIAGNOSIS — Z86.711 HISTORY OF PULMONARY EMBOLUS (PE): ICD-10-CM

## 2024-02-07 DIAGNOSIS — M25.561 BILATERAL CHRONIC KNEE PAIN: ICD-10-CM

## 2024-02-07 DIAGNOSIS — I87.2 VENOUS INSUFFICIENCY OF BOTH LOWER EXTREMITIES: ICD-10-CM

## 2024-02-07 LAB
ALBUMIN SERPL BCP-MCNC: 3.8 G/DL (ref 3.5–5.2)
ALP SERPL-CCNC: 88 U/L (ref 55–135)
ALT SERPL W/O P-5'-P-CCNC: 12 U/L (ref 10–44)
ANION GAP SERPL CALC-SCNC: 9 MMOL/L (ref 8–16)
AST SERPL-CCNC: 18 U/L (ref 10–40)
BASOPHILS # BLD AUTO: 0.05 K/UL (ref 0–0.2)
BASOPHILS NFR BLD: 0.5 % (ref 0–1.9)
BILIRUB SERPL-MCNC: 0.4 MG/DL (ref 0.1–1)
BUN SERPL-MCNC: 16 MG/DL (ref 8–23)
CALCIUM SERPL-MCNC: 9.8 MG/DL (ref 8.7–10.5)
CHLORIDE SERPL-SCNC: 106 MMOL/L (ref 95–110)
CHOLEST SERPL-MCNC: 129 MG/DL (ref 120–199)
CHOLEST/HDLC SERPL: 2.3 {RATIO} (ref 2–5)
CO2 SERPL-SCNC: 23 MMOL/L (ref 23–29)
CREAT SERPL-MCNC: 0.9 MG/DL (ref 0.5–1.4)
DIFFERENTIAL METHOD BLD: ABNORMAL
EOSINOPHIL # BLD AUTO: 0.2 K/UL (ref 0–0.5)
EOSINOPHIL NFR BLD: 1.5 % (ref 0–8)
ERYTHROCYTE [DISTWIDTH] IN BLOOD BY AUTOMATED COUNT: 16.6 % (ref 11.5–14.5)
EST. GFR  (NO RACE VARIABLE): >60 ML/MIN/1.73 M^2
GLUCOSE SERPL-MCNC: 93 MG/DL (ref 70–110)
HCT VFR BLD AUTO: 38.6 % (ref 37–48.5)
HDLC SERPL-MCNC: 57 MG/DL (ref 40–75)
HDLC SERPL: 44.2 % (ref 20–50)
HGB BLD-MCNC: 11.5 G/DL (ref 12–16)
IMM GRANULOCYTES # BLD AUTO: 0.06 K/UL (ref 0–0.04)
IMM GRANULOCYTES NFR BLD AUTO: 0.6 % (ref 0–0.5)
LDLC SERPL CALC-MCNC: 54.4 MG/DL (ref 63–159)
LYMPHOCYTES # BLD AUTO: 2.8 K/UL (ref 1–4.8)
LYMPHOCYTES NFR BLD: 27.8 % (ref 18–48)
MCH RBC QN AUTO: 20.2 PG (ref 27–31)
MCHC RBC AUTO-ENTMCNC: 29.8 G/DL (ref 32–36)
MCV RBC AUTO: 68 FL (ref 82–98)
MONOCYTES # BLD AUTO: 0.9 K/UL (ref 0.3–1)
MONOCYTES NFR BLD: 8.4 % (ref 4–15)
NEUTROPHILS # BLD AUTO: 6.2 K/UL (ref 1.8–7.7)
NEUTROPHILS NFR BLD: 61.2 % (ref 38–73)
NONHDLC SERPL-MCNC: 72 MG/DL
NRBC BLD-RTO: 0 /100 WBC
PLATELET # BLD AUTO: 292 K/UL (ref 150–450)
PMV BLD AUTO: 10.1 FL (ref 9.2–12.9)
POTASSIUM SERPL-SCNC: 3.6 MMOL/L (ref 3.5–5.1)
PROT SERPL-MCNC: 7.3 G/DL (ref 6–8.4)
RBC # BLD AUTO: 5.7 M/UL (ref 4–5.4)
SODIUM SERPL-SCNC: 138 MMOL/L (ref 136–145)
TRIGL SERPL-MCNC: 88 MG/DL (ref 30–150)
WBC # BLD AUTO: 10.07 K/UL (ref 3.9–12.7)

## 2024-02-07 PROCEDURE — 99214 OFFICE O/P EST MOD 30 MIN: CPT | Mod: PBBFAC,PO | Performed by: INTERNAL MEDICINE

## 2024-02-07 PROCEDURE — 80061 LIPID PANEL: CPT | Performed by: INTERNAL MEDICINE

## 2024-02-07 PROCEDURE — 80053 COMPREHEN METABOLIC PANEL: CPT | Performed by: INTERNAL MEDICINE

## 2024-02-07 PROCEDURE — 85025 COMPLETE CBC W/AUTO DIFF WBC: CPT | Performed by: INTERNAL MEDICINE

## 2024-02-07 PROCEDURE — 99215 OFFICE O/P EST HI 40 MIN: CPT | Mod: S$PBB,,, | Performed by: INTERNAL MEDICINE

## 2024-02-07 PROCEDURE — 99999 PR PBB SHADOW E&M-EST. PATIENT-LVL IV: CPT | Mod: PBBFAC,,, | Performed by: INTERNAL MEDICINE

## 2024-02-07 PROCEDURE — 36415 COLL VENOUS BLD VENIPUNCTURE: CPT | Mod: PO | Performed by: INTERNAL MEDICINE

## 2024-02-07 NOTE — PROGRESS NOTES
[facility]      Chief Complaint   Patient presents with    bilateral leg edema       Patient ID: Coby Atkinson is a 78 y.o. female with chronic diastolic heart failure, HTN, history of DVT/PE in 2017 (on warfarin), morbid obesity, who presents for a follow up appointment.  Pertinent history/events are as follows:     -Pt kindly referred by Flor Gautam NP for evaluation of Bilateral leg edema.    -At our initial clinic visit on 9/27/2023,   Mrs. Atkinson is accompanied by her .  She reported leg swelling for several years.  She report severe chronic knee pain in both knees.  Does not ambulate much and has no claudication or tissue loss.   Plan:   Bilateral leg edema- Due to BLE lymphedema and possible venous insufficiency.  Check BLE venous reflux study and REYES study.  Refer to lymphedema clinic.  Recommend wearing graduated compression hose.  Limit sodium intake to 2000 mg daily.  Limit volume intake to 1.5 L daily.  Elevate legs when resting.  Bilateral Knee Pain- Refer to Ortho for evaluation.  Diastolic Heart Failure- Stable.  Continue current medications.  Morbid Obesity- Encourage diet, exercise, and weight loss.     HPI:  Mrs. Atkinson is accompanied by her .  She reports improvement in leg swelling.  She has completed lymphedema clinic therapy. She is now followed by Pain Management for chronic knee pain.  REYES Study on 10/6/2023 revealed right resting REYES 1.02, is normal. Left resting REYES 0.98, is suggestive of minimal left lower extremity arterial disease. BLE Venous Reflux Study on 10/6/2023 revealed hemodynamically significant BLE venous reflux and no DVT.      Past Medical History:   Diagnosis Date    Anticoagulant long-term use     Cataract     Chronic diarrhea     Depression     Edema     GERD (gastroesophageal reflux disease)     Hypertension 10/2/2012    Osteoarthritis of both knees 8/9/2016    Osteopenia     Osteopenia 11/30/2017    PE (pulmonary  thromboembolism) 12/03/2017    Primary localized osteoarthrosis, lower leg 12/3/2014    Sleep apnea 2016    Thalassemia minor      Past Surgical History:   Procedure Laterality Date    APPENDECTOMY      CATARACT EXTRACTION      COLONOSCOPY N/A 11/5/2016    Procedure: COLONOSCOPY;  Surgeon: Tanner Simental MD;  Location: [facility] ([address] [place] [address]);  Service: Endoscopy;  Laterality: N/A;    HYSTERECTOMY  age 39    fibroids    OOPHORECTOMY      TONSILLECTOMY       Social History     Socioeconomic History    Marital status:     Number of children: 1   Occupational History    Occupation: Retired from [employer]    Tobacco Use    Smoking status: Never     Passive exposure: Never    Smokeless tobacco: Never   Substance and Sexual Activity    Alcohol use: No     Comment: rare    Drug use: No    Sexual activity: Yes     Partners: Male     Birth control/protection: None     Social Determinants of Health     Financial Resource Strain: Low Risk  (9/19/2023)    Overall Financial Resource Strain (CARDIA)     Difficulty of Paying Living Expenses: Not very hard   Food Insecurity: No Food Insecurity (9/19/2023)    Hunger Vital Sign     Worried About Running Out of Food in the Last Year: Never true     Ran Out of Food in the Last Year: Never true   Transportation Needs: No Transportation Needs (9/19/2023)    PRAPARE - Transportation     Lack of Transportation (Medical): No     Lack of Transportation (Non-Medical): No   Stress: No Stress Concern Present (9/19/2023)    [place] [place] [employer] - Occupational Stress Questionnaire     Feeling of Stress : Only a little   Social Connections: Unknown (9/19/2023)    Social Connection and Isolation Panel [NHANES]     Marital Status:    Housing Stability: Unknown (9/19/2023)    Housing Stability Vital Sign     Unable to Pay for Housing in the Last Year: No     Unstable Housing in the Last Year: No     Family History   Problem Relation Age of Onset     Hypertension Mother     Cancer Father         skin    No Known Problems Sister     Cancer Brother         pancreatic    No Known Problems Maternal Aunt     No Known Problems Maternal Uncle     No Known Problems Paternal Aunt     No Known Problems Paternal Uncle     Coronary artery disease Maternal Grandmother     Heart failure Maternal Grandmother     No Known Problems Maternal Grandfather     Stroke Paternal Grandmother     Coronary artery disease Paternal Grandmother     No Known Problems Paternal Grandfather     Amblyopia Neg Hx     Blindness Neg Hx     Cataracts Neg Hx     Diabetes Neg Hx     Glaucoma Neg Hx     Macular degeneration Neg Hx     Retinal detachment Neg Hx     Strabismus Neg Hx     Thyroid disease Neg Hx     Colon cancer Neg Hx     Stomach cancer Neg Hx     Esophageal cancer Neg Hx        Review of patient's allergies indicates:   Allergen Reactions    Codeine     Ciprofloxacin Rash       Medication List with Changes/Refills   Current Medications    ALENDRONATE (FOSAMAX) 70 MG TABLET    Take 1 tablet once weekly in the morning with a full glass of water on an empty stomach. Do not consume food or lie down for at least 30 minutes afterwards.    AMLODIPINE-VALSARTAN (EXFORGE)  MG PER TABLET    Take 1 tablet by mouth once daily.    BISOPROLOL (ZEBETA) 10 MG TABLET    Take 1 tablet (10 mg total) by mouth once daily.    BUSPIRONE (BUSPAR) 10 MG TABLET    TAKE 1 TABLET(10 MG) BY MOUTH THREE TIMES DAILY AS NEEDED FOR ANXIETY    DOXAZOSIN (CARDURA) 8 MG TAB    Take 8 mg by mouth once daily.    ERGOCALCIFEROL (ERGOCALCIFEROL) 50,000 UNIT CAP    Take 1 capsule (50,000 Units total) by mouth every 7 days.    ESCITALOPRAM OXALATE (LEXAPRO) 20 MG TABLET    Take 20 mg by mouth once daily.    FUROSEMIDE (LASIX) 20 MG TABLET    Take 2 tablets (40 mg total) by mouth once daily.    PANTOPRAZOLE (PROTONIX) 40 MG TABLET    Take 40 mg by mouth 2 (two) times daily.    POTASSIUM CHLORIDE (MICRO-K) 10 MEQ CPSR    Take  "1 capsule (10 mEq total) by mouth once daily.    SPIRONOLACTONE (ALDACTONE) 25 MG TABLET    Take 25 mg by mouth once daily.    WARFARIN (COUMADIN) 1 MG TABLET    Take 1mg daily, except take 0.5mg on Thursdays and Saturdays OR as directed by Coumadin Clinic       Review of Systems  Constitution: Denies chills, fever, and sweats.  HENT: Denies headaches or blurry vision.  Cardiovascular: Denies chest pain or irregular heart beat.  Respiratory: Denies cough or shortness of breath.  Gastrointestinal: Denies abdominal pain, nausea, or vomiting.  Musculoskeletal: Positive for leg swelling.    Neurological: Denies dizziness or focal weakness.  Psychiatric/Behavioral: Normal mental status.  Hematologic/Lymphatic: Denies bleeding problem or easy bruising/bleeding.  Skin: Denies rash or suspicious lesions    Physical Examination  /68   Pulse 84   Ht 5' 3" (1.6 m)   Wt 106.4 kg (234 lb 9.1 oz)   BMI 41.55 kg/m²     Constitutional: No acute distress, conversant  HEENT: Sclera anicteric, Pupils equal, round and reactive to light, extraocular motions intact, Oropharynx clear  Neck: No JVD, no carotid bruits  Cardiovascular: regular rate and rhythm, no murmur, rubs or gallops, normal S1/S2  Pulmonary: Clear to auscultation bilaterally  Abdominal: Abdomen soft, nontender, nondistended, positive bowel sounds  Extremities: BLE's with trace pitting edema, prominent varicose veins, and changes consistent with lymphedema (L>R)   Pulses:  Carotid pulses are 2+ on the right side, and 2+ on the left side.  Radial pulses are 2+ on the right side, and 2+ on the left side.   Femoral pulses are 2+ on the right side, and 2+ on the left side.  Popliteal pulses are 2+ on the right side, and 2+ on the left side.   Dorsalis pedis pulses are 2+ on the right side, and 2+ on the left side.   Posterior tibial pulses are 2+ on the right side, and 2+ on the left side.    Skin: No ecchymosis, erythema, or ulcers  Psych: Alert and oriented x 3, " appropriate affect  Neuro: CNII-XII intact, no focal deficits    Labs:  Most Recent Data  CBC:   Lab Results   Component Value Date    WBC 9.36 04/11/2023    HGB 11.7 (L) 04/11/2023    HCT 40.8 04/11/2023     04/11/2023    MCV 70 (L) 04/11/2023    RDW 15.2 (H) 04/11/2023     BMP:   Lab Results   Component Value Date     04/11/2023    K 3.8 04/11/2023     04/11/2023    CO2 21 (L) 04/11/2023    BUN 17 04/11/2023    CREATININE 0.9 04/11/2023    GLU 92 04/11/2023    CALCIUM 9.5 04/11/2023    MG 2.1 09/22/2022    PHOS 4.2 06/12/2020     LFTS;   Lab Results   Component Value Date    PROT 7.1 04/11/2023    ALBUMIN 3.6 04/11/2023    BILITOT 0.5 04/11/2023    AST 15 04/11/2023    ALKPHOS 79 04/11/2023    ALT 12 04/11/2023     COAGS:   Lab Results   Component Value Date    INR 3.5 (H) 01/29/2024     FLP:   Lab Results   Component Value Date    CHOL 126 04/11/2023    HDL 57 04/11/2023    LDLCALC 56.2 (L) 04/11/2023    TRIG 64 04/11/2023    CHOLHDL 45.2 04/11/2023     CARDIAC:   Lab Results   Component Value Date    TROPONINI <0.006 06/06/2022    BNP 23 12/13/2022       Imaging:    REYES Study 10/6/2023:    Right resting REYES 1.02, is normal.    Left resting REYES 0.98, is suggestive of minimal left lower extremity arterial disease.    PVR waveforms are mildly dampened at the ankle level bilaterally, and moderately dampened at the low thigh level bilaterally.    BLE Venous Reflux Study 10/6/2023:    There is no evidence of a right lower extremity DVT.    The right greater saphenous vein has reflux.    The right popliteal vein is has reflux.    A Baker's cyst measuring 4.81 cm x 4.63 cm  was seen in the right extremity.    There is subcutaneous edema located in the right distal calf veins.    There is no evidence of a left lower extremity DVT.    The left greater saphenous vein has reflux.    The left popliteal vein has reflux.    There is subcutaneous edema located in the left distal calf veins.    BLE Venous  Ultrasound 10/27/2022:  There is no evidence of a right lower extremity DVT.  A Baker's cyst measuring 3.33 cm x 1.3 cm was seen in the right extremity.  There is no evidence of a left lower extremity DVT. (Compression of left femoral vein cound not be performed due to pt discomfort, but vessel completely fills with color Doppler signal suggesting absence of thrombus).    Assessment/Plan:  Coby Atkinson is a 78 y.o. female with chronic diastolic heart failure, HTN, history of DVT/PE in 2017 (on warfarin), morbid obesity, who presents for a follow up appointment.    Bilateral leg edema- Due to BLE lymphedema and venous insufficiency.  Improving.  Continue lymphedema clinic techniques at home.  Continue graduated compression hose.  Limit sodium intake to 2000 mg daily.  Limit volume intake to 1.5 L daily.  Elevate legs when resting.    2. Bilateral Knee Pain- Continue management per Pain Management (Dr. Barton). Ok to proceed with bilateral genicular nerve blocks and radiofrequency ablations if indicated.    3. Diastolic Heart Failure- Stable.  Continue current medications.    4. Morbid Obesity- Encourage diet, exercise, and weight loss.     Follow up in 6 months    Total duration of face to face visit time 30 minutes.  Total time spent counseling greater than fifty percent of total visit time.  Counseling included discussion regarding imaging findings, diagnosis, possibilities, treatment options, risks and benefits.  The patient had many questions regarding the options and long-term effects.    Berhane Castrejon MD, PhD  Interventional Cardiology

## 2024-02-07 NOTE — PATIENT INSTRUCTIONS
Assessment/Plan:  Coby Atkinson is a 78 y.o. female with chronic diastolic heart failure, HTN, history of DVT/PE in 2017 (on warfarin), morbid obesity, who presents for a follow up appointment.    Bilateral leg edema- Due to BLE lymphedema and venous insufficiency.  Improving.  Continue lymphedema clinic techniques at home.  Continue graduated compression hose.  Limit sodium intake to 2000 mg daily.  Limit volume intake to 1.5 L daily.  Elevate legs when resting.    2. Bilateral Knee Pain- Continue management per Pain Management (Dr. Barton). Ok to proceed with bilateral genicular nerve blocks and radiofrequency ablations if indicated.    3. Diastolic Heart Failure- Stable.  Continue current medications.    4. Morbid Obesity- Encourage diet, exercise, and weight loss.     Follow up in 6 months

## 2024-02-08 ENCOUNTER — ANTI-COAG VISIT (OUTPATIENT)
Dept: CARDIOLOGY | Facility: CLINIC | Age: 79
End: 2024-02-08
Payer: MEDICARE

## 2024-02-08 DIAGNOSIS — Z79.01 CHRONIC ANTICOAGULATION: ICD-10-CM

## 2024-02-08 DIAGNOSIS — I27.82 CHRONIC PULMONARY EMBOLISM WITHOUT ACUTE COR PULMONALE, UNSPECIFIED PULMONARY EMBOLISM TYPE: ICD-10-CM

## 2024-02-08 DIAGNOSIS — I82.532 CHRONIC DEEP VEIN THROMBOSIS (DVT) OF LEFT POPLITEAL VEIN: ICD-10-CM

## 2024-02-08 DIAGNOSIS — Z86.711 HISTORY OF PULMONARY EMBOLUS (PE): Primary | ICD-10-CM

## 2024-02-08 PROCEDURE — 93793 ANTICOAG MGMT PT WARFARIN: CPT | Mod: S$GLB,,, | Performed by: PHARMACIST

## 2024-02-08 NOTE — PROGRESS NOTES
[facility]    2024 2:21 PM    Assessment/Plan:    Patient presents today with supratherapeutic INR.    Assessment of patient findings and chart review: Patient reports a much higher than prescribed coumadin dose. We advised her to take 0.5mg daily, and she reports taking 1mg daily. The importance of compliance will be emphasized. Patient was re-educated on situations that would require placing a call to the coumadin clinic, including bleeding or unusual bruising issues, changes in health, diet or medications, upcoming procedures that require warfarin interruption, and missed coumadin dose(s). Patient expressed understanding that avoidance of consistency with these parameters could cause fluctuations in INR, leading to more frequent visits and increase risk of adverse events.       Recommendation for patient's warfarin regimen: Hold dose for 2 days and take as prescribed    Recommend repeat INR in 1 week  _________________________________________________________________    Coby Atkinson (78 y.o.) is followed by the [facility].    Anticoagulation Summary  As of 2024      INR goal:  2.0-3.0   TTR:  32.0 % (6.1 y)   INR used for dosin.6 (2024)   Warfarin maintenance plan:  0.5 mg (1 mg x 0.5) every day   Weekly warfarin total:  3.5 mg   Plan last modified:  Josefina De Leon, PharmD (2024)   Next INR check:  2024   Target end date:      Indications    History of pulmonary embolus (PE)--2017 [Z86.711]  Chronic anticoagulation [Z79.01]  Chronic deep vein thrombosis (DVT) of left popliteal vein [I82.532]  Chronic pulmonary embolism without acute cor pulmonale [I27.82]                 Anticoagulation Episode Summary       INR check location:  Clinic Lab    Preferred lab:      Send INR reminders to:  [facility] COUMADIN MONITORING POOL    Comments:  [facility] // pt to write  down dosing instructions & repeat back          Anticoagulation Care Providers       Provider Role Specialty Phone number    Mundo HudsonTai,  [facility] Internal Medicine [phone]            Patient Findings       Negatives:  Signs/symptoms of thrombosis, Signs/symptoms of bleeding, Laboratory test error suspected, Change in health, Change in alcohol use, Change in activity, Upcoming invasive procedure, Emergency department visit, Upcoming dental procedure, Missed doses, Extra doses, Change in medications, Change in diet/appetite, Hospital admission, Bruising, Other complaints    Comments:  Pt confirmed that she has been taking 1mg daily, and took 0mg on 1/29-1/30. No changes to diet or medication, no s/sx of bleeding. No other issues reported.

## 2024-02-15 ENCOUNTER — HOSPITAL ENCOUNTER (OUTPATIENT)
Dept: CARDIOLOGY | Facility: HOSPITAL | Age: 79
Discharge: HOME | End: 2024-02-15
Attending: INTERNAL MEDICINE
Payer: MEDICARE

## 2024-02-15 ENCOUNTER — ANTI-COAG VISIT (OUTPATIENT)
Dept: CARDIOLOGY | Facility: CLINIC | Age: 79
End: 2024-02-15
Payer: MEDICARE

## 2024-02-15 VITALS
HEIGHT: 63 IN | HEART RATE: 78 BPM | WEIGHT: 234 LBS | DIASTOLIC BLOOD PRESSURE: 80 MMHG | BODY MASS INDEX: 41.46 KG/M2 | SYSTOLIC BLOOD PRESSURE: 140 MMHG

## 2024-02-15 DIAGNOSIS — Z86.711 HISTORY OF PULMONARY EMBOLUS (PE): Primary | ICD-10-CM

## 2024-02-15 DIAGNOSIS — I82.532 CHRONIC DEEP VEIN THROMBOSIS (DVT) OF LEFT POPLITEAL VEIN: ICD-10-CM

## 2024-02-15 DIAGNOSIS — I50.32 CHRONIC DIASTOLIC HEART FAILURE: ICD-10-CM

## 2024-02-15 DIAGNOSIS — I27.82 OTHER CHRONIC PULMONARY EMBOLISM WITHOUT ACUTE COR PULMONALE: ICD-10-CM

## 2024-02-15 DIAGNOSIS — I27.20 PULMONARY HYPERTENSION: ICD-10-CM

## 2024-02-15 DIAGNOSIS — Z79.01 CHRONIC ANTICOAGULATION: ICD-10-CM

## 2024-02-15 LAB
ASCENDING AORTA: 3.48 CM
AV INDEX (PROSTH): 0.63
AV MEAN GRADIENT: 8 MMHG
AV PEAK GRADIENT: 17 MMHG
AV VALVE AREA BY VELOCITY RATIO: 1.98 CM²
AV VALVE AREA: 2.04 CM²
AV VELOCITY RATIO: 0.61
BSA FOR ECHO PROCEDURE: 2.17 M2
CV ECHO LV RWT: 0.41 CM
DOP CALC AO PEAK VEL: 2.04 M/S
DOP CALC AO VTI: 40.31 CM
DOP CALC LVOT AREA: 3.2 CM2
DOP CALC LVOT DIAMETER: 2.03 CM
DOP CALC LVOT PEAK VEL: 1.25 M/S
DOP CALC LVOT STROKE VOLUME: 82.43 CM3
DOP CALC RVOT PEAK VEL: 1.05 M/S
DOP CALC RVOT VTI: 26.9 CM
DOP CALCLVOT PEAK VEL VTI: 25.48 CM
E WAVE DECELERATION TIME: 197.47 MSEC
E/A RATIO: 0.73
E/E' RATIO: 11.69 M/S
ECHO LV POSTERIOR WALL: 0.92 CM (ref 0.6–1.1)
EJECTION FRACTION: 65 %
FRACTIONAL SHORTENING: 41 % (ref 28–44)
INTERVENTRICULAR SEPTUM: 0.85 CM (ref 0.6–1.1)
LA MAJOR: 6.37 CM
LA MINOR: 6.38 CM
LA WIDTH: 3.98 CM
LEFT ATRIUM SIZE: 4.56 CM
LEFT ATRIUM VOLUME INDEX MOD: 43.1 ML/M2
LEFT ATRIUM VOLUME INDEX: 47.5 ML/M2
LEFT ATRIUM VOLUME MOD: 89.19 CM3
LEFT ATRIUM VOLUME: 98.34 CM3
LEFT INTERNAL DIMENSION IN SYSTOLE: 2.66 CM (ref 2.1–4)
LEFT VENTRICLE DIASTOLIC VOLUME INDEX: 44.52 ML/M2
LEFT VENTRICLE DIASTOLIC VOLUME: 92.15 ML
LEFT VENTRICLE MASS INDEX: 63 G/M2
LEFT VENTRICLE SYSTOLIC VOLUME INDEX: 12.6 ML/M2
LEFT VENTRICLE SYSTOLIC VOLUME: 26.09 ML
LEFT VENTRICULAR INTERNAL DIMENSION IN DIASTOLE: 4.49 CM (ref 3.5–6)
LEFT VENTRICULAR MASS: 129.39 G
LV LATERAL E/E' RATIO: 9.5 M/S
LV SEPTAL E/E' RATIO: 15.2 M/S
MV A" WAVE DURATION": 12.27 MSEC
MV PEAK A VEL: 1.04 M/S
MV PEAK E VEL: 0.76 M/S
MV STENOSIS PRESSURE HALF TIME: 57.27 MS
MV VALVE AREA P 1/2 METHOD: 3.84 CM2
PISA TR MAX VEL: 3.72 M/S
PULM VEIN S/D RATIO: 2.38
PV MEAN GRADIENT: 2 MMHG
PV PEAK D VEL: 0.29 M/S
PV PEAK GRADIENT: 7
PV PEAK S VEL: 0.69 M/S
PV PEAK VELOCITY: 1.31 M/S
RA MAJOR: 6.16 CM
RA PRESSURE ESTIMATED: 8 MMHG
RA WIDTH: 2.46 CM
RIGHT VENTRICULAR END-DIASTOLIC DIMENSION: 2.82 CM
RV TB RVSP: 12 MMHG
SINUS: 3.06 CM
STJ: 2.84 CM
TDI LATERAL: 0.08 M/S
TDI SEPTAL: 0.05 M/S
TDI: 0.07 M/S
TR MAX PG: 55 MMHG
TV REST PULMONARY ARTERY PRESSURE: 63 MMHG
Z-SCORE OF LEFT VENTRICULAR DIMENSION IN END DIASTOLE: -3.38
Z-SCORE OF LEFT VENTRICULAR DIMENSION IN END SYSTOLE: -2.93

## 2024-02-15 PROCEDURE — 93306 TTE W/DOPPLER COMPLETE: CPT | Mod: PO

## 2024-02-15 PROCEDURE — 93306 TTE W/DOPPLER COMPLETE: CPT | Mod: 26,,, | Performed by: INTERNAL MEDICINE

## 2024-02-15 PROCEDURE — 93793 ANTICOAG MGMT PT WARFARIN: CPT | Mod: S$GLB,,,

## 2024-02-15 NOTE — PROGRESS NOTES
[facility]    02/15/2024 3:06 PM    Assessment/Plan:    Patient presents today with supratherapeutic INR.    Assessment of patient findings and chart review: INR still above goal after adjustment for high INR last week; patient does not report any other changes to account    Recommendation for patient's warfarin regimen: Hold dose today then resume current maintenance dose    Recommend repeat INR in 5 days  _________________________________________________________________    Coby Atkinson (78 y.o.) is followed by the [facility].    Anticoagulation Summary  As of 2/15/2024      INR goal:  2.0-3.0   TTR:  31.9 % (6.2 y)   INR used for dosin.2 (2/15/2024)   Warfarin maintenance plan:  0.5 mg (1 mg x 0.5) every day   Weekly warfarin total:  3.5 mg   Plan last modified:  Josefina De Leon, PharmD (2024)   Next INR check:  2024   Target end date:      Indications    History of pulmonary embolus (PE)--2017 [Z86.711]  Chronic anticoagulation [Z79.01]  Chronic deep vein thrombosis (DVT) of left popliteal vein [I82.532]  Chronic pulmonary embolism without acute cor pulmonale [I27.82]                 Anticoagulation Episode Summary       INR check location:  Clinic Lab    Preferred lab:      Send INR reminders to:  [facility] COUMADIN MONITORING POOL    Comments:  [facility] Lab // pt to write down dosing instructions & repeat back          Anticoagulation Care Providers       Provider Role Specialty Phone number    Mundo Hudson,  [facility] Internal Medicine [phone]            Patient Findings       Positives:  Bruising, Other complaints    Negatives:  Signs/symptoms of thrombosis, Signs/symptoms of bleeding, Laboratory test error suspected, Change in health, Change in alcohol use, Change in activity, Upcoming invasive procedure, Emergency department visit, Upcoming dental  procedure, Missed doses, Extra doses, Change in medications, Change in diet/appetite, Hospital admission    Comments:  Pt confirmed correct dosing per calendar. No new medications or diet changes. She reports a bruise on the top of her R hand that she woke up with, she does not remember hitting the area. No s/sx of bleeding reported. Reports some leg swelling, but states that's nothing new and something she always has.

## 2024-02-23 ENCOUNTER — TELEPHONE (OUTPATIENT)
Dept: PAIN MEDICINE | Facility: CLINIC | Age: 79
End: 2024-02-23
Payer: MEDICARE

## 2024-02-23 DIAGNOSIS — M17.0 PRIMARY OSTEOARTHRITIS OF BOTH KNEES: Primary | ICD-10-CM

## 2024-02-23 NOTE — LETTER
February 23, 2024    Coby Atkinson  [address] [place] [address] [place] [address]             [facility] [address] [place] [id number]  Phone: [phone]  Fax: [phone] Good morning, Mrs. Atkinson. As per our conversation I am sending you this information regarding your scheduled procedures and follow up. The dates of your procedures are: 3/14, 3/28, 4/11, and 4/25/24. Your follow up with the provider after the series of injections is completed will be on 5/7/24.    These are the pre op instructions:    Please leave jewelry and valuables at home or give them to your escort for safe keeping.  You will be required to have an adult to escort you after the procedure is over. If someone is not available the procedure will not be performed.  You will be contacted by the pre op nurse with instructions regarding medications that can be taken the day of the procedures.  Cleanse your skin the evening before with an antibacterial soap (Dial or Hibiclens) and then repeat it again the morning of the procedure.  Do not apply lotions, creams, deodorants, perfumes, or colognes the day of the procedures.  You will be contacted the day before each procedure between 12-3p to be given the time to arrive the day of the procedure. If you are not contacted by the afternoon before the procedure you should contact [phone].  Nothing by mouth after midnight before the procedure.    If you have any questions or concerns regarding the instructions or after the procedures do not hesitate to contact me.      Donell Monroe LPN  Ph: [phone]

## 2024-02-23 NOTE — TELEPHONE ENCOUNTER
Spoke with patient. Discussed scheduling her MBB/RFA series for her knees. Dates selected are 3/14, 3/28, 4/11, 4/25/24. Requested pre op instructions and dates for procedures to be sent to her in the mail. Follow up visit scheduled as well. Letter has been generated and will be sent in mail to patient. No further issues discussed.

## 2024-02-29 ENCOUNTER — ANTI-COAG VISIT (OUTPATIENT)
Dept: CARDIOLOGY | Facility: CLINIC | Age: 79
End: 2024-02-29
Payer: MEDICARE

## 2024-02-29 DIAGNOSIS — Z79.01 CHRONIC ANTICOAGULATION: ICD-10-CM

## 2024-02-29 DIAGNOSIS — Z86.711 HISTORY OF PULMONARY EMBOLUS (PE): Primary | ICD-10-CM

## 2024-02-29 DIAGNOSIS — I82.532 CHRONIC DEEP VEIN THROMBOSIS (DVT) OF LEFT POPLITEAL VEIN: ICD-10-CM

## 2024-02-29 PROCEDURE — 93793 ANTICOAG MGMT PT WARFARIN: CPT | Mod: S$GLB,,, | Performed by: PHARMACIST

## 2024-02-29 NOTE — PROGRESS NOTES
[facility]    2024 1:36 PM    Assessment/Plan:    Patient presents today with supratherapeutic INR.    Assessment of patient findings and chart review: INR not at goal. Medications, chart, and patient findings reviewed. See calendar for adjustments to dose and follow up plan.  Patient reports slight rectal bleeding on/off for the last week and attributes it to hemorrhoids/straining. Patient will be advised to seek urgent care/ER if this continues/worsens and to communicate with us on this issue.     Recommendation for patient's warfarin regimen: Hold dose for 2 days then lower    Recommend repeat INR in 5 days  _________________________________________________________________    Coby Davidesperanza Atkinson (78 y.o.) is followed by the [facility].    Anticoagulation Summary  As of 2024      INR goal:  2.0-3.0   TTR:  31.7 % (6.2 y)   INR used for dosin.2 (2024)   Warfarin maintenance plan:  0 mg every Sun; 0.5 mg (1 mg x 0.5) all other days   Weekly warfarin total:  3 mg   Plan last modified:  Josefina De Leon, PharmD (2024)   Next INR check:  3/5/2024   Target end date:      Indications    History of pulmonary embolus (PE)--2017 [Z86.711]  Chronic anticoagulation [Z79.01]  Chronic deep vein thrombosis (DVT) of left popliteal vein [I82.532]  Chronic pulmonary embolism without acute cor pulmonale [I27.82]                 Anticoagulation Episode Summary       INR check location:  Clinic Lab    Preferred lab:      Send INR reminders to:  [facility] COUMADIN MONITORING POOL    Comments:  [facility] Lab // pt to write down dosing instructions & repeat back          Anticoagulation Care Providers       Provider Role Specialty Phone number    Mundo Hudson,  [facility] Internal Medicine [phone]            Patient Findings       Positives:  Signs/symptoms of bleeding    Negatives:   Signs/symptoms of thrombosis, Laboratory test error suspected, Change in health, Change in alcohol use, Change in activity, Upcoming invasive procedure, Emergency department visit, Upcoming dental procedure, Missed doses, Extra doses, Change in medications, Change in diet/appetite, Hospital admission, Bruising, Other complaints    Comments:  Pt confirmed correct dosing per calendar. She confirmed no new medications or diet changes. She is having some rectal bleeding that has been going on about a week, sometimes when she wipes, sometimes in the stool. She believes this is due to her hemorrhoid.     rectum

## 2024-03-06 ENCOUNTER — TELEPHONE (OUTPATIENT)
Dept: BARIATRICS | Facility: CLINIC | Age: 79
End: 2024-03-06
Payer: MEDICARE

## 2024-03-07 ENCOUNTER — ANTI-COAG VISIT (OUTPATIENT)
Dept: CARDIOLOGY | Facility: CLINIC | Age: 79
End: 2024-03-07
Payer: MEDICARE

## 2024-03-07 DIAGNOSIS — Z79.01 CHRONIC ANTICOAGULATION: ICD-10-CM

## 2024-03-07 DIAGNOSIS — I82.532 CHRONIC DEEP VEIN THROMBOSIS (DVT) OF LEFT POPLITEAL VEIN: ICD-10-CM

## 2024-03-07 DIAGNOSIS — I27.82 CHRONIC PULMONARY EMBOLISM WITHOUT ACUTE COR PULMONALE, UNSPECIFIED PULMONARY EMBOLISM TYPE: ICD-10-CM

## 2024-03-07 DIAGNOSIS — Z86.711 HISTORY OF PULMONARY EMBOLUS (PE): Primary | ICD-10-CM

## 2024-03-07 PROCEDURE — 93793 ANTICOAG MGMT PT WARFARIN: CPT | Mod: S$GLB,,, | Performed by: PHARMACIST

## 2024-03-11 ENCOUNTER — TELEPHONE (OUTPATIENT)
Dept: INTERNAL MEDICINE | Facility: CLINIC | Age: 79
End: 2024-03-11
Payer: MEDICARE

## 2024-03-11 NOTE — TELEPHONE ENCOUNTER
Several upcoming procedure nerve block b/l knee 3/14 & 3/28  Radiofrequency ablation 4/11 & 4/25    Will pt need to discontinue coumadin prior?

## 2024-03-11 NOTE — TELEPHONE ENCOUNTER
----- Message from Sandra Zamora sent at 3/11/2024  9:40 AM CDT -----  Regarding: Pt Advice  Contact: [phone]  Pt calling to speak with someone in provider office regarding procedure on 3/14.States she would like to know if she should stop taking warfarin (COUMADIN) 1 MG tablet before procedure.  Please call pt back at  [phone]

## 2024-03-11 NOTE — TELEPHONE ENCOUNTER
I don't think she needs to stop her coumadin for upcoming procedures unless directed to do so by Pain management.

## 2024-03-14 ENCOUNTER — TELEPHONE (OUTPATIENT)
Dept: PAIN MEDICINE | Facility: CLINIC | Age: 79
End: 2024-03-14
Payer: MEDICARE

## 2024-03-14 NOTE — TELEPHONE ENCOUNTER
----- Message from Mercedez Barber sent at 3/14/2024 10:58 AM CDT -----  Regarding: Medication  Contact: pt @ [phone]  Pt is having  procedure today and she taken her medications. Asking was this ok? Pt is asking for a call back

## 2024-03-14 NOTE — TELEPHONE ENCOUNTER
Spoke with patient. Stated she took her medication this morning and she wanted to confirm that was ok. Informed her she will be able to proceed today as planned. Verbalized understanding. No further issues discussed.

## 2024-03-18 ENCOUNTER — TELEPHONE (OUTPATIENT)
Dept: PAIN MEDICINE | Facility: CLINIC | Age: 79
End: 2024-03-18
Payer: MEDICARE

## 2024-03-18 NOTE — TELEPHONE ENCOUNTER
Mr(s)Tai Atkinson reports 80% reduction in pain and improvement in ADLs following the GENICULAR NERVE BLOCK performed Thursday. Pt reports pain 1/10 during the post-procedure time period. His/Her pain has since returned to baseline 5/10. PDI update: 1 1 1 0 0 0 0. She would like to proceed with the 2nd MBB/RFA on 3/28/24- provider updated.  
DC'd by MD

## 2024-03-21 ENCOUNTER — ANTI-COAG VISIT (OUTPATIENT)
Dept: CARDIOLOGY | Facility: CLINIC | Age: 79
End: 2024-03-21
Payer: MEDICARE

## 2024-03-21 ENCOUNTER — TELEPHONE (OUTPATIENT)
Dept: PAIN MEDICINE | Facility: CLINIC | Age: 79
End: 2024-03-21
Payer: MEDICARE

## 2024-03-21 DIAGNOSIS — I27.82 CHRONIC PULMONARY EMBOLISM WITHOUT ACUTE COR PULMONALE, UNSPECIFIED PULMONARY EMBOLISM TYPE: ICD-10-CM

## 2024-03-21 DIAGNOSIS — Z86.711 HISTORY OF PULMONARY EMBOLUS (PE): Primary | ICD-10-CM

## 2024-03-21 DIAGNOSIS — I82.532 CHRONIC DEEP VEIN THROMBOSIS (DVT) OF LEFT POPLITEAL VEIN: ICD-10-CM

## 2024-03-21 DIAGNOSIS — Z79.01 CHRONIC ANTICOAGULATION: ICD-10-CM

## 2024-03-21 PROCEDURE — 93793 ANTICOAG MGMT PT WARFARIN: CPT | Mod: S$GLB,,, | Performed by: PHARMACIST

## 2024-03-21 NOTE — TELEPHONE ENCOUNTER
----- Message from Cindy Mike RN sent at 3/21/2024  4:38 PM CDT -----  Regarding: cancel all procedures  Patient said she wants to cancel all upcoming procedures, she has changed her mind.

## 2024-03-21 NOTE — PROGRESS NOTES
[facility]    2024 3:50 PM    Assessment/Plan:    Patient presents today with subtherapeutic  INR.    Assessment of patient findings and chart review: despite subtherapeutic INR, patient actually is reporting a larger than prescribed dose of warfarin    Recommendation for patient's warfarin regimen:  per calendar    Recommend repeat INR in 1 week  _________________________________________________________________    Coby Atkinson (78 y.o.) is followed by the [email or web address].    Anticoagulation Summary  As of 3/21/2024      INR goal:  2.0-3.0   TTR:  31.9 % (6.3 y)   INR used for dosin.8 (3/21/2024)   Warfarin maintenance plan:  0 mg every Sun, Wed, Fri; 0.5 mg (1 mg x 0.5) all other days   Weekly warfarin total:  2 mg   Plan last modified:  Josefina De Leon, PharmD (3/7/2024)   Next INR check:     Target end date:      Indications    History of pulmonary embolus (PE)--2017 [Z86.711]  Chronic anticoagulation [Z79.01]  Chronic deep vein thrombosis (DVT) of left popliteal vein [I82.532]  Chronic pulmonary embolism without acute cor pulmonale [I27.82]                 Anticoagulation Episode Summary       INR check location:  Clinic Lab    Preferred lab:      Send INR reminders to:  [facility] COUMADIN MONITORING POOL    Comments:  [facility] Lab // pt to write down dosing instructions & repeat back          Anticoagulation Care Providers       Provider Role Specialty Phone number    AfshinmsMundo thomas, [facility] Internal Medicine [phone]            Patient Findings       Negatives:  Signs/symptoms of thrombosis, Signs/symptoms of bleeding, Laboratory test error suspected, Change in health, Change in alcohol use, Change in activity, Upcoming invasive procedure, Emergency department visit, Upcoming dental procedure, Missed doses, Extra doses, Change in medications, Change in diet/appetite,  Hospital admission, Bruising, Other complaints    Comments:  Pt reports taking 0.5mg daily. No changes to diet or mediations. No other issues confirmed.

## 2024-03-22 NOTE — TELEPHONE ENCOUNTER
Callback message left. Discuss and confirm with patient her wanting to cancel the scheduled procedures.

## 2024-04-01 ENCOUNTER — ANTI-COAG VISIT (OUTPATIENT)
Dept: CARDIOLOGY | Facility: CLINIC | Age: 79
End: 2024-04-01
Payer: MEDICARE

## 2024-04-01 DIAGNOSIS — Z79.01 CHRONIC ANTICOAGULATION: ICD-10-CM

## 2024-04-01 DIAGNOSIS — I27.82 CHRONIC PULMONARY EMBOLISM WITHOUT ACUTE COR PULMONALE, UNSPECIFIED PULMONARY EMBOLISM TYPE: ICD-10-CM

## 2024-04-01 DIAGNOSIS — Z86.711 HISTORY OF PULMONARY EMBOLUS (PE): Primary | ICD-10-CM

## 2024-04-01 DIAGNOSIS — I82.532 CHRONIC DEEP VEIN THROMBOSIS (DVT) OF LEFT POPLITEAL VEIN: ICD-10-CM

## 2024-04-01 PROCEDURE — 93793 ANTICOAG MGMT PT WARFARIN: CPT | Mod: S$GLB,,, | Performed by: PHARMACIST

## 2024-04-06 NOTE — TELEPHONE ENCOUNTER
Care Due:                  Date            Visit Type   Department     Provider  --------------------------------------------------------------------------------                                EP -                              PRIMARY      [facility] INTERNAL  Last Visit: 01-      CARE ([facility])   [facility]       Mundo Hudson                              EP -                              PRIMARY      [facility] INTERNAL  Next Visit: 07-      [facility] ([facility])   [facility]  [facility]                                                            Last  Test          Frequency    Reason                     Performed    Due Date  --------------------------------------------------------------------------------    Mg Level....  12 months..  alendronate..............  09- 09-    Phosphate...  12 months..  alendronate..............  Not Found    Overdue    Vitamin D...  12 months..  alendronate..............  12- 12-    [facility] Embedded Care Due Messages. Reference number: [id number].   4/06/2024 11:02:24 AM CDT

## 2024-04-07 RX ORDER — POTASSIUM CHLORIDE 750 MG/1
10 CAPSULE, EXTENDED RELEASE ORAL
Qty: 90 CAPSULE | Refills: 3 | Status: SHIPPED | OUTPATIENT
Start: 2024-04-07 | End: 2024-05-16 | Stop reason: SDUPTHER

## 2024-04-07 NOTE — TELEPHONE ENCOUNTER
Provider Staff:  Action required for this patient    Requires labs      Please see care gap opportunities below in Care Due Message.    Thanks!  [facility]     Appointments      Date Provider   Last Visit   1/26/2024 Mundo Hudson, DO   Next Visit   7/26/2024 Mundo Hudson, DO     Refill Decision Note   Coby Atkinson  is requesting a refill authorization.  Brief Assessment and Rationale for Refill:  Approve     Medication Therapy Plan:         Comments:     Note composed:9:43 AM 04/07/2024

## 2024-04-16 ENCOUNTER — ANTI-COAG VISIT (OUTPATIENT)
Dept: CARDIOLOGY | Facility: CLINIC | Age: 79
End: 2024-04-16
Payer: MEDICARE

## 2024-04-16 DIAGNOSIS — Z79.01 CHRONIC ANTICOAGULATION: ICD-10-CM

## 2024-04-16 DIAGNOSIS — I27.82 CHRONIC PULMONARY EMBOLISM WITHOUT ACUTE COR PULMONALE, UNSPECIFIED PULMONARY EMBOLISM TYPE: ICD-10-CM

## 2024-04-16 DIAGNOSIS — I82.532 CHRONIC DEEP VEIN THROMBOSIS (DVT) OF LEFT POPLITEAL VEIN: ICD-10-CM

## 2024-04-16 DIAGNOSIS — Z86.711 HISTORY OF PULMONARY EMBOLUS (PE): Primary | ICD-10-CM

## 2024-04-16 PROCEDURE — 93793 ANTICOAG MGMT PT WARFARIN: CPT | Mod: S$GLB,,, | Performed by: PHARMACIST

## 2024-04-17 NOTE — PROGRESS NOTES
Of note, patient called to report she only took 0.5mg last night instead of 1mg. She will be advised to tale 1mg dose today

## 2024-04-26 ENCOUNTER — TELEPHONE (OUTPATIENT)
Dept: INTERNAL MEDICINE | Facility: CLINIC | Age: 79
End: 2024-04-26
Payer: MEDICARE

## 2024-05-16 ENCOUNTER — OFFICE VISIT (OUTPATIENT)
Dept: INTERNAL MEDICINE | Facility: CLINIC | Age: 79
End: 2024-05-16
Payer: MEDICARE

## 2024-05-16 VITALS
TEMPERATURE: 98 F | BODY MASS INDEX: 40.39 KG/M2 | OXYGEN SATURATION: 95 % | HEART RATE: 56 BPM | RESPIRATION RATE: 18 BRPM | WEIGHT: 227.94 LBS | DIASTOLIC BLOOD PRESSURE: 70 MMHG | SYSTOLIC BLOOD PRESSURE: 138 MMHG | HEIGHT: 63 IN

## 2024-05-16 DIAGNOSIS — I50.32 CHRONIC DIASTOLIC HEART FAILURE: ICD-10-CM

## 2024-05-16 DIAGNOSIS — D56.3 THALASSEMIA MINOR: ICD-10-CM

## 2024-05-16 DIAGNOSIS — F32.1 CURRENT MODERATE EPISODE OF MAJOR DEPRESSIVE DISORDER, UNSPECIFIED WHETHER RECURRENT: ICD-10-CM

## 2024-05-16 DIAGNOSIS — I27.20 PULMONARY HYPERTENSION: ICD-10-CM

## 2024-05-16 DIAGNOSIS — R41.89 COGNITIVE IMPAIRMENT: Primary | ICD-10-CM

## 2024-05-16 DIAGNOSIS — I83.12 VARICOSE VEINS OF BOTH LOWER EXTREMITIES WITH INFLAMMATION: ICD-10-CM

## 2024-05-16 DIAGNOSIS — G47.33 OBSTRUCTIVE SLEEP APNEA: ICD-10-CM

## 2024-05-16 DIAGNOSIS — I83.11 VARICOSE VEINS OF BOTH LOWER EXTREMITIES WITH INFLAMMATION: ICD-10-CM

## 2024-05-16 DIAGNOSIS — Z86.718 HISTORY OF DVT OF LOWER EXTREMITY: ICD-10-CM

## 2024-05-16 DIAGNOSIS — K21.9 GASTROESOPHAGEAL REFLUX DISEASE, UNSPECIFIED WHETHER ESOPHAGITIS PRESENT: ICD-10-CM

## 2024-05-16 DIAGNOSIS — Z86.711 HISTORY OF PULMONARY EMBOLUS (PE): ICD-10-CM

## 2024-05-16 DIAGNOSIS — Z79.01 CHRONIC ANTICOAGULATION: ICD-10-CM

## 2024-05-16 DIAGNOSIS — E66.01 MORBID OBESITY WITH BMI OF 40.0-44.9, ADULT: ICD-10-CM

## 2024-05-16 PROBLEM — E66.2 OBESITY HYPOVENTILATION SYNDROME: Status: RESOLVED | Noted: 2019-02-20 | Resolved: 2024-05-16

## 2024-05-16 PROCEDURE — 99214 OFFICE O/P EST MOD 30 MIN: CPT | Mod: PBBFAC,PO | Performed by: INTERNAL MEDICINE

## 2024-05-16 PROCEDURE — 99214 OFFICE O/P EST MOD 30 MIN: CPT | Mod: S$PBB,,, | Performed by: INTERNAL MEDICINE

## 2024-05-16 PROCEDURE — 99999 PR PBB SHADOW E&M-EST. PATIENT-LVL IV: CPT | Mod: PBBFAC,,, | Performed by: INTERNAL MEDICINE

## 2024-05-16 RX ORDER — BISOPROLOL FUMARATE 10 MG/1
10 TABLET, FILM COATED ORAL DAILY
Qty: 90 TABLET | Refills: 3 | Status: SHIPPED | OUTPATIENT
Start: 2024-05-16

## 2024-05-16 RX ORDER — FLUCONAZOLE 150 MG/1
150 TABLET ORAL DAILY
COMMUNITY

## 2024-05-16 RX ORDER — ESCITALOPRAM OXALATE 20 MG/1
20 TABLET ORAL DAILY
Qty: 90 TABLET | Refills: 3 | Status: SHIPPED | OUTPATIENT
Start: 2024-05-16

## 2024-05-16 RX ORDER — DOXAZOSIN 8 MG/1
8 TABLET ORAL DAILY
Qty: 90 TABLET | Refills: 3 | Status: SHIPPED | OUTPATIENT
Start: 2024-05-16

## 2024-05-16 RX ORDER — POTASSIUM CHLORIDE 750 MG/1
10 CAPSULE, EXTENDED RELEASE ORAL DAILY
Qty: 90 CAPSULE | Refills: 3 | Status: SHIPPED | OUTPATIENT
Start: 2024-05-16

## 2024-05-16 NOTE — PROGRESS NOTES
Subjective     Patient ID: Coby Atkinson is a 78 y.o. female.    Chief Complaint: Follow-up, Insomnia, and Memory Loss    HPI  Pt with recent B/L PE's, hx of UGIB, HTN, Morbid Obesity, Depression, GERD, MCI, pulmonary HTN, OA of knees is here for f/u. Per pt's  her short term memory continues to get worse.   Review of Systems   Constitutional:  Negative for activity change, appetite change, chills, diaphoresis, fatigue, fever and unexpected weight change.   HENT:  Negative for postnasal drip, rhinorrhea, sinus pressure/congestion, sneezing, sore throat, trouble swallowing and voice change.    Respiratory:  Negative for cough, shortness of breath and wheezing.    Cardiovascular:  Negative for chest pain, palpitations and leg swelling.   Gastrointestinal:  Negative for abdominal pain, blood in stool, constipation, diarrhea, nausea and vomiting.   Genitourinary:  Negative for dysuria.   Musculoskeletal:  Positive for arthralgias. Negative for myalgias.   Integumentary:  Negative for rash and wound.   Allergic/Immunologic: Negative for environmental allergies and food allergies.   Hematological:  Negative for adenopathy. Does not bruise/bleed easily.          Objective     Physical Exam  Constitutional:       General: She is not in acute distress.     Appearance: Normal appearance. She is well-developed. She is not diaphoretic.   HENT:      Head: Normocephalic and atraumatic.      Right Ear: External ear normal.      Left Ear: External ear normal.      Nose: Nose normal.      Mouth/Throat:      Pharynx: No oropharyngeal exudate.   Eyes:      General: No scleral icterus.        Right eye: No discharge.         Left eye: No discharge.      Conjunctiva/sclera: Conjunctivae normal.      Pupils: Pupils are equal, round, and reactive to light.   Neck:      Vascular: No JVD.   Cardiovascular:      Rate and Rhythm: Normal rate and regular rhythm.      Pulses: Normal pulses.      Heart sounds: Normal heart sounds.    Pulmonary:      Effort: Pulmonary effort is normal. No respiratory distress.      Breath sounds: Normal breath sounds. No wheezing, rhonchi or rales.   Abdominal:      General: Bowel sounds are normal. There is no distension.      Palpations: Abdomen is soft.      Tenderness: There is no abdominal tenderness. There is no guarding or rebound.   Musculoskeletal:      Cervical back: Neck supple.      Right lower leg: No edema.      Left lower leg: No edema.   Lymphadenopathy:      Cervical: No cervical adenopathy.   Skin:     General: Skin is warm and dry.      Coloration: Skin is not pale.      Findings: No rash.   Neurological:      Mental Status: She is alert.      Gait: Gait abnormal.   Psychiatric:         Behavior: Behavior normal.         Thought Content: Thought content normal.            Assessment and Plan     1. Cognitive impairment  -     Ambulatory referral/consult to Neurology; Future; Expected date: 05/23/2024    2. Obstructive sleep apnea  Overview:  There was significant ADRIÁN (obstructive sleep apnea) based on AHI (apnea hypopnea index) criteria. The overall AHI was 5.7 with an oxygen laura of 88.0%. The supine AHI was 8.5 and the REM AHI was 26.9. The patient did not qualify for a split night study due to an insufficient number of events in the first half of the study. The RDI (Respiratory Disturbance Index) was greater than the AHI due to the presence of RERAs (respiratory effort related arousals). The overall RDI was 8.5.       3. Gastroesophageal reflux disease, unspecified whether esophagitis present    4. Morbid obesity with BMI of 40.0-44.9, adult    5. Thalassemia minor    6. History of DVT of lower extremity    7. History of pulmonary embolus (PE)--12/2017  Overview:  Submassive PE, s/p catheter-directed tPA      8. Current moderate episode of major depressive disorder, unspecified whether recurrent    9. Pulmonary hypertension  Overview:  Likely due to a combination of   1) sleep apnea -  diagnosed 8/16  2) sub-massive pulmonary embolism in 12/17  3) LV diastolic dysfunction (severe LAE, indeterminate LV diastolic dysfunction)      10. Chronic diastolic heart failure    11. Varicose veins of both lower extremities with inflammation    12. Chronic anticoagulation    Other orders  -     potassium chloride (MICRO-K) 10 MEQ CpSR; Take 1 capsule (10 mEq total) by mouth once daily. TAKE 1 CAPSULE(10 MEQ) BY MOUTH EVERY DAY  Dispense: 90 capsule; Refill: 3  -     bisoprolol (ZEBETA) 10 MG tablet; Take 1 tablet (10 mg total) by mouth once daily.  Dispense: 90 tablet; Refill: 3  -     EScitalopram oxalate (LEXAPRO) 20 MG tablet; Take 1 tablet (20 mg total) by mouth once daily.  Dispense: 90 tablet; Refill: 3  -     doxazosin (CARDURA) 8 MG Tab; Take 1 tablet (8 mg total) by mouth once daily.  Dispense: 90 tablet; Refill: 3         Recurrent PE/DVT- continue coumadin       Chronic diastolic heart failure- stable       HTN- on Doxazosin/Exforge      Morbid Obesity- referral in Bariatric Medicine      Depression- fair control on Lexapro 20 mg daily and Buspar 10 mg TID PRN      MCI- referral to Neuro     GERD- controlled on Protonix daily      OA of knees- Tylenol PRN    -seeing Pain management, planning for RFA      CKD III- stable      Pulmonary HTN- stable, will follow       ADRIÁN- pt advised to use her CPAP       Thalassemia minor      hx of UGIB

## 2024-05-20 ENCOUNTER — ANTI-COAG VISIT (OUTPATIENT)
Dept: CARDIOLOGY | Facility: CLINIC | Age: 79
End: 2024-05-20
Payer: MEDICARE

## 2024-05-20 DIAGNOSIS — Z86.711 HISTORY OF PULMONARY EMBOLUS (PE): Primary | ICD-10-CM

## 2024-05-20 DIAGNOSIS — I82.532 CHRONIC DEEP VEIN THROMBOSIS (DVT) OF LEFT POPLITEAL VEIN: ICD-10-CM

## 2024-05-20 DIAGNOSIS — I27.82 CHRONIC PULMONARY EMBOLISM WITHOUT ACUTE COR PULMONALE, UNSPECIFIED PULMONARY EMBOLISM TYPE: ICD-10-CM

## 2024-05-20 DIAGNOSIS — Z79.01 CHRONIC ANTICOAGULATION: ICD-10-CM

## 2024-05-20 PROCEDURE — 93793 ANTICOAG MGMT PT WARFARIN: CPT | Mod: ,,,

## 2024-05-21 NOTE — PROGRESS NOTES
[facility]    2024 10:27 AM    Assessment/Plan:    Patient presents today with therapeutic INR.    Assessment of patient findings and chart review: Pt stated she has been taking 0.5 mg daily since last lab on . This seems to be working for her. Will lower maintenance dose to 0.5 mg daily and reassess.     Recommendation for patient's warfarin regimen: Decrease maintenance dose    Recommend repeat INR in 2 weeks  _________________________________________________________________    Coby Atkinson (78 y.o.) is followed by the [facility].    Anticoagulation Summary  As of 2024      INR goal:  2.0-3.0   TTR:  31.8% (6.4 y)   INR used for dosin.3 (2024)   Warfarin maintenance plan:  1 mg (1 mg x 1) every Tue, Sat; 0.5 mg (1 mg x 0.5) all other days   Weekly warfarin total:  4.5 mg   Plan last modified:  Josefina De Leon, PharmD (2024)   Next INR check:  6/3/2024   Target end date:      Indications    History of pulmonary embolus (PE)--2017 [Z86.711]  Chronic anticoagulation [Z79.01]  Chronic deep vein thrombosis (DVT) of left popliteal vein [I82.532]  Chronic pulmonary embolism without acute cor pulmonale [I27.82]                 Anticoagulation Episode Summary       INR check location:  Clinic Lab    Preferred lab:      Send INR reminders to:  [facility] COUMADIN MONITORING POOL    Comments:  [facility] Lab // pt to write down dosing instructions & repeat back          Anticoagulation Care Providers       Provider Role Specialty Phone number    Mundo Hudosn, DO [facility] Internal Medicine [phone]            Patient Findings       Positives:  Other complaints    Negatives:  Signs/symptoms of bleeding, Change in health, Change in alcohol use, Change in activity, Upcoming invasive procedure, Emergency department visit, Upcoming dental procedure, Missed doses, Extra  doses, Change in medications, Change in diet/appetite, Hospital admission, Bruising    Comments:  Pt verified dose: 0.5mg daily since last lab, verified correct tab size, always has swelling in L leg,

## 2024-06-27 ENCOUNTER — ANTI-COAG VISIT (OUTPATIENT)
Dept: CARDIOLOGY | Facility: CLINIC | Age: 79
End: 2024-06-27
Payer: MEDICARE

## 2024-06-27 DIAGNOSIS — Z79.01 CHRONIC ANTICOAGULATION: ICD-10-CM

## 2024-06-27 DIAGNOSIS — I82.532 CHRONIC DEEP VEIN THROMBOSIS (DVT) OF LEFT POPLITEAL VEIN: ICD-10-CM

## 2024-06-27 DIAGNOSIS — I27.82 CHRONIC PULMONARY EMBOLISM WITHOUT ACUTE COR PULMONALE, UNSPECIFIED PULMONARY EMBOLISM TYPE: ICD-10-CM

## 2024-06-27 DIAGNOSIS — Z86.711 HISTORY OF PULMONARY EMBOLUS (PE): Primary | ICD-10-CM

## 2024-06-27 NOTE — PROGRESS NOTES
[facility]    2024 3:07 PM    Assessment/Plan:    Patient presents today with therapeutic INR.    Assessment of patient findings and chart review: reviewed    Recommendation for patient's warfarin regimen: Continue current maintenance dose; patient reports to taking 1mg po daily which is double recommended dose.     Recommend repeat INR next week with hospital follow up; pt is unable to have INR drawn 7/ d/t being out of town and refused  lab draw. Patient has recommended  lab draw  _________________________________________________________________    Coby Atkinson (78 y.o.) is followed by the [facility].    Anticoagulation Summary  As of 2024      INR goal:  2.0-3.0   TTR:  32.9% (6.5 y)   INR used for dosin.5 (2024)   Warfarin maintenance plan:  0.5 mg (1 mg x 0.5) every day   Weekly warfarin total:  3.5 mg   Plan last modified:  Abdias Camara, PharmD (2024)   Next INR check:  2024   Target end date:      Indications    History of pulmonary embolus (PE)--2017 [Z86.711]  Chronic anticoagulation [Z79.01]  Chronic deep vein thrombosis (DVT) of left popliteal vein [I82.532]  Chronic pulmonary embolism without acute cor pulmonale [I27.82]                 Anticoagulation Episode Summary       INR check location:  Clinic Lab    Preferred lab:      Send INR reminders to:  [facility] COUMADIN MONITORING POOL    Comments:  [facility] // pt to write down dosing instructions & repeat back          Anticoagulation Care Providers       Provider Role Specialty Phone number    Mundo Hudson,  [facility] Internal Medicine [phone]

## 2024-07-17 DIAGNOSIS — I50.32 CHRONIC DIASTOLIC HEART FAILURE: ICD-10-CM

## 2024-07-17 RX ORDER — FUROSEMIDE 20 MG/1
40 TABLET ORAL
Qty: 180 TABLET | Refills: 3 | Status: SHIPPED | OUTPATIENT
Start: 2024-07-17

## 2024-08-07 ENCOUNTER — OFFICE VISIT (OUTPATIENT)
Dept: INTERNAL MEDICINE | Facility: CLINIC | Age: 79
End: 2024-08-07
Payer: MEDICARE

## 2024-08-07 VITALS
WEIGHT: 233.56 LBS | OXYGEN SATURATION: 97 % | DIASTOLIC BLOOD PRESSURE: 52 MMHG | TEMPERATURE: 98 F | BODY MASS INDEX: 41.38 KG/M2 | SYSTOLIC BLOOD PRESSURE: 92 MMHG | RESPIRATION RATE: 16 BRPM | HEIGHT: 63 IN | HEART RATE: 51 BPM

## 2024-08-07 DIAGNOSIS — I50.32 CHRONIC DIASTOLIC HEART FAILURE: ICD-10-CM

## 2024-08-07 DIAGNOSIS — E66.01 MORBID OBESITY WITH BMI OF 40.0-44.9, ADULT: ICD-10-CM

## 2024-08-07 DIAGNOSIS — G47.33 OBSTRUCTIVE SLEEP APNEA: ICD-10-CM

## 2024-08-07 DIAGNOSIS — I27.9 PULMONARY HEART DISEASE: ICD-10-CM

## 2024-08-07 DIAGNOSIS — K21.9 GASTROESOPHAGEAL REFLUX DISEASE, UNSPECIFIED WHETHER ESOPHAGITIS PRESENT: ICD-10-CM

## 2024-08-07 DIAGNOSIS — Z79.01 CHRONIC ANTICOAGULATION: ICD-10-CM

## 2024-08-07 DIAGNOSIS — I10 ESSENTIAL HYPERTENSION: ICD-10-CM

## 2024-08-07 DIAGNOSIS — Z86.718 HISTORY OF DVT OF LOWER EXTREMITY: ICD-10-CM

## 2024-08-07 DIAGNOSIS — I27.82 CHRONIC PULMONARY EMBOLISM WITHOUT ACUTE COR PULMONALE, UNSPECIFIED PULMONARY EMBOLISM TYPE: ICD-10-CM

## 2024-08-07 DIAGNOSIS — R41.89 COGNITIVE IMPAIRMENT: ICD-10-CM

## 2024-08-07 DIAGNOSIS — D56.3 THALASSEMIA MINOR: ICD-10-CM

## 2024-08-07 DIAGNOSIS — I27.20 PULMONARY HYPERTENSION: ICD-10-CM

## 2024-08-07 DIAGNOSIS — F32.1 CURRENT MODERATE EPISODE OF MAJOR DEPRESSIVE DISORDER, UNSPECIFIED WHETHER RECURRENT: ICD-10-CM

## 2024-08-07 DIAGNOSIS — Z86.711 HISTORY OF PULMONARY EMBOLUS (PE): Primary | ICD-10-CM

## 2024-08-07 PROCEDURE — 99999 PR PBB SHADOW E&M-EST. PATIENT-LVL IV: CPT | Mod: PBBFAC,,, | Performed by: INTERNAL MEDICINE

## 2024-08-07 PROCEDURE — G2211 COMPLEX E/M VISIT ADD ON: HCPCS | Mod: S$PBB,,, | Performed by: INTERNAL MEDICINE

## 2024-08-07 PROCEDURE — 99214 OFFICE O/P EST MOD 30 MIN: CPT | Mod: S$PBB,,, | Performed by: INTERNAL MEDICINE

## 2024-08-07 PROCEDURE — 99214 OFFICE O/P EST MOD 30 MIN: CPT | Mod: PBBFAC,PO | Performed by: INTERNAL MEDICINE

## 2024-08-21 ENCOUNTER — TELEPHONE (OUTPATIENT)
Dept: INTERNAL MEDICINE | Facility: CLINIC | Age: 79
End: 2024-08-21
Payer: MEDICARE

## 2024-08-21 DIAGNOSIS — I10 ESSENTIAL HYPERTENSION: Primary | ICD-10-CM

## 2024-08-21 DIAGNOSIS — R79.9 ABNORMAL FINDING OF BLOOD CHEMISTRY, UNSPECIFIED: ICD-10-CM

## 2024-08-21 DIAGNOSIS — E66.01 MORBID OBESITY WITH BMI OF 40.0-44.9, ADULT: ICD-10-CM

## 2024-09-24 ENCOUNTER — ANTI-COAG VISIT (OUTPATIENT)
Dept: CARDIOLOGY | Facility: CLINIC | Age: 79
End: 2024-09-24
Payer: MEDICARE

## 2024-09-24 DIAGNOSIS — I27.82 CHRONIC PULMONARY EMBOLISM WITHOUT ACUTE COR PULMONALE, UNSPECIFIED PULMONARY EMBOLISM TYPE: ICD-10-CM

## 2024-09-24 DIAGNOSIS — I82.532 CHRONIC DEEP VEIN THROMBOSIS (DVT) OF LEFT POPLITEAL VEIN: ICD-10-CM

## 2024-09-24 DIAGNOSIS — Z79.01 CHRONIC ANTICOAGULATION: ICD-10-CM

## 2024-09-24 DIAGNOSIS — Z86.711 HISTORY OF PULMONARY EMBOLUS (PE): Primary | ICD-10-CM

## 2024-09-24 PROCEDURE — 93793 ANTICOAG MGMT PT WARFARIN: CPT | Mod: ,,, | Performed by: PHARMACIST

## 2024-09-24 NOTE — PROGRESS NOTES
[facility]    2024 9:21 AM    Assessment/Plan:    Patient presents today with therapeutic INR.    Assessment of patient findings and chart review: INR at goal. Medications and chart reviewed. No changes noted to necessitate adjustment of warfarin or follow-up plan. See calendar.      Recommendation for patient's warfarin regimen: Continue current maintenance dose    Recommend repeat INR in 3 weeks  _________________________________________________________________    Coby Atkinson (78 y.o.) is followed by the [facility].    Anticoagulation Summary  As of 2024      INR goal:  2.0-3.0   TTR:  35.3% (6.8 y)   INR used for dosin.3 (2024)   Warfarin maintenance plan:  0.5 mg (1 mg x 0.5) every day   Weekly warfarin total:  3.5 mg   Plan last modified:  Abdias Camara, PharmD (2024)   Next INR check:  10/14/2024   Target end date:      Indications    History of pulmonary embolus (PE)--2017 [Z86.711]  Chronic anticoagulation [Z79.01]  Chronic deep vein thrombosis (DVT) of left popliteal vein [I82.532]  Chronic pulmonary embolism without acute cor pulmonale [I27.82]                 Anticoagulation Episode Summary       INR check location:  Clinic Lab    Preferred lab:      Send INR reminders to:  [facility] COUMADIN MONITORING POOL    Comments:  [facility] // pt to write down dosing instructions & repeat back          Anticoagulation Care Providers       Provider Role Specialty Phone number    Mundo Hudson,  [facility] Internal Medicine [phone]

## 2024-09-26 ENCOUNTER — HOSPITAL ENCOUNTER (OUTPATIENT)
Facility: HOSPITAL | Age: 79
Discharge: HOME | End: 2024-09-27
Attending: STUDENT IN AN ORGANIZED HEALTH CARE EDUCATION/TRAINING PROGRAM | Admitting: STUDENT IN AN ORGANIZED HEALTH CARE EDUCATION/TRAINING PROGRAM
Payer: MEDICARE

## 2024-09-26 DIAGNOSIS — M25.562 BILATERAL CHRONIC KNEE PAIN: ICD-10-CM

## 2024-09-26 DIAGNOSIS — M25.561 BILATERAL CHRONIC KNEE PAIN: ICD-10-CM

## 2024-09-26 DIAGNOSIS — G89.29 BILATERAL CHRONIC KNEE PAIN: ICD-10-CM

## 2024-09-26 DIAGNOSIS — M17.12 OSTEOARTHRITIS OF LEFT KNEE, UNSPECIFIED OSTEOARTHRITIS TYPE: Primary | ICD-10-CM

## 2024-09-26 DIAGNOSIS — R07.9 CHEST PAIN: ICD-10-CM

## 2024-09-26 DIAGNOSIS — I82.532 CHRONIC DEEP VEIN THROMBOSIS (DVT) OF LEFT POPLITEAL VEIN: ICD-10-CM

## 2024-09-26 DIAGNOSIS — I50.32 CHRONIC DIASTOLIC HEART FAILURE: ICD-10-CM

## 2024-09-26 DIAGNOSIS — E66.01 MORBID OBESITY WITH BMI OF 40.0-44.9, ADULT: ICD-10-CM

## 2024-09-26 DIAGNOSIS — M25.562 LEFT KNEE PAIN: ICD-10-CM

## 2024-09-26 LAB
ALBUMIN SERPL BCP-MCNC: 3.5 G/DL (ref 3.5–5.2)
ALP SERPL-CCNC: 66 U/L (ref 55–135)
ALT SERPL W/O P-5'-P-CCNC: 8 U/L (ref 10–44)
ANION GAP SERPL CALC-SCNC: 6 MMOL/L (ref 8–16)
AST SERPL-CCNC: 14 U/L (ref 10–40)
BASOPHILS # BLD AUTO: 0.03 K/UL (ref 0–0.2)
BASOPHILS NFR BLD: 0.3 % (ref 0–1.9)
BILIRUB SERPL-MCNC: 0.6 MG/DL (ref 0.1–1)
BUN SERPL-MCNC: 17 MG/DL (ref 8–23)
CALCIUM SERPL-MCNC: 9.2 MG/DL (ref 8.7–10.5)
CHLORIDE SERPL-SCNC: 107 MMOL/L (ref 95–110)
CO2 SERPL-SCNC: 25 MMOL/L (ref 23–29)
CREAT SERPL-MCNC: 0.8 MG/DL (ref 0.5–1.4)
CRP SERPL-MCNC: 7.2 MG/L (ref 0–8.2)
DIFFERENTIAL METHOD BLD: ABNORMAL
EOSINOPHIL # BLD AUTO: 0.1 K/UL (ref 0–0.5)
EOSINOPHIL NFR BLD: 0.8 % (ref 0–8)
ERYTHROCYTE [DISTWIDTH] IN BLOOD BY AUTOMATED COUNT: 15.7 % (ref 11.5–14.5)
EST. GFR  (NO RACE VARIABLE): >60 ML/MIN/1.73 M^2
GLUCOSE SERPL-MCNC: 101 MG/DL (ref 70–110)
HCT VFR BLD AUTO: 35.2 % (ref 37–48.5)
HGB BLD-MCNC: 10.2 G/DL (ref 12–16)
IMM GRANULOCYTES # BLD AUTO: 0.06 K/UL (ref 0–0.04)
IMM GRANULOCYTES NFR BLD AUTO: 0.6 % (ref 0–0.5)
INR PPP: 2.5 (ref 0.8–1.2)
LYMPHOCYTES # BLD AUTO: 2.1 K/UL (ref 1–4.8)
LYMPHOCYTES NFR BLD: 19.1 % (ref 18–48)
MCH RBC QN AUTO: 19.4 PG (ref 27–31)
MCHC RBC AUTO-ENTMCNC: 29 G/DL (ref 32–36)
MCV RBC AUTO: 67 FL (ref 82–98)
MONOCYTES # BLD AUTO: 0.9 K/UL (ref 0.3–1)
MONOCYTES NFR BLD: 8.5 % (ref 4–15)
NEUTROPHILS # BLD AUTO: 7.7 K/UL (ref 1.8–7.7)
NEUTROPHILS NFR BLD: 70.7 % (ref 38–73)
NRBC BLD-RTO: 0 /100 WBC
PLATELET # BLD AUTO: 266 K/UL (ref 150–450)
PMV BLD AUTO: 9.6 FL (ref 9.2–12.9)
POTASSIUM SERPL-SCNC: 4 MMOL/L (ref 3.5–5.1)
PROT SERPL-MCNC: 6.8 G/DL (ref 6–8.4)
PROTHROMBIN TIME: 26.3 SEC (ref 9–12.5)
RBC # BLD AUTO: 5.26 M/UL (ref 4–5.4)
SODIUM SERPL-SCNC: 138 MMOL/L (ref 136–145)
WBC # BLD AUTO: 10.86 K/UL (ref 3.9–12.7)

## 2024-09-26 PROCEDURE — 96374 THER/PROPH/DIAG INJ IV PUSH: CPT

## 2024-09-26 PROCEDURE — 86140 C-REACTIVE PROTEIN: CPT

## 2024-09-26 PROCEDURE — G0378 HOSPITAL OBSERVATION PER HR: HCPCS

## 2024-09-26 PROCEDURE — 63600175 PHARM REV CODE 636 W HCPCS

## 2024-09-26 PROCEDURE — 25000003 PHARM REV CODE 250: Performed by: STUDENT IN AN ORGANIZED HEALTH CARE EDUCATION/TRAINING PROGRAM

## 2024-09-26 PROCEDURE — 99285 EMERGENCY DEPT VISIT HI MDM: CPT | Mod: 25

## 2024-09-26 PROCEDURE — 85025 COMPLETE CBC W/AUTO DIFF WBC: CPT

## 2024-09-26 PROCEDURE — 85610 PROTHROMBIN TIME: CPT

## 2024-09-26 PROCEDURE — 80053 COMPREHEN METABOLIC PANEL: CPT

## 2024-09-26 RX ORDER — ONDANSETRON 8 MG/1
8 TABLET, ORALLY DISINTEGRATING ORAL EVERY 6 HOURS PRN
Status: DISCONTINUED | OUTPATIENT
Start: 2024-09-26 | End: 2024-09-27 | Stop reason: HOSPADM

## 2024-09-26 RX ORDER — POLYETHYLENE GLYCOL 3350 17 G/17G
17 POWDER, FOR SOLUTION ORAL DAILY PRN
Status: DISCONTINUED | OUTPATIENT
Start: 2024-09-26 | End: 2024-09-27 | Stop reason: HOSPADM

## 2024-09-26 RX ORDER — GLUCAGON 1 MG
1 KIT INJECTION
Status: DISCONTINUED | OUTPATIENT
Start: 2024-09-26 | End: 2024-09-27 | Stop reason: HOSPADM

## 2024-09-26 RX ORDER — TALC
6 POWDER (GRAM) TOPICAL NIGHTLY PRN
Status: DISCONTINUED | OUTPATIENT
Start: 2024-09-26 | End: 2024-09-27 | Stop reason: HOSPADM

## 2024-09-26 RX ORDER — KETOROLAC TROMETHAMINE 30 MG/ML
15 INJECTION, SOLUTION INTRAMUSCULAR; INTRAVENOUS EVERY 6 HOURS PRN
Status: DISCONTINUED | OUTPATIENT
Start: 2024-09-26 | End: 2024-09-27 | Stop reason: HOSPADM

## 2024-09-26 RX ORDER — SODIUM CHLORIDE 0.9 % (FLUSH) 0.9 %
10 SYRINGE (ML) INJECTION EVERY 12 HOURS PRN
Status: DISCONTINUED | OUTPATIENT
Start: 2024-09-26 | End: 2024-09-27 | Stop reason: HOSPADM

## 2024-09-26 RX ORDER — DOXAZOSIN 2 MG/1
8 TABLET ORAL DAILY
Status: DISCONTINUED | OUTPATIENT
Start: 2024-09-27 | End: 2024-09-27 | Stop reason: HOSPADM

## 2024-09-26 RX ORDER — ALUMINUM HYDROXIDE, MAGNESIUM HYDROXIDE, AND SIMETHICONE 1200; 120; 1200 MG/30ML; MG/30ML; MG/30ML
30 SUSPENSION ORAL 4 TIMES DAILY PRN
Status: DISCONTINUED | OUTPATIENT
Start: 2024-09-26 | End: 2024-09-27 | Stop reason: HOSPADM

## 2024-09-26 RX ORDER — FUROSEMIDE 40 MG/1
40 TABLET ORAL DAILY
Status: DISCONTINUED | OUTPATIENT
Start: 2024-09-27 | End: 2024-09-27 | Stop reason: HOSPADM

## 2024-09-26 RX ORDER — AMLODIPINE BESYLATE 10 MG/1
10 TABLET ORAL DAILY
Status: DISCONTINUED | OUTPATIENT
Start: 2024-09-27 | End: 2024-09-27 | Stop reason: HOSPADM

## 2024-09-26 RX ORDER — ACETAMINOPHEN 325 MG/1
650 TABLET ORAL EVERY 4 HOURS PRN
Status: DISCONTINUED | OUTPATIENT
Start: 2024-09-26 | End: 2024-09-27 | Stop reason: HOSPADM

## 2024-09-26 RX ORDER — VALSARTAN 160 MG/1
320 TABLET ORAL DAILY
Status: DISCONTINUED | OUTPATIENT
Start: 2024-09-27 | End: 2024-09-27 | Stop reason: HOSPADM

## 2024-09-26 RX ORDER — DICLOFENAC SODIUM 10 MG/G
4 GEL TOPICAL 2 TIMES DAILY
Status: DISCONTINUED | OUTPATIENT
Start: 2024-09-26 | End: 2024-09-27 | Stop reason: HOSPADM

## 2024-09-26 RX ORDER — ESCITALOPRAM OXALATE 20 MG/1
20 TABLET ORAL DAILY
Status: DISCONTINUED | OUTPATIENT
Start: 2024-09-27 | End: 2024-09-27 | Stop reason: HOSPADM

## 2024-09-26 RX ORDER — SPIRONOLACTONE 25 MG/1
25 TABLET ORAL DAILY
Status: DISCONTINUED | OUTPATIENT
Start: 2024-09-27 | End: 2024-09-27 | Stop reason: HOSPADM

## 2024-09-26 RX ORDER — OXYCODONE HYDROCHLORIDE 5 MG/1
5 TABLET ORAL EVERY 6 HOURS PRN
Status: DISCONTINUED | OUTPATIENT
Start: 2024-09-26 | End: 2024-09-27 | Stop reason: HOSPADM

## 2024-09-26 RX ORDER — IBUPROFEN 200 MG
24 TABLET ORAL
Status: DISCONTINUED | OUTPATIENT
Start: 2024-09-26 | End: 2024-09-27 | Stop reason: HOSPADM

## 2024-09-26 RX ORDER — NALOXONE HCL 0.4 MG/ML
0.02 VIAL (ML) INJECTION
Status: DISCONTINUED | OUTPATIENT
Start: 2024-09-26 | End: 2024-09-27 | Stop reason: HOSPADM

## 2024-09-26 RX ORDER — SIMETHICONE 80 MG
1 TABLET,CHEWABLE ORAL 4 TIMES DAILY PRN
Status: DISCONTINUED | OUTPATIENT
Start: 2024-09-26 | End: 2024-09-27 | Stop reason: HOSPADM

## 2024-09-26 RX ORDER — KETOROLAC TROMETHAMINE 30 MG/ML
15 INJECTION, SOLUTION INTRAMUSCULAR; INTRAVENOUS
Status: COMPLETED | OUTPATIENT
Start: 2024-09-26 | End: 2024-09-26

## 2024-09-26 RX ORDER — IBUPROFEN 200 MG
16 TABLET ORAL
Status: DISCONTINUED | OUTPATIENT
Start: 2024-09-26 | End: 2024-09-27 | Stop reason: HOSPADM

## 2024-09-26 RX ORDER — BUSPIRONE HYDROCHLORIDE 10 MG/1
10 TABLET ORAL 3 TIMES DAILY PRN
Status: DISCONTINUED | OUTPATIENT
Start: 2024-09-26 | End: 2024-09-27 | Stop reason: HOSPADM

## 2024-09-26 RX ADMIN — DICLOFENAC SODIUM 4 G: 10 GEL TOPICAL at 08:09

## 2024-09-26 RX ADMIN — WARFARIN SODIUM 0.5 MG: 1 TABLET ORAL at 08:09

## 2024-09-26 RX ADMIN — KETOROLAC TROMETHAMINE 15 MG: 30 INJECTION, SOLUTION INTRAMUSCULAR; INTRAVENOUS at 01:09

## 2024-09-26 NOTE — H&P
[facility] - Emergency Dept  [facility] Medicine  History & Physical    Patient Name: Coby Atkinson  MRN: [id number]  Patient Class: OP- Observation  Admission Date: 9/26/2024  Attending Physician: Rubi Norton MD  Primary Care Provider: Mundo Hudson DO         Patient information was obtained from patient, past medical records, and ER records.     Subjective:     Principal Problem: Primary osteoarthritis of both knees    Chief Complaint:   Chief Complaint   Patient presents with    Knee Pain     Left knee pain since 8 am. Pt cannot move left knee. -swelling        HPI: Mrs. Coby Atkinson is a 78 y.o. female w/ PMHx of HTN, CAD, HFpEF, hx of PE/DVT (on warfarin), pulmonary HTN, ADRIÁN (on CPAP QHS), severe osteoarthritis w/ chronic b/l knee pain, morbid obesity, who presented to [facility] ED on 09/26/2024 w/ progressive knee pain (L>R). Reports that she's had knee pain for a long time, but that pain was so severe this AM that she had difficulty getting out of bed. Reports recent swelling of L knee and difficulty w/ movement.  Ambulates w/ walker at baseline, but was unable to bear weight at all this AM. No fevers/chills, N/V/D, recent trauma or falls, chest pain, SOB, AMS, etc. Pt has reportedly been contemplating TKA for years, but has been hesitant to move forward with procedure.     On arrival to ED, pt afebrile & HDS. Labs unremarkable. XR of L knee showed degenerative changes w/ small-moderate effusion; no fractures. Exam notable for valgus deformity w/ decreased ROM & notable bony TTP along medial join line; no notable erythema, warmth or edema when compared to R side. Failed walking trial w/ nursing in ED. Admitted for PT/OT evaluation and anticipated SNF placement.       Past Medical History:   Diagnosis Date    Anticoagulant long-term use     Cataract     Chronic diarrhea     Depression     Edema     GERD (gastroesophageal reflux disease)     Hypertension 10/2/2012    Osteoarthritis of both knees  8/9/2016    Osteopenia     Osteopenia 11/30/2017    PE (pulmonary thromboembolism) 12/03/2017    Primary localized osteoarthrosis, lower leg 12/3/2014    Sleep apnea 2016    Thalassemia minor        Past Surgical History:   Procedure Laterality Date    APPENDECTOMY      BLOCK, NERVE, GENICULAR Bilateral 3/14/2024    Procedure: Block, Nerve, Genicular---BILATERAL KNEE;  Surgeon: Kit Barton Jr., MD;  Location: [facility] PAIN MGMT;  Service: Pain Management;  Laterality: Bilateral;    CATARACT EXTRACTION      COLONOSCOPY N/A 11/5/2016    Procedure: COLONOSCOPY;  Surgeon: Tanner Simental MD;  Location: [facility] ENDO (4TH FLR);  Service: Endoscopy;  Laterality: N/A;    HYSTERECTOMY  age 39    fibroids    OOPHORECTOMY      TONSILLECTOMY         Review of patient's allergies indicates:   Allergen Reactions    Codeine     Ciprofloxacin Rash       No current facility-administered medications on file prior to encounter.     Current Outpatient Medications on File Prior to Encounter   Medication Sig    amlodipine-valsartan (EXFORGE)  mg per tablet Take 1 tablet by mouth once daily.    busPIRone (BUSPAR) 10 MG tablet TAKE 1 TABLET(10 MG) BY MOUTH THREE TIMES DAILY AS NEEDED FOR ANXIETY    doxazosin (CARDURA) 8 MG Tab Take 1 tablet (8 mg total) by mouth once daily.    EScitalopram oxalate (LEXAPRO) 20 MG tablet Take 1 tablet (20 mg total) by mouth once daily.    furosemide (LASIX) 20 MG tablet TAKE 2 TABLETS(40 MG) BY MOUTH EVERY DAY    potassium chloride (MICRO-K) 10 MEQ CpSR Take 1 capsule (10 mEq total) by mouth once daily. TAKE 1 CAPSULE(10 MEQ) BY MOUTH EVERY DAY    spironolactone (ALDACTONE) 25 MG tablet Take 25 mg by mouth once daily.    warfarin (COUMADIN) 1 MG tablet Take 1mg daily, except take 0.5mg on Thursdays and Saturdays OR as directed by [facility]    alendronate (FOSAMAX) 70 MG tablet Take 1 tablet once weekly in the morning with a full glass of water on an empty stomach. Do not consume food or lie  down for at least 30 minutes afterwards. (Patient not taking: Reported on 9/26/2024)    bisoprolol (ZEBETA) 10 MG tablet Take 1 tablet (10 mg total) by mouth once daily. (Patient not taking: Reported on 9/26/2024)     Family History       Problem Relation (Age of Onset)    Cancer Father, Brother    Coronary artery disease Maternal Grandmother, Paternal Grandmother    Heart failure Maternal Grandmother    Hypertension Mother    No Known Problems Sister, Maternal Aunt, Maternal Uncle, Paternal Aunt, Paternal Uncle, Maternal Grandfather, Paternal Grandfather    Stroke Paternal Grandmother          Tobacco Use    Smoking status: Never     Passive exposure: Never    Smokeless tobacco: Never   Substance and Sexual Activity    Alcohol use: No     Comment: rare    Drug use: No    Sexual activity: Yes     Partners: Male     Birth control/protection: None     Review of Systems   Constitutional:  Negative for chills, diaphoresis and fever.   HENT:  Negative for congestion, rhinorrhea, sore throat and trouble swallowing.    Eyes:  Negative for photophobia and visual disturbance.   Respiratory:  Negative for cough, shortness of breath and wheezing.    Cardiovascular:  Negative for chest pain, palpitations and leg swelling.   Gastrointestinal:  Negative for abdominal pain, constipation, diarrhea, nausea and vomiting.   Genitourinary:  Negative for difficulty urinating, dysuria and hematuria.   Musculoskeletal:  Positive for arthralgias, gait problem and joint swelling. Negative for neck pain.   Skin:  Negative for rash and wound.   Neurological:  Negative for dizziness, light-headedness and headaches.   Psychiatric/Behavioral:  Negative for agitation, confusion and sleep disturbance.        Objective:     Vital Signs (Most Recent):  Temp: 98.3 °F (36.8 °C) (09/26/24 1212)  Pulse: (!) 57 (09/26/24 1700)  Resp: 14 (09/26/24 1700)  BP: (!) 158/59 (09/26/24 1700)  SpO2: 98 % (09/26/24 1700) Vital Signs (24h Range):  Temp:  [98.3 °F  (36.8 °C)] 98.3 °F (36.8 °C)  Pulse:  [56-70] 57  Resp:  [14-18] 14  SpO2:  [96 %-100 %] 98 %  BP: (149-185)/(59-79) 158/59     Weight: 105.7 kg (233 lb 0.4 oz)  Body mass index is 41.28 kg/m².     Physical Exam  Constitutional:       General: She is not in acute distress.     Appearance: She is morbidly obese. She is not toxic-appearing or diaphoretic.   HENT:      Head: Normocephalic and atraumatic.      Mouth/Throat:      Mouth: Mucous membranes are moist.   Eyes:      Conjunctiva/sclera: Conjunctivae normal.   Cardiovascular:      Rate and Rhythm: Normal rate and regular rhythm.      Heart sounds: Normal heart sounds.   Pulmonary:      Effort: Pulmonary effort is normal. No respiratory distress.      Breath sounds: Normal breath sounds. No wheezing, rhonchi or rales.   Abdominal:      General: Bowel sounds are normal. There is no distension.      Palpations: Abdomen is soft.      Tenderness: There is no abdominal tenderness. There is no guarding.   Musculoskeletal:         General: No signs of injury.      Right knee: No swelling, erythema or ecchymosis.      Left knee: Bony tenderness present. No swelling, erythema or ecchymosis. Decreased range of motion. Tenderness present over the medial joint line.      Right lower leg: No edema.      Left lower leg: No edema.   Skin:     General: Skin is warm and dry.   Neurological:      General: No focal deficit present.      Mental Status: She is alert and oriented to person, place, and time. Mental status is at baseline.   Psychiatric:         Mood and Affect: Mood normal.         Behavior: Behavior normal.             Significant Labs: All pertinent labs within the past 24 hours have been reviewed.    Significant Imaging: I have reviewed all pertinent imaging results/findings within the past 24 hours.    Assessment/Plan:     * Primary osteoarthritis of both knees  Acute on chronic knee pain  Impaired mobility   Known severe OA w/ chronic b/l knee pain & impaired  mobility at baseline. Presented w/ progressive knee pain, particularly of L w/ resulting inability to bear weight. 0/4 SIRS on arrival. XR negative for fracture or other bony abnormalities aside from severe arthritic changes. Evaluated by ortho in ED- no concern for septic joint & no indication for emergent surgery. Failed walking trial w/ nursing in ED prompting admission for PT/OT evaluation & likely placement.   - No emergent surgical intervention required per orthopedic surgery  - Multimodal analgesia  - PT/OT  - Follow-up with outpatient ortho    Essential hypertension  Diastolic heart failure  Pulmonary heart disease  Hx of HFpEF w/ pulmonary heart dx, pulmonary HTN, & ADRIÁN, along w/ essential HTN. Last echo (Feb 2023) w/ LVEF 65%, grade II diastolic dysfunction, moderate biatrial dilation, moderate pulmonary HTN (PASP 63) & CVP 8. Compliant w/ home meds & denies any recent chest pain, SOB, LE edema, or other s/sx of exacerbation. HDS & no acute concerns on admission.   - Continue home amlodipine, valsartan & doxazosin  - Continue home diuretics  - CPAP QHS     Chronic pulmonary embolism without acute cor pulmonale  Hx of LLE DVT & submassive PE in 2017; on chronic anticoagulation (warfarin).    - Continue home warfarin; appreciate pharmacy assistance w/ dosing mgmt  - Daily INR    Morbid obesity with BMI of 40.0-44.9, adult  Weight 105.7 on admission (BMI 41.28). Morbid obesity complicates all aspects of disease management from diagnostic modalities to treatment.  - Weight loss strongly encouraged        VTE Risk Mitigation (From admission, onward)           Ordered     warfarin (COUMADIN) split tablet 0.5 mg  Daily         09/26/24 1718     IP VTE HIGH RISK PATIENT  Once         09/26/24 1718     Place sequential compression device  Until discontinued         09/26/24 1718     Reason for No Pharmacological VTE Prophylaxis  Once        Question:  Reasons:  Answer:  Already adequately anticoagulated on oral  Anticoagulants    09/26/24 1718                       On 09/26/2024, patient should be placed in hospital observation services under my care.           Rubi Norton MD  Department of Hospital Medicine  [facility] - Emergency Dept

## 2024-09-26 NOTE — ED TRIAGE NOTES
"Coby Atkinson, a 78 y.o. female presents to the ED w/ complaint of left knee pain that started a "long time ago" Reports she had trouble getting out of bed this morning.     Triage note:  Chief Complaint   Patient presents with    Knee Pain     Left knee pain since 8 am. Pt cannot move left knee. -swelling     Review of patient's allergies indicates:   Allergen Reactions    Codeine     Ciprofloxacin Rash     Past Medical History:   Diagnosis Date    Anticoagulant long-term use     Cataract     Chronic diarrhea     Depression     Edema     GERD (gastroesophageal reflux disease)     Hypertension 10/2/2012    Osteoarthritis of both knees 8/9/2016    Osteopenia     Osteopenia 11/30/2017    PE (pulmonary thromboembolism) 12/03/2017    Primary localized osteoarthrosis, lower leg 12/3/2014    Sleep apnea 2016    Thalassemia minor        "

## 2024-09-26 NOTE — ED PROVIDER NOTES
Encounter Date: 9/26/2024       History     Chief Complaint   Patient presents with    Knee Pain     Left knee pain since 8 am. Pt cannot move left knee. -swelling     Coby Atkinson is a 78 y.o. female with PMH significant for bilateral PE, Pulmonary HTN, GERD, UGIB, and bilateral knee OA presenting with acute on chronic left knee pain. Pt states that at baseline, she ambulates with a walker and has chronic knee pain. Upon waking this morning, she was unable to bear any weight on the left knee 2/2 pain. She endorses 6/10 pain in the left knee. Patient denies fever, chills, or recent trauma to the knee. No chest pain, SOB, or altered mental status. Her  is at the bedside and reports pt has been considering TKA for years but is hesitant to go forward with surgery. Patient denies any head trauma or LOC. The patient denies prior hx of falls. Patient denies numbness and tingling. Denies any other musculoskeletal pain or injuries. No known history of prior knee injury or surgery.    Walks w/a walker at baseline. She takes coumadin and has been compliant with all of her medications.                Review of patient's allergies indicates:   Allergen Reactions    Codeine     Ciprofloxacin Rash     Past Medical History:   Diagnosis Date    Anticoagulant long-term use     Cataract     Chronic diarrhea     Depression     Edema     GERD (gastroesophageal reflux disease)     Hypertension 10/2/2012    Osteoarthritis of both knees 8/9/2016    Osteopenia     Osteopenia 11/30/2017    PE (pulmonary thromboembolism) 12/03/2017    Primary localized osteoarthrosis, lower leg 12/3/2014    Sleep apnea 2016    Thalassemia minor      Past Surgical History:   Procedure Laterality Date    APPENDECTOMY      BLOCK, NERVE, GENICULAR Bilateral 3/14/2024    Procedure: Block, Nerve, Genicular---BILATERAL KNEE;  Surgeon: Kit Barton Jr., MD;  Location: [facility] PAIN MGMT;  Service: Pain Management;  Laterality: Bilateral;     CATARACT EXTRACTION      COLONOSCOPY N/A 11/5/2016    Procedure: COLONOSCOPY;  Surgeon: Tanner Simental MD;  Location: [facility] ([address] [place] [address]);  Service: Endoscopy;  Laterality: N/A;    HYSTERECTOMY  age 39    fibroids    OOPHORECTOMY      TONSILLECTOMY       Family History   Problem Relation Name Age of Onset    Hypertension Mother      Cancer Father          skin    No Known Problems Sister      Cancer Brother          pancreatic    No Known Problems Maternal Aunt      No Known Problems Maternal Uncle      No Known Problems Paternal Aunt      No Known Problems Paternal Uncle      Coronary artery disease Maternal Grandmother      Heart failure Maternal Grandmother      No Known Problems Maternal Grandfather      Stroke Paternal Grandmother      Coronary artery disease Paternal Grandmother      No Known Problems Paternal Grandfather      Amblyopia Neg Hx      Blindness Neg Hx      Cataracts Neg Hx      Diabetes Neg Hx      Glaucoma Neg Hx      Macular degeneration Neg Hx      Retinal detachment Neg Hx      Strabismus Neg Hx      Thyroid disease Neg Hx      Colon cancer Neg Hx      Stomach cancer Neg Hx      Esophageal cancer Neg Hx       Social History     Tobacco Use    Smoking status: Never     Passive exposure: Never    Smokeless tobacco: Never   Substance Use Topics    Alcohol use: No     Comment: rare    Drug use: No     Review of Systems   Constitutional:  Negative for activity change, appetite change, chills, diaphoresis, fatigue and fever.   HENT:  Negative for congestion, sneezing, sore throat and tinnitus.    Respiratory:  Negative for cough and shortness of breath.    Cardiovascular:  Negative for chest pain, palpitations and leg swelling.   Gastrointestinal:  Negative for abdominal pain, blood in stool, constipation, diarrhea, nausea and vomiting.   Genitourinary:  Negative for difficulty urinating and hematuria.   Musculoskeletal:  Positive for arthralgias and joint swelling.   Neurological:  Negative for  dizziness, syncope, facial asymmetry, speech difficulty, weakness, light-headedness, numbness and headaches.   Psychiatric/Behavioral:  Negative for confusion and decreased concentration.        Physical Exam     Initial Vitals [09/26/24 1212]   BP Pulse Resp Temp SpO2   (!) 164/79 70 16 98.3 °F (36.8 °C) 100 %      MAP       --         Physical Exam    Constitutional: She is not diaphoretic. She is Obese .  Non-toxic appearance. She does not have a sickly appearance. No distress.   HENT:   Head: Normocephalic and atraumatic.   Eyes: EOM are normal.   Neck: No tracheal deviation present. No JVD present.   Normal range of motion.  Cardiovascular:  Normal rate and regular rhythm.           Pulmonary/Chest: Breath sounds normal. No stridor.   Abdominal: Abdomen is soft. Bowel sounds are normal.   Musculoskeletal:      Cervical back: Normal range of motion.      Left knee: Bony tenderness present. No deformity, effusion or erythema. Decreased range of motion. Tenderness present over the medial joint line.      Comments: Genu valgum deformity.   Significant medial joint line tenderness. Unable to perform anterior drawer and straight leg raise 2/2 pain. No notable erythema, edema when compared to right knee.      Neurological: She is alert and oriented to person, place, and time.   Skin: Skin is warm and dry.         ED Course   Procedures  Labs Reviewed   CBC W/ AUTO DIFFERENTIAL - Abnormal       Result Value    WBC 10.86      RBC 5.26      Hemoglobin 10.2 (*)     Hematocrit 35.2 (*)     MCV 67 (*)     MCH 19.4 (*)     MCHC 29.0 (*)     RDW 15.7 (*)     Platelets 266      MPV 9.6      Immature Granulocytes 0.6 (*)     Gran # (ANC) 7.7      Immature Grans (Abs) 0.06 (*)     Lymph # 2.1      Mono # 0.9      Eos # 0.1      Baso # 0.03      nRBC 0      Gran % 70.7      Lymph % 19.1      Mono % 8.5      Eosinophil % 0.8      Basophil % 0.3      Differential Method Automated     COMPREHENSIVE METABOLIC PANEL - Abnormal     Sodium 138      Potassium 4.0      Chloride 107      CO2 25      Glucose 101      BUN 17      Creatinine 0.8      Calcium 9.2      Total Protein 6.8      Albumin 3.5      Total Bilirubin 0.6      Alkaline Phosphatase 66      AST 14      ALT 8 (*)     eGFR >60.0      Anion Gap 6 (*)    PROTIME-INR - Abnormal    Prothrombin Time 26.3 (*)     INR 2.5 (*)    C-REACTIVE PROTEIN    CRP 7.2            Imaging Results              X-Ray Knee 3 View Left (Final result)  Result time 09/26/24 13:17:23      Final result by Gentry Greenberg Jr., MD (09/26/24 13:17:23)                   Impression:      Degenerative change and left knee effusion.  No convincing fracture though correlation with clinical findings and additional evaluation if indicated.      Electronically signed by: Gentry Greenberg MD  Date:    09/26/2024  Time:    13:17               Narrative:    EXAMINATION:  XR KNEE 3 VIEW LEFT    CLINICAL HISTORY:  Pain in left knee    TECHNIQUE:  AP, lateral, and Merchant views of the left knee were performed.    COMPARISON:  October 17, 2023    FINDINGS:  Bones are demineralized.  Narrowing at the femoral tibial compartment with osteophytes particularly laterally.  Small/moderate left effusion.  Significant artifact on the sunrise view.  Degenerative change patellofemoral joint.                                       Medications   ketorolac injection 15 mg (15 mg Intravenous Given 9/26/24 1333)     Medical Decision Making  Coby Atkinson is a 78 y.o. female with PMH significant for bilateral PE, Pulmonary HTN, GERD, UGIB, and bilateral knee OA presenting with acute on chronic left knee pain. Pt states that at baseline, she ambulates with a walker and has chronic knee pain. Upon waking this morning, she was unable to bear any weight on the left knee 2/2 pain. Patient denies fever, chills, or recent trauma to the knee. No chest pain, SOB, or altered mental status. Her  is at the bedside and reports pt has been  considering TKA for years but is hesitant to go forward with surgery. Patient denies any head trauma or LOC. The patient denies prior hx of falls. Patient denies numbness and tingling. Denies any other musculoskeletal pain or injuries. No known history of prior knee injury or surgery.    Walks w/a walker at baseline. She takes coumadin and has been compliant with all of her medications.      Left knee XR  CBC   CMP    Care Update:  Patient still unable to ambulate 2/2 knee pain. Failed walking trial with nursing.     Dispo: Patient admitted with SNF placement pending.       Amount and/or Complexity of Data Reviewed  Labs: ordered. Decision-making details documented in ED Course.  Radiology: ordered. Decision-making details documented in ED Course.    Risk  Prescription drug management.               ED Course as of 09/26/24 1543   Thu Sep 26, 2024   1329 INR(!): 2.5  Therapeutic [AC]   1329 X-Ray Knee 3 View Left  Degenerative changes, no acute disease [AC]      ED Course User Index  [AC] Larry Rivera DO                           Clinical Impression:  Final diagnoses:  [M25.562] Left knee pain  [M17.12] Osteoarthritis of left knee, unspecified osteoarthritis type (Primary)          ED Disposition Condition    Observation Stable                Jonas Kidd MD  Resident  09/26/24 1884

## 2024-09-26 NOTE — ED NOTES
Nurses Note -- 4 Eyes      9/26/2024   6:34 PM      Skin assessed during: Admit      [x] No Altered Skin Integrity Present    [x]Prevention Measures Documented      [] Yes- Altered Skin Integrity Present or Discovered   [] LDA Added if Not in Epic (Describe Wound)   [] New Altered Skin Integrity was Present on Admit and Documented in LDA   [] Wound Image Taken    Wound Care Consulted? No    Attending Nurse:  Kamini Jimenez RN/Staff Member:   Philomena

## 2024-09-26 NOTE — HPI
Mrs. Coby Atkinson is a 78 y.o. female w/ PMHx of HTN, CAD, HFpEF, hx of PE/DVT (on warfarin), pulmonary HTN, ADRIÁN (on CPAP QHS), severe osteoarthritis w/ chronic b/l knee pain, morbid obesity, who presented to [facility] ED on 09/26/2024 w/ progressive knee pain (L>R). Reports that she's had knee pain for a long time, but that pain was so severe this AM that she had difficulty getting out of bed. Reports recent swelling of L knee and difficulty w/ movement.  Ambulates w/ walker at baseline, but was unable to bear weight at all this AM. No fevers/chills, N/V/D, recent trauma or falls, chest pain, SOB, AMS, etc. Pt has reportedly been contemplating TKA for years, but has been hesitant to move forward with procedure.     On arrival to ED, pt afebrile & HDS. Labs unremarkable. XR of L knee showed degenerative changes w/ small-moderate effusion; no fractures. Exam notable for valgus deformity w/ decreased ROM & notable bony TTP along medial join line; no notable erythema, warmth or edema when compared to R side. Failed walking trial w/ nursing in ED. Admitted for PT/OT evaluation and anticipated SNF placement.

## 2024-09-26 NOTE — LETTER
September 27, 2024         [address]  [facility] [address]  Phone: [phone]  Fax: [phone]       Patient: Coby Atkinson   YOB: 1945  Date of Visit: 09/27/2024        To Whom It May Concern:    Coby Atkinson was at hospitalized at [facility] 9/26/2024 - 9/27/2024. Due to her medical condition, she will be severely limited in ability to travel. It is not advised that she travels for at least the next 3 months. If you have any questions or concerns, or if I can be of further assistance, please do not hesitate to contact me.        Sincerely,    Rubi Norton MD

## 2024-09-26 NOTE — PHARMACY MED REC
"      Admission Medication History     The home medication history was taken by Bobo Mccarthy.    You may go to "Admission" then "Reconcile Home Medications" tabs to review and/or act upon these items.     The home medication list has been updated by the Pharmacy department.   Please read ALL comments highlighted in yellow.   Please address this information as you see fit.    Feel free to contact us if you have any questions or require assistance.          Medications listed below were obtained from: Patient/family and Analytic software- [employer]  Current Outpatient Medications on File Prior to Encounter   Medication Sig    amlodipine-valsartan (EXFORGE)  mg per tablet   Take 1 tablet by mouth once daily.    busPIRone (BUSPAR) 10 MG tablet   Take 1 tablet by mouth three times daily as needed for anxiety.    doxazosin (CARDURA) 8 MG Tab   Take 1 tablet (8 mg total) by mouth once daily.    EScitalopram oxalate (LEXAPRO) 20 MG tablet   Take 1 tablet (20 mg total) by mouth once daily.    furosemide (LASIX) 20 MG tablet   Take 2 tablets by mouth once daily.    potassium chloride (MICRO-K) 10 MEQ CpSR   Take 1 capsule (10 mEq total) by mouth once daily.    spironolactone (ALDACTONE) 25 MG tablet   Take 25 mg by mouth once daily.    warfarin (COUMADIN) 1 MG tablet   Take 1mg by mouth once daily, except take 0.5mg on Thursdays and Saturdays OR as directed by [facility]    alendronate (FOSAMAX) 70 MG tablet         Take 1 tablet once weekly in the morning with a full glass of water on an empty stomach. Do not consume food or lie down for at least 30 minutes afterwards.   (Patient not taking: Reported on 9/26/2024)    bisoprolol (ZEBETA) 10 MG tablet   Take 1 tablet (10 mg total) by mouth once daily. (Patient not taking: Reported on 9/26/2024)           Potential issues to be addressed PRIOR TO DISCHARGE  Patient requires education regarding drug therapies     Bobo Mccarthy  EXT [id number]            .          "

## 2024-09-26 NOTE — ASSESSMENT & PLAN NOTE
Acute on chronic knee pain  Impaired mobility   Known severe OA w/ chronic b/l knee pain & impaired mobility at baseline. Presented w/ progressive knee pain, particularly of L w/ resulting inability to bear weight. 0/4 SIRS on arrival. XR negative for fracture or other bony abnormalities aside from severe arthritic changes. Evaluated by ortho in ED- no concern for septic joint & no indication for emergent surgery. Failed walking trial w/ nursing in ED prompting admission for PT/OT evaluation & likely placement.   - No emergent surgical intervention required per orthopedic surgery  - Multimodal analgesia  - PT/OT  - Follow-up with outpatient ortho

## 2024-09-26 NOTE — SUBJECTIVE & OBJECTIVE
Past Medical History:   Diagnosis Date    Anticoagulant long-term use     Cataract     Chronic diarrhea     Depression     Edema     GERD (gastroesophageal reflux disease)     Hypertension 10/2/2012    Osteoarthritis of both knees 8/9/2016    Osteopenia     Osteopenia 11/30/2017    PE (pulmonary thromboembolism) 12/03/2017    Primary localized osteoarthrosis, lower leg 12/3/2014    Sleep apnea 2016    Thalassemia minor        Past Surgical History:   Procedure Laterality Date    APPENDECTOMY      BLOCK, NERVE, GENICULAR Bilateral 3/14/2024    Procedure: Block, Nerve, Genicular---BILATERAL KNEE;  Surgeon: Kit Barton Jr., MD;  Location: [facility] PAIN MGMT;  Service: Pain Management;  Laterality: Bilateral;    CATARACT EXTRACTION      COLONOSCOPY N/A 11/5/2016    Procedure: COLONOSCOPY;  Surgeon: Tanner Simental MD;  Location: [facility] ENDO [address]);  Service: Endoscopy;  Laterality: N/A;    HYSTERECTOMY  age 39    fibroids    OOPHORECTOMY      TONSILLECTOMY         Review of patient's allergies indicates:   Allergen Reactions    Codeine     Ciprofloxacin Rash       No current facility-administered medications on file prior to encounter.     Current Outpatient Medications on File Prior to Encounter   Medication Sig    amlodipine-valsartan (EXFORGE)  mg per tablet Take 1 tablet by mouth once daily.    busPIRone (BUSPAR) 10 MG tablet TAKE 1 TABLET(10 MG) BY MOUTH THREE TIMES DAILY AS NEEDED FOR ANXIETY    doxazosin (CARDURA) 8 MG Tab Take 1 tablet (8 mg total) by mouth once daily.    EScitalopram oxalate (LEXAPRO) 20 MG tablet Take 1 tablet (20 mg total) by mouth once daily.    furosemide (LASIX) 20 MG tablet TAKE 2 TABLETS(40 MG) BY MOUTH EVERY DAY    potassium chloride (MICRO-K) 10 MEQ CpSR Take 1 capsule (10 mEq total) by mouth once daily. TAKE 1 CAPSULE(10 MEQ) BY MOUTH EVERY DAY    spironolactone (ALDACTONE) 25 MG tablet Take 25 mg by mouth once daily.    warfarin (COUMADIN) 1 MG tablet Take 1mg daily,  except take 0.5mg on Thursdays and Saturdays OR as directed by Coumadin Clinic    alendronate (FOSAMAX) 70 MG tablet Take 1 tablet once weekly in the morning with a full glass of water on an empty stomach. Do not consume food or lie down for at least 30 minutes afterwards. (Patient not taking: Reported on 9/26/2024)    bisoprolol (ZEBETA) 10 MG tablet Take 1 tablet (10 mg total) by mouth once daily. (Patient not taking: Reported on 9/26/2024)     Family History       Problem Relation (Age of Onset)    Cancer Father, Brother    Coronary artery disease Maternal Grandmother, Paternal Grandmother    Heart failure Maternal Grandmother    Hypertension Mother    No Known Problems Sister, Maternal Aunt, Maternal Uncle, Paternal Aunt, Paternal Uncle, Maternal Grandfather, Paternal Grandfather    Stroke Paternal Grandmother          Tobacco Use    Smoking status: Never     Passive exposure: Never    Smokeless tobacco: Never   Substance and Sexual Activity    Alcohol use: No     Comment: rare    Drug use: No    Sexual activity: Yes     Partners: Male     Birth control/protection: None     Review of Systems   Constitutional:  Negative for chills, diaphoresis and fever.   HENT:  Negative for congestion, rhinorrhea, sore throat and trouble swallowing.    Eyes:  Negative for photophobia and visual disturbance.   Respiratory:  Negative for cough, shortness of breath and wheezing.    Cardiovascular:  Negative for chest pain, palpitations and leg swelling.   Gastrointestinal:  Negative for abdominal pain, constipation, diarrhea, nausea and vomiting.   Genitourinary:  Negative for difficulty urinating, dysuria and hematuria.   Musculoskeletal:  Positive for arthralgias, gait problem and joint swelling. Negative for neck pain.   Skin:  Negative for rash and wound.   Neurological:  Negative for dizziness, light-headedness and headaches.   Psychiatric/Behavioral:  Negative for agitation, confusion and sleep disturbance.        Objective:      Vital Signs (Most Recent):  Temp: 98.3 °F (36.8 °C) (09/26/24 1212)  Pulse: (!) 57 (09/26/24 1700)  Resp: 14 (09/26/24 1700)  BP: (!) 158/59 (09/26/24 1700)  SpO2: 98 % (09/26/24 1700) Vital Signs (24h Range):  Temp:  [98.3 °F (36.8 °C)] 98.3 °F (36.8 °C)  Pulse:  [56-70] 57  Resp:  [14-18] 14  SpO2:  [96 %-100 %] 98 %  BP: (149-185)/(59-79) 158/59     Weight: 105.7 kg (233 lb 0.4 oz)  Body mass index is 41.28 kg/m².     Physical Exam  Constitutional:       General: She is not in acute distress.     Appearance: She is morbidly obese. She is not toxic-appearing or diaphoretic.   HENT:      Head: Normocephalic and atraumatic.      Mouth/Throat:      Mouth: Mucous membranes are moist.   Eyes:      Conjunctiva/sclera: Conjunctivae normal.   Cardiovascular:      Rate and Rhythm: Normal rate and regular rhythm.      Heart sounds: Normal heart sounds.   Pulmonary:      Effort: Pulmonary effort is normal. No respiratory distress.      Breath sounds: Normal breath sounds. No wheezing, rhonchi or rales.   Abdominal:      General: Bowel sounds are normal. There is no distension.      Palpations: Abdomen is soft.      Tenderness: There is no abdominal tenderness. There is no guarding.   Musculoskeletal:         General: No signs of injury.      Right knee: No swelling, erythema or ecchymosis.      Left knee: Bony tenderness present. No swelling, erythema or ecchymosis. Decreased range of motion. Tenderness present over the medial joint line.      Right lower leg: No edema.      Left lower leg: No edema.   Skin:     General: Skin is warm and dry.   Neurological:      General: No focal deficit present.      Mental Status: She is alert and oriented to person, place, and time. Mental status is at baseline.   Psychiatric:         Mood and Affect: Mood normal.         Behavior: Behavior normal.             Significant Labs: All pertinent labs within the past 24 hours have been reviewed.    Significant Imaging: I have reviewed all  pertinent imaging results/findings within the past 24 hours.

## 2024-09-26 NOTE — ASSESSMENT & PLAN NOTE
Hx of LLE DVT & submassive PE in 2017; on chronic anticoagulation (warfarin).    - Continue home warfarin; appreciate pharmacy assistance w/ dosing mgmt  - Daily INR

## 2024-09-26 NOTE — ASSESSMENT & PLAN NOTE
Weight 105.7 on admission (BMI 41.28). Morbid obesity complicates all aspects of disease management from diagnostic modalities to treatment.  - Weight loss strongly encouraged

## 2024-09-26 NOTE — ASSESSMENT & PLAN NOTE
Diastolic heart failure  Pulmonary heart disease  Hx of HFpEF w/ pulmonary heart dx, pulmonary HTN, & ADRIÁN, along w/ essential HTN. Last echo (Feb 2023) w/ LVEF 65%, grade II diastolic dysfunction, moderate biatrial dilation, moderate pulmonary HTN (PASP 63) & CVP 8. Compliant w/ home meds & denies any recent chest pain, SOB, LE edema, or other s/sx of exacerbation. HDS & no acute concerns on admission.   - Continue home amlodipine, valsartan & doxazosin  - Continue home diuretics  - CPAP QHS    Bed in lowest position, wheels locked, appropriate side rails in place/Call bell, personal items and telephone in reach/Instruct patient to call for assistance before getting out of bed or chair/Non-slip footwear when patient is out of bed/Scott to call system/Physically safe environment - no spills, clutter or unnecessary equipment/Purposeful Proactive Rounding/Room/bathroom lighting operational, light cord in reach

## 2024-09-27 VITALS
SYSTOLIC BLOOD PRESSURE: 127 MMHG | RESPIRATION RATE: 18 BRPM | DIASTOLIC BLOOD PRESSURE: 61 MMHG | HEIGHT: 64 IN | TEMPERATURE: 99 F | OXYGEN SATURATION: 95 % | HEART RATE: 56 BPM | BODY MASS INDEX: 38.47 KG/M2 | WEIGHT: 225.31 LBS

## 2024-09-27 LAB
ANION GAP SERPL CALC-SCNC: 7 MMOL/L (ref 8–16)
BUN SERPL-MCNC: 18 MG/DL (ref 8–23)
CALCIUM SERPL-MCNC: 9.1 MG/DL (ref 8.7–10.5)
CHLORIDE SERPL-SCNC: 107 MMOL/L (ref 95–110)
CO2 SERPL-SCNC: 23 MMOL/L (ref 23–29)
CREAT SERPL-MCNC: 0.8 MG/DL (ref 0.5–1.4)
ERYTHROCYTE [DISTWIDTH] IN BLOOD BY AUTOMATED COUNT: 15.5 % (ref 11.5–14.5)
EST. GFR  (NO RACE VARIABLE): >60 ML/MIN/1.73 M^2
GLUCOSE SERPL-MCNC: 88 MG/DL (ref 70–110)
HCT VFR BLD AUTO: 33.9 % (ref 37–48.5)
HGB BLD-MCNC: 9.8 G/DL (ref 12–16)
INR PPP: 2.7 (ref 0.8–1.2)
MCH RBC QN AUTO: 19.6 PG (ref 27–31)
MCHC RBC AUTO-ENTMCNC: 28.9 G/DL (ref 32–36)
MCV RBC AUTO: 68 FL (ref 82–98)
PLATELET # BLD AUTO: 249 K/UL (ref 150–450)
PMV BLD AUTO: 9.6 FL (ref 9.2–12.9)
POTASSIUM SERPL-SCNC: 4 MMOL/L (ref 3.5–5.1)
PROTHROMBIN TIME: 27.7 SEC (ref 9–12.5)
RBC # BLD AUTO: 4.99 M/UL (ref 4–5.4)
SODIUM SERPL-SCNC: 137 MMOL/L (ref 136–145)
WBC # BLD AUTO: 9.75 K/UL (ref 3.9–12.7)

## 2024-09-27 PROCEDURE — 85027 COMPLETE CBC AUTOMATED: CPT | Performed by: STUDENT IN AN ORGANIZED HEALTH CARE EDUCATION/TRAINING PROGRAM

## 2024-09-27 PROCEDURE — 97530 THERAPEUTIC ACTIVITIES: CPT

## 2024-09-27 PROCEDURE — 25000003 PHARM REV CODE 250: Performed by: STUDENT IN AN ORGANIZED HEALTH CARE EDUCATION/TRAINING PROGRAM

## 2024-09-27 PROCEDURE — 97162 PT EVAL MOD COMPLEX 30 MIN: CPT

## 2024-09-27 PROCEDURE — 80048 BASIC METABOLIC PNL TOTAL CA: CPT | Performed by: STUDENT IN AN ORGANIZED HEALTH CARE EDUCATION/TRAINING PROGRAM

## 2024-09-27 PROCEDURE — 36415 COLL VENOUS BLD VENIPUNCTURE: CPT | Performed by: STUDENT IN AN ORGANIZED HEALTH CARE EDUCATION/TRAINING PROGRAM

## 2024-09-27 PROCEDURE — 51798 US URINE CAPACITY MEASURE: CPT

## 2024-09-27 PROCEDURE — 85610 PROTHROMBIN TIME: CPT | Performed by: STUDENT IN AN ORGANIZED HEALTH CARE EDUCATION/TRAINING PROGRAM

## 2024-09-27 PROCEDURE — 97165 OT EVAL LOW COMPLEX 30 MIN: CPT

## 2024-09-27 PROCEDURE — G0378 HOSPITAL OBSERVATION PER HR: HCPCS

## 2024-09-27 RX ORDER — LABETALOL HCL 20 MG/4 ML
20 SYRINGE (ML) INTRAVENOUS EVERY 6 HOURS PRN
Status: DISCONTINUED | OUTPATIENT
Start: 2024-09-27 | End: 2024-09-27 | Stop reason: HOSPADM

## 2024-09-27 RX ORDER — OXYCODONE HYDROCHLORIDE 5 MG/1
5 TABLET ORAL EVERY 6 HOURS PRN
Qty: 28 TABLET | Refills: 0 | Status: SHIPPED | OUTPATIENT
Start: 2024-09-27

## 2024-09-27 RX ORDER — ACETAMINOPHEN 325 MG/1
650 TABLET ORAL EVERY 4 HOURS PRN
Start: 2024-09-27 | End: 2024-09-27

## 2024-09-27 RX ORDER — DICLOFENAC SODIUM 10 MG/G
4 GEL TOPICAL 2 TIMES DAILY
Qty: 100 G | Refills: 2 | Status: SHIPPED | OUTPATIENT
Start: 2024-09-27

## 2024-09-27 RX ORDER — ACETAMINOPHEN 500 MG
1000 TABLET ORAL 3 TIMES DAILY
Start: 2024-09-27

## 2024-09-27 RX ADMIN — SPIRONOLACTONE 25 MG: 25 TABLET ORAL at 08:09

## 2024-09-27 RX ADMIN — VALSARTAN 320 MG: 160 TABLET, FILM COATED ORAL at 08:09

## 2024-09-27 RX ADMIN — ESCITALOPRAM OXALATE 20 MG: 20 TABLET ORAL at 08:09

## 2024-09-27 RX ADMIN — DOXAZOSIN 8 MG: 2 TABLET ORAL at 08:09

## 2024-09-27 RX ADMIN — METOPROLOL TARTRATE 75 MG: 50 TABLET, FILM COATED ORAL at 08:09

## 2024-09-27 RX ADMIN — OXYCODONE HYDROCHLORIDE 5 MG: 5 TABLET ORAL at 03:09

## 2024-09-27 RX ADMIN — FUROSEMIDE 40 MG: 40 TABLET ORAL at 08:09

## 2024-09-27 RX ADMIN — DICLOFENAC SODIUM 4 G: 10 GEL TOPICAL at 08:09

## 2024-09-27 RX ADMIN — AMLODIPINE BESYLATE 10 MG: 10 TABLET ORAL at 08:09

## 2024-09-27 NOTE — PT/OT/SLP EVAL
Physical Therapy Evaluation    Patient Name:  Coby Atkinson   MRN:  [id number]    Recommendations:     Discharge Recommendations: Moderate Intensity Therapy   Discharge Equipment Recommendations: bedside commode, wheelchair, walker, rolling  Barriers to discharge: Inaccessible home and Decreased caregiver support    Assessment:     Coby Atkinson is a 78 y.o. female admitted with a medical diagnosis of Primary osteoarthritis of both knees.  She presents with the following impairments/functional limitations: weakness, impaired endurance, impaired functional mobility, gait instability, impaired self care skills, impaired balance, decreased lower extremity function, pain, decreased safety awareness, decreased ROM. Pt would benefit from a moderate intensity/frequency therapy for: Dynamic/static standing/sitting balance through skilled balance training, strengthening with the use of skilled therapeutic exercises interventions, and mobility through adaptive equipment training. Pt continues to benefit from a collaborative PT/OT program to improve quality of life and focus on recovery of impairments.    Rehab Prognosis: Good; patient would benefit from acute skilled PT services to address these deficits and reach maximum level of function.    Recent Surgery: * No surgery found *      Plan:     During this hospitalization, patient to be seen 4 x/week to address the identified rehab impairments via gait training, therapeutic activities, therapeutic exercises, neuromuscular re-education and progress toward the following goals:    Plan of Care Expires:  10/27/24    Subjective     Chief Complaint: pain in B knees  Patient/Family Comments/goals: Pt's spouse unsure how he is going to take care of her at home.  Pain/Comfort:  Pain Rating 1:  (not rated)  Location - Side 1: Bilateral  Location 1: knee  Pain Addressed 1: Reposition, Distraction, Cessation of Activity  Pain Rating Post-Intervention 1: other (see comments)  (not rated)    Patients cultural, spiritual, Cheondoism conflicts given the current situation: no    Living Environment:  Living Environment: pt lives in a SSH with . Bathroom: Tub/shower combo  Previous level of function: Independent  Roles and Routines: Pt enjoys playing Bingo  Equipment Used at Home: rollator  Assistance upon Discharge: Family    Objective:     Communicated with RN prior to session.  Patient found HOB elevated with peripheral IV, PureWick  upon PT entry to room.    General Precautions: Standard, fall  Orthopedic Precautions:N/A   Braces: N/A  Respiratory Status: Room air    Exams:  Cognitive Exam:  Patient is oriented to Person, Place, Time, and Situation  Gross Motor Coordination:  WFL  Postural Exam:  Patient presented with the following abnormalities:    -       Rounded shoulders  -       Forward head  Sensation:  pt denied numbness/tingling in BLEs  RLE ROM: WFL  RLE Strength: hip flexion 4/5, knee extension 4/5, knee flexion 4/5, DF   LLE ROM: WFL  LLE Strength: hip flexion 4/5, knee extension 4/5, knee flexion 4/5, DF     Functional Mobility:  Bed Mobility:     Scooting: stand by assistance  Supine to Sit: minimum assistance  Sit to Supine: minimum assistance  Transfers:     Sit to Stand:  moderate assistance with rolling walker  Gait: 4ft fwd/bwd and 3 ft L and R, RW; decreased gait speed ands tep length, FFP, Vcing for upright and use of BUEs for decreasing weight through LLE for comfort      AM-PAC 6 CLICK MOBILITY  Total Score:15       Treatment & Education:  Patient educated on role of therapy, goals of session, and benefits of mobilizing.   Discussed PT plan of care during hospitalization.   Patient educated on calling for assistance.   Patient educated on how their diagnosis impacts their mobility within PT scope of practice.   All questions answered within PT scope of practice.    Patient left HOB elevated with all lines intact, call button in reach, bed alarm on, RN  notified, and pt's spouse present.    GOALS:   Multidisciplinary Problems       Physical Therapy Goals          Problem: Physical Therapy    Goal Priority Disciplines Outcome Goal Variances Interventions   Physical Therapy Goal     PT, PT/OT Progressing     Description: Goals to be met by: 10/27/2024     Patient will increase functional independence with mobility by performin. Supine to sit with Supervision  2. Sit to supine with Supervision  3. Sit to stand transfer with Supervison with RW  4. Gait  x 50 feet with Supervision using Rolling Walker.   5. Lower extremity exercise program x10 reps per handout, with supervision                       History:     Past Medical History:   Diagnosis Date    Anticoagulant long-term use     Cataract     Chronic diarrhea     Depression     Edema     GERD (gastroesophageal reflux disease)     Hypertension 10/2/2012    Osteoarthritis of both knees 2016    Osteopenia     Osteopenia 2017    PE (pulmonary thromboembolism) 2017    Primary localized osteoarthrosis, lower leg 12/3/2014    Sleep apnea 2016    Thalassemia minor        Past Surgical History:   Procedure Laterality Date    APPENDECTOMY      BLOCK, NERVE, GENICULAR Bilateral 3/14/2024    Procedure: Block, Nerve, Genicular---BILATERAL KNEE;  Surgeon: Kit Barton Jr., MD;  Location: [facility] PAIN MGMT;  Service: Pain Management;  Laterality: Bilateral;    CATARACT EXTRACTION      COLONOSCOPY N/A 2016    Procedure: COLONOSCOPY;  Surgeon: Tanner Simental MD;  Location: [facility] ENDO [address]);  Service: Endoscopy;  Laterality: N/A;    HYSTERECTOMY  age 39    fibroids    OOPHORECTOMY      TONSILLECTOMY         Time Tracking:     PT Received On: 24  PT Start Time: 1558     PT Stop Time: 1622  PT Total Time (min): 24 min     Billable Minutes: Evaluation 10 and Therapeutic Activity 14      2024

## 2024-09-27 NOTE — PLAN OF CARE
[facility] - Observation 11H      HOME HEALTH ORDERS  FACE TO FACE ENCOUNTER    Patient Name: Coby Atkinson  YOB: 1945    PCP: Mundo Hudson DO   PCP Address: [address] [facility] / [place] [address]  PCP Phone Number: [phone]  PCP Fax: [phone]    Encounter Date: 9/26/24    Admit to Home Health    Diagnoses:  Active Hospital Problems    Diagnosis  POA    *Primary osteoarthritis of both knees [M17.0]  Yes    Chronic pulmonary embolism without acute cor pulmonale [I27.82]  Yes    Chronic diastolic heart failure [I50.32]  Yes    Pulmonary heart disease [I27.9]  Yes    Morbid obesity with BMI of 40.0-44.9, adult [E66.01, Z68.41]  Not Applicable    Essential hypertension [I10]  Yes      Resolved Hospital Problems   No resolved problems to display.       Follow Up Appointments:  Future Appointments   Date Time Provider Department Center   10/14/2024  7:40 AM LAB, SBPH SBP LAB [facility]   11/6/2024 10:40 AM Mundo Hudson DO [facility] IM [place]   12/26/2024  4:20 PM Oksana Hilliard MD [facility] NEURO [facility]   2/7/2025 10:20 AM Mundo Hudson DO [facility] [place]       Allergies:  Review of patient's allergies indicates:   Allergen Reactions    Codeine     Ciprofloxacin Rash       Medications: Review discharge medications with patient and family and provide education.    Current Facility-Administered Medications   Medication Dose Route Frequency Provider Last Rate Last Admin    acetaminophen tablet 650 mg  650 mg Oral Q4H PRN Rubi Norton MD        aluminum-magnesium hydroxide-simethicone 200-200-20 mg/5 mL suspension 30 mL  30 mL Oral QID PRN Rubi Norton MD        amLODIPine tablet 10 mg  10 mg Oral Daily Rubi Norton MD   10 mg at 09/27/24 0829    busPIRone tablet 10 mg  10 mg Oral TID PRN Rubi Norton MD        dextrose 10% bolus 125 mL 125 mL  12.5 g Intravenous PRN Rubi Norton MD        dextrose 10% bolus 250 mL 250 mL  25 g  Intravenous PRN Rubi Norton MD        diclofenac sodium 1 % gel 4 g  4 g Topical (Top) BID Rubi Norton MD   4 g at 09/27/24 0828    doxazosin tablet 8 mg  8 mg Oral Daily Rubi Norton MD   8 mg at 09/27/24 0828    EScitalopram oxalate tablet 20 mg  20 mg Oral Daily Rubi Norton MD   20 mg at 09/27/24 0829    furosemide tablet 40 mg  40 mg Oral Daily Rubi Norton MD   40 mg at 09/27/24 0829    glucagon (human recombinant) injection 1 mg  1 mg Intramuscular PRN Rubi Norton MD        glucose chewable tablet 16 g  16 g Oral PRN Rubi Norton MD        glucose chewable tablet 24 g  24 g Oral PRN Rubi Norton MD        ketorolac injection 15 mg  15 mg Intravenous Q6H PRN Rubi Norton MD        labetalol 20 mg/4 mL (5 mg/mL) IV syring  20 mg Intravenous Q6H PRN Rubi Norton MD        melatonin tablet 6 mg  6 mg Oral Nightly PRN Rubi Norton MD        metoprolol tartrate tablet 75 mg  75 mg Oral BID Rubi Norton MD   75 mg at 09/27/24 0829    naloxone 0.4 mg/mL injection 0.02 mg  0.02 mg Intravenous PRN Rubi Norton MD        ondansetron disintegrating tablet 8 mg  8 mg Oral Q6H PRN Rubi Norton MD        oxyCODONE immediate release tablet 5 mg  5 mg Oral Q6H PRN Rubi Norton MD        polyethylene glycol packet 17 g  17 g Oral Daily PRN Rubi Norton MD        simethicone chewable tablet 80 mg  1 tablet Oral QID PRN Rubi Norton MD        sodium chloride 0.9% flush 10 mL  10 mL Intravenous Q12H PRN Rubi Norton MD        spironolactone tablet 25 mg  25 mg Oral Daily Rubi Norton MD   25 mg at 09/27/24 0829    valsartan tablet 320 mg  320 mg Oral Daily Rubi Norton MD   320 mg at 09/27/24 0829    warfarin (COUMADIN) split tablet 0.5 mg  0.5 mg Oral Daily Rubi Norton MD   0.5 mg at 09/26/24 2030        Medication List        START taking these medications      acetaminophen 500 MG tablet  Commonly known as:  TYLENOL  Take 2 tablets (1,000 mg total) by mouth 3 (three) times daily.     diclofenac sodium 1 % Gel  Commonly known as: VOLTAREN  Apply 4 g topically 2 (two) times daily.     oxyCODONE 5 MG immediate release tablet  Commonly known as: ROXICODONE  Take 1 tablet (5 mg total) by mouth every 6 (six) hours as needed for Pain.            CONTINUE taking these medications      amlodipine-valsartan  mg per tablet  Commonly known as: EXFORGE  Take 1 tablet by mouth once daily.     busPIRone 10 MG tablet  Commonly known as: BUSPAR  TAKE 1 TABLET(10 MG) BY MOUTH THREE TIMES DAILY AS NEEDED FOR ANXIETY     doxazosin 8 MG Tab  Commonly known as: CARDURA  Take 1 tablet (8 mg total) by mouth once daily.     EScitalopram oxalate 20 MG tablet  Commonly known as: LEXAPRO  Take 1 tablet (20 mg total) by mouth once daily.     furosemide 20 MG tablet  Commonly known as: LASIX  TAKE 2 TABLETS(40 MG) BY MOUTH EVERY DAY     potassium chloride 10 MEQ Cpsr  Commonly known as: MICRO-K  Take 1 capsule (10 mEq total) by mouth once daily. TAKE 1 CAPSULE(10 MEQ) BY MOUTH EVERY DAY     spironolactone 25 MG tablet  Commonly known as: ALDACTONE  Take 25 mg by mouth once daily.     warfarin 1 MG tablet  Commonly known as: COUMADIN  Take 1mg daily, except take 0.5mg on Thursdays and Saturdays OR as directed by Coumadin Clinic            ASK your doctor about these medications      alendronate 70 MG tablet  Commonly known as: FOSAMAX  Take 1 tablet once weekly in the morning with a full glass of water on an empty stomach. Do not consume food or lie down for at least 30 minutes afterwards.     bisoprolol 10 MG tablet  Commonly known as: ZEBETA  Take 1 tablet (10 mg total) by mouth once daily.                I have seen and examined this patient within the last 30 days. My clinical findings that support the need for the home health skilled services and home bound status are the following:no   Weakness/numbness causing balance and gait  disturbance due to Weakness/Debility making it taxing to leave home.     Diet:   cardiac diet    Labs:  Report Lab results to PCP.    Referrals/ Consults  Physical Therapy to evaluate and treat. Evaluate for home safety and equipment needs; Establish/upgrade home exercise program. Perform / instruct on therapeutic exercises, gait training, transfer training, and Range of Motion.  Occupational Therapy to evaluate and treat. Evaluate home environment for safety and equipment needs. Perform/Instruct on transfers, ADL training, ROM, and therapeutic exercises.  Aide to provide assistance with personal care, ADLs, and vital signs.    Activities:   activity as tolerated    Nursing:   Agency to admit patient within 24 hours of hospital discharge unless specified on physician order or at patient request    SN to complete comprehensive assessment including routine vital signs. Instruct on disease process and s/s of complications to report to MD. Review/verify medication list sent home with the patient at time of discharge  and instruct patient/caregiver as needed. Frequency may be adjusted depending on start of care date.     Skilled nurse to perform up to 3 visits PRN for symptoms related to diagnosis    Notify MD if SBP > 160 or < 90; DBP > 90 or < 50; HR > 120 or < 50; Temp > 101; O2 < 88%    Ok to schedule additional visits based on staff availability and patient request on consecutive days within the home health episode.    When multiple disciplines ordered:    Start of Care occurs on Sunday - Wednesday schedule remaining discipline evaluations as ordered on separate consecutive days following the start of care.    Thursday SOC -schedule subsequent evaluations Friday and Monday the following week.     Friday - Saturday SOC - schedule subsequent discipline evaluations on consecutive days starting Monday of the following week.    For all post-discharge communication and subsequent orders please contact patient's primary  care physician. If unable to reach primary care physician or do not receive response within 30 minutes, please contact Dr. Rubi Norton for clinical staff order clarification    Miscellaneous   Routine Skin for Bedridden Patients: Instruct patient/caregiver to apply moisture barrier cream to all skin folds and wet areas in perineal area daily and after baths and all bowel movements.    Home Health Aide:  Physical Therapy Three times weekly, Occupational Therapy Three times weekly, and Home Health Aide Three times weekly    Wound Care Orders  no    I certify that this patient is confined to her home and needs physical therapy and occupational therapy.

## 2024-09-27 NOTE — PLAN OF CARE
Problem: Physical Therapy  Goal: Physical Therapy Goal  Description: Goals to be met by: 10/27/2024     Patient will increase functional independence with mobility by performin. Supine to sit with Supervision  2. Sit to supine with Supervision  3. Sit to stand transfer with Supervison with RW  4. Gait  x 50 feet with Supervision using Rolling Walker.   5. Lower extremity exercise program x10 reps per handout, with supervision  Outcome: Progressing

## 2024-09-27 NOTE — ASSESSMENT & PLAN NOTE
HFpEF  DARIÁN (on CPAP)  Hx of HFpEF w/ pulmonary heart dx, pulmonary HTN, & ADRIÁN, along w/ essential HTN. Last echo (Feb 2023) w/ LVEF 65%, grade II diastolic dysfunction, moderate biatrial dilation, moderate pulmonary HTN (PASP 63) & CVP 8. Compliant w/ home meds & denies any recent chest pain, SOB, LE edema, or other s/sx of exacerbation. HDS & no acute concerns on admission.   - Continue home amlodipine, valsartan & doxazosin as above  - Continue home diuretics  - CPAP QHS

## 2024-09-27 NOTE — NURSING
Discharge instructions reviewed with patient and  at bedside. Medications administration, warning signs and upcoming appointments reviewed with patient. No complaints or concerns voiced at this time. Patient verbalized understanding. IV removed. Pt wheelchair transported to front entrance by RN for discharge.

## 2024-09-27 NOTE — NURSING TRANSFER
Nursing Transfer Note      Pt arrived from the er via stretcher accompanied by transporter x2. Arrived on Telemetry,SB.aaox4.resp even and non labored.vss.r/a sat 97%. C/o severe left knee pain with minimal movement.blanchable redness noted to buttock/sacrum.pt incontinent of stool upon arrival.pt cleaned and barrier cream applied.redness noted under abdominal fold,break in the skin noted to right side of abd fold.cleaned and barrier cream applied.heels intact,foam applied.heels floated on pillows.safety precautions implemented.bed in low position.rails up x3.call bell in reach.bed alarm activated for pt safety.dinner served.will monitor.

## 2024-09-27 NOTE — PT/OT/SLP EVAL
Occupational Therapy   Evaluation    Name: Coby Atkinson  MRN: [id number]  Admitting Diagnosis: Primary osteoarthritis of both knees  Recent Surgery: * No surgery found *      Recommendations:     Discharge Recommendations: Moderate Intensity Therapy  Discharge Equipment Recommendations:  walker, rolling, bedside commode, wheelchair  Barriers to discharge:  Other (Comment) (Increased assistance required for ADLs & functional mobility/transfers)    Patient has a mobility limitation that significantly impairs their ability to participate in one or more mobility related activities of daily living in customary locations in the home. The mobility limitation cannot be sufficiently resolved by the use of a cane or walker. The use of a manual wheelchair will greatly improve the patient's ability to participate in MRADLs. The patient will use the wheelchair on a regular basis at home. They have expressed their willingness to use a manual wheelchair in the home, and have a caregiver who is available and willing to assist with the wheelchair if needed.     Assessment:     Coby Atkinson is a 78 y.o. female with a medical diagnosis of Primary osteoarthritis of both knees.  She presents with the following performance deficits affecting function: weakness, impaired endurance, impaired functional mobility, decreased lower extremity function, pain.     Pt agreeable to therapy and tolerated fairly. However, pt remains limited in ADLs, functional mobility, and functional transfers and is currently not performing tasks at [facility]. Pt would continue to benefit from skilled OT services to maximize functional independence with ADLs and functional mobility, reduce caregiver burden, and facilitate safe discharge in the least restrictive environment.      Rehab Prognosis: Good; patient would benefit from acute skilled OT services to address these deficits and reach maximum level of function.       Plan:     Patient to be seen 4  x/week to address the above listed problems via self-care/home management, therapeutic exercises, therapeutic activities  Plan of Care Expires: 10/27/24  Plan of Care Reviewed with: patient    Subjective     Chief Complaint: L knee discomfort  Patient/Family Comments/goals: regain PLOF    Occupational Profile:  Living Environment: pt lives in a H with , daughter on-law with kids. Bathroom: Tub/shower combo  Previous level of function: Independent  Roles and Routines: Pt enjoys playing Bingo  Equipment Used at Home: rollator  Assistance upon Discharge: Family    Pain/Comfort:  Pain Rating 1: 0/10  Pain Rating Post-Intervention 1: other (see comments) (did not rate)    Patients cultural, spiritual, Yazdanism conflicts given the current situation: no    Objective:     Communicated with: RN prior to session.  Patient found HOB elevated with peripheral IV, PureWick upon OT entry to room.    General Precautions: Standard, fall  Orthopedic Precautions: N/A  Braces: N/A  Respiratory Status: Room air    Occupational Performance:    Bed Mobility:  (pt required additional time due to LLE weakness & discomfort  Patient completed Scooting to EOB with stand by assistance    Patient completed Supine to Sit with contact guard assistance    Functional Mobility/Transfers:  (pt required additional time due to LLE weakness & discomfort  Patient attempted 4 Sit <> Stand Transfers (from EOB) but only completed 1 STS  1st STS with moderate assistance with rolling walker (failed)  2nd & 3rd STS with moderate assistance with rolling walker while utilizing gait belt (failed)   4th STS with contact guard assistance with rolling walker  Pt required OT to hold front of RW down to prevent RW tip over  Additional time required    Patient completed Bed -> Chair Transfer using Step Transfer technique with contact guard assistance with rolling walker  Pt completed approximately 8 mini steps (2ft) in total to complete transfer  Pt required  verbal cueing for hand placement to maintain pt safety    Functional Mobility: pt was unable to ambulate further distances at this time due to L knee discomfort & generalized weakness    Activities of Daily Living:  Grooming: set-up assistance to complete facial hygeine     Upper Body Dressing: minimum assistance to don gown over back    Cognitive/Visual Perceptual:  Cognitive/Psychosocial Skills:     -       Oriented to: Person, Place, Time, and Situation   -       Follows Commands/attention:Follows multistep  commands    Physical Exam:  Dominant hand: -       Right  Upper Extremity Range of Motion:     -       Right Upper Extremity: WFL  -       Left Upper Extremity: WFL  Upper Extremity Strength:    -       Right Upper Extremity: WFL  -       Left Upper Extremity: WFL   Strength:    -       Right Upper Extremity: WFL  -       Left Upper Extremity: WFL  Fine Motor Coordination:    -       Intact  Left hand thumb/finger opposition skills and Right hand thumb/finger opposition skills  Gross motor coordination:   WFL    AMPAC 6 Click ADL:  AMPAC Total Score: 17    Treatment & Education:  -Pt educated on hand & RW placement during function mobility/transfers  -Education on task modification to maximize safety and (I) during ADLs and mobility  -Education on importance of OOB activity to improve overall activity tolerance and promote recovery  -Pt educated to call for assistance and to transfer with hospital staff only  -Provided education regarding role of OT &  POC with pt verbalizing understanding.    Pt had no further questions & when asked whether there were any concerns pt reported none.     Patient left up in chair with all lines intact, call button in reach, and RN notified    GOALS:   Multidisciplinary Problems       Occupational Therapy Goals          Problem: Occupational Therapy    Goal Priority Disciplines Outcome Interventions   Occupational Therapy Goal     OT, PT/OT Progressing    Description: Goals  to be met by: 10/27/2024     Patient will increase functional independence with ADLs by performing:    UE Dressing with Modified Gila.  LE Dressing with Modified Gila.  Grooming while seated with Set-up Assistance.  Toileting from toilet with Modified Gila for hygiene and clothing management.   Supine to sit with Modified Gila.  Step transfer with Modified Gila  Toilet transfer to toilet with Modified Gila.                         History:     Past Medical History:   Diagnosis Date    Anticoagulant long-term use     Cataract     Chronic diarrhea     Depression     Edema     GERD (gastroesophageal reflux disease)     Hypertension 10/2/2012    Osteoarthritis of both knees 8/9/2016    Osteopenia     Osteopenia 11/30/2017    PE (pulmonary thromboembolism) 12/03/2017    Primary localized osteoarthrosis, lower leg 12/3/2014    Sleep apnea 2016    Thalassemia minor          Past Surgical History:   Procedure Laterality Date    APPENDECTOMY      BLOCK, NERVE, GENICULAR Bilateral 3/14/2024    Procedure: Block, Nerve, Genicular---BILATERAL KNEE;  Surgeon: Kit Barton Jr., MD;  Location: [facility] PAIN [facility];  Service: Pain Management;  Laterality: Bilateral;    CATARACT EXTRACTION      COLONOSCOPY N/A 11/5/2016    Procedure: COLONOSCOPY;  Surgeon: Tanner Simental MD;  [facility]  Service: Endoscopy;  Laterality: N/A;    HYSTERECTOMY  age 39    fibroids    OOPHORECTOMY      TONSILLECTOMY         Time Tracking:     OT Date of Treatment: 09/27/24  OT Start Time: 0940  OT Stop Time: 1012  OT Total Time (min): 32 min    Billable Minutes:Evaluation 12  Therapeutic Activity 20    9/27/2024

## 2024-09-27 NOTE — CONSULTS
Hospital Medicine Pharmacy Consult Note    PharmD Consult Received For:     Pharmacy to review dose of warfarin  Coby Atkinson is a 78 y.o. female on warfarin therapy for chronic DVT/PE. PharmD has been consulted for warfarin dosing.    Current order: 0.5 mg daily  Home dose: 0.5 mg daily or as directed by coumadin clinic  Coumadin clinic enrollment: Active  INR goal: 2-3    Lab Results   Component Value Date    INR 2.7 (H) 09/27/2024    INR 2.5 (H) 09/26/2024    INR 2.3 (H) 09/23/2024     Significant drug interactions:  Diet: Adult Cardiac without supplements     Recommendation(s):   Continue 0.5 mg daily as ordered  Daily INR  Pharmacy will continue to follow and monitor warfarin  Please note: PharmD will follow on the weekend but no note will be dropped again until Monday.     Thank you for the consult,  Ilana Downs  Extension [phone]    **Note: Consults are reviewed Monday-Friday 7:00am-3:30pm. The above recommendations are only suggested. The recommendations should be considered in conjunction with all patient factors.**

## 2024-09-27 NOTE — PLAN OF CARE
09/27/24 1613   Post-Acute Status   Post-Acute Authorization Home Health   Home Health Status Referrals Sent     Pt is expected to discharge home with home health. SW sent referrals via [facility] and is waiting for an accepting agency. Providers that are owned, operated, or affiliated with [facility] are included on the list.    Discharge Plan A and Plan B have been determined by review of patient's clinical status, future medical and therapeutic needs, and coverage/benefits for post-acute care in coordination with multidisciplinary team members.    [facility]ARA will continue to follow up.      Kelly Gale LMSW  [facility]  Ext. [phone]

## 2024-09-27 NOTE — PLAN OF CARE
Problem: Adult Inpatient Plan of Care  Goal: Plan of Care Review  Outcome: Progressing  Goal: Patient-Specific Goal (Individualized)  Outcome: Progressing  Goal: Absence of Hospital-Acquired Illness or Injury  Outcome: Progressing  Goal: Optimal Comfort and Wellbeing  Outcome: Progressing  Goal: Readiness for Transition of Care  Outcome: Progressing     Problem: Bariatric Environmental Safety  Goal: Safety Maintained with Care  Outcome: Progressing     Problem: Wound  Goal: Optimal Coping  Outcome: Progressing  Goal: Optimal Functional Ability  Outcome: Progressing  Goal: Absence of Infection Signs and Symptoms  Outcome: Progressing  Goal: Improved Oral Intake  Outcome: Progressing  Goal: Optimal Pain Control and Function  Outcome: Progressing  Goal: Skin Health and Integrity  Outcome: Progressing  Goal: Optimal Wound Healing  Outcome: Progressing     Problem: Fall Injury Risk  Goal: Absence of Fall and Fall-Related Injury  Outcome: Progressing     Problem: Skin Injury Risk Increased  Goal: Skin Health and Integrity  Outcome: Progressing     Problem: Comorbidity Management  Goal: Maintenance of Heart Failure Symptom Control  Outcome: Progressing  Goal: Blood Pressure in Desired Range  Outcome: Progressing  Goal: Maintenance of Osteoarthritis Symptom Control  Outcome: Progressing     Problem: VTE (Venous Thromboembolism)  Goal: Tissue Perfusion  Outcome: Progressing  Goal: Right Ventricular Function  Outcome: Progressing     Problem: Mobility Impairment  Goal: Optimal Mobility  Outcome: Progressing      Never smoker

## 2024-09-27 NOTE — PLAN OF CARE
Problem: Adult Inpatient Plan of Care  Goal: Plan of Care Review  Outcome: Met  Goal: Patient-Specific Goal (Individualized)  Outcome: Met  Goal: Absence of Hospital-Acquired Illness or Injury  Outcome: Met  Goal: Optimal Comfort and Wellbeing  Outcome: Met  Goal: Readiness for Transition of Care  Outcome: Met     Problem: Bariatric Environmental Safety  Goal: Safety Maintained with Care  Outcome: Met     Problem: Wound  Goal: Optimal Coping  Outcome: Met  Goal: Optimal Functional Ability  Outcome: Met  Goal: Absence of Infection Signs and Symptoms  Outcome: Met  Goal: Improved Oral Intake  Outcome: Met  Goal: Optimal Pain Control and Function  Outcome: Met  Goal: Skin Health and Integrity  Outcome: Met  Goal: Optimal Wound Healing  Outcome: Met     Problem: Fall Injury Risk  Goal: Absence of Fall and Fall-Related Injury  Outcome: Met     Problem: Skin Injury Risk Increased  Goal: Skin Health and Integrity  Outcome: Met     Problem: Comorbidity Management  Goal: Maintenance of Heart Failure Symptom Control  Outcome: Met  Goal: Blood Pressure in Desired Range  Outcome: Met  Goal: Maintenance of Osteoarthritis Symptom Control  Outcome: Met     Problem: VTE (Venous Thromboembolism)  Goal: Tissue Perfusion  Outcome: Met  Goal: Right Ventricular Function  Outcome: Met     Problem: Mobility Impairment  Goal: Optimal Mobility  Outcome: Met

## 2024-09-27 NOTE — ASSESSMENT & PLAN NOTE
Hx of chronic HTN for which pt is on extensive BP regimen. Hypertensive on admission (160s-180s).   - Continue home amlodipine, valsartan & doxazosin  - Continue home diuretics  - PRN labetolol for goal SBP < 180

## 2024-09-27 NOTE — PLAN OF CARE
09/27/24 1638   Post-Acute Status   Post-Acute Authorization Home Health   Home Health Status Set-up Complete/Auth obtained     SW received notification that pt has been accepted with [facility] and they will see her on Sunday, 9/29, if not sooner.      Kelly Gale LMSW  [facility]  Ext. [phone]

## 2024-09-27 NOTE — NURSING
Pt has not urinated tonight.Bladder scanned,417ml of urine noted.pt reports she feels the urge to urinate and will try at this time.pt was able to void 125ml of concentrated urine via purewick and also saturated the incontinence pads.pt logrolled and cleaned,barrier cream applied.new puewick placed.

## 2024-09-27 NOTE — DISCHARGE INSTRUCTIONS
- For your pain take 1000mg tylenol 3x/day along with voltaren gel 3x/day.   - For additional pain, you can take oxycodone every 6 hours as needed.   - Otherwise continue taking the rest of your prior home medications as you were before.   - Acute Care at Home and Home Health providers/therapists will be coming to your home to help with your pain management and therapy.   - Follow-up with the orthopedic surgeon in clinic to discuss next steps in management.

## 2024-09-27 NOTE — PLAN OF CARE
[facility]  Initial Discharge Assessment       Primary Care Provider: Mundo Hudson, DO    Admission Diagnosis: Left knee pain [M25.562]  Chest pain [R07.9]  Osteoarthritis of left knee, unspecified osteoarthritis type [M17.12]    Admission Date: 9/26/2024  Expected Discharge Date: 9/27/2024         Payor: MEDICARE / Plan: MEDICARE PART A & B / Product Type: Government /     Extended Emergency Contact Information  Primary Emergency Contact: Isma Atkinson  Mobile Phone: [phone]  Relation: Spouse  Preferred language: English   needed? No    Discharge Plan A: (P) Skilled Nursing Facility  Discharge Plan B: (P) Home Health      [id number] - [place] [facility] - [address] AT [facility] [address]  [address] [address]  Phone: [phone] Fax: [phone]                 SW completed Discharge Planning Assessment with patient via bedside. Discharge planning booklet given to patient/family and whiteboard updated with MARIAM and phone #. All questions answered.    Patient reported that she will have transportation upon discharge.     Patient reported that she live with her , daughter-in-law, and grandchildren. Patient reported that prior to hospitalization she was independent with her ADL's. Patient reported that she uses a rollator. Patient reported that she is not on dialysis. Patient reported that she is on Coumadin and is monitored by [facility].      Patient lives in a [facility] with six steps to enter with railings and a ramp.     Discharge Plan A and Plan B have been determined by review of patient's clinical status, future medical and therapeutic needs, and coverage/benefits for post-acute care in coordination with multidisciplinary team members.      Kelly Gale, REFUGIO  [facility]  Ext. [phone]

## 2024-09-27 NOTE — NURSING
Nurses Note -- 4 Eyes      9/26/2024   11:46 PM      Skin assessed during: Admit      [] No Altered Skin Integrity Present    []Prevention Measures Documented      [x] Yes- Altered Skin Integrity Present or Discovered   [x] LDA Added if Not in Epic (Describe Wound)   [x] New Altered Skin Integrity was Present on Admit and Documented in LDA   [x] Wound Image Taken    Wound Care Consulted? Yes    Attending Nurse:  Bita Jimenez RN/Staff Member: Cheryl

## 2024-09-27 NOTE — PLAN OF CARE
Problem: Occupational Therapy  Goal: Occupational Therapy Goal  Description: Goals to be met by: 10/27/2024     Patient will increase functional independence with ADLs by performing:    UE Dressing with Modified [place].  LE Dressing with Modified [place].  Grooming while seated with Set-up Assistance.  Toileting from toilet with Modified [place] for hygiene and clothing management.   Supine to sit with Modified [place].  Step transfer with Modified [place]  Toilet transfer to toilet with Modified [place].    Outcome: Progressing       OT eval complete & goals established.

## 2024-09-28 NOTE — DISCHARGE SUMMARY
[facility] [address] [place] [address] Medicine  Discharge Summary      Patient Name: Coby Atkinson  MRN: [id number]  KIMBERLEE: [id number]  Patient Class: OP- Observation  Admission Date: 9/26/2024  Hospital Length of Stay: 0 days  Discharge Date and Time: 9/27/2024  5:45 PM  Attending Physician: Rubi Norton MD  Discharging Provider: Rubi Norton MD  Primary Care Provider: Mundo Hudson DO  [facility] Medicine Team: [facility]   Primary Care Team: [facility]    HPI:   Mrs. Coby Atkinson is a 78 y.o. female w/ PMHx of HTN, CAD, HFpEF, hx of PE/DVT (on warfarin), pulmonary HTN, ADRIÁN (on CPAP QHS), severe osteoarthritis w/ chronic b/l knee pain, morbid obesity, who presented to [facility] ED on 09/26/2024 w/ progressive knee pain (L>R). Reports that she's had knee pain for a long time, but that pain was so severe this AM that she had difficulty getting out of bed. Reports recent swelling of L knee and difficulty w/ movement.  Ambulates w/ walker at baseline, but was unable to bear weight at all this AM. No fevers/chills, N/V/D, recent trauma or falls, chest pain, SOB, AMS, etc. Pt has reportedly been contemplating TKA for years, but has been hesitant to move forward with procedure.     Hospital Course:   On arrival to ED, pt afebrile & HDS. Labs unremarkable. XR of L knee showed degenerative changes w/ small-moderate effusion; no fractures. Exam notable for valgus deformity w/ decreased ROM & notable bony TTP along medial join line; no notable erythema, warmth or edema when compared to R side. Failed walking trial w/ nursing in ED. Admitted for PT/OT evaluation and possible placement. Pt evaluated by PT/OT who advised moderate-intensity therapy (note, pt did better working w/ PT after being pre-medicated w/ analgesics). Unfortunately unable to get authorization for SNF given no inpatient needs. Pt offered NH placement which she declined. Discharged home w/ HH, ACAH, DME & multimodal analgesia. Pt to  "follow-up w/ orthopedic surgery in clinic for continued management.       Goals of Care Treatment Preferences:  Code Status: Full Code      Consults: Evaluated by Orthopedic Surgery in ED    Final Active Diagnoses:    Diagnosis Date Noted POA    PRINCIPAL PROBLEM:  Primary osteoarthritis of both knees [M17.0] 02/06/2019 Yes    Chronic pulmonary embolism without acute cor pulmonale [I27.82] 02/17/2021 Yes    Chronic diastolic heart failure [I50.32] 06/11/2020 Yes    Pulmonary heart disease [I27.9] 11/25/2019 Yes    Morbid obesity with BMI of 40.0-44.9, adult [E66.01, Z68.41] 06/14/2018 Not Applicable    Essential hypertension [I10] 10/11/2016 Yes      Problems Resolved During this Admission:       Discharged Condition: fair    Disposition: Home or Self Care    Follow Up:   Follow-up Information       Schedule an appointment as soon as possible for a visit  with Kit Chavira MD.    Specialty: Orthopedic Surgery  Contact information:  [facility] [address]  [phone]                           Patient Instructions:  (Per AVS)   - For your pain take 1000mg tylenol 3x/day along with voltaren gel 3x/day.   - For additional pain, you can take oxycodone every 6 hours as needed.   - Otherwise continue taking the rest of your prior home medications as you were before.   - Acute Care at Home and Home Health providers/therapists will be coming to your home to help with your pain management and therapy.   - Follow-up with the orthopedic surgeon in clinic to discuss next steps in management.        COMMODE FOR HOME USE     Order Specific Question Answer Comments   Type: Standard    Height: 5' 4" (1.626 m)    Weight: 102.2 kg (225 lb 5 oz)    Does patient have medical equipment at home? rollator    Length of need (1-99 months): 12      WALKER FOR HOME USE     Order Specific Question Answer Comments   Type of Walker: Rollator with brakes and/or seat    With wheels? Yes    Height: 5' 4" (1.626 m)    Weight: " "102.2 kg (225 lb 5 oz)    Length of need (1-99 months): 12    Does patient have medical equipment at home? rollator    Please check all that apply: Patient's condition impairs ambulation.    Please check all that apply: Patient is unable to safely ambulate without equipment.    Please check all that apply: Patient needs help to get in and out of chair.    Please check all that apply: Walker will be used for gait training.      WHEELCHAIR FOR HOME USE     Order Specific Question Answer Comments   Hours in W/C per day: 6    Type of Wheelchair: Standard    Size(Width): 18"(STD adult)    Leg Support: STD footrests    Lap Belt: Velcro    Accessories: Anti-tippers    Cushion: Basic    Reclining Back No    Height: 5' 4" (1.626 m)    Weight: 102.2 kg (225 lb 5 oz)    Does patient have medical equipment at home? rollator    Length of need (1-99 months): 12    Please check all that apply: Patient's upper body strength is sufficient for propulsion.    Please check all that apply: Patient mobility limitations cannot be sufficiently resolved by the use of other ambulatory therapies.      WHEELCHAIR FOR HOME USE     Order Specific Question Answer Comments   Hours in W/C per day: 6    Type of Wheelchair: Standard    Size(Width): 18"(STD adult)    Leg Support: STD footrests    Lap Belt: Velcro    Accessories: Anti-tippers    Cushion: Basic    Reclining Back No    Height: 5' 4" (1.626 m)    Weight: 102.2 kg (225 lb 5 oz)    Does patient have medical equipment at home? rollator    Length of need (1-99 months): 12    Please check all that apply: Patient mobility limitations cannot be sufficiently resolved by the use of other ambulatory therapies.    Please check all that apply: The patient requires the use of a w/c for activities of daily living within the Home.    Please check all that apply: Patient's upper body strength is sufficient for propulsion.      Ambulatory referral/consult to Outpatient Case Management   Referral Priority: " "Routine Referral Type: Consultation   Referral Reason: Specialty Services Required   Number of Visits Requested: 1     Ambulatory referral/consult to Orthopedics   Standing Status: Future   Referral Priority: Urgent Referral Type: Consultation   Requested Specialty: Orthopedic Surgery   Number of Visits Requested: 1       Significant Diagnostic Studies: N/A ; as discussed in "Hospital Course" above    Pending Diagnostic Studies:       None           Medications:  Reconciled Home Medications:      Medication List        START taking these medications      acetaminophen 500 MG tablet  Commonly known as: TYLENOL  Take 2 tablets (1,000 mg total) by mouth 3 (three) times daily.     diclofenac sodium 1 % Gel  Commonly known as: VOLTAREN  Apply 4 g topically 2 (two) times daily.     oxyCODONE 5 MG immediate release tablet  Commonly known as: ROXICODONE  Take 1 tablet (5 mg total) by mouth every 6 (six) hours as needed for Pain.            CONTINUE taking these medications      amlodipine-valsartan  mg per tablet  Commonly known as: EXFORGE  Take 1 tablet by mouth once daily.     busPIRone 10 MG tablet  Commonly known as: BUSPAR  TAKE 1 TABLET(10 MG) BY MOUTH THREE TIMES DAILY AS NEEDED FOR ANXIETY     doxazosin 8 MG Tab  Commonly known as: CARDURA  Take 1 tablet (8 mg total) by mouth once daily.     EScitalopram oxalate 20 MG tablet  Commonly known as: LEXAPRO  Take 1 tablet (20 mg total) by mouth once daily.     furosemide 20 MG tablet  Commonly known as: LASIX  TAKE 2 TABLETS(40 MG) BY MOUTH EVERY DAY     potassium chloride 10 MEQ Cpsr  Commonly known as: MICRO-K  Take 1 capsule (10 mEq total) by mouth once daily. TAKE 1 CAPSULE(10 MEQ) BY MOUTH EVERY DAY     spironolactone 25 MG tablet  Commonly known as: ALDACTONE  Take 25 mg by mouth once daily.     warfarin 1 MG tablet  Commonly known as: COUMADIN  Take 1mg daily, except take 0.5mg on Thursdays and Saturdays OR as directed by Coumadin Clinic            ASK your " doctor about these medications      alendronate 70 MG tablet  Commonly known as: FOSAMAX  Take 1 tablet once weekly in the morning with a full glass of water on an empty stomach. Do not consume food or lie down for at least 30 minutes afterwards.     bisoprolol 10 MG tablet  Commonly known as: ZEBETA  Take 1 tablet (10 mg total) by mouth once daily.              Indwelling Lines/Drains at time of discharge:   Lines/Drains/Airways       None              Time spent on the discharge of patient: 45 minutes         Rubi Norton MD  Department of Hospital Medicine  [facility] - Observation 11H

## 2024-09-30 ENCOUNTER — TELEPHONE (OUTPATIENT)
Dept: BARIATRICS | Facility: CLINIC | Age: 79
End: 2024-09-30
Payer: MEDICARE

## 2024-09-30 ENCOUNTER — PATIENT OUTREACH (OUTPATIENT)
Dept: ADMINISTRATIVE | Facility: CLINIC | Age: 79
End: 2024-09-30
Payer: MEDICARE

## 2024-09-30 NOTE — TELEPHONE ENCOUNTER
----- Message from Deepak sent at 9/30/2024  3:39 PM CDT -----  Regarding: Scheduling  Ms. Atkinson need to schedule an ep consult with Dr. Dunn. She completed her financial consult.

## 2024-09-30 NOTE — PROGRESS NOTES
C3 nurse spoke with Coby Atkinson for a TCC post hospital discharge follow up call. The patient does not have a scheduled HOSFU appointment with Mundo Hudson DO within 5-7 days post hospital discharge date 09/27/24. C3 nurse was unable to schedule HOSFU appointment in [facility].    Message sent to PCP staff requesting they contact patient and schedule follow up appointment.

## 2024-09-30 NOTE — PLAN OF CARE
[facility]  Discharge Final Note    Primary Care Provider: Mundo Hudson DO    Expected Discharge Date: 9/27/2024    Patient discharged to home via personal transportation.     Patient's bedside nurse and patient/ notified of the above.    Discharge Plan A and Plan B have been determined by review of patient's clinical status, future medical and therapeutic needs, and coverage/benefits for post-acute care in coordination with multidisciplinary team members.        Final Discharge Note (most recent)       Final Note - 09/30/24 0924          Final Note    Assessment Type Final Discharge Note (P)      Anticipated Discharge Disposition Home-Health Care Svc (P)         Post-Acute Status    Post-Acute Authorization Home Health (P)      Home Health Status Set-up Complete/Auth obtained (P)                      Important Message from Medicare             Contact Info       Kit Chavira MD   Specialty: Orthopedic Surgery    [address]  [facility] [address]   Phone: [phone]       Next Steps: Schedule an appointment as soon as possible for a visit            Future Appointments   Date Time Provider Department Center   10/11/2024 10:45 AM Kit Chavira MD [facility] ORTHO [facility]   10/14/2024  7:40 AM LAB, SBP [facility] LAB [facility]   11/6/2024 10:40 AM Mundo Hudson DO [facility] IM [place]   12/26/2024  4:20 PM Oksana Hilliard MD [facility] NEURO [facility]   2/7/2025 10:20 AM Mundo Hudson DO [facility] IM [place]        scheduled post-discharge follow-up appointment and information added to AVS.     Patient has been accepted with [facility].      Kelly Gale LMSW  [facility]  Ext. [phone]

## 2024-10-01 ENCOUNTER — OUTPATIENT CASE MANAGEMENT (OUTPATIENT)
Dept: ADMINISTRATIVE | Facility: OTHER | Age: 79
End: 2024-10-01
Payer: MEDICARE

## 2024-10-01 NOTE — LETTER
October 1, 2024             Dear Coby,    Welcome to [facility]s Complex Care Management Program.  It was a pleasure talking with you today.  My name is Tierney Quintanillaingue, and I look forward to being your Care Manager.  My goal is to help you function at the healthiest and highest level possible.  You can contact me directly at [phone].    As an [facility] patient, some of the services we may be able to provide include:     Development of an individualized care plan with a Registered Nurse   Connection with a   Connection with available resources and services    Coordinate communication among your care team members   Provide coaching and education   Help you understand your doctors treatment plan  Help you obtain information about your insurance coverage.     All services provided by [facility]s Complex Care Managers and other care team members are coordinated with and communicated to your primary care team.      As part of your enrollment, you will be receiving education materials and more information about these services in your [facility] account, by phone or through the mail.  If you do not wish to participate or receive information, please contact our office at [phone].      Sincerely,        Tierney Kerr RN  [facility]   Out-patient RN Complex Care Manager

## 2024-10-01 NOTE — PROGRESS NOTES
Outpatient Care Management  Initial Patient Assessment    Patient: Coby Atkinson  MRN: [id number]  Date of Service: 10/01/2024  Completed by: Tierney Kerr RN  Referral Date: 09/27/2024  Date of Eligibility: 9/28/2024  Program:   High Risk  Status: Ongoing  Effective Dates: 10/1/2024 - present  Responsible Staff: Tierney Kerr RN        Reason for Visit   Patient presents with    OPCM Chart Review    OPCM Enrollment Call    Nursing Assessment     initiated       Brief Summary:  Coby Atkinson was referred by Dr Norton for osteoarthritis. Patient qualifies for program based on 61.1 % risk score.   Active problem list, medical, surgical and social history reviewed. Active comorbidities include HTN, Obesity, ADRIÁN, GERD, Depression, hx of DVT. Areas of need identified by patient include management of osteoarthritis and increased physical activity.   Next steps: Pt agrees to follow up call on or around 10/8/2024- address ACP.   Did pt wear IPC (intermittent peripheral compression machine) MWF and Sunday as discussed on enrollment call?    Disability Status  Is the patient alert and oriented (person, place, time, and situation)?: Alert and oriented x 4  Hearing Difficulty or Deaf: no  Visual Difficulty or Blind: yes  Visual and Hearing Needs Conclusion: Pt states she wears reader glasses when needed; denies hearing deficits  Difficulty Concentrating, Remembering or Making Decisions: no  Communication Difficulty: no  Eating/Swallowing Difficulty: no  Walking or Climbing Stairs Difficulty: yes  Walking or Climbing Stairs: ambulation difficulty, requires equipment  Mobility Management: walker, scooter  Dressing/Bathing Difficulty: no  Toileting : Independent  Continence : Continence - Not a problem  Difficulty Managing Errands Independently: yes  Errands Management:  manages finances, grocery shopping and cleaning  Equipment Currently Used at Home: grab bar; walker, rolling; other (see comments)  (scooter, built in shower seat)  ADL Conclusion Statement: Pt requires walker at all times; pt able to dress and bathe self;  assists as needed  Change in Functional Status Since Onset of Current Illness/Injury: yes (left leg pain and swelling)        Spiritual Beliefs  Spiritual, Cultural Beliefs, Jew Practices, Values that Affect Care: no      Social History     Socioeconomic History    Marital status:     Number of children: 1   Occupational History    Occupation: Retired from [employer] of [employer]    Tobacco Use    Smoking status: Never     Passive exposure: Never    Smokeless tobacco: Never   Substance and Sexual Activity    Alcohol use: No     Comment: rare    Drug use: No    Sexual activity: Yes     Partners: Male     Birth control/protection: None     Social Drivers of Health     Financial Resource Strain: Low Risk  (10/1/2024)    Overall Financial Resource Strain (CARDIA)     Difficulty of Paying Living Expenses: Not hard at all   Food Insecurity: No Food Insecurity (10/1/2024)    Hunger Vital Sign     Worried About Running Out of Food in the Last Year: Never true     Ran Out of Food in the Last Year: Never true   Transportation Needs: No Transportation Needs (10/1/2024)    TRANSPORTATION NEEDS     Transportation : No   Physical Activity: Inactive (10/1/2024)    Exercise Vital Sign     Days of Exercise per Week: 0 days     Minutes of Exercise per Session: 0 min   Stress: No Stress Concern Present (10/1/2024)    [place] of [employer]ational [employer] - Occupational Stress Questionnaire     Feeling of Stress : Only a little   Housing Stability: Low Risk  (10/1/2024)    Housing Stability Vital Sign     Unable to Pay for Housing in the Last Year: No     Homeless in the Last Year: No       Roles and Relationships  Primary Source of Support/Comfort: spouse  Name of Support/Comfort Primary Source: Gwendolyn Atkinson-       Advance Directives (For Healthcare)  Advance Directive  (If  Adv Dir status is received, view document under Adv Dir in header or Chart Review Media tab): -- (not addressed during this call)        Patient Reported Insurance  Verified current insurance plan:: Medicare; Blue Cross            10/1/2024     3:09 PM 5/16/2024     7:46 AM 9/19/2023    10:16 AM 8/23/2022     2:48 PM 6/13/2022     9:51 AM 5/3/2022     1:27 PM 4/21/2022    11:11 AM   Depression Patient Health Questionnaire   Over the last two weeks how often have you been bothered by little interest or pleasure in doing things Not at all Not at all Not at all More than half the days Several days Not at all Not at all   Over the last two weeks how often have you been bothered by feeling down, depressed or hopeless Not at all More than half the days Nearly every day Nearly every day Several days Not at all Not at all   PHQ-2 Total Score 0 2 3 5 2 0 0   Over the last two weeks how often have you been bothered by trouble falling or staying asleep, or sleeping too much   Not at all       Over the last two weeks how often have you been bothered by feeling tired or having little energy   Nearly every day Nearly every day      Over the last two weeks how often have you been bothered by a poor appetite or overeating   Not at all Several days      Over the last two weeks how often have you been bothered by feeling bad about yourself - or that you are a failure or have let yourself or your family down   Not at all Nearly every day      Over the last two weeks how often have you been bothered by trouble concentrating on things, such as reading the newspaper or watching television   Nearly every day Nearly every day      Over the last two weeks how often have you been bothered by moving or speaking so slowly that other people could have noticed. Or the opposite - being so fidgety or restless that you have been moving around a lot more than usual.   Not at all More than half the days      Over the last two weeks how often have you  been bothered by thoughts that you would be better off dead, or of hurting yourself   Not at all More than half the days      If you checked off any problems, how difficult have these problems made it for you to do your work, take care of things at home or get along with other people?   Not difficult at all Very difficult      PHQ-9 Score   9       PHQ-9 Interpretation   Mild           Learning Assessment       10/01/2024 1636 [facility] (10/1/2024 - Present)   Created by Tierney Kerr RN -  (Nurse) Status: Complete                 PRIMARY LEARNER     Primary Learner Name:  Coby Atkinson TD - 10/01/2024 1636    Relationship:  Patient TD - 10/01/2024 1636    Does the primary learner have any barriers to learning?:  No Barriers TD - 10/01/2024 1636    What is the preferred language of the primary learner?:  English TD - 10/01/2024 1636    Is an  required?:  No TD - 10/01/2024 1636    How does the primary learner prefer to learn new concepts?:  Listening TD - 10/01/2024 1636    How often do you need to have someone help you read instructions, pamphlets, or written material from your doctor or pharmacy?:  Never TD - 10/01/2024 1636        CO-LEARNER #1     No question answered        CO-LEARNER #2     No question answered        SPECIAL TOPICS     No question answered        ANSWERED BY:     -:  Patient TD - 10/01/2024 1636        Comments         Edit History       Tierney Kerr, RN -  (Nurse)   10/01/2024 1636

## 2024-10-07 ENCOUNTER — HOSPITAL ENCOUNTER (INPATIENT)
Facility: HOSPITAL | Age: 79
LOS: 1 days | Discharge: NURSING, REHAB OR LONG-TERM CARE | DRG: 300 | End: 2024-10-09
Attending: EMERGENCY MEDICINE | Admitting: STUDENT IN AN ORGANIZED HEALTH CARE EDUCATION/TRAINING PROGRAM
Payer: MEDICARE

## 2024-10-07 DIAGNOSIS — G89.29 CHRONIC PAIN OF LEFT KNEE: Primary | ICD-10-CM

## 2024-10-07 DIAGNOSIS — M25.562 CHRONIC PAIN OF LEFT KNEE: Primary | ICD-10-CM

## 2024-10-07 DIAGNOSIS — F32.1 CURRENT MODERATE EPISODE OF MAJOR DEPRESSIVE DISORDER, UNSPECIFIED WHETHER RECURRENT: ICD-10-CM

## 2024-10-07 DIAGNOSIS — Z86.718 HISTORY OF DVT OF LOWER EXTREMITY: ICD-10-CM

## 2024-10-07 DIAGNOSIS — R26.2 UNABLE TO WALK: ICD-10-CM

## 2024-10-07 PROCEDURE — 99285 EMERGENCY DEPT VISIT HI MDM: CPT

## 2024-10-07 RX ORDER — BISOPROLOL FUMARATE 10 MG/1
10 TABLET, FILM COATED ORAL
Qty: 90 TABLET | Refills: 3 | Status: SHIPPED | OUTPATIENT
Start: 2024-10-07

## 2024-10-08 LAB
ALBUMIN SERPL BCP-MCNC: 3.8 G/DL (ref 3.5–5.2)
ALP SERPL-CCNC: 75 U/L (ref 55–135)
ALT SERPL W/O P-5'-P-CCNC: 10 U/L (ref 10–44)
ANION GAP SERPL CALC-SCNC: 13 MMOL/L (ref 8–16)
ANION GAP SERPL CALC-SCNC: 9 MMOL/L (ref 8–16)
AST SERPL-CCNC: 17 U/L (ref 10–40)
BASOPHILS # BLD AUTO: 0.05 K/UL (ref 0–0.2)
BASOPHILS # BLD AUTO: 0.05 K/UL (ref 0–0.2)
BASOPHILS NFR BLD: 0.4 % (ref 0–1.9)
BASOPHILS NFR BLD: 0.4 % (ref 0–1.9)
BILIRUB SERPL-MCNC: 0.7 MG/DL (ref 0.1–1)
BNP SERPL-MCNC: 85 PG/ML (ref 0–99)
BUN SERPL-MCNC: 16 MG/DL (ref 8–23)
BUN SERPL-MCNC: 17 MG/DL (ref 8–23)
CALCIUM SERPL-MCNC: 8.5 MG/DL (ref 8.7–10.5)
CALCIUM SERPL-MCNC: 9.5 MG/DL (ref 8.7–10.5)
CHLORIDE SERPL-SCNC: 105 MMOL/L (ref 95–110)
CHLORIDE SERPL-SCNC: 106 MMOL/L (ref 95–110)
CO2 SERPL-SCNC: 20 MMOL/L (ref 23–29)
CO2 SERPL-SCNC: 21 MMOL/L (ref 23–29)
CREAT SERPL-MCNC: 0.9 MG/DL (ref 0.5–1.4)
CREAT SERPL-MCNC: 1 MG/DL (ref 0.5–1.4)
DIFFERENTIAL METHOD BLD: ABNORMAL
DIFFERENTIAL METHOD BLD: ABNORMAL
EOSINOPHIL # BLD AUTO: 0 K/UL (ref 0–0.5)
EOSINOPHIL # BLD AUTO: 0.1 K/UL (ref 0–0.5)
EOSINOPHIL NFR BLD: 0.2 % (ref 0–8)
EOSINOPHIL NFR BLD: 0.4 % (ref 0–8)
ERYTHROCYTE [DISTWIDTH] IN BLOOD BY AUTOMATED COUNT: 14.9 % (ref 11.5–14.5)
ERYTHROCYTE [DISTWIDTH] IN BLOOD BY AUTOMATED COUNT: 15.2 % (ref 11.5–14.5)
EST. GFR  (NO RACE VARIABLE): 57.3 ML/MIN/1.73 M^2
EST. GFR  (NO RACE VARIABLE): >60 ML/MIN/1.73 M^2
FERRITIN SERPL-MCNC: 150 NG/ML (ref 20–300)
FOLATE SERPL-MCNC: 4.9 NG/ML (ref 4–24)
GLUCOSE SERPL-MCNC: 122 MG/DL (ref 70–110)
GLUCOSE SERPL-MCNC: 123 MG/DL (ref 70–110)
HCT VFR BLD AUTO: 32.1 % (ref 37–48.5)
HCT VFR BLD AUTO: 35.8 % (ref 37–48.5)
HCV AB SERPL QL IA: REACTIVE
HCV RNA SERPL NAA+PROBE-LOG IU: 4.54 LOGIU/ML
HCV RNA SERPL QL NAA+PROBE: DETECTED
HCV RNA SPEC NAA+PROBE-ACNC: ABNORMAL IU/ML
HGB BLD-MCNC: 11 G/DL (ref 12–16)
HGB BLD-MCNC: 9.6 G/DL (ref 12–16)
HIV 1+2 AB+HIV1 P24 AG SERPL QL IA: NORMAL
IMM GRANULOCYTES # BLD AUTO: 0.03 K/UL (ref 0–0.04)
IMM GRANULOCYTES # BLD AUTO: 0.05 K/UL (ref 0–0.04)
IMM GRANULOCYTES NFR BLD AUTO: 0.2 % (ref 0–0.5)
IMM GRANULOCYTES NFR BLD AUTO: 0.4 % (ref 0–0.5)
INR PPP: 2.9 (ref 0.8–1.2)
INR PPP: 2.9 (ref 0.8–1.2)
IRON SERPL-MCNC: 26 UG/DL (ref 30–160)
LYMPHOCYTES # BLD AUTO: 2 K/UL (ref 1–4.8)
LYMPHOCYTES # BLD AUTO: 2.3 K/UL (ref 1–4.8)
LYMPHOCYTES NFR BLD: 14.8 % (ref 18–48)
LYMPHOCYTES NFR BLD: 18.4 % (ref 18–48)
MAGNESIUM SERPL-MCNC: 1.8 MG/DL (ref 1.6–2.6)
MCH RBC QN AUTO: 19.8 PG (ref 27–31)
MCH RBC QN AUTO: 20.1 PG (ref 27–31)
MCHC RBC AUTO-ENTMCNC: 29.9 G/DL (ref 32–36)
MCHC RBC AUTO-ENTMCNC: 30.7 G/DL (ref 32–36)
MCV RBC AUTO: 65 FL (ref 82–98)
MCV RBC AUTO: 66 FL (ref 82–98)
MONOCYTES # BLD AUTO: 0.8 K/UL (ref 0.3–1)
MONOCYTES # BLD AUTO: 1 K/UL (ref 0.3–1)
MONOCYTES NFR BLD: 6 % (ref 4–15)
MONOCYTES NFR BLD: 7.9 % (ref 4–15)
NEUTROPHILS # BLD AUTO: 10.3 K/UL (ref 1.8–7.7)
NEUTROPHILS # BLD AUTO: 9 K/UL (ref 1.8–7.7)
NEUTROPHILS NFR BLD: 72.9 % (ref 38–73)
NEUTROPHILS NFR BLD: 78 % (ref 38–73)
NRBC BLD-RTO: 0 /100 WBC
NRBC BLD-RTO: 0 /100 WBC
PLATELET # BLD AUTO: 248 K/UL (ref 150–450)
PLATELET # BLD AUTO: 297 K/UL (ref 150–450)
PMV BLD AUTO: 9.5 FL (ref 9.2–12.9)
PMV BLD AUTO: 9.6 FL (ref 9.2–12.9)
POTASSIUM SERPL-SCNC: 3.5 MMOL/L (ref 3.5–5.1)
POTASSIUM SERPL-SCNC: 3.6 MMOL/L (ref 3.5–5.1)
PROT SERPL-MCNC: 7.3 G/DL (ref 6–8.4)
PROTHROMBIN TIME: 30 SEC (ref 9–12.5)
PROTHROMBIN TIME: 30.1 SEC (ref 9–12.5)
RBC # BLD AUTO: 4.85 M/UL (ref 4–5.4)
RBC # BLD AUTO: 5.47 M/UL (ref 4–5.4)
SATURATED IRON: 9 % (ref 20–50)
SODIUM SERPL-SCNC: 136 MMOL/L (ref 136–145)
SODIUM SERPL-SCNC: 138 MMOL/L (ref 136–145)
TOTAL IRON BINDING CAPACITY: 286 UG/DL (ref 250–450)
TRANSFERRIN SERPL-MCNC: 193 MG/DL (ref 200–375)
VIT B12 SERPL-MCNC: 254 PG/ML (ref 210–950)
WBC # BLD AUTO: 12.34 K/UL (ref 3.9–12.7)
WBC # BLD AUTO: 13.18 K/UL (ref 3.9–12.7)

## 2024-10-08 PROCEDURE — 25000003 PHARM REV CODE 250: Performed by: PHYSICIAN ASSISTANT

## 2024-10-08 PROCEDURE — 97166 OT EVAL MOD COMPLEX 45 MIN: CPT

## 2024-10-08 PROCEDURE — 83735 ASSAY OF MAGNESIUM: CPT | Performed by: PHYSICIAN ASSISTANT

## 2024-10-08 PROCEDURE — 82607 VITAMIN B-12: CPT | Performed by: STUDENT IN AN ORGANIZED HEALTH CARE EDUCATION/TRAINING PROGRAM

## 2024-10-08 PROCEDURE — 97162 PT EVAL MOD COMPLEX 30 MIN: CPT

## 2024-10-08 PROCEDURE — A4216 STERILE WATER/SALINE, 10 ML: HCPCS | Performed by: PHYSICIAN ASSISTANT

## 2024-10-08 PROCEDURE — 63600175 PHARM REV CODE 636 W HCPCS: Performed by: STUDENT IN AN ORGANIZED HEALTH CARE EDUCATION/TRAINING PROGRAM

## 2024-10-08 PROCEDURE — 85610 PROTHROMBIN TIME: CPT | Mod: 91 | Performed by: STUDENT IN AN ORGANIZED HEALTH CARE EDUCATION/TRAINING PROGRAM

## 2024-10-08 PROCEDURE — 80048 BASIC METABOLIC PNL TOTAL CA: CPT | Performed by: PHYSICIAN ASSISTANT

## 2024-10-08 PROCEDURE — 84466 ASSAY OF TRANSFERRIN: CPT | Performed by: STUDENT IN AN ORGANIZED HEALTH CARE EDUCATION/TRAINING PROGRAM

## 2024-10-08 PROCEDURE — 82746 ASSAY OF FOLIC ACID SERUM: CPT | Performed by: STUDENT IN AN ORGANIZED HEALTH CARE EDUCATION/TRAINING PROGRAM

## 2024-10-08 PROCEDURE — G0378 HOSPITAL OBSERVATION PER HR: HCPCS

## 2024-10-08 PROCEDURE — 99222 1ST HOSP IP/OBS MODERATE 55: CPT | Mod: ,,, | Performed by: NURSE PRACTITIONER

## 2024-10-08 PROCEDURE — 97530 THERAPEUTIC ACTIVITIES: CPT

## 2024-10-08 PROCEDURE — 96374 THER/PROPH/DIAG INJ IV PUSH: CPT

## 2024-10-08 PROCEDURE — 97112 NEUROMUSCULAR REEDUCATION: CPT

## 2024-10-08 PROCEDURE — 85025 COMPLETE CBC W/AUTO DIFF WBC: CPT | Performed by: PHYSICIAN ASSISTANT

## 2024-10-08 PROCEDURE — 82728 ASSAY OF FERRITIN: CPT | Performed by: STUDENT IN AN ORGANIZED HEALTH CARE EDUCATION/TRAINING PROGRAM

## 2024-10-08 PROCEDURE — 87522 HEPATITIS C REVRS TRNSCRPJ: CPT | Performed by: PHYSICIAN ASSISTANT

## 2024-10-08 PROCEDURE — 85025 COMPLETE CBC W/AUTO DIFF WBC: CPT | Mod: 91 | Performed by: PHYSICIAN ASSISTANT

## 2024-10-08 PROCEDURE — 25000003 PHARM REV CODE 250: Performed by: STUDENT IN AN ORGANIZED HEALTH CARE EDUCATION/TRAINING PROGRAM

## 2024-10-08 PROCEDURE — 86803 HEPATITIS C AB TEST: CPT | Performed by: PHYSICIAN ASSISTANT

## 2024-10-08 PROCEDURE — 83880 ASSAY OF NATRIURETIC PEPTIDE: CPT | Performed by: STUDENT IN AN ORGANIZED HEALTH CARE EDUCATION/TRAINING PROGRAM

## 2024-10-08 PROCEDURE — 97535 SELF CARE MNGMENT TRAINING: CPT

## 2024-10-08 PROCEDURE — 85610 PROTHROMBIN TIME: CPT | Performed by: PHYSICIAN ASSISTANT

## 2024-10-08 PROCEDURE — 87389 HIV-1 AG W/HIV-1&-2 AB AG IA: CPT | Performed by: PHYSICIAN ASSISTANT

## 2024-10-08 PROCEDURE — 80053 COMPREHEN METABOLIC PANEL: CPT | Performed by: PHYSICIAN ASSISTANT

## 2024-10-08 RX ORDER — AMOXICILLIN 250 MG
1 CAPSULE ORAL 2 TIMES DAILY PRN
Status: DISCONTINUED | OUTPATIENT
Start: 2024-10-08 | End: 2024-10-09 | Stop reason: HOSPADM

## 2024-10-08 RX ORDER — IBUPROFEN 200 MG
24 TABLET ORAL
Status: DISCONTINUED | OUTPATIENT
Start: 2024-10-08 | End: 2024-10-09 | Stop reason: HOSPADM

## 2024-10-08 RX ORDER — AMLODIPINE BESYLATE 10 MG/1
10 TABLET ORAL DAILY
Status: DISCONTINUED | OUTPATIENT
Start: 2024-10-08 | End: 2024-10-09 | Stop reason: HOSPADM

## 2024-10-08 RX ORDER — FUROSEMIDE 40 MG/1
40 TABLET ORAL DAILY
Status: DISCONTINUED | OUTPATIENT
Start: 2024-10-08 | End: 2024-10-08

## 2024-10-08 RX ORDER — IBUPROFEN 200 MG
16 TABLET ORAL
Status: DISCONTINUED | OUTPATIENT
Start: 2024-10-08 | End: 2024-10-09 | Stop reason: HOSPADM

## 2024-10-08 RX ORDER — GLUCAGON 1 MG
1 KIT INJECTION
Status: DISCONTINUED | OUTPATIENT
Start: 2024-10-08 | End: 2024-10-09 | Stop reason: HOSPADM

## 2024-10-08 RX ORDER — PREGABALIN 75 MG/1
75 CAPSULE ORAL
Status: COMPLETED | OUTPATIENT
Start: 2024-10-08 | End: 2024-10-08

## 2024-10-08 RX ORDER — METHOCARBAMOL 500 MG/1
1000 TABLET, FILM COATED ORAL
Status: COMPLETED | OUTPATIENT
Start: 2024-10-08 | End: 2024-10-08

## 2024-10-08 RX ORDER — FUROSEMIDE 10 MG/ML
40 INJECTION INTRAMUSCULAR; INTRAVENOUS ONCE
Status: COMPLETED | OUTPATIENT
Start: 2024-10-08 | End: 2024-10-08

## 2024-10-08 RX ORDER — WARFARIN 1 MG/1
1 TABLET ORAL
Status: DISCONTINUED | OUTPATIENT
Start: 2024-10-08 | End: 2024-10-08

## 2024-10-08 RX ORDER — SPIRONOLACTONE 25 MG/1
25 TABLET ORAL DAILY
Status: DISCONTINUED | OUTPATIENT
Start: 2024-10-08 | End: 2024-10-09 | Stop reason: HOSPADM

## 2024-10-08 RX ORDER — ESCITALOPRAM OXALATE 20 MG/1
20 TABLET ORAL DAILY
Status: DISCONTINUED | OUTPATIENT
Start: 2024-10-08 | End: 2024-10-09 | Stop reason: HOSPADM

## 2024-10-08 RX ORDER — VALSARTAN 160 MG/1
320 TABLET ORAL DAILY
Status: DISCONTINUED | OUTPATIENT
Start: 2024-10-08 | End: 2024-10-09 | Stop reason: HOSPADM

## 2024-10-08 RX ORDER — ACETAMINOPHEN 325 MG/1
650 TABLET ORAL EVERY 6 HOURS PRN
Status: DISCONTINUED | OUTPATIENT
Start: 2024-10-08 | End: 2024-10-09 | Stop reason: HOSPADM

## 2024-10-08 RX ORDER — BUSPIRONE HYDROCHLORIDE 10 MG/1
10 TABLET ORAL 3 TIMES DAILY PRN
Status: DISCONTINUED | OUTPATIENT
Start: 2024-10-08 | End: 2024-10-09 | Stop reason: HOSPADM

## 2024-10-08 RX ORDER — WARFARIN 1 MG/1
1 TABLET ORAL DAILY
Status: DISCONTINUED | OUTPATIENT
Start: 2024-10-08 | End: 2024-10-09

## 2024-10-08 RX ORDER — METOPROLOL TARTRATE 100 MG/1
100 TABLET ORAL 2 TIMES DAILY
Status: DISCONTINUED | OUTPATIENT
Start: 2024-10-08 | End: 2024-10-09 | Stop reason: HOSPADM

## 2024-10-08 RX ORDER — SODIUM CHLORIDE 0.9 % (FLUSH) 0.9 %
10 SYRINGE (ML) INJECTION EVERY 8 HOURS
Status: DISCONTINUED | OUTPATIENT
Start: 2024-10-08 | End: 2024-10-09 | Stop reason: HOSPADM

## 2024-10-08 RX ORDER — ACETAMINOPHEN 500 MG
1000 TABLET ORAL
Status: COMPLETED | OUTPATIENT
Start: 2024-10-08 | End: 2024-10-08

## 2024-10-08 RX ORDER — TALC
6 POWDER (GRAM) TOPICAL NIGHTLY PRN
Status: DISCONTINUED | OUTPATIENT
Start: 2024-10-08 | End: 2024-10-09 | Stop reason: HOSPADM

## 2024-10-08 RX ORDER — FUROSEMIDE 40 MG/1
40 TABLET ORAL 2 TIMES DAILY
Status: DISCONTINUED | OUTPATIENT
Start: 2024-10-08 | End: 2024-10-09

## 2024-10-08 RX ORDER — DOXAZOSIN 2 MG/1
8 TABLET ORAL DAILY
Status: DISCONTINUED | OUTPATIENT
Start: 2024-10-08 | End: 2024-10-09 | Stop reason: HOSPADM

## 2024-10-08 RX ORDER — NALOXONE HCL 0.4 MG/ML
0.02 VIAL (ML) INJECTION
Status: DISCONTINUED | OUTPATIENT
Start: 2024-10-08 | End: 2024-10-09 | Stop reason: HOSPADM

## 2024-10-08 RX ADMIN — WARFARIN SODIUM 1 MG: 1 TABLET ORAL at 05:10

## 2024-10-08 RX ADMIN — ACETAMINOPHEN 650 MG: 325 TABLET ORAL at 01:10

## 2024-10-08 RX ADMIN — PREGABALIN 75 MG: 75 CAPSULE ORAL at 01:10

## 2024-10-08 RX ADMIN — ACETAMINOPHEN 1000 MG: 500 TABLET ORAL at 01:10

## 2024-10-08 RX ADMIN — FUROSEMIDE 40 MG: 40 TABLET ORAL at 06:10

## 2024-10-08 RX ADMIN — AMLODIPINE BESYLATE 10 MG: 10 TABLET ORAL at 08:10

## 2024-10-08 RX ADMIN — SPIRONOLACTONE 25 MG: 25 TABLET ORAL at 08:10

## 2024-10-08 RX ADMIN — ESCITALOPRAM OXALATE 20 MG: 5 TABLET, FILM COATED ORAL at 08:10

## 2024-10-08 RX ADMIN — METHOCARBAMOL 1000 MG: 500 TABLET ORAL at 01:10

## 2024-10-08 RX ADMIN — FUROSEMIDE 40 MG: 10 INJECTION, SOLUTION INTRAVENOUS at 05:10

## 2024-10-08 RX ADMIN — METOPROLOL TARTRATE 100 MG: 100 TABLET, FILM COATED ORAL at 08:10

## 2024-10-08 RX ADMIN — Medication 10 ML: at 09:10

## 2024-10-08 RX ADMIN — FUROSEMIDE 40 MG: 40 TABLET ORAL at 08:10

## 2024-10-08 RX ADMIN — Medication 10 ML: at 01:10

## 2024-10-08 RX ADMIN — Medication 10 ML: at 05:10

## 2024-10-08 RX ADMIN — DOXAZOSIN 8 MG: 2 TABLET ORAL at 08:10

## 2024-10-08 RX ADMIN — VALSARTAN 320 MG: 160 TABLET, FILM COATED ORAL at 08:10

## 2024-10-08 NOTE — ASSESSMENT & PLAN NOTE
- clinically no apparently exacerbation  - monitor I&OS and daily weights   - continue home meds

## 2024-10-08 NOTE — ASSESSMENT & PLAN NOTE
Patients blood pressure range in the last 24 hours was: BP  Min: 110/55  Max: 175/70.The patient's inpatient anti-hypertensive regimen is listed below:  Current Antihypertensives  amLODIPine tablet 10 mg, Daily, Oral  valsartan tablet 320 mg, Daily, Oral  metoprolol tartrate (LOPRESSOR) tablet 100 mg, 2 times daily, Oral  doxazosin tablet 8 mg, Daily, Oral  furosemide tablet 40 mg, Daily, Oral  spironolactone tablet 25 mg, Daily, Oral    Plan  - BP is controlled, no changes needed to their regimen  - continue home meds

## 2024-10-08 NOTE — PT/OT/SLP EVAL
Physical Therapy Evaluation    Patient Name:  Coby Atkinson   MRN:  [id number]    Recommendations:     Discharge Recommendations: Moderate Intensity Therapy   Discharge Equipment Recommendations: bedside commode, grab bar, shower chair, hip kit, slide board, walker, rolling   Barriers to discharge: Decreased caregiver support and Increased skilled assistance required    Assessment:     Coby Atkinson is a 78 y.o. female admitted with a medical diagnosis of <principal problem not specified>. Patient presented with increased motivation to participate in evaluation, with fair response to completed activities and provided education. Overall, patient required significant assistance for bed level transitions this date; EOB sitting balance requiring CGA. Stance-based activities significantly limited due to L knee pain & corresponding weakness. Sit<>stand transfer only requiring Amarjit, however likely due to height of stretcher vs. utilizing LE strength. Additionally, patient with significant difficulty sustaining upright stance positioning. Gait assessment attempted, however patient unable to initiate steps with or lateral WS onto LLE. Of note, writing therapist observed increased SOB/WOB with limited activity & Nataly's sign technically positive bilaterally; RN notified. Based upon information gained, PT recommends moderate intensity skilled physical therapy services post-acutely. Provided recommendation based upon needed intensity to not only directly address patient's previously listed functional impairments, discrepancy between functional baseline & current mobility status, and increased falls risk, but to also positively impact patient's quality of life & intervene on high risk for caregiver burnout. . Performance deficits impacting function include weakness, pain, edema, impaired cardiopulmonary response to activity, impaired endurance, gait instability, impaired balance, decreased lower extremity function,  decreased safety awareness, impaired coordination.    Rehab Prognosis: Good; patient would benefit from acute skilled PT services to address these deficits and reach maximum level of function.    Recent Surgery: * No surgery found *      Plan:     During this hospitalization, patient to be seen 4 x/week to address the identified rehab impairments via gait training, therapeutic activities, therapeutic exercises, neuromuscular re-education and progress toward the following goals:    Plan of Care Expires:  11/08/24    Subjective     Chief Complaint: Pain & SOB  Patient/Family Comments/goals: Patient's spouse noting 2-3 instances of nears falls over past month  Pain/Comfort:  Pain Rating 1: 6/10  Location - Side 1: Left  Location - Orientation 1: generalized  Location 1: knee  Pain Addressed 1: Pre-medicate for activity, Reposition, Distraction, Cessation of Activity, Nurse notified  Pain Rating Post-Intervention 1: Unrated    Patients cultural, spiritual, Yazidism conflicts given the current situation: no    Social History:  Residence: Patient lives with their spouse in a single story house with  ramp entrance & LHR . Patient's bathroom has a Step-in shower with small built-in bench.  Equipment Owned: rollator, CPAP, wheelchair  Prior level of function:  At baseline, patient was modified independent for ambulation, utilizing her rollator. Patient was independent for all other mobility.  Assistance Upon Discharge:   Spouse - limited secondary to unable to provide physical assistance  Grandson - limited secondary to availability    Objective:     Communicated with Nsg prior to session.  Patient found HOB elevated with  (no active lines)  upon PT entry to room.    General Precautions: Standard, fall   Orthopedic Precautions:N/A   Braces: N/A   Body mass index is 38.62 kg/m².  Oxygen Device: Room Air  Vitals: BP (!) 118/56 (BP Location: Left arm, Patient Position: Lying)   Pulse 60   Temp 96.8 °F (36 °C) (Oral)   Resp  "16   Ht 5' 4" (1.626 m)   Wt 102.1 kg (225 lb)   SpO2 97%   Breastfeeding No   BMI 38.62 kg/m²     Exams:  Cognition:   Alert and Cooperative   Patient is oriented to Person, Place, Time, Situation  Command following: Follows multistep verbal commands  Fluency: clear/fluent  Postural Assessment: slouched posture, rounded shoulders, and forward head  Physical Exam:    Left LE Right LE   Edema present present   Sensation intact to light touch intact to light touch   Coordination Unable to assess Unable to assess     LLE ROM: WFL  RLE ROM: WFL    LLE Strength (out of 5):   Hip Flexion:2+: Moves through Partial AROM  Hip Abduction:4: Holds test positioning against MODERATE pressure  Hip Adduction:4: Holds test positioning against MODERATE pressure  Knee Extension:2+: Moves through Partial AROM  Ankle Dorsiflexion:4+: Holds test position against MODERATE to STRONG pressure  Ankle Plantarflexion:4+: Holds test position against MODERATE to STRONG pressure    RLE Strength (out of 5):   Hip Flexion:2+: Moves through Partial AROM  Hip Abduction:4: Holds test positioning against MODERATE pressure  Hip Adduction:4: Holds test positioning against MODERATE pressure  Knee Extension:4: Holds test positioning against MODERATE pressure  Ankle Dorsiflexion:4+: Holds test position against MODERATE to STRONG pressure  Ankle Plantarflexion:4+: Holds test position against MODERATE to STRONG pressure    Functional Mobility:  Bed Mobility:     Rolling Left: Moderate Assistance  EOB Scooting:   Anterior: Maximum Assistance  Boosting: Total Assistancex2   Supine>Sit: x2, Maximum Assistance with HOB Elevated  Sit>Supine: x1, Maximum Assistancex2 with HOB Flat. *limited by height of stretcher  *VC/TC for task sequencing  *Facilitation of trunk/LE management    Transfers:     Sit<>Stand: x1, Minimal Assistance from  Edge of stretcher  with Rolling Walker  Bed<>Chair: Deferred  *Facilitation of L knee stabilization    Gait: " Attempted    Balance:   Static Sitting: Contact Guard Assistance  Dynamic Sitting: Contact Guard Assistance    Static Standing: Minimal Assistance  Dynamic Standing: Minimal Assistance  *VC/TC for upright posturing, glute engagement  *Facilitation of trunk/hip extension, LLE stabilization    AM-PAC 6 CLICK MOBILITY  Total Score:9     Treatment & Education:  Nataly's sign: (+) L/R    Patient Education Provided on:  The role of physical therapy and how the patient can benefit from skilled services  The negative effects of prolonged bed rest/sedentary behavior, along with the importance of OOB activity & patient participation with PT  The importance of contacting RN, via call light, for mobility throughout the day  Pt white board updated with current therapists name and level of mobility assistance needed.     Patient & Spouse Verbalized understanding of all topics touched on this date. All questions answered within the PT scope of practice    Patient left HOB elevated with  RN notified & present and Spouse present.    GOALS:   Multidisciplinary Problems       Physical Therapy Goals          Problem: Physical Therapy    Goal Priority Disciplines Outcome Interventions   Physical Therapy Goal     PT, PT/OT Progressing    Description: Goals to be met by: 10/25/24     Patient will increase functional independence with mobility by performin. Supine to sit with Modified [place]  2. Rolling to Left and Right with Modified [place].  3. Sit to stand transfer with Modified [place] using LRAD  4. Bed to chair transfer with Moderate Assistance using LRAD  5. Gait  x 10 feet with Moderate Assistance using LRAD   6. Stand for 3 minutes with Modified [place] using LRAD  7. Lower extremity exercise program x15 reps per handout, with assistance as needed                         History:     Past Medical History:   Diagnosis Date    Anticoagulant long-term use     Cataract     Chronic diarrhea     Depression      Edema     GERD (gastroesophageal reflux disease)     Hypertension 10/2/2012    Osteoarthritis of both knees 8/9/2016    Osteopenia     Osteopenia 11/30/2017    PE (pulmonary thromboembolism) 12/03/2017    Primary localized osteoarthrosis, lower leg 12/3/2014    Sleep apnea 2016    Thalassemia minor        Past Surgical History:   Procedure Laterality Date    APPENDECTOMY      BLOCK, NERVE, GENICULAR Bilateral 3/14/2024    Procedure: Block, Nerve, Genicular---BILATERAL KNEE;  Surgeon: Kit Barton Jr., MD;  Location: [facility]IN [facility];  Service: Pain Management;  Laterality: Bilateral;    CATARACT EXTRACTION      COLONOSCOPY N/A 11/5/2016    Procedure: COLONOSCOPY;  Surgeon: Tanner Simental MD;  Location: [facility] ENDO ([address] [place] [address]);  Service: Endoscopy;  Laterality: N/A;    HYSTERECTOMY  age 39    fibroids    OOPHORECTOMY      TONSILLECTOMY         Time Tracking:     PT Received On: 10/08/24  PT Start Time: 1311     PT Stop Time: 1343  PT Total Time (min): 32 min     Billable Minutes: Evaluation 8 and Therapeutic Activity 24      10/08/2024

## 2024-10-08 NOTE — PLAN OF CARE
SW sent consult for PMR to help determine pt IPR eligbility      Jaimie Nunez, BREEZY, MSW, LMSW, RSW   Case Management  [facility]  Email: [email or web address]

## 2024-10-08 NOTE — PLAN OF CARE
Problem: Physical Therapy  Goal: Physical Therapy Goal  Description: Goals to be met by: 10/25/24     Patient will increase functional independence with mobility by performin. Supine to sit with Modified [place]  2. Rolling to Left and Right with Modified [place].  3. Sit to stand transfer with Modified [place] using LRAD  4. Bed to chair transfer with Moderate Assistance using LRAD  5. Gait  x 10 feet with Moderate Assistance using LRAD   6. Stand for 3 minutes with Modified [place] using LRAD  7. Lower extremity exercise program x15 reps per handout, with assistance as needed    Outcome: Progressing    Evaluation Complete. Goals Appropriate.

## 2024-10-08 NOTE — ED TRIAGE NOTES
Coby Atkinson, a 78 y.o. female presents to the ED w/ complaint of L knee pain. Seen 2 weeks ago for the same complaint. Pt stated it hurts to put pressure and move her leg. Was prescribed OxyContin but is out of her rx.      Triage note:  Chief Complaint   Patient presents with    Knee Pain     Chronic left knee pain. States was seen here x 2 weeks ago for the same. Per the patient she was stuck on the toilet for 2 hours due to the pain     Review of patient's allergies indicates:   Allergen Reactions    Codeine     Ciprofloxacin Rash     Past Medical History:   Diagnosis Date    Anticoagulant long-term use     Cataract     Chronic diarrhea     Depression     Edema     GERD (gastroesophageal reflux disease)     Hypertension 10/2/2012    Osteoarthritis of both knees 8/9/2016    Osteopenia     Osteopenia 11/30/2017    PE (pulmonary thromboembolism) 12/03/2017    Primary localized osteoarthrosis, lower leg 12/3/2014    Sleep apnea 2016    Thalassemia minor

## 2024-10-08 NOTE — PLAN OF CARE
[facility] - Emergency Dept  Initial Discharge Assessment       Primary Care Provider: Mundo Hudson DO    Admission Diagnosis: Chronic pain of left knee [M25.562, G89.29]    Admission Date: 10/7/2024  Expected Discharge Date:     Pt stated she is uses a rollator to assist with ambulation and is independent with her ADL's.      Pt stated she does take coumadin and is followed by [facility]    Pt stated she is current with home health  but could not recall the name.  On [facility] review [facility]    Pt to d/c with home health    Transition of Care Barriers: (P) None    Payor: MEDICARE / Plan: MEDICARE PART A & B / Product Type: Government /     Extended Emergency Contact Information  Primary Emergency Contact: BradleyjacobIsma  Mobile Phone: [phone]  Relation: Spouse  Preferred language: English   needed? No    Discharge Plan A: (P) Home Health, Home  Discharge Plan B: (P) Home Health, Home      [id number] - [place] - [address] AT [facility] [address]  [address] [address]  Phone: [phone] Fax: [phone]      Initial Assessment (most recent)       Adult Discharge Assessment - 10/08/24 0812          Discharge Assessment    Assessment Type Discharge Planning Assessment (P)      Confirmed/corrected address, phone number and insurance Yes (P)      Confirmed Demographics Correct on Facesheet (P)      Source of Information patient (P)      People in Home spouse (P)      Facility Arrived From: home (P)      Do you expect to return to your current living situation? Yes (P)      Do you have help at home or someone to help you manage your care at home? No (P)      Prior to hospitilization cognitive status: Alert/Oriented;No Deficits (P)      Current cognitive status: No Deficits;Alert/Oriented (P)      Walking or Climbing Stairs Difficulty yes (P)      Walking or Climbing Stairs ambulation  difficulty, requires equipment (P)      Mobility Management rollator (P)      Dressing/Bathing Difficulty no (P)      Home Accessibility wheelchair accessible (P)      Home Layout Able to live on 1st floor (P)      Equipment Currently Used at Home rollator;CPAP (P)      Patient currently being followed by outpatient case management? Yes (P)      If yes, name of outpatient case management following: [facility] (P)      Do you currently have service(s) that help you manage your care at home? Yes (P)      Name and Contact number of agency [facility] (P)      Is the pt/caregiver preference to resume services with current agency Yes (P)      Do you have any problems affording any of your prescribed medications? No (P)      Is the patient taking medications as prescribed? yes (P)      Who is going to help you get home at discharge? family/friends (P)      How do you get to doctors appointments? car, drives self;family or friend will provide (P)      Are you on dialysis? No (P)      Do you take coumadin? No (P)      Discharge Plan A Home Health;Home (P)      Discharge Plan B Home Health;Home (P)      DME Needed Upon Discharge  none (P)      Discharge Plan discussed with: Patient (P)      Transition of Care Barriers None (P)         Physical Activity    On average, how many days per week do you engage in moderate to strenuous exercise (like a brisk walk)? 0 days (P)      On average, how many minutes do you engage in exercise at this level? 0 min (P)         Financial Resource Strain    How hard is it for you to pay for the very basics like food, housing, medical care, and heating? Not very hard (P)         Housing Stability    In the last 12 months, was there a time when you were not able to pay the mortgage or rent on time? No (P)      At any time in the past 12 months, were you homeless or living in a shelter (including now)? No (P)         Transportation Needs    Has the lack of  transportation kept you from medical appointments, meetings, work or from getting things needed for daily living? No (P)         Food Insecurity    Within the past 12 months, you worried that your food would run out before you got the money to buy more. Never true (P)      Within the past 12 months, the food you bought just didn't last and you didn't have money to get more. Never true (P)         Stress    Do you feel stress - tense, restless, nervous, or anxious, or unable to sleep at night because your mind is troubled all the time - these days? To some extent (P)         Social Isolation    How often do you feel lonely or isolated from those around you?  Rarely (P)         Alcohol Use    Q1: How often do you have a drink containing alcohol? Never (P)      Q2: How many drinks containing alcohol do you have on a typical day when you are drinking? Patient does not drink (P)      Q3: How often do you have six or more drinks on one occasion? Never (P)         Utilities    In the past 12 months has the electric, gas, oil, or water company threatened to shut off services in your home? No (P)         Health Literacy    How often do you need to have someone help you when you read instructions, pamphlets, or other written material from your doctor or pharmacy? Rarely (P)         OTHER    Name(s) of People in Home spouse Isma (P)                    Discharge Plan A and Plan B have been determined by review of patient's clinical status, future medical and therapeutic needs, and coverage/benefits for post-acute care in coordination with multidisciplinary team members.    Jaimie Nunez CD, MSW, LMSW, RSW   Case Management  [facility]  Email: [email or web address]

## 2024-10-08 NOTE — ED PROVIDER NOTES
Encounter Date: 10/7/2024       History     Chief Complaint   Patient presents with    Knee Pain     Chronic left knee pain. States was seen here x 2 weeks ago for the same. Per the patient she was stuck on the toilet for 2 hours due to the pain     78-year-old female presenting with acute on chronic left knee pain.  Seen recently and admitted for the similar.  Was unable to get placement secondary to insurance.  Did not want to do nursing home placement.  Patient has been doing therapy at home.  Knee pain has getting worse.  Patient unable to stand or ambulate without assistance.   unable to physically get the patient up and is worried about her.  He is not able to care for her at home prompting visit back to the ER.     Patient denies any fevers or chills.  No recent falls or injury.      Review of patient's allergies indicates:   Allergen Reactions    Codeine     Ciprofloxacin Rash     Past Medical History:   Diagnosis Date    Anticoagulant long-term use     Cataract     Chronic diarrhea     Depression     Edema     GERD (gastroesophageal reflux disease)     Hypertension 10/2/2012    Osteoarthritis of both knees 8/9/2016    Osteopenia     Osteopenia 11/30/2017    PE (pulmonary thromboembolism) 12/03/2017    Primary localized osteoarthrosis, lower leg 12/3/2014    Sleep apnea 2016    Thalassemia minor      Past Surgical History:   Procedure Laterality Date    APPENDECTOMY      BLOCK, NERVE, GENICULAR Bilateral 3/14/2024    Procedure: Block, Nerve, Genicular---BILATERAL KNEE;  Surgeon: Kit Barton Jr., MD;  Location: [facility];  Service: Pain Management;  Laterality: Bilateral;    CATARACT EXTRACTION      COLONOSCOPY N/A 11/5/2016    Procedure: COLONOSCOPY;  Surgeon: Tanner Simental MD;  Location: [address];  Service: Endoscopy;  Laterality: N/A;    HYSTERECTOMY  age 39    fibroids    OOPHORECTOMY      TONSILLECTOMY       Family History   Problem Relation Name Age of Onset     Hypertension Mother      Cancer Father          skin    No Known Problems Sister      Cancer Brother          pancreatic    No Known Problems Maternal Aunt      No Known Problems Maternal Uncle      No Known Problems Paternal Aunt      No Known Problems Paternal Uncle      Coronary artery disease Maternal Grandmother      Heart failure Maternal Grandmother      No Known Problems Maternal Grandfather      Stroke Paternal Grandmother      Coronary artery disease Paternal Grandmother      No Known Problems Paternal Grandfather      Amblyopia Neg Hx      Blindness Neg Hx      Cataracts Neg Hx      Diabetes Neg Hx      Glaucoma Neg Hx      Macular degeneration Neg Hx      Retinal detachment Neg Hx      Strabismus Neg Hx      Thyroid disease Neg Hx      Colon cancer Neg Hx      Stomach cancer Neg Hx      Esophageal cancer Neg Hx       Social History     Tobacco Use    Smoking status: Never     Passive exposure: Never    Smokeless tobacco: Never   Substance Use Topics    Alcohol use: No     Comment: rare    Drug use: No     Review of Systems   Constitutional:  Negative for fever.   HENT:  Negative for sore throat.    Respiratory:  Negative for shortness of breath.    Cardiovascular:  Negative for chest pain.   Gastrointestinal:  Negative for nausea.   Genitourinary:  Negative for dysuria.   Musculoskeletal:  Positive for arthralgias. Negative for back pain.   Skin:  Negative for rash.   Neurological:  Negative for weakness.   Hematological:  Does not bruise/bleed easily.       Physical Exam     Initial Vitals [10/07/24 2303]   BP Pulse Resp Temp SpO2   (!) 154/99 77 18 98.6 °F (37 °C) 99 %      MAP       --         Physical Exam    Constitutional: Vital signs are normal. She appears well-developed and well-nourished.   HENT:   Head: Normocephalic and atraumatic.   Right Ear: Hearing normal.   Left Ear: Hearing normal.   Eyes: Conjunctivae are normal.   Cardiovascular:  Normal rate and regular rhythm.           Abdominal:  Abdomen is soft. Bowel sounds are normal.   Musculoskeletal:         General: Normal range of motion.      Comments: L knee:  ROM limited due to to pain.   There is mild swelling.  No warmth or redness  Obesity limits exam  No open injury     Neurological: She is alert and oriented to person, place, and time.   Skin: Skin is warm and intact.   Psychiatric: She has a normal mood and affect. Her speech is normal and behavior is normal. Cognition and memory are normal.         ED Course   Procedures  Labs Reviewed   CBC W/ AUTO DIFFERENTIAL - Abnormal       Result Value    WBC 13.18 (*)     RBC 5.47 (*)     Hemoglobin 11.0 (*)     Hematocrit 35.8 (*)     MCV 65 (*)     MCH 20.1 (*)     MCHC 30.7 (*)     RDW 15.2 (*)     Platelets 297      MPV 9.6      Immature Granulocytes 0.4      Gran # (ANC) 10.3 (*)     Immature Grans (Abs) 0.05 (*)     Lymph # 2.0      Mono # 0.8      Eos # 0.1      Baso # 0.05      nRBC 0      Gran % 78.0 (*)     Lymph % 14.8 (*)     Mono % 6.0      Eosinophil % 0.4      Basophil % 0.4      Differential Method Automated     COMPREHENSIVE METABOLIC PANEL - Abnormal    Sodium 138      Potassium 3.6      Chloride 105      CO2 20 (*)     Glucose 122 (*)     BUN 17      Creatinine 1.0      Calcium 9.5      Total Protein 7.3      Albumin 3.8      Total Bilirubin 0.7      Alkaline Phosphatase 75      AST 17      ALT 10      eGFR 57.3 (*)     Anion Gap 13     HIV 1 / 2 ANTIBODY   HEPATITIS C ANTIBODY          Imaging Results    None          Medications   methocarbamoL tablet 1,000 mg (has no administration in time range)   pregabalin capsule 75 mg (has no administration in time range)   acetaminophen tablet 1,000 mg (has no administration in time range)     Medical Decision Making  78-year-old female presenting with the acute on chronic knee pain.    Differential:  Chronic knee pain.  Obesity, electrolyte derangement, anemia    Plan:   I will get routine labs.  The patient has acute on chronic knee  pain.  She is unable to ambulate, her family is not able to care for her at home.  She will need admission with social work consult.  I had a discussion with the patient in the  regarding nursing home placement, if she is not able to be cared for at home.  They are interested in re-evaluating that situation.  Plan to admit the patient to hospital medicine    Amount and/or Complexity of Data Reviewed  Labs: ordered.    Risk  OTC drugs.  Prescription drug management.  Decision regarding hospitalization.                                      Clinical Impression:  Final diagnoses:  [M25.562, G89.29] Chronic pain of left knee (Primary)  [R26.2] Unable to walk          ED Disposition Condition    Admit Stable                Kofi Wang PA-C  10/08/24 0142

## 2024-10-08 NOTE — CONSULTS
Inpatient consult to Physical Medicine Rehab  Consult performed by: Cheryl Prakash NP  Consult ordered by: Poli Cardona MD  Reason for consult: Rehab      Consult received.     TIMO Hart, FNP-C  Physical Medicine & Rehabilitation   10/08/2024

## 2024-10-08 NOTE — ED NOTES
Assumed care of patient at this time. Patient is resting in bed in lowest, locked position with bed rails raised x2 in NAD. Pt refuses hospital gown, cardiac monitoring in place. Call light is within reach of patient. Patient reports a pain of 10/10. Ordered meds to be administered.

## 2024-10-08 NOTE — PLAN OF CARE
DHARA completed the LOCET via phone. DHARA faxed PAS to obtain the 142 for NH admission.    [facility]      Jaimie Nunez CD, MSW, LMSW, RSW   Case Management  [facility]  Email: [email or web address]

## 2024-10-08 NOTE — PLAN OF CARE
Problem: Occupational Therapy  Goal: Occupational Therapy Goal  Description: Goals to be met by: 11/5/24     Patient will increase functional independence with ADLs by performing:    LE Dressing with Stand-by Assistance.  Grooming while seated at sink with Stand-by Assistance.  Toileting from bedside commode with Minimal Assistance for hygiene and clothing management.   Supine to sit with Contact Guard Assistance.  Toilet transfer to bedside commode with Minimal Assistance.    Outcome: Progressing    OT evaluation completed and goals/POC established.    DME Justifications:  Bedside Commode - Patient has a mobility limitation that significantly impairs their ability to participate in one or more mobility related activities of daily living, including toileting. This deficit can be resolved by using a bedside commode. Patient demonstrates mobility limitations that will cause them to be confined to one room at home without bathroom access for up to 30 days. Using a bedside commode will greatly improve the patient's ability to participate in MRADLs.     Rolling Walker - Patient demonstrates a mobility limitation that significantly impairs their ability to participate in one or more mobility related activities of daily living. Patient's mobility limitation cannot be sufficiently resolved with the use of a cane, but can be sufficiently resolved with the use of a rolling walker.The use of a rolling walker will considerably improve their ability to participate in MRADLs. Patient will use the walker on a regular basis at home.      Wheelchair -  Patient has a mobility limitation that significantly impairs their ability to participate in one or more mobility related activities of daily living in customary locations in the home. The mobility limitation cannot be sufficiently resolved by the use of a cane or walker. The use of a manual wheelchair will greatly improve the patient's ability to participate in MRADLs. The patient  will use the wheelchair on a regular basis at home. They have expressed their willingness to use a manual wheelchair in the home, and have a caregiver who is available and willing to assist with the wheelchair if needed.

## 2024-10-08 NOTE — CARE UPDATE
female, with PMH of osteoarthritis of b/l hips, chronic b/l knee pain, GERD, ADRIÁN, HTN, thalassemia minor, PE (12/2017)/DVT of LLE, on coumadin CHF  G II DD , CAD, cognitive impairment, MDD, who was admitted with worsening chronic left knee pain,limiting mobility to a point where she is unable to perform ADLs at home   Consulted PT/OT and CM for assistance with placement INR at goal. Xary L knee pending this admit .    Of note Patient with h/o of b/o OA of knees and was recommended TKR by ortho in the past Patient unwilling for procedure

## 2024-10-08 NOTE — SUBJECTIVE & OBJECTIVE
Past Medical History:   Diagnosis Date    Anticoagulant long-term use     Cataract     Chronic diarrhea     Depression     Edema     GERD (gastroesophageal reflux disease)     Hypertension 10/2/2012    Osteoarthritis of both knees 8/9/2016    Osteopenia     Osteopenia 11/30/2017    PE (pulmonary thromboembolism) 12/03/2017    Primary localized osteoarthrosis, lower leg 12/3/2014    Sleep apnea 2016    Thalassemia minor        Past Surgical History:   Procedure Laterality Date    APPENDECTOMY      BLOCK, NERVE, GENICULAR Bilateral 3/14/2024    Procedure: Block, Nerve, Genicular---BILATERAL KNEE;  Surgeon: Kit Barton Jr., MD;  Location: [facility] PAIN MGMT;  Service: Pain Management;  Laterality: Bilateral;    CATARACT EXTRACTION      COLONOSCOPY N/A 11/5/2016    Procedure: COLONOSCOPY;  Surgeon: Tanner Simental MD;  Location: [facility] ENDO [address]);  Service: Endoscopy;  Laterality: N/A;    HYSTERECTOMY  age 39    fibroids    OOPHORECTOMY      TONSILLECTOMY         Review of patient's allergies indicates:   Allergen Reactions    Codeine     Ciprofloxacin Rash       No current facility-administered medications on file prior to encounter.     Current Outpatient Medications on File Prior to Encounter   Medication Sig    acetaminophen (TYLENOL) 500 MG tablet Take 2 tablets (1,000 mg total) by mouth 3 (three) times daily. (Patient taking differently: Take 1,000 mg by mouth as needed.)    alendronate (FOSAMAX) 70 MG tablet Take 1 tablet once weekly in the morning with a full glass of water on an empty stomach. Do not consume food or lie down for at least 30 minutes afterwards.    amlodipine-valsartan (EXFORGE)  mg per tablet Take 1 tablet by mouth once daily.    bisoprolol (ZEBETA) 10 MG tablet TAKE 1 TABLET(10 MG) BY MOUTH EVERY DAY    busPIRone (BUSPAR) 10 MG tablet TAKE 1 TABLET(10 MG) BY MOUTH THREE TIMES DAILY AS NEEDED FOR ANXIETY    diclofenac sodium (VOLTAREN) 1 % Gel Apply 4 g topically 2 (two) times  daily.    doxazosin (CARDURA) 8 MG Tab Take 1 tablet (8 mg total) by mouth once daily.    EScitalopram oxalate (LEXAPRO) 20 MG tablet Take 1 tablet (20 mg total) by mouth once daily.    furosemide (LASIX) 20 MG tablet TAKE 2 TABLETS(40 MG) BY MOUTH EVERY DAY    oxyCODONE (ROXICODONE) 5 MG immediate release tablet Take 1 tablet (5 mg total) by mouth every 6 (six) hours as needed for Pain.    potassium chloride (MICRO-K) 10 MEQ CpSR Take 1 capsule (10 mEq total) by mouth once daily. TAKE 1 CAPSULE(10 MEQ) BY MOUTH EVERY DAY    spironolactone (ALDACTONE) 25 MG tablet Take 25 mg by mouth once daily.    warfarin (COUMADIN) 1 MG tablet Take 1mg daily, except take 0.5mg on Thursdays and Saturdays OR as directed by [facility]     Family History       Problem Relation (Age of Onset)    Cancer Father, Brother    Coronary artery disease Maternal Grandmother, Paternal Grandmother    Heart failure Maternal Grandmother    Hypertension Mother    No Known Problems Sister, Maternal Aunt, Maternal Uncle, Paternal Aunt, Paternal Uncle, Maternal Grandfather, Paternal Grandfather    Stroke Paternal Grandmother          Tobacco Use    Smoking status: Never     Passive exposure: Never    Smokeless tobacco: Never   Substance and Sexual Activity    Alcohol use: No     Comment: rare    Drug use: No    Sexual activity: Yes     Partners: Male     Birth control/protection: None     Review of Systems   Constitutional:  Negative for activity change, appetite change, chills, diaphoresis and fever.   Respiratory:  Negative for cough, shortness of breath and wheezing.    Cardiovascular:  Negative for chest pain and palpitations.   Gastrointestinal:  Negative for abdominal distention, abdominal pain, diarrhea, nausea and vomiting.   Genitourinary:  Negative for decreased urine volume, dysuria, flank pain, frequency, hematuria and urgency.   Musculoskeletal:  Positive for arthralgias and myalgias. Negative for back pain, gait problem, joint  swelling, neck pain and neck stiffness.   Skin:  Negative for color change, pallor, rash and wound.   Neurological:  Positive for weakness (2/2 b/l knee pain). Negative for dizziness, syncope, light-headedness and headaches.   Psychiatric/Behavioral:  Negative for agitation and confusion.      Objective:     Vital Signs (Most Recent):  Temp: 98.6 °F (37 °C) (10/07/24 2303)  Pulse: 68 (10/08/24 0500)  Resp: (!) 21 (10/08/24 0500)  BP: (!) 116/56 (10/08/24 0500)  SpO2: 96 % (10/08/24 0500) Vital Signs (24h Range):  Temp:  [98.6 °F (37 °C)] 98.6 °F (37 °C)  Pulse:  [68-82] 68  Resp:  [18-24] 21  SpO2:  [94 %-99 %] 96 %  BP: (115-175)/(56-99) 116/56     Weight: 102.1 kg (225 lb)  Body mass index is 38.62 kg/m².     Physical Exam  Vitals and nursing note reviewed.   Constitutional:       General: She is not in acute distress.     Appearance: She is well-developed and normal weight. She is not ill-appearing, toxic-appearing or diaphoretic.   HENT:      Head: Normocephalic and atraumatic.   Eyes:      General: No scleral icterus.        Right eye: No discharge.         Left eye: No discharge.      Conjunctiva/sclera: Conjunctivae normal.   Neck:      Trachea: No tracheal deviation.   Cardiovascular:      Rate and Rhythm: Normal rate and regular rhythm.      Heart sounds: Normal heart sounds. No murmur heard.     No gallop.   Pulmonary:      Effort: Pulmonary effort is normal. No respiratory distress.      Breath sounds: Normal breath sounds. No stridor. No wheezing or rales.   Abdominal:      General: Bowel sounds are normal. There is no distension.      Palpations: Abdomen is soft. There is no mass.      Tenderness: There is no abdominal tenderness. There is no guarding.   Musculoskeletal:         General: No deformity. Normal range of motion.      Cervical back: Normal range of motion and neck supple.   Skin:     General: Skin is warm and dry.      Coloration: Skin is not pale.      Findings: No erythema or rash.  "  Neurological:      General: No focal deficit present.      Mental Status: She is alert and oriented to person, place, and time.      Cranial Nerves: No cranial nerve deficit.      Motor: No abnormal muscle tone.   Psychiatric:         Mood and Affect: Mood normal.         Behavior: Behavior normal.         Thought Content: Thought content normal.         Judgment: Judgment normal.                Significant Labs: All pertinent labs within the past 24 hours have been reviewed.  BMP:   Recent Labs   Lab 10/08/24  0404   *      K 3.5      CO2 21*   BUN 16   CREATININE 0.9   CALCIUM 8.5*   MG 1.8     CBC:   Recent Labs   Lab 10/08/24  0045 10/08/24  0404   WBC 13.18* 12.34   HGB 11.0* 9.6*   HCT 35.8* 32.1*    248     CMP:   Recent Labs   Lab 10/08/24  0045 10/08/24  0404    136   K 3.6 3.5    106   CO2 20* 21*   * 123*   BUN 17 16   CREATININE 1.0 0.9   CALCIUM 9.5 8.5*   PROT 7.3  --    ALBUMIN 3.8  --    BILITOT 0.7  --    ALKPHOS 75  --    AST 17  --    ALT 10  --    ANIONGAP 13 9     Urine Culture: No results for input(s): "LABURIN" in the last 48 hours.  Urine Studies: No results for input(s): "COLORU", "APPEARANCEUA", "PHUR", "SPECGRAV", "PROTEINUA", "GLUCUA", "KETONESU", "BILIRUBINUA", "OCCULTUA", "NITRITE", "UROBILINOGEN", "LEUKOCYTESUR", "RBCUA", "WBCUA", "BACTERIA", "SQUAMEPITHEL", "HYALINECASTS" in the last 48 hours.    Invalid input(s): "WRIGHTSUR"    Significant Imaging: I have reviewed all pertinent imaging results/findings within the past 24 hours.  Imaging Results    None        "

## 2024-10-08 NOTE — HPI
Ms. Coby Atkinson is a. Age female, with PMH of osteoarthritis of b/l hips, chronic b/l knee pain, GERD, ADRIÁN, HTN, thalassemia minor, PE (12/2017)/DVT of LLE, on chronic anticoagulation obesity, CHF, prior NSTEMI, cognitive impairment, CHF, MDD, who presented to [facility] ED on 10/7/24 due to left knee pain. While she was evaluated for this same chronic knee pain two weeks again, she returns today as the pain hs persisted and caused her to be stuck on the toilet x 2 hours. During her last admission, nursing home placement was declined by the patient, and SNF placement was unable to occur 2/2 insurance issues and she chose returning to home. She has been participating in therapy at home, but her knee pain continues to worsen to the point where she was unable to stand or walk without assistance on the day of presentation. Her  is not physically able to care for her, and thus she was sent back to the ED. She was evaluated in the ED with labs that are at her approximate baseline. She was placed on observation.

## 2024-10-08 NOTE — ED NOTES
Assumed patient care. Pt. resting in bed in NAD, RR e/u. Vital signs stable and within desired limits at this time of assessment. Pt. offered bathroom assistance and denies need at this time. Explanation of care/wait provided. Pt verbalizes no needs at this time. Bed in low, locked position with rails up.

## 2024-10-08 NOTE — PT/OT/SLP EVAL
Occupational Therapy   Evaluation & Treatment    Name: Coby Atkinson  MRN: [id number]  Admitting Diagnosis: <principal problem not specified>  Recent Surgery: * No surgery found *      Recommendations:     Discharge Recommendations: Moderate Intensity Therapy  Discharge Equipment Recommendations:  bedside commode, bath bench, grab bar, hip kit, slide board  Barriers to discharge:  Decreased caregiver support, Other (Comment) (increased skilled assist needed)    DME Justifications:    Bedside Commode - Patient has a mobility limitation that significantly impairs their ability to participate in one or more mobility related activities of daily living, including toileting. This deficit can be resolved by using a bedside commode. Patient demonstrates mobility limitations that will cause them to be confined to one room at home without bathroom access for up to 30 days. Using a bedside commode will greatly improve the patient's ability to participate in MRADLs.     Rolling Walker - Patient demonstrates a mobility limitation that significantly impairs their ability to participate in one or more mobility related activities of daily living. Patient's mobility limitation cannot be sufficiently resolved with the use of a cane, but can be sufficiently resolved with the use of a rolling walker.The use of a rolling walker will considerably improve their ability to participate in MRADLs. Patient will use the walker on a regular basis at home.      Assessment:     Coby Atkinson is a 78 y.o. female with a medical diagnosis of <principal problem not specified>.  She presents with the following performance deficits affecting function: weakness, impaired endurance, impaired self care skills, impaired functional mobility, gait instability, impaired balance, decreased lower extremity function, decreased safety awareness, pain, edema, decreased coordination, decreased ROM.      Pt agreeable to session. Pt limited in optimal  participation in session secondary to increased pain levels, decreased activity tolerance, decreased LE ROM, and anxiety/fear of falling with EOB/OOB activities. Pt has decreased functional strength and limited physical assistance available, which is needed for safety with functional transfers and mobility, putting pt at an increased risk for falls. Pt is not safe to return home at this time. Recommend moderate intensity therapy following discharge once medically stable in order to reduce fall risk, reduce caregiver burden, improve quality of life, and return to PLOF.    Rehab Prognosis: Fair; patient would benefit from acute skilled OT services to address these deficits and reach maximum level of function.    Plan:     Patient to be seen 4 x/week to address the above listed problems via self-care/home management, therapeutic activities, therapeutic exercises, neuromuscular re-education  Plan of Care Expires: 11/05/24  Plan of Care Reviewed with: patient, spouse    Subjective     Chief Complaint: L knee pain  Patient/Family Comments/goals: Pt spouse present, stating that he is unable to physically assist his spouse, expressing anxiety about potential discharge home.     Occupational Profile:  Living Environment: Pt lives with her spouse in a [facility] with a ramped entrance and a LHR (going up). Pt has a tub/shower combo.   Previous level of function: IND with ADLs  Roles and Routines: Pt is retired and still driving.   Equipment Used at Home: rollator, CPAP, wheelchair  Assistance upon Discharge: Pt's spouse cannot physically assist pt. Pt's grandson available to assist as needed but is not available at all times.     Pain/Comfort:  Pain Rating 1: 5/10  Location - Side 1: Left  Location - Orientation 1: generalized  Location 1: knee  Pain Addressed 1: Reposition, Distraction, Cessation of Activity  Pain Rating Post-Intervention 1: 5/10  Pain Rating 2: 8/10 (during mobilization, WB)  Location - Side 2: Left  Location -  Orientation 2: generalized  Location 2: knee  Pain Addressed 2: Reposition, Distraction, Cessation of Activity  Pain Rating Post-Intervention 2: 5/10    Patients cultural, spiritual, Pentecostalism conflicts given the current situation: no    Objective:     Communicated with: Nursing prior to session.  Patient found HOB elevated with Other (comments) (no active lines) upon OT entry to room.    General Precautions: Standard, fall  Orthopedic Precautions: N/A  Braces: N/A  Respiratory Status: Room air    Occupational Performance:    Bed Mobility:    Patient completed Scooting/Bridging with maximal assistance  Patient completed Supine to Sit with moderate assistance  Patient completed Sit to Supine with maximal assistance    Functional Mobility/Transfers:  Patient completed Sit <> Stand Transfer with minimum assistance  with  rolling walker   Functional Mobility: Pt able to tolerate sitting EOB for ~5 minutes with CGA provided for safety. Folding chair placed under pt's feet for support EOB secondary to increased pain with unsupported sitting, hospital bed not lowering for pt to have feet flat on floor. Pt performed one STS from EOB with min assist provided - verbal and tactile cues provided to promote upright posturing, extension of hips, and hand placement on RW. Pt requiring max assist to return to sitting EOB secondary to height of bed, decreased scooting ability, and increased pain. B knees blocked throughout scooting - pt resisting/sliding with attempt to scoot hips back onto EOB.     Activities of Daily Living:  Lower Body Dressing: maximal assistance to don hospital socks in supine, moderate assistance to doff personal underwear in sitting    Cognitive/Visual Perceptual:  Cognitive/Psychosocial Skills:     -       Oriented to: Person, Place, Time, and Situation   -       Follows Commands/attention:Follows two-step commands  -       Communication: clear/fluent  -       Memory: Impaired STM  -       Safety  awareness/insight to disability: impaired   -       Mood/Affect/Coping skills/emotional control: Appropriate to situation, Cooperative, and Anxious    Physical Exam:  Postural examination/scapula alignment:    -       Forward head  -       Abnormal trunk flexion  Edema:  Moderate BLE, L > R  Sensation:    -       Intact  light/touch BUE  Upper Extremity Range of Motion:     -       Right Upper Extremity: WFL  -       Left Upper Extremity: WFL  Upper Extremity Strength:    -       Right Upper Extremity: WFL  -       Left Upper Extremity: WFL   Strength:    -       Right Upper Extremity: WFL  -       Left Upper Extremity: WFL  Fine Motor Coordination:    -       Intact  Left hand thumb/finger opposition skills and Right hand thumb/finger opposition skills    AMPAC 6 Click ADL:  AMPAC Total Score: 19    Treatment & Education:  Provided education on the role of OT, POC, and therapy goals while in the acute care setting.   Provided education on the importance of continued mobilization and participation in OOB activities to increase functional endurance and activity tolerance for increased participation in ADL routines.   Provided education on safe transfer techniques and proper hand placement to promote safety awareness and prevent falls with functional transfers.   Discussed benefits of elevation and icing schedule to reduce edema levels and mitigate pain in LLE.  Discussed adaptive equipment, home modifications, modification of task, and options for further therapy/assistance after DC from acute care setting to promote safety and functional independence once back home.  All questions/concerns within the scope of OT answered/addressed - pt verbalized understanding.     Patient left HOB elevated with  nursing notified and spouse present    GOALS:   Multidisciplinary Problems       Occupational Therapy Goals          Problem: Occupational Therapy    Goal Priority Disciplines Outcome Interventions   Occupational  Therapy Goal     OT, PT/OT Progressing    Description: Goals to be met by: 11/5/24     Patient will increase functional independence with ADLs by performing:    LE Dressing with Stand-by Assistance.  Grooming while seated at sink with Stand-by Assistance.  Toileting from bedside commode with Minimal Assistance for hygiene and clothing management.   Supine to sit with Contact Guard Assistance.  Toilet transfer to bedside commode with Minimal Assistance.                         History:     Past Medical History:   Diagnosis Date    Anticoagulant long-term use     Cataract     Chronic diarrhea     Depression     Edema     GERD (gastroesophageal reflux disease)     Hypertension 10/2/2012    Osteoarthritis of both knees 8/9/2016    Osteopenia     Osteopenia 11/30/2017    PE (pulmonary thromboembolism) 12/03/2017    Primary localized osteoarthrosis, lower leg 12/3/2014    Sleep apnea 2016    Thalassemia minor          Past Surgical History:   Procedure Laterality Date    APPENDECTOMY      BLOCK, NERVE, GENICULAR Bilateral 3/14/2024    Procedure: Block, Nerve, Genicular---BILATERAL KNEE;  Surgeon: Kit Barton Jr., MD;  Location: [facility] PAIN MGMT;  Service: Pain Management;  Laterality: Bilateral;    CATARACT EXTRACTION      COLONOSCOPY N/A 11/5/2016    Procedure: COLONOSCOPY;  Surgeon: Tanner Simental MD;  [facility]  Service: Endoscopy;  Laterality: N/A;    HYSTERECTOMY  age 39    fibroids    OOPHORECTOMY      TONSILLECTOMY         Time Tracking:     OT Date of Treatment: 10/08/24  OT Start Time: 0905  OT Stop Time: 0950  OT Total Time (min): 45 min    Billable Minutes:Evaluation 10 minutes  Self Care/Home Management 10 minutes  Therapeutic Activity 10 minutes  Neuromuscular Re-education 15 minutes    10/8/2024   demonstrates skilled criteria for swallowing intervention

## 2024-10-08 NOTE — ASSESSMENT & PLAN NOTE
- Ms. Coby Atkinson presents after her b/l knee pain from OA was so great that she was unable to stand up from toilet and ambulate   - per report, family no longer able to care for her in the home   - Social Work consulted for assistance w/ placement   - PT / OT roger pending

## 2024-10-08 NOTE — PLAN OF CARE
10/08/24 0819   Post-Acute Status   Post-Acute Authorization Home Health   Home Health Status Pending medical clearance/testing   Discharge Delays None known at this time   Discharge Plan   Discharge Plan A Home Health     SW sent updates/referral to [facility] via [employer].     Discharge Plan A and Plan B have been determined by review of patient's clinical status, future medical and therapeutic needs, and coverage/benefits for post-acute care in coordination with multidisciplinary team members.    Jaimie Nunez, BREEZY, MSW, LMSW, RSW   Case Management  [facility]  Email: [email or web address]

## 2024-10-08 NOTE — H&P
[facility] - Emergency Dept  Hospital Medicine  History & Physical    Patient Name: Coby Atkinson  MRN: [id number]  Patient Class: OP- Observation  Admission Date: 10/7/2024  Attending Physician: Poli Cardona,*   Primary Care Provider: Mundo Hudson DO         Patient information was obtained from patient, past medical records, and ER records.     Subjective:     Principal Problem:<principal problem not specified>    Chief Complaint:   Chief Complaint   Patient presents with    Knee Pain     Chronic left knee pain. States was seen here x 2 weeks ago for the same. Per the patient she was stuck on the toilet for 2 hours due to the pain        HPI: Ms. Coby Atkinson is a. Age female, with PMH of osteoarthritis of b/l hips, chronic b/l knee pain, GERD, ADRIÁN, HTN, thalassemia minor, PE (12/2017)/DVT of LLE, on chronic anticoagulation obesity, CHF, prior NSTEMI, cognitive impairment, CHF, MDD, who presented to [facility] ED on 10/7/24 due to left knee pain. While she was evaluated for this same chronic knee pain two weeks again, she returns today as the pain hs persisted and caused her to be stuck on the toilet x 2 hours. During her last admission, nursing home placement was declined by the patient, and SNF placement was unable to occur 2/2 insurance issues and she chose returning to home. She has been participating in therapy at home, but her knee pain continues to worsen to the point where she was unable to stand or walk without assistance on the day of presentation. Her  is not physically able to care for her, and thus she was sent back to the ED. She was evaluated in the ED with labs that are at her approximate baseline. She was placed on observation.     Past Medical History:   Diagnosis Date    Anticoagulant long-term use     Cataract     Chronic diarrhea     Depression     Edema     GERD (gastroesophageal reflux disease)     Hypertension 10/2/2012    Osteoarthritis of both  knees 8/9/2016    Osteopenia     Osteopenia 11/30/2017    PE (pulmonary thromboembolism) 12/03/2017    Primary localized osteoarthrosis, lower leg 12/3/2014    Sleep apnea 2016    Thalassemia minor        Past Surgical History:   Procedure Laterality Date    APPENDECTOMY      BLOCK, NERVE, GENICULAR Bilateral 3/14/2024    Procedure: Block, Nerve, Genicular---BILATERAL KNEE;  Surgeon: Kit Barton Jr., MD;  Location: [facility] PAIN MGMT;  Service: Pain Management;  Laterality: Bilateral;    CATARACT EXTRACTION      COLONOSCOPY N/A 11/5/2016    Procedure: COLONOSCOPY;  Surgeon: Tanner Simental MD;  Location: [facility] ENDO (4TH FLR);  Service: Endoscopy;  Laterality: N/A;    HYSTERECTOMY  age 39    fibroids    OOPHORECTOMY      TONSILLECTOMY         Review of patient's allergies indicates:   Allergen Reactions    Codeine     Ciprofloxacin Rash       No current facility-administered medications on file prior to encounter.     Current Outpatient Medications on File Prior to Encounter   Medication Sig    acetaminophen (TYLENOL) 500 MG tablet Take 2 tablets (1,000 mg total) by mouth 3 (three) times daily. (Patient taking differently: Take 1,000 mg by mouth as needed.)    alendronate (FOSAMAX) 70 MG tablet Take 1 tablet once weekly in the morning with a full glass of water on an empty stomach. Do not consume food or lie down for at least 30 minutes afterwards.    amlodipine-valsartan (EXFORGE)  mg per tablet Take 1 tablet by mouth once daily.    bisoprolol (ZEBETA) 10 MG tablet TAKE 1 TABLET(10 MG) BY MOUTH EVERY DAY    busPIRone (BUSPAR) 10 MG tablet TAKE 1 TABLET(10 MG) BY MOUTH THREE TIMES DAILY AS NEEDED FOR ANXIETY    diclofenac sodium (VOLTAREN) 1 % Gel Apply 4 g topically 2 (two) times daily.    doxazosin (CARDURA) 8 MG Tab Take 1 tablet (8 mg total) by mouth once daily.    EScitalopram oxalate (LEXAPRO) 20 MG tablet Take 1 tablet (20 mg total) by mouth once daily.    furosemide (LASIX) 20 MG tablet TAKE 2  TABLETS(40 MG) BY MOUTH EVERY DAY    oxyCODONE (ROXICODONE) 5 MG immediate release tablet Take 1 tablet (5 mg total) by mouth every 6 (six) hours as needed for Pain.    potassium chloride (MICRO-K) 10 MEQ CpSR Take 1 capsule (10 mEq total) by mouth once daily. TAKE 1 CAPSULE(10 MEQ) BY MOUTH EVERY DAY    spironolactone (ALDACTONE) 25 MG tablet Take 25 mg by mouth once daily.    warfarin (COUMADIN) 1 MG tablet Take 1mg daily, except take 0.5mg on Thursdays and Saturdays OR as directed by [facility]     Family History       Problem Relation (Age of Onset)    Cancer Father, Brother    Coronary artery disease Maternal Grandmother, Paternal Grandmother    Heart failure Maternal Grandmother    Hypertension Mother    No Known Problems Sister, Maternal Aunt, Maternal Uncle, Paternal Aunt, Paternal Uncle, Maternal Grandfather, Paternal Grandfather    Stroke Paternal Grandmother          Tobacco Use    Smoking status: Never     Passive exposure: Never    Smokeless tobacco: Never   Substance and Sexual Activity    Alcohol use: No     Comment: rare    Drug use: No    Sexual activity: Yes     Partners: Male     Birth control/protection: None     Review of Systems   Constitutional:  Negative for activity change, appetite change, chills, diaphoresis and fever.   Respiratory:  Negative for cough, shortness of breath and wheezing.    Cardiovascular:  Negative for chest pain and palpitations.   Gastrointestinal:  Negative for abdominal distention, abdominal pain, diarrhea, nausea and vomiting.   Genitourinary:  Negative for decreased urine volume, dysuria, flank pain, frequency, hematuria and urgency.   Musculoskeletal:  Positive for arthralgias and myalgias. Negative for back pain, gait problem, joint swelling, neck pain and neck stiffness.   Skin:  Negative for color change, pallor, rash and wound.   Neurological:  Positive for weakness (2/2 b/l knee pain). Negative for dizziness, syncope, light-headedness and headaches.    Psychiatric/Behavioral:  Negative for agitation and confusion.      Objective:     Vital Signs (Most Recent):  Temp: 98.6 °F (37 °C) (10/07/24 2303)  Pulse: 68 (10/08/24 0500)  Resp: (!) 21 (10/08/24 0500)  BP: (!) 116/56 (10/08/24 0500)  SpO2: 96 % (10/08/24 0500) Vital Signs (24h Range):  Temp:  [98.6 °F (37 °C)] 98.6 °F (37 °C)  Pulse:  [68-82] 68  Resp:  [18-24] 21  SpO2:  [94 %-99 %] 96 %  BP: (115-175)/(56-99) 116/56     Weight: 102.1 kg (225 lb)  Body mass index is 38.62 kg/m².     Physical Exam  Vitals and nursing note reviewed.   Constitutional:       General: She is not in acute distress.     Appearance: She is well-developed and normal weight. She is not ill-appearing, toxic-appearing or diaphoretic.   HENT:      Head: Normocephalic and atraumatic.   Eyes:      General: No scleral icterus.        Right eye: No discharge.         Left eye: No discharge.      Conjunctiva/sclera: Conjunctivae normal.   Neck:      Trachea: No tracheal deviation.   Cardiovascular:      Rate and Rhythm: Normal rate and regular rhythm.      Heart sounds: Normal heart sounds. No murmur heard.     No gallop.   Pulmonary:      Effort: Pulmonary effort is normal. No respiratory distress.      Breath sounds: Normal breath sounds. No stridor. No wheezing or rales.   Abdominal:      General: Bowel sounds are normal. There is no distension.      Palpations: Abdomen is soft. There is no mass.      Tenderness: There is no abdominal tenderness. There is no guarding.   Musculoskeletal:         General: No deformity. Normal range of motion.      Cervical back: Normal range of motion and neck supple.   Skin:     General: Skin is warm and dry.      Coloration: Skin is not pale.      Findings: No erythema or rash.   Neurological:      General: No focal deficit present.      Mental Status: She is alert and oriented to person, place, and time.      Cranial Nerves: No cranial nerve deficit.      Motor: No abnormal muscle tone.   Psychiatric:     "     Mood and Affect: Mood normal.         Behavior: Behavior normal.         Thought Content: Thought content normal.         Judgment: Judgment normal.                Significant Labs: All pertinent labs within the past 24 hours have been reviewed.  BMP:   Recent Labs   Lab 10/08/24  0404   *      K 3.5      CO2 21*   BUN 16   CREATININE 0.9   CALCIUM 8.5*   MG 1.8     CBC:   Recent Labs   Lab 10/08/24  0045 10/08/24  0404   WBC 13.18* 12.34   HGB 11.0* 9.6*   HCT 35.8* 32.1*    248     CMP:   Recent Labs   Lab 10/08/24  0045 10/08/24  0404    136   K 3.6 3.5    106   CO2 20* 21*   * 123*   BUN 17 16   CREATININE 1.0 0.9   CALCIUM 9.5 8.5*   PROT 7.3  --    ALBUMIN 3.8  --    BILITOT 0.7  --    ALKPHOS 75  --    AST 17  --    ALT 10  --    ANIONGAP 13 9     Urine Culture: No results for input(s): "LABURIN" in the last 48 hours.  Urine Studies: No results for input(s): "COLORU", "APPEARANCEUA", "PHUR", "SPECGRAV", "PROTEINUA", "GLUCUA", "KETONESU", "BILIRUBINUA", "OCCULTUA", "NITRITE", "UROBILINOGEN", "LEUKOCYTESUR", "RBCUA", "WBCUA", "BACTERIA", "SQUAMEPITHEL", "HYALINECASTS" in the last 48 hours.    Invalid input(s): "WRIGHTSUR"    Significant Imaging: I have reviewed all pertinent imaging results/findings within the past 24 hours.  Imaging Results    None          Assessment/Plan:     Primary osteoarthritis of both knees  - Ms. Coby Atkinson presents after her b/l knee pain from OA was so great that she was unable to stand up from toilet and ambulate   - per report, family no longer able to care for her in the home   - Social Work consulted for assistance w/ placement   - PT / OT evals pending       Current moderate episode of major depressive disorder, unspecified whether recurrent  - continue home meds      Chronic diastolic heart failure  - clinically no apparently exacerbation  - monitor I&OS and daily weights   - continue home meds       Pulmonary " hypertension  - history noted       Chronic anticoagulation  - continue coumadin 0.5 mg QD   - INR daily       Essential hypertension  Patients blood pressure range in the last 24 hours was: BP  Min: 110/55  Max: 175/70.The patient's inpatient anti-hypertensive regimen is listed below:  Current Antihypertensives  amLODIPine tablet 10 mg, Daily, Oral  valsartan tablet 320 mg, Daily, Oral  metoprolol tartrate (LOPRESSOR) tablet 100 mg, 2 times daily, Oral  doxazosin tablet 8 mg, Daily, Oral  furosemide tablet 40 mg, Daily, Oral  spironolactone tablet 25 mg, Daily, Oral    Plan  - BP is controlled, no changes needed to their regimen  - continue home meds    Obstructive sleep apnea  - history noted   - CPAP ordered       Gastroesophageal reflux disease  - continue PPI         VTE Risk Mitigation (From admission, onward)           Ordered     warfarin (COUMADIN) tablet 1 mg  Daily         10/08/24 0653     IP VTE HIGH RISK PATIENT  Once         10/08/24 0337     Place sequential compression device  Until discontinued         10/08/24 0337                         On 10/08/2024, patient should be placed in hospital observation services under the care of Dr. Poli Barnard MD.           Tiffany Machado PA-C  Department of Hospital Medicine  [facility] - Emergency Dept

## 2024-10-08 NOTE — PLAN OF CARE
10/08/24 0845   Post-Acute Status   Post-Acute Authorization Placement   Post-Acute Placement Status Referrals Sent   Discharge Delays None known at this time   Discharge Plan   Discharge Plan A Skilled Nursing Facility;Rehab     Met with patient to review discharge recommendation of home health / SNF / Rehab and is agreeable to plan    Patient/family provided list of facilities in-network with patient's payor plan. Providers that are owned, operated, or affiliated with [facility] are included on the list.     SNF  Notified that referral sent to below listed facilities from in-network list based on proximity to home/family support:   [facility]  2.[facility]  3. [facility]  4. [facility]  5. [facility]    Patient/family instructed to identify preference.    Preferred Facility: (if more than 1, listed in order of descending preference)  No preference at this time      Rehab  Notified that referral sent to below listed facilities from in-network list based on proximity to home/family support:   [facility]    Patient/family instructed to identify preference.    Preferred Facility: (if more than 1, listed in order of descending preference)  No preference at this time    If an additional preferred facility not listed above is identified, additional referral to be sent. If above facilities unable to accept, will send additional referrals to in-network providers.     Home Health   Pt is current with [facility].  Referral/Update sent via careport    Discharge Plan A and Plan B have been determined by review of patient's clinical status, future medical and therapeutic needs, and coverage/benefits for post-acute care in coordination with multidisciplinary team members.    Jaimie Nunez, BREEZY, MSW, LMSW, RSW   Case Management  [facility]  Email: [email or web address]

## 2024-10-09 VITALS
OXYGEN SATURATION: 95 % | HEART RATE: 56 BPM | HEIGHT: 64 IN | SYSTOLIC BLOOD PRESSURE: 102 MMHG | RESPIRATION RATE: 16 BRPM | BODY MASS INDEX: 38.41 KG/M2 | TEMPERATURE: 97 F | DIASTOLIC BLOOD PRESSURE: 51 MMHG | WEIGHT: 225 LBS

## 2024-10-09 PROBLEM — I82.413 ACUTE BILATERAL DEEP VEIN THROMBOSIS (DVT) OF FEMORAL VEINS: Status: ACTIVE | Noted: 2024-10-09

## 2024-10-09 LAB
ANION GAP SERPL CALC-SCNC: 9 MMOL/L (ref 8–16)
BASOPHILS # BLD AUTO: 0.05 K/UL (ref 0–0.2)
BASOPHILS NFR BLD: 0.4 % (ref 0–1.9)
BUN SERPL-MCNC: 18 MG/DL (ref 8–23)
CALCIUM SERPL-MCNC: 8.5 MG/DL (ref 8.7–10.5)
CHLORIDE SERPL-SCNC: 103 MMOL/L (ref 95–110)
CO2 SERPL-SCNC: 25 MMOL/L (ref 23–29)
CREAT SERPL-MCNC: 1.1 MG/DL (ref 0.5–1.4)
DIFFERENTIAL METHOD BLD: ABNORMAL
EOSINOPHIL # BLD AUTO: 0.2 K/UL (ref 0–0.5)
EOSINOPHIL NFR BLD: 1.6 % (ref 0–8)
ERYTHROCYTE [DISTWIDTH] IN BLOOD BY AUTOMATED COUNT: 15.3 % (ref 11.5–14.5)
EST. GFR  (NO RACE VARIABLE): 51.1 ML/MIN/1.73 M^2
GLUCOSE SERPL-MCNC: 91 MG/DL (ref 70–110)
HCT VFR BLD AUTO: 32.2 % (ref 37–48.5)
HGB BLD-MCNC: 9.6 G/DL (ref 12–16)
IMM GRANULOCYTES # BLD AUTO: 0.05 K/UL (ref 0–0.04)
IMM GRANULOCYTES NFR BLD AUTO: 0.4 % (ref 0–0.5)
INR PPP: 2.9 (ref 0.8–1.2)
LYMPHOCYTES # BLD AUTO: 3.3 K/UL (ref 1–4.8)
LYMPHOCYTES NFR BLD: 29.5 % (ref 18–48)
MAGNESIUM SERPL-MCNC: 1.9 MG/DL (ref 1.6–2.6)
MCH RBC QN AUTO: 19.9 PG (ref 27–31)
MCHC RBC AUTO-ENTMCNC: 29.8 G/DL (ref 32–36)
MCV RBC AUTO: 67 FL (ref 82–98)
MONOCYTES # BLD AUTO: 1.1 K/UL (ref 0.3–1)
MONOCYTES NFR BLD: 9.5 % (ref 4–15)
NEUTROPHILS # BLD AUTO: 6.6 K/UL (ref 1.8–7.7)
NEUTROPHILS NFR BLD: 58.6 % (ref 38–73)
NRBC BLD-RTO: 0 /100 WBC
PLATELET # BLD AUTO: 263 K/UL (ref 150–450)
PMV BLD AUTO: 10 FL (ref 9.2–12.9)
POTASSIUM SERPL-SCNC: 3.5 MMOL/L (ref 3.5–5.1)
PROTHROMBIN TIME: 29.7 SEC (ref 9–12.5)
RBC # BLD AUTO: 4.83 M/UL (ref 4–5.4)
SODIUM SERPL-SCNC: 137 MMOL/L (ref 136–145)
WBC # BLD AUTO: 11.32 K/UL (ref 3.9–12.7)

## 2024-10-09 PROCEDURE — A4216 STERILE WATER/SALINE, 10 ML: HCPCS | Performed by: PHYSICIAN ASSISTANT

## 2024-10-09 PROCEDURE — 25000003 PHARM REV CODE 250: Performed by: PHYSICIAN ASSISTANT

## 2024-10-09 PROCEDURE — 85610 PROTHROMBIN TIME: CPT | Performed by: PHYSICIAN ASSISTANT

## 2024-10-09 PROCEDURE — 21400001 HC TELEMETRY ROOM

## 2024-10-09 PROCEDURE — 25000003 PHARM REV CODE 250: Performed by: STUDENT IN AN ORGANIZED HEALTH CARE EDUCATION/TRAINING PROGRAM

## 2024-10-09 PROCEDURE — 80048 BASIC METABOLIC PNL TOTAL CA: CPT | Performed by: PHYSICIAN ASSISTANT

## 2024-10-09 PROCEDURE — 99223 1ST HOSP IP/OBS HIGH 75: CPT | Mod: GC,,, | Performed by: INTERNAL MEDICINE

## 2024-10-09 PROCEDURE — 83735 ASSAY OF MAGNESIUM: CPT | Performed by: PHYSICIAN ASSISTANT

## 2024-10-09 PROCEDURE — 63600175 PHARM REV CODE 636 W HCPCS: Performed by: STUDENT IN AN ORGANIZED HEALTH CARE EDUCATION/TRAINING PROGRAM

## 2024-10-09 PROCEDURE — 85025 COMPLETE CBC W/AUTO DIFF WBC: CPT | Performed by: PHYSICIAN ASSISTANT

## 2024-10-09 RX ORDER — OXYCODONE HYDROCHLORIDE 5 MG/1
5 TABLET ORAL EVERY 6 HOURS PRN
Status: DISCONTINUED | OUTPATIENT
Start: 2024-10-09 | End: 2024-10-09 | Stop reason: HOSPADM

## 2024-10-09 RX ORDER — FUROSEMIDE 40 MG/1
40 TABLET ORAL DAILY
Status: DISCONTINUED | OUTPATIENT
Start: 2024-10-10 | End: 2024-10-09 | Stop reason: HOSPADM

## 2024-10-09 RX ORDER — HYDROMORPHONE HYDROCHLORIDE 1 MG/ML
1 INJECTION, SOLUTION INTRAMUSCULAR; INTRAVENOUS; SUBCUTANEOUS EVERY 6 HOURS PRN
Status: DISCONTINUED | OUTPATIENT
Start: 2024-10-09 | End: 2024-10-09 | Stop reason: HOSPADM

## 2024-10-09 RX ADMIN — METOPROLOL TARTRATE 100 MG: 100 TABLET, FILM COATED ORAL at 09:10

## 2024-10-09 RX ADMIN — Medication 10 ML: at 01:10

## 2024-10-09 RX ADMIN — SPIRONOLACTONE 25 MG: 25 TABLET ORAL at 09:10

## 2024-10-09 RX ADMIN — HYDROMORPHONE HYDROCHLORIDE 1 MG: 1 INJECTION, SOLUTION INTRAMUSCULAR; INTRAVENOUS; SUBCUTANEOUS at 12:10

## 2024-10-09 RX ADMIN — AMLODIPINE BESYLATE 10 MG: 10 TABLET ORAL at 09:10

## 2024-10-09 RX ADMIN — Medication 10 ML: at 05:10

## 2024-10-09 RX ADMIN — FUROSEMIDE 40 MG: 40 TABLET ORAL at 09:10

## 2024-10-09 RX ADMIN — VALSARTAN 320 MG: 160 TABLET, FILM COATED ORAL at 09:10

## 2024-10-09 RX ADMIN — ESCITALOPRAM OXALATE 20 MG: 5 TABLET, FILM COATED ORAL at 09:10

## 2024-10-09 RX ADMIN — DOXAZOSIN 8 MG: 2 TABLET ORAL at 09:10

## 2024-10-09 NOTE — PLAN OF CARE
10/09/24 1652   Post-Acute Status   Post-Acute Placement Status Set-up Complete/Auth obtained   Discharge Delays None known at this time   Discharge Plan   Discharge Plan A Rehab     On-Call SW asked to assist with case. SW faxed Facility Transfer Orders to [facility] Phone: [phone]  - [place] via [email or web address] for review. SW will continue to follow patient.    Discharge Plan A and Plan B have been determined by review of patient's clinical status, future medical and therapeutic needs, and coverage/benefits for post-acute care in coordination with multidisciplinary team members.       5:02 PM   SW arranged [facility] transport via [facility]. Requested  time is now.  Requested  time does not guarantee arrival time.  If transport does not arrive by 6:pm please contact assigned SW or on-call for assistance.    Patient's bedside nurse and pt notified of the above.       5:29 PM   DHARA informed Damir with [facility] patient will require a stretcher to [facility]. Informed Nurse.       6:18 PM   Ting WILLS 8:40 PM           Katja Traylor LMSW  - [facility]

## 2024-10-09 NOTE — PLAN OF CARE
[facility]    FACILITY TRANSFER ORDERS      Patient Name: Coby Atkinson  YOB: 1945    PCP: Mundo Hudson DO   PCP Address: [address] / [place] [address]  PCP Phone Number: [phone]  PCP Fax: [phone]    Encounter Date: 10/09/2024    Admit to: Rehab    Vital Signs:  Routine    Diagnoses:   Active Hospital Problems    Diagnosis  POA    *Acute bilateral deep vein thrombosis (DVT) of femoral veins [I82.413]  Yes    Current moderate episode of major depressive disorder, unspecified whether recurrent [F32.1]  Yes    Chronic diastolic heart failure [I50.32]  Yes    Primary osteoarthritis of both knees [M17.0]  Yes    Pulmonary hypertension [I27.20]  Yes     Likely due to a combination of   1) sleep apnea - diagnosed 8/16  2) sub-massive pulmonary embolism in 12/17  3) LV diastolic dysfunction (severe LAE, indeterminate LV diastolic dysfunction)      Chronic anticoagulation [Z79.01]  Not Applicable    Essential hypertension [I10]  Yes    Obstructive sleep apnea [G47.33]  Yes     There was significant ADRIÁN (obstructive sleep apnea) based on AHI (apnea hypopnea index) criteria. The overall AHI was 5.7 with an oxygen laura of 88.0%. The supine AHI was 8.5 and the REM AHI was 26.9. The patient did not qualify for a split night study due to an insufficient number of events in the first half of the study. The RDI (Respiratory Disturbance Index) was greater than the AHI due to the presence of RERAs (respiratory effort related arousals). The overall RDI was 8.5.       Gastroesophageal reflux disease [K21.9]  Yes      Resolved Hospital Problems   No resolved problems to display.       Allergies:  Review of patient's allergies indicates:   Allergen Reactions    Codeine     Ciprofloxacin Rash       Diet: cardiac diet    Activities: Activity as tolerated    Goals of Care Treatment Preferences:  Code Status: Full Code      Nursing: per facility protocol         CONSULTS:    Physical Therapy to evaluate and treat.  and Occupational Therapy to evaluate and treat.            Medications: Review discharge medications with patient and family and provide education.         Medication List        START taking these medications      apixaban 5 mg Tab  Commonly known as: ELIQUIS  Take 1 tablet (5 mg total) by mouth 2 (two) times daily.  Start taking on: October 11, 2024            CONTINUE taking these medications      alendronate 70 MG tablet  Commonly known as: FOSAMAX  Take 1 tablet once weekly in the morning with a full glass of water on an empty stomach. Do not consume food or lie down for at least 30 minutes afterwards.     amlodipine-valsartan  mg per tablet  Commonly known as: EXFORGE  Take 1 tablet by mouth once daily.     bisoprolol 10 MG tablet  Commonly known as: ZEBETA  TAKE 1 TABLET(10 MG) BY MOUTH EVERY DAY     busPIRone 10 MG tablet  Commonly known as: BUSPAR  TAKE 1 TABLET(10 MG) BY MOUTH THREE TIMES DAILY AS NEEDED FOR ANXIETY     doxazosin 8 MG Tab  Commonly known as: CARDURA  Take 1 tablet (8 mg total) by mouth once daily.     DULCOLAX (BISACODYL) ORAL  Take 2 tablets by mouth once daily.     EScitalopram oxalate 20 MG tablet  Commonly known as: LEXAPRO  Take 1 tablet (20 mg total) by mouth once daily.     furosemide 20 MG tablet  Commonly known as: LASIX  TAKE 2 TABLETS(40 MG) BY MOUTH EVERY DAY     oxyCODONE 5 MG immediate release tablet  Commonly known as: ROXICODONE  Take 1 tablet (5 mg total) by mouth every 6 (six) hours as needed for Pain.     potassium chloride 10 MEQ Cpsr  Commonly known as: MICRO-K  Take 1 capsule (10 mEq total) by mouth once daily. TAKE 1 CAPSULE(10 MEQ) BY MOUTH EVERY DAY            STOP taking these medications      acetaminophen 500 MG tablet  Commonly known as: TYLENOL     diclofenac sodium 1 % Gel  Commonly known as: VOLTAREN     IMODIUM A-D ORAL     PRILOSEC ORAL     SALONPAS TOP     warfarin 1 MG tablet  Commonly known  as: COUMADIN                Immunizations Administered as of 10/9/2024       Name Date Dose VIS Date Route Exp Date    COVID-19, MRNA, LN-S, PF (Pfizer) (Purple Cap) 12/22/2021  9:58 AM 0.3 mL 12/12/2020 Intramuscular 2/28/2022    Site: Left deltoid     Given By: Radha Pruitt, RN     : [employer]     Lot: [id number]     COVID-19, MRNA, LN-S, PF (Pfizer) (Purple Cap) 2/15/2021 10:05 AM 0.3 mL 12/12/2020 Intramuscular 5/30/2021    Site: Left deltoid     Given By: Jing Bustillo RN     : [employer]     Lot: [id number]     COVID-19, MRNA, LN-S, PF (Pfizer) (Purple Cap) 1/25/2021  9:34 AM 0.3 mL 12/12/2020 Intramuscular 5/31/2021    Site: Left arm     Given By: Jamil Bajwa MA     : [employer]     Lot: [id number]                 Some patients may experience side effects after vaccination.  These may include fever, headache, muscle or joint aches.  Most symptoms resolve with 24-48 hours and do not require urgent medical evaluation unless they persist for more than 72 hours or symptoms are concerning for an unrelated medical condition.          _________________________________  Poli Cardona MD  10/09/2024

## 2024-10-09 NOTE — PLAN OF CARE
Problem: Adult Inpatient Plan of Care  Goal: Plan of Care Review  10/9/2024 1643 by Nona Lane RN  Outcome: Adequate for Care Transition  10/9/2024 1643 by Nona Lane RN  Reactivated  10/9/2024 1643 by Nona Lane RN  Outcome: Met  Goal: Patient-Specific Goal (Individualized)  10/9/2024 1643 by Nona Lane RN  Outcome: Adequate for Care Transition  10/9/2024 1643 by Nona Lane RN  Reactivated  10/9/2024 1643 by Nona Lane RN  Outcome: Met  Goal: Absence of Hospital-Acquired Illness or Injury  10/9/2024 1643 by Nona Lane RN  Outcome: Adequate for Care Transition  10/9/2024 1643 by Nona Lane RN  Reactivated  10/9/2024 1643 by Nona Lane RN  Outcome: Met  Goal: Optimal Comfort and Wellbeing  10/9/2024 1643 by Nona Lane RN  Outcome: Adequate for Care Transition  10/9/2024 1643 by Nona Lane RN  Reactivated  10/9/2024 1643 by Nona Lane RN  Outcome: Met  Goal: Readiness for Transition of Care  10/9/2024 1643 by Nona Lane RN  Outcome: Adequate for Care Transition  10/9/2024 1643 by Nona Lnae RN  Reactivated  10/9/2024 1643 by Nona Lane RN  Outcome: Met     Problem: Skin Injury Risk Increased  Goal: Skin Health and Integrity  10/9/2024 1643 by Nona Lane RN  Outcome: Adequate for Care Transition  10/9/2024 1643 by Nona Lane RN  Reactivated  10/9/2024 1643 by Nona Lane RN  Outcome: Met

## 2024-10-09 NOTE — ED NOTES
Telemetry Verification   Patient placed on Telemetry Box  Verified by Carin RN  Box # [id number]   Rate 57   Rhythm Sinus Moi   Monitor tech: Minesh Quijano

## 2024-10-09 NOTE — SUBJECTIVE & OBJECTIVE
Interval History: L knee pain    Review of Systems   Constitutional: Negative.    Respiratory: Negative.     Cardiovascular: Negative.    Gastrointestinal: Negative.    Genitourinary: Negative.    Musculoskeletal:  Positive for arthralgias.     Objective:     Vital Signs (Most Recent):  Temp: 97.2 °F (36.2 °C) (10/09/24 1129)  Pulse: 64 (10/09/24 1129)  Resp: 16 (10/09/24 1243)  BP: (!) 142/67 (10/09/24 1129)  SpO2: (!) 93 % (10/09/24 1129) Vital Signs (24h Range):  Temp:  [96.5 °F (35.8 °C)-98.4 °F (36.9 °C)] 97.2 °F (36.2 °C)  Pulse:  [55-64] 64  Resp:  [15-18] 16  SpO2:  [93 %-98 %] 93 %  BP: (116-142)/(49-81) 142/67     Weight: 102.1 kg (225 lb)  Body mass index is 38.62 kg/m².  No intake or output data in the 24 hours ending 10/09/24 1337      Physical Exam  Constitutional:       General: She is not in acute distress.     Appearance: She is obese.   Cardiovascular:      Rate and Rhythm: Normal rate.      Pulses: Normal pulses.   Pulmonary:      Effort: No respiratory distress.      Breath sounds: Normal breath sounds. No wheezing.   Abdominal:      General: Bowel sounds are normal. There is no distension.      Palpations: Abdomen is soft.      Tenderness: There is no abdominal tenderness.   Musculoskeletal:         General: Tenderness (L knee) present.      Right lower leg: No edema.      Left lower leg: No edema.   Skin:     General: Skin is warm.   Neurological:      Mental Status: She is alert and oriented to person, place, and time. Mental status is at baseline.   Psychiatric:         Mood and Affect: Mood normal.             Significant Labs: All pertinent labs within the past 24 hours have been reviewed.    Significant Imaging: I have reviewed all pertinent imaging results/findings within the past 24 hours.

## 2024-10-09 NOTE — CONSULTS
Hematology Oncology Consult Note    Inpatient consult to Hematology/Oncology  Consult performed by: Rod Nguyen DO  Consult ordered by: Poli Cardona MD        SUBJECTIVE:     History of Present Illness:  Ms. Coby Atkinson is a. Age female, with PMH of osteoarthritis of b/l hips, chronic b/l knee pain, GERD, ADRIÁN, HTN, thalassemia minor, PE (12/2017)/DVT of LLE, on chronic anticoagulation obesity, CHF, prior NSTEMI, cognitive impairment, CHF, MDD, who presented to [facility] ED on 10/7/24 due to left knee pain. While she was evaluated for this same chronic knee pain two weeks again, she returns as the pain persisted and caused her to be stuck on the toilet x 2 hours. She has been participating in therapy at home, but her knee pain continues to worsen to the point where she was unable to stand or walk without assistance on the day of presentation.     US bilateral LEs performed and shows deep vein thrombosis in the bilateral femoral and deep femoral veins. Hematology consulted due to new thrombosis while on anticoagulation. Current INR 2.9. Review of labs shows subtherapeutic INR on 3/21, 4/1, and 4/16 earlier this year, was therapeutic again 5/20 when INR came up to 2.3.       Review of patient's allergies indicates:   Allergen Reactions    Codeine     Ciprofloxacin Rash     Past Medical History:   Diagnosis Date    Anticoagulant long-term use     Cataract     Chronic diarrhea     Depression     Edema     GERD (gastroesophageal reflux disease)     Hypertension 10/2/2012    Osteoarthritis of both knees 8/9/2016    Osteopenia     Osteopenia 11/30/2017    PE (pulmonary thromboembolism) 12/03/2017    Primary localized osteoarthrosis, lower leg 12/3/2014    Sleep apnea 2016    Thalassemia minor      Past Surgical History:   Procedure Laterality Date    APPENDECTOMY      BLOCK, NERVE, GENICULAR Bilateral 3/14/2024    Procedure: Block, Nerve, Genicular---BILATERAL KNEE;  Surgeon: Kit Barton  KIRT Corbin MD;  Location: [facility] PAIN [facility];  Service: Pain Management;  Laterality: Bilateral;    CATARACT EXTRACTION      COLONOSCOPY N/A 11/5/2016    Procedure: COLONOSCOPY;  Surgeon: Tanner Simental MD;  [facility]  Service: Endoscopy;  Laterality: N/A;    HYSTERECTOMY  age 39    fibroids    OOPHORECTOMY      TONSILLECTOMY       Family History   Problem Relation Name Age of Onset    Hypertension Mother      Cancer Father          skin    No Known Problems Sister      Cancer Brother          pancreatic    No Known Problems Maternal Aunt      No Known Problems Maternal Uncle      No Known Problems Paternal Aunt      No Known Problems Paternal Uncle      Coronary artery disease Maternal Grandmother      Heart failure Maternal Grandmother      No Known Problems Maternal Grandfather      Stroke Paternal Grandmother      Coronary artery disease Paternal Grandmother      No Known Problems Paternal Grandfather      Amblyopia Neg Hx      Blindness Neg Hx      Cataracts Neg Hx      Diabetes Neg Hx      Glaucoma Neg Hx      Macular degeneration Neg Hx      Retinal detachment Neg Hx      Strabismus Neg Hx      Thyroid disease Neg Hx      Colon cancer Neg Hx      Stomach cancer Neg Hx      Esophageal cancer Neg Hx       Social History     Tobacco Use    Smoking status: Never     Passive exposure: Never    Smokeless tobacco: Never   Substance Use Topics    Alcohol use: No     Comment: rare    Drug use: No     Review of Systems   Constitutional:  Negative for chills and fever.   HENT:  Negative for nosebleeds and sore throat.    Eyes:  Negative for blurred vision and double vision.   Respiratory:  Negative for hemoptysis and shortness of breath.    Cardiovascular:  Positive for leg swelling. Negative for chest pain and palpitations.   Gastrointestinal:  Negative for blood in stool, melena, nausea and vomiting.   Genitourinary:  Negative for dysuria and hematuria.   Musculoskeletal:  Negative for myalgias and  neck pain.   Neurological:  Negative for dizziness and headaches.   Psychiatric/Behavioral:  Negative for memory loss. The patient does not have insomnia.      OBJECTIVE:     Vital Signs:  Temp:  [98 °F (36.7 °C)-98.4 °F (36.9 °C)]   Pulse:  [57-61]   Resp:  [15-18]   BP: (116-136)/(69-81)   SpO2:  [95 %-97 %]     Physical Exam  Constitutional:       Appearance: Normal appearance.   HENT:      Head: Normocephalic and atraumatic.   Eyes:      Extraocular Movements: Extraocular movements intact.      Pupils: Pupils are equal, round, and reactive to light.   Cardiovascular:      Rate and Rhythm: Normal rate and regular rhythm.   Pulmonary:      Effort: Pulmonary effort is normal.      Breath sounds: Normal breath sounds.   Abdominal:      General: Abdomen is flat.      Palpations: Abdomen is soft.   Musculoskeletal:         General: Normal range of motion.      Cervical back: Normal range of motion and neck supple.      Right lower leg: Edema present.      Left lower leg: Edema present.   Skin:     General: Skin is warm and dry.   Neurological:      General: No focal deficit present.      Mental Status: She is alert and oriented to person, place, and time.   Psychiatric:         Mood and Affect: Mood normal.         Behavior: Behavior normal.       Laboratory:  CBC:   Recent Labs   Lab 10/09/24  0436   WBC 11.32   RBC 4.83   HGB 9.6*   HCT 32.2*      MCV 67*   MCH 19.9*   MCHC 29.8*     CMP:   Recent Labs   Lab 10/08/24  0045 10/08/24  0404 10/09/24  0436   *   < > 91   CALCIUM 9.5   < > 8.5*   ALBUMIN 3.8  --   --    PROT 7.3  --   --       < > 137   K 3.6   < > 3.5   CO2 20*   < > 25      < > 103   BUN 17   < > 18   CREATININE 1.0   < > 1.1   ALKPHOS 75  --   --    ALT 10  --   --    AST 17  --   --    BILITOT 0.7  --   --     < > = values in this interval not displayed.     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 Has had several instances of sub-therapeutic INR, most recently several low values from March and April 2024. We clarified with Ms. Atkinson that DVT/PE in 2017 was the only instance of VTE up until this admission. She has had no bleeding since 2017 when she had GI bleeding from duodenal ulcer which was not present on follow up EGD. We discussed switching to DOAC due to difficulty associated with monitoring warfarin. Discussed risks of bleeding. Answered all questions, patient and her  are comfortable with plan.     Recommendations:   - discontinue warfarin and switch to DOAC due to difficulty maintaining therapeutic INR  - ok for discharge from a hematologic standpoint      Discussed with Dr. Trejo.   We will sign off. Let us know if any questions.      Rod Nguyen DO  Hematology/Oncology Fellow, PGY-IV   [facility]

## 2024-10-09 NOTE — ASSESSMENT & PLAN NOTE
Patients blood pressure range in the last 24 hours was: BP  Min: 110/51  Max: 175/70.The patient's inpatient anti-hypertensive regimen is listed below:  Current Antihypertensives  amLODIPine tablet 10 mg, Daily, Oral  valsartan tablet 320 mg, Daily, Oral  metoprolol tartrate (LOPRESSOR) tablet 100 mg, 2 times daily, Oral  doxazosin tablet 8 mg, Daily, Oral  spironolactone tablet 25 mg, Daily, Oral  furosemide tablet 40 mg, Daily, Oral    Plan  - BP is controlled, no changes needed to their regimen  - continue home meds

## 2024-10-09 NOTE — ED NOTES
Received report from Magui MINOR. Assumed care of patient. Patient is alert and resting comfortably in bed in NAD. BP, cardiac, and O2 monitoring continued. Family member at bedside. Patient updated on plan of care, pt denies any needs or complaints at this time. Bed locked in lowest position, side rails up x2, call light within reach. VSS.

## 2024-10-09 NOTE — ASSESSMENT & PLAN NOTE
- Ms. Coby Atkinson presents after her b/l knee pain from OA was so great that she was unable to stand up from toilet and ambulate   - per report, family no longer able to care for her in the home   - Social Work consulted for assistance w/ placement   - PT / OT rec rehab  Pending placement

## 2024-10-09 NOTE — PLAN OF CARE
Inpatient Upgrade Note    Coby Atkinson has warranted treatment spanning two or more midnights of hospital level care for the management of  Acute bilateral deep vein thrombosis of femoral veins . She continues to require daily labs, monitoring of vital signs, and further evaluation by consultants. Her condition is also complicated by the following comorbidities:  PMH of osteoarthritis of b/l hips, chronic b/l knee pain, GERD, ARDIÁN, HTN, thalassemia minor, PE (12/2017)/DVT of LLE, on coumadin CHF  G II DD , CAD, cognitive impairment, MDD, who was admitted with worsening chronic left knee pain,limiting mobility .

## 2024-10-09 NOTE — SUBJECTIVE & OBJECTIVE
Past Medical History:   Diagnosis Date    Anticoagulant long-term use     Cataract     Chronic diarrhea     Depression     Edema     GERD (gastroesophageal reflux disease)     Hypertension 10/2/2012    Osteoarthritis of both knees 8/9/2016    Osteopenia     Osteopenia 11/30/2017    PE (pulmonary thromboembolism) 12/03/2017    Primary localized osteoarthrosis, lower leg 12/3/2014    Sleep apnea 2016    Thalassemia minor      Past Surgical History:   Procedure Laterality Date    APPENDECTOMY      BLOCK, NERVE, GENICULAR Bilateral 3/14/2024    Procedure: Block, Nerve, Genicular---BILATERAL KNEE;  Surgeon: Kit Barton Jr., MD;  Location: [facility];  Service: Pain Management;  Laterality: Bilateral;    CATARACT EXTRACTION      COLONOSCOPY N/A 11/5/2016    Procedure: COLONOSCOPY;  Surgeon: Tanner Simental MD;  Location: [facility] ENDO [address]);  Service: Endoscopy;  Laterality: N/A;    HYSTERECTOMY  age 39    fibroids    OOPHORECTOMY      TONSILLECTOMY       Review of patient's allergies indicates:   Allergen Reactions    Codeine     Ciprofloxacin Rash       Scheduled Medications:    amLODIPine  10 mg Oral Daily    doxazosin  8 mg Oral Daily    EScitalopram oxalate  20 mg Oral Daily    furosemide  40 mg Oral BID    metoprolol tartrate  100 mg Oral BID    sodium chloride 0.9%  10 mL Intravenous Q8H    spironolactone  25 mg Oral Daily    valsartan  320 mg Oral Daily    warfarin  1 mg Oral Daily       PRN Medications:   Current Facility-Administered Medications:     acetaminophen, 650 mg, Oral, Q6H PRN    busPIRone, 10 mg, Oral, TID PRN    dextrose 10%, 12.5 g, Intravenous, PRN    dextrose 10%, 25 g, Intravenous, PRN    glucagon (human recombinant), 1 mg, Intramuscular, PRN    glucose, 16 g, Oral, PRN    glucose, 24 g, Oral, PRN    melatonin, 6 mg, Oral, Nightly PRN    naloxone, 0.02 mg, Intravenous, PRN    senna-docusate 8.6-50 mg, 1 tablet, Oral, BID PRN    Family History       Problem Relation (Age of Onset)     Cancer Father, Brother    Coronary artery disease Maternal Grandmother, Paternal Grandmother    Heart failure Maternal Grandmother    Hypertension Mother    No Known Problems Sister, Maternal Aunt, Maternal Uncle, Paternal Aunt, Paternal Uncle, Maternal Grandfather, Paternal Grandfather    Stroke Paternal Grandmother          Tobacco Use    Smoking status: Never     Passive exposure: Never    Smokeless tobacco: Never   Substance and Sexual Activity    Alcohol use: No     Comment: rare    Drug use: No    Sexual activity: Yes     Partners: Male     Birth control/protection: None     Review of Systems   Reason unable to perform ROS: failling asleep diuring exam,  at bedside answering most history questions.   Constitutional:  Positive for activity change and fatigue. Negative for fever.   Musculoskeletal:  Positive for arthralgias and gait problem.   Neurological:  Positive for weakness.     Objective:     Vital Signs (Most Recent):  Temp: 96.5 °F (35.8 °C) (10/08/24 1758)  Pulse: (!) 55 (10/08/24 1758)  Resp: 16 (10/08/24 1758)  BP: (!) 139/49 (10/08/24 1852)  SpO2: 98 % (10/08/24 1758)    Vital Signs (24h Range):  Temp:  [96.5 °F (35.8 °C)-98.6 °F (37 °C)] 96.5 °F (35.8 °C)  Pulse:  [55-82] 55  Resp:  [16-24] 16  SpO2:  [94 %-99 %] 98 %  BP: (110-175)/(49-99) 139/49     Body mass index is 38.62 kg/m².     Physical Exam  Vitals and nursing note reviewed.   Constitutional:       Comments: Easily arousable, falling asleep during exam   HENT:      Nose: Nose normal.      Mouth/Throat:      Mouth: Mucous membranes are moist.   Pulmonary:      Effort: Pulmonary effort is normal.   Musculoskeletal:         General: Swelling (BLEs) present.      Comments: BLE weakness, lifting LLE off of bed, unable to lift RLE off of bed    Skin:     General: Skin is warm.   Neurological:      Motor: Weakness present.      Gait: Gait abnormal.      Comments: Following commands               Diagnostic Results: Labs: Reviewed  ECG:  Reviewed  X-Ray: Reviewed

## 2024-10-09 NOTE — PHARMACY MED REC
"Admission Medication History     The home medication history was taken by Juan David Elias.    You may go to "Admission" then "Reconcile Home Medications" tabs to review and/or act upon these items.     The home medication list has been updated by the Pharmacy department.   Please read ALL comments highlighted in yellow.   Please address this information as you see fit.    Feel free to contact us if you have any questions or require assistance.      The medications listed below were removed from the home medication list. Please reorder if appropriate:  Patient reports no longer taking the following medication(s):  SPIRONOLACTONE 25 MG    Medications listed below were obtained from: Patient/family and Analytic software- [employer]  PTA Medications   Medication Sig    acetaminophen (TYLENOL) 500 MG tablet Take 2 tablets (1,000 mg total) by mouth daily as needed for Pain (Headaches).    alendronate (FOSAMAX) 70 MG tablet Take 1 tablet once weekly in the morning with a full glass of water on an empty stomach. Do not consume food or lie down for at least 30 minutes afterwards. Last filled 5/14/24, #12/84 day supply.    amlodipine-valsartan (EXFORGE)  mg per tablet Take 1 tablet by mouth once daily.    bisoprolol (ZEBETA) 10 MG tablet Take 1 tablet (10 mg) by mouth every day.    busPIRone (BUSPAR) 10 MG tablet Take 1 tablet (10 mg) by mouth 3 times daily as needed for anxiety.    diclofenac sodium (VOLTAREN) 1 % Gel Apply 4 g topically 2 (two) times daily.    doxazosin (CARDURA) 8 MG Tab Take 1 tablet (8 mg total) by mouth once daily.    DULCOLAX, BISACODYL, ORAL Take 2 tablets by mouth once daily.    EScitalopram oxalate (LEXAPRO) 20 MG tablet Take 1 tablet (20 mg total) by mouth once daily.    furosemide (LASIX) 20 MG tablet Take 2 tablets (40 mg) by mouth every day.    loperamide HCl (IMODIUM A-D ORAL) Take 2 tablets by mouth at onset of diarrhea, then 1 tablet after each loose bowel movement until diarrhea has " subsided.    methyl salicylate/menth/camph (SALONPAS TOP) Apply topically daily as needed (Pain).    omeprazole magnesium (PRILOSEC ORAL) Take 1 capsule by mouth daily as needed (Heartburn).    oxyCODONE (ROXICODONE) 5 MG immediate release tablet Take 1 tablet (5 mg total) by mouth every 6 (six) hours as needed for Pain.    potassium chloride (MICRO-K) 10 MEQ CpSR Take 1 capsule (10 mEq total) by mouth once daily.     warfarin (COUMADIN) 1 MG tablet Take 0.5 mg by mouth Daily. OR as directed by [facility]madin [facility]       Potential issues to be addressed PRIOR TO DISCHARGE  Please discuss with the patient barriers to adherence with medication treatment plans            Juan David Elias  EXT [phone]                  .

## 2024-10-09 NOTE — ED NOTES
Re-Assumed care of pt.     Pt AAOx4, resting comfortably in bed, NAD, respirations E/UL, updated on POC, wheels locked and in low position, call bell with in reach, Comfort positioning and restroom needs were addressed. Necessary items were placed with in reach and was advised when a reassessment would take place.     Pt denies any complaints at this time. Left knee still painful but more manageable than earlier today. Pt cleaned and placed external catheter to wall suction.

## 2024-10-09 NOTE — CARE UPDATE
[facility] Care Support Interaction      I have reviewed the chart of Coby Atkinson who is hospitalized for Acute bilateral deep vein thrombosis (DVT) of femoral veins. The patient is currently located in the following unit: ED      I have seen and examined the patient and provided the following support:     Patient experience rounds - positive experience reported  Proactive rounds - patient is stable  Pt notified her bed is ready.  contacted and also notified, per pt request. # [phone]      Glendy Kelley PA-C  [facility]

## 2024-10-09 NOTE — HPI
Coby Atkinson is a 78-year-old female with PMHx of osteoarthritis of b/l hips, chronic b/l knee pain, GERD, ADRIÁN, HTN, thalassemia minor, PE (2017 and on Coumadin), DVT of LLE, obesity, CHF, NSTEMI, cognitive impairment, CHF, MDD. Recently presented to ED on 9/26/24 progressive BLE knee pain L>R. X-ray of L knee showed degenerative changes w/ small-moderate effusion; no fractures. Ortho consulted and patient has considering TKA for years but is hesitant to go forward with surgery. Discharged home with home health. Readmitted back to [facility] on 10/7/24 for chronic left knee pain. L knee x-ray with no convincing acute displaced fracture or dislocation of the knee noting small suprapatellar effusion and diffuse lower extremity edema.     Functional History: Patient lives with  in a single story home with ramp to enter. Prior to admission, [place]. DME: [employer]ator.

## 2024-10-09 NOTE — ASSESSMENT & PLAN NOTE
Patient was noted to have BLE DVT on U/S. U/S from 2023 with no DVT on coumadin and INR at goal consulted hem/onc for further AC recs

## 2024-10-09 NOTE — ED NOTES
Patient identifiers for Coby Atkinson checked and correct.    LOC: The patient is awake, alert and aware of environment with an appropriate affect, the patient is oriented x 4 and speaking appropriately.  APPEARANCE: Patient resting comfortably and in no acute distress, patient is clean and well groomed, patient's clothing is properly fastened.  SKIN: The skin is warm and dry, color consistent with ethnicity, patient has normal skin turgor and moist mucus membranes, skin intact, no breakdown or bruising noted.  MUSCULOSKELETAL: Generalized weakness. BLE +3 edema. BLE active ROM significantly impaired. Mobility significantly impaired.  RESPIRATORY: Airway is open and patent, respirations are spontaneous and even, patient has a normal effort and rate.  CARDIAC: Patient has a normal rate and rhythm, no periphreal edema noted, capillary refill < 3 seconds.  ABDOMEN: Soft and non tender to palpation, no distention noted. Patient denies any nausea, vomiting, diarrhea, or constipation.   NEUROLOGIC: Eyes open spontaneously, PERRL, behavior appropriate to situation, follows commands, facial expression symmetrical, bilateral hand grasp equal and even, purposeful motor response noted, normal sensation in all extremities.   HEENT: No abnormalities noted. White sclera and pupils equal round and reactive to light. Denies headache, dizziness.   : Pt voids independently, denies dysuria, hematuria, frequency.

## 2024-10-09 NOTE — PROGRESS NOTES
[facility] - Observation [address] [place] [address] Medicine  Progress Note    Patient Name: Coby Atkinson  MRN: [id number]  Patient Class: IP- Inpatient   Admission Date: 10/7/2024  Length of Stay: 0 days  Attending Physician: Poli Cardona,*  Primary Care Provider: Mundo Hudson DO        Subjective:     Principal Problem:Acute bilateral deep vein thrombosis (DVT) of femoral veins        HPI:  Ms. Coby Atkinson is a. Age female, with PMH of osteoarthritis of b/l hips, chronic b/l knee pain, GERD, ADRIÁN, HTN, thalassemia minor, PE (12/2017)/DVT of LLE, on chronic anticoagulation obesity, CHF, prior NSTEMI, cognitive impairment, CHF, MDD, who presented to [facility] ED on 10/7/24 due to left knee pain. While she was evaluated for this same chronic knee pain two weeks again, she returns today as the pain hs persisted and caused her to be stuck on the toilet x 2 hours. During her last admission, nursing home placement was declined by the patient, and SNF placement was unable to occur 2/2 insurance issues and she chose returning to home. She has been participating in therapy at home, but her knee pain continues to worsen to the point where she was unable to stand or walk without assistance on the day of presentation. Her  is not physically able to care for her, and thus she was sent back to the ED. She was evaluated in the ED with labs that are at her approximate baseline. She was placed on observation.     Overview/Hospital Course:  78 female with PMH of osteoarthritis of b/l hips, chronic b/l knee pain, GERD, ADRIÁN, HTN, thalassemia minor, PE (12/2017)/DVT of LE, on coumadin CHF EF 65% G II DD , CAD, cognitive impairment, MDD, who was admitted with worsening chronic left knee pain,limiting mobility to a point where she is unable to perform ADLs at home   Consulted PT/OT and CM for assistance with placement. Patient with h/o of b/l OA of knees and was recommended TKR by ortho in the past.  Patient unwilling for procedure. Patient was noted to have BLE DVT on U/S. U/S from 2023 with no DVT on coumadin and INR at goal consulted hem/onc for further AC recs    Interval History: L knee pain    Review of Systems   Constitutional: Negative.    Respiratory: Negative.     Cardiovascular: Negative.    Gastrointestinal: Negative.    Genitourinary: Negative.    Musculoskeletal:  Positive for arthralgias.     Objective:     Vital Signs (Most Recent):  Temp: 97.2 °F (36.2 °C) (10/09/24 1129)  Pulse: 64 (10/09/24 1129)  Resp: 16 (10/09/24 1243)  BP: (!) 142/67 (10/09/24 1129)  SpO2: (!) 93 % (10/09/24 1129) Vital Signs (24h Range):  Temp:  [96.5 °F (35.8 °C)-98.4 °F (36.9 °C)] 97.2 °F (36.2 °C)  Pulse:  [55-64] 64  Resp:  [15-18] 16  SpO2:  [93 %-98 %] 93 %  BP: (116-142)/(49-81) 142/67     Weight: 102.1 kg (225 lb)  Body mass index is 38.62 kg/m².  No intake or output data in the 24 hours ending 10/09/24 1337      Physical Exam  Constitutional:       General: She is not in acute distress.     Appearance: She is obese.   Cardiovascular:      Rate and Rhythm: Normal rate.      Pulses: Normal pulses.   Pulmonary:      Effort: No respiratory distress.      Breath sounds: Normal breath sounds. No wheezing.   Abdominal:      General: Bowel sounds are normal. There is no distension.      Palpations: Abdomen is soft.      Tenderness: There is no abdominal tenderness.   Musculoskeletal:         General: Tenderness (L knee) present.      Right lower leg: No edema.      Left lower leg: No edema.   Skin:     General: Skin is warm.   Neurological:      Mental Status: She is alert and oriented to person, place, and time. Mental status is at baseline.   Psychiatric:         Mood and Affect: Mood normal.             Significant Labs: All pertinent labs within the past 24 hours have been reviewed.    Significant Imaging: I have reviewed all pertinent imaging results/findings within the past 24 hours.        Assessment/Plan:      *  Acute bilateral deep vein thrombosis (DVT) of femoral veins    Patient was noted to have BLE DVT on U/S. U/S from 2023 with no DVT on coumadin and INR at goal consulted hem/onc for further AC recs    Current moderate episode of major depressive disorder, unspecified whether recurrent  - continue home meds      Chronic diastolic heart failure  - clinically no apparently exacerbation  - monitor I&OS and daily weights   - continue home meds       Primary osteoarthritis of both knees  - Ms. Coby Atkinson presents after her b/l knee pain from OA was so great that she was unable to stand up from toilet and ambulate   - per report, family no longer able to care for her in the home   - Social Work consulted for assistance w/ placement   - PT / OT rec rehab  Pending placement      Pulmonary hypertension  - history noted       Chronic anticoagulation  - continue coumadin 1 mg QD   - INR daily       Essential hypertension  Patients blood pressure range in the last 24 hours was: BP  Min: 110/51  Max: 175/70.The patient's inpatient anti-hypertensive regimen is listed below:  Current Antihypertensives  amLODIPine tablet 10 mg, Daily, Oral  valsartan tablet 320 mg, Daily, Oral  metoprolol tartrate (LOPRESSOR) tablet 100 mg, 2 times daily, Oral  doxazosin tablet 8 mg, Daily, Oral  spironolactone tablet 25 mg, Daily, Oral  furosemide tablet 40 mg, Daily, Oral    Plan  - BP is controlled, no changes needed to their regimen  - continue home meds    Obstructive sleep apnea  - history noted   - CPAP ordered       Gastroesophageal reflux disease  - continue PPI         VTE Risk Mitigation (From admission, onward)           Ordered     warfarin (COUMADIN) tablet 1 mg  Daily         10/08/24 0653     IP VTE HIGH RISK PATIENT  Once         10/08/24 0337     Place sequential compression device  Until discontinued         10/08/24 0337                    Discharge Planning   MARIAM:      Code Status: Full Code   Is the patient medically  ready for discharge?:     Reason for patient still in hospital (select all that apply): Patient trending condition and Treatment  Discharge Plan A: Skilled Nursing Facility, Rehab   Discharge Delays: None known at this time              Poli Cardona MD  Department of Hospital Medicine   [facility] - Observation 11H

## 2024-10-09 NOTE — CONSULTS
[facility] - Emergency Dept  Physical Medicine & Rehab  Consult Note    Patient Name: Coby Atkinson  MRN: [id number]  Admission Date: 10/7/2024  Hospital Length of Stay: 0 days  Attending Physician: Poli Cardona,*     Inpatient consult to Physical Medicine & Rehabilitation  Consult performed by: Cheryl Prakash NP  Consult requested by:  Poli Cardona,*    Collaborating Physician: Cary Bazan MD  Reason for Consult:  Assess rehabilitation needs      Consults  Subjective:     Principal Problem: Primary osteoarthritis of both knees    HPI: Coby Atkinson is a 78-year-old female with PMHx of osteoarthritis of b/l hips, chronic b/l knee pain, GERD, ADRIÁN, HTN, thalassemia minor, PE (2017 and on Coumadin), DVT of LLE, obesity, CHF, NSTEMI, cognitive impairment, CHF, MDD. Recently presented to ED on 9/26/24 progressive BLE knee pain L>R. X-ray of L knee showed degenerative changes w/ small-moderate effusion; no fractures. Ortho consulted and patient has considering TKA for years but is hesitant to go forward with surgery. Discharged home with home health. Readmitted back to [facility] on 10/7/24 for chronic left knee pain. L knee x-ray with no convincing acute displaced fracture or dislocation of the knee noting small suprapatellar effusion and diffuse lower extremity edema.     Functional History: Patient lives with  in a single story home with ramp to enter. Prior to admission, [place]. DME: rollator.     Hospital Course:   10/8/24: Evaluated by PT. Bed mobility mod- totalA x 2. Sit to stand Asad w/ RW. Gait attempted.     Past Medical History:   Diagnosis Date    Anticoagulant long-term use     Cataract     Chronic diarrhea     Depression     Edema     GERD (gastroesophageal reflux disease)     Hypertension 10/2/2012    Osteoarthritis of both knees 8/9/2016    Osteopenia     Osteopenia 11/30/2017    PE (pulmonary thromboembolism) 12/03/2017    Primary localized osteoarthrosis, lower leg 12/3/2014     Sleep apnea 2016    Thalassemia minor      Past Surgical History:   Procedure Laterality Date    APPENDECTOMY      BLOCK, NERVE, GENICULAR Bilateral 3/14/2024    Procedure: Block, Nerve, Genicular---BILATERAL KNEE;  Surgeon: Kit Barton Jr., MD;  Location: [facility];  Service: Pain Management;  Laterality: Bilateral;    CATARACT EXTRACTION      COLONOSCOPY N/A 11/5/2016    Procedure: COLONOSCOPY;  Surgeon: Tanner Simental MD;  [facility]  Service: Endoscopy;  Laterality: N/A;    HYSTERECTOMY  age 39    fibroids    OOPHORECTOMY      TONSILLECTOMY       Review of patient's allergies indicates:   Allergen Reactions    Codeine     Ciprofloxacin Rash       Scheduled Medications:    amLODIPine  10 mg Oral Daily    doxazosin  8 mg Oral Daily    EScitalopram oxalate  20 mg Oral Daily    furosemide  40 mg Oral BID    metoprolol tartrate  100 mg Oral BID    sodium chloride 0.9%  10 mL Intravenous Q8H    spironolactone  25 mg Oral Daily    valsartan  320 mg Oral Daily    warfarin  1 mg Oral Daily       PRN Medications:   Current Facility-Administered Medications:     acetaminophen, 650 mg, Oral, Q6H PRN    busPIRone, 10 mg, Oral, TID PRN    dextrose 10%, 12.5 g, Intravenous, PRN    dextrose 10%, 25 g, Intravenous, PRN    glucagon (human recombinant), 1 mg, Intramuscular, PRN    glucose, 16 g, Oral, PRN    glucose, 24 g, Oral, PRN    melatonin, 6 mg, Oral, Nightly PRN    naloxone, 0.02 mg, Intravenous, PRN    senna-docusate 8.6-50 mg, 1 tablet, Oral, BID PRN    Family History       Problem Relation (Age of Onset)    Cancer Father, Brother    Coronary artery disease Maternal Grandmother, Paternal Grandmother    Heart failure Maternal Grandmother    Hypertension Mother    No Known Problems Sister, Maternal Aunt, Maternal Uncle, Paternal Aunt, Paternal Uncle, Maternal Grandfather, Paternal Grandfather    Stroke Paternal Grandmother          Tobacco Use    Smoking status: Never     Passive  exposure: Never    Smokeless tobacco: Never   Substance and Sexual Activity    Alcohol use: No     Comment: rare    Drug use: No    Sexual activity: Yes     Partners: Male     Birth control/protection: None     Review of Systems   Reason unable to perform ROS: failling asleep diuring exam,  at bedside answering most history questions.   Constitutional:  Positive for activity change and fatigue. Negative for fever.   Musculoskeletal:  Positive for arthralgias and gait problem.   Neurological:  Positive for weakness.     Objective:     Vital Signs (Most Recent):  Temp: 96.5 °F (35.8 °C) (10/08/24 1758)  Pulse: (!) 55 (10/08/24 1758)  Resp: 16 (10/08/24 1758)  BP: (!) 139/49 (10/08/24 1852)  SpO2: 98 % (10/08/24 1758)    Vital Signs (24h Range):  Temp:  [96.5 °F (35.8 °C)-98.6 °F (37 °C)] 96.5 °F (35.8 °C)  Pulse:  [55-82] 55  Resp:  [16-24] 16  SpO2:  [94 %-99 %] 98 %  BP: (110-175)/(49-99) 139/49     Body mass index is 38.62 kg/m².     Physical Exam  Vitals and nursing note reviewed.   Constitutional:       Comments: Easily arousable, falling asleep during exam   HENT:      Nose: Nose normal.      Mouth/Throat:      Mouth: Mucous membranes are moist.   Pulmonary:      Effort: Pulmonary effort is normal.   Musculoskeletal:         General: Swelling (BLEs) present.      Comments: BLE weakness, lifting LLE off of bed, unable to lift RLE off of bed    Skin:     General: Skin is warm.   Neurological:      Motor: Weakness present.      Gait: Gait abnormal.      Comments: Following commands     Diagnostic Results:   Labs: Reviewed  ECG: Reviewed  X-Ray: Reviewed    Assessment/Plan:     Primary osteoarthritis of both knees  - Ortho consulted last admission and patient has considering TKA for years but is hesitant to go forward with surgery.  - Repeat L knee x-ray this admission with no convincing acute displaced fracture or dislocation of the knee noting small suprapatellar effusion and diffuse lower extremity edema.    - PT & OT eval  - Pain management    Chronic anticoagulation  - Coumadin    PM&R Recommendation:     At this time, the PM&R team has reviewed this patient's ongoing medical case including inpatient diagnosis, medical history, clinical examination, labs, vitals, current social and functional history to provide the post-acute recommendation as follows:     RECOMMENDATIONS: inpatient rehabilitation due to good motivation/participation with therapies, has been determined to tolerate 3 hours of therapy and good potential for recovery.     The patient will be admitted for comprehensive interdisciplinary inpatient rehabilitation to address the impairments due to medical diagnosis of Osteoarthritis of both knees. The patient will benefit from an inpatient rehabilitation program to promote functional recovery, implement compensatory strategies and will undergo assessment for needs for durable medical equipment for safe discharge to the community. This patient will benefit from a coordinated interdisciplinary rehabilitation program services that require close monitoring and treatment with 24-hour rehabilitative nursing and physical/occupational therapies for 3 hours/day for 5 days/week.This interdisciplinary program will be performed under the direction of a physiatrist.    We will continue to follow.     Thank you for your consult.     Cheryl Prakash NP  Department of Physical Medicine & Rehab  [facility] - Emergency Dept

## 2024-10-09 NOTE — HOSPITAL COURSE
78 female with PMH of osteoarthritis of b/l hips, chronic b/l knee pain, GERD, ADRIÁN, HTN, thalassemia minor, PE (12/2017)/DVT of LE, on coumadin CHF EF 65% G II DD , CAD, cognitive impairment, MDD, who was admitted with worsening chronic left knee pain,limiting mobility to a point where she is unable to perform ADLs at home   Consulted PT/OT and CM for assistance with placement. Patient with h/o of b/l OA of knees and was recommended TKR by ortho in the past. Patient unwilling for procedure. Patient was noted to have BLE DVT on U/S. U/S from 2023 with no DVT on coumadin and INR at goal consulted hem/onc, rec switching to eliquis. Patient is discharged to [facility] rehab for further PT.OT . To follow up with PCP/hematology/ortho OP

## 2024-10-09 NOTE — ASSESSMENT & PLAN NOTE
- Ortho consulted last admission and patient has considering TKA for years but is hesitant to go forward with surgery.  - Repeat L knee x-ray this admission with no convincing acute displaced fracture or dislocation of the knee noting small suprapatellar effusion and diffuse lower extremity edema.   - PT & OT eval  - Pain management

## 2024-10-10 ENCOUNTER — DOCUMENTATION ONLY (OUTPATIENT)
Dept: CARDIOLOGY | Facility: HOSPITAL | Age: 79
End: 2024-10-10
Payer: MEDICARE

## 2024-10-10 NOTE — PROGRESS NOTES
Heart Failure [facility]itional [facility] (HFT[facility]) Team notified of pt referral via Ambulatory Referral to Heart Failure Transitional Care ([id number]).    Patient screened today 10/10/2024 by provider and RN for enrollment to program.      Pt was deemed not a candidate for enrollment at this time related to patient primary presenting complaint would not benefit from Heart Failure Transitional Care Program. R/O knee pain OA    Pt will require additional follow up planning per primary team.     If pt status, diagnosis, or treatment plan changes , please place AMB referral to Heart Failure Transitional Care Clinic ([id number]) for HFTCC enrollment re-evalution.

## 2024-10-11 ENCOUNTER — ANTI-COAG VISIT (OUTPATIENT)
Dept: CARDIOLOGY | Facility: CLINIC | Age: 79
End: 2024-10-11
Payer: MEDICARE

## 2024-10-11 DIAGNOSIS — I27.82 CHRONIC PULMONARY EMBOLISM WITHOUT ACUTE COR PULMONALE, UNSPECIFIED PULMONARY EMBOLISM TYPE: ICD-10-CM

## 2024-10-11 DIAGNOSIS — Z79.01 CHRONIC ANTICOAGULATION: ICD-10-CM

## 2024-10-11 DIAGNOSIS — Z86.711 HISTORY OF PULMONARY EMBOLUS (PE): Primary | ICD-10-CM

## 2024-10-11 DIAGNOSIS — I82.532 CHRONIC DEEP VEIN THROMBOSIS (DVT) OF LEFT POPLITEAL VEIN: ICD-10-CM

## 2024-10-11 NOTE — PROGRESS NOTES
Coumadin has been discontinued. Patient has been started on Eliquis. We will discharge from coumadin clinic at this time

## 2024-10-11 NOTE — DISCHARGE SUMMARY
[facility] - Observation [address] [place] [address] Medicine  Discharge Summary      Patient Name: Coby Atkinson  MRN: [id number]  KIMBERLEE: [id number]  Patient Class: IP- Inpatient  Admission Date: 10/7/2024  Hospital Length of Stay: 1 days  Discharge Date and Time: 10/9/2024  6:57 PM  Attending Physician: Megan att. providers found   Discharging Provider: Poli Cardona MD  Primary Care Provider: Mundo Hudson DO  [facility] Medicine Team: [facility] MED J Poli Cardona MD  Primary Care Team: [facility] MED J    HPI:   Ms. Coby Atkinson is a. Age female, with PMH of osteoarthritis of b/l hips, chronic b/l knee pain, GERD, ADRIÁN, HTN, thalassemia minor, PE (12/2017)/DVT of LLE, on chronic anticoagulation obesity, CHF, prior NSTEMI, cognitive impairment, CHF, MDD, who presented to [facility] ED on 10/7/24 due to left knee pain. While she was evaluated for this same chronic knee pain two weeks again, she returns today as the pain hs persisted and caused her to be stuck on the toilet x 2 hours. During her last admission, nursing home placement was declined by the patient, and SNF placement was unable to occur 2/2 insurance issues and she chose returning to home. She has been participating in therapy at home, but her knee pain continues to worsen to the point where she was unable to stand or walk without assistance on the day of presentation. Her  is not physically able to care for her, and thus she was sent back to the ED. She was evaluated in the ED with labs that are at her approximate baseline. She was placed on observation.     * No surgery found *      Hospital Course:   78 female with PMH of osteoarthritis of b/l hips, chronic b/l knee pain, GERD, ADRIÁN, HTN, thalassemia minor, PE (12/2017)/DVT of LE, on coumadin CHF EF 65% G II DD , CAD, cognitive impairment, MDD, who was admitted with worsening chronic left knee pain,limiting mobility to a point where she is unable to perform ADLs at home    Consulted PT/OT and CM for assistance with placement. Patient with h/o of b/l OA of knees and was recommended TKR by ortho in the past. Patient unwilling for procedure. Patient was noted to have BLE DVT on U/S. U/S from 2023 with no DVT on coumadin and INR at goal consulted hem/onc, rec switching to eliquis. Patient is discharged to [facility] rehab for further PT.OT . To follow up with PCP/hematology/ortho OP     Goals of Care Treatment Preferences:  Code Status: Full Code      SDOH Screening:  The patient was screened for utility difficulties, food insecurity, transport difficulties, housing insecurity, and interpersonal safety and there were no concerns identified this admission.     Consults:   Consults (From admission, onward)          Status Ordering Provider     Inpatient consult to Hematology/Oncology  Once        Provider:  (Not yet assigned)    Completed KT ALVARADO     Inpatient consult to Physical Medicine Rehab  Once        Provider:  (Not yet assigned)    Completed KT ALVARADO            No new Assessment & Plan notes have been filed under this hospital service since the last note was generated.  Service: Hospital Medicine    Final Active Diagnoses:    Diagnosis Date Noted POA    PRINCIPAL PROBLEM:  Acute bilateral deep vein thrombosis (DVT) of femoral veins [I82.413] 10/09/2024 Yes    Current moderate episode of major depressive disorder, unspecified whether recurrent [F32.1] 01/26/2024 Yes    Chronic diastolic heart failure [I50.32] 06/11/2020 Yes    Primary osteoarthritis of both knees [M17.0] 02/06/2019 Yes    Pulmonary hypertension [I27.20] 11/25/2018 Yes    Chronic anticoagulation [Z79.01] 12/11/2017 Not Applicable    Essential hypertension [I10] 10/11/2016 Yes    Obstructive sleep apnea [G47.33]  Yes    Gastroesophageal reflux disease [K21.9] 06/13/2016 Yes      Problems Resolved During this Admission:       Discharged Condition: good    Disposition: Rehab Facility    Follow  "Up:    Patient Instructions:      ACCEPT - Ambulatory referral/consult to Heart Failure Transitional Care Clinic   Standing Status: Future   Referral Priority: Routine Referral Type: Consultation   Referral Reason: Specialty Services Required   Requested Specialty: Cardiology   Number of Visits Requested: 1     Ambulatory referral/consult to Orthopedics   Standing Status: Future   Referral Priority: Routine Referral Type: Consultation   Requested Specialty: Orthopedic Surgery   Number of Visits Requested: 1     Ambulatory referral/consult to Hematology / Oncology   Standing Status: Future   Referral Priority: Routine Referral Type: Consultation   Referral Reason: Specialty Services Required   Requested Specialty: Hematology and Oncology   Number of Visits Requested: 1       Significant Diagnostic Studies: Labs: CMP No results for input(s): "NA", "K", "CL", "CO2", "GLU", "BUN", "CREATININE", "CALCIUM", "PROT", "ALBUMIN", "BILITOT", "ALKPHOS", "AST", "ALT", "ANIONGAP", "ESTGFRAFRICA", "EGFRNONAA" in the last 48 hours. and CBC No results for input(s): "WBC", "HGB", "HCT", "PLT" in the last 48 hours.    Pending Diagnostic Studies:       None           Medications:  Reconciled Home Medications:      Medication List        START taking these medications      apixaban 5 mg Tab  Commonly known as: ELIQUIS  Take 1 tablet (5 mg total) by mouth 2 (two) times daily.            CONTINUE taking these medications      alendronate 70 MG tablet  Commonly known as: FOSAMAX  Take 1 tablet once weekly in the morning with a full glass of water on an empty stomach. Do not consume food or lie down for at least 30 minutes afterwards.     amlodipine-valsartan  mg per tablet  Commonly known as: EXFORGE  Take 1 tablet by mouth once daily.     bisoprolol 10 MG tablet  Commonly known as: ZEBETA  TAKE 1 TABLET(10 MG) BY MOUTH EVERY DAY     busPIRone 10 MG tablet  Commonly known as: BUSPAR  TAKE 1 TABLET(10 MG) BY MOUTH THREE TIMES DAILY AS " NEEDED FOR ANXIETY     doxazosin 8 MG Tab  Commonly known as: CARDURA  Take 1 tablet (8 mg total) by mouth once daily.     DULCOLAX (BISACODYL) ORAL  Take 2 tablets by mouth once daily.     EScitalopram oxalate 20 MG tablet  Commonly known as: LEXAPRO  Take 1 tablet (20 mg total) by mouth once daily.     furosemide 20 MG tablet  Commonly known as: LASIX  TAKE 2 TABLETS(40 MG) BY MOUTH EVERY DAY     oxyCODONE 5 MG immediate release tablet  Commonly known as: ROXICODONE  Take 1 tablet (5 mg total) by mouth every 6 (six) hours as needed for Pain.     potassium chloride 10 MEQ Cpsr  Commonly known as: MICRO-K  Take 1 capsule (10 mEq total) by mouth once daily. TAKE 1 CAPSULE(10 MEQ) BY MOUTH EVERY DAY            STOP taking these medications      acetaminophen 500 MG tablet  Commonly known as: TYLENOL     diclofenac sodium 1 % Gel  Commonly known as: VOLTAREN     IMODIUM A-D ORAL     PRILOSEC ORAL     SALONPAS TOP     warfarin 1 MG tablet  Commonly known as: COUMADIN              Indwelling Lines/Drains at time of discharge:   Lines/Drains/Airways       None                   Time spent on the discharge of patient: 35 minutes         Poli Cardona MD  Department of Hospital Medicine  [facility] - Observation 11H

## 2024-10-13 ENCOUNTER — HOSPITAL ENCOUNTER (OUTPATIENT)
Dept: RADIOLOGY | Facility: HOSPITAL | Age: 79
Discharge: HOME | End: 2024-10-13
Attending: INTERNAL MEDICINE
Payer: MEDICARE

## 2024-10-13 PROCEDURE — 71045 X-RAY EXAM CHEST 1 VIEW: CPT | Mod: 26,,, | Performed by: STUDENT IN AN ORGANIZED HEALTH CARE EDUCATION/TRAINING PROGRAM

## 2024-10-15 ENCOUNTER — HOSPITAL ENCOUNTER (OUTPATIENT)
Dept: RADIOLOGY | Facility: HOSPITAL | Age: 79
Discharge: HOME | End: 2024-10-15
Attending: FAMILY MEDICINE
Payer: MEDICARE

## 2024-10-15 ENCOUNTER — OUTPATIENT CASE MANAGEMENT (OUTPATIENT)
Dept: ADMINISTRATIVE | Facility: OTHER | Age: 79
End: 2024-10-15
Payer: MEDICARE

## 2024-10-15 PROCEDURE — 74018 RADEX ABDOMEN 1 VIEW: CPT | Mod: 26,,, | Performed by: RADIOLOGY

## 2024-10-15 NOTE — PROGRESS NOTES
10/15/24 patient was discharged from hospital on 10/9/24 and admitted to rehab facility. Will continue to follow. RN OPCM

## 2024-10-22 ENCOUNTER — EXTERNAL HOME HEALTH (OUTPATIENT)
Dept: HOME HEALTH SERVICES | Facility: HOSPITAL | Age: 79
End: 2024-10-22
Payer: MEDICARE

## 2024-10-24 ENCOUNTER — TELEPHONE (OUTPATIENT)
Dept: ORTHOPEDICS | Facility: CLINIC | Age: 79
End: 2024-10-24
Payer: MEDICARE

## 2024-10-24 NOTE — TELEPHONE ENCOUNTER
I called the pt she stated she's in a new nursing home. She has already had US and they told her she has blot clots in both legs. She was put on Eliquis. I advised pt I would relay the message to Dr Chavira.     ----- Message from Teodora sent at 10/24/2024 11:10 AM CDT -----  Contact: Home  [phone]  Pts  Mr. Atkinson  Patients  Mr. Atkinson would like to speak with you in regards to him saying that the patient have Blood clots in her legs, and she cannot stand.

## 2024-10-25 ENCOUNTER — TELEPHONE (OUTPATIENT)
Dept: HEPATOLOGY | Facility: CLINIC | Age: 79
End: 2024-10-25
Payer: MEDICARE

## 2024-10-25 DIAGNOSIS — B19.20 HEPATITIS C TEST POSITIVE: Primary | ICD-10-CM

## 2024-10-25 NOTE — TELEPHONE ENCOUNTER
Carlito Guerrier PA-C ordered that patient be scheduled for a hepatology consult visit for hep c. Patient hep c quant positive.  I spoke with patient who is currently in a rehab facility.  Appt with PA Scheuermann scheduled 12/9/24; appt reminder notice mailed.

## 2024-10-28 ENCOUNTER — OUTPATIENT CASE MANAGEMENT (OUTPATIENT)
Dept: ADMINISTRATIVE | Facility: OTHER | Age: 79
End: 2024-10-28
Payer: MEDICARE

## 2024-11-06 ENCOUNTER — OFFICE VISIT (OUTPATIENT)
Dept: ORTHOPEDICS | Facility: CLINIC | Age: 79
End: 2024-11-06
Payer: MEDICARE

## 2024-11-06 DIAGNOSIS — M17.12 PRIMARY OSTEOARTHRITIS OF LEFT KNEE: Primary | ICD-10-CM

## 2024-11-06 DIAGNOSIS — M25.562 ACUTE PAIN OF LEFT KNEE: ICD-10-CM

## 2024-11-06 PROCEDURE — 99214 OFFICE O/P EST MOD 30 MIN: CPT | Mod: S$PBB,,, | Performed by: PHYSICIAN ASSISTANT

## 2024-11-06 PROCEDURE — 99212 OFFICE O/P EST SF 10 MIN: CPT | Mod: PBBFAC | Performed by: PHYSICIAN ASSISTANT

## 2024-11-06 PROCEDURE — 99999 PR PBB SHADOW E&M-EST. PATIENT-LVL II: CPT | Mod: PBBFAC,,, | Performed by: PHYSICIAN ASSISTANT

## 2024-11-06 RX ORDER — METHYLPREDNISOLONE 4 MG/1
TABLET ORAL
Qty: 21 EACH | Refills: 0 | Status: SHIPPED | OUTPATIENT
Start: 2024-11-06 | End: 2024-11-27

## 2024-11-06 NOTE — PROGRESS NOTES
SUBJECTIVE:       History of Present Illness    CHIEF COMPLAINT:  - Coby presents today for follow-up evaluation of severe left knee pain and inability to walk, which has worsened over the past three weeks.    HPI:  Coby presents with knee pain ongoing for two years, worsening approximately one month ago when she was unable to get out of bed, prompting an emergency room visit. She was subsequently admitted due to bilateral DVT.  She is now currently admitted to a skilled nursing facility.  She is unable to return home due to inability to walk and transfer.  She was last seen by Dr. Rivero in October of last year, where knee replacement surgery was discussed. However, due to the recently discovered blood clots, she is likely not a candidate for surgery at this time.    Coby reports being unable to walk currently due to severe knee pain, stating she cannot walk at all or take any steps due to excessive knee pain. She mentions taking a few steps during therapy. Her leg is described as always being swollen, with tenderness all over the knee. She is unable to raise or straighten her leg. Coby had a previous negative experience with a knee injection, described as extremely painful.     Coby denies any recent falls on her knees.    Medications- Eliques for anti-coagulation.  Previously on coumadin.        Review of patient's allergies indicates:   Allergen Reactions    Codeine     Ciprofloxacin Rash         Current Outpatient Medications   Medication Sig Dispense Refill    alendronate (FOSAMAX) 70 MG tablet Take 1 tablet once weekly in the morning with a full glass of water on an empty stomach. Do not consume food or lie down for at least 30 minutes afterwards. 12 tablet 3    amlodipine-valsartan (EXFORGE)  mg per tablet Take 1 tablet by mouth once daily. 90 tablet 3    apixaban (ELIQUIS) 5 mg Tab Take 1 tablet (5 mg total) by mouth 2 (two) times daily. 60 tablet 4    bisoprolol (ZEBETA) 10 MG tablet TAKE 1  TABLET(10 MG) BY MOUTH EVERY DAY 90 tablet 3    busPIRone (BUSPAR) 10 MG tablet TAKE 1 TABLET(10 MG) BY MOUTH THREE TIMES DAILY AS NEEDED FOR ANXIETY 90 tablet 2    doxazosin (CARDURA) 8 MG Tab Take 1 tablet (8 mg total) by mouth once daily. 90 tablet 3    DULCOLAX, BISACODYL, ORAL Take 2 tablets by mouth once daily.      EScitalopram oxalate (LEXAPRO) 20 MG tablet Take 1 tablet (20 mg total) by mouth once daily. 90 tablet 3    furosemide (LASIX) 20 MG tablet TAKE 2 TABLETS(40 MG) BY MOUTH EVERY  tablet 3    methylPREDNISolone (MEDROL DOSEPACK) 4 mg tablet use as directed 21 each 0    oxyCODONE (ROXICODONE) 5 MG immediate release tablet Take 1 tablet (5 mg total) by mouth every 6 (six) hours as needed for Pain. 28 tablet 0    potassium chloride (MICRO-K) 10 MEQ CpSR Take 1 capsule (10 mEq total) by mouth once daily. TAKE 1 CAPSULE(10 MEQ) BY MOUTH EVERY DAY 90 capsule 3     No current facility-administered medications for this visit.       Past Medical History:   Diagnosis Date    Anticoagulant long-term use     Cataract     Chronic diarrhea     Depression     Edema     GERD (gastroesophageal reflux disease)     Hypertension 10/2/2012    Osteoarthritis of both knees 8/9/2016    Osteopenia     Osteopenia 11/30/2017    PE (pulmonary thromboembolism) 12/03/2017    Primary localized osteoarthrosis, lower leg 12/3/2014    Sleep apnea 2016    Thalassemia minor        Past Surgical History:   Procedure Laterality Date    APPENDECTOMY      BLOCK, NERVE, GENICULAR Bilateral 3/14/2024    Procedure: Block, Nerve, Genicular---BILATERAL KNEE;  Surgeon: Kit Barton Jr., MD;  Location: [facility] PAIN [facility]MT;  Service: Pain Management;  Laterality: Bilateral;    CATARACT EXTRACTION      COLONOSCOPY N/A 11/5/2016    Procedure: COLONOSCOPY;  Surgeon: Tanner Simental MD;  Location: [facility] ENDO ([address] [place] [address]);  Service: Endoscopy;  Laterality: N/A;    HYSTERECTOMY  age 39    fibroids    OOPHORECTOMY      TONSILLECTOMY          OBJECTIVE:     PHYSICAL EXAM:  General: Well developed, well nourished, in no acute distress.  Neurological: Mood & affect are normal.  HEENT: NCAT, sclera nonicteric   Lungs: Respirations are equal and unlabored.   CV: 2+ bilateral upper and lower extremity pulses.   Skin: Intact throughout with no rashes, erythema, or lesions  Extremities: No LE edema, no erythema or warmth of the skin in either lower extremity.    left Knee Exam:  Knee Range of Motion: limited due to pain  Diffuse TTP   Effusion: mild- no warmth or erythema  Condition of skin: intact  ROM   Strength: 4 of 5 quadriceps strength and 5 of 5 hamstring strength  Stability:  stable to testing  Varus /Valgus stress:  normal    Left Hip Examination:  full painless range of motion, without tenderness    IMAGING:    X-rays of the left knee, personally reviewed by me, demonstrate no acute abnormality.  Thre is severe DJD of the left knee.  No fracture or dislocation.       ASSESSMENT/PLAN:     Diagnoses and all orders for this visit:    Primary osteoarthritis of left knee    Acute pain of left knee  -     Ambulatory referral/consult to Orthopedics  -     CT Knee Without Contrast Left; Future    Other orders  -     methylPREDNISolone (MEDROL DOSEPACK) 4 mg tablet; use as directed      Assessment & Plan    - CT scan left knee due to inability to bear weight and sudden increase in pain.  Will call to discuss results  - Discussed knee replacement surgery as a potential future treatment option, but noted it is not ideal currently due to the recent DVT.  She would need medical clearance prior to considering TKA  - Recommend trying a knee brace as a non-invasive treatment option.  She may wear this with activity or attempted ambulation  - Medrol dose pack  - Discussed the possibility of a steroid injection in the knee in 2-3 weeks if her condition does not improve.  She declined the injection today due to prior negative experience.  - Follow up in 2-3  weeks if her condition does not improve to reassess and potentially try a steroid injection.         This note was generated with the assistance of ambient listening technology. Verbal consent was obtained by the patient and accompanying visitor(s) for the recording of patient appointment to facilitate this note. I attest to having reviewed and edited the generated note for accuracy, though some syntax or spelling errors may persist. Please contact the author of this note for any clarification.

## 2024-11-11 ENCOUNTER — HOSPITAL ENCOUNTER (OUTPATIENT)
Dept: RADIOLOGY | Facility: HOSPITAL | Age: 79
Discharge: HOME | End: 2024-11-11
Attending: PHYSICIAN ASSISTANT
Payer: MEDICARE

## 2024-11-11 DIAGNOSIS — M25.562 ACUTE PAIN OF LEFT KNEE: ICD-10-CM

## 2024-11-11 PROCEDURE — 73700 CT LOWER EXTREMITY W/O DYE: CPT | Mod: 26,LT,, | Performed by: RADIOLOGY

## 2024-11-11 PROCEDURE — 73700 CT LOWER EXTREMITY W/O DYE: CPT | Mod: TC,LT

## 2024-11-12 ENCOUNTER — OUTPATIENT CASE MANAGEMENT (OUTPATIENT)
Dept: ADMINISTRATIVE | Facility: OTHER | Age: 79
End: 2024-11-12
Payer: MEDICARE

## 2024-11-12 ENCOUNTER — TELEPHONE (OUTPATIENT)
Dept: ORTHOPEDICS | Facility: CLINIC | Age: 79
End: 2024-11-12
Payer: MEDICARE

## 2024-11-12 NOTE — TELEPHONE ENCOUNTER
Called patient about CT results- negative for fracture  Follow up if she changes her mind about injection  States she may be going home tomorrow

## 2024-11-21 ENCOUNTER — OUTPATIENT CASE MANAGEMENT (OUTPATIENT)
Dept: ADMINISTRATIVE | Facility: OTHER | Age: 79
End: 2024-11-21
Payer: MEDICARE

## 2024-11-21 NOTE — PROGRESS NOTES
Outpatient Care Management  Plan of Care Follow Up Visit    Patient: Coby Atkinson  MRN: [id number]  Date of Service: 11/21/2024  Completed by: Cindy Mcdonald RN  Referral Date: 09/27/2024    Reason for Visit   Patient presents with    OPCM RN Follow Up Call       Brief Summary: Phone contact made with Ms. Tenorio today. We discussed her care plan. Referred to SW. Will continue to follow. MILY MADISONM

## 2024-11-22 ENCOUNTER — TELEPHONE (OUTPATIENT)
Dept: INTERNAL MEDICINE | Facility: CLINIC | Age: 79
End: 2024-11-22
Payer: MEDICARE

## 2024-11-22 NOTE — TELEPHONE ENCOUNTER
----- Message from  Cindy sent at 11/21/2024 12:44 PM CST -----  Good afternoon,     I spoke with this patient today. She is now home from the skilled nursing facility. The patient is on bed rest due to blood clots in her legs. Spouse did get a wheelchair accessible van with a lift but it is in the shop and won't be ready until next week sometime. They have some questions regarding her clots and how this should be monitored. They are also wanting to see how this will effect her being able to become more mobile. With her restrictions, would [facility] Home be an option for her until the patient can come in for an appointment? If in agreement, can you please place a referral.   We also went through her medications. She is also taking the following medications that need to be added to her med list:   Metoprolol 100 mg daily  Pantoprazole 40 mg BID prn   Ranitadine 150 mg daily   Hyrocodone - acetaminophen 10 mg every 8 hours prn   Folic acid 1mg daily   Losartan potassium 50 mg     Thank you for your assistance,   Cindy Mcdonald RN  Outpatient Complex Care Management  [phone]

## 2024-11-22 NOTE — TELEPHONE ENCOUNTER
LVM for pt to call me bc I have questions about the home health that was with her before being in a facility so we can reinstate the care or put in new orders

## 2024-11-22 NOTE — TELEPHONE ENCOUNTER
Looks like she was prescribed HHC last month by Dr Bazan. Is the order still active ? If so we can give a verbal for extra services or do we need a new referral ?

## 2024-12-02 ENCOUNTER — OUTPATIENT CASE MANAGEMENT (OUTPATIENT)
Dept: ADMINISTRATIVE | Facility: OTHER | Age: 79
End: 2024-12-02
Payer: MEDICARE

## 2024-12-02 NOTE — PROGRESS NOTES
Outpatient Care Management   - Patient Assessment    Patient: Coby Atkinson  MRN:  [id number]  Date of Service:  12/2/2024  Completed by:  Paty Montano LCSW  Referral Date: 09/27/2024    Reason for Visit   Patient presents with    Social Work Assessment       Brief Summary:  received a referral from OPCM RN for the following HR SW psychosocial needs : pt needing more help at home.  Pt is bedbound with spouse as the sole caregiver.  Pt requires help with all ADL's.  Very limited family support.  May need nursing home placement in the near future. Care plan was created in collaboration with patient/caregiver input.   completed the SDOH questionnaire.

## 2024-12-04 ENCOUNTER — TELEPHONE (OUTPATIENT)
Dept: INTERNAL MEDICINE | Facility: CLINIC | Age: 79
End: 2024-12-04
Payer: MEDICARE

## 2024-12-04 NOTE — TELEPHONE ENCOUNTER
Called Pt, regarding current condition.  Stated she is essentially bedwritten.   Unable to move around without assistance.     helps her around with wheelchair, she uses a device for bowel/bladder evacuation.    Informed her that I will pass info to PCP,  And if we need to see her for a visit, we will let her know.

## 2024-12-04 NOTE — TELEPHONE ENCOUNTER
----- Message from Med Assistant Torres sent at 12/3/2024  2:11 PM CST -----  Regarding: Update on condition  Contact: Home  [phone]  Update on condition.  May need to Schedule Appt.  ----- Message -----  From: Teodora Amaro  Sent: 12/3/2024  10:01 AM CST  To: Becca PAIGE Staff    Patient would like for you to give her a call in regards to her saying that she's no longer able to walk.

## 2024-12-05 NOTE — TELEPHONE ENCOUNTER
Schedule her an eval with Jaz to determine where we go from here.  Can consider [facility](if they want it will order) for eval but at this point may need permanent placement 2/2 her medical comorbidities   See what they want to do...

## 2024-12-06 ENCOUNTER — OUTPATIENT CASE MANAGEMENT (OUTPATIENT)
Dept: ADMINISTRATIVE | Facility: OTHER | Age: 79
End: 2024-12-06
Payer: MEDICARE

## 2024-12-06 NOTE — PROGRESS NOTES
Outpatient Care Management  Plan of Care Follow Up Visit    Patient: Coby Atkinson  MRN: [id number]  Date of Service: 12/06/2024  Completed by: Cindy Mcdonald RN  Referral Date: 09/27/2024    Reason for Visit   Patient presents with    OPCM RN Follow Up Call       Brief Summary: Phone contact made with  And Ms. Atkinson today. We discussed her care plans. No new needs at this time. Will continue to monitor.

## 2024-12-09 ENCOUNTER — LAB VISIT (OUTPATIENT)
Dept: LAB | Facility: HOSPITAL | Age: 79
End: 2024-12-09
Payer: MEDICARE

## 2024-12-09 ENCOUNTER — OFFICE VISIT (OUTPATIENT)
Dept: HEPATOLOGY | Facility: CLINIC | Age: 79
End: 2024-12-09
Payer: MEDICARE

## 2024-12-09 VITALS — HEIGHT: 64 IN | BODY MASS INDEX: 38.62 KG/M2

## 2024-12-09 DIAGNOSIS — B18.2 CHRONIC HEPATITIS C WITHOUT HEPATIC COMA: ICD-10-CM

## 2024-12-09 DIAGNOSIS — B18.2 CHRONIC HEPATITIS C WITHOUT HEPATIC COMA: Primary | ICD-10-CM

## 2024-12-09 DIAGNOSIS — B19.20 HEPATITIS C TEST POSITIVE: ICD-10-CM

## 2024-12-09 LAB
HAV IGG SER QL IA: NORMAL
HBV CORE AB SERPL QL IA: NORMAL
HBV SURFACE AB SER-ACNC: <3 MIU/ML
HBV SURFACE AB SER-ACNC: NORMAL M[IU]/ML
HBV SURFACE AG SERPL QL IA: NORMAL

## 2024-12-09 PROCEDURE — 86790 VIRUS ANTIBODY NOS: CPT | Performed by: PHYSICIAN ASSISTANT

## 2024-12-09 PROCEDURE — 99999 PR PBB SHADOW E&M-EST. PATIENT-LVL III: CPT | Mod: PBBFAC,,, | Performed by: PHYSICIAN ASSISTANT

## 2024-12-09 PROCEDURE — 86704 HEP B CORE ANTIBODY TOTAL: CPT | Performed by: PHYSICIAN ASSISTANT

## 2024-12-09 PROCEDURE — 99213 OFFICE O/P EST LOW 20 MIN: CPT | Mod: PBBFAC | Performed by: PHYSICIAN ASSISTANT

## 2024-12-09 PROCEDURE — 99215 OFFICE O/P EST HI 40 MIN: CPT | Mod: S$PBB,,, | Performed by: PHYSICIAN ASSISTANT

## 2024-12-09 PROCEDURE — 87340 HEPATITIS B SURFACE AG IA: CPT | Performed by: PHYSICIAN ASSISTANT

## 2024-12-09 PROCEDURE — 86706 HEP B SURFACE ANTIBODY: CPT | Performed by: PHYSICIAN ASSISTANT

## 2024-12-09 RX ORDER — VELPATASVIR AND SOFOSBUVIR 100; 400 MG/1; MG/1
1 TABLET, FILM COATED ORAL DAILY
Qty: 28 TABLET | Refills: 2 | Status: ACTIVE | OUTPATIENT
Start: 2024-12-09

## 2024-12-09 NOTE — PROGRESS NOTES
HEPATOLOGY CLINIC VISIT NOTE - HCV clinic  REFERRING PROVIDER: Carlito Guerrier PA-C  CHIEF COMPLAINT: Hepatitis C   (accompanied by: )    HISTORY       This is a 79 y.o. White female with chronic hepatitis C, here for further eval / mngmt. PMH significant for inability to walk or get out of wheelchair w/o a lift for transfer.    HCV history:  Recent diagnosis: 10/2024, ER based screening  Prior icteric illnesses: None  Risks for HCV:  Blood transfusion 1960s  - Prior Treatment: No  - Genotype ?  - HCV RNA 35,014 IU/mL - 10/2024    Liver staging:  No formal liver staging  No liver imaging  Labs - no evidence of liver dysfunction, Liver panel consistently normal, plt > 250    FIB-4 Calculation: 1.61 (10/9/2024): considered low-risk for advanced fibrosis (based on age > 64)     Denies jaundice, dark urine, abdominal distention, hematemesis, melena, slowed mentation.       PMH, PSH, SOCIAL HX, FAMILY HX      Reviewed in Epic  Pertinent findings:  FAMILY HX: neg for liver diease  SOCIAL HX: . Resides in [place]. 1 son who is now   Alcohol - no  Drugs - no    ROS: as per HPI  Rare GERD    PHYSICAL EXAM:  Friendly White female, in no acute distress; alert and oriented to person, place and time  HEENT: Sclerae anicteric.   NECK: Supple  LUNGS: Normal respiratory effort.   ABDOMEN: Flat, soft, nontender.  SKIN: Warm and dry. No jaundice, No obvious rashes.   EXTREMITIES: No lower extremity edema  NEURO/PSYCH: Seated in wheelchair, unable to get out. Memory intact. Thought and speech pattern appropriate. Behavior normal. No depression or anxiety noted.    PERTINENT DIAGNOSTIC RESULTS      Lab Results   Component Value Date    WBC 11.32 10/09/2024    HGB 9.6 (L) 10/09/2024     10/09/2024     Lab Results   Component Value Date    INR 2.9 (H) 10/09/2024     Lab Results   Component Value Date    AST 17 10/08/2024    ALT 10 10/08/2024    BILITOT 0.7 10/08/2024    ALBUMIN 3.8 10/08/2024    ALKPHOS 75  10/08/2024    CREATININE 1.1 10/09/2024    BUN 18 10/09/2024     10/09/2024    K 3.5 10/09/2024       ASSESSMENT        79 y.o. White female with:  1. Chronic HCV, genotype  ?  - treatment naive  -- HAV status - Unknown  -- HBV status - Unknown    Inability to walk / stand which limit ability for outpt liver imaging    PLAN        Labs today  Epclusa x 12 weeks sent to [facility]  -- Patient to notify me of Rx start date so labs can be scheduled.    Orders Placed This Encounter   Procedures    Hepatitis C Genotype    Hepatitis B Surface Antigen    Hepatitis B Surface Ab, Qualitative    Hepatitis B Core Antibody, Total    Hepatitis A antibody, IgG    HCV Fibrosure     Considering advanced age, inability to do fibroscan since she can not ascend exam table, limited ability to obtain MRE / U/S elastography given same mobility issues, normal plt and LFT, and FIB-4 predicting low likelihood of advanced liver fibrosis will stage liver w/ fibrosure. If F3-4 predicted will rediscuss imaging based staging w/ pt &     ___________________________________________________________________  EDUCATION:  The natural history of Hepatitis C, including potential progression to cirrhosis was reviewed. We discussed the increased progression of liver disease secondary to alcohol use; patient was advised to avoid alcohol completely.     Transmission of Hepatitis C was reviewed, including possible sexual transmission. Sexual contacts should be screened. Risk of vertical transmission of Hepatitis C from mother to baby was reviewed. Patient should avoid sharing personal products such as razors, toothbrushes, etc.     Recommend avoiding raw seafood.  Limit acetaminophen to 2000mg daily.    HCV RX  Discussed goal of HCV eradication to prevent progression of liver disease.  Discussed use of Epclusa daily x 12 weeks w/ potential side effects of fatigue and headache.     Reviewed limitations on acid suppressant  medications due to DDI w/ Epclusa:  -- Antacids - must be  from Epclusa by 4 hours  -- H2 Receptor Antagonist - not taking  -- PPI - not recommended while on Epclusa  Patient instructed to contact me if experiencing acid related symptoms so further recommendations can be made regarding acid suppression therapy.      Herbal / alternative therapies must be discontinued  Discussed importance of medication adherence and risk of treatment failure / viral resistance if not adherent. Pt has verbalized understanding.    __________________________________________________________________    Duration of encounter: 55 min  This includes face-to-face time and non face-to-face time preparing to see the patient (eg, review of tests), obtaining and/or reviewing separately obtained history, documenting clinical information in the electronic or other health record, independently interpreting resultsand communicating results to the patient/family/caregiver, or care coordination.

## 2024-12-10 ENCOUNTER — OUTPATIENT CASE MANAGEMENT (OUTPATIENT)
Dept: ADMINISTRATIVE | Facility: OTHER | Age: 79
End: 2024-12-10
Payer: MEDICARE

## 2024-12-11 PROBLEM — B18.2 CHRONIC HEPATITIS C WITHOUT HEPATIC COMA: Status: ACTIVE | Noted: 2024-12-11

## 2024-12-12 LAB
A2 MACROGLOB SERPL-MCNC: 244 MG/DL (ref 100–280)
ALT SERPL W P-5'-P-CCNC: 18 U/L (ref 7–45)
APO A-I SERPL-MCNC: 130 MG/DL
BILIRUB SERPL-MCNC: 0.3 MG/DL (ref 0–1.2)
BIOPREDICTIVE SERIAL NUMBER: ABNORMAL
FIBROSIS STAGE SERPL QL: ABNORMAL
FIBROTEST INTERPRETATION: ABNORMAL
FIBROTEST-ACTITEST COMMENT: ABNORMAL
GGT SERPL-CCNC: 20 U/L (ref 5–36)
HAPTOGLOB SERPL-MCNC: 197 MG/DL (ref 30–200)
LIVER FIBR SCORE SERPL CALC.FIBROSURE: 0.28
NECROINFLAMMAT INTERP: ABNORMAL
NECROINFLAMMATORY ACT GRADE SERPL QL: ABNORMAL
NECROINFLAMMATORY ACT SCORE SERPL: 0.07

## 2024-12-13 ENCOUNTER — OFFICE VISIT (OUTPATIENT)
Dept: INTERNAL MEDICINE | Facility: CLINIC | Age: 79
End: 2024-12-13
Payer: MEDICARE

## 2024-12-13 VITALS
HEART RATE: 64 BPM | OXYGEN SATURATION: 97 % | BODY MASS INDEX: 38.45 KG/M2 | SYSTOLIC BLOOD PRESSURE: 100 MMHG | DIASTOLIC BLOOD PRESSURE: 58 MMHG | TEMPERATURE: 98 F | WEIGHT: 224 LBS

## 2024-12-13 DIAGNOSIS — R41.3 MEMORY LOSS: ICD-10-CM

## 2024-12-13 DIAGNOSIS — L85.3 XEROSIS OF SKIN: ICD-10-CM

## 2024-12-13 DIAGNOSIS — R26.2 UNABLE TO WALK: ICD-10-CM

## 2024-12-13 DIAGNOSIS — R41.3 OTHER AMNESIA: ICD-10-CM

## 2024-12-13 DIAGNOSIS — R53.81 PHYSICAL DEBILITY: Primary | ICD-10-CM

## 2024-12-13 PROCEDURE — 99214 OFFICE O/P EST MOD 30 MIN: CPT | Mod: S$PBB,,, | Performed by: NURSE PRACTITIONER

## 2024-12-13 PROCEDURE — 99213 OFFICE O/P EST LOW 20 MIN: CPT | Mod: PBBFAC,PO | Performed by: NURSE PRACTITIONER

## 2024-12-13 PROCEDURE — 99999 PR PBB SHADOW E&M-EST. PATIENT-LVL III: CPT | Mod: PBBFAC,,, | Performed by: NURSE PRACTITIONER

## 2024-12-13 RX ORDER — AMMONIUM LACTATE 12 G/100G
CREAM TOPICAL
Qty: 140 G | Refills: 2 | Status: SHIPPED | OUTPATIENT
Start: 2024-12-13

## 2024-12-13 NOTE — PROGRESS NOTES
Subjective:       Patient ID: Coby Atkinson is a 79 y.o. female.    Chief Complaint: Difficulty Walking    History of Present Illness    CHIEF COMPLAINT:  Patient presents today for inability to walk since October.    MOBILITY AND FUNCTION:  She has been unable to walk since October, previously ambulating with a walker for 2 years. She reports painful knees, though recent medical evaluation suggests knee pain may not be the primary cause of her ambulatory dysfunction. She reports having sensation and strength in legs but cannot stand. She underwent hospitalization and rehabilitation at [facility] where she achieved minimal standing and walking with walker assistance. She currently uses a Gene lift at home for transfers as her  is her sole caregiver.  She is unable to stand or walk. She is able to pull herself up with use of the overhead bar her  installed over the bed.     MEDICAL CONDITIONS:  She has blood clots in bilateral calves and Hepatitis C. She reports episodes of nausea and vomiting.    NEUROLOGICAL STATUS:  She demonstrates cognitive decline with increasing confusion about dates and current year and exhibits memory deficits. She reports excessive daytime sleepiness.    PAST MEDICAL HISTORY:  She experienced a miscarriage in the 1960s and had a fall between the stove and refrigerator approximately 1-2 years ago.    IMAGING:  She reports inability to undergo X-ray imaging due to difficulty with positioning from chair.    ROS:  Gastrointestinal: +nausea, +vomiting  Musculoskeletal: +joint pain, -muscle weakness  Psychiatric: +memory problems, +sleep difficulty      Objective:      Physical Exam  Vitals reviewed.   Constitutional:       Appearance: Normal appearance. She is obese.   HENT:      Head: Normocephalic and atraumatic.   Eyes:      Conjunctiva/sclera: Conjunctivae normal.      Pupils: Pupils are equal, round, and reactive to light.   Cardiovascular:      Rate and  Rhythm: Normal rate and regular rhythm.   Pulmonary:      Effort: Pulmonary effort is normal.      Breath sounds: Normal breath sounds.   Abdominal:      General: Bowel sounds are normal.      Palpations: Abdomen is soft.   Musculoskeletal:         General: Normal range of motion.      Cervical back: Normal range of motion and neck supple.   Skin:     General: Skin is warm.      Comments: Xerosis to bilateral upper and lower extremities   Neurological:      General: No focal deficit present.   Psychiatric:         Mood and Affect: Mood normal.         Assessment:       1. Physical debility    2. Unable to walk    3. Memory loss    4. Xerosis of skin  - ammonium lactate 12 % Crea; Apply topically to arms and legs  Dispense: 140 g; Refill: 2      Plan:       Assessment & Plan    IMPRESSION:  Suspect cognitive impairment as a major factor in patient's inability to walk  Await neurologist evaluation for comprehensive assessment  Consider possible memory disorders contributing to functional decline  Recognized need for further investigation beyond arthritis as cause of mobility issues  Will discuss case with Dr. Hudson on Monday    XEROSIS CUTIS (DRY SKIN):  - Started cream for dry skin on arms and face (not for groin area).    REDUCED MOBILITY:  - Continue with HH PT   -With increased fatigue and recent inability to ambulate independently, Guillain [place]  vs MS are potential diagnoses to consider  -Will order some labs and get and MRI of the brain.  -Pt to keep appt with Neurology on 12/30/24  -EMG of legs ordered by ortho.     FOLLOW-UP:  - Keep appt with neurology as previously scheduled on 12/30/24  -ER criteria discussed if pt's condition changes        This note was generated with the assistance of ambient listening technology. Verbal consent was obtained by the patient and accompanying visitor(s) for the recording of patient appointment to facilitate this note. I attest to having reviewed and edited the  generated note for accuracy, though some syntax or spelling errors may persist. Please contact the author of this note for any clarification.

## 2024-12-16 ENCOUNTER — TELEPHONE (OUTPATIENT)
Dept: HEPATOLOGY | Facility: CLINIC | Age: 79
End: 2024-12-16
Payer: MEDICARE

## 2024-12-16 DIAGNOSIS — B18.2 CHRONIC HEPATITIS C WITHOUT HEPATIC COMA: Primary | ICD-10-CM

## 2024-12-16 NOTE — TELEPHONE ENCOUNTER
----- Message from Pharmacist Tyrell sent at 12/16/2024  1:44 PM CST -----  Pt plan to start 12/20

## 2024-12-16 NOTE — TELEPHONE ENCOUNTER
Pt beginning 12 weeks Epclusa on 12/20/24  Anticipated treatment end date: 3/13/24  F1 (FibroSURE)  Rachel ?  Prior HCV treatment: No      Please review with patient /   Hepatitis C treatment with Epclusa will last 12 weeks. This means TWO more shipments of Epclusa will come from pharmacy. If there are problems getting future shipments please call us right away!  After treatment ends there is a small chance the virus can return. We will do lab work 3 months after treatment ends to see if virus is cured.    Please update episode & schedule labs to see if the virus is cured  - LFT, HCV RNA - SVR12 - 6/13/24      Also pls let pt know labs indicate very low likelihood of bad liver scarring. She will not need liver monitoring after her HCV is cured.

## 2024-12-17 NOTE — TELEPHONE ENCOUNTER
I spoke with patient.  Msg from PA Scheuermann relayed and mailed to her.  Lab draw scheduled 6/13/25; appt reminder notice mailed.

## 2024-12-26 ENCOUNTER — TELEPHONE (OUTPATIENT)
Dept: INTERNAL MEDICINE | Facility: CLINIC | Age: 79
End: 2024-12-26
Payer: MEDICARE

## 2024-12-26 NOTE — TELEPHONE ENCOUNTER
----- Message from Jaz Burnett NP sent at 12/23/2024 12:15 PM CST -----  Recent MRI of the brain showed no evidence of any acute intracranial processes. It is quite similar to MRI of the brain done in 2020.    Keep appt with neuro on 12/30/24 as previously scheduled.   Thank you.

## 2024-12-30 ENCOUNTER — OUTPATIENT CASE MANAGEMENT (OUTPATIENT)
Dept: ADMINISTRATIVE | Facility: OTHER | Age: 79
End: 2024-12-30
Payer: MEDICARE

## 2024-12-30 NOTE — LETTER
Coby Atkinson  [address] [address]      Dear Coby Atkinson,     I am your nurse with [facility]s Outpatient [facility] Management Department. I was unsuccessful in reaching you today. At your earliest convenience, I would like to discuss your healthcare progress.      Please contact me at [phone]  from 8:00AM to 4:30 PM on Monday thru Friday.     As you know, [facility] is a program offered to you through [facility] where a nurse is available 24/7 to answer questions or provide medical advice, their number is [phone].    Thanks,      Cindy Mcdonald, RN  Outpatient Care Management

## 2024-12-30 NOTE — PROGRESS NOTES
12/30/24 Attempt f/u with patient/caregiver. No answer. Left message requesting call back. RN OPCM  sent letter in mail

## 2025-01-04 PROBLEM — D64.9 ANEMIA: Status: ACTIVE | Noted: 2025-01-04

## 2025-01-04 PROBLEM — R55 SYNCOPE: Status: ACTIVE | Noted: 2025-01-04

## 2025-01-04 PROBLEM — E66.9 OBESITY: Status: ACTIVE | Noted: 2025-01-04

## 2025-01-04 PROBLEM — I95.9 HYPOTENSION: Status: ACTIVE | Noted: 2025-01-04

## 2025-01-06 ENCOUNTER — OUTPATIENT CASE MANAGEMENT (OUTPATIENT)
Dept: ADMINISTRATIVE | Facility: OTHER | Age: 80
End: 2025-01-06
Payer: MEDICARE

## 2025-01-07 PROBLEM — R78.81 POSITIVE BLOOD CULTURE: Status: ACTIVE | Noted: 2025-01-07

## 2025-01-07 PROBLEM — R55 SYNCOPE: Status: RESOLVED | Noted: 2025-01-04 | Resolved: 2025-01-07

## 2025-01-07 PROBLEM — I95.9 HYPOTENSION: Status: RESOLVED | Noted: 2025-01-04 | Resolved: 2025-01-07

## 2025-01-08 ENCOUNTER — OUTPATIENT CASE MANAGEMENT (OUTPATIENT)
Dept: ADMINISTRATIVE | Facility: OTHER | Age: 80
End: 2025-01-08
Payer: MEDICARE

## 2025-01-08 PROBLEM — N39.0 ACUTE UTI: Status: ACTIVE | Noted: 2025-01-08

## 2025-01-08 PROBLEM — R78.81 POSITIVE BLOOD CULTURE: Status: RESOLVED | Noted: 2025-01-07 | Resolved: 2025-01-08

## 2025-01-14 ENCOUNTER — OUTPATIENT CASE MANAGEMENT (OUTPATIENT)
Dept: ADMINISTRATIVE | Facility: OTHER | Age: 80
End: 2025-01-14
Payer: MEDICARE

## 2025-01-15 ENCOUNTER — OUTPATIENT CASE MANAGEMENT (OUTPATIENT)
Dept: ADMINISTRATIVE | Facility: OTHER | Age: 80
End: 2025-01-15
Payer: MEDICARE

## 2025-01-15 NOTE — PROGRESS NOTES
01/15/25 pt was discharged from the hospital and admitted to [facility]. Will continue to follow. RN OPCM.

## 2025-01-28 ENCOUNTER — TELEPHONE (OUTPATIENT)
Facility: CLINIC | Age: 80
End: 2025-01-28
Payer: MEDICARE

## 2025-01-28 PROCEDURE — G0180 MD CERTIFICATION HHA PATIENT: HCPCS | Mod: ,,, | Performed by: INTERNAL MEDICINE

## 2025-01-28 NOTE — TELEPHONE ENCOUNTER
I spoke with patient's  and asked that he have  to give hepatology a call.  Unsure why nursing homes are not accepting patient at this time.

## 2025-01-28 NOTE — TELEPHONE ENCOUNTER
"----- Message from Jennifer Scheuermann, PA sent at 1/27/2025  4:52 PM CST -----  Can you look into this please?  I saw her in Dec and prescribed HCV Rx which she is taking now.  was w/ her at that visit so I'm not sure what else is needed.    thanks  ----- Message -----  From: Moni Mistry MA  Sent: 1/25/2025  10:57 AM CST  To: Jennifer B. Scheuermann, PA    Please advise, thank you  ----- Message -----  From: Jonnie Villalobos  Sent: 1/24/2025   4:59 PM CST  To: Scheuermann Jennifer B Staff    Consult/Advisory    Name Of Caller: Isma Atkinson (Spouse)    Contact Preference?: [phone]     Provider Name: Scheuermann    Does patient feel the need to be seen today? No    What is the nature of the call?: Requesting clarification if pt was ever diagnosed w/ hepatitis. Stating pt can't be allowed into a alf home due to a previous hepatitis diagnosis    Additional Notes:  "Thank you for all that you do for our patients"  "

## 2025-02-03 ENCOUNTER — OUTPATIENT CASE MANAGEMENT (OUTPATIENT)
Dept: ADMINISTRATIVE | Facility: OTHER | Age: 80
End: 2025-02-03
Payer: MEDICARE

## 2025-02-03 NOTE — PROGRESS NOTES
02/03/25 pt still admitted to rehab. They are trying to find nursing home placement at this time. Notified spouse if pt comes home can contact this RN for any needs. Will continue to follow. RN OPCM

## 2025-02-04 ENCOUNTER — OUTPATIENT CASE MANAGEMENT (OUTPATIENT)
Dept: ADMINISTRATIVE | Facility: OTHER | Age: 80
End: 2025-02-04
Payer: MEDICARE

## 2025-02-18 ENCOUNTER — OUTPATIENT CASE MANAGEMENT (OUTPATIENT)
Dept: ADMINISTRATIVE | Facility: OTHER | Age: 80
End: 2025-02-18
Payer: MEDICARE

## 2025-02-18 NOTE — PROGRESS NOTES
Outpatient Care Management  Plan of Care Follow Up Visit    Patient: Coby Atkinson  MRN: [id number]  Date of Service: 02/18/2025  Completed by: Cindy Mcdonald RN  Referral Date: 09/27/2024    Reason for Visit   Patient presents with    OPCM RN Follow Up Call    OPCM Post Discharge       Brief Summary: Phone contact made with Mr. Atkinson today. The patient is now home from another stay at rehab. Sent message about care at home referral. Will continue to follow.

## 2025-02-19 ENCOUNTER — TELEPHONE (OUTPATIENT)
Dept: INTERNAL MEDICINE | Facility: CLINIC | Age: 80
End: 2025-02-19
Payer: MEDICARE

## 2025-02-19 DIAGNOSIS — R53.81 PHYSICAL DEBILITY: Primary | ICD-10-CM

## 2025-02-19 DIAGNOSIS — R26.2 UNABLE TO WALK: ICD-10-CM

## 2025-02-19 DIAGNOSIS — K75.9 HEPATITIS: ICD-10-CM

## 2025-02-19 NOTE — TELEPHONE ENCOUNTER
----- Message from  Cindy sent at 2/18/2025 10:18 AM CST -----  Good morning, I spoke with this patient's  today. The patient is now home from her inpatient rehab stay. She is still feeling very weak and hard to get our of the house. Can you place a Care at Home referral for her? Spouse has questions about pt's medications and what she should and shouldn't be taking since coming home. He did list them to me. She is not taking the Fosamax, Exforge, and Lasix. Other meds that she has that are not listed are Methocarbolol 5 mg, Folic acid 1 mg, and Hydrocodone .Thank you for your assistance, Cindy Mcdonald RNOutpatient Complex Care [phone]

## 2025-02-19 NOTE — TELEPHONE ENCOUNTER
XIMENA and advise.  Pt home from rehab.  Needs Care at Home referral.    Pt  Has about wife medications,  What she should and shouldn't take.    Not taking:  Fosamax   Exforge  Lasix    Other meds that she has that are not listed: Methocarbolol 5 mg,   Folic acid 1 mg,   Hydrocodone

## 2025-02-20 ENCOUNTER — TELEPHONE (OUTPATIENT)
Dept: HOME HEALTH SERVICES | Facility: CLINIC | Age: 80
End: 2025-02-20
Payer: MEDICARE

## 2025-02-21 ENCOUNTER — OUTPATIENT CASE MANAGEMENT (OUTPATIENT)
Dept: ADMINISTRATIVE | Facility: OTHER | Age: 80
End: 2025-02-21
Payer: MEDICARE

## 2025-02-22 DIAGNOSIS — Z00.00 ENCOUNTER FOR MEDICARE ANNUAL WELLNESS EXAM: ICD-10-CM

## 2025-02-26 ENCOUNTER — OUTPATIENT CASE MANAGEMENT (OUTPATIENT)
Dept: ADMINISTRATIVE | Facility: OTHER | Age: 80
End: 2025-02-26
Payer: MEDICARE

## 2025-03-06 ENCOUNTER — OUTPATIENT CASE MANAGEMENT (OUTPATIENT)
Dept: ADMINISTRATIVE | Facility: OTHER | Age: 80
End: 2025-03-06
Payer: MEDICARE

## 2025-03-06 NOTE — PROGRESS NOTES
03/6/25  pt admitted back to [facility] with plans to go to long term nursing care. Will continue to follow. MILY OPCM

## 2025-03-19 ENCOUNTER — OUTPATIENT CASE MANAGEMENT (OUTPATIENT)
Dept: ADMINISTRATIVE | Facility: OTHER | Age: 80
End: 2025-03-19
Payer: MEDICARE

## 2025-03-24 ENCOUNTER — OUTPATIENT CASE MANAGEMENT (OUTPATIENT)
Dept: ADMINISTRATIVE | Facility: OTHER | Age: 80
End: 2025-03-24
Payer: MEDICARE

## 2025-03-24 NOTE — LETTER
Coby Atkinson  [address] [facility] [place] [address]      Dear Coby Atkinson,     I am your nurse with [facility]s Outpatient Care Management Department. I was unsuccessful in reaching you today. At your earliest convenience, I would like to discuss your healthcare progress.      Please contact me at [phone]  from 8:00AM to 4:30 PM on Monday thru Friday.     As you know, [facility] is a program offered to you through [facility] where a nurse is available 24/7 to answer questions or provide medical advice, their number is [phone].    Thanks,      Cindy Mcdonald, RN  Outpatient Care Management

## 2025-03-28 ENCOUNTER — TELEPHONE (OUTPATIENT)
Dept: HEPATOLOGY | Facility: CLINIC | Age: 80
End: 2025-03-28
Payer: MEDICARE

## 2025-03-28 ENCOUNTER — CARE AT HOME (OUTPATIENT)
Dept: HOME HEALTH SERVICES | Facility: CLINIC | Age: 80
End: 2025-03-28
Payer: MEDICARE

## 2025-03-28 DIAGNOSIS — I82.413 ACUTE BILATERAL DEEP VEIN THROMBOSIS (DVT) OF FEMORAL VEINS: Primary | ICD-10-CM

## 2025-03-28 DIAGNOSIS — N39.0 ACUTE UTI: ICD-10-CM

## 2025-03-28 DIAGNOSIS — E66.01 MORBID OBESITY WITH BMI OF 40.0-44.9, ADULT: ICD-10-CM

## 2025-03-28 DIAGNOSIS — R26.2 UNABLE TO WALK: ICD-10-CM

## 2025-03-28 DIAGNOSIS — K75.9 HEPATITIS: ICD-10-CM

## 2025-03-28 DIAGNOSIS — I27.82 CHRONIC PULMONARY EMBOLISM WITHOUT ACUTE COR PULMONALE, UNSPECIFIED PULMONARY EMBOLISM TYPE: ICD-10-CM

## 2025-03-28 DIAGNOSIS — R53.81 PHYSICAL DEBILITY: ICD-10-CM

## 2025-03-28 DIAGNOSIS — M17.0 PRIMARY OSTEOARTHRITIS OF BOTH KNEES: ICD-10-CM

## 2025-03-28 DIAGNOSIS — I50.32 CHRONIC DIASTOLIC HEART FAILURE: ICD-10-CM

## 2025-03-28 DIAGNOSIS — B18.2 CHRONIC HEPATITIS C WITHOUT HEPATIC COMA: ICD-10-CM

## 2025-03-28 DIAGNOSIS — Z91.81 RISK FOR FALLS: ICD-10-CM

## 2025-03-28 DIAGNOSIS — I10 ESSENTIAL HYPERTENSION: ICD-10-CM

## 2025-03-28 PROCEDURE — 99350 HOME/RES VST EST HIGH MDM 60: CPT | Mod: S$GLB,,, | Performed by: NURSE PRACTITIONER

## 2025-03-28 NOTE — TELEPHONE ENCOUNTER
I spoke with patient on today and told him that hep c was not contagious. I called him back again.  He states that he's trying to get permanent placement for his wife at [facility] or [facility] but being told that he can't because she's contagious.  I spoke with Nurse Tripp (admitting dept # [phone] at [facility]). She states that per corporate (owner) of their facility a patient can't be admitted for permanent residency until it's documented that the patient has been treated for hep c with an EOT date. She states that it's just her companies policy (part financial/infection control) it's not every nursing home. Patient will need a letter to help with placement now since hep c treatment complete.

## 2025-03-28 NOTE — TELEPHONE ENCOUNTER
----- Message from Giovana sent at 3/28/2025  9:16 AM CDT -----  Regarding: Questions  Name Of Caller:    Isma (Pt's )Contact Preference:   [phone] of call:   Pt's  states their Wife finished taking the sofosbuvir-velpatasvir (Epclusa) 400-100 mg Tab. He would like to know her next steps.

## 2025-03-28 NOTE — TELEPHONE ENCOUNTER
----- Message from Marcello sent at 3/28/2025  1:19 PM CDT -----  Regarding: Consult/Advisory  Contact: [phone]  Consult/Advisory Name Of Caller: pt    Contact Preference:  [phone] Nature of call: Would like to know if hep c is contagious because all nursing homes has denied entry due to her diagnosis

## 2025-03-28 NOTE — TELEPHONE ENCOUNTER
I spoke with Isma ().  He states that patient missed some doses of Epclusa while in rehab but she took last pill one day last week. He could not provide an exact date.  SVR 12 draw moved from 6/13 to 6/27; reminder notice mailed.

## 2025-03-31 ENCOUNTER — OUTPATIENT CASE MANAGEMENT (OUTPATIENT)
Dept: ADMINISTRATIVE | Facility: OTHER | Age: 80
End: 2025-03-31
Payer: MEDICARE

## 2025-03-31 VITALS
OXYGEN SATURATION: 99 % | RESPIRATION RATE: 20 BRPM | TEMPERATURE: 98 F | HEART RATE: 65 BPM | DIASTOLIC BLOOD PRESSURE: 61 MMHG | SYSTOLIC BLOOD PRESSURE: 136 MMHG

## 2025-03-31 NOTE — PROGRESS NOTES
[facility]  Medical Chronic Care Home Visit    Encounter Provider: Tamela Landaverde   PCP: Mundo Hudson,   Consult Requested By: Dr. Mundo Hudson    HISTORY OF PRESENT ILLNESS      Patient ID: Coby Atkinson is a 79 y.o. female is being seen in the home due to physical debility that presents a taxing effort to leave the home, to mitigate high risk of hospital readmission and/or due to the limited availability of reliable or safe options for transportation to the point of access to the provider. Prior to treatment on this visit the chart was reviewed and patient verbal consent was obtained.    HPI:  79-year-old female with past medical history significant for pulmonary embolism, hypertension, GERD presented to ED via EMS after having syncopal episode x2 while she was at a seafood restaurant.  Patient noted that she had a food and started feeling bad and had a repeated vomitus episode.  Upon EMS arrival patient was found to be hypotensive and hypoxic.  She was given 500 mL for IV fluids and Zofran with improvement to overall symptoms.  She has been admitted for workup for possible etiology for syncopal episode.     Hospital Course:   The patient was admitted for physical debility and an acute urinary tract infection accompanied by nausea and vomiting. The patient was started on IV Rocephin, and one of two blood cultures grew coagulase-negative staphylococcus, which is likely a contaminant.   Nausea and vomiting resolved. .The patient remained afebrile with no leukocytosis. The patient will be discharged with a prescription for Ceftin. Additionally, the patient has been accepted to inpatient rehabilitation for physical debility. On the day of discharge, the patient was examined and deemed stable for release. The discharge plan was discussed with the patient, who was agreeable and receptive. Medications were reviewed, outpatient follow-up appointments were scheduled, and emergency room  precautions were provided. All questions were answered to the patient's satisfaction. The patient was discharged with instructions to follow up with their primary care physician after discharge from rehabilitation for continued care.    Today:  Reports no acute issues or concerns.  VSS.  Spouse reports mental status at baseline.  Denies dysuria, chest pain, sob, fevers, chills, cough, congestion, change in bladder habits, change in bowel habits.  Reports taking meds as prescribed.  Patient is currently bedbound.  Spouse provides 24 hour support.  Spouse reports patient is very difficult to get out of bed due to chronic knee pain.  Spouse requesting orthopedic referral.  Reports good bm pattern, sleep, appetite.  She is current with guardian [facility].      DECISION MAKING TODAY       Assessment & Plan:  1. Acute bilateral deep vein thrombosis (DVT) of femoral veins  Assessment & Plan:  Reports no acute issues  Continue eliquis       2. Physical debility  Assessment & Plan:  Bed bound  Continue caregiver support  Continue home health pt/ot    Orders:  -     Ambulatory referral/consult to [facility]    3. Unable to walk  -     Ambulatory referral/consult to [facility]    4. Hepatitis  -     Ambulatory referral/consult to [facility]    5. Chronic diastolic heart failure  Assessment & Plan:  Denies chest pain, SOB  Continue medication as prescribed  Keep scheduled follow up appts  Activity and Diet restrictions:   Recommend 2-3 gram sodium restriction and 1500cc- 2000cc fluid restriction.  Encourage physical activity with graded exercise program.  Requested patient to weigh themselves daily, and to notify us if their weight increases by more than 3 lbs in 1 day or 5 lbs in 3 days.  Elevated lower extremities while sitting/lying, compression stockings        6. Chronic pulmonary embolism without acute cor pulmonale, unspecified pulmonary embolism  type  Assessment & Plan:  Reports no acute issues  Continue eliquis       7. Essential hypertension  Assessment & Plan:  Chronic, stable  Continue meds as prescribed       8. Chronic hepatitis C without hepatic coma  Assessment & Plan:  Continue to follow with hepatology       9. Risk for falls  Assessment & Plan:  Discussed fall precautions       10. Primary osteoarthritis of both knees  Assessment & Plan:  Spouse requesting orthopedic referral  Referral placed  Continue home pt/ot    Orders:  -     Ambulatory referral/consult to Orthopedics; Future; Expected date: 2025    11. Morbid obesity with BMI of 40.0-44.9, adult  Assessment & Plan:  She has been educated on the negative effects of obesity to one's health and encouraged to consider lifestyle diet modifications and exercise.        12. Acute UTI  Assessment & Plan:  Resolved, reports no current symptoms              ENVIRONMENT OF CARE      Family and/or Caregiver present at visit?  Yes  Name of Caregiver: spouse  History provided by: patient and caregiver    4Ms for Medical Decision-Making in Older Adults    Last Completed EAWV: 2023    MEDICATIONS:  High Risk Medications:  Total Active Medications: 2  doxazosin Tab - 8 MG  EScitalopram oxalate - 20 MG    MOBILITY:  Activities of Daily Livin/19/2023    10:21 AM   Activities of Daily Living   Ambulation Assistance Required   Ambulation Assistance Walker (wheeled)   Dressing Independent   Transfers Independent   Toileting Continent of bladder;Continent of bowel   Feeding Independent   Cleaning home/Chores Independent   Telephone use Independent   Shopping Independent   Paying bills Assistance Required   Taking meds Independent     Fall Risk:      3/28/2025     8:00 AM 2024     1:40 PM 2024    10:30 AM   Fall Risk Assessment - Outpatient   Mobility Status Stretcher Ambulatory w/ assistance Ambulatory w/ assistance   Number of falls 0 1 1   Identified as fall risk True False False      Disability Status:      3/12/2024     6:24 PM   Disability Status   Are you deaf or do you have serious difficulty hearing? N   Are you blind or do you have serious difficulty seeing, even when wearing glasses? N   Because of a physical, mental, or emotional condition, do you have serious difficulty concentrating, remembering, or making decisions? N   Do you have serious difficulty walking or climbing stairs? N   Do you have difficulty dressing or bathing? N   Because of a physical, mental, or emotional condition, do you have difficulty doing errands alone such as visiting a doctor's office or shopping? N     Nutrition Screenin/19/2023    10:21 AM   Nutrition Screening   Has food intake declined over the past three months due to loss of appetite, digestive problems, chewing or swallowing difficulties? No decrease in food intake   Involuntary weight loss during the last 3 months? No weight loss   Mobility? Goes out   Has the patient suffered psychological stress or acute disease in the past three months? No   Neuropsychological problems? No psychological problems   Body Mass Index (BMI)?  BMI 23 or greater   Screening Score 14   Interpretation Normal nutritional status    Screening Score: 0-7 Malnourished, 8-11 At Risk, 12-14 Normal  Get Up and Go:      2023    10:17 AM 2022     9:54 AM 2021     9:58 AM 2020    10:34 AM 2019     9:55 AM   Get Up and Go   Trial 1 -- 11 seconds 12 seconds 10 seconds --     Whisper Test:      2023    10:17 AM   Whisper Test   Whisper Test --         MENTATION:   Has Dementia Dx: No  Has Anxiety Dx: No    Depression Patient Health Questionnaire:      10/1/2024     3:09 PM   Depression Patient Health Questionnaire   Over the last two weeks how often have you been bothered by little interest or pleasure in doing things Not at all   Over the last two weeks how often have you been bothered by feeling down, depressed or hopeless Not at all   PHQ-2  Total Score 0     Cognitive Function Screenin/19/2023    10:19 AM   Cognitive Function Screening   Clock Drawing Test 2    Mini-Cog 3 Minute Recall 0   Cognitive Function Screening 2       Data saved with a previous flowsheet row definition         WHAT MATTERS MOST:  Advance Care Planning   ACP Status:   Patient has had an ACP conversation  Living Will: No  Power of : No  [place]: No    What is most important right now is to focus on avoiding the hospital    Accordingly, we have decided that the best plan to meet the patient's goals includes continuing with treatment      What matters most to patient today is: improved strength, pain control          Is patient hospice appropriate: No  (If needed, use PPS <30 or FAST score >7)  Was referral to hospice placed: No    Impression upon entering the home:  Physical Dwelling: single family home   Appearance of home environment: cleaniness: clean  Functional Status: max assistance  Mobility: bedbound  Nutritional access: adequate intake and access  Home Health: Yes, HH Agency guardian HH    DME/Supplies: hospital bed and katie lift, [employer]      Diagnostic tests reviewed/disposition: No diagnosic tests pending after this hospitalization.  Disease/illness education:  signs and symptoms to report   Establishment or re-establishment of referral orders for community resources: No other necessary community resources.   Discussion with other health care providers: No discussion with other health care providers necessary.   Does patient have a PCP at [facility]? Yes   Repatriation plan with PCP? follow-up with PCP within 90d   Does patient have an ostomy (ileostomy, colostomy, suprapubic catheter, nephrostomy tube, tracheostomy, PEG tube, pleurex catheter, cholecystostomy, etc)? No  Were BPAs reviewed? No    Social History     Socioeconomic History    Marital status:     Number of children: 1   Occupational History    Occupation: Retired from [employer]    Tobacco Use    Smoking status: Never     Passive exposure: Never    Smokeless tobacco: Never   Substance and Sexual Activity    Alcohol use: No     Comment: rare    Drug use: No    Sexual activity: Yes     Partners: Male     Birth control/protection: None     Social Drivers of Health     Financial Resource Strain: Patient Declined (1/7/2025)    Overall Financial Resource Strain (CARDIA)     Difficulty of Paying Living Expenses: Patient declined   Food Insecurity: Patient Declined (1/7/2025)    Hunger Vital Sign     Worried About Running Out of Food in the Last Year: Patient declined     Ran Out of Food in the Last Year: Patient declined   Transportation Needs: Patient Declined (1/7/2025)    TRANSPORTATION NEEDS     Transportation : Patient declined   Physical Activity: Inactive (12/2/2024)    Exercise Vital Sign     Days of Exercise per Week: 0 days     Minutes of Exercise per Session: 0 min   Stress: Patient Declined (1/7/2025)    [employer] - Occupational Stress Questionnaire     Feeling of Stress : Patient declined   Recent Concern: Stress - Stress Concern Present (1/5/2025)    [employer] - Occupational Stress Questionnaire     Feeling of Stress : Very much   Housing Stability: Patient Declined (1/7/2025)    Housing Stability Vital Sign     Unable to Pay for Housing in the Last Year: Patient declined     Homeless in the Last Year: Patient declined         OBJECTIVE:     Vital Signs:  Vitals:    03/28/25 1500   BP: 136/61   Pulse: 65   Resp: 20   Temp: 98 °F (36.7 °C)       Review of Systems   Constitutional:  Negative for chills and fever.   HENT:  Negative for congestion, postnasal drip and rhinorrhea.    Eyes:  Negative for visual disturbance.   Respiratory:  Negative for chest tightness, shortness of breath and wheezing.    Cardiovascular:  Negative for chest pain and palpitations.   Gastrointestinal:  Negative for abdominal pain, blood in  stool, constipation, diarrhea, nausea and vomiting.   Genitourinary:  Negative for difficulty urinating and dysuria.   Musculoskeletal:  Positive for arthralgias and gait problem. Negative for back pain.   Skin:  Negative for color change and wound.   Neurological:  Positive for weakness. Negative for dizziness.   Hematological:  Does not bruise/bleed easily.   Psychiatric/Behavioral:  Negative for agitation.        Physical Exam  Constitutional:       Appearance: She is obese.   HENT:      Head: Normocephalic and atraumatic.      Nose: No congestion or rhinorrhea.   Cardiovascular:      Rate and Rhythm: Normal rate.   Pulmonary:      Effort: No respiratory distress.      Breath sounds: Normal breath sounds.   Abdominal:      General: Bowel sounds are normal.      Palpations: Abdomen is soft.   Musculoskeletal:         General: No deformity.      Cervical back: Normal range of motion and neck supple.   Skin:     General: Skin is warm and dry.   Neurological:      Mental Status: She is alert. Mental status is at baseline.   Psychiatric:         Mood and Affect: Mood normal.       INSTRUCTIONS FOR PATIENT:     Scheduled Follow-up, Appts Reviewed with Modifications if Needed: Yes  Future Appointments   Date Time Provider Department Center   6/27/2025 11:00 AM LAB, [facility] LAB [facility]       Current Medications[1]    Medication Reconciliation:  Were medications changed during this appointment? No  Were medications in the home? Yes  Is the patient taking the medications as directed? Yes  Does the patient/caregiver understand the medications and changes? Yes  Does updated med list accurately reflects meds patient is currently taking? Yes    Signature: Tamela Landaverde NP     Total face-to-face time was 60 min, >50% of this was spent on counseling and coordination of care. The following issues were discussed: primary and secondary diagnoses, co-morbidities, prescribed medications, treatment modalities, importance of  compliance with medical advice and directives for follow-up care.           [1]   Current Outpatient Medications:     alendronate (FOSAMAX) 70 MG tablet, Take 1 tablet once weekly in the morning with a full glass of water on an empty stomach. Do not consume food or lie down for at least 30 minutes afterwards. (Patient not taking: Reported on 2/18/2025), Disp: 12 tablet, Rfl: 3    amlodipine-valsartan (EXFORGE)  mg per tablet, Take 1 tablet by mouth once daily. (Patient not taking: Reported on 2/18/2025), Disp: 90 tablet, Rfl: 3    apixaban (ELIQUIS) 5 mg Tab, Take 1 tablet (5 mg total) by mouth 2 (two) times daily., Disp: 60 tablet, Rfl: 4    bisoprolol (ZEBETA) 10 MG tablet, TAKE 1 TABLET(10 MG) BY MOUTH EVERY DAY, Disp: 90 tablet, Rfl: 3    busPIRone (BUSPAR) 10 MG tablet, TAKE 1 TABLET(10 MG) BY MOUTH THREE TIMES DAILY AS NEEDED FOR ANXIETY, Disp: 90 tablet, Rfl: 2    doxazosin (CARDURA) 8 MG Tab, Take 1 tablet (8 mg total) by mouth once daily., Disp: 90 tablet, Rfl: 3    EScitalopram oxalate (LEXAPRO) 20 MG tablet, Take 1 tablet (20 mg total) by mouth once daily., Disp: 90 tablet, Rfl: 3    furosemide (LASIX) 20 MG tablet, TAKE 2 TABLETS(40 MG) BY MOUTH EVERY DAY (Patient not taking: Reported on 2/18/2025), Disp: 180 tablet, Rfl: 3    potassium chloride (MICRO-K) 10 MEQ CpSR, Take 1 capsule (10 mEq total) by mouth once daily. TAKE 1 CAPSULE(10 MEQ) BY MOUTH EVERY DAY, Disp: 90 capsule, Rfl: 3    sofosbuvir-velpatasvir 400-100 mg Tab, Take 1 tablet by mouth once daily., Disp: 28 tablet, Rfl: 2

## 2025-03-31 NOTE — PROGRESS NOTES
Outpatient Care Management  Plan of Care Follow Up Visit    Patient: Coby Atkinson  MRN: [id number]  Date of Service: 03/31/2025  Completed by: Cindy Mcdonald RN  Referral Date: 09/27/2024    Reason for Visit   Patient presents with    OPCM RN Follow Up Call       Brief Summary: Phone contact made with Coby today. We discussed her care plan. No new needs at this time. Will continue to follow for education and management.

## 2025-04-01 ENCOUNTER — OFFICE VISIT (OUTPATIENT)
Dept: ORTHOPEDICS | Facility: CLINIC | Age: 80
End: 2025-04-01
Payer: MEDICARE

## 2025-04-01 ENCOUNTER — HOSPITAL ENCOUNTER (OUTPATIENT)
Dept: RADIOLOGY | Facility: HOSPITAL | Age: 80
Discharge: HOME | End: 2025-04-01
Payer: MEDICARE

## 2025-04-01 VITALS — BODY MASS INDEX: 38.79 KG/M2 | HEIGHT: 63 IN

## 2025-04-01 DIAGNOSIS — M17.0 PRIMARY OSTEOARTHRITIS OF BOTH KNEES: Primary | ICD-10-CM

## 2025-04-01 DIAGNOSIS — M17.0 PRIMARY OSTEOARTHRITIS OF BOTH KNEES: ICD-10-CM

## 2025-04-01 PROCEDURE — 99214 OFFICE O/P EST MOD 30 MIN: CPT | Mod: S$PBB,,,

## 2025-04-01 PROCEDURE — 99999 PR PBB SHADOW E&M-EST. PATIENT-LVL III: CPT | Mod: PBBFAC,,,

## 2025-04-01 PROCEDURE — 99213 OFFICE O/P EST LOW 20 MIN: CPT | Mod: PBBFAC

## 2025-04-01 NOTE — PROGRESS NOTES
"  SUBJECTIVE:     Chief Complaint & History of Present Illness:  History of Present Illness    CHIEF COMPLAINT:  - Left knee pain due to significant arthritis    HPI:  Ms. Atkinson presents with complaints of bilateral knee pain, left worse than right. Her left knee pain is severe, described as inability to bear weight on her legs and extreme sensitivity to touch. She is unable to stand or walk independently, requiring a Gene lift at home. She can only stand for 8-17 seconds before pain becomes too intense. Her  reports that moving her in bed or having her sit on the side of the bed causes significant pain.    She has a history of blood clots in her legs and is currently on Eliquis. She has seen Dr. Chavira for her left knee and discussed potential knee replacement.    Treatment history includes multiple rounds of rehabilitation since April of last year, with stays at [facility] and [facility], each lasting about 28 days. During rehab and at home, attempts are made to have her stand, but she can only manage for short periods due to pain. She has had a previous knee injection which caused severe pain, leading to her almost losing consciousness.    She mentions experiencing lower back pain at the beginning of the week, which she attributes to prolonged periods of lying down. She denies any recent falls, accidents, or injuries.    PREVIOUS TREATMENTS:  - She has been to multiple rehab facilities since April of last year, including [facility] for 28 days and [facility] for 28 days, but was unable to walk due to pain.  - At home and in rehab, she attempts to stand with assistance, holding for 8-17 seconds before pain becomes too great.  - Sheila gave her a Medrol Dosepak during a previous visit.  - She had one knee injection in the past, which caused severe pain and almost made her lose consciousness.    - Per Dr. Mckeon on 12/11/2024: "We will order EMG and nerve conduction velocities on her, she " "needs to be seen by her neurologist as well by the hepatologist, to determine of the hepatitis-C has something to do with this also we need to find out the source of her hypercoagulability. We will order a protein S, protein C, factor 5 Leiden test, we will follow her accordingly."    MEDICATIONS:  - Eliquis  - Patches which help a little bit with pain  - Tylenol      ROS:  Musculoskeletal: +joint pain, +back pain, +limb pain, +pain with movement, +difficulty standing up          Past Medical History:   Diagnosis Date    Anticoagulant long-term use     Cataract     Chronic diarrhea     Depression     Edema     GERD (gastroesophageal reflux disease)     Hypertension 10/2/2012    Osteoarthritis of both knees 8/9/2016    Osteopenia     Osteopenia 11/30/2017    PE (pulmonary thromboembolism) 12/03/2017    Primary localized osteoarthrosis, lower leg 12/3/2014    Sleep apnea 2016    Thalassemia minor        Past Surgical History:   Procedure Laterality Date    APPENDECTOMY      BLOCK, NERVE, GENICULAR Bilateral 3/14/2024    Procedure: Block, Nerve, Genicular---BILATERAL KNEE;  Surgeon: Kit Barton Jr., MD;  Location: [facility] PAIN MGMT;  Service: Pain Management;  Laterality: Bilateral;    CATARACT EXTRACTION      COLONOSCOPY N/A 11/5/2016    Procedure: COLONOSCOPY;  Surgeon: Tanner Simental MD;  Location: [facility] ENDO ([address] [place] [address]);  Service: Endoscopy;  Laterality: N/A;    HYSTERECTOMY  age 39    fibroids    OOPHORECTOMY      TONSILLECTOMY         Family History   Problem Relation Name Age of Onset    Hypertension Mother      Cancer Father          skin    No Known Problems Sister      Cancer Brother          pancreatic    No Known Problems Maternal Aunt      No Known Problems Maternal Uncle      No Known Problems Paternal Aunt      No Known Problems Paternal Uncle      Coronary artery disease Maternal Grandmother      Heart failure Maternal Grandmother      No Known Problems Maternal Grandfather      Stroke Paternal " "Grandmother      Coronary artery disease Paternal Grandmother      No Known Problems Paternal Grandfather      Amblyopia Neg Hx      Blindness Neg Hx      Cataracts Neg Hx      Diabetes Neg Hx      Glaucoma Neg Hx      Macular degeneration Neg Hx      Retinal detachment Neg Hx      Strabismus Neg Hx      Thyroid disease Neg Hx      Colon cancer Neg Hx      Stomach cancer Neg Hx      Esophageal cancer Neg Hx         Review of patient's allergies indicates:   Allergen Reactions    Codeine     Ciprofloxacin Rash         Current Medications[1]      OBJECTIVE:     PHYSICAL EXAM:  Ht 5' 3" (1.6 m)   BMI 38.79 kg/m²   General: Pleasant, cooperative, NAD.  HEENT: NCAT, sclera nonicteric.  Lungs: Respirations are equal and unlabored.   Abdomen: Soft and non-tender.  CV: 2+ bilateral upper and lower extremity pulses.  Neuro: Sensation intact to light touch.  Skin: Intact throughout LE with no rashes, erythema, or lesions.  Extremities: No LE edema, NVI lower extremities. unable to walk gait.    right Knee Exam:  Knee Range of Motion: 10-95   Effusion: none  Condition of skin: intact  Location of tenderness: Medial joint line and Lateral joint line   Strength: 3/5 quadriceps strength, 3/5 gastroc-soleus strength, 3/5 hamstring strength, and 3/5 tibialis anterior strength  Stability: stable to testing  Knee Alignment: Moderate Valgus     left Knee Exam:  Knee Range of Motion: 10-95   Effusion: none  Condition of skin: intact  Location of tenderness: Medial joint line and Lateral joint line   Strength: 3/5 quadriceps strength, 3/5 gastroc-soleus strength, 3/5 hamstring strength, and 3/5 tibialis anterior strength  Knee alignment:  Moderate valgus  Stability: stable to testing    RADIOGRAPHS:  Unable to obtain new x-rays today due to patient's inability to stand. Review or previous imaging reveals severe tricompartmental osteoarthritic changes.     ASSESSMENT:       ICD-10-CM ICD-9-CM   1. Primary osteoarthritis of both knees  " M17.0 715.16       PLAN:     We discussed with the patient at length all the different treatment options available including anti-inflammatories, acetaminophen, rest, ice, knee strengthening exercise, occasional cortisone injections for temporary relief, Viscosupplimentation injections, arthroscopic debridement, osteotomy, and finally knee arthroplasty.     Assessment & Plan    - Discussed left knee replacement as a potential option.  - Surgery would remove arthritis and potentially relieve pain, but patient needs to be cleared medically first.  - Suggested left knee corticosteroid injection for pain relief, but patient declined.  - Referred to Dr. Mensah for knee replacement candidacy.  - Referred to pain management for knee pain evaluation and non-surgical pain management options.         This note was generated with the assistance of ambient listening technology. Verbal consent was obtained by the patient and accompanying visitor(s) for the recording of patient appointment to facilitate this note. I attest to having reviewed and edited the generated note for accuracy, though some syntax or spelling errors may persist. Please contact the author of this note for any clarification.         Tello Vizcarra PA-C         [1]   Current Outpatient Medications:     alendronate (FOSAMAX) 70 MG tablet, Take 1 tablet once weekly in the morning with a full glass of water on an empty stomach. Do not consume food or lie down for at least 30 minutes afterwards., Disp: 12 tablet, Rfl: 3    amlodipine-valsartan (EXFORGE)  mg per tablet, Take 1 tablet by mouth once daily., Disp: 90 tablet, Rfl: 3    apixaban (ELIQUIS) 5 mg Tab, Take 1 tablet (5 mg total) by mouth 2 (two) times daily., Disp: 60 tablet, Rfl: 4    bisoprolol (ZEBETA) 10 MG tablet, TAKE 1 TABLET(10 MG) BY MOUTH EVERY DAY, Disp: 90 tablet, Rfl: 3    busPIRone (BUSPAR) 10 MG tablet, TAKE 1 TABLET(10 MG) BY MOUTH THREE TIMES DAILY AS NEEDED FOR ANXIETY, Disp: 90  tablet, Rfl: 2    doxazosin (CARDURA) 8 MG Tab, Take 1 tablet (8 mg total) by mouth once daily., Disp: 90 tablet, Rfl: 3    EScitalopram oxalate (LEXAPRO) 20 MG tablet, Take 1 tablet (20 mg total) by mouth once daily., Disp: 90 tablet, Rfl: 3    furosemide (LASIX) 20 MG tablet, TAKE 2 TABLETS(40 MG) BY MOUTH EVERY DAY, Disp: 180 tablet, Rfl: 3    potassium chloride (MICRO-K) 10 MEQ CpSR, Take 1 capsule (10 mEq total) by mouth once daily. TAKE 1 CAPSULE(10 MEQ) BY MOUTH EVERY DAY, Disp: 90 capsule, Rfl: 3    sofosbuvir-velpatasvir 400-100 mg Tab, Take 1 tablet by mouth once daily., Disp: 28 tablet, Rfl: 2

## 2025-04-02 ENCOUNTER — TELEPHONE (OUTPATIENT)
Dept: ORTHOPEDICS | Facility: CLINIC | Age: 80
End: 2025-04-02
Payer: MEDICARE

## 2025-04-02 NOTE — TELEPHONE ENCOUNTER
LVM for pt to call back to schedule----- Message from Tello Vizcarra PA-C sent at 4/1/2025  5:48 PM CDT -----  Regarding: L TKA  Can you please schedule this patient to see Dr. Mensah to discuss knee replacement surgery. I do not believe she would be a candidate but were insistent on visiting with a surgeon. Thank you! Tello Vizcarra PA-C

## 2025-04-09 ENCOUNTER — OUTPATIENT CASE MANAGEMENT (OUTPATIENT)
Dept: ADMINISTRATIVE | Facility: OTHER | Age: 80
End: 2025-04-09
Payer: MEDICARE

## 2025-04-15 ENCOUNTER — OFFICE VISIT (OUTPATIENT)
Dept: PAIN MEDICINE | Facility: CLINIC | Age: 80
End: 2025-04-15
Payer: MEDICARE

## 2025-04-15 ENCOUNTER — TELEPHONE (OUTPATIENT)
Dept: PAIN MEDICINE | Facility: CLINIC | Age: 80
End: 2025-04-15

## 2025-04-15 VITALS
WEIGHT: 218.94 LBS | HEART RATE: 99 BPM | DIASTOLIC BLOOD PRESSURE: 75 MMHG | HEIGHT: 63 IN | SYSTOLIC BLOOD PRESSURE: 119 MMHG | BODY MASS INDEX: 38.79 KG/M2

## 2025-04-15 DIAGNOSIS — G89.29 CHRONIC PAIN OF LEFT KNEE: Primary | ICD-10-CM

## 2025-04-15 DIAGNOSIS — M25.562 CHRONIC PAIN OF LEFT KNEE: Primary | ICD-10-CM

## 2025-04-15 DIAGNOSIS — M17.0 PRIMARY OSTEOARTHRITIS OF BOTH KNEES: ICD-10-CM

## 2025-04-15 PROCEDURE — 99999 PR PBB SHADOW E&M-EST. PATIENT-LVL III: CPT | Mod: PBBFAC,,, | Performed by: STUDENT IN AN ORGANIZED HEALTH CARE EDUCATION/TRAINING PROGRAM

## 2025-04-15 PROCEDURE — 99214 OFFICE O/P EST MOD 30 MIN: CPT | Mod: S$PBB,,, | Performed by: STUDENT IN AN ORGANIZED HEALTH CARE EDUCATION/TRAINING PROGRAM

## 2025-04-15 PROCEDURE — G2211 COMPLEX E/M VISIT ADD ON: HCPCS | Mod: S$PBB,,, | Performed by: STUDENT IN AN ORGANIZED HEALTH CARE EDUCATION/TRAINING PROGRAM

## 2025-04-15 PROCEDURE — 99213 OFFICE O/P EST LOW 20 MIN: CPT | Mod: PBBFAC | Performed by: STUDENT IN AN ORGANIZED HEALTH CARE EDUCATION/TRAINING PROGRAM

## 2025-04-15 NOTE — TELEPHONE ENCOUNTER
----- Message from Concepcion Ritchie sent at 4/15/2025 10:27 AM CDT -----  Regarding: Order for MYRNA PARISH    Patient Name: MYRNA PARISH([id number])  Sex: Female  : 1945      PCP: JEOVANNY GARRISON    Center: [facility] CENTRAL BILLING OFFICE     Types of orders made on 04/15/2025: Outpatient Referral, Procedure Request    Order Date:4/15/2025  Ordering User:CONCEPCION RITCHIE [[id number]]  Encounter Provider:Concepcion Ritchie DO [[id number]]  Authorizing Provider: Concepcion Ritchie DO [[id number]]  Department:[facility] PAIN MANAGEMENT[[id number]]    Common Order Information  Procedure -> Joint/Bursa Injection (Specify joint and laterality) Cmt: Left             diagnostic Genicular Block    Pre-op Diagnosis -> Left knee pain       -> OA (osteoarthritis) of knee     Order Specific Information  Order: Procedure Request Order for Pain Management [Custom: [id number]]  Order #:          [id number]: 1 FUTURE    Priority: Routine  Class: Clinic Performed    Future Order Information      Expires on:04/15/2026            Expected by:04/15/2025                   Associated Diagnoses      M17.0 Primary osteoarthritis of both knees      M25.562, G89.29 Chronic pain of left knee      Physician -> Gelter         Is patient on anti-coagulants? -> Yes         Facility Name: -> [facility]         Follow-up: Cmt: Mychart or Phone Call          Priority: Routine  Class: Clinic Performed    Future Order Information      Expires on:04/15/2026            Expected by:04/15/2025                   Associated Diagnoses      M17.0 Primary osteoarthritis of both knees      M25.562, G89.29 Chronic pain of left knee      Procedure -> Joint/Bursa Injection (Specify joint and laterality) Cmt: Left                 diagnostic Genicular Block        Physician -> Gelter         Is patient on anti-coagulants? -> Yes         Pre-op Diagnosis -> Left knee pain           -> OA (osteoarthritis) of knee         Facility Name: -> [facility]          Follow-up: Cmt: Mychart or Phone Call

## 2025-04-15 NOTE — PROGRESS NOTES
Subjective:     Patient ID: Coby Atkinson is a 79 y.o. female    Chief Complaint: Knee Pain      Referred by: Tello Vizcarra PA-C      HPI:    Interval History 4/15/2025:     Coby Atkinson is a 79 y.o. female presents to the clinic for left knee pain.  Patient was previously seen by Dr. Barton, this is my first time seeing her.  She was recently evaluated by Orthopedics for left knee pain.  She likely needs a knee replacement, but still needs medical clearance. They are recommending evaluation for interventional procedures.  She reports undergoing intra-articular knee injection in the past which caused her great pain.  On chart review I see that she had a diagnostic genicular block, however the patient does not remember this procedure nor does she remember if it gave her any relief.  Her  at bedside remarks that patient has severe pain with standing or walking, and states that patient remains seated or in bed most of the day.    The pain is located in the left knee area and does not radiate.  The pain is described as aching    At BEST  3/10   At WORST  8/10 on the WORST day.    On average pain is rated as 3/10.   Today the pain is rated as 2/10  Symptoms interfere with daily activity.   Exacerbating factors: Standing, Bending, Walking, Flexing, and Getting out of bed/chair.    Mitigating factors medications.     Initial Encounter (1/25/24):  Coby Atkinson is a 79 y.o. female who presents today with chronic bilateral knee pain secondary to osteoarthritis.  Patient is followed by Orthopedic surgery.  Has failed intra-articular injections.  Not an ideal candidate for surgery, and patient is not very interested in knee replacement surgery at this time.  She is here today to discuss genicular nerve blocks/RFA as a potential way to manage her pain.   This pain is described in detail below.    Physical Therapy:  No.    Non-pharmacologic Treatment:  Rest helps         TENS?  No    Pain  Medications:         Currently taking:  Nothing    Has tried in the past:  NSAIDs, Tylenol    Has not tried:  Opioids, Muscle relaxants, TCAs, SNRIs, anticonvulsants, topical creams    Blood thinners:  Coumadin    Interventional Therapies:  None    Relevant Surgeries:  None    Affecting sleep?  Yes    Affecting daily activities? yes    Depressive symptoms? No          SI/HI? No    Work status: Retired    Pain Scores:    Best:       3/10  Worst:     6/10  Usually:   3/10  Today:    3/10    Pain Disability Index  Family/Home Responsibilities:: 2  Recreation:: 2  Social Activity:: 2  Occupation:: 2  Sexual Behavior:: 2  Self Care:: 2  Life-Support Activities:: 2  Pain Disability Index (PDI): 14    Review of Systems   Constitutional:  Negative for activity change, appetite change, chills, fatigue, fever and unexpected weight change.   HENT:  Negative for hearing loss.    Eyes:  Negative for visual disturbance.   Respiratory:  Negative for chest tightness and shortness of breath.    Cardiovascular:  Negative for chest pain.   Gastrointestinal:  Negative for abdominal pain, constipation, diarrhea, nausea and vomiting.   Genitourinary:  Negative for difficulty urinating.   Musculoskeletal:  Positive for arthralgias, gait problem and myalgias. Negative for back pain and neck pain.   Skin:  Negative for rash.   Neurological:  Positive for weakness. Negative for dizziness, light-headedness, numbness and headaches.   Psychiatric/Behavioral:  Positive for sleep disturbance. Negative for hallucinations and suicidal ideas. The patient is not nervous/anxious.        Past Medical History:   Diagnosis Date    Anticoagulant long-term use     Cataract     Chronic diarrhea     Depression     Edema     GERD (gastroesophageal reflux disease)     Hypertension 10/2/2012    Osteoarthritis of both knees 8/9/2016    Osteopenia     Osteopenia 11/30/2017    PE (pulmonary thromboembolism) 12/03/2017    Primary localized osteoarthrosis, lower leg  12/3/2014    Sleep apnea 2016    Thalassemia minor        Past Surgical History:   Procedure Laterality Date    APPENDECTOMY      BLOCK, NERVE, GENICULAR Bilateral 3/14/2024    Procedure: Block, Nerve, Genicular---BILATERAL KNEE;  Surgeon: Kit Barton Jr., MD;  Location: [facility] PAIN MGMT;  Service: Pain Management;  Laterality: Bilateral;    CATARACT EXTRACTION      COLONOSCOPY N/A 11/5/2016    Procedure: COLONOSCOPY;  Surgeon: Tanner Simental MD;  [facility]  Service: Endoscopy;  Laterality: N/A;    HYSTERECTOMY  age 39    fibroids    OOPHORECTOMY      TONSILLECTOMY         Social History     Socioeconomic History    Marital status:     Number of children: 1   Occupational History    Occupation: Retired from [employer]    Tobacco Use    Smoking status: Never     Passive exposure: Never    Smokeless tobacco: Never   Substance and Sexual Activity    Alcohol use: No     Comment: rare    Drug use: No    Sexual activity: Yes     Partners: Male     Birth control/protection: None     Social Drivers of Health     Financial Resource Strain: Patient Declined (1/7/2025)    Overall Financial Resource Strain (CARDIA)     Difficulty of Paying Living Expenses: Patient declined   Food Insecurity: Patient Declined (1/7/2025)    Hunger Vital Sign     Worried About Running Out of Food in the Last Year: Patient declined     Ran Out of Food in the Last Year: Patient declined   Transportation Needs: Patient Declined (1/7/2025)    TRANSPORTATION NEEDS     Transportation : Patient declined   Physical Activity: Inactive (12/2/2024)    Exercise Vital Sign     Days of Exercise per Week: 0 days     Minutes of Exercise per Session: 0 min   Stress: Patient Declined (1/7/2025)    [employer] [place] [employer] - Occupational Stress Questionnaire     Feeling of Stress : Patient declined   Recent Concern: Stress - Stress Concern Present (1/5/2025)    [employer] [place] [employer]  "Health - Occupational Stress Questionnaire     Feeling of Stress : Very much   Housing Stability: Patient Declined (1/7/2025)    Housing Stability Vital Sign     Unable to Pay for Housing in the Last Year: Patient declined     Homeless in the Last Year: Patient declined       Review of patient's allergies indicates:   Allergen Reactions    Codeine     Ciprofloxacin Rash       Current Outpatient Medications on File Prior to Visit   Medication Sig Dispense Refill    alendronate (FOSAMAX) 70 MG tablet Take 1 tablet once weekly in the morning with a full glass of water on an empty stomach. Do not consume food or lie down for at least 30 minutes afterwards. 12 tablet 3    amlodipine-valsartan (EXFORGE)  mg per tablet Take 1 tablet by mouth once daily. 90 tablet 3    apixaban (ELIQUIS) 5 mg Tab Take 1 tablet (5 mg total) by mouth 2 (two) times daily. 60 tablet 4    bisoprolol (ZEBETA) 10 MG tablet TAKE 1 TABLET(10 MG) BY MOUTH EVERY DAY 90 tablet 3    busPIRone (BUSPAR) 10 MG tablet TAKE 1 TABLET(10 MG) BY MOUTH THREE TIMES DAILY AS NEEDED FOR ANXIETY 90 tablet 2    doxazosin (CARDURA) 8 MG Tab Take 1 tablet (8 mg total) by mouth once daily. 90 tablet 3    EScitalopram oxalate (LEXAPRO) 20 MG tablet Take 1 tablet (20 mg total) by mouth once daily. 90 tablet 3    furosemide (LASIX) 20 MG tablet TAKE 2 TABLETS(40 MG) BY MOUTH EVERY  tablet 3    potassium chloride (MICRO-K) 10 MEQ CpSR Take 1 capsule (10 mEq total) by mouth once daily. TAKE 1 CAPSULE(10 MEQ) BY MOUTH EVERY DAY 90 capsule 3    sofosbuvir-velpatasvir 400-100 mg Tab Take 1 tablet by mouth once daily. 28 tablet 2     No current facility-administered medications on file prior to visit.       Objective:      /75 (BP Location: Right arm, Patient Position: Sitting)   Pulse 99   Ht 5' 3" (1.6 m)   Wt 99.3 kg (218 lb 14.7 oz)   BMI 38.78 kg/m²     PHYSICAL EXAMINATION:    GENERAL: Well appearing, in no acute distress, alert and oriented " x3.  PSYCH:  Mood and affect appropriate.  SKIN: Skin color, texture, turgor normal, no rashes or lesions.  HEAD/FACE:  Normocephalic, atraumatic. Cranial nerves grossly intact.  NECK: Normal ROM. Supple.   CV: RRR with palpation of the radial artery.  PULM: No evidence of respiratory difficulty, symmetric chest rise.  GI:  Soft and non-distended.  MSK: Pain with passive ROM of the Left knee. +TTP over the medial joint line of the left knee.  +TTP over the lateral joint line of the left knee. No obvious deformities, edema, or skin discoloration.  No atrophy or tone abnormalities are noted.   NEURO: Bilateral upper and lower extremity coordination and strength is symmetric.  No loss of sensation is noted.  MENTAL STATUS: A x O x 3, good concentration, speech is fluent and goal directed  MOTOR: 3/5 in b/l LE  GAIT: Using a wheelchair.     Imaging:    Narrative & Impression    EXAMINATION:  XR KNEE ORTHO BILAT WITH FLEXION     CLINICAL HISTORY:  Bilateral primary osteoarthritis of knee     TECHNIQUE:  AP standing of both knees, PA flexion standing views of both knees, and Merchant views of both knees were performed.  Lateral views of both knees were also performed.     COMPARISON:  10/17/2022     FINDINGS:  Five views bilateral knees.     Right knee:     There is tricompartmental degenerative change.  There is osteopenia.  No acute displaced fracture or dislocation of the right knee.  No large knee joint effusion.  There is prominent lateral compartmental narrowing peer     Left knee:     There is tricompartmental degenerative change noting prominent lateral compartmental narrowing.  There is osteopenia.  No acute displaced fracture or dislocation of the knee.  No radiopaque foreign body.  No large knee joint effusion.     Impression:     1. No acute displaced fracture or dislocation of the left or right knee noting prominent degenerative changes.        Electronically signed by: Kofi Gallego MD  Date:                                             10/17/2023  Time:                                           14:58       Assessment:       Encounter Diagnoses   Name Primary?    Primary osteoarthritis of both knees     Chronic pain of left knee Yes     Plan:       Coby was seen today for knee pain.    Diagnoses and all orders for this visit:    Chronic pain of left knee  -     Procedure Request Order for Pain Management; Future    Primary osteoarthritis of both knees  -     Ambulatory referral/consult to Pain Clinic  -     Procedure Request Order for Pain Management; Future      04/15/2025 - Coby Atkinson is a 79 y.o. female with chronic bilateral knee pain secondary to osteoarthritis.  No improvement with previous intra-articular injections.  She is being evaluated for surgical candidacy for knee replacement by Orthopedics.  They are recommending interventional procedures for pain management in the meantime.  Previously had a diagnostic genicular nerve block with another pain physician, however she does not remember if she had any relief.  Recommending repeat diagnostic genicular blocks on the left.  If good response, consider RFA.    Treatment Plan:   Procedures:  Schedule patient for left diagnostic genicular nerve block.  Proceed to RFA if appropriate.  PT/OT/HEP: I have stressed the importance of physical activity and a home exercise plan to help with pain and improve health.  Medications:    - Tylenol prn    -  Not applicable  Imaging: Reviewed.  Follow Up:  Follow up via MyChart or phone call after diagnostic block    Fatmata Ritchie, DO   Interventional Pain Management    This note was created by combination of typed  and M-Modal dictation. Transcription and phonetic errors may be present.  If there are any questions, please contact me.

## 2025-04-15 NOTE — H&P (VIEW-ONLY)
Subjective:     Patient ID: Coby Atkinson is a 79 y.o. female    Chief Complaint: Knee Pain      Referred by: Tello Vizcarra PA-C      HPI:    Interval History 4/15/2025:     Coby Atkinson is a 79 y.o. female presents to the clinic for left knee pain.  Patient was previously seen by Dr. Barton, this is my first time seeing her.  She was recently evaluated by Orthopedics for left knee pain.  She likely needs a knee replacement, but still needs medical clearance. They are recommending evaluation for interventional procedures.  She reports undergoing intra-articular knee injection in the past which caused her great pain.  On chart review I see that she had a diagnostic genicular block, however the patient does not remember this procedure nor does she remember if it gave her any relief.  Her  at bedside remarks that patient has severe pain with standing or walking, and states that patient remains seated or in bed most of the day.    The pain is located in the left knee area and does not radiate.  The pain is described as aching    At BEST  3/10   At WORST  8/10 on the WORST day.    On average pain is rated as 3/10.   Today the pain is rated as 2/10  Symptoms interfere with daily activity.   Exacerbating factors: Standing, Bending, Walking, Flexing, and Getting out of bed/chair.    Mitigating factors medications.     Initial Encounter (1/25/24):  Coby Atkinson is a 79 y.o. female who presents today with chronic bilateral knee pain secondary to osteoarthritis.  Patient is followed by Orthopedic surgery.  Has failed intra-articular injections.  Not an ideal candidate for surgery, and patient is not very interested in knee replacement surgery at this time.  She is here today to discuss genicular nerve blocks/RFA as a potential way to manage her pain.   This pain is described in detail below.    Physical Therapy:  No.    Non-pharmacologic Treatment:  Rest helps         TENS?  No    Pain  Medications:         Currently taking:  Nothing    Has tried in the past:  NSAIDs, Tylenol    Has not tried:  Opioids, Muscle relaxants, TCAs, SNRIs, anticonvulsants, topical creams    Blood thinners:  Coumadin    Interventional Therapies:  None    Relevant Surgeries:  None    Affecting sleep?  Yes    Affecting daily activities? yes    Depressive symptoms? No          SI/HI? No    Work status: Retired    Pain Scores:    Best:       3/10  Worst:     6/10  Usually:   3/10  Today:    3/10    Pain Disability Index  Family/Home Responsibilities:: 2  Recreation:: 2  Social Activity:: 2  Occupation:: 2  Sexual Behavior:: 2  Self Care:: 2  Life-Support Activities:: 2  Pain Disability Index (PDI): 14    Review of Systems   Constitutional:  Negative for activity change, appetite change, chills, fatigue, fever and unexpected weight change.   HENT:  Negative for hearing loss.    Eyes:  Negative for visual disturbance.   Respiratory:  Negative for chest tightness and shortness of breath.    Cardiovascular:  Negative for chest pain.   Gastrointestinal:  Negative for abdominal pain, constipation, diarrhea, nausea and vomiting.   Genitourinary:  Negative for difficulty urinating.   Musculoskeletal:  Positive for arthralgias, gait problem and myalgias. Negative for back pain and neck pain.   Skin:  Negative for rash.   Neurological:  Positive for weakness. Negative for dizziness, light-headedness, numbness and headaches.   Psychiatric/Behavioral:  Positive for sleep disturbance. Negative for hallucinations and suicidal ideas. The patient is not nervous/anxious.        Past Medical History:   Diagnosis Date    Anticoagulant long-term use     Cataract     Chronic diarrhea     Depression     Edema     GERD (gastroesophageal reflux disease)     Hypertension 10/2/2012    Osteoarthritis of both knees 8/9/2016    Osteopenia     Osteopenia 11/30/2017    PE (pulmonary thromboembolism) 12/03/2017    Primary localized osteoarthrosis, lower leg  12/3/2014    Sleep apnea 2016    Thalassemia minor        Past Surgical History:   Procedure Laterality Date    APPENDECTOMY      BLOCK, NERVE, GENICULAR Bilateral 3/14/2024    Procedure: Block, Nerve, Genicular---BILATERAL KNEE;  Surgeon: Kit Barton Jr., MD;  Location: [facility];  Service: Pain Management;  Laterality: Bilateral;    CATARACT EXTRACTION      COLONOSCOPY N/A 11/5/2016    Procedure: COLONOSCOPY;  Surgeon: Tanner Simental MD;  Location: [facility] ([facility]);  Service: Endoscopy;  Laterality: N/A;    HYSTERECTOMY  age 39    fibroids    OOPHORECTOMY      TONSILLECTOMY         Social History     Socioeconomic History    Marital status:     Number of children: 1   Occupational History    Occupation: Retired from [employer]    Tobacco Use    Smoking status: Never     Passive exposure: Never    Smokeless tobacco: Never   Substance and Sexual Activity    Alcohol use: No     Comment: rare    Drug use: No    Sexual activity: Yes     Partners: Male     Birth control/protection: None     Social Drivers of Health     Financial Resource Strain: Patient Declined (1/7/2025)    Overall Financial Resource Strain (CARDIA)     Difficulty of Paying Living Expenses: Patient declined   Food Insecurity: Patient Declined (1/7/2025)    Hunger Vital Sign     Worried About Running Out of Food in the Last Year: Patient declined     Ran Out of Food in the Last Year: Patient declined   Transportation Needs: Patient Declined (1/7/2025)    TRANSPORTATION NEEDS     Transportation : Patient declined   Physical Activity: Inactive (12/2/2024)    Exercise Vital Sign     Days of Exercise per Week: 0 days     Minutes of Exercise per Session: 0 min   Stress: Patient Declined (1/7/2025)    [employer] [employer] - Occupational Stress Questionnaire     Feeling of Stress : Patient declined   Recent Concern: Stress - Stress Concern Present (1/5/2025)    [employer]  "[employer] - Occupational Stress Questionnaire     Feeling of Stress : Very much   Housing Stability: Patient Declined (1/7/2025)    Housing Stability Vital Sign     Unable to Pay for Housing in the Last Year: Patient declined     Homeless in the Last Year: Patient declined       Review of patient's allergies indicates:   Allergen Reactions    Codeine     Ciprofloxacin Rash       Current Outpatient Medications on File Prior to Visit   Medication Sig Dispense Refill    alendronate (FOSAMAX) 70 MG tablet Take 1 tablet once weekly in the morning with a full glass of water on an empty stomach. Do not consume food or lie down for at least 30 minutes afterwards. 12 tablet 3    amlodipine-valsartan (EXFORGE)  mg per tablet Take 1 tablet by mouth once daily. 90 tablet 3    apixaban (ELIQUIS) 5 mg Tab Take 1 tablet (5 mg total) by mouth 2 (two) times daily. 60 tablet 4    bisoprolol (ZEBETA) 10 MG tablet TAKE 1 TABLET(10 MG) BY MOUTH EVERY DAY 90 tablet 3    busPIRone (BUSPAR) 10 MG tablet TAKE 1 TABLET(10 MG) BY MOUTH THREE TIMES DAILY AS NEEDED FOR ANXIETY 90 tablet 2    doxazosin (CARDURA) 8 MG Tab Take 1 tablet (8 mg total) by mouth once daily. 90 tablet 3    EScitalopram oxalate (LEXAPRO) 20 MG tablet Take 1 tablet (20 mg total) by mouth once daily. 90 tablet 3    furosemide (LASIX) 20 MG tablet TAKE 2 TABLETS(40 MG) BY MOUTH EVERY  tablet 3    potassium chloride (MICRO-K) 10 MEQ CpSR Take 1 capsule (10 mEq total) by mouth once daily. TAKE 1 CAPSULE(10 MEQ) BY MOUTH EVERY DAY 90 capsule 3    sofosbuvir-velpatasvir 400-100 mg Tab Take 1 tablet by mouth once daily. 28 tablet 2     No current facility-administered medications on file prior to visit.       Objective:      /75 (BP Location: Right arm, Patient Position: Sitting)   Pulse 99   Ht 5' 3" (1.6 m)   Wt 99.3 kg (218 lb 14.7 oz)   BMI 38.78 kg/m²     PHYSICAL EXAMINATION:    GENERAL: Well appearing, in no acute distress, alert and oriented " x3.  PSYCH:  Mood and affect appropriate.  SKIN: Skin color, texture, turgor normal, no rashes or lesions.  HEAD/FACE:  Normocephalic, atraumatic. Cranial nerves grossly intact.  NECK: Normal ROM. Supple.   CV: RRR with palpation of the radial artery.  PULM: No evidence of respiratory difficulty, symmetric chest rise.  GI:  Soft and non-distended.  MSK: Pain with passive ROM of the Left knee. +TTP over the medial joint line of the left knee.  +TTP over the lateral joint line of the left knee. No obvious deformities, edema, or skin discoloration.  No atrophy or tone abnormalities are noted.   NEURO: Bilateral upper and lower extremity coordination and strength is symmetric.  No loss of sensation is noted.  MENTAL STATUS: A x O x 3, good concentration, speech is fluent and goal directed  MOTOR: 3/5 in b/l LE  GAIT: Using a wheelchair.     Imaging:    Narrative & Impression    EXAMINATION:  XR KNEE ORTHO BILAT WITH FLEXION     CLINICAL HISTORY:  Bilateral primary osteoarthritis of knee     TECHNIQUE:  AP standing of both knees, PA flexion standing views of both knees, and Merchant views of both knees were performed.  Lateral views of both knees were also performed.     COMPARISON:  10/17/2022     FINDINGS:  Five views bilateral knees.     Right knee:     There is tricompartmental degenerative change.  There is osteopenia.  No acute displaced fracture or dislocation of the right knee.  No large knee joint effusion.  There is prominent lateral compartmental narrowing peer     Left knee:     There is tricompartmental degenerative change noting prominent lateral compartmental narrowing.  There is osteopenia.  No acute displaced fracture or dislocation of the knee.  No radiopaque foreign body.  No large knee joint effusion.     Impression:     1. No acute displaced fracture or dislocation of the left or right knee noting prominent degenerative changes.        Electronically signed by: Kofi Gallego MD  Date:                                             10/17/2023  Time:                                           14:58       Assessment:       Encounter Diagnoses   Name Primary?    Primary osteoarthritis of both knees     Chronic pain of left knee Yes     Plan:       Coby was seen today for knee pain.    Diagnoses and all orders for this visit:    Chronic pain of left knee  -     Procedure Request Order for Pain Management; Future    Primary osteoarthritis of both knees  -     Ambulatory referral/consult to Pain Clinic  -     Procedure Request Order for Pain Management; Future      04/15/2025 - Coby Atkinson is a 79 y.o. female with chronic bilateral knee pain secondary to osteoarthritis.  No improvement with previous intra-articular injections.  She is being evaluated for surgical candidacy for knee replacement by Orthopedics.  They are recommending interventional procedures for pain management in the meantime.  Previously had a diagnostic genicular nerve block with another pain physician, however she does not remember if she had any relief.  Recommending repeat diagnostic genicular blocks on the left.  If good response, consider RFA.    Treatment Plan:   Procedures:  Schedule patient for left diagnostic genicular nerve block.  Proceed to RFA if appropriate.  PT/OT/HEP: I have stressed the importance of physical activity and a home exercise plan to help with pain and improve health.  Medications:    - Tylenol prn    -  Not applicable  Imaging: Reviewed.  Follow Up:  Follow up via MyChart or phone call after diagnostic block    Fatmata Ritchie, DO   Interventional Pain Management    This note was created by combination of typed  and M-Modal dictation. Transcription and phonetic errors may be present.  If there are any questions, please contact me.

## 2025-04-16 ENCOUNTER — TELEPHONE (OUTPATIENT)
Dept: INTERNAL MEDICINE | Facility: CLINIC | Age: 80
End: 2025-04-16
Payer: MEDICARE

## 2025-04-16 ENCOUNTER — OUTPATIENT CASE MANAGEMENT (OUTPATIENT)
Dept: ADMINISTRATIVE | Facility: OTHER | Age: 80
End: 2025-04-16
Payer: MEDICARE

## 2025-04-16 NOTE — TELEPHONE ENCOUNTER
----- Message from Charo sent at 4/16/2025  4:15 PM CDT -----  Regarding: Appt  Contact: pt [phone]  Pt is requesting call back to request sooner pre op/surgery clearance appt, for ortho surgery, please call pt [phone]  
Spoke to pt and she said her surgery is not on 4/30 so she needs a sooner pre op apt  I schedule her with dr Almaraz for next Tuesday  She verbalized understanding    
[facility]

## 2025-04-16 NOTE — PROGRESS NOTES
Outpatient Care Management  Plan of Care Follow Up Visit    Patient: Coby Atkinson  MRN: [id number]  Date of Service: 04/16/2025  Completed by: Cindy Mcdonald RN  Referral Date: 09/27/2024    Reason for Visit   Patient presents with    OPCM RN Follow Up Call       Brief Summary: Phone contact made with Mr. Atkinson today, Re: Mrs. Atkinson. We discussed her care plan. Collaborated with [facility], and sent message to PCP. Will continue to follow for education and management.

## 2025-04-17 ENCOUNTER — TELEPHONE (OUTPATIENT)
Dept: INTERNAL MEDICINE | Facility: CLINIC | Age: 80
End: 2025-04-17
Payer: MEDICARE

## 2025-04-17 DIAGNOSIS — R54 FRAIL ELDERLY: Primary | ICD-10-CM

## 2025-04-17 DIAGNOSIS — F03.90 DEMENTIA, UNSPECIFIED DEMENTIA SEVERITY, UNSPECIFIED DEMENTIA TYPE, UNSPECIFIED WHETHER BEHAVIORAL, PSYCHOTIC, OR MOOD DISTURBANCE OR ANXIETY: ICD-10-CM

## 2025-04-17 NOTE — TELEPHONE ENCOUNTER
"----- Message from  Cindy sent at 4/17/2025  9:39 AM CDT -----  Good morning, I spoke with Mr. Atkinson yesterday re: Mrs. Atknison, and has completed home PT.  In discussion, Mr. Atkinson expressed concern re: his wife not wanting or being able to use her motorized chair. In order to keep her home, she will need to use more assistive equipment due to Mr. Atkinson having a hard time pushing her in the standard chair, lifting her, and basically supporting all of her weight with ADL's.In addition, when she attempts to use motorized chair,  she becomes anxious to the point of vomiting at times. She is compliant with her med regimen of Lexapro and Buspar, although he is not sure if she is has taken this up to the 3 times per day.  Due to the above, I reached out to  and was informed that PT can help address this issue with the following: new order to include the specific need to "work with PT regarding use of assistive equipment" and fax to [phone]. With that, she recently followed up with Dr. Mensah and will need to see you for preop clearance.As you can see, I am slightly concerned about this patient and her spouse. They don't really have other family to help out. Mr. Atkinson expresses a lot of frustration during our case management calls and thinks the patient is not doing what she is fully able.  My concern in this case is caregiver stress and burnout. Please advise.I am reconsulting OPCM SW as well. Thank you for your assistance, Cindy Mcdonald RNOutpatient Complex Care [phone]  "

## 2025-04-17 NOTE — TELEPHONE ENCOUNTER
"Please see following message from  nurse    I spoke with Mr. Atkinson yesterday re: Mrs. Atkinson, and has completed home PT. In discussion, Mr. Atkinson expressed concern re: his wife not wanting or being able to use her motorized chair. In order to keep her home, she will need to use more assistive equipment due to Mr. Atkinson having a hard time pushing her in the standard chair, lifting her, and basically supporting all of her weight with ADL's.In addition, when she attempts to use motorized chair, she becomes anxious to the point of vomiting at times. She is compliant with her med regimen of Lexapro and Buspar, although he is not sure if she is has taken this up to the 3 times per day. Due to the above, I reached out to  and was informed that PT can help address this issue with the following: new order to include the specific need to "work with PT regarding use of assistive equipment" and fax to [phone]. With that, she recently followed up with Dr. Mensah and will need to see you for preop clearance.As you can see, I am slightly concerned about this patient and her spouse. They don't really have other family to help out. Mr. Atkinson expresses a lot of frustration during our case management calls and thinks the patient is not doing what she is fully able. My concern in this case is caregiver stress and burnout. Please advise.I am reconsulting OPCM SW as well.      New order to include the specific need to "work with PT regarding use of assistive equipment" and fax to [phone].  "

## 2025-04-22 ENCOUNTER — OFFICE VISIT (OUTPATIENT)
Dept: INTERNAL MEDICINE | Facility: CLINIC | Age: 80
End: 2025-04-22
Payer: MEDICARE

## 2025-04-22 ENCOUNTER — LAB VISIT (OUTPATIENT)
Dept: LAB | Facility: HOSPITAL | Age: 80
End: 2025-04-22
Payer: MEDICARE

## 2025-04-22 VITALS
HEART RATE: 94 BPM | WEIGHT: 218 LBS | TEMPERATURE: 98 F | OXYGEN SATURATION: 97 % | RESPIRATION RATE: 15 BRPM | SYSTOLIC BLOOD PRESSURE: 148 MMHG | BODY MASS INDEX: 38.62 KG/M2 | DIASTOLIC BLOOD PRESSURE: 78 MMHG

## 2025-04-22 DIAGNOSIS — D68.59 HYPERCOAGULOPATHY: ICD-10-CM

## 2025-04-22 DIAGNOSIS — E04.2 MULTINODULAR THYROID: ICD-10-CM

## 2025-04-22 DIAGNOSIS — I50.30 HEART FAILURE WITH PRESERVED EJECTION FRACTION, UNSPECIFIED HF CHRONICITY: ICD-10-CM

## 2025-04-22 DIAGNOSIS — Z01.818 PREOP EXAMINATION: Primary | ICD-10-CM

## 2025-04-22 DIAGNOSIS — Z01.818 PREOP EXAMINATION: ICD-10-CM

## 2025-04-22 LAB
ALBUMIN SERPL BCP-MCNC: 3.5 G/DL (ref 3.5–5.2)
ALP SERPL-CCNC: 79 UNIT/L (ref 40–150)
ALT SERPL W/O P-5'-P-CCNC: <5 UNIT/L (ref 10–44)
ANION GAP (OHS): 9 MMOL/L (ref 8–16)
AST SERPL-CCNC: 15 UNIT/L (ref 11–45)
BILIRUB SERPL-MCNC: 0.3 MG/DL (ref 0.1–1)
BUN SERPL-MCNC: 20 MG/DL (ref 8–23)
CALCIUM SERPL-MCNC: 9.9 MG/DL (ref 8.7–10.5)
CHLORIDE SERPL-SCNC: 109 MMOL/L (ref 95–110)
CO2 SERPL-SCNC: 24 MMOL/L (ref 23–29)
CREAT SERPL-MCNC: 0.8 MG/DL (ref 0.5–1.4)
GFR SERPLBLD CREATININE-BSD FMLA CKD-EPI: >60 ML/MIN/1.73/M2
GLUCOSE SERPL-MCNC: 81 MG/DL (ref 70–110)
POTASSIUM SERPL-SCNC: 4.1 MMOL/L (ref 3.5–5.1)
PROT SERPL-MCNC: 7.5 GM/DL (ref 6–8.4)
SODIUM SERPL-SCNC: 142 MMOL/L (ref 136–145)

## 2025-04-22 PROCEDURE — 99214 OFFICE O/P EST MOD 30 MIN: CPT | Mod: PBBFAC,PO

## 2025-04-22 PROCEDURE — 85025 COMPLETE CBC W/AUTO DIFF WBC: CPT

## 2025-04-22 PROCEDURE — 99999 PR PBB SHADOW E&M-EST. PATIENT-LVL IV: CPT | Mod: PBBFAC,GC,,

## 2025-04-22 PROCEDURE — 36415 COLL VENOUS BLD VENIPUNCTURE: CPT | Mod: PO

## 2025-04-22 PROCEDURE — 84443 ASSAY THYROID STIM HORMONE: CPT

## 2025-04-22 PROCEDURE — 99215 OFFICE O/P EST HI 40 MIN: CPT | Mod: S$PBB,GC,,

## 2025-04-22 PROCEDURE — 80053 COMPREHEN METABOLIC PANEL: CPT

## 2025-04-22 NOTE — PROGRESS NOTES
I have interviewed and examined the patient w/ the resident,   I agree w/ the impression and plan as outlined above.     Lakshmi Alas MD- Staff

## 2025-04-22 NOTE — PROGRESS NOTES
Subjective:       Patient ID: [id number]    Chief Complaint:   Chief Complaint   Patient presents with    Pre-op Exam       Coby Atkinson is a 79 y.o. female with extensive history including HFpEF, severe knee OA, NSTEMI, pulmonary HTN, unspecified hypercoagulability with unprovoked DVT/PE, miscarriage (eliquis), recently treated hepatitis c, asx nodular thyroid that presents for pre-op evaluation. Patient's PCP is Dr. Hudson. Patient is accompanied by spouse during this visit.    Chronic L knee OA s/p multiple rounds of physical/occupational therapy, injections, now wheelchair bound and significantly debilitated. Pending genicular nerve block 4/30 +/- RFA. Potential knee replacement pending clinical course.     Pt's functional status limited d/t knee pain. Significantly debilitated, unable to lift herself up or transfer from bed to chair without assistance. Denies orthopnea, endorses hypervolemia responsive to daily lasix. Noted to have G1DD on Jan 2025 echo. H/o type 2 NSTEMI 2/2 PE. Unclear functional status, suspect METS < 4.     Unprovoked DVT + PE prior to wheelchair-bound status and miscarriage. Was undergoing hematology workup but lost to follow up. Previously on warfarin but switched to eliquis.     Patient uncertain regarding current med list aside from eliquis and lasix. Multiple contradicting home med lists, some including losartan, valsartan, amlodipine, spironolactone, metoprolol, bisoprolol. Inconsistent fill history. Patient and spouse will reconcile meds and will call with updated list.     Review of Systems   Constitutional:  Positive for malaise/fatigue.   Respiratory:  Positive for shortness of breath. Negative for cough.    Cardiovascular:  Positive for leg swelling. Negative for chest pain, palpitations and orthopnea.   Gastrointestinal:  Positive for heartburn.   Musculoskeletal:  Positive for joint pain.         Objective:      Vitals:    04/22/25 1305   BP: (!) 148/78   Pulse: 94    Resp: 15   Temp: 98.1 °F (36.7 °C)       Physical Exam  Constitutional:       General: She is not in acute distress.     Appearance: Normal appearance. She is not ill-appearing.   HENT:      Head: Normocephalic and atraumatic.      Right Ear: External ear normal.      Left Ear: External ear normal.      Nose: Nose normal.   Eyes:      General: No scleral icterus.     Extraocular Movements: Extraocular movements intact.   Cardiovascular:      Rate and Rhythm: Regular rhythm. Tachycardia present.      Heart sounds: Normal heart sounds.   Pulmonary:      Effort: Pulmonary effort is normal.      Breath sounds: Normal breath sounds. No rales.   Abdominal:      General: There is no distension.   Musculoskeletal:         General: Swelling present.      Cervical back: Normal range of motion.   Neurological:      Mental Status: She is alert. Mental status is at baseline.   Psychiatric:         Cognition and Memory: Memory is impaired.         Medications Ordered Prior to Encounter[1]      Assessment and Plan:       Preop examination in setting of Hypercoagulopathy and Heart failure with preserved ejection fraction, unspecified HF chronicity  Discussed risks and benefits of minimally invasive procedures, acceptable to patient and spouse in regards to procedure planned for 4/30. Okay to continue current regimen. Given cardiac and hematologic history, would recommend further risk stratification prior to any invasive surgery, including potential pharmacologic stress test. However, given current quality of life without intervention and inability to participate in potential cardiac rehab likely without orthopedic intervention, may warrant surgery if risk remains acceptable to patient. In addition, patient may require individualized AC bridging given suspicion for hypercoagulable disorder. Regardless, would recommend patient re-establish with cardiology and hematology in the interim as need for surgery is contingent upon response  to injection and potential RFA.   -     CBC W/ AUTO DIFFERENTIAL; Future; Expected date: 04/22/2025  -     Comprehensive Metabolic Panel; Future; Expected date: 04/22/2025  -     TSH; Future; Expected date: 04/22/2025  -     Ambulatory referral/consult to Cardiology; Future; Expected date: 04/29/2025  -     Ambulatory referral/consult to Hematology / Oncology; Future; Expected date: 04/29/2025    Multinodular thyroid  -     TSH; Future; Expected date: 04/22/2025      Follow-up labs, referrals.     Case discussed with Dr. Alas    Time spent today: > 40 minutes on H&P, MRR, and MDM        Carly Almaraz MD  Medical Resident  [facility]          [1]   Current Outpatient Medications on File Prior to Visit   Medication Sig Dispense Refill    alendronate (FOSAMAX) 70 MG tablet Take 1 tablet once weekly in the morning with a full glass of water on an empty stomach. Do not consume food or lie down for at least 30 minutes afterwards. 12 tablet 3    amlodipine-valsartan (EXFORGE)  mg per tablet Take 1 tablet by mouth once daily. 90 tablet 3    apixaban (ELIQUIS) 5 mg Tab Take 1 tablet (5 mg total) by mouth 2 (two) times daily. 60 tablet 4    bisoprolol (ZEBETA) 10 MG tablet TAKE 1 TABLET(10 MG) BY MOUTH EVERY DAY 90 tablet 3    busPIRone (BUSPAR) 10 MG tablet TAKE 1 TABLET(10 MG) BY MOUTH THREE TIMES DAILY AS NEEDED FOR ANXIETY 90 tablet 2    doxazosin (CARDURA) 8 MG Tab Take 1 tablet (8 mg total) by mouth once daily. 90 tablet 3    EScitalopram oxalate (LEXAPRO) 20 MG tablet Take 1 tablet (20 mg total) by mouth once daily. 90 tablet 3    furosemide (LASIX) 20 MG tablet TAKE 2 TABLETS(40 MG) BY MOUTH EVERY  tablet 3    potassium chloride (MICRO-K) 10 MEQ CpSR Take 1 capsule (10 mEq total) by mouth once daily. TAKE 1 CAPSULE(10 MEQ) BY MOUTH EVERY DAY 90 capsule 3    sofosbuvir-velpatasvir 400-100 mg Tab Take 1 tablet by mouth once daily. 28 tablet 2     No current facility-administered  medications on file prior to visit.

## 2025-04-23 ENCOUNTER — TELEPHONE (OUTPATIENT)
Dept: PAIN MEDICINE | Facility: CLINIC | Age: 80
End: 2025-04-23
Payer: MEDICARE

## 2025-04-23 LAB
ABSOLUTE EOSINOPHIL (OHS): 0.32 K/UL
ABSOLUTE MONOCYTE (OHS): 0.91 K/UL (ref 0.3–1)
ABSOLUTE NEUTROPHIL COUNT (OHS): 10.54 K/UL (ref 1.8–7.7)
BASOPHILS # BLD AUTO: 0.06 K/UL
BASOPHILS NFR BLD AUTO: 0.4 %
ERYTHROCYTE [DISTWIDTH] IN BLOOD BY AUTOMATED COUNT: 15.9 % (ref 11.5–14.5)
HCT VFR BLD AUTO: 39.4 % (ref 37–48.5)
HGB BLD-MCNC: 11.3 GM/DL (ref 12–16)
IMM GRANULOCYTES # BLD AUTO: 0.1 K/UL (ref 0–0.04)
IMM GRANULOCYTES NFR BLD AUTO: 0.6 % (ref 0–0.5)
LYMPHOCYTES # BLD AUTO: 3.67 K/UL (ref 1–4.8)
MCH RBC QN AUTO: 19.8 PG (ref 27–31)
MCHC RBC AUTO-ENTMCNC: 28.7 G/DL (ref 32–36)
MCV RBC AUTO: 69 FL (ref 82–98)
NUCLEATED RBC (/100WBC) (OHS): 0 /100 WBC
PLATELET # BLD AUTO: 309 K/UL (ref 150–450)
PMV BLD AUTO: 9.6 FL (ref 9.2–12.9)
RBC # BLD AUTO: 5.7 M/UL (ref 4–5.4)
RELATIVE EOSINOPHIL (OHS): 2.1 %
RELATIVE LYMPHOCYTE (OHS): 23.5 % (ref 18–48)
RELATIVE MONOCYTE (OHS): 5.8 % (ref 4–15)
RELATIVE NEUTROPHIL (OHS): 67.6 % (ref 38–73)
TSH SERPL-ACNC: 2.5 UIU/ML (ref 0.4–4)
WBC # BLD AUTO: 15.6 K/UL (ref 3.9–12.7)

## 2025-04-28 ENCOUNTER — TELEPHONE (OUTPATIENT)
Dept: PAIN MEDICINE | Facility: HOSPITAL | Age: 80
End: 2025-04-28
Payer: MEDICARE

## 2025-04-29 ENCOUNTER — OUTPATIENT CASE MANAGEMENT (OUTPATIENT)
Dept: ADMINISTRATIVE | Facility: OTHER | Age: 80
End: 2025-04-29
Payer: MEDICARE

## 2025-04-30 ENCOUNTER — RESULTS FOLLOW-UP (OUTPATIENT)
Dept: INTERNAL MEDICINE | Facility: CLINIC | Age: 80
End: 2025-04-30

## 2025-04-30 ENCOUNTER — HOSPITAL ENCOUNTER (OUTPATIENT)
Facility: HOSPITAL | Age: 80
Discharge: HOME | End: 2025-04-30
Attending: STUDENT IN AN ORGANIZED HEALTH CARE EDUCATION/TRAINING PROGRAM | Admitting: STUDENT IN AN ORGANIZED HEALTH CARE EDUCATION/TRAINING PROGRAM
Payer: MEDICARE

## 2025-04-30 ENCOUNTER — TELEPHONE (OUTPATIENT)
Dept: PAIN MEDICINE | Facility: HOSPITAL | Age: 80
End: 2025-04-30
Payer: MEDICARE

## 2025-04-30 ENCOUNTER — TELEPHONE (OUTPATIENT)
Dept: INTERNAL MEDICINE | Facility: CLINIC | Age: 80
End: 2025-04-30
Payer: MEDICARE

## 2025-04-30 VITALS
SYSTOLIC BLOOD PRESSURE: 140 MMHG | OXYGEN SATURATION: 94 % | TEMPERATURE: 97 F | HEART RATE: 65 BPM | HEIGHT: 63 IN | DIASTOLIC BLOOD PRESSURE: 71 MMHG | WEIGHT: 218 LBS | BODY MASS INDEX: 38.62 KG/M2 | RESPIRATION RATE: 16 BRPM

## 2025-04-30 DIAGNOSIS — M17.0 PRIMARY OSTEOARTHRITIS OF BOTH KNEES: Primary | ICD-10-CM

## 2025-04-30 DIAGNOSIS — G89.29 CHRONIC PAIN: ICD-10-CM

## 2025-04-30 DIAGNOSIS — M17.12 OSTEOARTHRITIS OF LEFT KNEE, UNSPECIFIED OSTEOARTHRITIS TYPE: Primary | ICD-10-CM

## 2025-04-30 PROCEDURE — 64454 NJX AA&/STRD GNCLR NRV BRNCH: CPT | Mod: LT,,, | Performed by: STUDENT IN AN ORGANIZED HEALTH CARE EDUCATION/TRAINING PROGRAM

## 2025-04-30 PROCEDURE — 63600175 PHARM REV CODE 636 W HCPCS: Performed by: STUDENT IN AN ORGANIZED HEALTH CARE EDUCATION/TRAINING PROGRAM

## 2025-04-30 PROCEDURE — 25000003 PHARM REV CODE 250: Performed by: STUDENT IN AN ORGANIZED HEALTH CARE EDUCATION/TRAINING PROGRAM

## 2025-04-30 PROCEDURE — 64454 NJX AA&/STRD GNCLR NRV BRNCH: CPT | Mod: LT | Performed by: STUDENT IN AN ORGANIZED HEALTH CARE EDUCATION/TRAINING PROGRAM

## 2025-04-30 RX ORDER — ALPRAZOLAM 0.5 MG/1
0.5 TABLET, ORALLY DISINTEGRATING ORAL ONCE AS NEEDED
Status: COMPLETED | OUTPATIENT
Start: 2025-04-30 | End: 2025-04-30

## 2025-04-30 RX ORDER — LIDOCAINE HYDROCHLORIDE 20 MG/ML
INJECTION, SOLUTION EPIDURAL; INFILTRATION; INTRACAUDAL; PERINEURAL
Status: DISCONTINUED | OUTPATIENT
Start: 2025-04-30 | End: 2025-04-30 | Stop reason: HOSPADM

## 2025-04-30 RX ORDER — BUPIVACAINE HYDROCHLORIDE 2.5 MG/ML
INJECTION, SOLUTION EPIDURAL; INFILTRATION; INTRACAUDAL; PERINEURAL
Status: DISCONTINUED | OUTPATIENT
Start: 2025-04-30 | End: 2025-04-30 | Stop reason: HOSPADM

## 2025-04-30 RX ADMIN — ALPRAZOLAM 0.5 MG: 0.5 TABLET, ORALLY DISINTEGRATING ORAL at 01:04

## 2025-04-30 NOTE — DISCHARGE INSTRUCTIONS
[facility] [place]  Dr. Fatmata Ritchie  Messaging service # [phone]    DIAGNOSTIC KNEE BLOCK POST-PROCEDURE INSTRUCTIONS:    What you need to do:  Keep a record of your response to the injection you had today.  It is very important that you accurately record how you responded to today's injection.  Please try to separate any soreness from the needle from your usual pain.  We want to know how this affects your usual pain.  Also REMEMBER that today's injection is a DIAGNOSTIC block, the pain relief will only last for a few hours to a few days.  Insurance requires this block before progressing to the ablation.  The sole purpose of this injection is to give us information, and not to treat your pain for an extended period.    Think about the amount of pain that you would have doing the most painful activities (I.e. bending, twisting, walking, standing straight, cleaning, etc...).  Now do those same activities as soon as you get home from the hospital.  Think about how much better you feel doing those activities now that the injection is done.  Does it feel 50%, 80%, 100% better than it would have otherwise?  This is the number you need to send me.    Send me a message through [facility] or leave a message at the phone number above telling me what % of improvement you got from the injection.    If you have difficulty putting a percentage on your pain relief fill this out:  (Rate each question below from 0-10 with 0 being no pain and 10 being the worst imaginable pain)    How bad would your pain get during activities like walking/going up stairs BEFORE the injection? _______    For the first 8 hours AFTER the injection, when you did those same painful activities, what was you best pain score? ____________    Send me those two numbers if you aren't able to give a  percentage.  ---------------------------------------------------------------------------------------------------------------------------------------------------------------------------------------------  What to watch out for:    If you experience any of the following symptoms after your procedure, please notify the messaging service immediately (see above for contact information):   fever (increased oral temperature)   bleeding or swelling at the injection site,    drainage, rash or redness at the injection site    possible signs of infection    increased pain at the injection site   worsening of your usual pain   severe headache   new or worsening numbness    new arm and/or leg weakness, or    changes in bowel and/or bladder function: urinating or defecating on yourself and not knowing that you did it.    PLEASE FOLLOW ALL INSTRUCTIONS CAREFULLY     Do not engage in strenuous activity (e.g., lifting or pushing heavy objects or repeated bending) for 24 hours.     Do not take a bath, swim or use Jacuzzi for 24 hours after procedure. (A shower is fine).   Remove any Band-Aids when you get home.    Use cold/ice, as needed for comfort.  We recommend the use of cold therapy alternating on for 20 minutes, off for 20 minutes.    Do not apply direct heat (heating pad or heat packs) to the injection site for 24 hours.     Resume your usual medications, unless instructed otherwise by your Pain Physician.     If you are on warfarin (Coumadin) or other blood thinner, resume this medication as instructed by your prescribing Physician.    IF AT ANY POINT YOU ARE VERY CONCERNED ABOUT YOUR SYMPTOMS, PLEASE GO TO THE EMERGENCY ROOM.    If you develop worsening pain, weakness, numbness, lose bowel or bladder control (i.e., having an accident where you did not even know you had to go to the bathroom and suddenly noticed you soiled yourself), saddle anesthesia (a loss of sensation restricted to the area of the buttocks, anus and  between the legs -- i.e., those parts of your body that would touch a saddle if you were sitting on one) you need to go immediately to the emergency department for evaluation and treatment.    ----------------------------------------------------------------------------------------------------------------------------------------------------------------  If you received Sedation please read the following instructions:  POST SEDATION INSTRUCTIONS    Today you received intravenous medication (also known as sedation) that was used to help you relax and/or decrease discomfort during your procedure. This medication will be acting in your body for the next 24 hours, so you might feel a little tired or sleepy. This feeling will slowly wear off.   Common side effects associated with these medications include: drowsiness, dizziness, sleepiness, confusion, feeling excited, difficulty remembering things, lack of steadiness with walking or balance, loss of fine muscle control, slowed reflexes, difficulty focusing, and blurred vision.  Some over-the-counter and prescription medications (e.g., muscle relaxants, opioids, mood-altering medications, sedatives/hypnotics, antihistamines) can interact with the intravenous medication you received and cause an increased risk of the side effects listed above in addition to other potentially life threatening side effects. Use extreme caution if you are taking such medications, and consult with your Pain Physician or prescribing physician if you have any questions.  For the next 12-24 hours:    DO NOT--Drive a car, operate machinery or power tools   DO NOT--Drink any alcoholic beverages (not even beer), they may dangerously increase the risk of side effects.    DO NOT--Make any important legal or business decisions or sign important documents.  We advise you to have someone to assist you at home. Move slowly and carefully. Do not make sudden changes in position. Be aware of dizziness or  light-headedness and move accordingly.   If you seek medical treatment within 24 hours, let the nurse or doctor caring for you know that you have received the above medications. If you have any questions or concerns related to your sedation or treatment today please contact us.

## 2025-04-30 NOTE — PROGRESS NOTES
Assessment:  Encounter Diagnoses   Name Primary?    Primary osteoarthritis of right knee     Primary osteoarthritis of left knee     Weakness of both lower extremities Yes    Disorders of muscle in diseases classified elsewhere, multiple sites        Plan:  Assessment & Plan    - Ordered EMG/nerve conduction test for the lower extremities.  - Referred to neurology for lower extremity weakness and EMG results interpretation.  - Follow up after EMG/nerve conduction study completion.            Patient ID: Coby Atkinson is a 79 y.o. female.  Chief Complaint   Patient presents with    Right Knee - Pain    Left Knee - Pain        History of Present Illness    CHIEF COMPLAINT:  - Bilateral knee pain and weakness, with inability to stand or walk for the past year.    HPI:  Ms. Atkinson presents with her  for evaluation of bilateral knee pain and weakness, reporting significant deterioration over the past year. Pain affects the entire knee when attempting to bear weight or apply pressure. Sitting for extended periods also causes discomfort.    She has been unable to walk for approximately one year, using a wheelchair with a lift for transfers. About a year ago, she suddenly felt unable to get out of bed due to weakness, not increased pain. Prior to this, she had been using a walker for about 6-7 months.    Multiple rehabilitation attempts have been made. At [facility], she was able to walk minimally. In [place], where she attended twice, she could stand for 8-16 seconds with assistance, using her knees against a wall for support. This occurred approximately six months ago.    She recently underwent a nerve block procedure for her knee pain, which provided temporary relief lasting a few hours. However, she still lacks the strength to stand independently.    She has consulted multiple healthcare providers. In December, she saw Dr. Mckeon at [facility] [place] [place], who ordered an EMG due to concerns about her weakness, though  she does not recall having this test done. A healthcare provider at [facility] reportedly refused to perform surgery until she could stand on one leg. She also had an appointment with a neurologist in December, but there was a misunderstanding about the purpose of the visit.    She also mentions experiencing some weakness in her arms and occasional dizziness.    She denies numbness in her legs, muscle spasms, or locking up of muscles. She denies any specific medical diagnoses.    PREVIOUS TREATMENTS:  - Ms. Atkinson underwent nerve block procedure around the knee 1-2 weeks ago, which provided temporary pain relief for a few hours  - Ms. Atkinson has been doing exercises at home, including arm exercises with a water bottle for weight, chest exercises, and buttocks squeezes  - Ms. Atkinson attended rehab in [place] twice, where they were able to get her to stand but not walk  - Ms. Atkinson received therapy at [facility], where she walked minimally  - Ms. Atkinson has been using a hospital bed with a chain to pull herself up    SOCIAL HISTORY:  - Marital Status:           CC: bilateral Knee Pain     Pain score: 3/10    Pain location: all aspects of the knee     Pain duration: 2 yrs   Progressive?: yes - dull and throbbing pain    Walking ability: unable to walk    Assistive device: wheelchair     Limitations: General walking, difficulty going up/down steps, difficulty getting in/out of the car, difficulty sleeping at night , difficulty rising from sitting , difficulty putting on shoes/socks, and difficulty standing for long periods of time    Physical therapy: yes    Previous conservative treatments: tylenol, mobic, steroid injections    Past surgical hx: none    Hx of DVT, heart attack, blood thinners: hx of DVT on Eliquis 5 mg BID    Hx of osteoporosis or fragility fx: none    Occupation: does not work    Social support: The patient stated that they live at home with their . The patient stated  that their  would be able to help take care of them if they were to have surgery.      Past Medical History:   Past Medical History:   Diagnosis Date    Anticoagulant long-term use     Cataract     Chronic diarrhea     Depression     Edema     GERD (gastroesophageal reflux disease)     Hypertension 10/2/2012    Osteoarthritis of both knees 8/9/2016    Osteopenia     Osteopenia 11/30/2017    PE (pulmonary thromboembolism) 12/03/2017    Primary localized osteoarthrosis, lower leg 12/3/2014    Sleep apnea 2016    Thalassemia minor         Surgical History:  Past Surgical History:   Procedure Laterality Date    APPENDECTOMY      BLOCK, NERVE, GENICULAR Bilateral 3/14/2024    Procedure: Block, Nerve, Genicular---BILATERAL KNEE;  Surgeon: Kit Barton Jr., MD;  Location: [facility]IN [facility];  Service: Pain Management;  Laterality: Bilateral;    BLOCK, NERVE, GENICULAR Left 4/30/2025    Procedure: Left diagnostic Genicular Block;  Surgeon: Fatmata Ritchie DO;  Location: [facility]IN [facility];  Service: Pain Management;  Laterality: Left;  oral sed - Eliquis ok    CATARACT EXTRACTION      COLONOSCOPY N/A 11/5/2016    Procedure: COLONOSCOPY;  Surgeon: Tanner Simental MD;  [facility] ([facility]);  Service: Endoscopy;  Laterality: N/A;    HYSTERECTOMY  age 39    fibroids    OOPHORECTOMY      TONSILLECTOMY          Social History:  She reports that she has never smoked. She has never been exposed to tobacco smoke. She has never used smokeless tobacco. She reports that she does not drink alcohol and does not use drugs.     Family History:  family history includes Cancer in her brother and father; Coronary artery disease in her maternal grandmother and paternal grandmother; Heart failure in her maternal grandmother; Hypertension in her mother; No Known Problems in her maternal aunt, maternal grandfather, maternal uncle, paternal aunt, paternal grandfather, paternal uncle, and sister; Stroke in her paternal  grandmother.     Medications Ordered Prior to Encounter[1]  Review of patient's allergies indicates:   Allergen Reactions    Codeine     Ciprofloxacin Rash          Physical exam:  There were no vitals taken for this visit.  General: no apparent distress    Gait: unable to walk    Physical Exam    Musculoskeletal: Weak hip abduction. Weak knee extension.           Right knee:   TTP at the medial and lateral joint line and patella  Skin: intact, mild effusion  Range of motion: passive ROM 15 to 100, unable to actively extend against gravity and unable to maintain extension against gravity after assistance achieving extension  Strength: 2/5 with knee extension, 3/5 with flexion, 3/5 hip flexion, 3/5 ankle dorsiflexion  Ligament exam: stable to varus/valgus and stable to AP stress in flexion and extension  Neurovascular: 2+ DP pulse,  Light touch sensation intact  No clonus, 1+ patellar/achilles reflexes    Left knee:   TTP at the medial and lateral joint line and patella  Skin: intact, mild effusion  Range of motion: passive ROM 15 to 100, unable to actively extend against gravity but able to maintain extension against gravity after assistance achieving extension  Strength: 2/5 with extension, 4/5 with flexion, 4/5 hip flexion, 4/5 ankle dorsiflexion  Ligament exam: stable to varus/valgus and stable to AP stress in flexion and extension  Neurovascular: 2+ DP pulse,  Light touch sensation intact       Relevant Results:  Imaging:  Plain x-rays of the bilateral  knees (non weight bearing) were obtained and independently reviewed by me today, 5/8/2025 , and demonstrate  narrowing of the medial and lateral compartments, moderate osteophytes and sclerosis c/w KL grade 3-4 arthritic change. There is varus alignment.     Labs:  Hb- 11.3  Cr- 0.8  HbA1c- 5.5  Alb- 3.5      This note was generated with the assistance of ambient listening technology. Verbal consent was obtained by the patient and accompanying visitor(s) for the  recording of patient appointment to facilitate this note. I attest to having reviewed and edited the generated note for accuracy, though some syntax or spelling errors may persist. Please contact the author of this note for any clarification.              [1]   Current Outpatient Medications on File Prior to Visit   Medication Sig Dispense Refill    alendronate (FOSAMAX) 70 MG tablet Take 1 tablet once weekly in the morning with a full glass of water on an empty stomach. Do not consume food or lie down for at least 30 minutes afterwards. 12 tablet 3    amlodipine-valsartan (EXFORGE)  mg per tablet Take 1 tablet by mouth once daily. 90 tablet 3    apixaban (ELIQUIS) 5 mg Tab Take 1 tablet (5 mg total) by mouth 2 (two) times daily. 60 tablet 4    bisoprolol (ZEBETA) 10 MG tablet TAKE 1 TABLET(10 MG) BY MOUTH EVERY DAY 90 tablet 3    busPIRone (BUSPAR) 10 MG tablet TAKE 1 TABLET(10 MG) BY MOUTH THREE TIMES DAILY AS NEEDED FOR ANXIETY 90 tablet 2    doxazosin (CARDURA) 8 MG Tab Take 1 tablet (8 mg total) by mouth once daily. 90 tablet 3    EScitalopram oxalate (LEXAPRO) 20 MG tablet Take 1 tablet (20 mg total) by mouth once daily. 90 tablet 3    furosemide (LASIX) 20 MG tablet TAKE 2 TABLETS(40 MG) BY MOUTH EVERY  tablet 3    potassium chloride (MICRO-K) 10 MEQ CpSR Take 1 capsule (10 mEq total) by mouth once daily. TAKE 1 CAPSULE(10 MEQ) BY MOUTH EVERY DAY 90 capsule 3    sofosbuvir-velpatasvir 400-100 mg Tab Take 1 tablet by mouth once daily. 28 tablet 2     No current facility-administered medications on file prior to visit.

## 2025-04-30 NOTE — OP NOTE
Diagnostic Genicular Nerve Block under Fluoroscopic Guidance    The procedure, risks, benefits, and options were discussed with the patient. There are no contraindications to the procedure. The patent expressed understanding and agreed to the procedure. Informed written consent was obtained prior to the start of the procedure and can be found in the patient's chart.    PATIENT NAME: Mrs. Coby Atkinson   MRN: [id number]     DATE OF PROCEDURE: 04/30/2025    PROCEDURE: Diagnostic Left Genicular Nerve Block under Fluoroscopic Guidance    PRE-OP DIAGNOSIS: Chronic pain of left knee [M25.562, G89.29]  Chronic Knee Pain    POST-OP DIAGNOSIS: Same    PHYSICIAN: Fatmata Ritchie DO    ASSISTANTS: None    MEDICATIONS INJECTED: Bupivacine 0.25%     LOCAL ANESTHETIC INJECTED: Xylocaine 2%     SEDATION: None    ESTIMATED BLOOD LOSS: None    COMPLICATIONS: None    INTERVAL HISTORY: Patient has clinical findings of chronic knee pain that is not relieved by other therapies.     TECHNIQUE: Time-out was performed to identify the patient and procedure to be performed. With the patient laying in a supine position, the surgical area was prepped and draped in the usual sterile fashion using ChloraPrep and a fenestrated drape. Three target sites including the superior lateral genicular nerve where the lateral femoral shaft meets the epicondyle, the superior medial genicular nerve where the medial femoral shaft meets the epicondyle, and the inferior medial genicular nerve where the medial tibial shaft meets the epicondyle, were determined under fluoroscopy guidance. Skin anesthesia was achieved by injecting Lidocaine 2% over the injection sites. A 25 gauge, 3.5 inch spinal quinke needle was then advanced under fluoroscopy in the AP and lateral views into the positions of the geniculate nerves at each site. Once the needle tip position was confirmed on fluoroscopy, negative pressure was applied to confirm no intravascular placement.  1 mL  of the anesthetic listed above was then slowly injected at each site. The needles were removed and bleeding was nil. A sterile dressing was applied. No specimens collected. The patient tolerated the procedure well.       The patient was monitored after the procedure in the recovery area. They were given post-procedure and discharge instructions to follow at home. The patient was discharged in a stable condition.    Fatmata Ritchie DO

## 2025-04-30 NOTE — PLAN OF CARE
Patient vital signs stable and pt has no complaints at this time. Discharge instructions reviewed with pt and understood. No questions or concerns from pt at this time. Pt belongings returned. Pt escorted to lobby.

## 2025-04-30 NOTE — DISCHARGE SUMMARY
Discharge Note  Short Stay      SUMMARY     Admit Date: 4/30/2025    Attending Physician: Fatmata Ritchie      Discharge Physician: Fatmata Ritchie      Discharge Date: 4/30/2025 2:28 PM    Procedure(s) (LRB):  Left diagnostic Genicular Block (Left)    Final Diagnosis: Chronic pain of left knee [M25.562, G89.29]    Disposition: Home or self care    Patient Instructions:   Current Discharge Medication List        CONTINUE these medications which have NOT CHANGED    Details   alendronate (FOSAMAX) 70 MG tablet Take 1 tablet once weekly in the morning with a full glass of water on an empty stomach. Do not consume food or lie down for at least 30 minutes afterwards.  Qty: 12 tablet, Refills: 3    Associated Diagnoses: Osteoporosis, unspecified osteoporosis type, unspecified pathological fracture presence      amlodipine-valsartan (EXFORGE)  mg per tablet Take 1 tablet by mouth once daily.  Qty: 90 tablet, Refills: 3    Comments: .  Associated Diagnoses: Essential hypertension      apixaban (ELIQUIS) 5 mg Tab Take 1 tablet (5 mg total) by mouth 2 (two) times daily.  Qty: 60 tablet, Refills: 4      bisoprolol (ZEBETA) 10 MG tablet TAKE 1 TABLET(10 MG) BY MOUTH EVERY DAY  Qty: 90 tablet, Refills: 3      busPIRone (BUSPAR) 10 MG tablet TAKE 1 TABLET(10 MG) BY MOUTH THREE TIMES DAILY AS NEEDED FOR ANXIETY  Qty: 90 tablet, Refills: 2      doxazosin (CARDURA) 8 MG Tab Take 1 tablet (8 mg total) by mouth once daily.  Qty: 90 tablet, Refills: 3    Comments: .      EScitalopram oxalate (LEXAPRO) 20 MG tablet Take 1 tablet (20 mg total) by mouth once daily.  Qty: 90 tablet, Refills: 3      furosemide (LASIX) 20 MG tablet TAKE 2 TABLETS(40 MG) BY MOUTH EVERY DAY  Qty: 180 tablet, Refills: 3    Associated Diagnoses: Chronic diastolic heart failure      potassium chloride (MICRO-K) 10 MEQ CpSR Take 1 capsule (10 mEq total) by mouth once daily. TAKE 1 CAPSULE(10 MEQ) BY MOUTH EVERY DAY  Qty: 90 capsule, Refills: 3       sofosbuvir-velpatasvir 400-100 mg Tab Take 1 tablet by mouth once daily.  Qty: 28 tablet, Refills: 2    Associated Diagnoses: Chronic hepatitis C without hepatic coma                 Discharge Diagnosis: Chronic pain of left knee [M25.562, G89.29]  Condition on Discharge: Stable with no complications to procedure   Diet on Discharge: Same as before.  Activity: as per instruction sheet.  Discharge to: Home with a responsible adult.  Follow up: 2-4 weeks       Please call my office or pager at [phone] if experienced any weakness or loss of sensation, fever > 101.5, pain uncontrolled with oral medications, persistent nausea/vomiting/or diarrhea, redness or drainage from the incisions, or any other worrisome concerns. If physician on call was not reached or could not communicate with our office for any reason please go to the nearest emergency department

## 2025-04-30 NOTE — TELEPHONE ENCOUNTER
Called patient to discuss leukocytosis. Unable to reach, spoke with spouse. Stated patient feeling well, no complaints. Instructed to RTC or discuss at upcoming appts if symptoms arise.

## 2025-05-01 ENCOUNTER — OUTPATIENT CASE MANAGEMENT (OUTPATIENT)
Dept: ADMINISTRATIVE | Facility: OTHER | Age: 80
End: 2025-05-01
Payer: MEDICARE

## 2025-05-01 NOTE — PROGRESS NOTES
Outpatient Care Management  Plan of Care Follow Up Visit    Patient: Coby Atkinson  MRN: [id number]  Date of Service: 05/01/2025  Completed by: Cindy Mcdonald RN  Referral Date: 04/17/2025    Reason for Visit   Patient presents with    OPCM RN Follow Up Call       Brief Summary: Phone contact made with Ms. Tenorio and her spouse today. We discussed her care plan. No new needs at this time. Will continue to follow for education and management.

## 2025-05-06 ENCOUNTER — OFFICE VISIT (OUTPATIENT)
Dept: CARDIOLOGY | Facility: CLINIC | Age: 80
End: 2025-05-06
Payer: MEDICARE

## 2025-05-06 DIAGNOSIS — I27.20 PULMONARY HYPERTENSION: ICD-10-CM

## 2025-05-06 DIAGNOSIS — Z86.711 HISTORY OF PULMONARY EMBOLUS (PE): ICD-10-CM

## 2025-05-06 DIAGNOSIS — Z79.01 CHRONIC ANTICOAGULATION: ICD-10-CM

## 2025-05-06 DIAGNOSIS — Z86.718 HISTORY OF DVT OF LOWER EXTREMITY: ICD-10-CM

## 2025-05-06 DIAGNOSIS — E66.01 MORBID OBESITY WITH BMI OF 40.0-44.9, ADULT: ICD-10-CM

## 2025-05-06 DIAGNOSIS — G47.33 OSA (OBSTRUCTIVE SLEEP APNEA): ICD-10-CM

## 2025-05-06 DIAGNOSIS — I10 ESSENTIAL HYPERTENSION: ICD-10-CM

## 2025-05-06 DIAGNOSIS — I87.2 VENOUS INSUFFICIENCY OF BOTH LOWER EXTREMITIES: ICD-10-CM

## 2025-05-06 DIAGNOSIS — Z01.818 PREOP EXAMINATION: Primary | ICD-10-CM

## 2025-05-06 PROBLEM — I27.82 CHRONIC PULMONARY EMBOLISM WITHOUT ACUTE COR PULMONALE: Status: RESOLVED | Noted: 2021-02-17 | Resolved: 2025-05-06

## 2025-05-06 PROBLEM — I82.532 CHRONIC DEEP VEIN THROMBOSIS (DVT) OF LEFT POPLITEAL VEIN: Status: RESOLVED | Noted: 2017-12-21 | Resolved: 2025-05-06

## 2025-05-06 PROCEDURE — 99999 PR PBB SHADOW E&M-EST. PATIENT-LVL III: CPT | Mod: PBBFAC,,, | Performed by: INTERNAL MEDICINE

## 2025-05-06 PROCEDURE — 99213 OFFICE O/P EST LOW 20 MIN: CPT | Mod: PBBFAC,25 | Performed by: INTERNAL MEDICINE

## 2025-05-06 PROCEDURE — 93005 ELECTROCARDIOGRAM TRACING: CPT | Mod: PBBFAC | Performed by: INTERNAL MEDICINE

## 2025-05-06 PROCEDURE — 99215 OFFICE O/P EST HI 40 MIN: CPT | Mod: S$PBB,,, | Performed by: INTERNAL MEDICINE

## 2025-05-06 NOTE — PROGRESS NOTES
Patient ID: Coby Atkinson is a 79 y.o. female.    History of Present Illness    CHIEF COMPLAINT:  - Patient presents for cardiac clearance for knee surgery.    HPI:  Patient is an 79-year-old female presenting for an initial evaluation as part of cardiac clearance for knee surgery. She reports significant mobility limitations due to knee arthritis, requiring the use of a scooter for ambulation. She typically sleeps flat but occasionally falls asleep in a reclined position while watching television. She has a history of sleep apnea and uses a CPAP. She monitors BP at home twice weekly, reporting diastolic readings between 70 and 80, and systolic ranging from 140 to 160. She is on multiple antihypertensive medications, including Bisoprolol 10 mg every other day, a combination pill of Amlodipine and Valsartan, and Doxazosin. For fluid management, she takes Furosemide 40 mg, two pills daily, which sometimes improves swelling. She has a history of blood clots in 2017, which were treated appropriately, and she has been on anticoagulation therapy since then.    She denies chest pain, dyspnea, orthopnea, history of CHF, MI, CVA, DM, and current cardiac symptoms.    CARDIAC HISTORY (IMAGING REVIEWED DURING VISIT):  - EKG ([id number]:40:00.000Z): SR, no Q waves or ST changes  - Echo 01/2025: Normal LV size, EF 60-65%, grade 1 diastology, normal RV size and function, CVB 3, estimated PASP 47, negative bubble study for intra-cardiac shunt  - Venous US 2023: No evidence of DVT bilaterally, bilateral GSV reflux  - MRI brain 12/2024: No acute intracranial process, no ischemic changes  - Stress echo 2016: No evidence of ischemia, poor exercise capacity  - Cardiac cath 2017: Left main PA, pig tail placed for tPA infusion    MEDICAL HISTORY:  - HTN  - Pulmonary embolism: 2017  - Obesity  - Thalassemia minor  - Osteoarthritis  - Venous insufficiency  - Sleep apnea  - Pulmonary HTN: mild, likely related to sleep  apnea    SURGICAL HISTORY:  - Appendectomy  - Tonsillectomy    MEDICATIONS:  - Amlodipine/Valsartan 5/ mg daily  - Bisoprolol 10 mg every other day  - Doxazosin 8 mg daily  - Furosemide 40 mg 2 pills daily (one in the morning, one in the evening)  - Potassium Chloride (KCL) 10 mEq daily  - Apixaban 5 mg 2x daily         Physical Exam    Vitals: Pulse: 80. Blood pressure: 152/82.  Cardiovascular: Regular rate and rhythm with no murmurs. No JVD or carotid bruits.  Lungs: Lungs clear to auscultation bilaterally, no crackles.  Extremities: No lower extremity edema.         LABS:  Hemoglobin:  Recent Labs   Lab 01/07/25  0348 01/08/25  0434 04/22/25  1523   Hemoglobin 8.6 L 9.0 L  --    HGB  --   --  11.3 L     MCV:  Recent Labs   Lab 01/07/25  0348 01/08/25  0434 04/22/25  1523   MCV 67 L 67 L 69 L     Platelet:  Recent Labs   Lab 01/07/25  0348 01/08/25  0434 04/22/25  1523   Platelet Count  --   --  309   Platelets 232 268  --      A1C:  Recent Labs   Lab 12/13/22  1050 04/11/23  1009 04/25/24  0748   Hemoglobin A1C 5.8 H 5.3 5.5     Creatinine:  Recent Labs   Lab 01/07/25  0348 01/08/25  0434 04/22/25  1523   Creatinine 0.8 0.8 0.8     BUN:  Recent Labs   Lab 01/07/25  0348 01/08/25  0434 04/22/25  1523   BUN 12 17 20     GFR:   @LAB3YR(EGFRNORACEVR:3)  Albumin:  Recent Labs   Lab Result Units 04/22/25  1523   Albumin g/dL 3.5      LIPIDS:  Recent Labs   Lab 02/09/23  0915 04/11/23  1009 02/07/24  1135   HDL 62 57 57   Cholesterol 141 126 129   Triglycerides 75 64 88   LDL Cholesterol 64.0 56.2 L 54.4 L   HDL/Cholesterol Ratio 44.0 45.2 44.2   Non-HDL Cholesterol 79 69 72   Total Cholesterol/HDL Ratio 2.3 2.2 2.3     Apolipoprotein B       Lipoprotein A:       Troponin:  Recent Labs   Lab 01/05/25  1017 01/05/25  1617 01/05/25  2310   Troponin I <0.006 <0.006 <0.006     BNP  Recent Labs   Lab 12/13/22  1050 10/08/24  1754 01/04/25  1528   BNP 23 85 158 H       Assessment & Plan    I27.20 Pulmonary  hypertension  Z01.818 Preop exam  I10 Essential hypertension  G47.33 ADRIÁN (obstructive sleep apnea)  I87.2 Venous insufficiency of both lower extremities  Z86.718 History of DVT of lower extremity  Z86.711 History of pulmonary embolus (PE)  Z79.01 Chronic anticoagulation  E66.01, Z68.41 Morbid obesity with BMI of 40.0-44.9, adult      PREOP EXAM:  - 0 risk factors by RCRI criteria, would suggest a lower risk for perioperative cardiovascular events.  That being said the patient's obesity, obstructive sleep apnea, and history of VTE likely place her at higher risk.  - No further cardiac testing needed for preoperative evaluation.  - Discontinue Apixaban (Eliquis) 48 hours prior to surgery and restart as soon as reasonable postop.    PULMONARY HYPERTENSION:  - Mild pulmonary HTN noted.  - likely secondary to ADRIÁN/obesity.    - on diuretic therapy and antihypertensive therapy including calcium channel blocker.    ESSENTIAL HYPERTENSION:  - Continued Bisoprolol 10 mg every other day.  - Continued Amlodipine/Valsartan combination pill for blood pressure.  - Continued Doxazosin for blood pressure.  - can consider more aggressive BP control or weight loss.    VENOUS INSUFFICIENCY OF BOTH LOWER EXTREMITIES:  - symptoms controlled on Furosemide (Lasix) 40 mg, two pills daily.  - second-degree decreased activity levels.    CHRONIC ANTICOAGULATION/history of DVT/history of pulmonary embolism:  - history of now provoked VTE with mild activity levels at baseline.  - agree with chronic anticoagulation therapy.    MORBID OBESITY WITH BMI OF 40.0-44.9, ADULT:  - Patient to continue focus on weight loss through diet, recommend increasing activity levels when possible, particularly after knee surgery.    LIFESTYLE CHANGES:  - Removed heart failure diagnosis from chart.  Diagnosis dates back to presentation for pulmonary embolism.  The patient otherwise denies history of heart failure.  - Patient to continue focus on weight loss through  diet, recommend increasing activity levels when possible, particularly after knee surgery.     The total time spent today in care of this established patient was 40 minutes.          This note was generated with the assistance of ambient listening technology. Verbal consent was obtained by the patient and accompanying visitor(s) for the recording of patient appointment to facilitate this note. I attest to having reviewed and edited the generated note for accuracy, though some syntax or spelling errors may persist. Please contact the author of this note for any clarification.

## 2025-05-08 ENCOUNTER — HOSPITAL ENCOUNTER (OUTPATIENT)
Dept: RADIOLOGY | Facility: HOSPITAL | Age: 80
Discharge: HOME | End: 2025-05-08
Attending: STUDENT IN AN ORGANIZED HEALTH CARE EDUCATION/TRAINING PROGRAM
Payer: MEDICARE

## 2025-05-08 ENCOUNTER — OFFICE VISIT (OUTPATIENT)
Dept: ORTHOPEDICS | Facility: CLINIC | Age: 80
End: 2025-05-08
Payer: MEDICARE

## 2025-05-08 ENCOUNTER — TELEPHONE (OUTPATIENT)
Dept: ORTHOPEDICS | Facility: CLINIC | Age: 80
End: 2025-05-08
Payer: MEDICARE

## 2025-05-08 DIAGNOSIS — M17.12 PRIMARY OSTEOARTHRITIS OF LEFT KNEE: ICD-10-CM

## 2025-05-08 DIAGNOSIS — M17.0 PRIMARY OSTEOARTHRITIS OF BOTH KNEES: ICD-10-CM

## 2025-05-08 DIAGNOSIS — R29.898 WEAKNESS OF BOTH LOWER EXTREMITIES: Primary | ICD-10-CM

## 2025-05-08 DIAGNOSIS — M63.89 DISORDERS OF MUSCLE IN DISEASES CLASSIFIED ELSEWHERE, MULTIPLE SITES: ICD-10-CM

## 2025-05-08 DIAGNOSIS — M17.11 PRIMARY OSTEOARTHRITIS OF RIGHT KNEE: ICD-10-CM

## 2025-05-08 PROCEDURE — 99213 OFFICE O/P EST LOW 20 MIN: CPT | Mod: PBBFAC,25 | Performed by: STUDENT IN AN ORGANIZED HEALTH CARE EDUCATION/TRAINING PROGRAM

## 2025-05-08 PROCEDURE — 73562 X-RAY EXAM OF KNEE 3: CPT | Mod: 26,50,, | Performed by: RADIOLOGY

## 2025-05-08 PROCEDURE — 99999 PR PBB SHADOW E&M-EST. PATIENT-LVL III: CPT | Mod: PBBFAC,,, | Performed by: STUDENT IN AN ORGANIZED HEALTH CARE EDUCATION/TRAINING PROGRAM

## 2025-05-08 PROCEDURE — 73562 X-RAY EXAM OF KNEE 3: CPT | Mod: TC,50

## 2025-05-08 NOTE — TELEPHONE ENCOUNTER
Called patient & left a voicemail informing her that her EMG has been scheduled for 6/16 at 3:00 p.m. at the [facility] location. Call-back name and number provided.    Lita Arriaga MS, ATC, OTC  Clinical / Surgical Assistant to Dr. [facility]  O: [phone]  F: [phone]

## 2025-05-09 PROBLEM — M25.561 PAIN IN BOTH KNEES: Status: ACTIVE | Noted: 2025-05-09

## 2025-05-09 PROBLEM — M25.562 PAIN IN BOTH KNEES: Status: ACTIVE | Noted: 2025-05-09
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
## 2025-05-09 LAB
OHS QRS DURATION: 84 MS
OHS QTC CALCULATION: 461 MS

## 2025-05-13 ENCOUNTER — OUTPATIENT CASE MANAGEMENT (OUTPATIENT)
Dept: ADMINISTRATIVE | Facility: OTHER | Age: 80
End: 2025-05-13
Payer: MEDICARE

## 2025-05-19 ENCOUNTER — OUTPATIENT CASE MANAGEMENT (OUTPATIENT)
Dept: ADMINISTRATIVE | Facility: OTHER | Age: 80
End: 2025-05-19
Payer: COMMERCIAL

## 2025-05-20 PROBLEM — F33.9 EPISODE OF RECURRENT MAJOR DEPRESSIVE DISORDER: Status: ACTIVE | Noted: 2025-05-20

## 2025-05-26 ENCOUNTER — OUTPATIENT CASE MANAGEMENT (OUTPATIENT)
Dept: ADMINISTRATIVE | Facility: OTHER | Age: 80
End: 2025-05-26
Payer: COMMERCIAL

## 2025-05-26 NOTE — PROGRESS NOTES
05/26/25 Pt discharged from the hospital on 5/22/25 at admitted to [facility]. Does not qualify for OPCM at this time. Has all needs met RN OPCM

## 2025-06-23 ENCOUNTER — TELEPHONE (OUTPATIENT)
Facility: CLINIC | Age: 80
End: 2025-06-23
Payer: COMMERCIAL

## 2025-06-23 NOTE — TELEPHONE ENCOUNTER
Called and spoke with Mr Atkinson  he informed me that Mrs Atkinson is in a Nursing Facility @ [phone].  Pt is is coming for leg weakness.  I informed him that the appointment is scheduled incorrectly and I will forward his message to the correct department for scheduling .  Mr Atkinson was ok with this.

## 2025-06-23 NOTE — TELEPHONE ENCOUNTER
Copied from CRM #[id number]. Topic: General Inquiry - Patient Advice  >> Jun 23, 2025  2:32 PM Dilip wrote:  Type: Patient Call Back    Who called: patient     What is the request in detail: call patient about wife appointment she is in a nursing facility    Can the clinic reply by [facility]?    Would the patient rather a call back or a response via [facility]? call    Best call back number:[phone]    Additional Information:

## 2025-06-25 DIAGNOSIS — Z78.0 MENOPAUSE: ICD-10-CM

## 2025-07-02 ENCOUNTER — TELEPHONE (OUTPATIENT)
Dept: HEPATOLOGY | Facility: CLINIC | Age: 80
End: 2025-07-02
Payer: COMMERCIAL

## 2025-07-02 NOTE — TELEPHONE ENCOUNTER
Patient currently stating at [facility] (# [phone] and fax  # [phone]).  She needs to complete svr 12 blood draw to confirm cure after hep c treatment.  Lab orders and provider's treatment note faxed to facility on 7/2/25.

## 2025-07-09 NOTE — PROGRESS NOTES
[facility] - NEUROLOGY [address] [facility], [place]         Patient ID: [id number]  Referring Physician: Mundo Mensah MD    Chief Complaint/Reason for Consult:  Lower extremity weakness     Subjective:     HPI  Coby Atkinson is an 79 y.o. woman who is seen as a new patient for lower extremity weakness.     She reports sudden onset of leg weakness starting April 16th, 2024 with inability to get out of bed. No stroke-like symptoms.  No unilateral symptoms.  Patient herself does not have any idea why she was unable to walk and seems unperturbed by this.   recalls the exact day this happened, because he had planned a cruise for their 60th anniversary.    Back pain is minimal, occurring only with prolonged sitting. She denies any numbness, tingling, or other neurological symptoms accompanying the leg weakness.   No bowel/bladder changes.      MEDICAL HISTORY:  History significant for pulmonary embolism secondary to deep vein thrombosis in lower extremity, requiring emergency clot dissolution procedure at [facility]. She also has bilateral primary knee arthritis and has been under the care of orthopedics.    MEDICATIONS:  She recently transitioned from Warfarin to Apixaban (Eliquis) for anticoagulation.    PREVIOUS TREATMENTS:  - Ms. Atkinson underwent nerve block procedure for the knee which provided temporary pain relief for a few hours  - Ms. Atkinson has been doing exercises at the nursing home.  - Ms. Atkinson attended rehab in [place] twice, where they were able to get her to stand but not walk  - Ms. Atkinson received therapy at [place] rehab, where she walked minimally  - Ms. Atkinson has been using a hospital bed with a chain to pull herself up.    ROS:  Musculoskeletal: +back pain, +joint pain, +muscle weakness, +difficulty standing up, +pain with movement  Neurological: -numbness, -tingling. The balance of systems is otherwise negative.         Past Medical  History:  -------------------------------------    Anticoagulant long-term use    Cataract    Chronic diarrhea    Depression    Edema    GERD (gastroesophageal reflux disease)    Hypertension    Osteoarthritis of both knees    Osteopenia    Osteopenia    PE (pulmonary thromboembolism)    Primary localized osteoarthrosis, lower leg    Sleep apnea    Thalassemia minor       Allergies:  Review of patient's allergies indicates:   Allergen Reactions    Codeine     Ciprofloxacin Rash       Pertinent Family History:  Family History   Problem Relation Name Age of Onset    Hypertension Mother      Cancer Father          skin    No Known Problems Sister      Cancer Brother          pancreatic    No Known Problems Maternal Aunt      No Known Problems Maternal Uncle      No Known Problems Paternal Aunt      No Known Problems Paternal Uncle      Coronary artery disease Maternal Grandmother      Heart failure Maternal Grandmother      No Known Problems Maternal Grandfather      Stroke Paternal Grandmother      Coronary artery disease Paternal Grandmother      No Known Problems Paternal Grandfather      Amblyopia Neg Hx      Blindness Neg Hx      Cataracts Neg Hx      Diabetes Neg Hx      Glaucoma Neg Hx      Macular degeneration Neg Hx      Retinal detachment Neg Hx      Strabismus Neg Hx      Thyroid disease Neg Hx      Colon cancer Neg Hx      Stomach cancer Neg Hx      Esophageal cancer Neg Hx         Pertinent Social History:  Social History[1]    Medications:  Current Outpatient Medications   Medication Instructions    alendronate (FOSAMAX) 70 MG tablet Take 1 tablet once weekly in the morning with a full glass of water on an empty stomach. Do not consume food or lie down for at least 30 minutes afterwards.    amlodipine-valsartan (EXFORGE)  mg per tablet 1 tablet, Oral, Daily    apixaban (ELIQUIS) 5 mg, Oral, 2 times daily    bisoprolol (ZEBETA) 10 mg, Oral    busPIRone (BUSPAR) 10 MG tablet TAKE 1 TABLET(10 MG) BY  MOUTH THREE TIMES DAILY AS NEEDED FOR ANXIETY    diclofenac sodium (VOLTAREN ARTHRITIS PAIN) 2 g, Topical (Top), Daily    doxazosin (CARDURA) 8 mg, Oral, Daily    EScitalopram oxalate (LEXAPRO) 20 mg, Oral, Daily    furosemide (LASIX) 40 mg, Oral    potassium chloride (MICRO-K) 10 MEQ CpSR 10 mEq, Oral, Daily, TAKE 1 CAPSULE(10 MEQ) BY MOUTH EVERY DAY    sofosbuvir-velpatasvir 400-100 mg Tab 1 tablet, Oral, Daily        Objective:     Vitals:    07/10/25 1619   BP: 98/61   Pulse: (!) 59        General:  Well-appearing, obese woman who is seated in a wheelchair  HEENT:  Normocephalic, atraumatic  Musculoskeletal:  Normal joints  Extremities:  No clubbing, cyanosis, or edema    Neurologic Exam:   Awake, alert, and oriented x3  Speech sparse,  does most of the explaining regarding symptoms/treatment; patient answered questions.  Affect flat.       CN II - CN XII:  PERRLA. EOM intact. No Nystagmus. No ophthalmoplegia. No papilledema  Facial sensation is normal to light touch.   Facial expression is full and symmetric.   Hearing is intact bilaterally.   Palate elevates symmetrically.   SCM and Trapezius full strength bilaterally.   Tongue is midline.     Motor:  4/5 in the upper and lower extremities bilaterally; Left leg 4-/5  Normal tone, normal bulk     Sensory:  Grossly intact    DTRs:   Biceps Brachioradialis Triceps Catracho Patellar Ankle    Right 2+ 2+ 2+ Negative 1+ 1+    Left 2+ 2+ 2+ Negative 1+ 1+      Coordination and Gait:  Finger to nose is normal bilaterally.  No tremors at rest or with action  Unable to assess gait; patient requires lift to get in/out of chair    Pertinent lab results  Lab Results   Component Value Date    FOLATE 4.9 10/08/2024    ERCJHPZC76 254 10/08/2024    THIAMINEBLOO 60 10/11/2022    GRLJHZYF05BT 11 (L) 12/13/2022        Lab Results   Component Value Date    [id number] Non-reactive 10/08/2024    HEPAIGG Non-reactive 12/09/2024    HEPBSAG Non-reactive 12/09/2024    HEPBSAB  <3.00 12/09/2024    HEPBSAB Non-reactive 12/09/2024    HEPBCAB Non-reactive 12/09/2024     Lab Results   Component Value Date    TSH 2.504 04/22/2025    FREET4 0.87 11/10/2021    WBC 11.62 05/21/2025    LYMPH 31.5 05/21/2025    LYMPH 3.66 05/21/2025    RBC 5.11 05/21/2025    HGB 10.5 (L) 05/21/2025    HCT 34.9 (L) 05/21/2025    MCV 68 (L) 05/21/2025     05/21/2025     05/21/2025    K 4.0 05/21/2025    CO2 25 05/21/2025    BUN 28 (H) 05/21/2025    CREATININE 0.9 05/21/2025    CALCIUM 9.1 05/21/2025    AST 12 05/21/2025    ALT 6 (L) 05/21/2025            Assessment and Plan:   Ms. Atkinson is an 79 y.o. RH female with lower extremity weakess.  She has known osteoarthritis of the knees, left > right, and debility.            LUMBAR RADICULOPATHY, CHRONIC:   Considered potential causes for reported sudden onset lower body weakness, including back issues.   Ordered MRI Lumbar Spine to evaluate for any structural issues.   Follow up after completion of MRI to review test results.    Weakness:   Considered potential causes for reported sudden onset lower body weakness, including nerve and muscle disorders.   Explained that labs can sometimes indicate muscle problems through abnormal enzyme levels or vitamin deficiencies.   Clarified that EMG/nerve conduction study involves stimulating nerves and checking muscles, but does not involve the knee joint directly; she will follow up with orthopedics for knee problems.   Ordered comprehensive workup to investigate potential nerve disorders.   Ordered specific labs to check for nerve-related abnormalities.   Ordered EMG/nerve conduction study to assess nerve and muscle function.   Follow up after completion of nerve conduction study/EMG to review test results.    OTHER ABNORMALITIES OF GAIT AND MOBILITY:   Considered potential causes for reported sudden onset lower body weakness, including joint problems, debility, and psychologic factors.   Ruled out stroke or other  acute neurological events based on history and presentation.  Previous MRI brain negative.   Suspect deconditioning as possible explanation for weakness, though unusual for sudden onset.   Ordered comprehensive workup to investigate potential muscle disorders.   Ordered specific labs to check for muscle enzyme abnormalities and vitamin deficiencies.   Plan to review all test results before determining next steps in management.    PLAN SUMMARY:   Ordered comprehensive lab workup to investigate muscle and nerve disorders   Ordered MRI of Lumbar Spine   Ordered EMG/nerve conduction study   Follow-up after completion of nerve conduction study/EMG to review results   Follow-up after completion of MRI to review results   Review all test results before determining next steps in management            This is a patient with a complex neurologic diagnosis whose overall, ongoing care is being managed and monitored by me and our Neurology clinic.   As such, since 2024,  is the appropriate add-on code to accompany the other E/M billing for this visit.    This note was generated with the assistance of ambient listening technology. Verbal consent was obtained by the patient and accompanying visitor(s) for the recording of patient appointment to facilitate this note. I attest to having reviewed and edited the generated note for accuracy, though some syntax or spelling errors may persist. Please contact the author of this note for any clarification.                         [1]   Social History  Tobacco Use    Smoking status: Never     Passive exposure: Never    Smokeless tobacco: Never   Substance Use Topics    Alcohol use: No     Comment: rare    Drug use: No

## 2025-07-10 ENCOUNTER — OFFICE VISIT (OUTPATIENT)
Facility: CLINIC | Age: 80
End: 2025-07-10
Payer: MEDICARE

## 2025-07-10 VITALS
DIASTOLIC BLOOD PRESSURE: 61 MMHG | SYSTOLIC BLOOD PRESSURE: 98 MMHG | BODY MASS INDEX: 39.69 KG/M2 | HEART RATE: 59 BPM | HEIGHT: 63 IN | WEIGHT: 224 LBS

## 2025-07-10 DIAGNOSIS — R26.89 OTHER ABNORMALITIES OF GAIT AND MOBILITY: ICD-10-CM

## 2025-07-10 DIAGNOSIS — G60.9 HEREDITARY AND IDIOPATHIC NEUROPATHY, UNSPECIFIED: ICD-10-CM

## 2025-07-10 DIAGNOSIS — M54.16 LUMBAR RADICULOPATHY, CHRONIC: Primary | ICD-10-CM

## 2025-07-10 PROCEDURE — 99214 OFFICE O/P EST MOD 30 MIN: CPT | Mod: PBBFAC | Performed by: PSYCHIATRY & NEUROLOGY

## 2025-07-10 PROCEDURE — 99999 PR PBB SHADOW E&M-EST. PATIENT-LVL IV: CPT | Mod: PBBFAC,,, | Performed by: PSYCHIATRY & NEUROLOGY

## 2025-07-28 ENCOUNTER — HOSPITAL ENCOUNTER (OUTPATIENT)
Dept: RADIOLOGY | Facility: HOSPITAL | Age: 80
Discharge: HOME | End: 2025-07-28
Attending: PSYCHIATRY & NEUROLOGY
Payer: MEDICARE

## 2025-07-28 DIAGNOSIS — M54.16 LUMBAR RADICULOPATHY, CHRONIC: ICD-10-CM

## 2025-07-28 PROCEDURE — 72148 MRI LUMBAR SPINE W/O DYE: CPT | Mod: 26,,, | Performed by: INTERNAL MEDICINE

## 2025-07-28 PROCEDURE — 72148 MRI LUMBAR SPINE W/O DYE: CPT | Mod: TC

## 2025-08-14 ENCOUNTER — EXTERNAL HOME HEALTH (OUTPATIENT)
Dept: HOME HEALTH SERVICES | Facility: HOSPITAL | Age: 80
End: 2025-08-14
Payer: MEDICARE

## 2025-08-26 ENCOUNTER — EXTERNAL HOME HEALTH (OUTPATIENT)
Dept: HOME HEALTH SERVICES | Facility: HOSPITAL | Age: 80
End: 2025-08-26
Payer: MEDICARE

## 2025-08-27 ENCOUNTER — EXTERNAL HOME HEALTH (OUTPATIENT)
Dept: HOME HEALTH SERVICES | Facility: HOSPITAL | Age: 80
End: 2025-08-27
Payer: MEDICARE

## 2025-09-02 ENCOUNTER — LAB VISIT (OUTPATIENT)
Dept: LAB | Facility: HOSPITAL | Age: 80
End: 2025-09-02
Payer: MEDICARE

## 2025-09-02 ENCOUNTER — OFFICE VISIT (OUTPATIENT)
Facility: CLINIC | Age: 80
End: 2025-09-02
Payer: MEDICARE

## 2025-09-02 ENCOUNTER — PROCEDURE VISIT (OUTPATIENT)
Facility: CLINIC | Age: 80
End: 2025-09-02
Payer: MEDICARE

## 2025-09-02 DIAGNOSIS — R26.89 OTHER ABNORMALITIES OF GAIT AND MOBILITY: ICD-10-CM

## 2025-09-02 DIAGNOSIS — M54.16 LUMBAR RADICULOPATHY, CHRONIC: ICD-10-CM

## 2025-09-02 DIAGNOSIS — R26.89 OTHER ABNORMALITIES OF GAIT AND MOBILITY: Primary | ICD-10-CM

## 2025-09-02 DIAGNOSIS — R53.81 DEBILITY: ICD-10-CM

## 2025-09-02 DIAGNOSIS — G60.9 HEREDITARY AND IDIOPATHIC NEUROPATHY, UNSPECIFIED: ICD-10-CM

## 2025-09-02 DIAGNOSIS — M54.16 LUMBAR RADICULOPATHY, CHRONIC: Primary | ICD-10-CM

## 2025-09-02 LAB
25(OH)D3+25(OH)D2 SERPL-MCNC: 19 NG/ML (ref 30–96)
CK SERPL-CCNC: 37 U/L (ref 20–180)
CRP SERPL-MCNC: 3.2 MG/L
ERYTHROCYTE [SEDIMENTATION RATE] IN BLOOD BY PHOTOMETRIC METHOD: 41 MM/HR
VIT B12 SERPL-MCNC: 362 PG/ML (ref 210–950)

## 2025-09-02 PROCEDURE — 82550 ASSAY OF CK (CPK): CPT

## 2025-09-02 PROCEDURE — 82607 VITAMIN B-12: CPT

## 2025-09-02 PROCEDURE — 99999 PR PBB SHADOW E&M-EST. PATIENT-LVL III: CPT | Mod: PBBFAC,,, | Performed by: PSYCHIATRY & NEUROLOGY

## 2025-09-02 PROCEDURE — G2211 COMPLEX E/M VISIT ADD ON: HCPCS | Mod: ,,, | Performed by: PSYCHIATRY & NEUROLOGY

## 2025-09-02 PROCEDURE — 86334 IMMUNOFIX E-PHORESIS SERUM: CPT

## 2025-09-02 PROCEDURE — 82306 VITAMIN D 25 HYDROXY: CPT

## 2025-09-02 PROCEDURE — 84165 PROTEIN E-PHORESIS SERUM: CPT

## 2025-09-02 PROCEDURE — 85652 RBC SED RATE AUTOMATED: CPT

## 2025-09-02 PROCEDURE — 84207 ASSAY OF VITAMIN B-6: CPT

## 2025-09-02 PROCEDURE — 99214 OFFICE O/P EST MOD 30 MIN: CPT | Mod: S$PBB,,, | Performed by: PSYCHIATRY & NEUROLOGY

## 2025-09-02 PROCEDURE — 36415 COLL VENOUS BLD VENIPUNCTURE: CPT

## 2025-09-02 PROCEDURE — 99213 OFFICE O/P EST LOW 20 MIN: CPT | Mod: PBBFAC | Performed by: PSYCHIATRY & NEUROLOGY

## 2025-09-02 PROCEDURE — 86140 C-REACTIVE PROTEIN: CPT

## 2025-09-02 PROCEDURE — 99499 UNLISTED E&M SERVICE: CPT | Mod: S$PBB,,, | Performed by: PSYCHIATRY & NEUROLOGY

## 2025-09-02 PROCEDURE — 84425 ASSAY OF VITAMIN B-1: CPT

## 2025-09-02 PROCEDURE — 86235 NUCLEAR ANTIGEN ANTIBODY: CPT

## 2025-09-03 LAB
ALBUMIN, SPE (OHS): 3.73 G/DL (ref 3.35–5.55)
ALPHA 1 GLOB (OHS): 0.26 GM/DL (ref 0.17–0.41)
ALPHA 2 GLOB (OHS): 0.82 GM/DL (ref 0.43–0.99)
BETA GLOB (OHS): 0.7 GM/DL (ref 0.5–1.1)
GAMMA GLOBULIN (OHS): 1.19 GM/DL (ref 0.67–1.58)
PROT SERPL-MCNC: 6.7 GM/DL (ref 6–8.4)

## 2025-09-04 LAB
PATHOLOGIST INTERPRETATION - IFE SERUM (OHS): NORMAL
PATHOLOGIST REVIEW - SPE (OHS): NORMAL

## 2025-09-05 ENCOUNTER — TELEPHONE (OUTPATIENT)
Dept: HEPATOLOGY | Facility: CLINIC | Age: 80
End: 2025-09-05
Payer: MEDICARE